# Patient Record
Sex: MALE | Race: WHITE | HISPANIC OR LATINO | URBAN - METROPOLITAN AREA
[De-identification: names, ages, dates, MRNs, and addresses within clinical notes are randomized per-mention and may not be internally consistent; named-entity substitution may affect disease eponyms.]

---

## 2018-11-13 VITALS
DIASTOLIC BLOOD PRESSURE: 74 MMHG | OXYGEN SATURATION: 99 % | RESPIRATION RATE: 16 BRPM | HEART RATE: 53 BPM | TEMPERATURE: 98 F | SYSTOLIC BLOOD PRESSURE: 164 MMHG

## 2018-11-13 RX ORDER — CHLORHEXIDINE GLUCONATE 213 G/1000ML
1 SOLUTION TOPICAL ONCE
Qty: 0 | Refills: 0 | Status: DISCONTINUED | OUTPATIENT
Start: 2018-11-14 | End: 2018-11-15

## 2018-11-13 NOTE — H&P ADULT - ASSESSMENT
73 y o Chinese speaking male POOR HISTORIAN with PMH of HTN, Dm, PVD, HLD, glaucoma CAD s/p PCI s/p most recent dx cath @ [facility] 11/6/18 revealing 2 V CAD who now  presents for recommended staged PCI of RCA 2/2 pt's risk factors, prior history of CAD, abnormal NST and known residual disease.  H/H . Pt denies bleeding, GI bleeding, hematemesis, hematuria, BRBPR or melena . ASA … and Plavix … pre-cath.  Cr. IV NS@ cc/hr pre-cath.  Risks & benefits of procedure and alternative therapy have been explained to the patient including but not limited to: allergic reaction, bleeding w/possible need for blood transfusion, infection, renal and vascular compromise, limb damage, arrhythmia, stroke, vessel dissection/perforation, Myocardial infarction, emergent CABG. Informed consent obtained and in chart 73 y o Sami speaking male BLIND POOR HISTORIAN with PMH of HTN, Dm, PVD, HLD, glaucoma CAD s/p PCI s/p most recent dx cath @ [facility] 11/6/18 revealing 2 V CAD who now  presents for recommended staged PCI of RCA 2/2 pt's risk factors, prior history of CAD, abnormal NST and known residual disease.  H/H 12.9/39.3. Pt denies bleeding, GI bleeding, hematemesis, hematuria, BRBPR or melena . Pt. loaded with  mg PO X 1 and Plavix 600 mg PO X 1 pre-cath.  Cr. 1.06 IV NS@ 75 cc/hr pre-cath.  Pt. precath consented with help of [employer]  ID # [id number]. Pt. blind, consent signed by wife Nini Robertson (HCP).   Risks & benefits of procedure and alternative therapy have been explained to the patient including but not limited to: allergic reaction, bleeding w/possible need for blood transfusion, infection, renal and vascular compromise, limb damage, arrhythmia, stroke, vessel dissection/perforation, Myocardial infarction, emergent CABG. Informed consent obtained and in chart

## 2018-11-13 NOTE — H&P ADULT - NSHPLABSRESULTS_GEN_ALL_CORE
12.9   5.6   )-----------( 195      ( 14 Nov 2018 10:57 )             39.3   11-14    140  |  103  |  22  ----------------------------<  250<H>  4.6   |  27  |  1.06    Ca    9.3      14 Nov 2018 10:57    TPro  7.2  /  Alb  4.4  /  TBili  0.4  /  DBili  x   /  AST  22  /  ALT  20  /  AlkPhos  78  11-14  PT/INR - ( 14 Nov 2018 10:57 )   PT: 13.3 sec;   INR: 1.17          PTT - ( 14 Nov 2018 10:57 )  PTT:31.6 sec

## 2018-11-13 NOTE — H&P ADULT - HISTORY OF PRESENT ILLNESS
73 y o M current smoker with PMH of HTN, Dm, PVD, HLD, glaucoma CAD s/p PCI in 2014 presented to the clinic for follow-up of ECHO and NST results. Pt underwent ECHO on 10/19/18 which indicated overall LV systolic function is nml with EF of 55-60%. NST was done 10/19/18 indicating a large sized  moderate intensity partially reversible defect in the inferolateral wall suggestive of ischemia with normal LV with EF of 60%. Pt underwent diagnostic cardiac cath on 11/6/18 in [place] which showed 2-vessel CAD. Pt denies currently CP, SOB, LE edema, PND/orthopnea, palpitations, n/v, fever/chills, dizziness, syncope, blood in the stool. In light of pt's risk factors, prior history of CAD, abnormal NST and known residual disease, pt is now referred to [facility] for recommended cardiac cath with possible intervention. pt was told to bring meds with him, please review    73 y o Luxembourgish speaking male POOR HISTORIAN with PMH of HTN, Dm, PVD, HLD, glaucoma CAD s/p PCI in 2014 presented to his doctor's office with 7/10 CP and AMBROCIO with ambulation on less than 3 city blocks and going up less than one flight of stairs. Pt endorses LE edema.  Pt underwent ECHO on 10/19/18 which indicated overall LV systolic function is nml with EF of 55-60%. NST was done 10/19/18 indicating a large sized  moderate intensity partially reversible defect in the inferolateral wall suggestive of ischemia with normal LV with EF of 60%. Pt underwent diagnostic cardiac cath on 11/6/18 in [place] which showed 2-vessel CAD. Pt denies currently PND/orthopnea, palpitations, n/v, fever/chills, dizziness, syncope, blood in the stool. In light of pt's risk factors, prior history of CAD, abnormal NST and known residual disease, pt is now referred to [facility] for recommended staged PCI of RCA. 73 y o Welsh speaking male POOR HISTORIAN with PMH of HTN, Dm, PVD, HLD, glaucoma CAD s/p PCI in 2014 presented to his doctor's office with 7/10 CP and AMBROCIO with ambulation on less than 3 city blocks and going up less than one flight of stairs. Pt endorses LE edema.  Pt underwent ECHO on 10/19/18 which indicated overall LV systolic function is nml with EF of 55-60%. NST was done 10/19/18 indicating a large sized  moderate intensity partially reversible defect in the inferolateral wall suggestive of ischemia with normal LV with EF of 60%. Pt underwent diagnostic cardiac cath on 11/6/18 in [place] which showed 2-vessel CAD. Pt denies currently PND/orthopnea, palpitations, n/v, fever/chills, dizziness, syncope, blood in the stool. In light of pt's risk factors, prior history of CAD, abnormal NST and known residual disease, pt is now referred to [facility] for recommended staged PCI of RCA. 73 y o Kyrgyz speaking male BLIND, POOR HISTORIAN with PMH of HTN, DM, PVD, HLD, glaucoma, CAD s/p PCI in 2014 presented to his doctor's office with 7/10 CP and AMBROCIO with ambulation on less than 3 city blocks and going up less than one flight of stairs. Pt endorses LE edema.  Pt underwent ECHO on 10/19/18 which indicated overall LV systolic function is nml with EF of 55-60%. NST was done 10/19/18 indicating a large sized  moderate intensity partially reversible defect in the inferolateral wall suggestive of ischemia with normal LV with EF of 60%. Pt underwent diagnostic cardiac cath on 11/6/18 in [place] which showed 2-vessel CAD. Pt denies currently PND/orthopnea, palpitations, n/v, fever/chills, dizziness, syncope, blood in the stool. In light of pt's risk factors, prior history of CAD, abnormal NST and known residual disease, pt is now referred to [facility] for recommended staged PCI of RCA.

## 2018-11-13 NOTE — H&P ADULT - RS GEN PE MLT RESP DETAILS PC
no wheezes/no subcutaneous emphysema/clear to auscultation bilaterally/breath sounds equal/respirations non-labored/normal/good air movement/airway patent/no rales/no chest wall tenderness/no intercostal retractions/no rhonchi

## 2018-11-13 NOTE — H&P ADULT - PMH
CAD (coronary artery disease)    DM (diabetes mellitus)    Glaucoma    HLD (hyperlipidemia)    HTN (hypertension)

## 2018-11-14 ENCOUNTER — INPATIENT (INPATIENT)
Facility: HOSPITAL | Age: 73
LOS: 0 days | Discharge: HOME | DRG: 247 | End: 2018-11-15
Attending: INTERNAL MEDICINE | Admitting: INTERNAL MEDICINE
Payer: MEDICARE

## 2018-11-14 DIAGNOSIS — E11.51 TYPE 2 DIABETES MELLITUS WITH DIABETIC PERIPHERAL ANGIOPATHY WITHOUT GANGRENE: ICD-10-CM

## 2018-11-14 DIAGNOSIS — Z95.5 PRESENCE OF CORONARY ANGIOPLASTY IMPLANT AND GRAFT: ICD-10-CM

## 2018-11-14 DIAGNOSIS — H54.7 UNSPECIFIED VISUAL LOSS: ICD-10-CM

## 2018-11-14 DIAGNOSIS — I25.110 ATHEROSCLEROTIC HEART DISEASE OF NATIVE CORONARY ARTERY WITH UNSTABLE ANGINA PECTORIS: ICD-10-CM

## 2018-11-14 DIAGNOSIS — Z79.4 LONG TERM (CURRENT) USE OF INSULIN: ICD-10-CM

## 2018-11-14 DIAGNOSIS — E78.5 HYPERLIPIDEMIA, UNSPECIFIED: ICD-10-CM

## 2018-11-14 DIAGNOSIS — H40.9 UNSPECIFIED GLAUCOMA: ICD-10-CM

## 2018-11-14 DIAGNOSIS — Z83.3 FAMILY HISTORY OF DIABETES MELLITUS: ICD-10-CM

## 2018-11-14 DIAGNOSIS — Z82.49 FAMILY HISTORY OF ISCHEMIC HEART DISEASE AND OTHER DISEASES OF THE CIRCULATORY SYSTEM: ICD-10-CM

## 2018-11-14 DIAGNOSIS — I10 ESSENTIAL (PRIMARY) HYPERTENSION: ICD-10-CM

## 2018-11-14 LAB
ALBUMIN SERPL ELPH-MCNC: 4.4 G/DL — SIGNIFICANT CHANGE UP (ref 3.3–5)
ALP SERPL-CCNC: 78 U/L — SIGNIFICANT CHANGE UP (ref 40–120)
ALT FLD-CCNC: 20 U/L — SIGNIFICANT CHANGE UP (ref 10–45)
ANION GAP SERPL CALC-SCNC: 10 MMOL/L — SIGNIFICANT CHANGE UP (ref 5–17)
APTT BLD: 31.6 SEC — SIGNIFICANT CHANGE UP (ref 27.5–36.3)
AST SERPL-CCNC: 22 U/L — SIGNIFICANT CHANGE UP (ref 10–40)
BASOPHILS NFR BLD AUTO: 0.2 % — SIGNIFICANT CHANGE UP (ref 0–2)
BILIRUB SERPL-MCNC: 0.4 MG/DL — SIGNIFICANT CHANGE UP (ref 0.2–1.2)
BUN SERPL-MCNC: 22 MG/DL — SIGNIFICANT CHANGE UP (ref 7–23)
CALCIUM SERPL-MCNC: 9.3 MG/DL — SIGNIFICANT CHANGE UP (ref 8.4–10.5)
CHLORIDE SERPL-SCNC: 103 MMOL/L — SIGNIFICANT CHANGE UP (ref 96–108)
CHOLEST SERPL-MCNC: 127 MG/DL — SIGNIFICANT CHANGE UP (ref 10–199)
CK MB CFR SERPL CALC: 4.5 NG/ML — SIGNIFICANT CHANGE UP (ref 0–6.7)
CK SERPL-CCNC: 170 U/L — SIGNIFICANT CHANGE UP (ref 30–200)
CO2 SERPL-SCNC: 27 MMOL/L — SIGNIFICANT CHANGE UP (ref 22–31)
CREAT SERPL-MCNC: 1.06 MG/DL — SIGNIFICANT CHANGE UP (ref 0.5–1.3)
EOSINOPHIL NFR BLD AUTO: 1.6 % — SIGNIFICANT CHANGE UP (ref 0–6)
GLUCOSE SERPL-MCNC: 250 MG/DL — HIGH (ref 70–99)
HCT VFR BLD CALC: 39.3 % — SIGNIFICANT CHANGE UP (ref 39–50)
HDLC SERPL-MCNC: 43 MG/DL — SIGNIFICANT CHANGE UP
HGB BLD-MCNC: 12.9 G/DL — LOW (ref 13–17)
INR BLD: 1.17 — HIGH (ref 0.88–1.16)
LIPID PNL WITH DIRECT LDL SERPL: 59 MG/DL — SIGNIFICANT CHANGE UP
LYMPHOCYTES # BLD AUTO: 30.1 % — SIGNIFICANT CHANGE UP (ref 13–44)
MCHC RBC-ENTMCNC: 27.9 PG — SIGNIFICANT CHANGE UP (ref 27–34)
MCHC RBC-ENTMCNC: 32.8 G/DL — SIGNIFICANT CHANGE UP (ref 32–36)
MCV RBC AUTO: 84.9 FL — SIGNIFICANT CHANGE UP (ref 80–100)
MONOCYTES NFR BLD AUTO: 6.6 % — SIGNIFICANT CHANGE UP (ref 2–14)
NEUTROPHILS NFR BLD AUTO: 61.5 % — SIGNIFICANT CHANGE UP (ref 43–77)
PLATELET # BLD AUTO: 195 K/UL — SIGNIFICANT CHANGE UP (ref 150–400)
POTASSIUM SERPL-MCNC: 4.6 MMOL/L — SIGNIFICANT CHANGE UP (ref 3.5–5.3)
POTASSIUM SERPL-SCNC: 4.6 MMOL/L — SIGNIFICANT CHANGE UP (ref 3.5–5.3)
PROT SERPL-MCNC: 7.2 G/DL — SIGNIFICANT CHANGE UP (ref 6–8.3)
PROTHROM AB SERPL-ACNC: 13.3 SEC — HIGH (ref 10–12.9)
RBC # BLD: 4.63 M/UL — SIGNIFICANT CHANGE UP (ref 4.2–5.8)
RBC # FLD: 13.2 % — SIGNIFICANT CHANGE UP (ref 10.3–16.9)
SODIUM SERPL-SCNC: 140 MMOL/L — SIGNIFICANT CHANGE UP (ref 135–145)
TOTAL CHOLESTEROL/HDL RATIO MEASUREMENT: 3 RATIO — LOW (ref 3.4–9.6)
TRIGL SERPL-MCNC: 125 MG/DL — SIGNIFICANT CHANGE UP (ref 10–149)
WBC # BLD: 5.6 K/UL — SIGNIFICANT CHANGE UP (ref 3.8–10.5)
WBC # FLD AUTO: 5.6 K/UL — SIGNIFICANT CHANGE UP (ref 3.8–10.5)

## 2018-11-14 PROCEDURE — 92928 PRQ TCAT PLMT NTRAC ST 1 LES: CPT | Mod: LD

## 2018-11-14 PROCEDURE — 93010 ELECTROCARDIOGRAM REPORT: CPT

## 2018-11-14 PROCEDURE — 92978 ENDOLUMINL IVUS OCT C 1ST: CPT | Mod: 26

## 2018-11-14 PROCEDURE — 93454 CORONARY ARTERY ANGIO S&I: CPT | Mod: 26,XU

## 2018-11-14 PROCEDURE — 93571 IV DOP VEL&/PRESS C FLO 1ST: CPT | Mod: 26

## 2018-11-14 RX ORDER — ASPIRIN/CALCIUM CARB/MAGNESIUM 324 MG
325 TABLET ORAL ONCE
Qty: 0 | Refills: 0 | Status: DISCONTINUED | OUTPATIENT
Start: 2018-11-14 | End: 2018-11-14

## 2018-11-14 RX ORDER — INSULIN LISPRO 100/ML
12 VIAL (ML) SUBCUTANEOUS
Qty: 0 | Refills: 0 | Status: DISCONTINUED | OUTPATIENT
Start: 2018-11-14 | End: 2018-11-15

## 2018-11-14 RX ORDER — INSULIN LISPRO 100/ML
VIAL (ML) SUBCUTANEOUS
Qty: 0 | Refills: 0 | Status: DISCONTINUED | OUTPATIENT
Start: 2018-11-14 | End: 2018-11-15

## 2018-11-14 RX ORDER — ATORVASTATIN CALCIUM 80 MG/1
20 TABLET, FILM COATED ORAL AT BEDTIME
Qty: 0 | Refills: 0 | Status: DISCONTINUED | OUTPATIENT
Start: 2018-11-14 | End: 2018-11-15

## 2018-11-14 RX ORDER — DEXTROSE 50 % IN WATER 50 %
25 SYRINGE (ML) INTRAVENOUS ONCE
Qty: 0 | Refills: 0 | Status: DISCONTINUED | OUTPATIENT
Start: 2018-11-14 | End: 2018-11-15

## 2018-11-14 RX ORDER — LISINOPRIL 2.5 MG/1
20 TABLET ORAL DAILY
Qty: 0 | Refills: 0 | Status: DISCONTINUED | OUTPATIENT
Start: 2018-11-15 | End: 2018-11-15

## 2018-11-14 RX ORDER — DEXTROSE 50 % IN WATER 50 %
12.5 SYRINGE (ML) INTRAVENOUS ONCE
Qty: 0 | Refills: 0 | Status: DISCONTINUED | OUTPATIENT
Start: 2018-11-14 | End: 2018-11-15

## 2018-11-14 RX ORDER — INSULIN LISPRO 100/ML
7 VIAL (ML) SUBCUTANEOUS
Qty: 0 | Refills: 0 | Status: DISCONTINUED | OUTPATIENT
Start: 2018-11-14 | End: 2018-11-15

## 2018-11-14 RX ORDER — CLOPIDOGREL BISULFATE 75 MG/1
600 TABLET, FILM COATED ORAL ONCE
Qty: 0 | Refills: 0 | Status: COMPLETED | OUTPATIENT
Start: 2018-11-14 | End: 2018-11-14

## 2018-11-14 RX ORDER — INSULIN GLARGINE 100 [IU]/ML
45 INJECTION, SOLUTION SUBCUTANEOUS EVERY MORNING
Qty: 0 | Refills: 0 | Status: DISCONTINUED | OUTPATIENT
Start: 2018-11-15 | End: 2018-11-15

## 2018-11-14 RX ORDER — INSULIN GLARGINE 100 [IU]/ML
52 INJECTION, SOLUTION SUBCUTANEOUS EVERY MORNING
Qty: 0 | Refills: 0 | Status: DISCONTINUED | OUTPATIENT
Start: 2018-11-14 | End: 2018-11-14

## 2018-11-14 RX ORDER — SODIUM CHLORIDE 9 MG/ML
1000 INJECTION INTRAMUSCULAR; INTRAVENOUS; SUBCUTANEOUS
Qty: 0 | Refills: 0 | Status: DISCONTINUED | OUTPATIENT
Start: 2018-11-14 | End: 2018-11-14

## 2018-11-14 RX ORDER — HYDROCHLOROTHIAZIDE 25 MG
12.5 TABLET ORAL DAILY
Qty: 0 | Refills: 0 | Status: DISCONTINUED | OUTPATIENT
Start: 2018-11-15 | End: 2018-11-15

## 2018-11-14 RX ORDER — SODIUM CHLORIDE 9 MG/ML
1000 INJECTION INTRAMUSCULAR; INTRAVENOUS; SUBCUTANEOUS
Qty: 0 | Refills: 0 | Status: DISCONTINUED | OUTPATIENT
Start: 2018-11-14 | End: 2018-11-15

## 2018-11-14 RX ORDER — ASPIRIN/CALCIUM CARB/MAGNESIUM 324 MG
81 TABLET ORAL DAILY
Qty: 0 | Refills: 0 | Status: DISCONTINUED | OUTPATIENT
Start: 2018-11-15 | End: 2018-11-15

## 2018-11-14 RX ORDER — TIMOLOL 0.5 %
1 DROPS OPHTHALMIC (EYE) DAILY
Qty: 0 | Refills: 0 | Status: DISCONTINUED | OUTPATIENT
Start: 2018-11-14 | End: 2018-11-15

## 2018-11-14 RX ORDER — INSULIN LISPRO 100/ML
5 VIAL (ML) SUBCUTANEOUS
Qty: 0 | Refills: 0 | Status: DISCONTINUED | OUTPATIENT
Start: 2018-11-14 | End: 2018-11-15

## 2018-11-14 RX ORDER — INSULIN LISPRO 100/ML
VIAL (ML) SUBCUTANEOUS ONCE
Qty: 0 | Refills: 0 | Status: DISCONTINUED | OUTPATIENT
Start: 2018-11-14 | End: 2018-11-15

## 2018-11-14 RX ORDER — LATANOPROST 0.05 MG/ML
1 SOLUTION/ DROPS OPHTHALMIC; TOPICAL AT BEDTIME
Qty: 0 | Refills: 0 | Status: DISCONTINUED | OUTPATIENT
Start: 2018-11-14 | End: 2018-11-15

## 2018-11-14 RX ORDER — METOPROLOL TARTRATE 50 MG
100 TABLET ORAL DAILY
Qty: 0 | Refills: 0 | Status: DISCONTINUED | OUTPATIENT
Start: 2018-11-14 | End: 2018-11-15

## 2018-11-14 RX ORDER — ASPIRIN/CALCIUM CARB/MAGNESIUM 324 MG
325 TABLET ORAL ONCE
Qty: 0 | Refills: 0 | Status: COMPLETED | OUTPATIENT
Start: 2018-11-14 | End: 2018-11-14

## 2018-11-14 RX ORDER — DEXTROSE 50 % IN WATER 50 %
15 SYRINGE (ML) INTRAVENOUS ONCE
Qty: 0 | Refills: 0 | Status: DISCONTINUED | OUTPATIENT
Start: 2018-11-14 | End: 2018-11-15

## 2018-11-14 RX ORDER — BENAZEPRIL HYDROCHLORIDE 40 MG/1
1 TABLET ORAL
Qty: 0 | Refills: 0 | COMMUNITY

## 2018-11-14 RX ORDER — INSULIN LISPRO 100/ML
0 VIAL (ML) SUBCUTANEOUS
Qty: 0 | Refills: 0 | COMMUNITY

## 2018-11-14 RX ORDER — INSULIN GLARGINE 100 [IU]/ML
0 INJECTION, SOLUTION SUBCUTANEOUS
Qty: 0 | Refills: 0 | COMMUNITY

## 2018-11-14 RX ORDER — GLUCAGON INJECTION, SOLUTION 0.5 MG/.1ML
1 INJECTION, SOLUTION SUBCUTANEOUS ONCE
Qty: 0 | Refills: 0 | Status: DISCONTINUED | OUTPATIENT
Start: 2018-11-14 | End: 2018-11-15

## 2018-11-14 RX ORDER — SODIUM CHLORIDE 9 MG/ML
1000 INJECTION, SOLUTION INTRAVENOUS
Qty: 0 | Refills: 0 | Status: DISCONTINUED | OUTPATIENT
Start: 2018-11-14 | End: 2018-11-15

## 2018-11-14 RX ORDER — CLOPIDOGREL BISULFATE 75 MG/1
75 TABLET, FILM COATED ORAL DAILY
Qty: 0 | Refills: 0 | Status: DISCONTINUED | OUTPATIENT
Start: 2018-11-15 | End: 2018-11-15

## 2018-11-14 RX ADMIN — CLOPIDOGREL BISULFATE 600 MILLIGRAM(S): 75 TABLET, FILM COATED ORAL at 12:05

## 2018-11-14 RX ADMIN — LATANOPROST 1 DROP(S): 0.05 SOLUTION/ DROPS OPHTHALMIC; TOPICAL at 22:52

## 2018-11-14 RX ADMIN — Medication 325 MILLIGRAM(S): at 12:09

## 2018-11-14 RX ADMIN — Medication 1 DROP(S): at 22:52

## 2018-11-14 RX ADMIN — SODIUM CHLORIDE 75 MILLILITER(S): 9 INJECTION INTRAMUSCULAR; INTRAVENOUS; SUBCUTANEOUS at 12:04

## 2018-11-14 NOTE — PROGRESS NOTE ADULT - SUBJECTIVE AND OBJECTIVE BOX
Interventional Cardiology Radial band Removal Note    s/p Heparin     Pt without complaints.  VSS.    RightRadial access site Radar Hemoband in place, No hematoma, no bleed  Radial pulse: 2+    Hemostasis achieved with manual release of hemoband.    No Vasovagal reaction.    Meds given: None    Right Radial access site  no hematoma, no bleed  Radial pulse: 2+    A/P:  s/p Dx PTCA/Stent ([place] one)  -	continue to monitor  -	-OOB as tolerated  -	Post Procedure Instructions given  -                   Call [phone] if any access site issues.

## 2018-11-14 NOTE — PROGRESS NOTE ADULT - SUBJECTIVE AND OBJECTIVE BOX
Interventional Cardiology PA Sheath Pull Note    Pt without complaints. VSS      Pre-Sheath Removal:    [ X] Right     [ ] Left          [X] Groin    [ ] Brachial    [ ] 5Fr      [X ] 6Fr    [ ] 7Fr     [ ] 8Fr    [X ] Arterial  [ ] Venous sheath in place    [ ] Hematoma        [ ] Bleed    Pulses:    [X ] Right     [ ] Left       DP:  [X ]  Doppler   [ ]  Faint    [ ]   1+    [ ] 2+       Hemostasis Achieved with:          20       minutes manual pressure    Vasovagal Reaction: No    Meds Given: None      Post-Sheath Removal:    [ X] Right     [ ] Left          [ ] Groin    [ ] Brachial    [ ] Hematoma        [ ] Bleed       [ ] Bruit    Pulses:    [X ] Right     [ ] Left       DP:  [X]  Doppler   [ ]  Faint    [ ]   1+    [ ] 2+     A/P:  s/p [ ] Dx Cath      [ ] IVUS/FFR     [ ] PTA/STENT       [X ] PTCA/STENT    -Continue bedrest (pt given instructions)  -Continue to monitor

## 2018-11-15 ENCOUNTER — TRANSCRIPTION ENCOUNTER (OUTPATIENT)
Age: 73
End: 2018-11-15

## 2018-11-15 VITALS — TEMPERATURE: 98 F

## 2018-11-15 LAB
ANION GAP SERPL CALC-SCNC: 10 MMOL/L — SIGNIFICANT CHANGE UP (ref 5–17)
BASOPHILS NFR BLD AUTO: 0.2 % — SIGNIFICANT CHANGE UP (ref 0–2)
BUN SERPL-MCNC: 16 MG/DL — SIGNIFICANT CHANGE UP (ref 7–23)
CALCIUM SERPL-MCNC: 9.1 MG/DL — SIGNIFICANT CHANGE UP (ref 8.4–10.5)
CHLORIDE SERPL-SCNC: 103 MMOL/L — SIGNIFICANT CHANGE UP (ref 96–108)
CO2 SERPL-SCNC: 24 MMOL/L — SIGNIFICANT CHANGE UP (ref 22–31)
CREAT SERPL-MCNC: 0.92 MG/DL — SIGNIFICANT CHANGE UP (ref 0.5–1.3)
EOSINOPHIL NFR BLD AUTO: 1.4 % — SIGNIFICANT CHANGE UP (ref 0–6)
GLUCOSE SERPL-MCNC: 171 MG/DL — HIGH (ref 70–99)
HBA1C BLD-MCNC: 8.1 % — HIGH (ref 4–5.6)
HCT VFR BLD CALC: 37.7 % — LOW (ref 39–50)
HGB BLD-MCNC: 12.2 G/DL — LOW (ref 13–17)
LYMPHOCYTES # BLD AUTO: 19 % — SIGNIFICANT CHANGE UP (ref 13–44)
MAGNESIUM SERPL-MCNC: 1.7 MG/DL — SIGNIFICANT CHANGE UP (ref 1.6–2.6)
MCHC RBC-ENTMCNC: 27.5 PG — SIGNIFICANT CHANGE UP (ref 27–34)
MCHC RBC-ENTMCNC: 32.4 G/DL — SIGNIFICANT CHANGE UP (ref 32–36)
MCV RBC AUTO: 85.1 FL — SIGNIFICANT CHANGE UP (ref 80–100)
MONOCYTES NFR BLD AUTO: 7.1 % — SIGNIFICANT CHANGE UP (ref 2–14)
NEUTROPHILS NFR BLD AUTO: 72.3 % — SIGNIFICANT CHANGE UP (ref 43–77)
PLATELET # BLD AUTO: 166 K/UL — SIGNIFICANT CHANGE UP (ref 150–400)
POTASSIUM SERPL-MCNC: 4.1 MMOL/L — SIGNIFICANT CHANGE UP (ref 3.5–5.3)
POTASSIUM SERPL-SCNC: 4.1 MMOL/L — SIGNIFICANT CHANGE UP (ref 3.5–5.3)
RBC # BLD: 4.43 M/UL — SIGNIFICANT CHANGE UP (ref 4.2–5.8)
RBC # FLD: 13.3 % — SIGNIFICANT CHANGE UP (ref 10.3–16.9)
SODIUM SERPL-SCNC: 137 MMOL/L — SIGNIFICANT CHANGE UP (ref 135–145)
WBC # BLD: 5.9 K/UL — SIGNIFICANT CHANGE UP (ref 3.8–10.5)
WBC # FLD AUTO: 5.9 K/UL — SIGNIFICANT CHANGE UP (ref 3.8–10.5)

## 2018-11-15 PROCEDURE — 99231 SBSQ HOSP IP/OBS SF/LOW 25: CPT | Mod: 25

## 2018-11-15 PROCEDURE — 99239 HOSP IP/OBS DSCHRG MGMT >30: CPT

## 2018-11-15 RX ORDER — CLOPIDOGREL BISULFATE 75 MG/1
1 TABLET, FILM COATED ORAL
Qty: 30 | Refills: 11 | OUTPATIENT
Start: 2018-11-15 | End: 2019-11-09

## 2018-11-15 RX ORDER — MAGNESIUM SULFATE 500 MG/ML
2 VIAL (ML) INJECTION ONCE
Qty: 0 | Refills: 0 | Status: COMPLETED | OUTPATIENT
Start: 2018-11-15 | End: 2018-11-15

## 2018-11-15 RX ORDER — ATORVASTATIN CALCIUM 80 MG/1
1 TABLET, FILM COATED ORAL
Qty: 0 | Refills: 0 | COMMUNITY

## 2018-11-15 RX ORDER — ATORVASTATIN CALCIUM 80 MG/1
1 TABLET, FILM COATED ORAL
Qty: 30 | Refills: 3 | OUTPATIENT
Start: 2018-11-15 | End: 2019-03-14

## 2018-11-15 RX ADMIN — Medication 5 UNIT(S): at 07:34

## 2018-11-15 RX ADMIN — CLOPIDOGREL BISULFATE 75 MILLIGRAM(S): 75 TABLET, FILM COATED ORAL at 11:58

## 2018-11-15 RX ADMIN — Medication 100 MILLIGRAM(S): at 10:56

## 2018-11-15 RX ADMIN — Medication 2: at 07:34

## 2018-11-15 RX ADMIN — Medication 1 DROP(S): at 12:00

## 2018-11-15 RX ADMIN — Medication 81 MILLIGRAM(S): at 11:59

## 2018-11-15 RX ADMIN — Medication 6: at 11:59

## 2018-11-15 RX ADMIN — Medication 12.5 MILLIGRAM(S): at 05:31

## 2018-11-15 RX ADMIN — Medication 7 UNIT(S): at 12:00

## 2018-11-15 RX ADMIN — INSULIN GLARGINE 45 UNIT(S): 100 INJECTION, SOLUTION SUBCUTANEOUS at 07:33

## 2018-11-15 RX ADMIN — Medication 50 GRAM(S): at 09:07

## 2018-11-15 RX ADMIN — LISINOPRIL 20 MILLIGRAM(S): 2.5 TABLET ORAL at 05:31

## 2018-11-15 NOTE — DISCHARGE NOTE ADULT - PATIENT PORTAL LINK FT
You can access the [facility] Patient Portal, offered by [facility], by registering with the following website: [email or web address] [place]

## 2018-11-15 NOTE — DISCHARGE NOTE ADULT - CARE PLAN
Principal Discharge DX:	CAD (coronary artery disease)  Goal:	You have blockages in the arteries that give blood and oxygen to your heart arteries. It is called CORONARY ARTERY DISEASE. You had a procedure called CARDIAC CATHETERIZATION and got a drug eluting stent to your mid RIGHT CORONARY ARTERY. You also had BALLOONING to your mid LEFT ANTERIOR DESCENDING coronary artery. You have ANOTHER BLOCKAGE in your LEFT CIRCUMFLEX that you need to COME BACK for another procedure in 5 WEEKS. It is VERY IMPORTANT that you take ASPIRIN 81mg once daily and PLAVIX (CLOPIDOGREL) 75mg once daily to keep your stents open. If you do not take them EVERY SINGLE DAY your stents can close and you can have a heart attack.  Assessment and plan of treatment:	Right wrist and groin care: do not lift objects heavier than 5 lbs for 5 days. Observe for signs of bleeding including bleeding/sudden swelling to your right wrist or groin site, new/worsening back pain, or numbness/tingling/pain/coolness to your right arm or leg. If you experience these symptoms call your doctor and go to the nearest ED immediately. Follow up with Dr Latrice Goldberg in 1-2 weeks.  Secondary Diagnosis:	DM (diabetes mellitus)  Goal:	Continue medications for diabetes  Secondary Diagnosis:	HTN (hypertension)  Goal:	Continue medications for blood pressure  Secondary Diagnosis:	HLD (hyperlipidemia)  Goal:	We are INCREASING your cholesterol medication. STOP taking ATORVASTATIN 20mg once daily at bedtime. START taking ATORVASTATIN 40mg once daily at bedtime.  Secondary Diagnosis:	Glaucoma  Goal:	Continue glaucoma medications.

## 2018-11-15 NOTE — DISCHARGE NOTE ADULT - MEDICATION SUMMARY - MEDICATIONS TO TAKE
I will START or STAY ON the medications listed below when I get home from the hospital:    Ecotrin Adult Low Strength 81 mg oral delayed release tablet  -- 1 tab(s) by mouth once a day  -- Indication: For Coronary artery disease/ KEEPING YOUR STENTS OPEN    Lantus 100 units/mL subcutaneous solution  -- 52 unit(s) subcutaneous once a day (in the morning)  -- Indication: For Diabetes    HumaLOG 100 units/mL injectable solution  -- 8 units in am; 10 units in lunch and 15 units at dinner  -- Indication: For Diabetes    atorvastatin 40 mg oral tablet  -- 1 tab(s) by mouth once a day   -- Avoid grapefruit and grapefruit juice while taking this medication.  Do not take this drug if you are pregnant.  It is very important that you take or use this exactly as directed.  Do not skip doses or discontinue unless directed by your doctor.  Obtain medical advice before taking any non-prescription drugs as some may affect the action of this medication.  Take with food or milk.    -- Indication: For Cholesterol control    benazepril-hydrochlorothiazide 20 mg-12.5 mg oral tablet  -- 1 tab(s) by mouth once a day  -- Indication: For Blood pressure control    clopidogrel 75 mg oral tablet  -- 1 tab(s) by mouth once a day  -- Indication: For Coronary artery disease/ KEEPING YOUR STENTS OPEN    Toprol- mg oral tablet, extended release  -- 1 tab(s) by mouth once a day  -- Indication: For Blood pressure control    latanoprost 0.005% ophthalmic solution  -- 1 drop(s) to each affected eye once a day (in the evening) left eye  -- Indication: For Glaucoma    Simbrinza 1%- 0.2% ophthalmic suspension  -- 1 drop(s) to each affected eye 3 times a day  -- Indication: For Glaucoma    timolol maleate 0.5% ophthalmic solution  -- 1 drop(s) to each affected eye once a day  -- Indication: For Glaucoma

## 2018-11-15 NOTE — PROGRESS NOTE ADULT - SUBJECTIVE AND OBJECTIVE BOX
INTERVENTIONAL CARDIOLOGY FOLLOW UP NOTE    -Patient seen and examined this am  -No events overnight  -No complaints this am    VASCULAR ACCESS EXAM:     FEMORAL:    2+ right femoral pulse  2+ DP/PT pulses.  -Access site clean, non-tender, without ecchymosis or hematoma.    RADIAL:   2+ right/ radial pulse   Normal capillary refill. No evidence of motor or sensory deficits of digits, hand, wrist or forearm.   -Access site clean, non-tender, without ecchymosis or hematoma.    A/P  72 yo M with PMH CAD/DLD now s/p PCI of RCA with WINTER and PTCA of LAD ISR  via radial and femoral approach with no evidence of vascular complications post procedure.     -continue with current medications including dual antiplatelet therapy   -discharge instructions as per protocol   -outpatient follow up with primary cardiologist

## 2018-11-15 NOTE — DISCHARGE NOTE ADULT - PLAN OF CARE
You have blockages in the arteries that give blood and oxygen to your heart arteries. It is called CORONARY ARTERY DISEASE. You had a procedure called CARDIAC CATHETERIZATION and got a drug eluting stent to your mid RIGHT CORONARY ARTERY. You also had BALLOONING to your mid LEFT ANTERIOR DESCENDING coronary artery. You have ANOTHER BLOCKAGE in your LEFT CIRCUMFLEX that you need to COME BACK for another procedure in 5 WEEKS. It is VERY IMPORTANT that you take ASPIRIN 81mg once daily and PLAVIX (CLOPIDOGREL) 75mg once daily to keep your stents open. If you do not take them EVERY SINGLE DAY your stents can close and you can have a heart attack. Right wrist and groin care: do not lift objects heavier than 5 lbs for 5 days. Observe for signs of bleeding including bleeding/sudden swelling to your right wrist or groin site, new/worsening back pain, or numbness/tingling/pain/coolness to your right arm or leg. If you experience these symptoms call your doctor and go to the nearest ED immediately. Follow up with Dr Latrice Goldberg in 1-2 weeks. Continue medications for diabetes Continue medications for blood pressure We are INCREASING your cholesterol medication. STOP taking ATORVASTATIN 20mg once daily at bedtime. START taking ATORVASTATIN 40mg once daily at bedtime. Continue glaucoma medications.

## 2018-11-15 NOTE — DISCHARGE NOTE ADULT - HOSPITAL COURSE
73 y o Hungarian speaking male BLIND, POOR HISTORIAN with PMH of HTN, DM, PVD, HLD, glaucoma, CAD s/p PCI in 2014 presented to his doctor's office with 7/10 CP and AMBROCIO with ambulation on less than 3 city blocks and going up less than one flight of stairs. Pt endorses LE edema.  Pt underwent ECHO on 10/19/18 which indicated overall LV systolic function is nml with EF of 55-60%. NST was done 10/19/18 indicating a large sized  moderate intensity partially reversible defect in the inferolateral wall suggestive of ischemia with normal LV with EF of 60%. Pt underwent diagnostic cardiac cath on 11/6/18 in [place] which showed 2-vessel CAD. Pt denies currently PND/orthopnea, palpitations, n/v, fever/chills, dizziness, syncope, blood in the stool. In light of pt's risk factors, prior history of CAD, abnormal NST and known residual disease, pt is now referred to [facility] for recommended staged PCI of RCA. 73 y o Azeri speaking male BLIND, POOR HISTORIAN with PMH of HTN, DM, PVD, HLD, glaucoma, CAD s/p PCI in 2014 presented to his doctor's office with 7/10 CP and AMBROCIO with ambulation on less than 3 city blocks and going up less than one flight of stairs. Pt endorses LE edema.  Pt underwent ECHO on 10/19/18 which indicated overall LV systolic function is nml with EF of 55-60%. NST was done 10/19/18 indicating a large sized  moderate intensity partially reversible defect in the inferolateral wall suggestive of ischemia with normal LV with EF of 60%. Pt underwent diagnostic cardiac cath on 11/6/18 in [place] which showed 2-vessel CAD. Pt denies currently PND/orthopnea, palpitations, n/v, fever/chills, dizziness, syncope, blood in the stool. In light of pt's risk factors, prior history of CAD, abnormal NST and known residual disease, pt is now referred to [facility] for recommended staged PCI of RCA. Pt now s/p cardiac cath 11/14/18: PTCA WINTER mRCA 90%, patent proximal RCA stent. PTCA Angiosculpt proximal to mid LAD severe ISR (IFR 0.84 (+)). LM 20-30%. Proximal LCx 70%, distal LCx 99%. Return for staged PCI LCx. EF 55-60% by echo 10/2018. Radial band 5 pm Radar Band 5 pm. Hepairn 5 PM 6 F sheath right groin.  5 PM. Atropine 0.6 mg IV x 1 given for  HR 30-40's baseline 50's. HR back to baseline. Insulin doses decreased while inpatient mealtime and basal. Patine ton Lantus 52 AM and Humalo 8,10,15 at home ordered for 5,7,and 12. Pt's labs and VSS. PT stable for discharge home as per Dr Bravo. Pt's Atorvastatin 20mg daily increased to Atorvastatin 40mg daily. Pt will follow up with Dr Goldberg in 1-2 weeks.

## 2018-11-15 NOTE — DISCHARGE NOTE ADULT - PROVIDER TOKENS
FREE:[LAST:[Yusuf],FIRST:[Ben],PHONE:[phone]],FAX:[(   )    -],ADDRESS:[[address] [place] [address]]]

## 2018-12-02 PROCEDURE — C1725: CPT

## 2018-12-02 PROCEDURE — C1753: CPT

## 2018-12-02 PROCEDURE — C1889: CPT

## 2018-12-02 PROCEDURE — C1769: CPT

## 2018-12-02 PROCEDURE — 82962 GLUCOSE BLOOD TEST: CPT

## 2018-12-02 PROCEDURE — 83036 HEMOGLOBIN GLYCOSYLATED A1C: CPT

## 2018-12-02 PROCEDURE — 85610 PROTHROMBIN TIME: CPT

## 2018-12-02 PROCEDURE — 85730 THROMBOPLASTIN TIME PARTIAL: CPT

## 2018-12-02 PROCEDURE — C1894: CPT

## 2018-12-02 PROCEDURE — 36415 COLL VENOUS BLD VENIPUNCTURE: CPT

## 2018-12-02 PROCEDURE — C1887: CPT

## 2018-12-02 PROCEDURE — 80053 COMPREHEN METABOLIC PANEL: CPT

## 2018-12-02 PROCEDURE — 80061 LIPID PANEL: CPT

## 2018-12-02 PROCEDURE — 80048 BASIC METABOLIC PNL TOTAL CA: CPT

## 2018-12-02 PROCEDURE — 93005 ELECTROCARDIOGRAM TRACING: CPT

## 2018-12-02 PROCEDURE — 83735 ASSAY OF MAGNESIUM: CPT

## 2018-12-02 PROCEDURE — C1874: CPT

## 2018-12-02 PROCEDURE — 82550 ASSAY OF CK (CPK): CPT

## 2018-12-02 PROCEDURE — 85025 COMPLETE CBC W/AUTO DIFF WBC: CPT

## 2018-12-02 PROCEDURE — 82553 CREATINE MB FRACTION: CPT

## 2019-02-25 VITALS
DIASTOLIC BLOOD PRESSURE: 67 MMHG | OXYGEN SATURATION: 99 % | HEIGHT: 66 IN | HEART RATE: 61 BPM | TEMPERATURE: 98 F | RESPIRATION RATE: 16 BRPM | WEIGHT: 170.42 LBS | SYSTOLIC BLOOD PRESSURE: 143 MMHG

## 2019-02-25 PROBLEM — I25.10 ATHEROSCLEROTIC HEART DISEASE OF NATIVE CORONARY ARTERY WITHOUT ANGINA PECTORIS: Chronic | Status: ACTIVE | Noted: 2018-11-13

## 2019-02-25 PROBLEM — H40.9 UNSPECIFIED GLAUCOMA: Chronic | Status: ACTIVE | Noted: 2018-11-13

## 2019-02-25 PROBLEM — E11.9 TYPE 2 DIABETES MELLITUS WITHOUT COMPLICATIONS: Chronic | Status: ACTIVE | Noted: 2018-11-13

## 2019-02-25 PROBLEM — E78.5 HYPERLIPIDEMIA, UNSPECIFIED: Chronic | Status: ACTIVE | Noted: 2018-11-13

## 2019-02-25 PROBLEM — I10 ESSENTIAL (PRIMARY) HYPERTENSION: Chronic | Status: ACTIVE | Noted: 2018-11-13

## 2019-02-25 NOTE — H&P ADULT - ATTENDING COMMENTS
See PA note written above, for details. I reviewed the PA documentation.  I reviewed vitals, labs, medications, cardiac studies and additional imaging 2/27/19.  I agree with the PA's findings and plans as written above with the following additions/amendments:  73 blind M, with Essential HTN, DM, PVD, HLD, Glaucoma, CAD s/p multiple PCI (last 11/14/18) who presented today to [facility] for recommended staged PCI. Patient is now s/p PCI to dLCx and PTCA of mLCX ISR. LVEF 55-60% per TTE.      Plan for:  CTSx evaluation of residual severe mLAD ISR   DAPT with ASA/Plavix  High intensity statin Atorva 80  Metoprolol 100XL daily  Order HbA1c, TFTs, FLP  Patient to be referred to cardiac rehabilitation upon discharge for cardiac conditioning    NOEMI Ronquillo.  Cardiology Attending

## 2019-02-25 NOTE — H&P ADULT - HISTORY OF PRESENT ILLNESS
74 yo Equ[place] Guinean speaking, blind M, POOR HISTORIAN with PMH of HTN, DM, PVD, HLD, glaucoma, CAD s/p multiple PCI (most recently @ [facility] on 11/14/18 WINTER mRCA with residual pLCX 70% ISR and dLCx 99%) who presented to cardiologist c/o persistent fatigue and palpitations. Pt denies CP, SOB, AMBROCIO, dizziness, syncope, diaphoresis, orthopnea, PND, LE edema, N/V, melena, hematochezia, fever or chills. Prior to previous procedure pt c/o 7/10 CP and AMBROCIO with ambulation on less than 3 city blocks and going up less than one flight of stairs. Pt endorsed LE edema. At that time pt underwent ECHO (10/19/18) revealing overall LV systolic function is nml with EF of 55-60%,  NST (10/19/18) indicating a large sized  moderate intensity partially reversible defect in the inferolateral wall suggestive of ischemia with normal LV with EF of 60%.    In light of pt's risk factors, abnormal NST and known residual disease, pt is now referred to [facility] for recommended staged PCI of LCx.    Cardiac CATH (11/14/18 @ [facility]): dLMCA 20-30%, prox to mid LAD severe ISR (IFR 0.84), pLCx 70% ISR, dLCx 99%, pRCA stent patent, mRCA 90% tubular s/p WINTER.

## 2019-02-25 NOTE — H&P ADULT - ASSESSMENT
74 yo Tristanian speaking, blind M, POOR HISTORIAN with PMH of HTN, DM, PVD, HLD, glaucoma, CAD s/p multiple PCI (most recently @ [facility] on 11/14/18 WINTER mRCA with residual pLCX 70% ISR and dLCx 99%) who presented today to [facility] for recommended staged PCI.    ASA III and Mallampati III    OF NOTE: pt was loaded with Plavix 75 mg PO x1 and  mg PO x1 prior to procedure.    Risks & benefits of procedure and alternative therapy have been explained to the patient including but not limited to: allergic reaction, bleeding w/possible need for blood transfusion, infection, renal and vascular compromise, limb damage, arrhythmia, stroke, vessel dissection/perforation, Myocardial infarction, emergent CABG. Informed consent obtained and in chart. 74 yo Palauan speaking, blind M, POOR HISTORIAN with PMH of HTN, DM, PVD, HLD, glaucoma, CAD s/p multiple PCI (most recently @ [facility] on 11/14/18 WINTER mRCA with residual pLCX 70% ISR and dLCx 99%) who presented today to [facility] for recommended staged PCI.    ASA III and Mallampati III    OF NOTE: pt was loaded with Plavix 75 mg PO x1 and  mg PO x1 prior to procedure. The history was taken and the consent was obtained with the help of [employer]  George # [id number].     Risks & benefits of procedure and alternative therapy have been explained to the patient including but not limited to: allergic reaction, bleeding w/possible need for blood transfusion, infection, renal and vascular compromise, limb damage, arrhythmia, stroke, vessel dissection/perforation, Myocardial infarction, emergent CABG. Informed consent obtained and in chart.

## 2019-02-27 ENCOUNTER — INPATIENT (INPATIENT)
Facility: HOSPITAL | Age: 74
LOS: 0 days | Discharge: HOME | DRG: 247 | End: 2019-02-28
Attending: INTERNAL MEDICINE | Admitting: INTERNAL MEDICINE
Payer: MEDICARE

## 2019-02-27 PROBLEM — Z00.00 ENCOUNTER FOR PREVENTIVE HEALTH EXAMINATION: Status: ACTIVE | Noted: 2019-02-27

## 2019-02-27 LAB
ALBUMIN SERPL ELPH-MCNC: 4.2 G/DL — SIGNIFICANT CHANGE UP (ref 3.3–5)
ALP SERPL-CCNC: 74 U/L — SIGNIFICANT CHANGE UP (ref 40–120)
ALT FLD-CCNC: 21 U/L — SIGNIFICANT CHANGE UP (ref 10–45)
ANION GAP SERPL CALC-SCNC: 10 MMOL/L — SIGNIFICANT CHANGE UP (ref 5–17)
APTT BLD: 32.4 SEC — SIGNIFICANT CHANGE UP (ref 27.5–36.3)
AST SERPL-CCNC: 19 U/L — SIGNIFICANT CHANGE UP (ref 10–40)
BASOPHILS # BLD AUTO: 0.02 K/UL — SIGNIFICANT CHANGE UP (ref 0–0.2)
BASOPHILS NFR BLD AUTO: 0.3 % — SIGNIFICANT CHANGE UP (ref 0–2)
BILIRUB SERPL-MCNC: 0.5 MG/DL — SIGNIFICANT CHANGE UP (ref 0.2–1.2)
BUN SERPL-MCNC: 16 MG/DL — SIGNIFICANT CHANGE UP (ref 7–23)
CALCIUM SERPL-MCNC: 9.1 MG/DL — SIGNIFICANT CHANGE UP (ref 8.4–10.5)
CHLORIDE SERPL-SCNC: 105 MMOL/L — SIGNIFICANT CHANGE UP (ref 96–108)
CHOLEST SERPL-MCNC: 93 MG/DL — SIGNIFICANT CHANGE UP (ref 10–199)
CK MB CFR SERPL CALC: 4 NG/ML — SIGNIFICANT CHANGE UP (ref 0–6.7)
CK SERPL-CCNC: 152 U/L — SIGNIFICANT CHANGE UP (ref 30–200)
CO2 SERPL-SCNC: 26 MMOL/L — SIGNIFICANT CHANGE UP (ref 22–31)
CREAT SERPL-MCNC: 0.84 MG/DL — SIGNIFICANT CHANGE UP (ref 0.5–1.3)
EOSINOPHIL # BLD AUTO: 0.07 K/UL — SIGNIFICANT CHANGE UP (ref 0–0.5)
EOSINOPHIL NFR BLD AUTO: 1.1 % — SIGNIFICANT CHANGE UP (ref 0–6)
GLUCOSE BLDC GLUCOMTR-MCNC: 133 MG/DL — HIGH (ref 70–99)
GLUCOSE SERPL-MCNC: 144 MG/DL — HIGH (ref 70–99)
HBA1C BLD-MCNC: 8.7 % — HIGH (ref 4–5.6)
HCT VFR BLD CALC: 38.1 % — LOW (ref 39–50)
HDLC SERPL-MCNC: 40 MG/DL — SIGNIFICANT CHANGE UP
HGB BLD-MCNC: 12.7 G/DL — LOW (ref 13–17)
IMM GRANULOCYTES NFR BLD AUTO: 0.3 % — SIGNIFICANT CHANGE UP (ref 0–1.5)
INR BLD: 1.19 — HIGH (ref 0.88–1.16)
LIPID PNL WITH DIRECT LDL SERPL: 35 MG/DL — SIGNIFICANT CHANGE UP
LYMPHOCYTES # BLD AUTO: 1.47 K/UL — SIGNIFICANT CHANGE UP (ref 1–3.3)
LYMPHOCYTES # BLD AUTO: 22.3 % — SIGNIFICANT CHANGE UP (ref 13–44)
MCHC RBC-ENTMCNC: 28.3 PG — SIGNIFICANT CHANGE UP (ref 27–34)
MCHC RBC-ENTMCNC: 33.3 GM/DL — SIGNIFICANT CHANGE UP (ref 32–36)
MCV RBC AUTO: 84.9 FL — SIGNIFICANT CHANGE UP (ref 80–100)
MONOCYTES # BLD AUTO: 0.58 K/UL — SIGNIFICANT CHANGE UP (ref 0–0.9)
MONOCYTES NFR BLD AUTO: 8.8 % — SIGNIFICANT CHANGE UP (ref 2–14)
NEUTROPHILS # BLD AUTO: 4.43 K/UL — SIGNIFICANT CHANGE UP (ref 1.8–7.4)
NEUTROPHILS NFR BLD AUTO: 67.2 % — SIGNIFICANT CHANGE UP (ref 43–77)
NRBC # BLD: 0 /100 WBCS — SIGNIFICANT CHANGE UP (ref 0–0)
PLATELET # BLD AUTO: 183 K/UL — SIGNIFICANT CHANGE UP (ref 150–400)
POTASSIUM SERPL-MCNC: 3.9 MMOL/L — SIGNIFICANT CHANGE UP (ref 3.5–5.3)
POTASSIUM SERPL-SCNC: 3.9 MMOL/L — SIGNIFICANT CHANGE UP (ref 3.5–5.3)
PROT SERPL-MCNC: 7 G/DL — SIGNIFICANT CHANGE UP (ref 6–8.3)
PROTHROM AB SERPL-ACNC: 13.5 SEC — HIGH (ref 10–12.9)
RBC # BLD: 4.49 M/UL — SIGNIFICANT CHANGE UP (ref 4.2–5.8)
RBC # FLD: 13 % — SIGNIFICANT CHANGE UP (ref 10.3–14.5)
SODIUM SERPL-SCNC: 141 MMOL/L — SIGNIFICANT CHANGE UP (ref 135–145)
TOTAL CHOLESTEROL/HDL RATIO MEASUREMENT: 2.3 RATIO — LOW (ref 3.4–9.6)
TRIGL SERPL-MCNC: 88 MG/DL — SIGNIFICANT CHANGE UP (ref 10–149)
WBC # BLD: 6.59 K/UL — SIGNIFICANT CHANGE UP (ref 3.8–10.5)
WBC # FLD AUTO: 6.59 K/UL — SIGNIFICANT CHANGE UP (ref 3.8–10.5)

## 2019-02-27 PROCEDURE — 71046 X-RAY EXAM CHEST 2 VIEWS: CPT | Mod: 26

## 2019-02-27 PROCEDURE — 93010 ELECTROCARDIOGRAM REPORT: CPT

## 2019-02-27 PROCEDURE — 99222 1ST HOSP IP/OBS MODERATE 55: CPT

## 2019-02-27 RX ORDER — GLUCAGON INJECTION, SOLUTION 0.5 MG/.1ML
1 INJECTION, SOLUTION SUBCUTANEOUS ONCE
Qty: 0 | Refills: 0 | Status: DISCONTINUED | OUTPATIENT
Start: 2019-02-27 | End: 2019-02-28

## 2019-02-27 RX ORDER — DEXTROSE 50 % IN WATER 50 %
25 SYRINGE (ML) INTRAVENOUS ONCE
Qty: 0 | Refills: 0 | Status: DISCONTINUED | OUTPATIENT
Start: 2019-02-27 | End: 2019-02-28

## 2019-02-27 RX ORDER — CLOPIDOGREL BISULFATE 75 MG/1
75 TABLET, FILM COATED ORAL ONCE
Qty: 0 | Refills: 0 | Status: COMPLETED | OUTPATIENT
Start: 2019-02-27 | End: 2019-02-27

## 2019-02-27 RX ORDER — ASPIRIN/CALCIUM CARB/MAGNESIUM 324 MG
81 TABLET ORAL DAILY
Qty: 0 | Refills: 0 | Status: DISCONTINUED | OUTPATIENT
Start: 2019-02-28 | End: 2019-02-28

## 2019-02-27 RX ORDER — DORZOLAMIDE HYDROCHLORIDE 20 MG/ML
1 SOLUTION/ DROPS OPHTHALMIC THREE TIMES A DAY
Qty: 0 | Refills: 0 | Status: DISCONTINUED | OUTPATIENT
Start: 2019-02-27 | End: 2019-02-28

## 2019-02-27 RX ORDER — METOPROLOL TARTRATE 50 MG
100 TABLET ORAL DAILY
Qty: 0 | Refills: 0 | Status: DISCONTINUED | OUTPATIENT
Start: 2019-02-27 | End: 2019-02-28

## 2019-02-27 RX ORDER — DEXTROSE 50 % IN WATER 50 %
15 SYRINGE (ML) INTRAVENOUS ONCE
Qty: 0 | Refills: 0 | Status: DISCONTINUED | OUTPATIENT
Start: 2019-02-27 | End: 2019-02-28

## 2019-02-27 RX ORDER — SODIUM CHLORIDE 9 MG/ML
1000 INJECTION, SOLUTION INTRAVENOUS
Qty: 0 | Refills: 0 | Status: DISCONTINUED | OUTPATIENT
Start: 2019-02-27 | End: 2019-02-28

## 2019-02-27 RX ORDER — LISINOPRIL 2.5 MG/1
20 TABLET ORAL AT BEDTIME
Qty: 0 | Refills: 0 | Status: DISCONTINUED | OUTPATIENT
Start: 2019-02-28 | End: 2019-02-28

## 2019-02-27 RX ORDER — INSULIN LISPRO 100/ML
8 VIAL (ML) SUBCUTANEOUS
Qty: 0 | Refills: 0 | Status: DISCONTINUED | OUTPATIENT
Start: 2019-02-27 | End: 2019-02-28

## 2019-02-27 RX ORDER — DEXTROSE 50 % IN WATER 50 %
12.5 SYRINGE (ML) INTRAVENOUS ONCE
Qty: 0 | Refills: 0 | Status: DISCONTINUED | OUTPATIENT
Start: 2019-02-27 | End: 2019-02-28

## 2019-02-27 RX ORDER — ASPIRIN/CALCIUM CARB/MAGNESIUM 324 MG
325 TABLET ORAL ONCE
Qty: 0 | Refills: 0 | Status: COMPLETED | OUTPATIENT
Start: 2019-02-27 | End: 2019-02-27

## 2019-02-27 RX ORDER — SODIUM CHLORIDE 9 MG/ML
500 INJECTION INTRAMUSCULAR; INTRAVENOUS; SUBCUTANEOUS
Qty: 0 | Refills: 0 | Status: DISCONTINUED | OUTPATIENT
Start: 2019-02-27 | End: 2019-02-28

## 2019-02-27 RX ORDER — INSULIN LISPRO 100/ML
12 VIAL (ML) SUBCUTANEOUS
Qty: 0 | Refills: 0 | Status: DISCONTINUED | OUTPATIENT
Start: 2019-02-27 | End: 2019-02-28

## 2019-02-27 RX ORDER — INSULIN LISPRO 100/ML
VIAL (ML) SUBCUTANEOUS
Qty: 0 | Refills: 0 | Status: DISCONTINUED | OUTPATIENT
Start: 2019-02-27 | End: 2019-02-28

## 2019-02-27 RX ORDER — SODIUM CHLORIDE 9 MG/ML
500 INJECTION, SOLUTION INTRAVENOUS
Qty: 0 | Refills: 0 | Status: DISCONTINUED | OUTPATIENT
Start: 2019-02-27 | End: 2019-02-27

## 2019-02-27 RX ORDER — HYDROCHLOROTHIAZIDE 25 MG
12.5 TABLET ORAL DAILY
Qty: 0 | Refills: 0 | Status: DISCONTINUED | OUTPATIENT
Start: 2019-02-28 | End: 2019-02-28

## 2019-02-27 RX ORDER — ATORVASTATIN CALCIUM 80 MG/1
40 TABLET, FILM COATED ORAL DAILY
Qty: 0 | Refills: 0 | Status: DISCONTINUED | OUTPATIENT
Start: 2019-02-27 | End: 2019-02-28

## 2019-02-27 RX ORDER — TIMOLOL 0.5 %
1 DROPS OPHTHALMIC (EYE) DAILY
Qty: 0 | Refills: 0 | Status: DISCONTINUED | OUTPATIENT
Start: 2019-02-27 | End: 2019-02-28

## 2019-02-27 RX ORDER — LATANOPROST 0.05 MG/ML
1 SOLUTION/ DROPS OPHTHALMIC; TOPICAL AT BEDTIME
Qty: 0 | Refills: 0 | Status: DISCONTINUED | OUTPATIENT
Start: 2019-02-27 | End: 2019-02-28

## 2019-02-27 RX ORDER — INSULIN LISPRO 100/ML
VIAL (ML) SUBCUTANEOUS ONCE
Qty: 0 | Refills: 0 | Status: DISCONTINUED | OUTPATIENT
Start: 2019-02-27 | End: 2019-02-28

## 2019-02-27 RX ORDER — CLOPIDOGREL BISULFATE 75 MG/1
75 TABLET, FILM COATED ORAL DAILY
Qty: 0 | Refills: 0 | Status: DISCONTINUED | OUTPATIENT
Start: 2019-02-28 | End: 2019-02-28

## 2019-02-27 RX ORDER — INSULIN GLARGINE 100 [IU]/ML
40 INJECTION, SOLUTION SUBCUTANEOUS AT BEDTIME
Qty: 0 | Refills: 0 | Status: DISCONTINUED | OUTPATIENT
Start: 2019-02-27 | End: 2019-02-28

## 2019-02-27 RX ORDER — INSULIN LISPRO 100/ML
6 VIAL (ML) SUBCUTANEOUS
Qty: 0 | Refills: 0 | Status: DISCONTINUED | OUTPATIENT
Start: 2019-02-28 | End: 2019-02-28

## 2019-02-27 RX ADMIN — ATORVASTATIN CALCIUM 40 MILLIGRAM(S): 80 TABLET, FILM COATED ORAL at 22:44

## 2019-02-27 RX ADMIN — Medication 1 DROP(S): at 18:21

## 2019-02-27 RX ADMIN — DORZOLAMIDE HYDROCHLORIDE 1 DROP(S): 20 SOLUTION/ DROPS OPHTHALMIC at 18:21

## 2019-02-27 RX ADMIN — SODIUM CHLORIDE 100 MILLILITER(S): 9 INJECTION INTRAMUSCULAR; INTRAVENOUS; SUBCUTANEOUS at 14:39

## 2019-02-27 RX ADMIN — SODIUM CHLORIDE 30 MILLILITER(S): 9 INJECTION, SOLUTION INTRAVENOUS at 08:15

## 2019-02-27 RX ADMIN — Medication 3: at 16:52

## 2019-02-27 RX ADMIN — Medication 325 MILLIGRAM(S): at 08:14

## 2019-02-27 RX ADMIN — INSULIN GLARGINE 40 UNIT(S): 100 INJECTION, SOLUTION SUBCUTANEOUS at 22:44

## 2019-02-27 RX ADMIN — CLOPIDOGREL BISULFATE 75 MILLIGRAM(S): 75 TABLET, FILM COATED ORAL at 08:13

## 2019-02-27 RX ADMIN — Medication 5: at 22:49

## 2019-02-27 RX ADMIN — Medication 12 UNIT(S): at 16:52

## 2019-02-27 NOTE — CONSULT NOTE ADULT - SUBJECTIVE AND OBJECTIVE BOX
Surgeon:    Requesting Physician:    HISTORY OF PRESENT ILLNESS (Need 4):  73y Male    PAST MEDICAL & SURGICAL HISTORY:  Glaucoma  CAD (coronary artery disease)  DM (diabetes mellitus)  HLD (hyperlipidemia)  HTN (hypertension)  No significant past surgical history      MEDICATIONS  (STANDING):  atorvastatin 40 milliGRAM(s) Oral daily  dextrose 5%. 1000 milliLiter(s) (50 mL/Hr) IV Continuous <Continuous>  dextrose 5%. 1000 milliLiter(s) (50 mL/Hr) IV Continuous <Continuous>  dextrose 50% Injectable 12.5 Gram(s) IV Push Once  dextrose 50% Injectable 25 Gram(s) IV Push Once  dextrose 50% Injectable 25 Gram(s) IV Push Once  dextrose 50% Injectable 12.5 Gram(s) IV Push Once  dextrose 50% Injectable 25 Gram(s) IV Push Once  dextrose 50% Injectable 25 Gram(s) IV Push Once  dorzolamide 2% Ophthalmic Solution 1 Drop(s) Both EYES three times a day  insulin glargine Injectable (LANTUS) 40 Unit(s) SubCutaneous at bedtime  insulin lispro (HumaLOG) corrective regimen sliding scale   SubCutaneous Before meals and at bedtime  insulin lispro (HumaLOG) corrective regimen sliding scale   SubCutaneous Once  insulin lispro Injectable (HumaLOG) 8 Unit(s) SubCutaneous with lunch  insulin lispro Injectable (HumaLOG) 12 Unit(s) SubCutaneous with dinner  latanoprost 0.005% Ophthalmic Solution 1 Drop(s) Left EYE at bedtime  metoprolol succinate  milliGRAM(s) Oral daily  sodium chloride 0.9%. 500 milliLiter(s) (100 mL/Hr) IV Continuous <Continuous>  timolol 0.5% Solution 1 Drop(s) Both EYES daily    MEDICATIONS  (PRN):  dextrose 40% Gel 15 Gram(s) Oral Once PRN Blood Glucose LESS THAN 70 milliGRAM(s)/deciliter  dextrose 40% Gel 15 Gram(s) Oral Once PRN Blood Glucose LESS THAN 70 milliGRAM(s)/deciliter  glucagon  Injectable 1 milliGRAM(s) IntraMuscular Once PRN Glucose LESS THAN 70 milligrams/deciliter  glucagon  Injectable 1 milliGRAM(s) IntraMuscular Once PRN Glucose LESS THAN 70 milligrams/deciliter      Allergies    No Known Allergies    Intolerances        SOCIAL HISTORY:  Smoker:  YES / NO        PACK YEARS:                         WHEN QUIT?  ETOH use:  YES / NO               FREQUENCY / QUANTITY:  Ilicit Drug use:  YES / NO  Occupation:  Assisted device use (Cane / Walker):  Live with:    FAMILY HISTORY:  No pertinent family history in first degree relatives      Review of Systems (Need 10):  CONSTITUTIONAL: Denies fevers / chills, sweats, fatigue, weight loss, weight gain                                       NEURO:  Denies parathesias, seizures, syncope, confusion                                                                                  EYES:  Denies blurry vision, discharge, pain, loss of vision                                                                                    ENMT:  Denies difficulty hearing, vertigo, dysphagia, epistaxis, recent dental work                                       CV:  Denies chest pain, palpitations, AMBROCIO, orthopnea                                                                                           RESPIRATORY:  Denies wWheezing, SOB, cough / sputum, hemoptysis                                                               GI:  Denies nausea, vomiting, diarrhea, constipation, melena                                                                          : Denies hematuria, dysuria, urgency, incontinence                                                                                          MUSKULOSKELETAL:  Denies arthritis, joint swelling, muscle weakness                                                             SKIN/BREAST:  Denies rash, itching, hair loss, masses                                                                                              PSYCH:  Denies depression, anxiety, suicidal ideation                                                                                                HEME/LYMPH:  Denies bruises easily, enlarged lymph nodes, tender lymph nodes                                          ENDOCRINE:  Denies cold intolerance, heat intolerance, polydipsia                                                                      Vital Signs Last 24 Hrs  T(C): --  T(F): --  HR: --  BP: --  BP(mean): --  RR: --  SpO2: --    Physical Exam (Need 8)  CONSTITUTIONAL:                                                                          WNL  NEURO:                                                                                             WNL                      EYES:                                                                                                  WNL  ENMT:                                                                                                WNL  CV:                                                                                                      WNL  RESPIRATORY:                                                                                  WNL  GI:                                                                                                       WNL  : AGUILAR + / -                                                                                 WNL  MUSKULOSKELETAL:                                                                       WNL  SKIN / BREAST:                                                                                 WNL                                                          LABS:                        12.7   6.59  )-----------( 183      ( 27 Feb 2019 07:51 )             38.1     02-27    141  |  105  |  16  ----------------------------<  144<H>  3.9   |  26  |  0.84    Ca    9.1      27 Feb 2019 07:51    TPro  7.0  /  Alb  4.2  /  TBili  0.5  /  DBili  x   /  AST  19  /  ALT  21  /  AlkPhos  74  02-27    PT/INR - ( 27 Feb 2019 07:51 )   PT: 13.5 sec;   INR: 1.19          PTT - ( 27 Feb 2019 07:51 )  PTT:32.4 sec    CARDIAC MARKERS ( 27 Feb 2019 07:51 )  x     / x     / 152 U/L / x     / 4.0 ng/mL          RADIOLOGY & ADDITIONAL STUDIES:  CAROTID U/S:    CXR:    CT Scan:    EKG:    TTE / BRITTANY:    Cardiac Cath: Surgeon:     Requesting Physician:    HISTORY OF PRESENT ILLNESS (Need 4):  72 yo Guyanese speaking, blind M, POOR HISTORIAN with PMH of HTN, DM, PVD, HLD, glaucoma, CAD s/p multiple PCI (most recently @ [facility] on 11/14/18 WINTER mRCA with residual pLCX 70% ISR and dLCx 99%) who presented to cardiologist c/o persistent fatigue and palpitations. Pt denies CP, SOB, AMBROCIO, dizziness, syncope, diaphoresis, orthopnea, PND, LE edema, N/V, melena, hematochezia, fever or chills. Prior to previous procedure pt c/o 7/10 CP and AMBROCIO with ambulation on less than 3 city blocks and going up less than one flight of stairs. Pt endorsed LE edema. At that time pt underwent ECHO (10/19/18) revealing overall LV systolic function is nml with EF of 55-60%,  NST (10/19/18) indicating a large sized  moderate intensity partially reversible defect in the inferolateral wall suggestive of ischemia with normal LV with EF of 60%.    In light of pt's risk factors, abnormal NST and known residual disease, pt is now referred to [facility] for recommended staged PCI of LCx.    Cardiac CATH (11/14/18 @ [facility]): dLMCA 20-30%, prox to mid LAD severe ISR (IFR 0.84), pLCx 70% ISR, dLCx 99%, pRCA stent patent, mRCA 90% tubular s/p WINTER.    PAST MEDICAL & SURGICAL HISTORY:  Glaucoma  CAD (coronary artery disease)  DM (diabetes mellitus)  HLD (hyperlipidemia)  HTN (hypertension)  No significant past surgical history      MEDICATIONS  (STANDING):  atorvastatin 40 milliGRAM(s) Oral daily  dextrose 5%. 1000 milliLiter(s) (50 mL/Hr) IV Continuous <Continuous>  dextrose 5%. 1000 milliLiter(s) (50 mL/Hr) IV Continuous <Continuous>  dextrose 50% Injectable 12.5 Gram(s) IV Push Once  dextrose 50% Injectable 25 Gram(s) IV Push Once  dextrose 50% Injectable 25 Gram(s) IV Push Once  dextrose 50% Injectable 12.5 Gram(s) IV Push Once  dextrose 50% Injectable 25 Gram(s) IV Push Once  dextrose 50% Injectable 25 Gram(s) IV Push Once  dorzolamide 2% Ophthalmic Solution 1 Drop(s) Both EYES three times a day  insulin glargine Injectable (LANTUS) 40 Unit(s) SubCutaneous at bedtime  insulin lispro (HumaLOG) corrective regimen sliding scale   SubCutaneous Before meals and at bedtime  insulin lispro (HumaLOG) corrective regimen sliding scale   SubCutaneous Once  insulin lispro Injectable (HumaLOG) 8 Unit(s) SubCutaneous with lunch  insulin lispro Injectable (HumaLOG) 12 Unit(s) SubCutaneous with dinner  latanoprost 0.005% Ophthalmic Solution 1 Drop(s) Left EYE at bedtime  metoprolol succinate  milliGRAM(s) Oral daily  sodium chloride 0.9%. 500 milliLiter(s) (100 mL/Hr) IV Continuous <Continuous>  timolol 0.5% Solution 1 Drop(s) Both EYES daily    MEDICATIONS  (PRN):  dextrose 40% Gel 15 Gram(s) Oral Once PRN Blood Glucose LESS THAN 70 milliGRAM(s)/deciliter  dextrose 40% Gel 15 Gram(s) Oral Once PRN Blood Glucose LESS THAN 70 milliGRAM(s)/deciliter  glucagon  Injectable 1 milliGRAM(s) IntraMuscular Once PRN Glucose LESS THAN 70 milligrams/deciliter  glucagon  Injectable 1 milliGRAM(s) IntraMuscular Once PRN Glucose LESS THAN 70 milligrams/deciliter      Allergies    No Known Allergies    Intolerances        SOCIAL HISTORY:  Smoker:  YES / NO        PACK YEARS:                         WHEN QUIT?  ETOH use:  YES / NO               FREQUENCY / QUANTITY:  Ilicit Drug use:  YES / NO  Occupation:  Assisted device use (Cane / Walker):  Live with:    FAMILY HISTORY:  No pertinent family history in first degree relatives      Review of Systems (Need 10):  CONSTITUTIONAL: Denies fevers / chills, sweats, fatigue, weight loss, weight gain                                       NEURO:  Denies parathesias, seizures, syncope, confusion                                                                                  EYES:  Denies blurry vision, discharge, pain, loss of vision                                                                                    ENMT:  Denies difficulty hearing, vertigo, dysphagia, epistaxis, recent dental work                                       CV:  Denies chest pain, palpitations, AMBROCIO, orthopnea                                                                                           RESPIRATORY:  Denies wWheezing, SOB, cough / sputum, hemoptysis                                                               GI:  Denies nausea, vomiting, diarrhea, constipation, melena                                                                          : Denies hematuria, dysuria, urgency, incontinence                                                                                          MUSKULOSKELETAL:  Denies arthritis, joint swelling, muscle weakness                                                             SKIN/BREAST:  Denies rash, itching, hair loss, masses                                                                                              PSYCH:  Denies depression, anxiety, suicidal ideation                                                                                                HEME/LYMPH:  Denies bruises easily, enlarged lymph nodes, tender lymph nodes                                          ENDOCRINE:  Denies cold intolerance, heat intolerance, polydipsia                                                                      Vital Signs Last 24 Hrs  T(C): --  T(F): --  HR: --  BP: --  BP(mean): --  RR: --  SpO2: --    Physical Exam (Need 8)  CONSTITUTIONAL:                                                                          WNL  NEURO:                                                                                             WNL                      EYES:                                                                                                  WNL  ENMT:                                                                                                WNL  CV:                                                                                                      WNL  RESPIRATORY:                                                                                  WNL  GI:                                                                                                       WNL  : AGUILAR + / -                                                                                 WNL  MUSKULOSKELETAL:                                                                       WNL  SKIN / BREAST:                                                                                 WNL                                                          LABS:                        12.7   6.59  )-----------( 183      ( 27 Feb 2019 07:51 )             38.1     02-27    141  |  105  |  16  ----------------------------<  144<H>  3.9   |  26  |  0.84    Ca    9.1      27 Feb 2019 07:51    TPro  7.0  /  Alb  4.2  /  TBili  0.5  /  DBili  x   /  AST  19  /  ALT  21  /  AlkPhos  74  02-27    PT/INR - ( 27 Feb 2019 07:51 )   PT: 13.5 sec;   INR: 1.19          PTT - ( 27 Feb 2019 07:51 )  PTT:32.4 sec    CARDIAC MARKERS ( 27 Feb 2019 07:51 )  x     / x     / 152 U/L / x     / 4.0 ng/mL          RADIOLOGY & ADDITIONAL STUDIES:  CAROTID U/S:    CXR:    CT Scan:    EKG:    TTE / BRITTANY:    Cardiac Cath: Surgeon:     Requesting Physician: Dr. Mosher    HISTORY OF PRESENT ILLNESS (Need 4):  This is a 74 y/o Welsh speaking, blind male, with PMHx of HTN, DM, PVD, HLD, glaucoma, CAD s/p multiple PCI (most recently @ [facility] on 11/14/18 WINTER mRCA with residual pLCX 70% ISR and dLCx 99%) who presented to cardiologist c/o persistent fatigue and palpitations.  Pt denies CP, SOB, AMBROCIO, dizziness, syncope, diaphoresis, orthopnea, PND, LE edema, N/V, melena, hematochezia, fever or chills.  Prior to his stent, he was experiencing 7/10 CP and AMBROCIO with ambulation on less than 3 city blocks and going up less than one flight of stairs along with LE edema.  At that time pt underwent ECHO (10/19/18) revealing overall LV systolic function is normal with EF of 55-60%, NST (10/19/18) indicating a large sized moderate intensity partially reversible defect in the inferolateral wall suggestive of ischemia with normal LV with EF of 60%.    Most recent cardiac CATH (11/14/18 @ [facility]): dLMCA 20-30%, prox to mid LAD severe ISR (IFR 0.84), pLCx 70% ISR, dLCx 99%, pRCA stent patent, mRCA 90% tubular s/p WINTER.    He was sent here for a repeat elective cath today.  He was found to have severe ISR of the LAD.  We are being consulted for a MIDCAB evaluation.     PAST MEDICAL & SURGICAL HISTORY:  Glaucoma  CAD (coronary artery disease)  DM (diabetes mellitus)  HLD (hyperlipidemia)  HTN (hypertension)  No significant past surgical history      MEDICATIONS  (STANDING):  atorvastatin 40 milliGRAM(s) Oral daily  dextrose 5%. 1000 milliLiter(s) (50 mL/Hr) IV Continuous <Continuous>  dextrose 5%. 1000 milliLiter(s) (50 mL/Hr) IV Continuous <Continuous>  dextrose 50% Injectable 12.5 Gram(s) IV Push Once  dextrose 50% Injectable 25 Gram(s) IV Push Once  dextrose 50% Injectable 25 Gram(s) IV Push Once  dextrose 50% Injectable 12.5 Gram(s) IV Push Once  dextrose 50% Injectable 25 Gram(s) IV Push Once  dextrose 50% Injectable 25 Gram(s) IV Push Once  dorzolamide 2% Ophthalmic Solution 1 Drop(s) Both EYES three times a day  insulin glargine Injectable (LANTUS) 40 Unit(s) SubCutaneous at bedtime  insulin lispro (HumaLOG) corrective regimen sliding scale   SubCutaneous Before meals and at bedtime  insulin lispro (HumaLOG) corrective regimen sliding scale   SubCutaneous Once  insulin lispro Injectable (HumaLOG) 8 Unit(s) SubCutaneous with lunch  insulin lispro Injectable (HumaLOG) 12 Unit(s) SubCutaneous with dinner  latanoprost 0.005% Ophthalmic Solution 1 Drop(s) Left EYE at bedtime  metoprolol succinate  milliGRAM(s) Oral daily  sodium chloride 0.9%. 500 milliLiter(s) (100 mL/Hr) IV Continuous <Continuous>  timolol 0.5% Solution 1 Drop(s) Both EYES daily    MEDICATIONS  (PRN):  dextrose 40% Gel 15 Gram(s) Oral Once PRN Blood Glucose LESS THAN 70 milliGRAM(s)/deciliter  dextrose 40% Gel 15 Gram(s) Oral Once PRN Blood Glucose LESS THAN 70 milliGRAM(s)/deciliter  glucagon  Injectable 1 milliGRAM(s) IntraMuscular Once PRN Glucose LESS THAN 70 milligrams/deciliter  glucagon  Injectable 1 milliGRAM(s) IntraMuscular Once PRN Glucose LESS THAN 70 milligrams/deciliter      Allergies    No Known Allergies    Intolerances        SOCIAL HISTORY:  Smoker: Denies  ETOH use:  Denies  Ilicit Drug use: Denies  Occupation:  Assisted device use (Cane / Walker):  Live with:    FAMILY HISTORY:  No pertinent family history in first degree relatives      Review of Systems (Need 10):  CONSTITUTIONAL: Denies fevers / chills, sweats, fatigue, weight loss, weight gain                                       NEURO:  Denies parathesias, seizures, syncope, confusion                                                                                  EYES:  Denies blurry vision, discharge, pain, loss of vision                                                                                    ENMT:  Denies difficulty hearing, vertigo, dysphagia, epistaxis, recent dental work                                       CV:  Denies chest pain, palpitations, AMBROCIO, orthopnea                                                                                           RESPIRATORY:  Denies wWheezing, SOB, cough / sputum, hemoptysis                                                               GI:  Denies nausea, vomiting, diarrhea, constipation, melena                                                                          : Denies hematuria, dysuria, urgency, incontinence                                                                                          MUSKULOSKELETAL:  Denies arthritis, joint swelling, muscle weakness                                                             SKIN/BREAST:  Denies rash, itching, hair loss, masses                                                                                              PSYCH:  Denies depression, anxiety, suicidal ideation                                                                                                HEME/LYMPH:  Denies bruises easily, enlarged lymph nodes, tender lymph nodes                                          ENDOCRINE:  Denies cold intolerance, heat intolerance, polydipsia                                                                            Physical Exam (Need 8)  GEN: NAD, looks comfortable  Psych: Mood appropriate  Neuro: A&Ox3.  No focal deficits.  Moving all extremities.   HEENT: No obvious abnormalities  CV: S1S2, regular, no murmurs appreciated.  No carotid bruits.  No JVD  Lungs: Clear B/L.  No wheezing, rales or rhonchi  ABD: Soft, non-tender, non-distended.  +Bowel sounds  EXT: Warm and well perfused.  No peripheral edema noted  Musculoskeletal: Moving all extremities with normal ROM, no joint swelling  PV: Pedal pulses palpable                                                            LABS:                        12.7   6.59  )-----------( 183      ( 27 Feb 2019 07:51 )             38.1     02-27    141  |  105  |  16  ----------------------------<  144<H>  3.9   |  26  |  0.84    Ca    9.1      27 Feb 2019 07:51    TPro  7.0  /  Alb  4.2  /  TBili  0.5  /  DBili  x   /  AST  19  /  ALT  21  /  AlkPhos  74  02-27    PT/INR - ( 27 Feb 2019 07:51 )   PT: 13.5 sec;   INR: 1.19          PTT - ( 27 Feb 2019 07:51 )  PTT:32.4 sec    CARDIAC MARKERS ( 27 Feb 2019 07:51 )  x     / x     / 152 U/L / x     / 4.0 ng/mL      RADIOLOGY & ADDITIONAL STUDIES:  CAROTID U/S:    CXR:    CT Scan:    EKG:< from: 12 Lead ECG (11.14.18 @ 11:00) >    Diagnosis Line Marked sinus bradycardia  ST abnormality, possible digitalis effect    < end of copied text >  < from: 12 Lead ECG (11.14.18 @ 11:00) >    Ventricular Rate 48 BPM    < end of copied text >      TTE     Cardiac Cath:

## 2019-02-27 NOTE — CONSULT NOTE ADULT - ASSESSMENT
CABG  Aspirin               [  ] Yes  [  ] Contraindicated, Reason_______________________________  Beta-Blocker     [  ] Yes  [  ]Contraindicated, Reason_______________________________  Statin                 [  ] Yes  [  ] Contraindicated, Reason_______________________________            Plan:  Problem 1:      Problem 2:      Problem 3:      Problem 4:    I have reviewed clinical labs tests and reports, radiology tests and reports, as well as old patient medical records, and discussed with the refering physician. This is a 72 y/o Uzbek speaking, blind male, with PMHx of HTN, DM, PVD, HLD, glaucoma, CAD s/p multiple PCI (most recently @ [facility] on 11/14/18 WINTER mRCA with residual pLCX 70% ISR and dLCx 99%) who presented to cardiologist c/o persistent fatigue and palpitations.  Pt denies CP, SOB, AMBROCIO, dizziness, syncope, diaphoresis, orthopnea, PND, LE edema, N/V, melena, hematochezia, fever or chills.  Prior to his stent, he was experiencing 7/10 CP and AMBROCIO with ambulation on less than 3 city blocks and going up less than one flight of stairs along with LE edema.  At that time pt underwent ECHO (10/19/18) revealing overall LV systolic function is normal with EF of 55-60%, NST (10/19/18) indicating a large sized moderate intensity partially reversible defect in the inferolateral wall suggestive of ischemia with normal LV with EF of 60%.    CABG  Aspirin               [x  ] Yes  [  ] Contraindicated, Reason_______________________________  Beta-Blocker     [  x] Yes  [  ]Contraindicated, Reason_______________________________  Statin                 [ x ] Yes  [  ] Contraindicated, Reason_______________________________            Plan:  Problem 1: CAD.  Plan for MIDCAB on Friday 2/27/19 pending further work-up.  Will need TTE, Carotid dopplers, CXR, EKG, PFTs and T&S x 2.  Added on TSH level, other labs reviewed.  Continue w/ BB, ASA, statin and plavix (recent stent).  Prefer to D/C ACEI as to avoid perioperative vasoplegia.      Problem 2: HLD.  Continue w/ Statin.       Problem 3: HTN.  C/w beta blocker.      Problem 4: Diabetes.  Insulin regimen and sliding scale.  FS currently under control.  ?Endocrine consult for A1C >8%.      I have reviewed clinical labs tests and reports, radiology tests and reports, as well as old patient medical records, and discussed with the refering physician.

## 2019-02-28 ENCOUNTER — TRANSCRIPTION ENCOUNTER (OUTPATIENT)
Age: 74
End: 2019-02-28

## 2019-02-28 VITALS — HEART RATE: 65 BPM | DIASTOLIC BLOOD PRESSURE: 58 MMHG | SYSTOLIC BLOOD PRESSURE: 133 MMHG | RESPIRATION RATE: 18 BRPM

## 2019-02-28 LAB
ANION GAP SERPL CALC-SCNC: 8 MMOL/L — SIGNIFICANT CHANGE UP (ref 5–17)
APPEARANCE UR: CLEAR — SIGNIFICANT CHANGE UP
BASOPHILS # BLD AUTO: 0.02 K/UL — SIGNIFICANT CHANGE UP (ref 0–0.2)
BASOPHILS NFR BLD AUTO: 0.3 % — SIGNIFICANT CHANGE UP (ref 0–2)
BILIRUB UR-MCNC: NEGATIVE — SIGNIFICANT CHANGE UP
BUN SERPL-MCNC: 12 MG/DL — SIGNIFICANT CHANGE UP (ref 7–23)
CALCIUM SERPL-MCNC: 9.2 MG/DL — SIGNIFICANT CHANGE UP (ref 8.4–10.5)
CHLORIDE SERPL-SCNC: 103 MMOL/L — SIGNIFICANT CHANGE UP (ref 96–108)
CO2 SERPL-SCNC: 30 MMOL/L — SIGNIFICANT CHANGE UP (ref 22–31)
COLOR SPEC: YELLOW — SIGNIFICANT CHANGE UP
CREAT SERPL-MCNC: 0.91 MG/DL — SIGNIFICANT CHANGE UP (ref 0.5–1.3)
DIFF PNL FLD: NEGATIVE — SIGNIFICANT CHANGE UP
EOSINOPHIL # BLD AUTO: 0.07 K/UL — SIGNIFICANT CHANGE UP (ref 0–0.5)
EOSINOPHIL NFR BLD AUTO: 0.9 % — SIGNIFICANT CHANGE UP (ref 0–6)
GLUCOSE SERPL-MCNC: 71 MG/DL — SIGNIFICANT CHANGE UP (ref 70–99)
GLUCOSE UR QL: NEGATIVE — SIGNIFICANT CHANGE UP
HCT VFR BLD CALC: 41.1 % — SIGNIFICANT CHANGE UP (ref 39–50)
HGB BLD-MCNC: 13.1 G/DL — SIGNIFICANT CHANGE UP (ref 13–17)
IMM GRANULOCYTES NFR BLD AUTO: 0.4 % — SIGNIFICANT CHANGE UP (ref 0–1.5)
KETONES UR-MCNC: NEGATIVE — SIGNIFICANT CHANGE UP
LEUKOCYTE ESTERASE UR-ACNC: NEGATIVE — SIGNIFICANT CHANGE UP
LYMPHOCYTES # BLD AUTO: 2.09 K/UL — SIGNIFICANT CHANGE UP (ref 1–3.3)
LYMPHOCYTES # BLD AUTO: 26.6 % — SIGNIFICANT CHANGE UP (ref 13–44)
MAGNESIUM SERPL-MCNC: 1.8 MG/DL — SIGNIFICANT CHANGE UP (ref 1.6–2.6)
MCHC RBC-ENTMCNC: 27.7 PG — SIGNIFICANT CHANGE UP (ref 27–34)
MCHC RBC-ENTMCNC: 31.9 GM/DL — LOW (ref 32–36)
MCV RBC AUTO: 86.9 FL — SIGNIFICANT CHANGE UP (ref 80–100)
MONOCYTES # BLD AUTO: 0.74 K/UL — SIGNIFICANT CHANGE UP (ref 0–0.9)
MONOCYTES NFR BLD AUTO: 9.4 % — SIGNIFICANT CHANGE UP (ref 2–14)
NEUTROPHILS # BLD AUTO: 4.92 K/UL — SIGNIFICANT CHANGE UP (ref 1.8–7.4)
NEUTROPHILS NFR BLD AUTO: 62.4 % — SIGNIFICANT CHANGE UP (ref 43–77)
NITRITE UR-MCNC: NEGATIVE — SIGNIFICANT CHANGE UP
NRBC # BLD: 0 /100 WBCS — SIGNIFICANT CHANGE UP (ref 0–0)
PH UR: 6 — SIGNIFICANT CHANGE UP (ref 5–8)
PLATELET # BLD AUTO: 187 K/UL — SIGNIFICANT CHANGE UP (ref 150–400)
POTASSIUM SERPL-MCNC: 3.5 MMOL/L — SIGNIFICANT CHANGE UP (ref 3.5–5.3)
POTASSIUM SERPL-SCNC: 3.5 MMOL/L — SIGNIFICANT CHANGE UP (ref 3.5–5.3)
PROT UR-MCNC: NEGATIVE MG/DL — SIGNIFICANT CHANGE UP
RBC # BLD: 4.73 M/UL — SIGNIFICANT CHANGE UP (ref 4.2–5.8)
RBC # FLD: 13.1 % — SIGNIFICANT CHANGE UP (ref 10.3–14.5)
SODIUM SERPL-SCNC: 141 MMOL/L — SIGNIFICANT CHANGE UP (ref 135–145)
SP GR SPEC: 1.01 — SIGNIFICANT CHANGE UP (ref 1–1.03)
UROBILINOGEN FLD QL: 0.2 E.U./DL — SIGNIFICANT CHANGE UP
WBC # BLD: 7.87 K/UL — SIGNIFICANT CHANGE UP (ref 3.8–10.5)
WBC # FLD AUTO: 7.87 K/UL — SIGNIFICANT CHANGE UP (ref 3.8–10.5)

## 2019-02-28 PROCEDURE — 93880 EXTRACRANIAL BILAT STUDY: CPT | Mod: 26

## 2019-02-28 PROCEDURE — 99239 HOSP IP/OBS DSCHRG MGMT >30: CPT

## 2019-02-28 PROCEDURE — 94010 BREATHING CAPACITY TEST: CPT | Mod: 26

## 2019-02-28 RX ORDER — POTASSIUM CHLORIDE 20 MEQ
40 PACKET (EA) ORAL ONCE
Qty: 0 | Refills: 0 | Status: COMPLETED | OUTPATIENT
Start: 2019-02-28 | End: 2019-02-28

## 2019-02-28 RX ORDER — MAGNESIUM OXIDE 400 MG ORAL TABLET 241.3 MG
800 TABLET ORAL ONCE
Qty: 0 | Refills: 0 | Status: COMPLETED | OUTPATIENT
Start: 2019-02-28 | End: 2019-02-28

## 2019-02-28 RX ORDER — CLOPIDOGREL BISULFATE 75 MG/1
1 TABLET, FILM COATED ORAL
Qty: 30 | Refills: 11 | OUTPATIENT
Start: 2019-02-28 | End: 2020-02-22

## 2019-02-28 RX ORDER — ASPIRIN/CALCIUM CARB/MAGNESIUM 324 MG
1 TABLET ORAL
Qty: 30 | Refills: 11 | OUTPATIENT
Start: 2019-02-28 | End: 2020-02-22

## 2019-02-28 RX ORDER — ASPIRIN/CALCIUM CARB/MAGNESIUM 324 MG
1 TABLET ORAL
Qty: 0 | Refills: 0 | COMMUNITY

## 2019-02-28 RX ORDER — ATORVASTATIN CALCIUM 80 MG/1
80 TABLET, FILM COATED ORAL AT BEDTIME
Qty: 0 | Refills: 0 | Status: DISCONTINUED | OUTPATIENT
Start: 2019-02-28 | End: 2019-02-28

## 2019-02-28 RX ADMIN — Medication 100 MILLIGRAM(S): at 12:22

## 2019-02-28 RX ADMIN — Medication 40 MILLIEQUIVALENT(S): at 12:22

## 2019-02-28 RX ADMIN — CLOPIDOGREL BISULFATE 75 MILLIGRAM(S): 75 TABLET, FILM COATED ORAL at 12:22

## 2019-02-28 RX ADMIN — LISINOPRIL 20 MILLIGRAM(S): 2.5 TABLET ORAL at 00:44

## 2019-02-28 RX ADMIN — DORZOLAMIDE HYDROCHLORIDE 1 DROP(S): 20 SOLUTION/ DROPS OPHTHALMIC at 14:40

## 2019-02-28 RX ADMIN — LATANOPROST 1 DROP(S): 0.05 SOLUTION/ DROPS OPHTHALMIC; TOPICAL at 00:45

## 2019-02-28 RX ADMIN — Medication 1: at 12:21

## 2019-02-28 RX ADMIN — DORZOLAMIDE HYDROCHLORIDE 1 DROP(S): 20 SOLUTION/ DROPS OPHTHALMIC at 06:31

## 2019-02-28 RX ADMIN — MAGNESIUM OXIDE 400 MG ORAL TABLET 800 MILLIGRAM(S): 241.3 TABLET ORAL at 12:22

## 2019-02-28 RX ADMIN — Medication 8 UNIT(S): at 12:21

## 2019-02-28 RX ADMIN — Medication 81 MILLIGRAM(S): at 12:22

## 2019-02-28 RX ADMIN — Medication 12.5 MILLIGRAM(S): at 06:30

## 2019-02-28 NOTE — DISCHARGE NOTE NURSING/CASE MANAGEMENT/SOCIAL WORK - [email or web address]_GEN_[email or web address]_CORE
You can access the [facility] Portal, offered by [facility], by registering with the following website: [email or web address]

## 2019-02-28 NOTE — DISCHARGE NOTE PROVIDER - HOSPITAL COURSE
74 yo Lithuanian speaking, blind M, POOR HISTORIAN with PMH of HTN, DM, PVD, HLD, glaucoma, CAD s/p multiple PCI (most recently @ [facility] on 11/14/18 WINTER mRCA with residual pLCX 70% ISR and dLCx 99%) who presented to cardiologist c/o persistent fatigue and palpitations. Pt denies CP, SOB, AMBROCIO, dizziness, syncope, diaphoresis, orthopnea, PND, LE edema, N/V, melena, hematochezia, fever or chills. Prior to previous procedure pt c/o 7/10 CP and AMBROCIO with ambulation on less than 3 city blocks and going up less than one flight of stairs. Pt endorsed LE edema. At that time pt underwent ECHO (10/19/18) revealing overall LV systolic function is nml with EF of 55-60%,  NST (10/19/18) indicating a large sized  moderate intensity partially reversible defect in the inferolateral wall suggestive of ischemia with normal LV with EF of 60%. In light of pt's risk factors, abnormal NST and known residual disease, pt was referred to [facility] for recommended staged PCI of LCx. Patient underwent cardiac cath on 2/27/19 and received PTCA/WINTER distalLCx, PTCA midLCx ISR, LM normal, midLAD severe ISR, distalLAD mild diffuse disease, midLCx 90% ISR, distalLCx 99%, patent stents in prox/midRCA, EDP 6, EF 55-60%, right groin perclosed. Dr. Roberson consulted to discuss options related to severe midLAD disease. He was recommended for CTS and has completed appropriate pre-op workup including PFTs, labs, UA and carotid duplex. Patient was seen and examined this AM and is asymptomatic without complaints. VSS, labs and tele reviewed. Patient is stable for discharge per Dr. Bravo. He has been given appropriate discharge instructions including medication regimen, access site management and follow up.        Temp 98.3F, HR 60bpm, /64, RR 16, O2 sat 97%    Gen: NAD, A&O x 3    Cards: RRR, clear S1 and S2 without murmur    Pulm: CTA b/l without w/r/r    Right groin: no hematoma or ooze, femoral pulse 2+ without bruit, DP 1+, PT faint    Ext: No LE edema b/l 72 yo Panamanian speaking, blind M, POOR HISTORIAN with PMH of HTN, DM, PVD, HLD, glaucoma, CAD s/p multiple PCI (most recently @ [facility] on 11/14/18 WINTER mRCA with residual pLCX 70% ISR and dLCx 99%) who presented to cardiologist c/o persistent fatigue and palpitations. Pt denies CP, SOB, AMBROCIO, dizziness, syncope, diaphoresis, orthopnea, PND, LE edema, N/V, melena, hematochezia, fever or chills. Prior to previous procedure pt c/o 7/10 CP and AMBROCIO with ambulation on less than 3 city blocks and going up less than one flight of stairs. Pt endorsed LE edema. At that time pt underwent ECHO (10/19/18) revealing overall LV systolic function is nml with EF of 55-60%,  NST (10/19/18) indicating a large sized  moderate intensity partially reversible defect in the inferolateral wall suggestive of ischemia with normal LV with EF of 60%. In light of pt's risk factors, abnormal NST and known residual disease, pt was referred to [facility] for recommended staged PCI of LCx. Patient underwent cardiac cath on 2/27/19 and received PTCA/WINTER distalLCx, PTCA midLCx ISR, LM normal, midLAD severe ISR, distalLAD mild diffuse disease, midLCx 90% ISR, distalLCx 99%, patent stents in prox/midRCA, EDP 6, EF 55-60%, right groin perclosed. Dr. Roberson consulted to discuss options related to severe midLAD disease. He was recommended for CTS and has completed appropriate pre-op workup including PFTs, labs, UA and carotid duplex. Carotid duplex revealed small plaque b/l without evidence of HD significant carotid artery stenosis. Patient was seen and examined this AM and is asymptomatic without complaints. VSS, labs and tele reviewed. Patient is stable for discharge per Dr. Bravo. He has been given appropriate discharge instructions including medication regimen, access site management and follow up.        Temp 98.3F, HR 60bpm, /64, RR 16, O2 sat 97%    Gen: NAD, A&O x 3    Cards: RRR, clear S1 and S2 without murmur    Pulm: CTA b/l without w/r/r    Right groin: no hematoma or ooze, femoral pulse 2+ without bruit, DP 1+, PT faint    Ext: No LE edema b/l

## 2019-02-28 NOTE — DISCHARGE NOTE PROVIDER - CARE PROVIDER_API CALL
Inocencio Smith  [address] [place] [address]  Phone: [phone]  Fax: (   )    -  Follow Up Time:     Boom Roberson)  Cardiovascular Surgery  [address] [place] [address]  Phone: [phone]  Fax: [phone]  Follow Up Time:

## 2019-02-28 NOTE — DISCHARGE NOTE PROVIDER - NSDCCPCAREPLAN_GEN_ALL_CORE_FT
PRINCIPAL DISCHARGE DIAGNOSIS  Problem: CAD (coronary artery disease)  Assessment and Plan of Treatment: You underwent a cardiac angiogram and received a stent to the ___ artery. PLEASE CONTINUE ASPIRIN 81MG DAILY AND PLAVIX 75MG DAILY. DO NOT STOP THESE MEDICATIONS FOR ANY REASON AS THEY ARE KEEPING YOUR STENT OPEN AND PREVENTING A HEART ATTACK. Avoid strenuous activity or heavy lifting for the next five days. Do not take a bath or swim for the next five days; you may shower. For any bleeding or hematoma formation (hardened blood collection under the skin) at the access site of ____ please hold pressure and go to the emergency room. Please follow up with  ___ in 1-2 weeks. For recurrent chest pain, please call your doctor or go to the emergency room.      SECONDARY DISCHARGE DIAGNOSES  Problem: HTN (hypertension)  Assessment and Plan of Treatment:     Problem: HLD (hyperlipidemia)  Assessment and Plan of Treatment:     Problem: DM (diabetes mellitus)  Assessment and Plan of Treatment: PRINCIPAL DISCHARGE DIAGNOSIS  Problem: CAD (coronary artery disease)  Assessment and Plan of Treatment: You underwent a cardiac angiogram and received a stent to the left circumflex artery. PLEASE CONTINUE ASPIRIN 81MG DAILY AND PLAVIX 75MG DAILY. DO NOT STOP THESE MEDICATIONS FOR ANY REASON AS THEY ARE KEEPING YOUR STENT OPEN AND PREVENTING A HEART ATTACK. Avoid strenuous activity or heavy lifting for the next five days. Do not take a bath or swim for the next five days; you may shower. For any bleeding or hematoma formation (hardened blood collection under the skin) at the access site of right groin please hold pressure and go to the emergency room. Please follow up with Dr. Inocencio Smith in 1-2 weeks. For recurrent chest pain, please call your doctor or go to the emergency room. Follow up with Dr. Roberson on March 19th at 12PM.      SECONDARY DISCHARGE DIAGNOSES  Problem: HTN (hypertension)  Assessment and Plan of Treatment: Please continue medications as listed to keep your blood pressure controlled. For blood pressure that is too high or too low please see your doctor or go to the emergency room as necessary.    Problem: HLD (hyperlipidemia)  Assessment and Plan of Treatment: Please continue lipitor (atorvastatin) to keep your cholesterol low. High cholesterol contributes to heart disease.    Problem: DM (diabetes mellitus)  Assessment and Plan of Treatment: Please continue medications as listed for diabetes. Maintain a low carbohydrate, low sugar diet. Check for your blood sugars regularly.

## 2019-02-28 NOTE — DISCHARGE NOTE PROVIDER - PROVIDER TOKENS
FREE:[LAST:[Luis],FIRST:[Inocencio],PHONE:[phone]],FAX:[(   )    -],ADDRESS:[[address] [place] [address]]],PROVIDER:[[id number]]]

## 2019-03-05 DIAGNOSIS — T82.855A STENOSIS OF CORONARY ARTERY STENT, INITIAL ENCOUNTER: ICD-10-CM

## 2019-03-05 DIAGNOSIS — I10 ESSENTIAL (PRIMARY) HYPERTENSION: ICD-10-CM

## 2019-03-05 DIAGNOSIS — E11.9 TYPE 2 DIABETES MELLITUS WITHOUT COMPLICATIONS: ICD-10-CM

## 2019-03-05 DIAGNOSIS — I25.118 ATHEROSCLEROTIC HEART DISEASE OF NATIVE CORONARY ARTERY WITH OTHER FORMS OF ANGINA PECTORIS: ICD-10-CM

## 2019-03-05 DIAGNOSIS — Z79.82 LONG TERM (CURRENT) USE OF ASPIRIN: ICD-10-CM

## 2019-03-05 DIAGNOSIS — E78.5 HYPERLIPIDEMIA, UNSPECIFIED: ICD-10-CM

## 2019-03-05 DIAGNOSIS — I73.9 PERIPHERAL VASCULAR DISEASE, UNSPECIFIED: ICD-10-CM
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
## 2019-03-05 DIAGNOSIS — H54.7 UNSPECIFIED VISUAL LOSS: ICD-10-CM
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
## 2019-03-05 DIAGNOSIS — Z79.4 LONG TERM (CURRENT) USE OF INSULIN: ICD-10-CM

## 2019-03-05 DIAGNOSIS — H40.9 UNSPECIFIED GLAUCOMA: ICD-10-CM

## 2019-03-13 VITALS — HEIGHT: 66 IN | BODY MASS INDEX: 27.32 KG/M2 | WEIGHT: 170 LBS

## 2019-03-13 PROBLEM — I25.10 CAD (CORONARY ARTERY DISEASE): Status: ACTIVE | Noted: 2019-03-13

## 2019-03-13 PROBLEM — Z86.39 HISTORY OF DIABETES MELLITUS: Status: RESOLVED | Noted: 2019-03-13 | Resolved: 2019-03-13

## 2019-03-13 PROBLEM — Z95.5 PRESENCE OF DRUG COATED STENT IN LEFT CIRCUMFLEX CORONARY ARTERY: Status: ACTIVE | Noted: 2019-03-13

## 2019-03-13 PROBLEM — Z95.5 PRESENCE OF BARE METAL STENT IN RIGHT CORONARY ARTERY: Status: ACTIVE | Noted: 2019-03-13

## 2019-03-13 PROBLEM — H54.8 LEGALLY BLIND: Status: ACTIVE | Noted: 2019-03-13

## 2019-03-13 PROBLEM — Z86.79 HISTORY OF PERIPHERAL VASCULAR DISEASE: Status: RESOLVED | Noted: 2019-03-13 | Resolved: 2019-03-13

## 2019-03-13 PROBLEM — Z86.79 HISTORY OF HYPERTENSION: Status: RESOLVED | Noted: 2019-03-13 | Resolved: 2019-03-13

## 2019-03-13 PROBLEM — Z86.69 HISTORY OF GLAUCOMA: Status: RESOLVED | Noted: 2019-03-13 | Resolved: 2019-03-13

## 2019-03-13 PROBLEM — Z86.39 HISTORY OF HYPERLIPIDEMIA: Status: RESOLVED | Noted: 2019-03-13 | Resolved: 2019-03-13

## 2019-03-13 RX ORDER — TIMOLOL MALEATE 5 MG/ML
0.5 SOLUTION/ DROPS OPHTHALMIC DAILY
Refills: 0 | Status: ACTIVE | COMMUNITY

## 2019-03-13 RX ORDER — BENAZEPRIL HYDROCHLORIDE AND HYDROCHLOROTHIAZIDE 20; 12.5 MG/1; MG/1
20-12.5 TABLET, FILM COATED ORAL DAILY
Refills: 0 | Status: ACTIVE | COMMUNITY

## 2019-03-13 RX ORDER — BRINZOLAMIDE/BRIMONIDINE TARTRATE 10; 2 MG/ML; MG/ML
1-0.2 SUSPENSION/ DROPS OPHTHALMIC 3 TIMES DAILY
Refills: 0 | Status: ACTIVE | COMMUNITY

## 2019-03-13 RX ORDER — LATANOPROST/PF 0.005 %
0.01 DROPS OPHTHALMIC (EYE) DAILY
Refills: 0 | Status: ACTIVE | COMMUNITY

## 2019-03-15 PROCEDURE — 82962 GLUCOSE BLOOD TEST: CPT

## 2019-03-15 PROCEDURE — 86900 BLOOD TYPING SEROLOGIC ABO: CPT

## 2019-03-15 PROCEDURE — 82550 ASSAY OF CK (CPK): CPT

## 2019-03-15 PROCEDURE — 83735 ASSAY OF MAGNESIUM: CPT

## 2019-03-15 PROCEDURE — 71046 X-RAY EXAM CHEST 2 VIEWS: CPT

## 2019-03-15 PROCEDURE — 82553 CREATINE MB FRACTION: CPT

## 2019-03-15 PROCEDURE — C1894: CPT

## 2019-03-15 PROCEDURE — 86850 RBC ANTIBODY SCREEN: CPT

## 2019-03-15 PROCEDURE — C1725: CPT

## 2019-03-15 PROCEDURE — 80061 LIPID PANEL: CPT

## 2019-03-15 PROCEDURE — 80048 BASIC METABOLIC PNL TOTAL CA: CPT

## 2019-03-15 PROCEDURE — C1760: CPT

## 2019-03-15 PROCEDURE — C1769: CPT

## 2019-03-15 PROCEDURE — 84443 ASSAY THYROID STIM HORMONE: CPT

## 2019-03-15 PROCEDURE — 83036 HEMOGLOBIN GLYCOSYLATED A1C: CPT

## 2019-03-15 PROCEDURE — 85730 THROMBOPLASTIN TIME PARTIAL: CPT

## 2019-03-15 PROCEDURE — 93005 ELECTROCARDIOGRAM TRACING: CPT

## 2019-03-15 PROCEDURE — C1887: CPT

## 2019-03-15 PROCEDURE — 93880 EXTRACRANIAL BILAT STUDY: CPT

## 2019-03-15 PROCEDURE — 81003 URINALYSIS AUTO W/O SCOPE: CPT

## 2019-03-15 PROCEDURE — 80053 COMPREHEN METABOLIC PANEL: CPT

## 2019-03-15 PROCEDURE — 85610 PROTHROMBIN TIME: CPT

## 2019-03-15 PROCEDURE — C1874: CPT

## 2019-03-15 PROCEDURE — C1889: CPT

## 2019-03-15 PROCEDURE — 36415 COLL VENOUS BLD VENIPUNCTURE: CPT

## 2019-03-15 PROCEDURE — C1753: CPT

## 2019-03-15 PROCEDURE — 86901 BLOOD TYPING SEROLOGIC RH(D): CPT

## 2019-03-15 PROCEDURE — 94150 VITAL CAPACITY TEST: CPT

## 2019-03-15 PROCEDURE — 85025 COMPLETE CBC W/AUTO DIFF WBC: CPT

## 2019-03-19 ENCOUNTER — APPOINTMENT (OUTPATIENT)
Dept: CARDIOTHORACIC SURGERY | Facility: CLINIC | Age: 74
End: 2019-03-19
Payer: MEDICARE

## 2019-03-19 ENCOUNTER — OUTPATIENT (OUTPATIENT)
Dept: OUTPATIENT SERVICES | Facility: HOSPITAL | Age: 74
LOS: 1 days | End: 2019-03-19
Payer: MEDICARE

## 2019-03-19 ENCOUNTER — NON-APPOINTMENT (OUTPATIENT)
Age: 74
End: 2019-03-19

## 2019-03-19 VITALS
BODY MASS INDEX: 27.32 KG/M2 | TEMPERATURE: 97.8 F | RESPIRATION RATE: 18 BRPM | HEART RATE: 98 BPM | SYSTOLIC BLOOD PRESSURE: 158 MMHG | WEIGHT: 170 LBS | HEIGHT: 66 IN | OXYGEN SATURATION: 98 % | DIASTOLIC BLOOD PRESSURE: 69 MMHG

## 2019-03-19 DIAGNOSIS — Z86.39 PERSONAL HISTORY OF OTHER ENDOCRINE, NUTRITIONAL AND METABOLIC DISEASE: ICD-10-CM

## 2019-03-19 DIAGNOSIS — Z95.5 PRESENCE OF CORONARY ANGIOPLASTY IMPLANT AND GRAFT: ICD-10-CM

## 2019-03-19 DIAGNOSIS — Z86.69 PERSONAL HISTORY OF OTHER DISEASES OF THE NERVOUS SYSTEM AND SENSE ORGANS: ICD-10-CM

## 2019-03-19 DIAGNOSIS — Z86.79 PERSONAL HISTORY OF OTHER DISEASES OF THE CIRCULATORY SYSTEM: ICD-10-CM

## 2019-03-19 DIAGNOSIS — H54.8 LEGAL BLINDNESS, AS DEFINED IN USA: ICD-10-CM

## 2019-03-19 DIAGNOSIS — I25.10 ATHEROSCLEROTIC HEART DISEASE OF NATIVE CORONARY ARTERY W/OUT ANGINA PECTORIS: ICD-10-CM

## 2019-03-19 LAB
ALBUMIN SERPL ELPH-MCNC: 4.2 G/DL — SIGNIFICANT CHANGE UP (ref 3.3–5)
ALP SERPL-CCNC: 75 U/L — SIGNIFICANT CHANGE UP (ref 40–120)
ALT FLD-CCNC: 20 U/L — SIGNIFICANT CHANGE UP (ref 10–45)
ANION GAP SERPL CALC-SCNC: 11 MMOL/L — SIGNIFICANT CHANGE UP (ref 5–17)
APPEARANCE UR: CLEAR — SIGNIFICANT CHANGE UP
AST SERPL-CCNC: 25 U/L — SIGNIFICANT CHANGE UP (ref 10–40)
BACTERIA # UR AUTO: PRESENT /HPF
BASOPHILS # BLD AUTO: 0.02 K/UL — SIGNIFICANT CHANGE UP (ref 0–0.2)
BASOPHILS NFR BLD AUTO: 0.3 % — SIGNIFICANT CHANGE UP (ref 0–2)
BILIRUB SERPL-MCNC: 0.3 MG/DL — SIGNIFICANT CHANGE UP (ref 0.2–1.2)
BILIRUB UR-MCNC: NEGATIVE — SIGNIFICANT CHANGE UP
BLD GP AB SCN SERPL QL: NEGATIVE — SIGNIFICANT CHANGE UP
BUN SERPL-MCNC: 14 MG/DL — SIGNIFICANT CHANGE UP (ref 7–23)
CALCIUM SERPL-MCNC: 9.5 MG/DL — SIGNIFICANT CHANGE UP (ref 8.4–10.5)
CHLORIDE SERPL-SCNC: 106 MMOL/L — SIGNIFICANT CHANGE UP (ref 96–108)
CHOLEST SERPL-MCNC: 113 MG/DL — SIGNIFICANT CHANGE UP (ref 10–199)
CO2 SERPL-SCNC: 28 MMOL/L — SIGNIFICANT CHANGE UP (ref 22–31)
COLOR SPEC: YELLOW — SIGNIFICANT CHANGE UP
CREAT SERPL-MCNC: 0.86 MG/DL — SIGNIFICANT CHANGE UP (ref 0.5–1.3)
DIFF PNL FLD: NEGATIVE — SIGNIFICANT CHANGE UP
EOSINOPHIL # BLD AUTO: 0.06 K/UL — SIGNIFICANT CHANGE UP (ref 0–0.5)
EOSINOPHIL NFR BLD AUTO: 1 % — SIGNIFICANT CHANGE UP (ref 0–6)
EPI CELLS # UR: SIGNIFICANT CHANGE UP /HPF (ref 0–5)
GLUCOSE SERPL-MCNC: 118 MG/DL — HIGH (ref 70–99)
GLUCOSE UR QL: NEGATIVE — SIGNIFICANT CHANGE UP
HBA1C BLD-MCNC: 8.2 % — HIGH (ref 4–5.6)
HBV SURFACE AG SER-ACNC: SIGNIFICANT CHANGE UP
HCT VFR BLD CALC: 39.5 % — SIGNIFICANT CHANGE UP (ref 39–50)
HDLC SERPL-MCNC: 40 MG/DL — SIGNIFICANT CHANGE UP
HGB BLD-MCNC: 12.6 G/DL — LOW (ref 13–17)
IMM GRANULOCYTES NFR BLD AUTO: 0.2 % — SIGNIFICANT CHANGE UP (ref 0–1.5)
KETONES UR-MCNC: NEGATIVE — SIGNIFICANT CHANGE UP
LEUKOCYTE ESTERASE UR-ACNC: NEGATIVE — SIGNIFICANT CHANGE UP
LIPID PNL WITH DIRECT LDL SERPL: 46 MG/DL — SIGNIFICANT CHANGE UP
LYMPHOCYTES # BLD AUTO: 1.42 K/UL — SIGNIFICANT CHANGE UP (ref 1–3.3)
LYMPHOCYTES # BLD AUTO: 22.6 % — SIGNIFICANT CHANGE UP (ref 13–44)
MCHC RBC-ENTMCNC: 27.8 PG — SIGNIFICANT CHANGE UP (ref 27–34)
MCHC RBC-ENTMCNC: 31.9 GM/DL — LOW (ref 32–36)
MCV RBC AUTO: 87 FL — SIGNIFICANT CHANGE UP (ref 80–100)
MONOCYTES # BLD AUTO: 0.52 K/UL — SIGNIFICANT CHANGE UP (ref 0–0.9)
MONOCYTES NFR BLD AUTO: 8.3 % — SIGNIFICANT CHANGE UP (ref 2–14)
NEUTROPHILS # BLD AUTO: 4.24 K/UL — SIGNIFICANT CHANGE UP (ref 1.8–7.4)
NEUTROPHILS NFR BLD AUTO: 67.6 % — SIGNIFICANT CHANGE UP (ref 43–77)
NITRITE UR-MCNC: NEGATIVE — SIGNIFICANT CHANGE UP
NRBC # BLD: 0 /100 WBCS — SIGNIFICANT CHANGE UP (ref 0–0)
PH UR: 7 — SIGNIFICANT CHANGE UP (ref 5–8)
PLATELET # BLD AUTO: 196 K/UL — SIGNIFICANT CHANGE UP (ref 150–400)
POTASSIUM SERPL-MCNC: 4.9 MMOL/L — SIGNIFICANT CHANGE UP (ref 3.5–5.3)
POTASSIUM SERPL-SCNC: 4.9 MMOL/L — SIGNIFICANT CHANGE UP (ref 3.5–5.3)
PROT SERPL-MCNC: 7.8 G/DL — SIGNIFICANT CHANGE UP (ref 6–8.3)
PROT UR-MCNC: ABNORMAL MG/DL
RBC # BLD: 4.54 M/UL — SIGNIFICANT CHANGE UP (ref 4.2–5.8)
RBC # FLD: 13.3 % — SIGNIFICANT CHANGE UP (ref 10.3–14.5)
RH IG SCN BLD-IMP: POSITIVE — SIGNIFICANT CHANGE UP
SODIUM SERPL-SCNC: 145 MMOL/L — SIGNIFICANT CHANGE UP (ref 135–145)
SP GR SPEC: 1.01 — SIGNIFICANT CHANGE UP (ref 1–1.03)
TOTAL CHOLESTEROL/HDL RATIO MEASUREMENT: 2.8 RATIO — LOW (ref 3.4–9.6)
TRIGL SERPL-MCNC: 134 MG/DL — SIGNIFICANT CHANGE UP (ref 10–149)
TSH SERPL-MCNC: 2.03 UIU/ML — SIGNIFICANT CHANGE UP (ref 0.35–4.94)
UROBILINOGEN FLD QL: 0.2 E.U./DL — SIGNIFICANT CHANGE UP
WBC # BLD: 6.27 K/UL — SIGNIFICANT CHANGE UP (ref 3.8–10.5)
WBC # FLD AUTO: 6.27 K/UL — SIGNIFICANT CHANGE UP (ref 3.8–10.5)
WBC UR QL: < 5 /HPF — SIGNIFICANT CHANGE UP

## 2019-03-19 PROCEDURE — 93010 ELECTROCARDIOGRAM REPORT: CPT

## 2019-03-19 PROCEDURE — 99205 OFFICE O/P NEW HI 60 MIN: CPT

## 2019-03-19 PROCEDURE — 36415 COLL VENOUS BLD VENIPUNCTURE: CPT

## 2019-03-20 NOTE — HISTORY OF PRESENT ILLNESS
[FreeTextEntry1] : 72 yo Salvadorean speaking male who is legally blind and POOR HISTORIAN with PMH of HTN, DM, PVD, HLD, \par glaucoma, CAD s/p multiple PCI's presents for surgical consult. 11/14/18  @ [facility] s/p WINTER ->mRCA with \par residual pLCX 70% ISR and dLCx 99%. He presented to his cardiologist with c/o persistent fatigue and palpitations. Pt denies CP, SOB, AMBROCIO, dizziness, syncope, diaphoresis, orthopnea, PND, LE edema, N/V, melena, hematochezia, fever or chills. 10/19/18  ECHO revealing overall LV systolic function is nml with EF of 55-60%. 10/19/18 NST indicating a large sized  moderate intensity partially reversible defect in the inferolateral wall suggestive of ischemia with normal LV with EF of 60%. 2/27/19 admitted to [facility] for staged PCI of  LCx. 2/27/19 Cardiac cath by Dr. Smith with PTCA/WINTER-> distalLCx, PTCA ->midLCx ISR, LM normal, midLAD severe ISR, distalLAD mild diffuse disease, midLCx 90% ISR, distalLCx 99%, patent stents in prox/midRCA, EDP 6, EF 55-60%. \par Patient presents today to discuss MIDCAB procedure. Today he appears generally well, NAD, denies c/o chest pain, sob/ambrocio, fever, lower extremity edema, brbpr or palpitations.

## 2019-03-20 NOTE — PHYSICAL EXAM
[General Appearance - Alert] : alert [General Appearance - In No Acute Distress] : in no acute distress [Outer Ear] : the ears and nose were normal in appearance [Oropharynx] : the oropharynx was normal [Neck Appearance] : the appearance of the neck was normal [Neck Cervical Mass (___cm)] : no neck mass was observed [Jugular Venous Distention Increased] : there was no jugular-venous distention [Thyroid Diffuse Enlargement] : the thyroid was not enlarged [Thyroid Nodule] : there were no palpable thyroid nodules [Auscultation Breath Sounds / Voice Sounds] : lungs were clear to auscultation bilaterally [Heart Sounds] : normal S1 and S2 [Heart Rate And Rhythm] : heart rate was normal and rhythm regular [Heart Sounds Pericardial Friction Rub] : no pericardial rub [Murmurs] : no murmurs [Heart Sounds Gallop] : no gallops [Chest Visual Inspection Thoracic Asymmetry] : no chest asymmetry [Examination Of The Chest] : the chest was normal in appearance [Diminished Respiratory Excursion] : normal chest expansion [2+] : right 2+ [No Abnormalities] : the abdominal aorta was not enlarged and no bruit was heard [Penis Abnormality] : the penis was normal [Scrotum] : the scrotum was normal [Testes Swelling] : the testicles were not swollen [Testes Mass (___cm)] : there were no testicular masses [No CVA Tenderness] : no ~M costovertebral angle tenderness [No Spinal Tenderness] : no spinal tenderness [Abnormal Walk] : normal gait [Nail Clubbing] : no clubbing  or cyanosis of the fingernails [Musculoskeletal - Swelling] : no joint swelling seen [Motor Tone] : muscle strength and tone were normal [Skin Turgor] : normal skin turgor [Skin Color & Pigmentation] : normal skin color and pigmentation [] : no rash [Sensation] : the sensory exam was normal to light touch and pinprick [Deep Tendon Reflexes (DTR)] : deep tendon reflexes were 2+ and symmetric [Oriented To Time, Place, And Person] : oriented to person, place, and time [No Focal Deficits] : no focal deficits [Impaired Insight] : insight and judgment were intact [Affect] : the affect was normal [Right Carotid Bruit] : no bruit heard over the right carotid [Left Carotid Bruit] : no bruit heard over the left carotid [Right Femoral Bruit] : no bruit heard over the right femoral artery [Left Femoral Bruit] : no bruit heard over the left femoral artery [FreeTextEntry1] : ambulates with a cane

## 2019-03-20 NOTE — CONSULT LETTER
[Dear  ___] : Dear  [unfilled], [Please see my note below.] : Please see my note below. [Sincerely,] : Sincerely, [FreeTextEntry2] : Feliberto Mosher M.D. [FreeTextEntry1] : Please find attached our consultation on your patient, LEONARDO HAY \par We take a multidisciplinary team approach to patient care and consider you, the referring physician, an extension of our team. We will maintain an open line of communication with you throughout your patient's treatment course.  \par It is our commitment to provide your patient with the highest quality of advanced therapeutic options. We thank you for allowing us to participate in the care of your patient.\par Please do not hesitate to contact our team with any questions or concerns at [phone].\par  [FreeTextEntry3] : Boom Roberson MD, FRCS\par \par [facility] \par Department of Cardiothoracic  Surgery \par Director of Robotic Cardiac Surgery\par Professor of Cardiovascular & Thoracic Surgery\par [employer] \par \par [facility]\par

## 2019-03-20 NOTE — ASSESSMENT
[FreeTextEntry1] : 73 year old male  with a history of PMH of HTN, DM, PVD, HLD, glaucoma, blindness,  CAD s/p WINTER->RCA in 11/2018 and recently WINTER->LCX presents with residual LAD ISR.  Dr. Roberson reviewed the cardiac cath imaging and reports with the patient and  his wife and discussed the case with .  Dr. Roberson discussed the risks, benefits and alternatives to surgery. Risks include but not limited to death, heart attack, bleeding, stroke, kidney problems and infection. He quoted a 1-2% operative mortality and complication risk.  He also discussed the various approaches, minimally invasive versus sternotomy. Dr. Roberson feels the patient will benefit and is a candidate for a robotic assisted MIDCAB (LIMA->LAD) as completion of hybrid procedures. All questions were addressed and patient agrees to proceed with surgery. \par \par Plan: \par PST today--labs, xray, ekg, u/a\par SDA 4/1/19\par pt instructed to take Toprol XL the morning of surgery\par instructions provided re antibacterial showers and pt given 3 sponges\par

## 2019-03-20 NOTE — DATA REVIEWED
[FreeTextEntry1] : 10/19/18  ECHO:g overall LV systolic function is nml with EF of 55-60%\par \par 10/19/18 NST: large sized  moderate intensity partially reversible defect in the inferolateral wall suggestive of ischemia with normal LV with EF of 60%. \par \par 2/27/19 Cardiac cath: PTCA/WINTER-> distalLCx, PTCA ->midLCx ISR, LM normal, midLAD severe ISR, distalLAD mild diffuse disease, midLCx 90% ISR, distalLCx 99%, patent stents in prox/midRCA, EDP 6, EF 55-60%.\par \par  2/28/19 Carotid US: Small plaque bilaterally without evidence of hemodynamically significant carotid artery stenosis.\par \par 2/27/19 Chest Xray : Coronary stents. Mild hypoinflation. No lung infiltrate or pleural effusion. No bone abnormality. No pneumothorax.\par \par

## 2019-03-21 VITALS
WEIGHT: 170.42 LBS | HEART RATE: 61 BPM | HEIGHT: 66 IN | OXYGEN SATURATION: 99 % | DIASTOLIC BLOOD PRESSURE: 67 MMHG | SYSTOLIC BLOOD PRESSURE: 143 MMHG | RESPIRATION RATE: 18 BRPM | TEMPERATURE: 98 F

## 2019-03-21 DIAGNOSIS — I25.10 ATHEROSCLEROTIC HEART DISEASE OF NATIVE CORONARY ARTERY WITHOUT ANGINA PECTORIS: ICD-10-CM

## 2019-03-21 NOTE — H&P ADULT - ASSESSMENT
73 year old male with a history of PMH of HTN, DM, PVD, HLD, glaucoma, blindness, CAD s/p WINTER->RCA in 11/2018 and recently WINTER->LCX presents with residual LAD ISR. Dr. Roberson reviewed the cardiac cath imaging and reports with the patient and his wife and discussed the case with . Dr. Roberson discussed the risks, benefits and alternatives to surgery. Risks include but not limited to death, heart attack, bleeding, stroke, kidney problems and infection. He quoted a 1-2% operative mortality and complication risk. He also discussed the various approaches, minimally invasive versus sternotomy. Dr. Roberson feels the patient will benefit and is a candidate for a robotic assisted MIDCAB (LIMA->LAD) as completion of hybrid procedures. All questions were addressed and patient agrees to proceed with surgery.     Plan:   PST   SDA 4/1/19  pt instructed to take Toprol XL the morning of surgery  instructions provided re antibacterial showers and pt given 3 sponges  ***Please send PT/INR STAT in SDA unit** 73 year old male with a history of PMH of HTN, DM, PVD, HLD, glaucoma, blindness, CAD s/p WINTER->RCA in 11/2018 and recently WINTER->LCX presents with residual LAD ISR. Dr. Roberson reviewed the cardiac cath imaging and reports with the patient and his wife and discussed the case with . Dr. Roberson discussed the risks, benefits and alternatives to surgery. Risks include but not limited to death, heart attack, bleeding, stroke, kidney problems and infection. He quoted a 1-2% operative mortality and complication risk. He also discussed the various approaches, minimally invasive versus sternotomy. Dr. Roberson feels the patient will benefit and is a candidate for a robotic assisted MIDCAB (LIMA->LAD) as completion of hybrid procedures. All questions were addressed and patient agrees to proceed with surgery.     Plan:   PST   SDA 4/1/19  pt instructed to take Toprol XL the morning of surgery  instructions provided re antibacterial showers and pt given 3 sponges  ***Please send PT/INR STAT in SDA unit**      Admit under Dr. Roberson via same day surgery. Consent signed, placed on chart.  Risks/benefits reviewed, patient understands and agrees. T&S ordered and blood products placed on hold for OR.  To 9E  post-op.     Consent obtained via pacific , verbal consent given per patient but unable to read/write 2/2 blindness. Spouse signed consent for patient at bedside.

## 2019-03-21 NOTE — H&P ADULT - NSHPLABSRESULTS_GEN_ALL_CORE
Hgb A1C = 8.2  creat = 0.86  hct= 39.5  hgb= 12.6  plt= 196  WBC= 6.27  INR=  tot alb=4.2  tot bili= 0.3    TSH= 2.028    2/27/19 Chest xray: clear lungs    3/19/19 EKG: SB, 54 bpm    10/19/18 ECHO: EF of 55-60%    10/19/18 NST: large sized moderate intensity partially reversible defect in the inferolateral wall suggestive of ischemia with normal LV with EF of 60%.     2/27/19 Cardiac cath: PTCA/WINTER-> distalLCx, PTCA ->midLCx ISR, LM normal, midLAD severe ISR, distalLAD mild diffuse disease, midLCx 90% ISR, distalLCx 99%, patent stents in prox/midRCA, EDP 6, EF 55-60%.     2/28/19 Carotid US: Small plaque bilaterally without evidence of hemodynamically significant carotid artery stenosis.    2/27/19 Chest Xray : Coronary stents. Mild hypoinflation. No lung infiltrate or pleural effusion. No bone abnormality. No pneumothorax.

## 2019-03-21 NOTE — H&P ADULT - HISTORY OF PRESENT ILLNESS
72 yo Filipino speaking male who is legally blind and POOR HISTORIAN with PMH of HTN, DM, PVD, HLD, glaucoma, CAD s/p multiple PCI's presents for surgical consult. 11/14/18 @ [facility] s/p WINTER ->mRCA with residual pLCX 70% ISR and dLCx 99%. He presented to his cardiologist with c/o persistent fatigue and palpitations. Pt denies CP, SOB, AMBROCIO, dizziness, syncope, diaphoresis, orthopnea, PND, LE edema, N/V, melena, hematochezia, fever or chills. 10/19/18 ECHO revealing overall LV systolic function is nml with EF of 55-60%. 10/19/18 NST indicating a large sized moderate intensity partially reversible defect in the inferolateral wall suggestive of ischemia with normal LV with EF of 60%. 2/27/19 admitted to [facility] for staged PCI of LCx. 2/27/19 Cardiac cath by Dr. Smith with PTCA/WINTER-> distalLCx, PTCA ->midLCx ISR, LM normal, midLAD severe ISR, distalLAD mild diffuse disease, midLCx 90% ISR, distalLCx 99%, patent stents in prox/midRCA, EDP 6, EF 55-60%. Patient was seen in the outpatient office and now presents for elective surgery 74 yo Tanzanian speaking male who is legally blind and POOR HISTORIAN with PMH of HTN, DM, PVD, HLD, glaucoma, CAD s/p multiple PCI's presents for surgical consult. 11/14/18 @ [facility] s/p WINTER ->mRCA with residual pLCX 70% ISR and dLCx 99%. He presented to his cardiologist with c/o persistent fatigue and palpitations. Pt denies CP, SOB, AMBROCIO, dizziness, syncope, diaphoresis, orthopnea, PND, LE edema, N/V, melena, hematochezia, fever or chills. 10/19/18 ECHO revealing overall LV systolic function is nml with EF of 55-60%. 10/19/18 NST indicating a large sized moderate intensity partially reversible defect in the inferolateral wall suggestive of ischemia with normal LV with EF of 60%. 2/27/19 admitted to [facility] for staged PCI of LCx. 2/27/19 Cardiac cath by Dr. Smith with PTCA/WINTER-> distalLCx, PTCA ->midLCx ISR, LM normal, midLAD severe ISR, distalLAD mild diffuse disease, midLCx 90% ISR, distalLCx 99%, patent stents in prox/midRCA, EDP 6, EF 55-60%. Patient was seen in the outpatient office and now presents for elective surgery    Patient seen in same day holding area; Reports no changes to PMHx or medications since last seen by our team. Denies acute or current SOB, chest pain, palpitation, N/V/D, fever/chills, recent illness, or any other concerning symptoms.

## 2019-03-21 NOTE — H&P ADULT - NSICDXPASTMEDICALHX_GEN_ALL_CORE_FT
PAST MEDICAL HISTORY:  CAD (coronary artery disease)     DM (diabetes mellitus)     Glaucoma     HLD (hyperlipidemia)     HTN (hypertension)

## 2019-04-01 ENCOUNTER — APPOINTMENT (OUTPATIENT)
Dept: CARDIOTHORACIC SURGERY | Facility: HOSPITAL | Age: 74
End: 2019-04-01

## 2019-04-01 ENCOUNTER — INPATIENT (INPATIENT)
Facility: HOSPITAL | Age: 74
LOS: 2 days | Discharge: HOME HEALTH | DRG: 236 | End: 2019-04-04
Attending: THORACIC SURGERY (CARDIOTHORACIC VASCULAR SURGERY) | Admitting: THORACIC SURGERY (CARDIOTHORACIC VASCULAR SURGERY)
Payer: MEDICARE

## 2019-04-01 LAB
ALBUMIN SERPL ELPH-MCNC: 3.5 G/DL — SIGNIFICANT CHANGE UP (ref 3.3–5)
ALP SERPL-CCNC: 60 U/L — SIGNIFICANT CHANGE UP (ref 40–120)
ALT FLD-CCNC: 15 U/L — SIGNIFICANT CHANGE UP (ref 10–45)
ANION GAP SERPL CALC-SCNC: 12 MMOL/L — SIGNIFICANT CHANGE UP (ref 5–17)
APTT BLD: 34.8 SEC — SIGNIFICANT CHANGE UP (ref 27.5–36.3)
APTT BLD: 45.4 SEC — HIGH (ref 27.5–36.3)
AST SERPL-CCNC: 19 U/L — SIGNIFICANT CHANGE UP (ref 10–40)
BASOPHILS # BLD AUTO: 0.02 K/UL — SIGNIFICANT CHANGE UP (ref 0–0.2)
BASOPHILS NFR BLD AUTO: 0.2 % — SIGNIFICANT CHANGE UP (ref 0–2)
BILIRUB SERPL-MCNC: 0.5 MG/DL — SIGNIFICANT CHANGE UP (ref 0.2–1.2)
BUN SERPL-MCNC: 13 MG/DL — SIGNIFICANT CHANGE UP (ref 7–23)
CALCIUM SERPL-MCNC: 8.7 MG/DL — SIGNIFICANT CHANGE UP (ref 8.4–10.5)
CHLORIDE SERPL-SCNC: 105 MMOL/L — SIGNIFICANT CHANGE UP (ref 96–108)
CO2 SERPL-SCNC: 23 MMOL/L — SIGNIFICANT CHANGE UP (ref 22–31)
CREAT SERPL-MCNC: 0.71 MG/DL — SIGNIFICANT CHANGE UP (ref 0.5–1.3)
EOSINOPHIL # BLD AUTO: 0.08 K/UL — SIGNIFICANT CHANGE UP (ref 0–0.5)
EOSINOPHIL NFR BLD AUTO: 0.8 % — SIGNIFICANT CHANGE UP (ref 0–6)
GAS PNL BLDA: SIGNIFICANT CHANGE UP
GAS PNL BLDA: SIGNIFICANT CHANGE UP
GLUCOSE BLDC GLUCOMTR-MCNC: 118 MG/DL — HIGH (ref 70–99)
GLUCOSE BLDC GLUCOMTR-MCNC: 151 MG/DL — HIGH (ref 70–99)
GLUCOSE SERPL-MCNC: 162 MG/DL — HIGH (ref 70–99)
HCT VFR BLD CALC: 33.4 % — LOW (ref 39–50)
HGB BLD-MCNC: 11.5 G/DL — LOW (ref 13–17)
IMM GRANULOCYTES NFR BLD AUTO: 1.3 % — SIGNIFICANT CHANGE UP (ref 0–1.5)
INR BLD: 1.15 — SIGNIFICANT CHANGE UP (ref 0.88–1.16)
INR BLD: 1.31 — HIGH (ref 0.88–1.16)
LACTATE SERPL-SCNC: 1.3 MMOL/L — SIGNIFICANT CHANGE UP (ref 0.5–2)
LYMPHOCYTES # BLD AUTO: 1.24 K/UL — SIGNIFICANT CHANGE UP (ref 1–3.3)
LYMPHOCYTES # BLD AUTO: 11.9 % — LOW (ref 13–44)
MCHC RBC-ENTMCNC: 29.1 PG — SIGNIFICANT CHANGE UP (ref 27–34)
MCHC RBC-ENTMCNC: 34.4 GM/DL — SIGNIFICANT CHANGE UP (ref 32–36)
MCV RBC AUTO: 84.6 FL — SIGNIFICANT CHANGE UP (ref 80–100)
MONOCYTES # BLD AUTO: 0.45 K/UL — SIGNIFICANT CHANGE UP (ref 0–0.9)
MONOCYTES NFR BLD AUTO: 4.3 % — SIGNIFICANT CHANGE UP (ref 2–14)
NEUTROPHILS # BLD AUTO: 8.46 K/UL — HIGH (ref 1.8–7.4)
NEUTROPHILS NFR BLD AUTO: 81.5 % — HIGH (ref 43–77)
NRBC # BLD: 0 /100 WBCS — SIGNIFICANT CHANGE UP (ref 0–0)
PLATELET # BLD AUTO: 158 K/UL — SIGNIFICANT CHANGE UP (ref 150–400)
POTASSIUM SERPL-MCNC: 4.1 MMOL/L — SIGNIFICANT CHANGE UP (ref 3.5–5.3)
POTASSIUM SERPL-SCNC: 4.1 MMOL/L — SIGNIFICANT CHANGE UP (ref 3.5–5.3)
PROT SERPL-MCNC: 6 G/DL — SIGNIFICANT CHANGE UP (ref 6–8.3)
PROTHROM AB SERPL-ACNC: 13.1 SEC — HIGH (ref 10–12.9)
PROTHROM AB SERPL-ACNC: 14.9 SEC — HIGH (ref 10–12.9)
RBC # BLD: 3.95 M/UL — LOW (ref 4.2–5.8)
RBC # FLD: 13.3 % — SIGNIFICANT CHANGE UP (ref 10.3–14.5)
SODIUM SERPL-SCNC: 140 MMOL/L — SIGNIFICANT CHANGE UP (ref 135–145)
WBC # BLD: 10.39 K/UL — SIGNIFICANT CHANGE UP (ref 3.8–10.5)
WBC # FLD AUTO: 10.39 K/UL — SIGNIFICANT CHANGE UP (ref 3.8–10.5)

## 2019-04-01 PROCEDURE — 76998 US GUIDE INTRAOP: CPT | Mod: 26,59

## 2019-04-01 PROCEDURE — 99292 CRITICAL CARE ADDL 30 MIN: CPT

## 2019-04-01 PROCEDURE — 93010 ELECTROCARDIOGRAM REPORT: CPT

## 2019-04-01 PROCEDURE — 76998 US GUIDE INTRAOP: CPT | Mod: 26,AS

## 2019-04-01 PROCEDURE — 71045 X-RAY EXAM CHEST 1 VIEW: CPT | Mod: 26

## 2019-04-01 PROCEDURE — 99291 CRITICAL CARE FIRST HOUR: CPT

## 2019-04-01 PROCEDURE — 33533 CABG ARTERIAL SINGLE: CPT

## 2019-04-01 PROCEDURE — 33533 CABG ARTERIAL SINGLE: CPT | Mod: AS

## 2019-04-01 RX ORDER — INSULIN HUMAN 100 [IU]/ML
1 INJECTION, SOLUTION SUBCUTANEOUS
Qty: 100 | Refills: 0 | Status: DISCONTINUED | OUTPATIENT
Start: 2019-04-01 | End: 2019-04-02

## 2019-04-01 RX ORDER — LATANOPROST 0.05 MG/ML
1 SOLUTION/ DROPS OPHTHALMIC; TOPICAL AT BEDTIME
Qty: 0 | Refills: 0 | Status: DISCONTINUED | OUTPATIENT
Start: 2019-04-01 | End: 2019-04-04

## 2019-04-01 RX ORDER — PANTOPRAZOLE SODIUM 20 MG/1
40 TABLET, DELAYED RELEASE ORAL DAILY
Qty: 0 | Refills: 0 | Status: DISCONTINUED | OUTPATIENT
Start: 2019-04-01 | End: 2019-04-02

## 2019-04-01 RX ORDER — BUPIVACAINE 13.3 MG/ML
20 INJECTION, SUSPENSION, LIPOSOMAL INFILTRATION ONCE
Qty: 0 | Refills: 0 | Status: DISCONTINUED | OUTPATIENT
Start: 2019-04-01 | End: 2019-04-01

## 2019-04-01 RX ORDER — CEFAZOLIN SODIUM 1 G
2000 VIAL (EA) INJECTION EVERY 8 HOURS
Qty: 0 | Refills: 0 | Status: COMPLETED | OUTPATIENT
Start: 2019-04-01 | End: 2019-04-02

## 2019-04-01 RX ORDER — DORZOLAMIDE HYDROCHLORIDE 20 MG/ML
1 SOLUTION/ DROPS OPHTHALMIC THREE TIMES A DAY
Qty: 0 | Refills: 0 | Status: DISCONTINUED | OUTPATIENT
Start: 2019-04-02 | End: 2019-04-04

## 2019-04-01 RX ORDER — ASPIRIN/CALCIUM CARB/MAGNESIUM 324 MG
81 TABLET ORAL DAILY
Qty: 0 | Refills: 0 | Status: DISCONTINUED | OUTPATIENT
Start: 2019-04-01 | End: 2019-04-04

## 2019-04-01 RX ORDER — CEFAZOLIN SODIUM 1 G
2000 VIAL (EA) INJECTION EVERY 8 HOURS
Qty: 0 | Refills: 0 | Status: DISCONTINUED | OUTPATIENT
Start: 2019-04-01 | End: 2019-04-01

## 2019-04-01 RX ORDER — CHLORHEXIDINE GLUCONATE 213 G/1000ML
5 SOLUTION TOPICAL EVERY 4 HOURS
Qty: 0 | Refills: 0 | Status: DISCONTINUED | OUTPATIENT
Start: 2019-04-01 | End: 2019-04-02

## 2019-04-01 RX ORDER — BRIMONIDINE TARTRATE 2 MG/MG
1 SOLUTION/ DROPS OPHTHALMIC THREE TIMES A DAY
Qty: 0 | Refills: 0 | Status: DISCONTINUED | OUTPATIENT
Start: 2019-04-02 | End: 2019-04-04

## 2019-04-01 RX ORDER — TIMOLOL 0.5 %
1 DROPS OPHTHALMIC (EYE) DAILY
Qty: 0 | Refills: 0 | Status: DISCONTINUED | OUTPATIENT
Start: 2019-04-01 | End: 2019-04-04

## 2019-04-01 RX ORDER — HEPARIN SODIUM 5000 [USP'U]/ML
5000 INJECTION INTRAVENOUS; SUBCUTANEOUS EVERY 8 HOURS
Qty: 0 | Refills: 0 | Status: DISCONTINUED | OUTPATIENT
Start: 2019-04-01 | End: 2019-04-02

## 2019-04-01 RX ORDER — CLOPIDOGREL BISULFATE 75 MG/1
75 TABLET, FILM COATED ORAL DAILY
Qty: 0 | Refills: 0 | Status: DISCONTINUED | OUTPATIENT
Start: 2019-04-01 | End: 2019-04-04

## 2019-04-01 RX ORDER — SODIUM CHLORIDE 9 MG/ML
1000 INJECTION INTRAMUSCULAR; INTRAVENOUS; SUBCUTANEOUS
Qty: 0 | Refills: 0 | Status: DISCONTINUED | OUTPATIENT
Start: 2019-04-01 | End: 2019-04-02

## 2019-04-01 RX ORDER — ATORVASTATIN CALCIUM 80 MG/1
40 TABLET, FILM COATED ORAL AT BEDTIME
Qty: 0 | Refills: 0 | Status: DISCONTINUED | OUTPATIENT
Start: 2019-04-01 | End: 2019-04-04

## 2019-04-01 RX ORDER — MEPERIDINE HYDROCHLORIDE 50 MG/ML
25 INJECTION INTRAMUSCULAR; INTRAVENOUS; SUBCUTANEOUS ONCE
Qty: 0 | Refills: 0 | Status: DISCONTINUED | OUTPATIENT
Start: 2019-04-01 | End: 2019-04-01

## 2019-04-01 RX ORDER — DORZOLAMIDE HYDROCHLORIDE 20 MG/ML
SOLUTION/ DROPS OPHTHALMIC
Qty: 0 | Refills: 0 | Status: DISCONTINUED | OUTPATIENT
Start: 2019-04-01 | End: 2019-04-04

## 2019-04-01 RX ORDER — INSULIN LISPRO 100/ML
0 VIAL (ML) SUBCUTANEOUS
Qty: 0 | Refills: 0 | COMMUNITY

## 2019-04-01 RX ORDER — DORZOLAMIDE HYDROCHLORIDE 20 MG/ML
1 SOLUTION/ DROPS OPHTHALMIC ONCE
Qty: 0 | Refills: 0 | Status: COMPLETED | OUTPATIENT
Start: 2019-04-01 | End: 2019-04-01

## 2019-04-01 RX ORDER — DEXTROSE 50 % IN WATER 50 %
50 SYRINGE (ML) INTRAVENOUS
Qty: 0 | Refills: 0 | Status: DISCONTINUED | OUTPATIENT
Start: 2019-04-01 | End: 2019-04-04

## 2019-04-01 RX ORDER — DEXTROSE 50 % IN WATER 50 %
25 SYRINGE (ML) INTRAVENOUS
Qty: 0 | Refills: 0 | Status: DISCONTINUED | OUTPATIENT
Start: 2019-04-01 | End: 2019-04-04

## 2019-04-01 RX ORDER — CHLORHEXIDINE GLUCONATE 213 G/1000ML
1 SOLUTION TOPICAL DAILY
Qty: 0 | Refills: 0 | Status: DISCONTINUED | OUTPATIENT
Start: 2019-04-02 | End: 2019-04-04

## 2019-04-01 RX ORDER — ALBUMIN HUMAN 25 %
500 VIAL (ML) INTRAVENOUS ONCE
Qty: 0 | Refills: 0 | Status: COMPLETED | OUTPATIENT
Start: 2019-04-01 | End: 2019-04-01

## 2019-04-01 RX ORDER — PROPOFOL 10 MG/ML
5 INJECTION, EMULSION INTRAVENOUS
Qty: 1000 | Refills: 0 | Status: DISCONTINUED | OUTPATIENT
Start: 2019-04-01 | End: 2019-04-02

## 2019-04-01 RX ADMIN — CHLORHEXIDINE GLUCONATE 5 MILLILITER(S): 213 SOLUTION TOPICAL at 23:51

## 2019-04-01 RX ADMIN — Medication 250 MILLILITER(S): at 21:30

## 2019-04-01 NOTE — PROGRESS NOTE ADULT - SUBJECTIVE AND OBJECTIVE BOX
CTICU  CRITICAL  CARE  attending     Hand off received 					   Pertinent clinical, laboratory, radiographic, hemodynamic, echocardiographic, respiratory data, microbiologic data and chart were reviewed and analyzed frequently throughout the course of the day and night  Patient seen and examined with CTS/ SH attending at bedside      74 yo Yi speaking male who is legally blind and POOR HISTORIAN with PMH of HTN, DM, PVD, HLD, glaucoma, CAD s/p multiple PCI's presents for surgical consult. 11/14/18 @ [facility] s/p WINTER ->mRCA with residual pLCX 70% ISR and dLCx 99%. He presented to his cardiologist with c/o persistent fatigue and palpitations. Pt denies CP, SOB, AMBROCIO, dizziness, syncope, diaphoresis, orthopnea, PND, LE edema, N/V, melena, hematochezia, fever or chills. 10/19/18 ECHO revealing overall LV systolic function is nml with EF of 55-60%. 10/19/18 NST indicating a large sized moderate intensity partially reversible defect in the inferolateral wall suggestive of ischemia with normal LV with EF of 60%. 2/27/19 admitted to [facility] for staged PCI of LCx. 2/27/19 Cardiac cath by Dr. Smith with PTCA/WINTER-> distalLCx, PTCA ->midLCx ISR, LM normal, midLAD severe ISR, distalLAD mild diffuse disease, midLCx 90% ISR, distalLCx 99%, patent stents in prox/midRCA, EDP 6, EF 55-60%. Patient was seen in the outpatient office and now presents for elective CABG.      FAMILY HISTORY:  No pertinent family history in first degree relatives  PAST MEDICAL & SURGICAL HISTORY:  Glaucoma  CAD (coronary artery disease)  DM (diabetes mellitus)  HLD (hyperlipidemia)  HTN (hypertension)  No significant past surgical history    Patient is a 73y old  Male who presents with a chief complaint of CAD (21 Mar 2019 14:50)      14 system review was unremarkable  acute changes include acute respiratory failure  Vital signs, hemodynamic and respiratory parameters were reviewed from the bedside nursing flow sheet.  ICU Vital Signs Last 24 Hrs  T(C): 36.7 (01 Apr 2019 13:00), Max: 36.7 (01 Apr 2019 13:00)  T(F): 98.1 (01 Apr 2019 13:00), Max: 98.1 (01 Apr 2019 13:00)  HR: 50 (01 Apr 2019 22:30) (50 - 57)  BP: 150/67 (01 Apr 2019 13:00) (150/67 - 150/67)  BP(mean): --  ABP: 150/60 (01 Apr 2019 22:30) (114/44 - 150/60)  ABP(mean): 94 (01 Apr 2019 22:30) (70 - 94)  RR: 12 (01 Apr 2019 22:30) (11 - 14)  SpO2: 100% (01 Apr 2019 22:30) (97% - 100%)    Adult Advanced Hemodynamics Last 24 Hrs  CVP(mm Hg): 18 (01 Apr 2019 22:30) (8 - 21)  CVP(cm H2O): --  CO: --  CI: --  PA: --  PA(mean): --  PCWP: --  SVR: --  SVRI: --  PVR: --  PVRI: --, ABG - ( 01 Apr 2019 20:50 )  pH, Arterial: 7.43  pH, Blood: x     /  pCO2: 37    /  pO2: 139   / HCO3: 24    / Base Excess: -0.2  /  SaO2: 99                Mode: AC/ CMV (Assist Control/ Continuous Mandatory Ventilation)  RR (machine): 12  TV (machine): 500  FiO2: 50  PEEP: 5  ITime: 1  MAP: 8  PIP: 20    Intake and output was reviewed and the fluid balance was calculated  Daily Height in cm: 167.64 (01 Apr 2019 13:00)    Daily   I&O's Summary    01 Apr 2019 07:01  -  01 Apr 2019 22:56  --------------------------------------------------------  IN: 598 mL / OUT: 200 mL / NET: 398 mL        All lines and drain sites were assessed  Glycemic trend was reviewed: CAPILLARY BLOOD GLUCOSE      POCT Blood Glucose.: 118 mg/dL       NEURO: No acute change in mental status from the baseline. Both cornea are opacified. No visual perception except light. Follows simple commands.  CVS: S1, S2, No S3.  RESPIRATORY: Auscultation of the chest reveals equal breath sounds.  GI: Abdomen is soft  Extremities are warm and well perfused.  VASCULAR: + DP/PT.  Wounds appear clean and unremarkable  Antibiotics are perioperative cefazolin.        labs  CBC Full  -  ( 01 Apr 2019 20:39 )  WBC Count : 10.39 K/uL  RBC Count : 3.95 M/uL  Hemoglobin : 11.5 g/dL  Hematocrit : 33.4 %  Platelet Count - Automated : 158 K/uL  Mean Cell Volume : 84.6 fl  Mean Cell Hemoglobin : 29.1 pg  Mean Cell Hemoglobin Concentration : 34.4 gm/dL  Auto Neutrophil # : 8.46 K/uL  Auto Lymphocyte # : 1.24 K/uL  Auto Monocyte # : 0.45 K/uL  Auto Eosinophil # : 0.08 K/uL  Auto Basophil # : 0.02 K/uL  Auto Neutrophil % : 81.5 %  Auto Lymphocyte % : 11.9 %  Auto Monocyte % : 4.3 %  Auto Eosinophil % : 0.8 %  Auto Basophil % : 0.2 %    04-01    140  |  105  |  13  ----------------------------<  162<H>  4.1   |  23  |  0.71    Ca    8.7      01 Apr 2019 20:39    TPro  6.0  /  Alb  3.5  /  TBili  0.5  /  DBili  x   /  AST  19  /  ALT  15  /  AlkPhos  60  04-01    PT/INR - ( 01 Apr 2019 20:39 )   PT: 14.9 sec;   INR: 1.31          PTT - ( 01 Apr 2019 20:39 )  PTT:45.4 sec  The current medications were reviewed   MEDICATIONS  (STANDING):  albumin human  5% IVPB 500 milliLiter(s) IV Intermittent once  aspirin enteric coated 81 milliGRAM(s) Oral daily  atorvastatin 40 milliGRAM(s) Oral at bedtime  ceFAZolin  Injectable. 2000 milliGRAM(s) IV Push every 8 hours  chlorhexidine 0.12% Liquid 5 milliLiter(s) Oral Mucosa every 4 hours  clopidogrel Tablet 75 milliGRAM(s) Oral daily  dextrose 50% Injectable 50 milliLiter(s) IV Push every 15 minutes  dextrose 50% Injectable 25 milliLiter(s) IV Push every 15 minutes  dorzolamide 2% Ophthalmic Solution      dorzolamide 2% Ophthalmic Solution 1 Drop(s) Both EYES once  heparin  Injectable 5000 Unit(s) SubCutaneous every 8 hours  insulin Infusion 1 Unit(s)/Hr (1 mL/Hr) IV Continuous <Continuous>  latanoprost 0.005% Ophthalmic Solution 1 Drop(s) Both EYES at bedtime  pantoprazole  Injectable 40 milliGRAM(s) IV Push daily  propofol Infusion 5 MICROgram(s)/kG/Min (2.319 mL/Hr) IV Continuous <Continuous>  sodium chloride 0.9%. 1000 milliLiter(s) (10 mL/Hr) IV Continuous <Continuous>  timolol 0.5% Solution 1 Drop(s) Both EYES daily    MEDICATIONS  (PRN):      PROBLEM LIST/ ASSESSMENT:  HEALTH ISSUES - PROBLEM Dx:        Patient is a 73y old  Male who presents with CAD   S/P CABG  Hemodynamically stable.  Good oxygenation.  Fair urine  output.  Overall doing well.      My plan includes :  WEAN to Extubate.  Dual antiplatelet RX.  Beta blocker and statin.  Resume antiglaucoma meds.  Close hemodynamic, ventilatory and drain monitoring and management.  Monitor for arrhythmias and monitor parameters for organ perfusion  Monitor neurologic status  Head of the bed should remain elevated to 45 deg .   Chest PT and IS will be encouraged  Monitor adequacy of oxygenation and ventilation and attempt to wean oxygen  Nutritional goals will be met using po eventually , ensure adequate caloric intake and monitor the same  Stress ulcer and VTE prophylaxis will be achieved    OPTIMIZE Glycemic control.  Electrolytes have been repleted as necessary and wound care has been carried out. Pain control has been achieved.   Aggressive physical therapy and early mobility and ambulation goals will be met   The family was updated about the course and plan  CRITICAL CARE TIME SPENT in evaluation and management, reassessments, review and interpretation of labs and x-rays, ventilator and hemodynamic management, formulating a plan and coordinating care: ___90____ MIN.  Time does not include procedural time.  CTICU ATTENDING     					    Hany Brock MD

## 2019-04-01 NOTE — BRIEF OPERATIVE NOTE - NSICDXBRIEFPROCEDURE_GEN_ALL_CORE_FT
PROCEDURES:  CABG, using internal mammary artery 01-Apr-2019 20:22:07 Minimally invasive approach Hitesh Westbrook

## 2019-04-02 LAB
ALBUMIN SERPL ELPH-MCNC: 3.9 G/DL — SIGNIFICANT CHANGE UP (ref 3.3–5)
ALBUMIN SERPL ELPH-MCNC: 4.1 G/DL — SIGNIFICANT CHANGE UP (ref 3.3–5)
ALP SERPL-CCNC: 60 U/L — SIGNIFICANT CHANGE UP (ref 40–120)
ALP SERPL-CCNC: 62 U/L — SIGNIFICANT CHANGE UP (ref 40–120)
ALT FLD-CCNC: 14 U/L — SIGNIFICANT CHANGE UP (ref 10–45)
ALT FLD-CCNC: 14 U/L — SIGNIFICANT CHANGE UP (ref 10–45)
ANION GAP SERPL CALC-SCNC: 12 MMOL/L — SIGNIFICANT CHANGE UP (ref 5–17)
ANION GAP SERPL CALC-SCNC: 13 MMOL/L — SIGNIFICANT CHANGE UP (ref 5–17)
APTT BLD: 31.2 SEC — SIGNIFICANT CHANGE UP (ref 27.5–36.3)
APTT BLD: 33.1 SEC — SIGNIFICANT CHANGE UP (ref 27.5–36.3)
AST SERPL-CCNC: 19 U/L — SIGNIFICANT CHANGE UP (ref 10–40)
AST SERPL-CCNC: 21 U/L — SIGNIFICANT CHANGE UP (ref 10–40)
BILIRUB SERPL-MCNC: 0.5 MG/DL — SIGNIFICANT CHANGE UP (ref 0.2–1.2)
BILIRUB SERPL-MCNC: 0.5 MG/DL — SIGNIFICANT CHANGE UP (ref 0.2–1.2)
BUN SERPL-MCNC: 15 MG/DL — SIGNIFICANT CHANGE UP (ref 7–23)
BUN SERPL-MCNC: 16 MG/DL — SIGNIFICANT CHANGE UP (ref 7–23)
CALCIUM SERPL-MCNC: 8.6 MG/DL — SIGNIFICANT CHANGE UP (ref 8.4–10.5)
CALCIUM SERPL-MCNC: 8.6 MG/DL — SIGNIFICANT CHANGE UP (ref 8.4–10.5)
CHLORIDE SERPL-SCNC: 102 MMOL/L — SIGNIFICANT CHANGE UP (ref 96–108)
CHLORIDE SERPL-SCNC: 104 MMOL/L — SIGNIFICANT CHANGE UP (ref 96–108)
CO2 SERPL-SCNC: 23 MMOL/L — SIGNIFICANT CHANGE UP (ref 22–31)
CO2 SERPL-SCNC: 23 MMOL/L — SIGNIFICANT CHANGE UP (ref 22–31)
CREAT SERPL-MCNC: 0.73 MG/DL — SIGNIFICANT CHANGE UP (ref 0.5–1.3)
CREAT SERPL-MCNC: 0.84 MG/DL — SIGNIFICANT CHANGE UP (ref 0.5–1.3)
GAS PNL BLDA: SIGNIFICANT CHANGE UP
GLUCOSE BLDC GLUCOMTR-MCNC: 190 MG/DL — HIGH (ref 70–99)
GLUCOSE BLDC GLUCOMTR-MCNC: 200 MG/DL — HIGH (ref 70–99)
GLUCOSE BLDC GLUCOMTR-MCNC: 269 MG/DL — HIGH (ref 70–99)
GLUCOSE BLDC GLUCOMTR-MCNC: 289 MG/DL — HIGH (ref 70–99)
GLUCOSE BLDC GLUCOMTR-MCNC: 317 MG/DL — HIGH (ref 70–99)
GLUCOSE BLDC GLUCOMTR-MCNC: 354 MG/DL — HIGH (ref 70–99)
GLUCOSE BLDC GLUCOMTR-MCNC: 360 MG/DL — HIGH (ref 70–99)
GLUCOSE BLDC GLUCOMTR-MCNC: 475 MG/DL — CRITICAL HIGH (ref 70–99)
GLUCOSE SERPL-MCNC: 185 MG/DL — HIGH (ref 70–99)
GLUCOSE SERPL-MCNC: 291 MG/DL — HIGH (ref 70–99)
HCT VFR BLD CALC: 33.6 % — LOW (ref 39–50)
HCT VFR BLD CALC: 35.2 % — LOW (ref 39–50)
HGB BLD-MCNC: 11.3 G/DL — LOW (ref 13–17)
HGB BLD-MCNC: 11.5 G/DL — LOW (ref 13–17)
INR BLD: 1.26 — HIGH (ref 0.88–1.16)
INR BLD: 1.27 — HIGH (ref 0.88–1.16)
LACTATE SERPL-SCNC: 0.9 MMOL/L — SIGNIFICANT CHANGE UP (ref 0.5–2)
LACTATE SERPL-SCNC: 1.4 MMOL/L — SIGNIFICANT CHANGE UP (ref 0.5–2)
MAGNESIUM SERPL-MCNC: 1.4 MG/DL — LOW (ref 1.6–2.6)
MAGNESIUM SERPL-MCNC: 2.5 MG/DL — SIGNIFICANT CHANGE UP (ref 1.6–2.6)
MCHC RBC-ENTMCNC: 27.9 PG — SIGNIFICANT CHANGE UP (ref 27–34)
MCHC RBC-ENTMCNC: 28.5 PG — SIGNIFICANT CHANGE UP (ref 27–34)
MCHC RBC-ENTMCNC: 32.7 GM/DL — SIGNIFICANT CHANGE UP (ref 32–36)
MCHC RBC-ENTMCNC: 33.6 GM/DL — SIGNIFICANT CHANGE UP (ref 32–36)
MCV RBC AUTO: 84.8 FL — SIGNIFICANT CHANGE UP (ref 80–100)
MCV RBC AUTO: 85.4 FL — SIGNIFICANT CHANGE UP (ref 80–100)
NRBC # BLD: 0 /100 WBCS — SIGNIFICANT CHANGE UP (ref 0–0)
NRBC # BLD: 0 /100 WBCS — SIGNIFICANT CHANGE UP (ref 0–0)
PHOSPHATE SERPL-MCNC: 4.1 MG/DL — SIGNIFICANT CHANGE UP (ref 2.5–4.5)
PLATELET # BLD AUTO: 142 K/UL — LOW (ref 150–400)
PLATELET # BLD AUTO: 174 K/UL — SIGNIFICANT CHANGE UP (ref 150–400)
POTASSIUM SERPL-MCNC: 4 MMOL/L — SIGNIFICANT CHANGE UP (ref 3.5–5.3)
POTASSIUM SERPL-MCNC: 4.1 MMOL/L — SIGNIFICANT CHANGE UP (ref 3.5–5.3)
POTASSIUM SERPL-SCNC: 4 MMOL/L — SIGNIFICANT CHANGE UP (ref 3.5–5.3)
POTASSIUM SERPL-SCNC: 4.1 MMOL/L — SIGNIFICANT CHANGE UP (ref 3.5–5.3)
PROT SERPL-MCNC: 6.2 G/DL — SIGNIFICANT CHANGE UP (ref 6–8.3)
PROT SERPL-MCNC: 6.6 G/DL — SIGNIFICANT CHANGE UP (ref 6–8.3)
PROTHROM AB SERPL-ACNC: 14.3 SEC — HIGH (ref 10–12.9)
PROTHROM AB SERPL-ACNC: 14.4 SEC — HIGH (ref 10–12.9)
RBC # BLD: 3.96 M/UL — LOW (ref 4.2–5.8)
RBC # BLD: 4.12 M/UL — LOW (ref 4.2–5.8)
RBC # FLD: 13.4 % — SIGNIFICANT CHANGE UP (ref 10.3–14.5)
RBC # FLD: 13.5 % — SIGNIFICANT CHANGE UP (ref 10.3–14.5)
SODIUM SERPL-SCNC: 138 MMOL/L — SIGNIFICANT CHANGE UP (ref 135–145)
SODIUM SERPL-SCNC: 139 MMOL/L — SIGNIFICANT CHANGE UP (ref 135–145)
WBC # BLD: 10.8 K/UL — HIGH (ref 3.8–10.5)
WBC # BLD: 12 K/UL — HIGH (ref 3.8–10.5)
WBC # FLD AUTO: 10.8 K/UL — HIGH (ref 3.8–10.5)
WBC # FLD AUTO: 12 K/UL — HIGH (ref 3.8–10.5)

## 2019-04-02 PROCEDURE — 83036 HEMOGLOBIN GLYCOSYLATED A1C: CPT

## 2019-04-02 PROCEDURE — 80053 COMPREHEN METABOLIC PANEL: CPT

## 2019-04-02 PROCEDURE — 86901 BLOOD TYPING SEROLOGIC RH(D): CPT

## 2019-04-02 PROCEDURE — 81001 URINALYSIS AUTO W/SCOPE: CPT

## 2019-04-02 PROCEDURE — 99291 CRITICAL CARE FIRST HOUR: CPT

## 2019-04-02 PROCEDURE — 86900 BLOOD TYPING SEROLOGIC ABO: CPT

## 2019-04-02 PROCEDURE — 85025 COMPLETE CBC W/AUTO DIFF WBC: CPT

## 2019-04-02 PROCEDURE — 36415 COLL VENOUS BLD VENIPUNCTURE: CPT

## 2019-04-02 PROCEDURE — 80061 LIPID PANEL: CPT

## 2019-04-02 PROCEDURE — 84443 ASSAY THYROID STIM HORMONE: CPT

## 2019-04-02 PROCEDURE — 86850 RBC ANTIBODY SCREEN: CPT

## 2019-04-02 PROCEDURE — 87340 HEPATITIS B SURFACE AG IA: CPT

## 2019-04-02 PROCEDURE — 71045 X-RAY EXAM CHEST 1 VIEW: CPT | Mod: 26,76

## 2019-04-02 RX ORDER — MAGNESIUM SULFATE 500 MG/ML
2 VIAL (ML) INJECTION ONCE
Qty: 0 | Refills: 0 | Status: COMPLETED | OUTPATIENT
Start: 2019-04-02 | End: 2019-04-02

## 2019-04-02 RX ORDER — LISINOPRIL 2.5 MG/1
2.5 TABLET ORAL DAILY
Qty: 0 | Refills: 0 | Status: DISCONTINUED | OUTPATIENT
Start: 2019-04-02 | End: 2019-04-03

## 2019-04-02 RX ORDER — INSULIN GLARGINE 100 [IU]/ML
40 INJECTION, SOLUTION SUBCUTANEOUS AT BEDTIME
Qty: 0 | Refills: 0 | Status: DISCONTINUED | OUTPATIENT
Start: 2019-04-02 | End: 2019-04-02

## 2019-04-02 RX ORDER — PANTOPRAZOLE SODIUM 20 MG/1
40 TABLET, DELAYED RELEASE ORAL
Qty: 0 | Refills: 0 | Status: DISCONTINUED | OUTPATIENT
Start: 2019-04-02 | End: 2019-04-04

## 2019-04-02 RX ORDER — SODIUM CHLORIDE 9 MG/ML
3 INJECTION INTRAMUSCULAR; INTRAVENOUS; SUBCUTANEOUS EVERY 8 HOURS
Qty: 0 | Refills: 0 | Status: DISCONTINUED | OUTPATIENT
Start: 2019-04-02 | End: 2019-04-04

## 2019-04-02 RX ORDER — INSULIN LISPRO 100/ML
4 VIAL (ML) SUBCUTANEOUS
Qty: 0 | Refills: 0 | Status: DISCONTINUED | OUTPATIENT
Start: 2019-04-02 | End: 2019-04-02

## 2019-04-02 RX ORDER — GLUCAGON INJECTION, SOLUTION 0.5 MG/.1ML
1 INJECTION, SOLUTION SUBCUTANEOUS ONCE
Qty: 0 | Refills: 0 | Status: DISCONTINUED | OUTPATIENT
Start: 2019-04-02 | End: 2019-04-04

## 2019-04-02 RX ORDER — SENNA PLUS 8.6 MG/1
2 TABLET ORAL AT BEDTIME
Qty: 0 | Refills: 0 | Status: DISCONTINUED | OUTPATIENT
Start: 2019-04-02 | End: 2019-04-04

## 2019-04-02 RX ORDER — OXYCODONE AND ACETAMINOPHEN 5; 325 MG/1; MG/1
2 TABLET ORAL EVERY 4 HOURS
Qty: 0 | Refills: 0 | Status: DISCONTINUED | OUTPATIENT
Start: 2019-04-02 | End: 2019-04-04

## 2019-04-02 RX ORDER — INSULIN GLARGINE 100 [IU]/ML
40 INJECTION, SOLUTION SUBCUTANEOUS EVERY MORNING
Qty: 0 | Refills: 0 | Status: DISCONTINUED | OUTPATIENT
Start: 2019-04-02 | End: 2019-04-04

## 2019-04-02 RX ORDER — INSULIN LISPRO 100/ML
6 VIAL (ML) SUBCUTANEOUS
Qty: 0 | Refills: 0 | Status: DISCONTINUED | OUTPATIENT
Start: 2019-04-02 | End: 2019-04-04

## 2019-04-02 RX ORDER — DOCUSATE SODIUM 100 MG
100 CAPSULE ORAL THREE TIMES A DAY
Qty: 0 | Refills: 0 | Status: DISCONTINUED | OUTPATIENT
Start: 2019-04-02 | End: 2019-04-04

## 2019-04-02 RX ORDER — INSULIN LISPRO 100/ML
10 VIAL (ML) SUBCUTANEOUS
Qty: 0 | Refills: 0 | Status: DISCONTINUED | OUTPATIENT
Start: 2019-04-03 | End: 2019-04-04

## 2019-04-02 RX ORDER — SODIUM CHLORIDE 9 MG/ML
1000 INJECTION, SOLUTION INTRAVENOUS
Qty: 0 | Refills: 0 | Status: DISCONTINUED | OUTPATIENT
Start: 2019-04-02 | End: 2019-04-04

## 2019-04-02 RX ORDER — LIDOCAINE HYDROCHLORIDE AND EPINEPHRINE 10; 10 MG/ML; UG/ML
5 INJECTION, SOLUTION INFILTRATION; PERINEURAL ONCE
Qty: 0 | Refills: 0 | Status: COMPLETED | OUTPATIENT
Start: 2019-04-02 | End: 2019-04-02

## 2019-04-02 RX ORDER — HUMAN INSULIN 100 [IU]/ML
14 INJECTION, SUSPENSION SUBCUTANEOUS ONCE
Qty: 0 | Refills: 0 | Status: COMPLETED | OUTPATIENT
Start: 2019-04-02 | End: 2019-04-02

## 2019-04-02 RX ORDER — POLYETHYLENE GLYCOL 3350 17 G/17G
17 POWDER, FOR SOLUTION ORAL DAILY
Qty: 0 | Refills: 0 | Status: DISCONTINUED | OUTPATIENT
Start: 2019-04-02 | End: 2019-04-04

## 2019-04-02 RX ORDER — HEPARIN SODIUM 5000 [USP'U]/ML
5000 INJECTION INTRAVENOUS; SUBCUTANEOUS EVERY 8 HOURS
Qty: 0 | Refills: 0 | Status: DISCONTINUED | OUTPATIENT
Start: 2019-04-02 | End: 2019-04-04

## 2019-04-02 RX ORDER — ACETAMINOPHEN 500 MG
1000 TABLET ORAL ONCE
Qty: 0 | Refills: 0 | Status: COMPLETED | OUTPATIENT
Start: 2019-04-02 | End: 2019-04-02

## 2019-04-02 RX ORDER — ACETAMINOPHEN 500 MG
650 TABLET ORAL EVERY 6 HOURS
Qty: 0 | Refills: 0 | Status: DISCONTINUED | OUTPATIENT
Start: 2019-04-02 | End: 2019-04-04

## 2019-04-02 RX ORDER — IPRATROPIUM/ALBUTEROL SULFATE 18-103MCG
3 AEROSOL WITH ADAPTER (GRAM) INHALATION ONCE
Qty: 0 | Refills: 0 | Status: COMPLETED | OUTPATIENT
Start: 2019-04-02 | End: 2019-04-02

## 2019-04-02 RX ORDER — FUROSEMIDE 40 MG
20 TABLET ORAL ONCE
Qty: 0 | Refills: 0 | Status: COMPLETED | OUTPATIENT
Start: 2019-04-02 | End: 2019-04-02

## 2019-04-02 RX ORDER — HUMAN INSULIN 100 [IU]/ML
8 INJECTION, SUSPENSION SUBCUTANEOUS ONCE
Qty: 0 | Refills: 0 | Status: COMPLETED | OUTPATIENT
Start: 2019-04-02 | End: 2019-04-02

## 2019-04-02 RX ORDER — DEXTROSE 50 % IN WATER 50 %
15 SYRINGE (ML) INTRAVENOUS ONCE
Qty: 0 | Refills: 0 | Status: DISCONTINUED | OUTPATIENT
Start: 2019-04-02 | End: 2019-04-04

## 2019-04-02 RX ORDER — FENTANYL CITRATE 50 UG/ML
50 INJECTION INTRAVENOUS
Qty: 0 | Refills: 0 | Status: DISCONTINUED | OUTPATIENT
Start: 2019-04-02 | End: 2019-04-02

## 2019-04-02 RX ORDER — METOPROLOL TARTRATE 50 MG
12.5 TABLET ORAL EVERY 12 HOURS
Qty: 0 | Refills: 0 | Status: DISCONTINUED | OUTPATIENT
Start: 2019-04-02 | End: 2019-04-04

## 2019-04-02 RX ORDER — INSULIN LISPRO 100/ML
VIAL (ML) SUBCUTANEOUS
Qty: 0 | Refills: 0 | Status: DISCONTINUED | OUTPATIENT
Start: 2019-04-02 | End: 2019-04-04

## 2019-04-02 RX ADMIN — HEPARIN SODIUM 5000 UNIT(S): 5000 INJECTION INTRAVENOUS; SUBCUTANEOUS at 05:22

## 2019-04-02 RX ADMIN — Medication 650 MILLIGRAM(S): at 22:57

## 2019-04-02 RX ADMIN — BRIMONIDINE TARTRATE 1 DROP(S): 2 SOLUTION/ DROPS OPHTHALMIC at 22:26

## 2019-04-02 RX ADMIN — FENTANYL CITRATE 50 MICROGRAM(S): 50 INJECTION INTRAVENOUS at 03:05

## 2019-04-02 RX ADMIN — Medication 2000 MILLIGRAM(S): at 09:34

## 2019-04-02 RX ADMIN — CHLORHEXIDINE GLUCONATE 5 MILLILITER(S): 213 SOLUTION TOPICAL at 02:36

## 2019-04-02 RX ADMIN — FENTANYL CITRATE 50 MICROGRAM(S): 50 INJECTION INTRAVENOUS at 03:45

## 2019-04-02 RX ADMIN — OXYCODONE AND ACETAMINOPHEN 2 TABLET(S): 5; 325 TABLET ORAL at 11:05

## 2019-04-02 RX ADMIN — Medication 81 MILLIGRAM(S): at 12:19

## 2019-04-02 RX ADMIN — Medication 20 MILLIGRAM(S): at 03:00

## 2019-04-02 RX ADMIN — LIDOCAINE HYDROCHLORIDE AND EPINEPHRINE 5 MILLILITER(S): 10; 10 INJECTION, SOLUTION INFILTRATION; PERINEURAL at 10:14

## 2019-04-02 RX ADMIN — HEPARIN SODIUM 5000 UNIT(S): 5000 INJECTION INTRAVENOUS; SUBCUTANEOUS at 22:25

## 2019-04-02 RX ADMIN — BRIMONIDINE TARTRATE 1 DROP(S): 2 SOLUTION/ DROPS OPHTHALMIC at 13:34

## 2019-04-02 RX ADMIN — CLOPIDOGREL BISULFATE 75 MILLIGRAM(S): 75 TABLET, FILM COATED ORAL at 12:06

## 2019-04-02 RX ADMIN — Medication 1000 MILLIGRAM(S): at 04:14

## 2019-04-02 RX ADMIN — SENNA PLUS 2 TABLET(S): 8.6 TABLET ORAL at 22:25

## 2019-04-02 RX ADMIN — Medication 400 MILLIGRAM(S): at 03:04

## 2019-04-02 RX ADMIN — HUMAN INSULIN 8 UNIT(S): 100 INJECTION, SUSPENSION SUBCUTANEOUS at 11:03

## 2019-04-02 RX ADMIN — Medication 2000 MILLIGRAM(S): at 00:47

## 2019-04-02 RX ADMIN — OXYCODONE AND ACETAMINOPHEN 2 TABLET(S): 5; 325 TABLET ORAL at 09:32

## 2019-04-02 RX ADMIN — PANTOPRAZOLE SODIUM 40 MILLIGRAM(S): 20 TABLET, DELAYED RELEASE ORAL at 12:16

## 2019-04-02 RX ADMIN — Medication 4 UNIT(S): at 12:07

## 2019-04-02 RX ADMIN — Medication 2: at 07:31

## 2019-04-02 RX ADMIN — Medication 50 GRAM(S): at 03:15

## 2019-04-02 RX ADMIN — Medication 6: at 12:06

## 2019-04-02 RX ADMIN — Medication 50 GRAM(S): at 03:50

## 2019-04-02 RX ADMIN — Medication 8: at 16:57

## 2019-04-02 RX ADMIN — LATANOPROST 1 DROP(S): 0.05 SOLUTION/ DROPS OPHTHALMIC; TOPICAL at 01:53

## 2019-04-02 RX ADMIN — LISINOPRIL 2.5 MILLIGRAM(S): 2.5 TABLET ORAL at 09:33

## 2019-04-02 RX ADMIN — Medication 100 MILLIGRAM(S): at 22:25

## 2019-04-02 RX ADMIN — CHLORHEXIDINE GLUCONATE 1 APPLICATION(S): 213 SOLUTION TOPICAL at 12:06

## 2019-04-02 RX ADMIN — ATORVASTATIN CALCIUM 40 MILLIGRAM(S): 80 TABLET, FILM COATED ORAL at 22:25

## 2019-04-02 RX ADMIN — Medication 100 MILLIGRAM(S): at 13:34

## 2019-04-02 RX ADMIN — Medication 12.5 MILLIGRAM(S): at 17:44

## 2019-04-02 RX ADMIN — DORZOLAMIDE HYDROCHLORIDE 1 DROP(S): 20 SOLUTION/ DROPS OPHTHALMIC at 05:19

## 2019-04-02 RX ADMIN — SODIUM CHLORIDE 3 MILLILITER(S): 9 INJECTION INTRAMUSCULAR; INTRAVENOUS; SUBCUTANEOUS at 22:26

## 2019-04-02 RX ADMIN — LATANOPROST 1 DROP(S): 0.05 SOLUTION/ DROPS OPHTHALMIC; TOPICAL at 22:26

## 2019-04-02 RX ADMIN — Medication 4 UNIT(S): at 16:58

## 2019-04-02 RX ADMIN — DORZOLAMIDE HYDROCHLORIDE 1 DROP(S): 20 SOLUTION/ DROPS OPHTHALMIC at 01:53

## 2019-04-02 RX ADMIN — BRIMONIDINE TARTRATE 1 DROP(S): 2 SOLUTION/ DROPS OPHTHALMIC at 05:18

## 2019-04-02 RX ADMIN — DORZOLAMIDE HYDROCHLORIDE 1 DROP(S): 20 SOLUTION/ DROPS OPHTHALMIC at 13:35

## 2019-04-02 RX ADMIN — SODIUM CHLORIDE 3 MILLILITER(S): 9 INJECTION INTRAMUSCULAR; INTRAVENOUS; SUBCUTANEOUS at 13:32

## 2019-04-02 RX ADMIN — Medication 1 DROP(S): at 12:12

## 2019-04-02 RX ADMIN — HUMAN INSULIN 14 UNIT(S): 100 INJECTION, SUSPENSION SUBCUTANEOUS at 17:58

## 2019-04-02 RX ADMIN — Medication 2000 MILLIGRAM(S): at 16:27

## 2019-04-02 RX ADMIN — Medication 3 MILLILITER(S): at 01:47

## 2019-04-02 RX ADMIN — DORZOLAMIDE HYDROCHLORIDE 1 DROP(S): 20 SOLUTION/ DROPS OPHTHALMIC at 22:26

## 2019-04-02 RX ADMIN — Medication 2: at 22:34

## 2019-04-02 RX ADMIN — Medication 650 MILLIGRAM(S): at 23:00

## 2019-04-02 NOTE — PROGRESS NOTE ADULT - SUBJECTIVE AND OBJECTIVE BOX
CTICU  CRITICAL  CARE  attending     Hand off received 					   Pertinent clinical, laboratory, radiographic, hemodynamic, echocardiographic, respiratory data, microbiologic data and chart were reviewed and analyzed frequently throughout the course of the day and night  Patient seen and examined with CTS/ SH attending at bedside  Pt is a 73y , Male, HEALTH ISSUES - PROBLEM Dx:      , FAMILY HISTORY:  No pertinent family history in first degree relatives  PAST MEDICAL & SURGICAL HISTORY:  Glaucoma  CAD (coronary artery disease)  DM (diabetes mellitus)  HLD (hyperlipidemia)  HTN (hypertension)  No significant past surgical history    Patient is a 73y old  Male who presents with a chief complaint of CAD (02 Apr 2019 17:23)      14 system review was unremarkable  acute changes include acute respiratory failure  Vital signs, hemodynamic and respiratory parameters were reviewed from the bedside nursing flowsheet.  ICU Vital Signs Last 24 Hrs  T(C): 36.7 (02 Apr 2019 21:51), Max: 36.7 (02 Apr 2019 21:51)  T(F): 98.1 (02 Apr 2019 21:51), Max: 98.1 (02 Apr 2019 21:51)  HR: 64 (02 Apr 2019 19:20) (50 - 70)  BP: 115/47 (02 Apr 2019 19:20) (106/39 - 148/65)  BP(mean): 66 (02 Apr 2019 19:20) (56 - 103)  ABP: 132/34 (02 Apr 2019 10:00) (132/34 - 178/58)  ABP(mean): 66 (02 Apr 2019 10:00) (66 - 100)  RR: 18 (02 Apr 2019 19:20) (10 - 32)  SpO2: 98% (02 Apr 2019 19:20) (94% - 100%)    Adult Advanced Hemodynamics Last 24 Hrs  CVP(mm Hg): 13 (02 Apr 2019 09:00) (8 - 65)  CVP(cm H2O): --  CO: --  CI: --  PA: --  PA(mean): --  PCWP: --  SVR: --  SVRI: --  PVR: --  PVRI: --, ABG - ( 02 Apr 2019 10:33 )  pH, Arterial: 7.42  pH, Blood: x     /  pCO2: 40    /  pO2: 70    / HCO3: 25    / Base Excess: 0.4   /  SaO2: 94                Mode: CPAP with PS  FiO2: 40  PEEP: 5  PS: 10  MAP: 9  PIP: 15    Intake and output was reviewed and the fluid balance was calculated  Daily     Daily   I&O's Summary    01 Apr 2019 07:01  -  02 Apr 2019 07:00  --------------------------------------------------------  IN: 1144 mL / OUT: 1745 mL / NET: -601 mL    02 Apr 2019 07:01  -  02 Apr 2019 21:57  --------------------------------------------------------  IN: 435 mL / OUT: 820 mL / NET: -385 mL        All lines and drain sites were assessed  Glycemic trend was reviewedCAPILLARY BLOOD GLUCOSE      POCT Blood Glucose.: 354 mg/dL (02 Apr 2019 21:23)    No acute change in mental status  Auscultation of the chest reveals equal bs  Abdomen is soft  Extremities are warm and well perfused  Wounds appear clean and unremarkable  Antibiotics are periop    labs  CBC Full  -  ( 02 Apr 2019 10:34 )  WBC Count : 12.00 K/uL  RBC Count : 4.12 M/uL  Hemoglobin : 11.5 g/dL  Hematocrit : 35.2 %  Platelet Count - Automated : 174 K/uL  Mean Cell Volume : 85.4 fl  Mean Cell Hemoglobin : 27.9 pg  Mean Cell Hemoglobin Concentration : 32.7 gm/dL  Auto Neutrophil # : x  Auto Lymphocyte # : x  Auto Monocyte # : x  Auto Eosinophil # : x  Auto Basophil # : x  Auto Neutrophil % : x  Auto Lymphocyte % : x  Auto Monocyte % : x  Auto Eosinophil % : x  Auto Basophil % : x    04-02    138  |  102  |  16  ----------------------------<  291<H>  4.0   |  23  |  0.84    Ca    8.6      02 Apr 2019 10:34  Phos  4.1     04-02  Mg     2.5     04-02    TPro  6.6  /  Alb  3.9  /  TBili  0.5  /  DBili  x   /  AST  21  /  ALT  14  /  AlkPhos  62  04-02    PT/INR - ( 02 Apr 2019 10:34 )   PT: 14.3 sec;   INR: 1.26          PTT - ( 02 Apr 2019 10:34 )  PTT:31.2 sec  The current medications were reviewed   MEDICATIONS  (STANDING):  aspirin enteric coated 81 milliGRAM(s) Oral daily  atorvastatin 40 milliGRAM(s) Oral at bedtime  brimonidine 0.2% Ophthalmic Solution 1 Drop(s) Both EYES three times a day  chlorhexidine 2% Cloths 1 Application(s) Topical daily  clopidogrel Tablet 75 milliGRAM(s) Oral daily  dextrose 5%. 1000 milliLiter(s) (50 mL/Hr) IV Continuous <Continuous>  dextrose 50% Injectable 50 milliLiter(s) IV Push every 15 minutes  dextrose 50% Injectable 25 milliLiter(s) IV Push every 15 minutes  docusate sodium 100 milliGRAM(s) Oral three times a day  dorzolamide 2% Ophthalmic Solution      dorzolamide 2% Ophthalmic Solution 1 Drop(s) Both EYES three times a day  heparin  Injectable 5000 Unit(s) SubCutaneous every 8 hours  insulin glargine Injectable (LANTUS) 40 Unit(s) SubCutaneous every morning  insulin lispro (HumaLOG) corrective regimen sliding scale   SubCutaneous Before meals and at bedtime  insulin lispro Injectable (HumaLOG) 6 Unit(s) SubCutaneous before breakfast  insulin lispro Injectable (HumaLOG) 6 Unit(s) SubCutaneous before lunch  latanoprost 0.005% Ophthalmic Solution 1 Drop(s) Both EYES at bedtime  lisinopril 2.5 milliGRAM(s) Oral daily  metoprolol tartrate 12.5 milliGRAM(s) Oral every 12 hours  pantoprazole    Tablet 40 milliGRAM(s) Oral before breakfast  senna 2 Tablet(s) Oral at bedtime  sodium chloride 0.9% lock flush 3 milliLiter(s) IV Push every 8 hours  timolol 0.5% Solution 1 Drop(s) Both EYES daily    MEDICATIONS  (PRN):  acetaminophen   Tablet .. 650 milliGRAM(s) Oral every 6 hours PRN Mild Pain (1 - 3)  dextrose 40% Gel 15 Gram(s) Oral once PRN Blood Glucose LESS THAN 70 milliGRAM(s)/deciliter  glucagon  Injectable 1 milliGRAM(s) IntraMuscular once PRN Glucose LESS THAN 70 milligrams/deciliter  oxyCODONE    5 mG/acetaminophen 325 mG 2 Tablet(s) Oral every 4 hours PRN Moderate Pain (4 - 6)  polyethylene glycol 3350 17 Gram(s) Oral daily PRN Constipation       PROBLEM LIST/ ASSESSMENT:  HEALTH ISSUES - PROBLEM Dx:      ,   Patient is a 73y old  Male who presents with a chief complaint of CAD (02 Apr 2019 17:23)     s/p cardiac surgery      My plan includes :  close hemodynamic, ventilatory and drain monitoring and management per post op routine    Monitor for arrhythmias and monitor parameters for organ perfusion  beta blockade not administered due to hemodynamic instability and bradycardia  monitor neurologic status  Head of the bed should remain elevated to 45 deg .   chest PT and IS will be encouraged  monitor adequacy of oxygenation and ventilation and attempt to wean oxygen  antibiotic regimen will be tailored to the clinical, laboratory and microbiologic data  Nutritional goals will be met using po eventually , ensure adequate caloric intake and montior the same  Stress ulcer and VTE prophylaxis will be achieved    Glycemic control is satisfactory  Electrolytes have been repleted as necessary and wound care has been carried out. Pain control has been achieved.   agressive physical therapy and early mobility and ambulation goals will be met   The family was updated about the course and plan  CRITICAL CARE TIME SPENT in evaluation and management, reassessments, review and interpretation of labs and x-rays, ventilator and hemodynamic management, formulating a plan and coordinating care: ___90____ MIN.  Time does not include procedural time.  CTICU ATTENDING     					    Bakari Vargas MD

## 2019-04-02 NOTE — PHYSICAL THERAPY INITIAL EVALUATION ADULT - PERTINENT HX OF CURRENT PROBLEM, REHAB EVAL
73 year old male admitted to [facility] for staged PCI of LCx. 2/27/19 Cardiac cath by Dr. Smith with PTCA/WINTER-> distalLCx, PTCA ->midLCx ISR, LM normal, midLAD severe ISR, distalLAD mild diffuse disease, midLCx 90% ISR, distalLCx 99%, patent stents in prox/midRCA, EDP 6, EF 55-60%. Patient was seen in the outpatient office and now presents for elective surgery.

## 2019-04-02 NOTE — PHYSICAL THERAPY INITIAL EVALUATION ADULT - IMPAIRMENTS FOUND, PT EVAL
ventilation and respiration/gas exchange/visual motor/aerobic capacity/endurance/gait, locomotion, and balance

## 2019-04-02 NOTE — PROGRESS NOTE ADULT - SUBJECTIVE AND OBJECTIVE BOX
Patient discussed on morning rounds with Dr. Roberson    Operation / Date: 4/1/19 - [facility]    SUBJECTIVE ASSESSMENT:  73y Male resting in chair in NAD.  Patient ambulated from 9E to 9L.  Patient feels well.  Minimal SOB with activity.  Minimal post operative pain.  Tolerating diet, no nausea, emesis.  No dizziness.         Vital Signs Last 24 Hrs  T(C): 36 (02 Apr 2019 17:06), Max: 36.6 (02 Apr 2019 09:00)  T(F): 96.8 (02 Apr 2019 17:06), Max: 97.8 (02 Apr 2019 09:00)  HR: 58 (02 Apr 2019 13:46) (50 - 70)  BP: 106/40 (02 Apr 2019 13:46) (106/40 - 148/65)  BP(mean): 80 (02 Apr 2019 13:46) (80 - 103)  RR: 16 (02 Apr 2019 13:46) (10 - 32)  SpO2: 100% (02 Apr 2019 13:46) (94% - 100%)  I&O's Detail    01 Apr 2019 07:01  -  02 Apr 2019 07:00  --------------------------------------------------------  IN:    Albumin 5%  - 250 mL: 750 mL    IV PiggyBack: 150 mL    propofol Infusion: 74 mL    sodium chloride 0.9%: 170 mL  Total IN: 1144 mL    OUT:    Chest Tube: 250 mL    Chest Tube: 170 mL    Indwelling Catheter - Urethral: 1325 mL  Total OUT: 1745 mL    Total NET: -601 mL      02 Apr 2019 07:01  -  02 Apr 2019 17:24  --------------------------------------------------------  IN:    IV PiggyBack: 100 mL    Oral Fluid: 320 mL    sodium chloride 0.9%: 15 mL  Total IN: 435 mL    OUT:    Chest Tube: 60 mL    Chest Tube: 40 mL    Indwelling Catheter - Urethral: 230 mL  Total OUT: 330 mL    Total NET: 105 mL          CHEST TUBE:  No.  CORINNE DRAIN:  Yes  EPICARDIAL WIRES: No.  TIE DOWNS: Yes  AGUILAR: No.    PHYSICAL EXAM:    General: resting in chair in NAD     Neurological: A&O x 3 no deficit     Cardiovascular: RRR no M/R/G    Respiratory: CTA bilaterally     Gastrointestinal:    Extremities:    Vascular:    Incision Sites:    LABS:                        11.5   12.00 )-----------( 174      ( 02 Apr 2019 10:34 )             35.2       COUMADIN:  Yes/No. REASON: .    PT/INR - ( 02 Apr 2019 10:34 )   PT: 14.3 sec;   INR: 1.26          PTT - ( 02 Apr 2019 10:34 )  PTT:31.2 sec    04-02    138  |  102  |  16  ----------------------------<  291<H>  4.0   |  23  |  0.84    Ca    8.6      02 Apr 2019 10:34  Phos  4.1     04-02  Mg     2.5     04-02    TPro  6.6  /  Alb  3.9  /  TBili  0.5  /  DBili  x   /  AST  21  /  ALT  14  /  AlkPhos  62  04-02          MEDICATIONS  (STANDING):  aspirin enteric coated 81 milliGRAM(s) Oral daily  atorvastatin 40 milliGRAM(s) Oral at bedtime  brimonidine 0.2% Ophthalmic Solution 1 Drop(s) Both EYES three times a day  chlorhexidine 2% Cloths 1 Application(s) Topical daily  clopidogrel Tablet 75 milliGRAM(s) Oral daily  dextrose 5%. 1000 milliLiter(s) (50 mL/Hr) IV Continuous <Continuous>  dextrose 50% Injectable 50 milliLiter(s) IV Push every 15 minutes  dextrose 50% Injectable 25 milliLiter(s) IV Push every 15 minutes  docusate sodium 100 milliGRAM(s) Oral three times a day  dorzolamide 2% Ophthalmic Solution      dorzolamide 2% Ophthalmic Solution 1 Drop(s) Both EYES three times a day  insulin glargine Injectable (LANTUS) 40 Unit(s) SubCutaneous at bedtime  insulin lispro (HumaLOG) corrective regimen sliding scale   SubCutaneous Before meals and at bedtime  insulin lispro Injectable (HumaLOG) 6 Unit(s) SubCutaneous before breakfast  insulin lispro Injectable (HumaLOG) 6 Unit(s) SubCutaneous before lunch  insulin NPH human recombinant 14 Unit(s) SubCutaneous once  latanoprost 0.005% Ophthalmic Solution 1 Drop(s) Both EYES at bedtime  lisinopril 2.5 milliGRAM(s) Oral daily  metoprolol tartrate 12.5 milliGRAM(s) Oral every 12 hours  pantoprazole    Tablet 40 milliGRAM(s) Oral before breakfast  senna 2 Tablet(s) Oral at bedtime  sodium chloride 0.9% lock flush 3 milliLiter(s) IV Push every 8 hours  timolol 0.5% Solution 1 Drop(s) Both EYES daily    MEDICATIONS  (PRN):  dextrose 40% Gel 15 Gram(s) Oral once PRN Blood Glucose LESS THAN 70 milliGRAM(s)/deciliter  glucagon  Injectable 1 milliGRAM(s) IntraMuscular once PRN Glucose LESS THAN 70 milligrams/deciliter  oxyCODONE    5 mG/acetaminophen 325 mG 2 Tablet(s) Oral every 4 hours PRN Moderate Pain (4 - 6)  polyethylene glycol 3350 17 Gram(s) Oral daily PRN Constipation        RADIOLOGY & ADDITIONAL TESTS: Patient discussed on morning rounds with Dr. Roberson    Operation / Date: 4/1/19 - [facility]    SUBJECTIVE ASSESSMENT:  73y Male resting in chair in NAD.  Patient ambulated from 9E to 9L.  Patient feels well.  Minimal SOB with activity.  Minimal post operative pain.  Tolerating diet, no nausea, emesis.  No dizziness.         Vital Signs Last 24 Hrs  T(C): 36 (02 Apr 2019 17:06), Max: 36.6 (02 Apr 2019 09:00)  T(F): 96.8 (02 Apr 2019 17:06), Max: 97.8 (02 Apr 2019 09:00)  HR: 58 (02 Apr 2019 13:46) (50 - 70)  BP: 106/40 (02 Apr 2019 13:46) (106/40 - 148/65)  BP(mean): 80 (02 Apr 2019 13:46) (80 - 103)  RR: 16 (02 Apr 2019 13:46) (10 - 32)  SpO2: 100% (02 Apr 2019 13:46) (94% - 100%)  I&O's Detail    01 Apr 2019 07:01  -  02 Apr 2019 07:00  --------------------------------------------------------  IN:    Albumin 5%  - 250 mL: 750 mL    IV PiggyBack: 150 mL    propofol Infusion: 74 mL    sodium chloride 0.9%: 170 mL  Total IN: 1144 mL    OUT:    Chest Tube: 250 mL    Chest Tube: 170 mL    Indwelling Catheter - Urethral: 1325 mL  Total OUT: 1745 mL    Total NET: -601 mL      02 Apr 2019 07:01  -  02 Apr 2019 17:24  --------------------------------------------------------  IN:    IV PiggyBack: 100 mL    Oral Fluid: 320 mL    sodium chloride 0.9%: 15 mL  Total IN: 435 mL    OUT:    Chest Tube: 60 mL    Chest Tube: 40 mL    Indwelling Catheter - Urethral: 230 mL  Total OUT: 330 mL    Total NET: 105 mL          CHEST TUBE:  No.  CORINNE DRAIN:  Yes  EPICARDIAL WIRES: No.  TIE DOWNS: Yes  AGUILAR: No.    PHYSICAL EXAM:    General: resting in chair in NAD     Neurological: A&O x 3 no deficit     Cardiovascular: RRR no M/R/G    Respiratory: CTA bilaterally     Gastrointestinal: soft, non-tender     Extremities: no lower extremity edema     Vascular: extremities warm, well perfused     Incision Sites: L thoracotomy site healing well, C/D/I    LABS:                         11.5   12.00 )-----------( 174      ( 02 Apr 2019 10:34 )             35.2         PT/INR - ( 02 Apr 2019 10:34 )   PT: 14.3 sec;   INR: 1.26          PTT - ( 02 Apr 2019 10:34 )  PTT:31.2 sec    04-02    138  |  102  |  16  ----------------------------<  291<H>  4.0   |  23  |  0.84    Ca    8.6      02 Apr 2019 10:34  Phos  4.1     04-02  Mg     2.5     04-02    TPro  6.6  /  Alb  3.9  /  TBili  0.5  /  DBili  x   /  AST  21  /  ALT  14  /  AlkPhos  62  04-02          MEDICATIONS  (STANDING):  aspirin enteric coated 81 milliGRAM(s) Oral daily  atorvastatin 40 milliGRAM(s) Oral at bedtime  brimonidine 0.2% Ophthalmic Solution 1 Drop(s) Both EYES three times a day  chlorhexidine 2% Cloths 1 Application(s) Topical daily  clopidogrel Tablet 75 milliGRAM(s) Oral daily  dextrose 5%. 1000 milliLiter(s) (50 mL/Hr) IV Continuous <Continuous>  dextrose 50% Injectable 50 milliLiter(s) IV Push every 15 minutes  dextrose 50% Injectable 25 milliLiter(s) IV Push every 15 minutes  docusate sodium 100 milliGRAM(s) Oral three times a day  dorzolamide 2% Ophthalmic Solution      dorzolamide 2% Ophthalmic Solution 1 Drop(s) Both EYES three times a day  insulin glargine Injectable (LANTUS) 40 Unit(s) SubCutaneous at bedtime  insulin lispro (HumaLOG) corrective regimen sliding scale   SubCutaneous Before meals and at bedtime  insulin lispro Injectable (HumaLOG) 6 Unit(s) SubCutaneous before breakfast  insulin lispro Injectable (HumaLOG) 6 Unit(s) SubCutaneous before lunch  insulin NPH human recombinant 14 Unit(s) SubCutaneous once  latanoprost 0.005% Ophthalmic Solution 1 Drop(s) Both EYES at bedtime  lisinopril 2.5 milliGRAM(s) Oral daily  metoprolol tartrate 12.5 milliGRAM(s) Oral every 12 hours  pantoprazole    Tablet 40 milliGRAM(s) Oral before breakfast  senna 2 Tablet(s) Oral at bedtime  sodium chloride 0.9% lock flush 3 milliLiter(s) IV Push every 8 hours  timolol 0.5% Solution 1 Drop(s) Both EYES daily    MEDICATIONS  (PRN):  dextrose 40% Gel 15 Gram(s) Oral once PRN Blood Glucose LESS THAN 70 milliGRAM(s)/deciliter  glucagon  Injectable 1 milliGRAM(s) IntraMuscular once PRN Glucose LESS THAN 70 milligrams/deciliter  oxyCODONE    5 mG/acetaminophen 325 mG 2 Tablet(s) Oral every 4 hours PRN Moderate Pain (4 - 6)  polyethylene glycol 3350 17 Gram(s) Oral daily PRN Constipation        RADIOLOGY & ADDITIONAL TESTS:

## 2019-04-02 NOTE — PHYSICAL THERAPY INITIAL EVALUATION ADULT - GENERAL OBSERVATIONS, REHAB EVAL
patient received seated out of bed with no acute distress. +EKG +mikel to wall suction x 1, +chest tube to wall suction +ho +jack +central line  +NC 4L/min +SCDs

## 2019-04-02 NOTE — CONSULT NOTE ADULT - ATTENDING COMMENTS
Patient seen,questioned and examined with Dr. Brandon.and NP Janessa Alcantara His glucoses today have been [phone].He was brdged with NPH Insulin earlier today and will be bridged now as he takes his basal Insulin christiane the AM. Will then treat his Diabetes kaity;litus with 40 units Lantus and 6-6-10 Humalog premeal regimen. Will follow.

## 2019-04-02 NOTE — CONSULT NOTE ADULT - SUBJECTIVE AND OBJECTIVE BOX
HPI: 73 year old male with a history of PMH of HTN, DM, PVD, HLD, glaucoma, blindness, CAD s/p WINTER->RCA in 11/2018 and recently WINTER->LCX presents with residual LAD ISR. now s/p mid CAB POD #1.     	HumaLOG 100 units/mL injectable solution: 6 units in am, 10 units in lunch and 15 units at dinner   	Lantus 100 units/mL subcutaneous solution: 52 unit(s) subcutaneous once a day (in the morning), Last Dose Taken:    . Lives in home with wife and granddaughter. Never a smoker.    Age at Dx:  How dx:  Hx and duration of insulin:  Current Therapy:  Hx of hypoglycemia  Hx of DKA/HHS?    Home FSG:  Fasting  Lunch  Dinner  Bed    Hx of other regimens  Complications:  Outpatient Endo:    PMH & Surgical Hx:CAD I25.10  No pertinent family history in first degree relatives  Handoff  Glaucoma  CAD (coronary artery disease)  DM (diabetes mellitus)  HLD (hyperlipidemia)  HTN (hypertension)  CAD (coronary artery disease)  CAD in native artery  CABG, using internal mammary artery  No significant past surgical history      FH:  DM:  Thyroid:  Autoimmune:  Other:    SH:  Smoking  Etoh:  Recreational Drugs:  Social Life:    Current Meds:  aspirin enteric coated 81 milliGRAM(s) Oral daily  atorvastatin 40 milliGRAM(s) Oral at bedtime  brimonidine 0.2% Ophthalmic Solution 1 Drop(s) Both EYES three times a day  ceFAZolin  Injectable. 2000 milliGRAM(s) IV Push every 8 hours  chlorhexidine 2% Cloths 1 Application(s) Topical daily  clopidogrel Tablet 75 milliGRAM(s) Oral daily  dextrose 40% Gel 15 Gram(s) Oral once PRN  dextrose 5%. 1000 milliLiter(s) IV Continuous <Continuous>  dextrose 50% Injectable 50 milliLiter(s) IV Push every 15 minutes  dextrose 50% Injectable 25 milliLiter(s) IV Push every 15 minutes  docusate sodium 100 milliGRAM(s) Oral three times a day  dorzolamide 2% Ophthalmic Solution      dorzolamide 2% Ophthalmic Solution 1 Drop(s) Both EYES three times a day  glucagon  Injectable 1 milliGRAM(s) IntraMuscular once PRN  insulin lispro (HumaLOG) corrective regimen sliding scale   SubCutaneous Before meals and at bedtime  insulin lispro Injectable (HumaLOG) 4 Unit(s) SubCutaneous three times a day before meals  insulin NPH human recombinant 8 Unit(s) SubCutaneous once  latanoprost 0.005% Ophthalmic Solution 1 Drop(s) Both EYES at bedtime  lisinopril 2.5 milliGRAM(s) Oral daily  metoprolol tartrate 12.5 milliGRAM(s) Oral every 12 hours  oxyCODONE    5 mG/acetaminophen 325 mG 2 Tablet(s) Oral every 4 hours PRN  pantoprazole    Tablet 40 milliGRAM(s) Oral before breakfast  polyethylene glycol 3350 17 Gram(s) Oral daily PRN  senna 2 Tablet(s) Oral at bedtime  sodium chloride 0.9% lock flush 3 milliLiter(s) IV Push every 8 hours  timolol 0.5% Solution 1 Drop(s) Both EYES daily      Allergies:  No Known Allergies      ROS:  Denies the following except as indicated.    General: weight loss/weight gain, decreased appetite, fatigue  Eyes: Blurry vision, double vision, visual changes  ENT: Throat pain, changes in voice,   CV: palpitations, SOB, CP, cough  GI: NVD, difficulty swallowing, abdominal pain  : polyuria, dysuria  Endo: abnormal menses, temperature intolerance, decreased libido  MSK: weakness, joint pain  Skin: rash, dryness, diaphoresis  Heme: Easy bruising,bleeding  Neuro: HA, dizziness, lightheadedness, numbness tingling  Psych: Anxiety, Depression    Vital Signs Last 24 Hrs  T(C): 36.6 (02 Apr 2019 09:00), Max: 36.7 (01 Apr 2019 13:00)  T(F): 97.8 (02 Apr 2019 09:00), Max: 98.1 (01 Apr 2019 13:00)  HR: 58 (02 Apr 2019 10:00) (50 - 70)  BP: 148/65 (02 Apr 2019 01:00) (148/65 - 150/67)  BP(mean): 103 (02 Apr 2019 01:00) (103 - 103)  RR: 20 (02 Apr 2019 10:00) (10 - 32)  SpO2: 100% (02 Apr 2019 10:00) (94% - 100%)  Height (cm): 167.64 (04-01 @ 13:06)  Weight (kg): 77.3 (04-01 @ 13:06)  BMI (kg/m2): 27.5 (04-01 @ 13:06)      Constitutional: wn/wd in NAD.   HEENT: NCAT, MMM, OP clear, EOMI, , no proptosis or lid retraction  Neck: no thyromegaly or palpable thyroid nodules   Respiratory: lungs CTAB.  Cardiovascular: regular rhythm, normal S1 and S2, no audible murmurs, no peripheral edema  GI: soft, NT/ND, no masses/HSM appreciated.  Neurology: no tremors, DTR 2+  Skin: no visible rashes/lesions  Psychiatric: AAO x 3, normal affect/mood.  Ext: radial pulses intact, DP pulses intact, extremities warm, no cyanosis, clubbing or edema.       LABS:                        11.3   10.80 )-----------( 142      ( 02 Apr 2019 02:12 )             33.6     04-02    139  |  104  |  15  ----------------------------<  185<H>  4.1   |  23  |  0.73    Ca    8.6      02 Apr 2019 02:12  Phos  4.1     04-02  Mg     1.4     04-02    TPro  6.2  /  Alb  4.1  /  TBili  0.5  /  DBili  x   /  AST  19  /  ALT  14  /  AlkPhos  60  04-02    PT/INR - ( 02 Apr 2019 02:12 )   PT: 14.4 sec;   INR: 1.27          PTT - ( 02 Apr 2019 02:12 )  PTT:33.1 sec    Hemoglobin A1C, Whole Blood: 8.2 (03-19 @ 15:49)  Hemoglobin A1C, Whole Blood: 8.7 (02-27 @ 07:51)    Thyroid Stimulating Hormone, Serum: 2.028 (03-19 @ 15:48)  Thyroid Stimulating Hormone, Serum: 3.248 (02-27 @ 07:51)      RADIOLOGY & ADDITIONAL STUDIES:  CAPILLARY BLOOD GLUCOSE      POCT Blood Glucose.: 190 mg/dL (02 Apr 2019 07:03)  POCT Blood Glucose.: 151 mg/dL (01 Apr 2019 23:46)  POCT Blood Glucose.: 118 mg/dL (01 Apr 2019 12:55)      Will Discuss in Rounds  A/P:73y Male    1.  DM  Please continue lantus       units at night / morning.  Please continue lispro      units before each meal.  Please continue lispro moderate / low dose sliding scale four times daily with meals and at bedtime    Pt's fingerstick glucose goal is     Will continue to monitor     For discharge, pt can continue    Pt can follow up at discharge with [facility] by calling  to make an appointment.   Will discuss case with     and update primary team HPI: 73 year old male with a history of PMH of HTN, DM, PVD, HLD, glaucoma, blindness, CAD s/p WINTER->RCA in 11/2018 and recently WINTER->LCX presents with residual LAD ISR. now s/p mid CAB POD #1.  Didn't require insulin drip post procedure. Complains of pain at procedure site. Reports poor appetite today. Only had coffee for breakfast. A1C 8.2%. TSH 2.028. Cr 0.73 with GFR 92. EF 55-60%.    FSG & insulin:  3/31:  4P BMP .  4/1:  1P   Bedtime .  4/2:  Breakfast . Lispro 2  NPH 8 units @ 11 AM.  Lunch . Lispro 4+6.    Age at Dx: 20+ years ago  How dx: hyperglycemic symtpoms  Hx and duration of insulin: insulin X 15 years  Current Therapy: Lantus 52 units in the AM, and humalog 7/7/15; dials doses with clicks and confirmation by wife.  Hx of hypoglycemia: 1-2 times a week < 70 in the evening  Hx of DKA/HHS: Denies    Home FSG: 3X/day; talking meter  Fasting: <114  Lunch 150  Dinner <200  Diet at home: Breakfast-oatmeal and coffee, Lunch-sandwich with 2 slices of bread, Dinner-mashed potato and rice (moderate portion) and vegetable. Occasional fruit and sweets. No sugary beverages.    Hx of other regimens  Complications: venous insufficiency to right LES, blind Glaucoma right eye surgery 30 years ago with loss of vision; gradual loss of vision to left eye)  Outpatient Endo: managed by PCP at [facility]    PMH & Surgical Hx:CAD I25.10  No pertinent family history in first degree relatives  Handoff  Glaucoma  CAD (coronary artery disease)  DM (diabetes mellitus)  HLD (hyperlipidemia)  HTN (hypertension)  CAD (coronary artery disease)  CAD in native artery  CABG, using internal mammary artery  No significant past surgical history      FH:  DM: brother  Thyroid: denies  Autoimmune: denies      SH:  Smoking: denies  Etoh: denies  Recreational Drugs: denies  Social Life: lives with wife and granddaughter; worked as     Current Meds:  aspirin enteric coated 81 milliGRAM(s) Oral daily  atorvastatin 40 milliGRAM(s) Oral at bedtime  brimonidine 0.2% Ophthalmic Solution 1 Drop(s) Both EYES three times a day  ceFAZolin  Injectable. 2000 milliGRAM(s) IV Push every 8 hours  chlorhexidine 2% Cloths 1 Application(s) Topical daily  clopidogrel Tablet 75 milliGRAM(s) Oral daily  dextrose 40% Gel 15 Gram(s) Oral once PRN  dextrose 5%. 1000 milliLiter(s) IV Continuous <Continuous>  dextrose 50% Injectable 50 milliLiter(s) IV Push every 15 minutes  dextrose 50% Injectable 25 milliLiter(s) IV Push every 15 minutes  docusate sodium 100 milliGRAM(s) Oral three times a day  dorzolamide 2% Ophthalmic Solution      dorzolamide 2% Ophthalmic Solution 1 Drop(s) Both EYES three times a day  glucagon  Injectable 1 milliGRAM(s) IntraMuscular once PRN  insulin lispro (HumaLOG) corrective regimen sliding scale   SubCutaneous Before meals and at bedtime  insulin lispro Injectable (HumaLOG) 4 Unit(s) SubCutaneous three times a day before meals  insulin NPH human recombinant 8 Unit(s) SubCutaneous once  latanoprost 0.005% Ophthalmic Solution 1 Drop(s) Both EYES at bedtime  lisinopril 2.5 milliGRAM(s) Oral daily  metoprolol tartrate 12.5 milliGRAM(s) Oral every 12 hours  oxyCODONE    5 mG/acetaminophen 325 mG 2 Tablet(s) Oral every 4 hours PRN  pantoprazole    Tablet 40 milliGRAM(s) Oral before breakfast  polyethylene glycol 3350 17 Gram(s) Oral daily PRN  senna 2 Tablet(s) Oral at bedtime  sodium chloride 0.9% lock flush 3 milliLiter(s) IV Push every 8 hours  timolol 0.5% Solution 1 Drop(s) Both EYES daily      Allergies:  No Known Allergies      ROS:  Denies the following except as indicated.    General: weight loss/weight gain, decreased appetite, fatigue  Eyes: Blurry vision, double vision, visual changes  ENT: Throat pain, changes in voice,   CV: palpitations, SOB, CP, cough  GI: NVD, difficulty swallowing, abdominal pain  : polyuria, dysuria  Endo: temperature intolerance, decreased libido  MSK: weakness, joint pain  Skin: + discoloration to right LES; denies rash, dryness, diaphoresis  Heme: Easy bruising, bleeding  Neuro: HA, dizziness, lightheadedness, numbness, tingling  Psych: Anxiety, Depression    Vital Signs Last 24 Hrs  T(C): 36.6 (02 Apr 2019 09:00), Max: 36.7 (01 Apr 2019 13:00)  T(F): 97.8 (02 Apr 2019 09:00), Max: 98.1 (01 Apr 2019 13:00)  HR: 58 (02 Apr 2019 10:00) (50 - 70)  BP: 148/65 (02 Apr 2019 01:00) (148/65 - 150/67)  BP(mean): 103 (02 Apr 2019 01:00) (103 - 103)  RR: 20 (02 Apr 2019 10:00) (10 - 32)  SpO2: 100% (02 Apr 2019 10:00) (94% - 100%)  Height (cm): 167.64 (04-01 @ 13:06)  Weight (kg): 77.3 (04-01 @ 13:06)  BMI (kg/m2): 27.5 (04-01 @ 13:06)      Constitutional: wn/wd in NAD.   HEENT: NCAT, MMM, OP clear, EOMI, , no proptosis or lid retraction  Neck: no thyromegaly or palpable thyroid nodules   Respiratory: lungs CTAB.  Cardiovascular: regular rhythm, normal S1 and S2, no audible murmurs, right LES trace edema  GI: soft, NT/ND, no masses/HSM appreciated.  Neurology: no tremors, DTR 2+  Skin: no visible rashes/lesions  Psychiatric: AAO x 3, normal affect/mood.  Ext: radial pulses intact, DP pulses intact 2+ L, 1+ R, extremities warm, no cyanosis, clubbing.      LABS:                        11.3   10.80 )-----------( 142      ( 02 Apr 2019 02:12 )             33.6     04-02    139  |  104  |  15  ----------------------------<  185<H>  4.1   |  23  |  0.73    Ca    8.6      02 Apr 2019 02:12  Phos  4.1     04-02  Mg     1.4     04-02    TPro  6.2  /  Alb  4.1  /  TBili  0.5  /  DBili  x   /  AST  19  /  ALT  14  /  AlkPhos  60  04-02    PT/INR - ( 02 Apr 2019 02:12 )   PT: 14.4 sec;   INR: 1.27          PTT - ( 02 Apr 2019 02:12 )  PTT:33.1 sec    Hemoglobin A1C, Whole Blood: 8.2 (03-19 @ 15:49)  Hemoglobin A1C, Whole Blood: 8.7 (02-27 @ 07:51)    Thyroid Stimulating Hormone, Serum: 2.028 (03-19 @ 15:48)  Thyroid Stimulating Hormone, Serum: 3.248 (02-27 @ 07:51)    CAPILLARY BLOOD GLUCOSE  POCT Blood Glucose.: 190 mg/dL (02 Apr 2019 07:03)  POCT Blood Glucose.: 151 mg/dL (01 Apr 2019 23:46)  POCT Blood Glucose.: 118 mg/dL (01 Apr 2019 12:55)      Will Discuss in Rounds  A/P:  73 year old male with a history of PMH of HTN, DM, PVD, HLD, glaucoma, blindness, CAD now s/p mid CAB POD #1 with uncontrolled diabetes.    1.  DM - type 2, uncontrolled, complicated  Please continue lantus       units at night / morning.  Please continue lispro      units before each meal.  Please continue lispro moderate / low dose sliding scale four times daily with meals and at bedtime    Pt's fingerstick glucose goal is 100-180.    Will continue to monitor     For discharge, pt can continue basal/bolus, dosage TBD by clinical course    Pt can follow up at discharge with [facility] by calling  to make an appointment.   Will discuss case with Dr. Robbins   and update primary team HPI: 73 year old male with a history of PMH of HTN, DM, PVD, HLD, glaucoma, blindness, CAD s/p WINTER->RCA in 11/2018 and recently WINTER->LCX presents with residual LAD ISR. now s/p mid CAB POD #1.  Didn't require insulin drip post procedure. Complains of pain at procedure site. Reports poor appetite today. Only had coffee for breakfast. A1C 8.2%. TSH 2.028. Cr 0.73 with GFR 92. EF 55-60%.    FSG & insulin:  3/31:  4P BMP .  4/1:  1P   Bedtime .  4/2:  Breakfast . Lispro 2  NPH 8 units @ 11 AM.  Lunch . Lispro 4+6.    Age at Dx: 20+ years ago  How dx: hyperglycemic symtpoms  Hx and duration of insulin: insulin X 15 years  Current Therapy: Lantus 52 units in the AM, and humalog 7/7/15; dials doses with clicks and confirmation by wife.  Hx of hypoglycemia: 1-2 times a week < 70 in the evening  Hx of DKA/HHS: Denies    Home FSG: 3X/day; talking meter  Fasting: <114  Lunch 150  Dinner <200  Diet at home: Breakfast-oatmeal and coffee, Lunch-sandwich with 2 slices of bread, Dinner-mashed potato and rice (moderate portion) and vegetable. Occasional fruit and sweets. No sugary beverages.    Hx of other regimens  Complications: venous insufficiency to right LES, blind Glaucoma right eye surgery 30 years ago with loss of vision; gradual loss of vision to left eye)  Outpatient Endo: managed by PCP at [facility]    PMH & Surgical Hx:CAD I25.10  No pertinent family history in first degree relatives  Handoff  Glaucoma  CAD (coronary artery disease)  DM (diabetes mellitus)  HLD (hyperlipidemia)  HTN (hypertension)  CAD (coronary artery disease)  CAD in native artery  CABG, using internal mammary artery  No significant past surgical history      FH:  DM: brother  Thyroid: denies  Autoimmune: denies      SH:  Smoking: denies  Etoh: denies  Recreational Drugs: denies  Social Life: lives with wife and granddaughter; worked as     Current Meds:  aspirin enteric coated 81 milliGRAM(s) Oral daily  atorvastatin 40 milliGRAM(s) Oral at bedtime  brimonidine 0.2% Ophthalmic Solution 1 Drop(s) Both EYES three times a day  ceFAZolin  Injectable. 2000 milliGRAM(s) IV Push every 8 hours  chlorhexidine 2% Cloths 1 Application(s) Topical daily  clopidogrel Tablet 75 milliGRAM(s) Oral daily  dextrose 40% Gel 15 Gram(s) Oral once PRN  dextrose 5%. 1000 milliLiter(s) IV Continuous <Continuous>  dextrose 50% Injectable 50 milliLiter(s) IV Push every 15 minutes  dextrose 50% Injectable 25 milliLiter(s) IV Push every 15 minutes  docusate sodium 100 milliGRAM(s) Oral three times a day  dorzolamide 2% Ophthalmic Solution      dorzolamide 2% Ophthalmic Solution 1 Drop(s) Both EYES three times a day  glucagon  Injectable 1 milliGRAM(s) IntraMuscular once PRN  insulin lispro (HumaLOG) corrective regimen sliding scale   SubCutaneous Before meals and at bedtime  insulin lispro Injectable (HumaLOG) 4 Unit(s) SubCutaneous three times a day before meals  insulin NPH human recombinant 8 Unit(s) SubCutaneous once  latanoprost 0.005% Ophthalmic Solution 1 Drop(s) Both EYES at bedtime  lisinopril 2.5 milliGRAM(s) Oral daily  metoprolol tartrate 12.5 milliGRAM(s) Oral every 12 hours  oxyCODONE    5 mG/acetaminophen 325 mG 2 Tablet(s) Oral every 4 hours PRN  pantoprazole    Tablet 40 milliGRAM(s) Oral before breakfast  polyethylene glycol 3350 17 Gram(s) Oral daily PRN  senna 2 Tablet(s) Oral at bedtime  sodium chloride 0.9% lock flush 3 milliLiter(s) IV Push every 8 hours  timolol 0.5% Solution 1 Drop(s) Both EYES daily      Allergies:  No Known Allergies      ROS:  Denies the following except as indicated.    General: weight loss/weight gain, decreased appetite, fatigue  Eyes: Blurry vision, double vision, visual changes  ENT: Throat pain, changes in voice,   CV: palpitations, SOB, CP, cough  GI: NVD, difficulty swallowing, abdominal pain  : polyuria, dysuria  Endo: temperature intolerance, decreased libido  MSK: weakness, joint pain  Skin: + discoloration to right LES; denies rash, dryness, diaphoresis  Heme: Easy bruising, bleeding  Neuro: HA, dizziness, lightheadedness, numbness, tingling  Psych: Anxiety, Depression    Vital Signs Last 24 Hrs  T(C): 36.6 (02 Apr 2019 09:00), Max: 36.7 (01 Apr 2019 13:00)  T(F): 97.8 (02 Apr 2019 09:00), Max: 98.1 (01 Apr 2019 13:00)  HR: 58 (02 Apr 2019 10:00) (50 - 70)  BP: 148/65 (02 Apr 2019 01:00) (148/65 - 150/67)  BP(mean): 103 (02 Apr 2019 01:00) (103 - 103)  RR: 20 (02 Apr 2019 10:00) (10 - 32)  SpO2: 100% (02 Apr 2019 10:00) (94% - 100%)  Height (cm): 167.64 (04-01 @ 13:06)  Weight (kg): 77.3 (04-01 @ 13:06)  BMI (kg/m2): 27.5 (04-01 @ 13:06)      Constitutional: wn/wd in NAD.   HEENT: NCAT, MMM, OP clear, EOMI, , no proptosis or lid retraction  Neck: no thyromegaly or palpable thyroid nodules   Respiratory: lungs CTAB.  Cardiovascular: regular rhythm, normal S1 and S2, no audible murmurs, right LES trace edema  GI: soft, NT/ND, no masses/HSM appreciated.  Neurology: no tremors, DTR 2+  Skin: no visible rashes/lesions  Psychiatric: AAO x 3, normal affect/mood.  Ext: radial pulses intact, DP pulses intact 2+ L, 1+ R, extremities warm, no cyanosis, clubbing.      LABS:                        11.3   10.80 )-----------( 142      ( 02 Apr 2019 02:12 )             33.6     04-02    139  |  104  |  15  ----------------------------<  185<H>  4.1   |  23  |  0.73    Ca    8.6      02 Apr 2019 02:12  Phos  4.1     04-02  Mg     1.4     04-02    TPro  6.2  /  Alb  4.1  /  TBili  0.5  /  DBili  x   /  AST  19  /  ALT  14  /  AlkPhos  60  04-02    PT/INR - ( 02 Apr 2019 02:12 )   PT: 14.4 sec;   INR: 1.27          PTT - ( 02 Apr 2019 02:12 )  PTT:33.1 sec    Hemoglobin A1C, Whole Blood: 8.2 (03-19 @ 15:49)  Hemoglobin A1C, Whole Blood: 8.7 (02-27 @ 07:51)    Thyroid Stimulating Hormone, Serum: 2.028 (03-19 @ 15:48)  Thyroid Stimulating Hormone, Serum: 3.248 (02-27 @ 07:51)    CAPILLARY BLOOD GLUCOSE  POCT Blood Glucose.: 190 mg/dL (02 Apr 2019 07:03)  POCT Blood Glucose.: 151 mg/dL (01 Apr 2019 23:46)  POCT Blood Glucose.: 118 mg/dL (01 Apr 2019 12:55)      A/P:  73 year old male with a history of PMH of HTN, DM, PVD, HLD, glaucoma, blindness, CAD now s/p mid CAB POD #1 with uncontrolled diabetes.    1.  DM - type 2, uncontrolled, complicated  Please give NPH 14 units tonight. Please give lantus  40   units in the morning.  Please continue lispro 6  units before breakfast and lunch and lispro 10 units before dinner.  Please continue lispro moderate dose sliding scale four times daily with meals and at bedtime    Pt's fingerstick glucose goal is 100-180.    Will continue to monitor     For discharge, pt can continue basal/bolus, dosage TBD by clinical course    Pt can follow up at discharge with [facility] by calling  to make an appointment.   Will discuss case with Dr. Robbins   and update primary team

## 2019-04-02 NOTE — CHART NOTE - NSCHARTNOTEFT_GEN_A_CORE
CT Removal:    Pt seen and examined at bedside.  Case discussed with Dr. Roberson.  Minimal output from left pleural CT.  No air leak appreciated.  CT removed without incident per Dr. Roberson request. Tie down secured and Occlusive DSD placed.  CXR no obvious PTX noted.  Pt tolerated procedure well.

## 2019-04-02 NOTE — PROGRESS NOTE ADULT - ASSESSMENT
72 yo Burmese speaking male who is legally blind and POOR HISTORIAN with PMH of HTN, DM, PVD, HLD, glaucoma, CAD s/p multiple PCI's presents for surgical consult. 11/14/18 @ [facility] s/p WINTER ->mRCA with residual pLCX 70% ISR and dLCx 99%. He presented to his cardiologist with c/o persistent fatigue and palpitations.10/19/18 NST indicating a large sized moderate intensity partially reversible defect in the inferolateral wall suggestive of ischemia with normal LV with EF of 60%. 2/27/19 admitted to [facility] for staged PCI of LCx. 2/27/19 Cardiac cath by Dr. Smith with PTCA/WINTER-> distalLCx, PTCA ->midLCx ISR, LM normal, midLAD severe ISR, distalLAD mild diffuse disease, midLCx 90% ISR, distalLCx 99%, patent stents in prox/midRCA, EDP 6, EF 55-60%. Patient was seen in the outpatient office and now presents for elective surgery    Plan    NEURO  - Pain control: Tylenol, Oxycodone     PULM  - 4L NC, wean  - Surgical CT removed today, tiedown in place   - Surgical mikel remains, consider removal tomorrow   - Encourage IS  - PA/LAT CXR in AM    CV  - s/p MIDCAB     - ASA/Plavix     - Continue Lopressor, titrate as BP tolerates      - Lisinopril added for HTN  - PCI prior to admission: LCx     - Continue ASA/Plavix     GI   - Tolerating DASH diet   - Bowel reg       - No active issues  - Start diuresis tomorrow     HEME  - No active issues    ID  - No active issues     ENDO  - Hx DM     - A1c 8.2     - Endocrine recommendations: 72 yo Algerian speaking male who is legally blind and POOR HISTORIAN with PMH of HTN, DM, PVD, HLD, glaucoma, CAD s/p multiple PCI's presents for surgical consult. 11/14/18 @ [facility] s/p WINTER ->mRCA with residual pLCX 70% ISR and dLCx 99%. He presented to his cardiologist with c/o persistent fatigue and palpitations.10/19/18 NST indicating a large sized moderate intensity partially reversible defect in the inferolateral wall suggestive of ischemia with normal LV with EF of 60%. 2/27/19 admitted to [facility] for staged PCI of LCx. 2/27/19 Cardiac cath by Dr. Smith with PTCA/WINTER-> distalLCx, PTCA ->midLCx ISR, LM normal, midLAD severe ISR, distalLAD mild diffuse disease, midLCx 90% ISR, distalLCx 99%, patent stents in prox/midRCA, EDP 6, EF 55-60%. Patient was seen in the outpatient office and now presents for elective surgery    Plan    NEURO  - Pain control: Tylenol, Oxycodone     PULM  - 4L NC, wean  - Surgical CT removed today, tiedown in place   - Surgical mikel remains, consider removal tomorrow   - Encourage IS  - PA/LAT CXR in AM    CV  - s/p MIDCAB     - ASA/Plavix     - Continue Lopressor, titrate as BP tolerates      - Lisinopril added for HTN  - PCI prior to admission: LCx     - Continue ASA/Plavix     GI   - Tolerating DASH diet   - Bowel reg       - No active issues  - Start diuresis tomorrow     HEME  - No active issues    ID  - No active issues     ENDO  - Hx DM     - A1c 8.2     - Endocrine recommendations: Lantus 40U AM, Lispro 6U breakfast, 6U lunch, 10U dinner    DISPO  - 9L

## 2019-04-03 DIAGNOSIS — I25.10 ATHEROSCLEROTIC HEART DISEASE OF NATIVE CORONARY ARTERY WITHOUT ANGINA PECTORIS: ICD-10-CM

## 2019-04-03 LAB
ALBUMIN SERPL ELPH-MCNC: 3.4 G/DL — SIGNIFICANT CHANGE UP (ref 3.3–5)
ALP SERPL-CCNC: 51 U/L — SIGNIFICANT CHANGE UP (ref 40–120)
ALT FLD-CCNC: 9 U/L — LOW (ref 10–45)
ANION GAP SERPL CALC-SCNC: 8 MMOL/L — SIGNIFICANT CHANGE UP (ref 5–17)
AST SERPL-CCNC: 18 U/L — SIGNIFICANT CHANGE UP (ref 10–40)
BASOPHILS # BLD AUTO: 0.01 K/UL — SIGNIFICANT CHANGE UP (ref 0–0.2)
BASOPHILS NFR BLD AUTO: 0.1 % — SIGNIFICANT CHANGE UP (ref 0–2)
BILIRUB SERPL-MCNC: 0.4 MG/DL — SIGNIFICANT CHANGE UP (ref 0.2–1.2)
BUN SERPL-MCNC: 21 MG/DL — SIGNIFICANT CHANGE UP (ref 7–23)
CALCIUM SERPL-MCNC: 8.6 MG/DL — SIGNIFICANT CHANGE UP (ref 8.4–10.5)
CHLORIDE SERPL-SCNC: 104 MMOL/L — SIGNIFICANT CHANGE UP (ref 96–108)
CO2 SERPL-SCNC: 28 MMOL/L — SIGNIFICANT CHANGE UP (ref 22–31)
CREAT SERPL-MCNC: 0.93 MG/DL — SIGNIFICANT CHANGE UP (ref 0.5–1.3)
EOSINOPHIL # BLD AUTO: 0.01 K/UL — SIGNIFICANT CHANGE UP (ref 0–0.5)
EOSINOPHIL NFR BLD AUTO: 0.1 % — SIGNIFICANT CHANGE UP (ref 0–6)
GLUCOSE BLDC GLUCOMTR-MCNC: 164 MG/DL — HIGH (ref 70–99)
GLUCOSE BLDC GLUCOMTR-MCNC: 197 MG/DL — HIGH (ref 70–99)
GLUCOSE BLDC GLUCOMTR-MCNC: 230 MG/DL — HIGH (ref 70–99)
GLUCOSE BLDC GLUCOMTR-MCNC: 233 MG/DL — HIGH (ref 70–99)
GLUCOSE SERPL-MCNC: 181 MG/DL — HIGH (ref 70–99)
HCT VFR BLD CALC: 30.8 % — LOW (ref 39–50)
HGB BLD-MCNC: 10.1 G/DL — LOW (ref 13–17)
IMM GRANULOCYTES NFR BLD AUTO: 0.4 % — SIGNIFICANT CHANGE UP (ref 0–1.5)
LYMPHOCYTES # BLD AUTO: 1.13 K/UL — SIGNIFICANT CHANGE UP (ref 1–3.3)
LYMPHOCYTES # BLD AUTO: 10.8 % — LOW (ref 13–44)
MAGNESIUM SERPL-MCNC: 2.2 MG/DL — SIGNIFICANT CHANGE UP (ref 1.6–2.6)
MCHC RBC-ENTMCNC: 28.4 PG — SIGNIFICANT CHANGE UP (ref 27–34)
MCHC RBC-ENTMCNC: 32.8 GM/DL — SIGNIFICANT CHANGE UP (ref 32–36)
MCV RBC AUTO: 86.5 FL — SIGNIFICANT CHANGE UP (ref 80–100)
MONOCYTES # BLD AUTO: 0.9 K/UL — SIGNIFICANT CHANGE UP (ref 0–0.9)
MONOCYTES NFR BLD AUTO: 8.6 % — SIGNIFICANT CHANGE UP (ref 2–14)
NEUTROPHILS # BLD AUTO: 8.35 K/UL — HIGH (ref 1.8–7.4)
NEUTROPHILS NFR BLD AUTO: 80 % — HIGH (ref 43–77)
NRBC # BLD: 0 /100 WBCS — SIGNIFICANT CHANGE UP (ref 0–0)
PLATELET # BLD AUTO: 143 K/UL — LOW (ref 150–400)
POTASSIUM SERPL-MCNC: 4.1 MMOL/L — SIGNIFICANT CHANGE UP (ref 3.5–5.3)
POTASSIUM SERPL-SCNC: 4.1 MMOL/L — SIGNIFICANT CHANGE UP (ref 3.5–5.3)
PROT SERPL-MCNC: 6 G/DL — SIGNIFICANT CHANGE UP (ref 6–8.3)
RBC # BLD: 3.56 M/UL — LOW (ref 4.2–5.8)
RBC # FLD: 13.6 % — SIGNIFICANT CHANGE UP (ref 10.3–14.5)
SODIUM SERPL-SCNC: 140 MMOL/L — SIGNIFICANT CHANGE UP (ref 135–145)
WBC # BLD: 10.44 K/UL — SIGNIFICANT CHANGE UP (ref 3.8–10.5)
WBC # FLD AUTO: 10.44 K/UL — SIGNIFICANT CHANGE UP (ref 3.8–10.5)

## 2019-04-03 PROCEDURE — 71046 X-RAY EXAM CHEST 2 VIEWS: CPT | Mod: 26

## 2019-04-03 PROCEDURE — 71045 X-RAY EXAM CHEST 1 VIEW: CPT | Mod: 26,59

## 2019-04-03 RX ORDER — BENZOCAINE AND MENTHOL 5; 1 G/100ML; G/100ML
1 LIQUID ORAL THREE TIMES A DAY
Qty: 0 | Refills: 0 | Status: DISCONTINUED | OUTPATIENT
Start: 2019-04-03 | End: 2019-04-04

## 2019-04-03 RX ADMIN — Medication 10 UNIT(S): at 17:22

## 2019-04-03 RX ADMIN — INSULIN GLARGINE 40 UNIT(S): 100 INJECTION, SOLUTION SUBCUTANEOUS at 07:28

## 2019-04-03 RX ADMIN — LISINOPRIL 2.5 MILLIGRAM(S): 2.5 TABLET ORAL at 06:42

## 2019-04-03 RX ADMIN — Medication 100 MILLIGRAM(S): at 06:43

## 2019-04-03 RX ADMIN — Medication 4: at 17:22

## 2019-04-03 RX ADMIN — Medication 2: at 07:25

## 2019-04-03 RX ADMIN — HEPARIN SODIUM 5000 UNIT(S): 5000 INJECTION INTRAVENOUS; SUBCUTANEOUS at 06:43

## 2019-04-03 RX ADMIN — BRIMONIDINE TARTRATE 1 DROP(S): 2 SOLUTION/ DROPS OPHTHALMIC at 21:58

## 2019-04-03 RX ADMIN — Medication 100 MILLIGRAM(S): at 14:34

## 2019-04-03 RX ADMIN — Medication 6 UNIT(S): at 12:29

## 2019-04-03 RX ADMIN — LATANOPROST 1 DROP(S): 0.05 SOLUTION/ DROPS OPHTHALMIC; TOPICAL at 21:58

## 2019-04-03 RX ADMIN — DORZOLAMIDE HYDROCHLORIDE 1 DROP(S): 20 SOLUTION/ DROPS OPHTHALMIC at 06:45

## 2019-04-03 RX ADMIN — DORZOLAMIDE HYDROCHLORIDE 1 DROP(S): 20 SOLUTION/ DROPS OPHTHALMIC at 21:58

## 2019-04-03 RX ADMIN — HEPARIN SODIUM 5000 UNIT(S): 5000 INJECTION INTRAVENOUS; SUBCUTANEOUS at 14:33

## 2019-04-03 RX ADMIN — BRIMONIDINE TARTRATE 1 DROP(S): 2 SOLUTION/ DROPS OPHTHALMIC at 06:41

## 2019-04-03 RX ADMIN — SODIUM CHLORIDE 3 MILLILITER(S): 9 INJECTION INTRAMUSCULAR; INTRAVENOUS; SUBCUTANEOUS at 14:09

## 2019-04-03 RX ADMIN — Medication 6 UNIT(S): at 07:27

## 2019-04-03 RX ADMIN — HEPARIN SODIUM 5000 UNIT(S): 5000 INJECTION INTRAVENOUS; SUBCUTANEOUS at 21:57

## 2019-04-03 RX ADMIN — CLOPIDOGREL BISULFATE 75 MILLIGRAM(S): 75 TABLET, FILM COATED ORAL at 12:30

## 2019-04-03 RX ADMIN — SODIUM CHLORIDE 3 MILLILITER(S): 9 INJECTION INTRAMUSCULAR; INTRAVENOUS; SUBCUTANEOUS at 07:28

## 2019-04-03 RX ADMIN — SODIUM CHLORIDE 3 MILLILITER(S): 9 INJECTION INTRAMUSCULAR; INTRAVENOUS; SUBCUTANEOUS at 21:58

## 2019-04-03 RX ADMIN — Medication 100 MILLIGRAM(S): at 21:57

## 2019-04-03 RX ADMIN — Medication 81 MILLIGRAM(S): at 12:30

## 2019-04-03 RX ADMIN — BRIMONIDINE TARTRATE 1 DROP(S): 2 SOLUTION/ DROPS OPHTHALMIC at 14:34

## 2019-04-03 RX ADMIN — PANTOPRAZOLE SODIUM 40 MILLIGRAM(S): 20 TABLET, DELAYED RELEASE ORAL at 06:42

## 2019-04-03 RX ADMIN — Medication 2: at 12:29

## 2019-04-03 RX ADMIN — Medication 12.5 MILLIGRAM(S): at 06:42

## 2019-04-03 RX ADMIN — Medication 12.5 MILLIGRAM(S): at 20:38

## 2019-04-03 RX ADMIN — Medication 1 DROP(S): at 12:30

## 2019-04-03 RX ADMIN — DORZOLAMIDE HYDROCHLORIDE 1 DROP(S): 20 SOLUTION/ DROPS OPHTHALMIC at 14:33

## 2019-04-03 RX ADMIN — Medication 4: at 21:58

## 2019-04-03 RX ADMIN — CHLORHEXIDINE GLUCONATE 1 APPLICATION(S): 213 SOLUTION TOPICAL at 12:31

## 2019-04-03 RX ADMIN — ATORVASTATIN CALCIUM 40 MILLIGRAM(S): 80 TABLET, FILM COATED ORAL at 21:57

## 2019-04-03 RX ADMIN — SENNA PLUS 2 TABLET(S): 8.6 TABLET ORAL at 21:57

## 2019-04-03 NOTE — PROGRESS NOTE ADULT - SUBJECTIVE AND OBJECTIVE BOX
INTERVAL HPI/OVERNIGHT EVENTS:    HPI: 73 year old male with a history of PMH of HTN, DM, PVD, HLD, glaucoma, blindness, CAD s/p WINTER->RCA in 11/2018 and recently WINTER->LCX presents with residual LAD ISR. now s/p mid CAB POD #2.  Didn't require insulin drip post procedure. Complains of pain at procedure site. Reports poor appetite today. Only had coffee for breakfast. A1C 8.2%. TSH 2.028. Cr 0.73 with GFR 92. EF 55-60%.    FSG & insulin:  4/2:  Dinner . Lispro 4+8. Ate chicken, potato and applesauce. NPH 14 units at 5PM  Bedtime . Lispro 2  4/3:  Breakfast . Lispro 6+2. Ate Lantus 40 units. Ate cornflakes and coffee.  Lunch . Lispro 6+2.     Pt reports the following symptoms:  ROS:  Denies the following except as indicated.    General: weight loss/weight gain, decreased appetite, fatigue  Eyes: Blurry vision, double vision, visual changes  ENT: Throat pain, changes in voice,   CV: palpitations, SOB, CP, cough  GI: NVD, difficulty swallowing, abdominal pain  : polyuria, dysuria  Endo: temperature intolerance, decreased libido  MSK: weakness, joint pain  Skin: + discoloration to right LES; denies rash, dryness, diaphoresis  Heme: Easy bruising, bleeding  Neuro: HA, dizziness, lightheadedness, numbness, tingling  Psych: Anxiety, Depression    MEDICATIONS  (STANDING):  aspirin enteric coated 81 milliGRAM(s) Oral daily  atorvastatin 40 milliGRAM(s) Oral at bedtime  brimonidine 0.2% Ophthalmic Solution 1 Drop(s) Both EYES three times a day  chlorhexidine 2% Cloths 1 Application(s) Topical daily  clopidogrel Tablet 75 milliGRAM(s) Oral daily  dextrose 5%. 1000 milliLiter(s) (50 mL/Hr) IV Continuous <Continuous>  dextrose 50% Injectable 50 milliLiter(s) IV Push every 15 minutes  dextrose 50% Injectable 25 milliLiter(s) IV Push every 15 minutes  docusate sodium 100 milliGRAM(s) Oral three times a day  dorzolamide 2% Ophthalmic Solution      dorzolamide 2% Ophthalmic Solution 1 Drop(s) Both EYES three times a day  heparin  Injectable 5000 Unit(s) SubCutaneous every 8 hours  insulin glargine Injectable (LANTUS) 40 Unit(s) SubCutaneous every morning  insulin lispro (HumaLOG) corrective regimen sliding scale   SubCutaneous Before meals and at bedtime  insulin lispro Injectable (HumaLOG) 6 Unit(s) SubCutaneous before breakfast  insulin lispro Injectable (HumaLOG) 6 Unit(s) SubCutaneous before lunch  insulin lispro Injectable (HumaLOG) 10 Unit(s) SubCutaneous before dinner  latanoprost 0.005% Ophthalmic Solution 1 Drop(s) Both EYES at bedtime  lisinopril 2.5 milliGRAM(s) Oral daily  metoprolol tartrate 12.5 milliGRAM(s) Oral every 12 hours  pantoprazole    Tablet 40 milliGRAM(s) Oral before breakfast  senna 2 Tablet(s) Oral at bedtime  sodium chloride 0.9% lock flush 3 milliLiter(s) IV Push every 8 hours  timolol 0.5% Solution 1 Drop(s) Both EYES daily    MEDICATIONS  (PRN):  acetaminophen   Tablet .. 650 milliGRAM(s) Oral every 6 hours PRN Mild Pain (1 - 3)  benzocaine 15 mG/menthol 3.6 mG Lozenge 1 Lozenge Oral three times a day PRN Sore Throat  dextrose 40% Gel 15 Gram(s) Oral once PRN Blood Glucose LESS THAN 70 milliGRAM(s)/deciliter  glucagon  Injectable 1 milliGRAM(s) IntraMuscular once PRN Glucose LESS THAN 70 milligrams/deciliter  oxyCODONE    5 mG/acetaminophen 325 mG 2 Tablet(s) Oral every 4 hours PRN Moderate Pain (4 - 6)  polyethylene glycol 3350 17 Gram(s) Oral daily PRN Constipation      PHYSICAL EXAM  Vital Signs Last 24 Hrs  T(C): 37.3 (03 Apr 2019 13:30), Max: 37.3 (03 Apr 2019 13:30)  T(F): 99.2 (03 Apr 2019 13:30), Max: 99.2 (03 Apr 2019 13:30)  HR: 78 (03 Apr 2019 09:56) (58 - 78)  BP: 127/53 (03 Apr 2019 09:56) (97/36 - 131/36)  BP(mean): 93 (03 Apr 2019 09:56) (56 - 93)  RR: 18 (03 Apr 2019 09:56) (16 - 18)  SpO2: 97% (03 Apr 2019 09:56) (96% - 100%)    Constitutional: wn/wd in NAD.   HEENT: NCAT, MMM, OP clear, EOMI, , no proptosis or lid retraction  Neck: no thyromegaly or palpable thyroid nodules   Respiratory: lungs CTAB.  Cardiovascular: regular rhythm, normal S1 and S2, no audible murmurs, right LES trace edema  GI: soft, NT/ND, no masses/HSM appreciated.  Neurology: no tremors, DTR 2+  Skin: no visible rashes/lesions  Psychiatric: AAO x 3, normal affect/mood.  Ext: radial pulses intact, DP pulses intact 2+ L, 1+ R, extremities warm, no cyanosis, clubbing.        LABS:                        10.1   10.44 )-----------( 143      ( 03 Apr 2019 06:33 )             30.8     04-03    140  |  104  |  21  ----------------------------<  181<H>  4.1   |  28  |  0.93    Ca    8.6      03 Apr 2019 06:33  Phos  4.1     04-02  Mg     2.2     04-03    TPro  6.0  /  Alb  3.4  /  TBili  0.4  /  DBili  x   /  AST  18  /  ALT  9<L>  /  AlkPhos  51  04-03    PT/INR - ( 02 Apr 2019 10:34 )   PT: 14.3 sec;   INR: 1.26          PTT - ( 02 Apr 2019 10:34 )  PTT:31.2 sec    Thyroid Stimulating Hormone, Serum: 2.028 uIU/mL (03-19 @ 15:48)  Thyroid Stimulating Hormone, Serum: 3.248 uIU/mL (02-27 @ 07:51)      HbA1C: 8.2 % (03-19 @ 15:49)  8.7 % (02-27 @ 07:51)  8.1 % (11-15 @ 05:54)    CAPILLARY BLOOD GLUCOSE      POCT Blood Glucose.: 197 mg/dL (03 Apr 2019 11:46)  POCT Blood Glucose.: 164 mg/dL (03 Apr 2019 06:48)  POCT Blood Glucose.: 200 mg/dL (02 Apr 2019 22:32)  POCT Blood Glucose.: 354 mg/dL (02 Apr 2019 21:23)  POCT Blood Glucose.: 289 mg/dL (02 Apr 2019 19:22)  POCT Blood Glucose.: 360 mg/dL (02 Apr 2019 18:00)  POCT Blood Glucose.: 475 mg/dL (02 Apr 2019 17:56)  POCT Blood Glucose.: 317 mg/dL (02 Apr 2019 16:15)    WILL DISCUSS IN ROUNDS  A/P:  73 year old male with a history of PMH of HTN, DM, PVD, HLD, glaucoma, blindness, CAD now s/p mid CAB POD #2 with uncontrolled diabetes.    1.  DM - type 2, uncontrolled, complicated  Please give NPH 14 units tonight. Please give lantus  40   units in the morning.  Please continue lispro 6  units before breakfast and lunch and lispro 10 units before dinner.  Please continue lispro moderate dose sliding scale four times daily with meals and at bedtime    Pt's fingerstick glucose goal is 100-180.    Will continue to monitor     For discharge, pt can continue basal/bolus, dosage TBD by clinical course    Pt can follow up at discharge with [facility] by calling  to make an appointment.   Will discuss case with Dr. Robbins   and update primary team INTERVAL HPI/OVERNIGHT EVENTS:    HPI: 73 year old male with a history of PMH of HTN, DM, PVD, HLD, glaucoma, blindness, CAD s/p WINTER->RCA in 11/2018 and recently WINTER->LCX presents with residual LAD ISR. now s/p mid CAB POD #2.  Didn't require insulin drip post procedure. Complains of pain at procedure site. Reports poor appetite today. Only had coffee for breakfast. A1C 8.2%. TSH 2.028. Cr 0.73 with GFR 92. EF 55-60%.    FSG & insulin:  4/2:  Dinner . Lispro 4+8. Ate chicken, potato and applesauce. NPH 14 units at 5PM  Bedtime . Lispro 2  4/3:  Breakfast . Lispro 6+2. Ate Lantus 40 units. Ate cornflakes and coffee.  Lunch . Lispro 6+2.     Pt reports the following symptoms:  ROS:  Denies the following except as indicated.    General: weight loss/weight gain, decreased appetite, fatigue  Eyes: Blurry vision, double vision, visual changes  ENT: Throat pain, changes in voice,   CV: palpitations, SOB, CP, cough  GI: NVD, difficulty swallowing, abdominal pain  : polyuria, dysuria  Endo: temperature intolerance, decreased libido  MSK: weakness, joint pain  Skin: + discoloration to right LES; denies rash, dryness, diaphoresis  Heme: Easy bruising, bleeding  Neuro: HA, dizziness, lightheadedness, numbness, tingling  Psych: Anxiety, Depression    MEDICATIONS  (STANDING):  aspirin enteric coated 81 milliGRAM(s) Oral daily  atorvastatin 40 milliGRAM(s) Oral at bedtime  brimonidine 0.2% Ophthalmic Solution 1 Drop(s) Both EYES three times a day  chlorhexidine 2% Cloths 1 Application(s) Topical daily  clopidogrel Tablet 75 milliGRAM(s) Oral daily  dextrose 5%. 1000 milliLiter(s) (50 mL/Hr) IV Continuous <Continuous>  dextrose 50% Injectable 50 milliLiter(s) IV Push every 15 minutes  dextrose 50% Injectable 25 milliLiter(s) IV Push every 15 minutes  docusate sodium 100 milliGRAM(s) Oral three times a day  dorzolamide 2% Ophthalmic Solution      dorzolamide 2% Ophthalmic Solution 1 Drop(s) Both EYES three times a day  heparin  Injectable 5000 Unit(s) SubCutaneous every 8 hours  insulin glargine Injectable (LANTUS) 40 Unit(s) SubCutaneous every morning  insulin lispro (HumaLOG) corrective regimen sliding scale   SubCutaneous Before meals and at bedtime  insulin lispro Injectable (HumaLOG) 6 Unit(s) SubCutaneous before breakfast  insulin lispro Injectable (HumaLOG) 6 Unit(s) SubCutaneous before lunch  insulin lispro Injectable (HumaLOG) 10 Unit(s) SubCutaneous before dinner  latanoprost 0.005% Ophthalmic Solution 1 Drop(s) Both EYES at bedtime  lisinopril 2.5 milliGRAM(s) Oral daily  metoprolol tartrate 12.5 milliGRAM(s) Oral every 12 hours  pantoprazole    Tablet 40 milliGRAM(s) Oral before breakfast  senna 2 Tablet(s) Oral at bedtime  sodium chloride 0.9% lock flush 3 milliLiter(s) IV Push every 8 hours  timolol 0.5% Solution 1 Drop(s) Both EYES daily    MEDICATIONS  (PRN):  acetaminophen   Tablet .. 650 milliGRAM(s) Oral every 6 hours PRN Mild Pain (1 - 3)  benzocaine 15 mG/menthol 3.6 mG Lozenge 1 Lozenge Oral three times a day PRN Sore Throat  dextrose 40% Gel 15 Gram(s) Oral once PRN Blood Glucose LESS THAN 70 milliGRAM(s)/deciliter  glucagon  Injectable 1 milliGRAM(s) IntraMuscular once PRN Glucose LESS THAN 70 milligrams/deciliter  oxyCODONE    5 mG/acetaminophen 325 mG 2 Tablet(s) Oral every 4 hours PRN Moderate Pain (4 - 6)  polyethylene glycol 3350 17 Gram(s) Oral daily PRN Constipation      PHYSICAL EXAM  Vital Signs Last 24 Hrs  T(C): 37.3 (03 Apr 2019 13:30), Max: 37.3 (03 Apr 2019 13:30)  T(F): 99.2 (03 Apr 2019 13:30), Max: 99.2 (03 Apr 2019 13:30)  HR: 78 (03 Apr 2019 09:56) (58 - 78)  BP: 127/53 (03 Apr 2019 09:56) (97/36 - 131/36)  BP(mean): 93 (03 Apr 2019 09:56) (56 - 93)  RR: 18 (03 Apr 2019 09:56) (16 - 18)  SpO2: 97% (03 Apr 2019 09:56) (96% - 100%)    Constitutional: wn/wd in NAD.   HEENT: NCAT, MMM, OP clear, EOMI, , no proptosis or lid retraction  Neck: no thyromegaly or palpable thyroid nodules   Respiratory: lungs CTAB.  Cardiovascular: regular rhythm, normal S1 and S2, no audible murmurs, right LES trace edema  GI: soft, NT/ND, no masses/HSM appreciated.  Neurology: no tremors, DTR 2+  Skin: no visible rashes/lesions  Psychiatric: AAO x 3, normal affect/mood.  Ext: radial pulses intact, DP pulses intact 2+ L, 1+ R, extremities warm, no cyanosis, clubbing.        LABS:                        10.1   10.44 )-----------( 143      ( 03 Apr 2019 06:33 )             30.8     04-03    140  |  104  |  21  ----------------------------<  181<H>  4.1   |  28  |  0.93    Ca    8.6      03 Apr 2019 06:33  Phos  4.1     04-02  Mg     2.2     04-03    TPro  6.0  /  Alb  3.4  /  TBili  0.4  /  DBili  x   /  AST  18  /  ALT  9<L>  /  AlkPhos  51  04-03    PT/INR - ( 02 Apr 2019 10:34 )   PT: 14.3 sec;   INR: 1.26          PTT - ( 02 Apr 2019 10:34 )  PTT:31.2 sec    Thyroid Stimulating Hormone, Serum: 2.028 uIU/mL (03-19 @ 15:48)  Thyroid Stimulating Hormone, Serum: 3.248 uIU/mL (02-27 @ 07:51)      HbA1C: 8.2 % (03-19 @ 15:49)  8.7 % (02-27 @ 07:51)  8.1 % (11-15 @ 05:54)    CAPILLARY BLOOD GLUCOSE      POCT Blood Glucose.: 197 mg/dL (03 Apr 2019 11:46)  POCT Blood Glucose.: 164 mg/dL (03 Apr 2019 06:48)  POCT Blood Glucose.: 200 mg/dL (02 Apr 2019 22:32)  POCT Blood Glucose.: 354 mg/dL (02 Apr 2019 21:23)  POCT Blood Glucose.: 289 mg/dL (02 Apr 2019 19:22)  POCT Blood Glucose.: 360 mg/dL (02 Apr 2019 18:00)  POCT Blood Glucose.: 475 mg/dL (02 Apr 2019 17:56)  POCT Blood Glucose.: 317 mg/dL (02 Apr 2019 16:15)      A/P:  73 year old male with a history of PMH of HTN, DM, PVD, HLD, glaucoma, blindness, CAD now s/p mid CAB POD #2 with uncontrolled diabetes.    1.  DM - type 2, uncontrolled, complicated  Please contine lantus  40   units in the morning.  Please continue lispro 6  units before breakfast and lunch and lispro 10 units before dinner.  Please continue lispro moderate dose sliding scale four times daily with meals and at bedtime    Pt's fingerstick glucose goal is 100-180.    Will continue to monitor     For discharge, pt can continue basal/bolus, dosage TBD by clinical course    Pt can follow up at discharge with [facility] by calling  to make an appointment.   Will discuss case with Dr. Robbins   and update primary team

## 2019-04-03 NOTE — PROGRESS NOTE ADULT - PROBLEM SELECTOR PLAN 1
Neuro: Pain controlled with Tylenol/Percocet PRN.  No focal deficits.    CV: HR/BP controlled.  BP stable, however will D/C ACEI to try to titrate up BB.  Remaining mikel removed.  +tie down.      Lungs:  On 2L NC, Oe 99%.  Weaned to RA, will see how he does.  PA/Lat CXR from today stable.  F/U post tube pull bedside CXR.      GI:  On Protonix, PO diet.     : Creatinine stable.  Voiding on his own today.      ID: No issues.    Heme: H/H stable. SC heparin for DVT prophylaxis.      Dispo: Home tomorrow with wife.

## 2019-04-03 NOTE — PROGRESS NOTE ADULT - ATTENDING COMMENTS
Patient was seen by me at the bedside and evaluated with JOYA Alcantara.  Based on the patient's glucoses over the last day will continue Lantus Insulin 40 units and Humalog premeal Insulin 6-6-10. Will follow.

## 2019-04-03 NOTE — PROGRESS NOTE ADULT - ASSESSMENT
This is a 74 y/o Turkish speaking male (speaks some English) who is legally blind with PMH of HTN, DM, PVD, HLD, glaucoma, CAD s/p multiple PCI's presented out-patient for surgical consult.  10/19/18 NST indicating a large sized moderate intensity partially reversible defect in the inferolateral wall suggestive of ischemia with normal LV with EF of 60%.  On 11/14/18 @ [facility] s/p WINTER ->mRCA with residual pLCX 70% ISR and dLCx 99%. He presented to his cardiologist with c/o persistent fatigue and palpitations.  2/27/19 admitted to [facility] for staged PCI of LCx.  2/27/19 Cardiac cath by Dr. Smith with PTCA/WINTER-> distalLCx, PTCA ->midLCx ISR, LM normal, midLAD severe ISR, distalLAD mild diffuse disease, midLCx 90% ISR, distalLCx 99%, patent stents in prox/midRCA, EDP 6, EF 55-60%. Patient was seen in the outpatient office and came in for elective MIDCAB.  On 4/1/19 s/p MIDCABx1 (LIMA-LAD), EF 60%.  Uneventful post-op course thus far.  PA/Lat CXR stable wtih some small amt of atelectasis with possible small pleural effusion.  Otherwise walking and tolerating PO diet.  Pain controlled.  Likely home tomorrow.

## 2019-04-03 NOTE — PROGRESS NOTE ADULT - SUBJECTIVE AND OBJECTIVE BOX
Patient discussed on morning rounds with Dr. Roberson    Operation / Date: 4/1/19  MIDCABx1 (LIMA-LAD) EF 60%    SUBJECTIVE ASSESSMENT:  *Wife present helping with translation*  Patient feels well today.  Pulling 500-750mL on IS (after a lot of education).  He walked "4x yesterday" and "2x today" so far.  He is eating regular PO diet and has not had a BM yet.  He is passing gas though, and denies abdominal pain, N/V/D.  He denies SOB, surgical pain, chest pain, dizziness, fever or chills.  He was told he will be most likely ready for D/C tomorrow.       Vital Signs Last 24 Hrs  T(C): 37.3 (03 Apr 2019 13:30), Max: 37.3 (03 Apr 2019 13:30)  T(F): 99.2 (03 Apr 2019 13:30), Max: 99.2 (03 Apr 2019 13:30)  HR: 62 (03 Apr 2019 13:25) (62 - 78)  BP: 118/54 (03 Apr 2019 13:25) (97/36 - 131/36)  BP(mean): 91 (03 Apr 2019 13:25) (56 - 93)  RR: 16 (03 Apr 2019 13:25) (16 - 18)  SpO2: 97% (03 Apr 2019 13:25) (96% - 100%)  I&O's Detail    02 Apr 2019 07:01  -  03 Apr 2019 07:00  --------------------------------------------------------  IN:    IV PiggyBack: 100 mL    Oral Fluid: 320 mL    sodium chloride 0.9%: 15 mL  Total IN: 435 mL    OUT:    Chest Tube: 60 mL    Chest Tube: 120 mL    Indwelling Catheter - Urethral: 230 mL    Voided: 1100 mL  Total OUT: 1510 mL    Total NET: -1075 mL      03 Apr 2019 07:01  -  03 Apr 2019 15:57  --------------------------------------------------------  IN:    Oral Fluid: 300 mL  Total IN: 300 mL    OUT:    Voided: 250 mL  Total OUT: 250 mL    Total NET: 50 mL        CHEST TUBE:  No  CORINNE DRAIN:  No (just removed this afternoon, CXR pending)  EPICARDIAL WIRES: No  TIE DOWNS: Yes.    AGUILAR: No  *RIJ TLC    PHYSICAL EXAM:    GEN: NAD, looks comfortable. Sitting in chair, cooperative and calm.  Uses a cane to walk.    Psych: Mood appropriate  Neuro: A&Ox3.  No focal deficits.  Moving all extremities.   HEENT: No obvious abnormalities  CV: S1S2, regular, no murmurs appreciated.  No carotid bruits.  No JVD  Lungs: Decreased breath sounds LLL.  No wheezing, rales or rhonchi  ABD: Soft, non-tender, non-distended.  +Bowel sounds  EXT: Warm and well perfused.  1+ pitting edema LE below knees.    Musculoskeletal: Moving all extremities with normal ROM, no joint swelling  PV: Pedal pulses palpable  Incision Sites: Left thoracotomy incision clean and dry.  Open to air.      LABS:                        10.1   10.44 )-----------( 143      ( 03 Apr 2019 06:33 )             30.8       COUMADIN:  Yes/No. REASON: .    PT/INR - ( 02 Apr 2019 10:34 )   PT: 14.3 sec;   INR: 1.26          PTT - ( 02 Apr 2019 10:34 )  PTT:31.2 sec    04-03    140  |  104  |  21  ----------------------------<  181<H>  4.1   |  28  |  0.93    Ca    8.6      03 Apr 2019 06:33  Phos  4.1     04-02  Mg     2.2     04-03    TPro  6.0  /  Alb  3.4  /  TBili  0.4  /  DBili  x   /  AST  18  /  ALT  9<L>  /  AlkPhos  51  04-03        MEDICATIONS  (STANDING):  aspirin enteric coated 81 milliGRAM(s) Oral daily  atorvastatin 40 milliGRAM(s) Oral at bedtime  brimonidine 0.2% Ophthalmic Solution 1 Drop(s) Both EYES three times a day  chlorhexidine 2% Cloths 1 Application(s) Topical daily  clopidogrel Tablet 75 milliGRAM(s) Oral daily  dextrose 5%. 1000 milliLiter(s) (50 mL/Hr) IV Continuous <Continuous>  dextrose 50% Injectable 50 milliLiter(s) IV Push every 15 minutes  dextrose 50% Injectable 25 milliLiter(s) IV Push every 15 minutes  docusate sodium 100 milliGRAM(s) Oral three times a day  dorzolamide 2% Ophthalmic Solution      dorzolamide 2% Ophthalmic Solution 1 Drop(s) Both EYES three times a day  heparin  Injectable 5000 Unit(s) SubCutaneous every 8 hours  insulin glargine Injectable (LANTUS) 40 Unit(s) SubCutaneous every morning  insulin lispro (HumaLOG) corrective regimen sliding scale   SubCutaneous Before meals and at bedtime  insulin lispro Injectable (HumaLOG) 6 Unit(s) SubCutaneous before breakfast  insulin lispro Injectable (HumaLOG) 6 Unit(s) SubCutaneous before lunch  insulin lispro Injectable (HumaLOG) 10 Unit(s) SubCutaneous before dinner  latanoprost 0.005% Ophthalmic Solution 1 Drop(s) Both EYES at bedtime  lisinopril 2.5 milliGRAM(s) Oral daily  metoprolol tartrate 12.5 milliGRAM(s) Oral every 12 hours  pantoprazole    Tablet 40 milliGRAM(s) Oral before breakfast  senna 2 Tablet(s) Oral at bedtime  sodium chloride 0.9% lock flush 3 milliLiter(s) IV Push every 8 hours  timolol 0.5% Solution 1 Drop(s) Both EYES daily    MEDICATIONS  (PRN):  acetaminophen   Tablet .. 650 milliGRAM(s) Oral every 6 hours PRN Mild Pain (1 - 3)  benzocaine 15 mG/menthol 3.6 mG Lozenge 1 Lozenge Oral three times a day PRN Sore Throat  dextrose 40% Gel 15 Gram(s) Oral once PRN Blood Glucose LESS THAN 70 milliGRAM(s)/deciliter  glucagon  Injectable 1 milliGRAM(s) IntraMuscular once PRN Glucose LESS THAN 70 milligrams/deciliter  oxyCODONE    5 mG/acetaminophen 325 mG 2 Tablet(s) Oral every 4 hours PRN Moderate Pain (4 - 6)  polyethylene glycol 3350 17 Gram(s) Oral daily PRN Constipation        RADIOLOGY & ADDITIONAL TESTS:  < from: Xray Chest 2 Views PA/Lat (04.03.19 @ 09:33) >    Support Devices:  Unchanged  Heart: Cardiomegaly  Mediastinum: Normal in contour  Lungs/Airways: Improved right middle/lower lobe atelectasis. Small left   pleural effusion stable.  Musculoskeletal: Normal    < end of copied text >

## 2019-04-04 ENCOUNTER — TRANSCRIPTION ENCOUNTER (OUTPATIENT)
Age: 74
End: 2019-04-04

## 2019-04-04 VITALS — TEMPERATURE: 99 F

## 2019-04-04 LAB
ANION GAP SERPL CALC-SCNC: 7 MMOL/L — SIGNIFICANT CHANGE UP (ref 5–17)
BUN SERPL-MCNC: 18 MG/DL — SIGNIFICANT CHANGE UP (ref 7–23)
CALCIUM SERPL-MCNC: 8.7 MG/DL — SIGNIFICANT CHANGE UP (ref 8.4–10.5)
CHLORIDE SERPL-SCNC: 102 MMOL/L — SIGNIFICANT CHANGE UP (ref 96–108)
CO2 SERPL-SCNC: 28 MMOL/L — SIGNIFICANT CHANGE UP (ref 22–31)
CREAT SERPL-MCNC: 0.9 MG/DL — SIGNIFICANT CHANGE UP (ref 0.5–1.3)
GLUCOSE BLDC GLUCOMTR-MCNC: 160 MG/DL — HIGH (ref 70–99)
GLUCOSE BLDC GLUCOMTR-MCNC: 206 MG/DL — HIGH (ref 70–99)
GLUCOSE SERPL-MCNC: 190 MG/DL — HIGH (ref 70–99)
HCT VFR BLD CALC: 31.6 % — LOW (ref 39–50)
HGB BLD-MCNC: 10.3 G/DL — LOW (ref 13–17)
MAGNESIUM SERPL-MCNC: 2 MG/DL — SIGNIFICANT CHANGE UP (ref 1.6–2.6)
MCHC RBC-ENTMCNC: 28.7 PG — SIGNIFICANT CHANGE UP (ref 27–34)
MCHC RBC-ENTMCNC: 32.6 GM/DL — SIGNIFICANT CHANGE UP (ref 32–36)
MCV RBC AUTO: 88 FL — SIGNIFICANT CHANGE UP (ref 80–100)
NRBC # BLD: 0 /100 WBCS — SIGNIFICANT CHANGE UP (ref 0–0)
PLATELET # BLD AUTO: 141 K/UL — LOW (ref 150–400)
POTASSIUM SERPL-MCNC: 4.2 MMOL/L — SIGNIFICANT CHANGE UP (ref 3.5–5.3)
POTASSIUM SERPL-SCNC: 4.2 MMOL/L — SIGNIFICANT CHANGE UP (ref 3.5–5.3)
RBC # BLD: 3.59 M/UL — LOW (ref 4.2–5.8)
RBC # FLD: 13.9 % — SIGNIFICANT CHANGE UP (ref 10.3–14.5)
SODIUM SERPL-SCNC: 137 MMOL/L — SIGNIFICANT CHANGE UP (ref 135–145)
WBC # BLD: 10.9 K/UL — HIGH (ref 3.8–10.5)
WBC # FLD AUTO: 10.9 K/UL — HIGH (ref 3.8–10.5)

## 2019-04-04 PROCEDURE — 97530 THERAPEUTIC ACTIVITIES: CPT

## 2019-04-04 PROCEDURE — 84100 ASSAY OF PHOSPHORUS: CPT

## 2019-04-04 PROCEDURE — P9045: CPT

## 2019-04-04 PROCEDURE — 85730 THROMBOPLASTIN TIME PARTIAL: CPT

## 2019-04-04 PROCEDURE — 86923 COMPATIBILITY TEST ELECTRIC: CPT

## 2019-04-04 PROCEDURE — 86901 BLOOD TYPING SEROLOGIC RH(D): CPT

## 2019-04-04 PROCEDURE — 94002 VENT MGMT INPAT INIT DAY: CPT

## 2019-04-04 PROCEDURE — 99239 HOSP IP/OBS DSCHRG MGMT >30: CPT

## 2019-04-04 PROCEDURE — 86900 BLOOD TYPING SEROLOGIC ABO: CPT

## 2019-04-04 PROCEDURE — 82962 GLUCOSE BLOOD TEST: CPT

## 2019-04-04 PROCEDURE — S2900: CPT

## 2019-04-04 PROCEDURE — 80048 BASIC METABOLIC PNL TOTAL CA: CPT

## 2019-04-04 PROCEDURE — 85610 PROTHROMBIN TIME: CPT

## 2019-04-04 PROCEDURE — 82330 ASSAY OF CALCIUM: CPT

## 2019-04-04 PROCEDURE — 97161 PT EVAL LOW COMPLEX 20 MIN: CPT

## 2019-04-04 PROCEDURE — 71045 X-RAY EXAM CHEST 1 VIEW: CPT | Mod: 26

## 2019-04-04 PROCEDURE — C1889: CPT

## 2019-04-04 PROCEDURE — 85027 COMPLETE CBC AUTOMATED: CPT

## 2019-04-04 PROCEDURE — 83605 ASSAY OF LACTIC ACID: CPT

## 2019-04-04 PROCEDURE — 71045 X-RAY EXAM CHEST 1 VIEW: CPT

## 2019-04-04 PROCEDURE — 71046 X-RAY EXAM CHEST 2 VIEWS: CPT

## 2019-04-04 PROCEDURE — 82803 BLOOD GASES ANY COMBINATION: CPT

## 2019-04-04 PROCEDURE — 83735 ASSAY OF MAGNESIUM: CPT

## 2019-04-04 PROCEDURE — 84295 ASSAY OF SERUM SODIUM: CPT

## 2019-04-04 PROCEDURE — 86850 RBC ANTIBODY SCREEN: CPT

## 2019-04-04 PROCEDURE — 80053 COMPREHEN METABOLIC PANEL: CPT

## 2019-04-04 PROCEDURE — 97116 GAIT TRAINING THERAPY: CPT

## 2019-04-04 PROCEDURE — 36415 COLL VENOUS BLD VENIPUNCTURE: CPT

## 2019-04-04 PROCEDURE — 84132 ASSAY OF SERUM POTASSIUM: CPT

## 2019-04-04 PROCEDURE — 94640 AIRWAY INHALATION TREATMENT: CPT

## 2019-04-04 PROCEDURE — 93005 ELECTROCARDIOGRAM TRACING: CPT

## 2019-04-04 PROCEDURE — 85025 COMPLETE CBC W/AUTO DIFF WBC: CPT

## 2019-04-04 RX ORDER — METOPROLOL TARTRATE 50 MG
1 TABLET ORAL
Qty: 0 | Refills: 0 | COMMUNITY

## 2019-04-04 RX ORDER — FUROSEMIDE 40 MG
1 TABLET ORAL
Qty: 5 | Refills: 0
Start: 2019-04-04 | End: 2019-04-08

## 2019-04-04 RX ORDER — DOCUSATE SODIUM 100 MG
1 CAPSULE ORAL
Qty: 30 | Refills: 0
Start: 2019-04-04 | End: 2019-04-13

## 2019-04-04 RX ORDER — CLOPIDOGREL BISULFATE 75 MG/1
1 TABLET, FILM COATED ORAL
Qty: 30 | Refills: 0
Start: 2019-04-04 | End: 2019-05-03

## 2019-04-04 RX ORDER — ASPIRIN/CALCIUM CARB/MAGNESIUM 324 MG
1 TABLET ORAL
Qty: 30 | Refills: 0
Start: 2019-04-04 | End: 2019-05-03

## 2019-04-04 RX ORDER — INSULIN GLARGINE 100 [IU]/ML
45 INJECTION, SOLUTION SUBCUTANEOUS
Qty: 0 | Refills: 0 | DISCHARGE
Start: 2019-04-04

## 2019-04-04 RX ORDER — INSULIN GLARGINE 100 [IU]/ML
46 INJECTION, SOLUTION SUBCUTANEOUS
Qty: 0 | Refills: 0 | DISCHARGE
Start: 2019-04-04

## 2019-04-04 RX ORDER — ATORVASTATIN CALCIUM 80 MG/1
1 TABLET, FILM COATED ORAL
Qty: 30 | Refills: 0
Start: 2019-04-04 | End: 2019-05-03

## 2019-04-04 RX ORDER — SENNA PLUS 8.6 MG/1
2 TABLET ORAL
Qty: 20 | Refills: 0
Start: 2019-04-04 | End: 2019-04-13

## 2019-04-04 RX ORDER — BENAZEPRIL HYDROCHLORIDE AND HYDROCHLOROTHIAZIDE 10; 12.5 MG/1; MG/1
1 TABLET, FILM COATED ORAL
Qty: 0 | Refills: 0 | COMMUNITY

## 2019-04-04 RX ORDER — METOPROLOL TARTRATE 50 MG
0.5 TABLET ORAL
Qty: 30 | Refills: 0
Start: 2019-04-04 | End: 2019-05-03

## 2019-04-04 RX ORDER — ACETAMINOPHEN 500 MG
2 TABLET ORAL
Qty: 60 | Refills: 0
Start: 2019-04-04

## 2019-04-04 RX ORDER — POTASSIUM CHLORIDE 20 MEQ
1 PACKET (EA) ORAL
Qty: 5 | Refills: 0
Start: 2019-04-04 | End: 2019-04-08

## 2019-04-04 RX ORDER — INSULIN GLARGINE 100 [IU]/ML
52 INJECTION, SOLUTION SUBCUTANEOUS
Qty: 0 | Refills: 0 | COMMUNITY

## 2019-04-04 RX ADMIN — Medication 81 MILLIGRAM(S): at 12:32

## 2019-04-04 RX ADMIN — SODIUM CHLORIDE 3 MILLILITER(S): 9 INJECTION INTRAMUSCULAR; INTRAVENOUS; SUBCUTANEOUS at 12:33

## 2019-04-04 RX ADMIN — HEPARIN SODIUM 5000 UNIT(S): 5000 INJECTION INTRAVENOUS; SUBCUTANEOUS at 05:30

## 2019-04-04 RX ADMIN — DORZOLAMIDE HYDROCHLORIDE 1 DROP(S): 20 SOLUTION/ DROPS OPHTHALMIC at 05:31

## 2019-04-04 RX ADMIN — Medication 4: at 12:31

## 2019-04-04 RX ADMIN — Medication 6 UNIT(S): at 07:09

## 2019-04-04 RX ADMIN — Medication 6 UNIT(S): at 12:31

## 2019-04-04 RX ADMIN — INSULIN GLARGINE 40 UNIT(S): 100 INJECTION, SOLUTION SUBCUTANEOUS at 07:09

## 2019-04-04 RX ADMIN — Medication 2: at 06:59

## 2019-04-04 RX ADMIN — SODIUM CHLORIDE 3 MILLILITER(S): 9 INJECTION INTRAMUSCULAR; INTRAVENOUS; SUBCUTANEOUS at 05:31

## 2019-04-04 RX ADMIN — CLOPIDOGREL BISULFATE 75 MILLIGRAM(S): 75 TABLET, FILM COATED ORAL at 12:32

## 2019-04-04 RX ADMIN — BRIMONIDINE TARTRATE 1 DROP(S): 2 SOLUTION/ DROPS OPHTHALMIC at 05:31

## 2019-04-04 RX ADMIN — BRIMONIDINE TARTRATE 1 DROP(S): 2 SOLUTION/ DROPS OPHTHALMIC at 14:16

## 2019-04-04 RX ADMIN — Medication 12.5 MILLIGRAM(S): at 05:31

## 2019-04-04 RX ADMIN — DORZOLAMIDE HYDROCHLORIDE 1 DROP(S): 20 SOLUTION/ DROPS OPHTHALMIC at 14:16

## 2019-04-04 RX ADMIN — Medication 1 DROP(S): at 12:32

## 2019-04-04 RX ADMIN — PANTOPRAZOLE SODIUM 40 MILLIGRAM(S): 20 TABLET, DELAYED RELEASE ORAL at 06:59

## 2019-04-04 RX ADMIN — Medication 100 MILLIGRAM(S): at 14:16

## 2019-04-04 RX ADMIN — Medication 100 MILLIGRAM(S): at 05:31

## 2019-04-04 NOTE — DISCHARGE NOTE PROVIDER - HOSPITAL COURSE
72 yo Greenlandic speaking male who is legally blind and POOR HISTORIAN with PMH of HTN, DM, PVD, HLD, glaucoma, CAD s/p multiple PCI's presents for surgical consult. 11/14/18 @ [facility] s/p WINTER ->mRCA with residual pLCX 70% ISR and dLCx 99%. He presented to his cardiologist with c/o persistent fatigue and palpitations. Pt denies CP, SOB, AMBROCIO, dizziness, syncope, diaphoresis, orthopnea, PND, LE edema, N/V, melena, hematochezia, fever or chills. 10/19/18 ECHO revealing overall LV systolic function is nml with EF of 55-60%. 10/19/18 NST indicating a large sized moderate intensity partially reversible defect in the inferolateral wall suggestive of ischemia with normal LV with EF of 60%. 2/27/19 admitted to [facility] for staged PCI of LCx. 2/27/19 Cardiac cath by Dr. Smith with PTCA/WINETR-> distalLCx, PTCA ->midLCx ISR, LM normal, midLAD severe ISR, distalLAD mild diffuse disease, midLCx 90% ISR, distalLCx 99%, patent stents in prox/midRCA, EDP 6, EF 55-60%. He presented on 4/1/19 for elective surgey and underwent and uncomplicated MIDCAB.  He was brought to CTICU post-op intuabted and HD stable without inotropic support.  He was extubated that evening and transferred to step down unit on pod#1.  His chest tube was removed pod#1 and he was started on low dose beta blocker and ACE-I.  He was also evaluated by endocrinology for poorly controlled diabetes (HgbA1c of 8.2).  He was started on a basal/bolus regimen with good glycemic control.  His mikel drain was removed on pod#2 and he has been breathing comfortably, ambulating in hallway, and is ready to return home on pod#3.        35 minutes was spent with the patient reviewing the discharge material including medications, follow up appointments, recovery, concerning symptoms, and how to contact their health care providers if they have questions

## 2019-04-04 NOTE — DISCHARGE NOTE NURSING/CASE MANAGEMENT/SOCIAL WORK - NSDCDPATPORTLINK_GEN_ALL_CORE
You can access the [facility] Patient Portal, offered by [facility], by registering with the following website: [email or web address]

## 2019-04-04 NOTE — DISCHARGE NOTE PROVIDER - CARE PROVIDER_API CALL
Boom Roberson (MD)  Cardiovascular Surgery  [address]  [place], [place] [address]  Phone: [phone]  Fax: [phone]  Follow Up Time:

## 2019-04-04 NOTE — PROGRESS NOTE ADULT - ATTENDING COMMENTS
Patient seen and discussed with Dr. Brandon. Glucose level was 230 last night and 160 this AM.Plan is discharge on 42 units Lantus Insulin plus 6-6-100 units premeal Insulin.

## 2019-04-04 NOTE — PROGRESS NOTE ADULT - SUBJECTIVE AND OBJECTIVE BOX
INTERVAL HPI/OVERNIGHT EVENTS:    Patient seen at bedside with family member, sitting in chair and resting comfortably. States for breakfast he had pancakes and coffee. Patient possible is for discharge today.     FSG & Insulin received:  Yesterday: 40 lantus in AM   pre-dinner fs  nutritional lispro 10  units + 4  units lispro SS  bedtime fs  lantus 0  units +  4  units lispro SS    Today: 40 lantus in AM   pre-breakfast fsg: 160  nutritional lispro  6 units+  2  units lispro SS  pre-lunch fs   nutritional lispro  6 units + 4  units lispro SS    Pt reports the following symptoms:  CONSTITUTIONAL:  Negative fever or chills, feels well, good appetite  EYES:  +poor vision  CARDIOVASCULAR:  Negative for chest pain or palpitations  RESPIRATORY:  Negative for cough, wheezing, or SOB   GASTROINTESTINAL:  Negative for nausea, vomiting, diarrhea, constipation, or abdominal pain  GENITOURINARY:  Negative frequency, urgency or dysuria  NEUROLOGIC:  No headache, confusion, dizziness, lightheadedness    MEDICATIONS  (STANDING):  aspirin enteric coated 81 milliGRAM(s) Oral daily  atorvastatin 40 milliGRAM(s) Oral at bedtime  brimonidine 0.2% Ophthalmic Solution 1 Drop(s) Both EYES three times a day  chlorhexidine 2% Cloths 1 Application(s) Topical daily  clopidogrel Tablet 75 milliGRAM(s) Oral daily  docusate sodium 100 milliGRAM(s) Oral three times a day  dorzolamide 2% Ophthalmic Solution      dorzolamide 2% Ophthalmic Solution 1 Drop(s) Both EYES three times a day  heparin  Injectable 5000 Unit(s) SubCutaneous every 8 hours  insulin glargine Injectable (LANTUS) 40 Unit(s) SubCutaneous every morning  insulin lispro (HumaLOG) corrective regimen sliding scale   SubCutaneous Before meals and at bedtime  insulin lispro Injectable (HumaLOG) 6 Unit(s) SubCutaneous before breakfast  insulin lispro Injectable (HumaLOG) 6 Unit(s) SubCutaneous before lunch  insulin lispro Injectable (HumaLOG) 10 Unit(s) SubCutaneous before dinner  latanoprost 0.005% Ophthalmic Solution 1 Drop(s) Both EYES at bedtime  metoprolol tartrate 12.5 milliGRAM(s) Oral every 12 hours  pantoprazole    Tablet 40 milliGRAM(s) Oral before breakfast  senna 2 Tablet(s) Oral at bedtime  sodium chloride 0.9% lock flush 3 milliLiter(s) IV Push every 8 hours  timolol 0.5% Solution 1 Drop(s) Both EYES daily    MEDICATIONS  (PRN):  acetaminophen   Tablet .. 650 milliGRAM(s) Oral every 6 hours PRN Mild Pain (1 - 3)  benzocaine 15 mG/menthol 3.6 mG Lozenge 1 Lozenge Oral three times a day PRN Sore Throat  dextrose 40% Gel 15 Gram(s) Oral once PRN Blood Glucose LESS THAN 70 milliGRAM(s)/deciliter  glucagon  Injectable 1 milliGRAM(s) IntraMuscular once PRN Glucose LESS THAN 70 milligrams/deciliter  oxyCODONE    5 mG/acetaminophen 325 mG 2 Tablet(s) Oral every 4 hours PRN Moderate Pain (4 - 6)  polyethylene glycol 3350 17 Gram(s) Oral daily PRN Constipation    PHYSICAL EXAM  Vital Signs Last 24 Hrs  T(C): 36.8 (2019 09:56), Max: 37.3 (2019 13:30)  T(F): 98.2 (2019 09:56), Max: 99.2 (2019 13:30)  HR: 84 (2019 09:23) (62 - 84)  BP: 108/46 (2019 09:23) (108/46 - 128/50)  BP(mean): 61 (2019 09:23) (57 - 91)  RR: 18 (2019 09:23) (16 - 18)  SpO2: 94% (2019 09:23) (89% - 97%)    Constitutional: wn/wd in NAD.   HEENT: NCAT, +opacification in b/l eyes  Neck: no thyromegaly or palpable thyroid nodules   Respiratory: lungs CTAB.  Cardiovascular: regular rhythm, normal S1 and S2, no audible murmurs, no peripheral edema  GI: soft, NT/ND, no masses/HSM appreciated.  Neurology: no tremors, DTR 2+  Skin: no visible rashes/lesions  Psychiatric: AAO x 3, normal affect/mood.    LABS:                        10.3   10.90 )-----------( 141      ( 2019 07:00 )             31.6     04-04    137  |  102  |  18  ----------------------------<  190<H>  4.2   |  28  |  0.90    Ca    8.7      2019 06:59  Mg     2.0     -    TPro  6.0  /  Alb  3.4  /  TBili  0.4  /  DBili  x   /  AST  18  /  ALT  9<L>  /  AlkPhos  51  -    Thyroid Stimulating Hormone, Serum: 2.028 uIU/mL ( @ 15:48)  Thyroid Stimulating Hormone, Serum: 3.248 uIU/mL ( @ 07:51)      HbA1C: 8.2 % ( @ 15:49)  8.7 % ( @ 07:51)  8.1 % (11-15 @ 05:54)    CAPILLARY BLOOD GLUCOSE  POCT Blood Glucose.: 206 mg/dL (2019 11:37)  POCT Blood Glucose.: 160 mg/dL (2019 06:34)  POCT Blood Glucose.: 230 mg/dL (2019 21:01)  POCT Blood Glucose.: 233 mg/dL (2019 16:49)    A/P:  73 year old male with a history of PMH of HTN, DM, PVD, HLD, glaucoma, blindness, CAD now s/p mid CAB POD #3 with uncontrolled diabetes.    1.  DM - type 2, uncontrolled, complicated  Please increase lantus to 45 units in the morning.    Please increase lispro tp 7 units before breakfast and lunch and lispro 15 units before dinner.    Please continue lispro moderate dose sliding scale four times daily with meals and at bedtime  Pt's fingerstick glucose goal is 100-180.  Will continue to monitor     For discharge, pt can continue his home regimen of basal/bolus, would change his AM lantus dose to 46 units.     Pt can follow up at discharge with [facility] by calling  to make an appointment. INTERVAL HPI/OVERNIGHT EVENTS:    Patient seen at bedside with family member, sitting in chair and resting comfortably. States for breakfast he had pancakes and coffee. Patient possible is for discharge today.     FSG & Insulin received:  Yesterday: 40 lantus in AM   pre-dinner fs  nutritional lispro 10  units + 4  units lispro SS  bedtime fs  lantus 0  units +  4  units lispro SS    Today: 40 lantus in AM   pre-breakfast fsg: 160  nutritional lispro  6 units+  2  units lispro SS  pre-lunch fs   nutritional lispro  6 units + 4  units lispro SS    Pt reports the following symptoms:  CONSTITUTIONAL:  Negative fever or chills, feels well, good appetite  EYES:  +poor vision  CARDIOVASCULAR:  Negative for chest pain or palpitations  RESPIRATORY:  Negative for cough, wheezing, or SOB   GASTROINTESTINAL:  Negative for nausea, vomiting, diarrhea, constipation, or abdominal pain  GENITOURINARY:  Negative frequency, urgency or dysuria  NEUROLOGIC:  No headache, confusion, dizziness, lightheadedness    MEDICATIONS  (STANDING):  aspirin enteric coated 81 milliGRAM(s) Oral daily  atorvastatin 40 milliGRAM(s) Oral at bedtime  brimonidine 0.2% Ophthalmic Solution 1 Drop(s) Both EYES three times a day  chlorhexidine 2% Cloths 1 Application(s) Topical daily  clopidogrel Tablet 75 milliGRAM(s) Oral daily  docusate sodium 100 milliGRAM(s) Oral three times a day  dorzolamide 2% Ophthalmic Solution      dorzolamide 2% Ophthalmic Solution 1 Drop(s) Both EYES three times a day  heparin  Injectable 5000 Unit(s) SubCutaneous every 8 hours  insulin glargine Injectable (LANTUS) 40 Unit(s) SubCutaneous every morning  insulin lispro (HumaLOG) corrective regimen sliding scale   SubCutaneous Before meals and at bedtime  insulin lispro Injectable (HumaLOG) 6 Unit(s) SubCutaneous before breakfast  insulin lispro Injectable (HumaLOG) 6 Unit(s) SubCutaneous before lunch  insulin lispro Injectable (HumaLOG) 10 Unit(s) SubCutaneous before dinner  latanoprost 0.005% Ophthalmic Solution 1 Drop(s) Both EYES at bedtime  metoprolol tartrate 12.5 milliGRAM(s) Oral every 12 hours  pantoprazole    Tablet 40 milliGRAM(s) Oral before breakfast  senna 2 Tablet(s) Oral at bedtime  sodium chloride 0.9% lock flush 3 milliLiter(s) IV Push every 8 hours  timolol 0.5% Solution 1 Drop(s) Both EYES daily    MEDICATIONS  (PRN):  acetaminophen   Tablet .. 650 milliGRAM(s) Oral every 6 hours PRN Mild Pain (1 - 3)  benzocaine 15 mG/menthol 3.6 mG Lozenge 1 Lozenge Oral three times a day PRN Sore Throat  dextrose 40% Gel 15 Gram(s) Oral once PRN Blood Glucose LESS THAN 70 milliGRAM(s)/deciliter  glucagon  Injectable 1 milliGRAM(s) IntraMuscular once PRN Glucose LESS THAN 70 milligrams/deciliter  oxyCODONE    5 mG/acetaminophen 325 mG 2 Tablet(s) Oral every 4 hours PRN Moderate Pain (4 - 6)  polyethylene glycol 3350 17 Gram(s) Oral daily PRN Constipation    PHYSICAL EXAM  Vital Signs Last 24 Hrs  T(C): 36.8 (2019 09:56), Max: 37.3 (2019 13:30)  T(F): 98.2 (2019 09:56), Max: 99.2 (2019 13:30)  HR: 84 (2019 09:23) (62 - 84)  BP: 108/46 (2019 09:23) (108/46 - 128/50)  BP(mean): 61 (2019 09:23) (57 - 91)  RR: 18 (2019 09:23) (16 - 18)  SpO2: 94% (2019 09:23) (89% - 97%)    Constitutional: wn/wd in NAD.   HEENT: NCAT, +opacification in b/l eyes  Neck: no thyromegaly or palpable thyroid nodules   Respiratory: lungs CTAB.  Cardiovascular: regular rhythm, normal S1 and S2, no audible murmurs, no peripheral edema  GI: soft, NT/ND, no masses/HSM appreciated.  Neurology: no tremors, DTR 2+  Skin: no visible rashes/lesions  Psychiatric: AAO x 3, normal affect/mood.    LABS:                        10.3   10.90 )-----------( 141      ( 2019 07:00 )             31.6     04-04    137  |  102  |  18  ----------------------------<  190<H>  4.2   |  28  |  0.90    Ca    8.7      2019 06:59  Mg     2.0     -    TPro  6.0  /  Alb  3.4  /  TBili  0.4  /  DBili  x   /  AST  18  /  ALT  9<L>  /  AlkPhos  51  -    Thyroid Stimulating Hormone, Serum: 2.028 uIU/mL ( @ 15:48)  Thyroid Stimulating Hormone, Serum: 3.248 uIU/mL ( @ 07:51)      HbA1C: 8.2 % ( @ 15:49)  8.7 % ( @ 07:51)  8.1 % (11-15 @ 05:54)    CAPILLARY BLOOD GLUCOSE  POCT Blood Glucose.: 206 mg/dL (2019 11:37)  POCT Blood Glucose.: 160 mg/dL (2019 06:34)  POCT Blood Glucose.: 230 mg/dL (2019 21:01)  POCT Blood Glucose.: 233 mg/dL (2019 16:49)    A/P:  73 year old male with a history of PMH of HTN, DM, PVD, HLD, glaucoma, blindness, CAD now s/p mid CAB POD #3 with uncontrolled diabetes.    1.  DM - type 2, uncontrolled, complicated  Please increase lantus to 46 units in the morning.    Please increase lispro t0 7 units before breakfast and lunch and lispro 15 units before dinner.    Please continue lispro moderate dose sliding scale four times daily with meals and at bedtime  Pt's fingerstick glucose goal is 100-180.  Will continue to monitor     For discharge, pt can continue his home regimen of basal/bolus, would change his AM lantus dose to 46 units.     Pt can follow up at discharge with [facility] by calling  to make an appointment.

## 2019-04-04 NOTE — DISCHARGE NOTE PROVIDER - NSDCCPGOAL_GEN_ALL_CORE_FT
To get better and follow your care plan as instructed.    -Please follow up with Dr. Roberson on 4/9/19 at 1:15PM.  The office is located at [facility], [facility] 4th Floor.  Call us with any questions #[phone].  Follow up with Dr. Goldberg on 4/17/19 at 2:15 PM.  His office is located at [address] [place] [address].  The phone number is [phone]  -No driving or strenuous activity for 6 weeks, or until cleared by your surgeon.  Avoid lifting >10lbs.   -Continue to walk daily as tolerated and use your incentive spirometer every hour.  -Gently clean your incision(s) with anti-bacterial soap and water, pat dry and leave open to air.  We recommend liquid Dial.    -Call your doctor if you have shortness of breath, chest pain not relieved by pain medication, dizziness, fever >101.5, or increased redness/drainage from your incision. To get better and follow your care plan as instructed.    -Please follow up with Dr. Roberson on 4/9/19 at 1:15PM.  The office is located at [facility], [facility] 4th Floor.  Call us with any questions #[phone].  Follow up with Dr. Goldberg on 4/17/19 at 2:15 PM.  His office is located at [address] [place] [address].  The phone number is [phone]  Follow up with [facility] by calling  to make an appointment  -No driving or strenuous activity for 6 weeks, or until cleared by your surgeon.  Avoid lifting >10lbs.   -Continue to walk daily as tolerated and use your incentive spirometer every hour.  -Gently clean your incision(s) with anti-bacterial soap and water, pat dry and leave open to air.  We recommend liquid Dial.    -Call your doctor if you have shortness of breath, chest pain not relieved by pain medication, dizziness, fever >101.5, or increased redness/drainage from your incision.

## 2019-04-04 NOTE — PROGRESS NOTE ADULT - SUBJECTIVE AND OBJECTIVE BOX
Patient discussed on morning rounds with Dr. Roberson     Operation / Date: 4/1/19 MICHELLE    Surgeon: Dr. Roberson    Referring Physician: Dr. Goldberg    SUBJECTIVE ASSESSMENT:    Pt reports pain well controlled, breathing comfortably on room air and ambulating in hallway.    Hospital Course:    72 yo Maltese speaking male who is legally blind and POOR HISTORIAN with PMH of HTN, DM, PVD, HLD, glaucoma, CAD s/p multiple PCI's presents for surgical consult. 11/14/18 @ [facility] s/p WINTER ->mRCA with residual pLCX 70% ISR and dLCx 99%. He presented to his cardiologist with c/o persistent fatigue and palpitations. Pt denies CP, SOB, AMBROCIO, dizziness, syncope, diaphoresis, orthopnea, PND, LE edema, N/V, melena, hematochezia, fever or chills. 10/19/18 ECHO revealing overall LV systolic function is nml with EF of 55-60%. 10/19/18 NST indicating a large sized moderate intensity partially reversible defect in the inferolateral wall suggestive of ischemia with normal LV with EF of 60%. 2/27/19 admitted to [facility] for staged PCI of LCx. 2/27/19 Cardiac cath by Dr. Smith with PTCA/WINTER-> distalLCx, PTCA ->midLCx ISR, LM normal, midLAD severe ISR, distalLAD mild diffuse disease, midLCx 90% ISR, distalLCx 99%, patent stents in prox/midRCA, EDP 6, EF 55-60%. He presented on 4/1/19 for elective surgey and underwent and uncomplicated MIDCAB.  He was brought to CTICU post-op intuabted and HD stable without inotropic support.  He was extubated that evening and transferred to step down unit on pod#1.  His chest tube was removed pod#1 and he was started on low dose beta blocker and ACE-I.  He was also evaluated by endocrinology for poorly controlled diabetes (HgbA1c of 8.2).  He was started on a basal/bolus regimen with good glycemic control.  His mikel drain was removed on pod#2 and he has been breathing comfortably, ambulating in hallway, and is ready to return home on pod#3.    Vital Signs Last 24 Hrs  T(C): 37.1 (04 Apr 2019 12:56), Max: 37.3 (03 Apr 2019 17:23)  T(F): 98.7 (04 Apr 2019 12:56), Max: 99.2 (03 Apr 2019 17:23)  HR: 74 (04 Apr 2019 12:40) (64 - 84)  BP: 130/35 (04 Apr 2019 12:40) (108/46 - 130/35)  BP(mean): 55 (04 Apr 2019 12:40) (55 - 75)  RR: 18 (04 Apr 2019 12:40) (16 - 18)  SpO2: 91% (04 Apr 2019 12:40) (89% - 97%)  I&O's Detail    03 Apr 2019 07:01  -  04 Apr 2019 07:00  --------------------------------------------------------  IN:    Oral Fluid: 300 mL  Total IN: 300 mL    OUT:    Voided: 950 mL  Total OUT: 950 mL    Total NET: -650 mL      04 Apr 2019 07:01  -  04 Apr 2019 13:31  --------------------------------------------------------  IN:    Oral Fluid: 350 mL  Total IN: 350 mL    OUT:    Voided: 200 mL  Total OUT: 200 mL    Total NET: 150 mL          EPICARDIAL WIRES REMOVED: Yes.  TIE DOWNS REMOVED: Yes.    PHYSICAL EXAM:    General: NAD    Neurological: A&Ox3    Cardiovascular: s1S2 RRR    Respiratory: CTA b/l no W/R/R    Gastrointestinal: soft NT/ND +BS    Extremities: no edema    Vascular: warm and well perfused bilaterally    Incision Sites: L anterior thoracotomy C/D/I      LABS:                        10.3   10.90 )-----------( 141      ( 04 Apr 2019 07:00 )             31.6       COUMADIN:  No.            04-04    137  |  102  |  18  ----------------------------<  190<H>  4.2   |  28  |  0.90    Ca    8.7      04 Apr 2019 06:59  Mg     2.0     04-04    TPro  6.0  /  Alb  3.4  /  TBili  0.4  /  DBili  x   /  AST  18  /  ALT  9<L>  /  AlkPhos  51  04-03          MEDICATIONS  (STANDING):  aspirin enteric coated 81 milliGRAM(s) Oral daily  atorvastatin 40 milliGRAM(s) Oral at bedtime  brimonidine 0.2% Ophthalmic Solution 1 Drop(s) Both EYES three times a day  chlorhexidine 2% Cloths 1 Application(s) Topical daily  clopidogrel Tablet 75 milliGRAM(s) Oral daily  dextrose 5%. 1000 milliLiter(s) (50 mL/Hr) IV Continuous <Continuous>  dextrose 50% Injectable 50 milliLiter(s) IV Push every 15 minutes  dextrose 50% Injectable 25 milliLiter(s) IV Push every 15 minutes  docusate sodium 100 milliGRAM(s) Oral three times a day  dorzolamide 2% Ophthalmic Solution      dorzolamide 2% Ophthalmic Solution 1 Drop(s) Both EYES three times a day  heparin  Injectable 5000 Unit(s) SubCutaneous every 8 hours  insulin glargine Injectable (LANTUS) 40 Unit(s) SubCutaneous every morning  insulin lispro (HumaLOG) corrective regimen sliding scale   SubCutaneous Before meals and at bedtime  insulin lispro Injectable (HumaLOG) 6 Unit(s) SubCutaneous before breakfast  insulin lispro Injectable (HumaLOG) 6 Unit(s) SubCutaneous before lunch  insulin lispro Injectable (HumaLOG) 10 Unit(s) SubCutaneous before dinner  latanoprost 0.005% Ophthalmic Solution 1 Drop(s) Both EYES at bedtime  metoprolol tartrate 12.5 milliGRAM(s) Oral every 12 hours  pantoprazole    Tablet 40 milliGRAM(s) Oral before breakfast  senna 2 Tablet(s) Oral at bedtime  sodium chloride 0.9% lock flush 3 milliLiter(s) IV Push every 8 hours  timolol 0.5% Solution 1 Drop(s) Both EYES daily      Discharge CXR: L basilar atelectasis, evaluated with US, no signif pleural effusion

## 2019-04-05 PROBLEM — Z09 POSTOP CHECK: Status: ACTIVE | Noted: 2019-04-05

## 2019-04-08 ENCOUNTER — FORM ENCOUNTER (OUTPATIENT)
Age: 74
End: 2019-04-08

## 2019-04-09 ENCOUNTER — APPOINTMENT (OUTPATIENT)
Dept: CARDIOTHORACIC SURGERY | Facility: CLINIC | Age: 74
End: 2019-04-09
Payer: MEDICARE

## 2019-04-09 ENCOUNTER — OUTPATIENT (OUTPATIENT)
Dept: OUTPATIENT SERVICES | Facility: HOSPITAL | Age: 74
LOS: 1 days | End: 2019-04-09
Payer: MEDICARE

## 2019-04-09 VITALS
OXYGEN SATURATION: 95 % | HEART RATE: 67 BPM | WEIGHT: 168 LBS | BODY MASS INDEX: 27.12 KG/M2 | TEMPERATURE: 97.5 F | DIASTOLIC BLOOD PRESSURE: 66 MMHG | RESPIRATION RATE: 18 BRPM | SYSTOLIC BLOOD PRESSURE: 141 MMHG

## 2019-04-09 DIAGNOSIS — Z09 ENCOUNTER FOR FOLLOW-UP EXAMINATION AFTER COMPLETED TREATMENT FOR CONDITIONS OTHER THAN MALIGNANT NEOPLASM: ICD-10-CM

## 2019-04-09 PROCEDURE — 71046 X-RAY EXAM CHEST 2 VIEWS: CPT | Mod: 26

## 2019-04-09 PROCEDURE — 71046 X-RAY EXAM CHEST 2 VIEWS: CPT

## 2019-04-09 PROCEDURE — 99024 POSTOP FOLLOW-UP VISIT: CPT

## 2019-04-10 DIAGNOSIS — J98.11 ATELECTASIS: ICD-10-CM
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
## 2019-04-10 DIAGNOSIS — I25.10 ATHEROSCLEROTIC HEART DISEASE OF NATIVE CORONARY ARTERY WITHOUT ANGINA PECTORIS: ICD-10-CM

## 2019-04-10 DIAGNOSIS — E11.51 TYPE 2 DIABETES MELLITUS WITH DIABETIC PERIPHERAL ANGIOPATHY WITHOUT GANGRENE: ICD-10-CM

## 2019-04-10 DIAGNOSIS — Z79.4 LONG TERM (CURRENT) USE OF INSULIN: ICD-10-CM

## 2019-04-10 DIAGNOSIS — T82.855A STENOSIS OF CORONARY ARTERY STENT, INITIAL ENCOUNTER: ICD-10-CM

## 2019-04-10 DIAGNOSIS — H40.9 UNSPECIFIED GLAUCOMA: ICD-10-CM
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
## 2019-04-10 DIAGNOSIS — H54.8 LEGAL BLINDNESS, AS DEFINED IN USA: ICD-10-CM

## 2019-04-10 DIAGNOSIS — I10 ESSENTIAL (PRIMARY) HYPERTENSION: ICD-10-CM

## 2019-04-10 DIAGNOSIS — E78.5 HYPERLIPIDEMIA, UNSPECIFIED: ICD-10-CM

## 2019-04-10 DIAGNOSIS — E11.65 TYPE 2 DIABETES MELLITUS WITH HYPERGLYCEMIA: ICD-10-CM

## 2019-04-14 ENCOUNTER — EMERGENCY (EMERGENCY)
Facility: HOSPITAL | Age: 74
LOS: 1 days | Discharge: HOME | End: 2019-04-14
Attending: EMERGENCY MEDICINE | Admitting: EMERGENCY MEDICINE
Payer: MEDICARE

## 2019-04-14 VITALS
OXYGEN SATURATION: 93 % | HEART RATE: 57 BPM | RESPIRATION RATE: 20 BRPM | TEMPERATURE: 99 F | SYSTOLIC BLOOD PRESSURE: 135 MMHG | WEIGHT: 173.94 LBS | DIASTOLIC BLOOD PRESSURE: 77 MMHG

## 2019-04-14 VITALS
TEMPERATURE: 99 F | HEART RATE: 55 BPM | RESPIRATION RATE: 18 BRPM | DIASTOLIC BLOOD PRESSURE: 64 MMHG | OXYGEN SATURATION: 95 % | SYSTOLIC BLOOD PRESSURE: 134 MMHG

## 2019-04-14 LAB
ALBUMIN SERPL ELPH-MCNC: 3.7 G/DL — SIGNIFICANT CHANGE UP (ref 3.3–5)
ALP SERPL-CCNC: 80 U/L — SIGNIFICANT CHANGE UP (ref 40–120)
ALT FLD-CCNC: 17 U/L — SIGNIFICANT CHANGE UP (ref 10–45)
ANION GAP SERPL CALC-SCNC: 9 MMOL/L — SIGNIFICANT CHANGE UP (ref 5–17)
APTT BLD: 31.2 SEC — SIGNIFICANT CHANGE UP (ref 27.5–36.3)
AST SERPL-CCNC: 19 U/L — SIGNIFICANT CHANGE UP (ref 10–40)
BASOPHILS # BLD AUTO: 0.02 K/UL — SIGNIFICANT CHANGE UP (ref 0–0.2)
BASOPHILS NFR BLD AUTO: 0.2 % — SIGNIFICANT CHANGE UP (ref 0–2)
BILIRUB SERPL-MCNC: 0.3 MG/DL — SIGNIFICANT CHANGE UP (ref 0.2–1.2)
BUN SERPL-MCNC: 20 MG/DL — SIGNIFICANT CHANGE UP (ref 7–23)
CALCIUM SERPL-MCNC: 9 MG/DL — SIGNIFICANT CHANGE UP (ref 8.4–10.5)
CHLORIDE SERPL-SCNC: 99 MMOL/L — SIGNIFICANT CHANGE UP (ref 96–108)
CK MB CFR SERPL CALC: 2.2 NG/ML — SIGNIFICANT CHANGE UP (ref 0–6.7)
CO2 SERPL-SCNC: 27 MMOL/L — SIGNIFICANT CHANGE UP (ref 22–31)
CREAT SERPL-MCNC: 1.05 MG/DL — SIGNIFICANT CHANGE UP (ref 0.5–1.3)
EOSINOPHIL # BLD AUTO: 0.16 K/UL — SIGNIFICANT CHANGE UP (ref 0–0.5)
EOSINOPHIL NFR BLD AUTO: 1.6 % — SIGNIFICANT CHANGE UP (ref 0–6)
GLUCOSE SERPL-MCNC: 225 MG/DL — HIGH (ref 70–99)
HCT VFR BLD CALC: 32.1 % — LOW (ref 39–50)
HGB BLD-MCNC: 10.1 G/DL — LOW (ref 13–17)
IMM GRANULOCYTES NFR BLD AUTO: 0.4 % — SIGNIFICANT CHANGE UP (ref 0–1.5)
INR BLD: 1.22 — HIGH (ref 0.88–1.16)
LYMPHOCYTES # BLD AUTO: 1.47 K/UL — SIGNIFICANT CHANGE UP (ref 1–3.3)
LYMPHOCYTES # BLD AUTO: 14.3 % — SIGNIFICANT CHANGE UP (ref 13–44)
MAGNESIUM SERPL-MCNC: 1.9 MG/DL — SIGNIFICANT CHANGE UP (ref 1.6–2.6)
MCHC RBC-ENTMCNC: 27.6 PG — SIGNIFICANT CHANGE UP (ref 27–34)
MCHC RBC-ENTMCNC: 31.5 GM/DL — LOW (ref 32–36)
MCV RBC AUTO: 87.7 FL — SIGNIFICANT CHANGE UP (ref 80–100)
MONOCYTES # BLD AUTO: 0.81 K/UL — SIGNIFICANT CHANGE UP (ref 0–0.9)
MONOCYTES NFR BLD AUTO: 7.9 % — SIGNIFICANT CHANGE UP (ref 2–14)
NEUTROPHILS # BLD AUTO: 7.78 K/UL — HIGH (ref 1.8–7.4)
NEUTROPHILS NFR BLD AUTO: 75.6 % — SIGNIFICANT CHANGE UP (ref 43–77)
NRBC # BLD: 0 /100 WBCS — SIGNIFICANT CHANGE UP (ref 0–0)
PLATELET # BLD AUTO: 382 K/UL — SIGNIFICANT CHANGE UP (ref 150–400)
POTASSIUM SERPL-MCNC: 4.6 MMOL/L — SIGNIFICANT CHANGE UP (ref 3.5–5.3)
POTASSIUM SERPL-SCNC: 4.6 MMOL/L — SIGNIFICANT CHANGE UP (ref 3.5–5.3)
PROT SERPL-MCNC: 7.2 G/DL — SIGNIFICANT CHANGE UP (ref 6–8.3)
PROTHROM AB SERPL-ACNC: 13.9 SEC — HIGH (ref 10–12.9)
RBC # BLD: 3.66 M/UL — LOW (ref 4.2–5.8)
RBC # FLD: 13.3 % — SIGNIFICANT CHANGE UP (ref 10.3–14.5)
SODIUM SERPL-SCNC: 135 MMOL/L — SIGNIFICANT CHANGE UP (ref 135–145)
TROPONIN T SERPL-MCNC: 0.08 NG/ML — CRITICAL HIGH (ref 0–0.01)
WBC # BLD: 10.28 K/UL — SIGNIFICANT CHANGE UP (ref 3.8–10.5)
WBC # FLD AUTO: 10.28 K/UL — SIGNIFICANT CHANGE UP (ref 3.8–10.5)

## 2019-04-14 PROCEDURE — 71046 X-RAY EXAM CHEST 2 VIEWS: CPT | Mod: 26

## 2019-04-14 PROCEDURE — 80053 COMPREHEN METABOLIC PANEL: CPT

## 2019-04-14 PROCEDURE — 84484 ASSAY OF TROPONIN QUANT: CPT

## 2019-04-14 PROCEDURE — 85730 THROMBOPLASTIN TIME PARTIAL: CPT

## 2019-04-14 PROCEDURE — 85025 COMPLETE CBC W/AUTO DIFF WBC: CPT

## 2019-04-14 PROCEDURE — 71046 X-RAY EXAM CHEST 2 VIEWS: CPT

## 2019-04-14 PROCEDURE — 82553 CREATINE MB FRACTION: CPT

## 2019-04-14 PROCEDURE — 83880 ASSAY OF NATRIURETIC PEPTIDE: CPT

## 2019-04-14 PROCEDURE — 36415 COLL VENOUS BLD VENIPUNCTURE: CPT

## 2019-04-14 PROCEDURE — 99285 EMERGENCY DEPT VISIT HI MDM: CPT | Mod: 25

## 2019-04-14 PROCEDURE — 99284 EMERGENCY DEPT VISIT MOD MDM: CPT | Mod: 25

## 2019-04-14 PROCEDURE — 82550 ASSAY OF CK (CPK): CPT

## 2019-04-14 PROCEDURE — 83735 ASSAY OF MAGNESIUM: CPT

## 2019-04-14 PROCEDURE — 85610 PROTHROMBIN TIME: CPT

## 2019-04-14 NOTE — ED PROVIDER NOTE - DIAGNOSTIC INTERPRETATION
ER PA: Fabiola Bravo, PAC  CHEST XRAY INTERPRETATION: L sided pleural effusion, improved from prior, heart shadow normal, bony structures intact

## 2019-04-14 NOTE — ED ADULT TRIAGE NOTE - CHIEF COMPLAINT QUOTE
pt. presents with c/o severe cough for a week, pt. was discharged april 4th s/p CABG procedure. pt. denies any chest pain, fever,

## 2019-04-14 NOTE — ED ADULT NURSE NOTE - OBJECTIVE STATEMENT
Patient presents to the ED with c/o chest pain with cough x 10 days s/p discharge from hospital after CABG. Denies pain radiation, severe SOB, Dizziness, n/v. Speaking in complete sentences. NAD Noted

## 2019-04-14 NOTE — ED PROVIDER NOTE - CARE PROVIDER_API CALL
Boom Roberson (MD)  Cardiovascular Surgery  [address]  [place], [place] [address]  Phone: [phone]  Fax: [phone]  Follow Up Time:

## 2019-04-14 NOTE — ED PROVIDER NOTE - CARE PROVIDERS DIRECT ADDRESSES
[email or web address]

## 2019-04-14 NOTE — ED PROVIDER NOTE - PHYSICAL EXAMINATION
CONSTITUTIONAL: Well-appearing; well-nourished; in no apparent distress.   HEAD: Normocephalic; atraumatic.   EYES: PERRL; EOM intact; conjunctiva and sclera clear  ENT: normal nose; no rhinorrhea; normal pharynx with no erythema or lesions.   NECK: Supple; non-tender; no LAD  CARDIOVASCULAR: Normal S1, S2; no murmurs, rubs, or gallops. Regular rate and rhythm. +ecchymosis to L breast with well healing scar (pt states bruising has been there since the surgery)  RESPIRATORY: Breathing easily; breath sounds clear and equal bilaterally; no wheezes, rhonchi, or rales.  GI: Soft; non-distended; non-tender; no palpable organomegaly.   MSK: FROM at all extremities, normal tone   EXT: No cyanosis or edema; N/V intact  SKIN: Normal for age and race; warm; dry; good turgor; no apparent lesions or rash.   NEURO: A & O x 3; face symmetric; grossly unremarkable.   PSYCHOLOGICAL: The patient’s mood and manner are appropriate.

## 2019-04-14 NOTE — ED PROVIDER NOTE - ATTENDING CONTRIBUTION TO CARE
73M hx htn, dm, high chol, cad, s/p recent cabg, c/o cough for past week.  states dry. no fevers, no chest pain, no SOB, no palpitations, chronic LE swelling.  gen- nad  heent- ncat, clear conj  cv -rrr  lungs -ctab  abd - soft, nt, nd  ext -wwp  neuro -aox3, mata  no infiltrate on CXR, no change on xray, no acute st/t wave changes on EKG< labs checked.  trop elevation likely from recent CABG. pt seen by CT surgery - can be discharged home

## 2019-04-14 NOTE — ED PROVIDER NOTE - OBJECTIVE STATEMENT
74 yo M with pmh of HTN, DM, PVD, HLD, glaucoma, legally blind, CAD s/p multiple PCIs, s/p CABG 4/4/19 c/o dry cough x 1 week. Denies fever, chills, cp, sob, palpitations, sweats, dizziness. Report chronic LE swelling. Denies sick contacts, recent travel. Has not taken anything for cough. Pt had a f/u with his surgeon and everything is healing well.

## 2019-04-14 NOTE — ED PROVIDER NOTE - CLINICAL SUMMARY MEDICAL DECISION MAKING FREE TEXT BOX
74 yo M with pmh of HTN, DM, PVD, HLD, glaucoma, legally blind, CAD s/p multiple PCIs, s/p CABG 4/4/19 c/o dry cough x 1 week. No f/c, cp, sob. Afebrile. Sat 93% on room air. Well appearing. Lungs cta b/l. +bruising to L chest wall, well healing scar. EKG sinus viral @ 56 no ischemic changes. 74 yo M with pmh of HTN, DM, PVD, HLD, glaucoma, legally blind, CAD s/p multiple PCIs, s/p CABG 4/4/19 c/o dry cough x 1 week. No f/c, cp, sob. Afebrile. Sat 93% on room air. Well appearing. Lungs cta b/l. +bruising to L chest wall, well healing scar. EKG sinus viral @ 56 no ischemic changes. CXR- L sided pleural effusion, improved from prior. Seen by CT surgery PA who spoke with Dr. Rboerson, trop likely from recent surgery, pt  stable for dc

## 2019-04-14 NOTE — ED ADULT NURSE NOTE - CAS EDN DISCHARGE ASSESSMENT
testing unremarkable, denies further complaints NAD noted/Alert and oriented to person, place and time

## 2019-04-14 NOTE — ED ADULT NURSE NOTE - NSIMPLEMENTINTERV_GEN_ALL_ED
Implemented All Universal Safety Interventions:  [place] to call system. Call bell, personal items and telephone within reach. Instruct patient to call for assistance. Room bathroom lighting operational. Non-slip footwear when patient is off stretcher. Physically safe environment: no spills, clutter or unnecessary equipment. Stretcher in lowest position, wheels locked, appropriate side rails in place.

## 2019-04-14 NOTE — ED ADULT NURSE NOTE - CHPI ED NUR SYMPTOMS NEG
no diaphoresis/no fever/no nausea/no congestion/no dizziness/no vomiting/no chills/no syncope/no back pain

## 2019-04-18 DIAGNOSIS — I10 ESSENTIAL (PRIMARY) HYPERTENSION: ICD-10-CM

## 2019-04-18 DIAGNOSIS — Z79.82 LONG TERM (CURRENT) USE OF ASPIRIN: ICD-10-CM

## 2019-04-18 DIAGNOSIS — R05 COUGH: ICD-10-CM

## 2019-04-18 DIAGNOSIS — E78.5 HYPERLIPIDEMIA, UNSPECIFIED: ICD-10-CM

## 2019-04-18 DIAGNOSIS — E11.9 TYPE 2 DIABETES MELLITUS WITHOUT COMPLICATIONS: ICD-10-CM

## 2019-04-18 DIAGNOSIS — Z79.1 LONG TERM (CURRENT) USE OF NON-STEROIDAL ANTI-INFLAMMATORIES (NSAID): ICD-10-CM

## 2019-04-18 DIAGNOSIS — I25.10 ATHEROSCLEROTIC HEART DISEASE OF NATIVE CORONARY ARTERY WITHOUT ANGINA PECTORIS: ICD-10-CM

## 2019-04-18 DIAGNOSIS — Z79.4 LONG TERM (CURRENT) USE OF INSULIN: ICD-10-CM

## 2019-04-18 DIAGNOSIS — Z79.891 LONG TERM (CURRENT) USE OF OPIATE ANALGESIC: ICD-10-CM

## 2019-04-18 DIAGNOSIS — M79.81 NONTRAUMATIC HEMATOMA OF SOFT TISSUE: ICD-10-CM

## 2019-05-13 ENCOUNTER — FORM ENCOUNTER (OUTPATIENT)
Age: 74
End: 2019-05-13

## 2019-05-14 ENCOUNTER — OUTPATIENT (OUTPATIENT)
Dept: OUTPATIENT SERVICES | Facility: HOSPITAL | Age: 74
LOS: 1 days | End: 2019-05-14
Payer: MEDICARE

## 2019-05-14 ENCOUNTER — APPOINTMENT (OUTPATIENT)
Dept: CARDIOTHORACIC SURGERY | Facility: CLINIC | Age: 74
End: 2019-05-14
Payer: MEDICARE

## 2019-05-14 VITALS
BODY MASS INDEX: 28.08 KG/M2 | WEIGHT: 174 LBS | TEMPERATURE: 95.1 F | OXYGEN SATURATION: 96 % | DIASTOLIC BLOOD PRESSURE: 66 MMHG | HEART RATE: 70 BPM | RESPIRATION RATE: 18 BRPM | SYSTOLIC BLOOD PRESSURE: 151 MMHG

## 2019-05-14 PROCEDURE — 71046 X-RAY EXAM CHEST 2 VIEWS: CPT

## 2019-05-14 PROCEDURE — 99024 POSTOP FOLLOW-UP VISIT: CPT

## 2019-05-14 PROCEDURE — 71046 X-RAY EXAM CHEST 2 VIEWS: CPT | Mod: 26

## 2019-10-02 NOTE — PHYSICAL THERAPY INITIAL EVALUATION ADULT - PRECAUTIONS/LIMITATIONS, REHAB EVAL
CHIEF COMPLAINT: bites (left leg)      HISTORY OF PRESENT ILLNESS: Jake Grande is a 53 year old male who presents for the following issues:    1. Patient reports that two weeks ago he noticed what he thought was a bug bite. It doesn't seem to be going away.     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 Tortuous colon determined to be likely cause of occasional left side discomfort.     • AINSLEY on CPAP 04/25/2018     Priority: Low   • Low testosterone 03/07/2018     Priority: Low       Past Surgical History:   Procedure Laterality Date   • Umbilical hernia repair          Family History   Problem Relation Age of Onset   • Cancer Mother         pancreatic cancer   • Hypertension Father    • Other Father         kidney stones, gall stones, heart bypass surgery   • Cancer Sister         Breast cancer, returned twice   • Cancer Maternal Grandfather         bone cancer       Social History     Socioeconomic History   • Marital status: /Civil Union     Spouse name: Not on file   • Number of children: Not on file   • Years of education: Not on file   • Highest education level: Not on file   Occupational History   • Not on file   Social Needs   • Financial resource strain: Not on file   • Food insecurity:     Worry: Not on file     Inability: Not on file   • Transportation needs:     Medical: Not on file     Non-medical: Not on file   Tobacco Use   • Smoking status: Never Smoker   • Smokeless tobacco: Never Used   Substance and Sexual Activity   • Alcohol use: Yes     Comment: socially   • Drug use: No   • Sexual activity: Yes     Partners: Female   Lifestyle   • Physical activity:     Days per week: Not on file     Minutes per session: Not on file   • Stress: Not on file   Relationships    • Social connections:     Talks on phone: Not on file     Gets together: Not on file     Attends Confucianism service: Not on file     Active member of club or organization: Not on file     Attends meetings of clubs or organizations: Not on file     Relationship status: Not on file   • Intimate partner violence:     Fear of current or ex partner: Not on file     Emotionally abused: Not on file     Physically abused: Not on file     Forced sexual activity: Not on file   Other Topics Concern   • Not on file   Social History Narrative   • Not on file        Current Outpatient Medications   Medication Sig Dispense Refill   • Multiple Vitamins-Minerals (VITAMIN - THERAPEUTIC MULTIVITAMINS W/MINERALS) Tab Take 1 tablet by mouth daily.     • Elastic Bandages & Supports (MEDICAL COMPRESSION STOCKINGS) Misc 2 each daily. 20-30mmHg, knee high, daily use, for 99 months 2 each 0   • carvedilol (COREG) 6.25 MG tablet Take 1 tablet by mouth 2 times daily (with meals). 180 tablet 1   • phenylephrine (SUDAFED PE) 10 MG Tab Take 10 mg by mouth.     • cyclobenzaprine (FLEXERIL) 5 MG tablet Take 1-2 tablets by mouth 3 times daily as needed for Muscle spasms. 30 tablet 0   • Ascorbic Acid (VITAMIN C) 100 MG tablet Take 100 mg by mouth.     • Cholecalciferol (VITAMIN D3) 1000 units capsule Take 1,000 Units by mouth.     • IRON PO Take 1 tablet by mouth.     • CVS ZINC 50 MG Tab        No current facility-administered medications for this visit.         ALLERGIES:   Allergen Reactions   • Aspirin Other (See Comments)     LOW PLATELET COUNT       Most Recent Immunizations   Administered Date(s) Administered   • Influenza Whole 11/08/2008   • Influenza, Split 09/01/2017   • Influenza, Unspecified Formulation 09/10/2018   • Influenza, seasonal, injectable, preservative free 09/01/2017   • Influenza, seasonal, injectable, trivalent 11/10/2007   • Tdap 08/12/2017     REVIEW OF SYSTEMS:  GENERAL:  Denies fever, chills.  SKIN:  Reports bug bite  as above.    Objective:  VITALS:    Visit Vitals  /80   Pulse 86   Temp 97.9 °F (36.6 °C) (Oral)   Wt 127 kg   BMI 35.00 kg/m²     PHYSICAL EXAMINATION:    General:  conversant and in no acute distress  HEENT:   normocephalic and atraumatic  Skin: left lower extremity, lateral aspect with 5mm erythematous papule with tiny eschar.  Psychiatric: Mood: Appropriate; Alert and oriented x3    ASSESSMENT AND PLAN:    Bug bite without infection, initial encounter  Patient brought in a picture from two weeks ago which showed a much larger lesion on his left lower leg. It appears to have healed and shrunken in size since then. We discussed that sometimes a bug bite can take longer to heal, and can sometimes even fibrose into a dermatofibroma. Patient was advised to notify me if there is no improvement in the next two weeks, or if worsening at any point. He expressed understanding.      Bilateral lower extremity edema  Patient requested refill of compression stocking.  - Elastic Bandages & Supports (MEDICAL COMPRESSION STOCKINGS) Misc; 2 each daily. 20-30mmHg, knee high, daily use, for 99 months  Dispense: 2 each; Refill: 0    Patient requested the following screenings prior to his upcoming annual health checkup:    History of thrombocytopenia  - CBC WITH DIFFERENTIAL; Future    Lipid screening  - LIPID PANEL WITH REFLEX; Future    Screening for diabetes mellitus (DM)  - COMPREHENSIVE METABOLIC PANEL; Future      FOLLOW UP:  Return in about 1 month (around 11/2/2019) for annual health checkup.   thoracotomy

## 2020-05-12 ENCOUNTER — APPOINTMENT (OUTPATIENT)
Dept: CARDIOTHORACIC SURGERY | Facility: CLINIC | Age: 75
End: 2020-05-12
Payer: MEDICARE

## 2020-05-12 ENCOUNTER — APPOINTMENT (OUTPATIENT)
Dept: CARDIOTHORACIC SURGERY | Facility: CLINIC | Age: 75
End: 2020-05-12

## 2020-05-12 PROCEDURE — 99441: CPT

## 2020-05-12 RX ORDER — ASPIRIN 81 MG
81 TABLET, DELAYED RELEASE (ENTERIC COATED) ORAL
Qty: 30 | Refills: 6 | Status: ACTIVE | COMMUNITY
Start: 1900-01-01 | End: 1900-01-01

## 2020-05-12 RX ORDER — INSULIN LISPRO 100 [IU]/ML
100 INJECTION, SOLUTION INTRAVENOUS; SUBCUTANEOUS 3 TIMES DAILY
Qty: 1 | Refills: 3 | Status: ACTIVE | COMMUNITY
Start: 1900-01-01 | End: 1900-01-01

## 2020-05-12 RX ORDER — CLOPIDOGREL BISULFATE 75 MG/1
75 TABLET, FILM COATED ORAL
Qty: 30 | Refills: 5 | Status: ACTIVE | COMMUNITY
Start: 1900-01-01 | End: 1900-01-01

## 2020-05-12 RX ORDER — METOPROLOL SUCCINATE 100 MG/1
100 TABLET, EXTENDED RELEASE ORAL
Qty: 30 | Refills: 3 | Status: ACTIVE | COMMUNITY
Start: 1900-01-01 | End: 1900-01-01

## 2020-05-12 RX ORDER — INSULIN GLARGINE 100 [IU]/ML
100 INJECTION, SOLUTION SUBCUTANEOUS DAILY
Qty: 1 | Refills: 3 | Status: ACTIVE | COMMUNITY
Start: 1900-01-01 | End: 1900-01-01

## 2020-05-13 NOTE — REASON FOR VISIT
[Follow-Up: _____] : a [unfilled] follow-up visit [Verbal consent obtained from patient] : the patient, [unfilled] [Other: _____] : [unfilled]

## 2020-05-14 NOTE — END OF VISIT
[FreeTextEntry3] : I, MARKELL CAMARILLO , am scribing for and in the presence of FRITZ BETHEA the following sections: History of present illness, past Medical/family/surgical/family/social history, review of systems, vital signs, physical exam and disposition.\par I personally performed the services described in the documentation, reviewed the documentation recorded by the scribe in my presence and it accurately and completely records my words and actions.\par

## 2020-05-14 NOTE — ASSESSMENT
[FreeTextEntry1] : 75 yo male s/p PTCA/WINTER-> distalLCx, PTCA ->midLCx ISR on 2/27/19 and status post Robotic assisted MIDCAB x 1 (LIMA->LAD) on 4/1/19 for completion of hybrid procedure presents for 1 year follow up. \par \par Plan: \par continue current medications\par follow up with Dr. Mosher--appt later this month\par counseled re need to good DM management\par we have renewed all medications\par DC from CTS service

## 2020-05-14 NOTE — HISTORY OF PRESENT ILLNESS
[FreeTextEntry1] : 73 yo male s/p PTCA/WINTER-> distalLCx, PTCA ->midLCx ISR on 2/27/19 and status post Robotic assisted MIDCAB x 1 (LIMA->LAD) on 4/1/19 for completion of hybrid procedure presents for 1 year follow up. Patient denies c/o chest pain, sob/melton, fever, lower extremity edema, syncope or palpitations. He denies hospitalizations or additional cardiac procedures. He is followed by Dr. Mosher. NYHA 1.

## 2022-08-11 ENCOUNTER — INPATIENT (INPATIENT)
Facility: HOSPITAL | Age: 77
LOS: 2 days | Discharge: HOME | DRG: 246 | End: 2022-08-14
Attending: INTERNAL MEDICINE | Admitting: INTERNAL MEDICINE
Payer: MEDICARE

## 2022-08-11 VITALS
RESPIRATION RATE: 18 BRPM | DIASTOLIC BLOOD PRESSURE: 73 MMHG | HEIGHT: 65 IN | SYSTOLIC BLOOD PRESSURE: 174 MMHG | HEART RATE: 51 BPM | WEIGHT: 176.37 LBS | TEMPERATURE: 98 F | OXYGEN SATURATION: 99 %

## 2022-08-11 DIAGNOSIS — Z29.9 ENCOUNTER FOR PROPHYLACTIC MEASURES, UNSPECIFIED: ICD-10-CM

## 2022-08-11 DIAGNOSIS — E11.9 TYPE 2 DIABETES MELLITUS WITHOUT COMPLICATIONS: ICD-10-CM

## 2022-08-11 DIAGNOSIS — E78.5 HYPERLIPIDEMIA, UNSPECIFIED: ICD-10-CM

## 2022-08-11 DIAGNOSIS — I10 ESSENTIAL (PRIMARY) HYPERTENSION: ICD-10-CM

## 2022-08-11 LAB
ALBUMIN SERPL ELPH-MCNC: 3.8 G/DL — SIGNIFICANT CHANGE UP (ref 3.3–5)
ALP SERPL-CCNC: 91 U/L — SIGNIFICANT CHANGE UP (ref 40–120)
ALT FLD-CCNC: 22 U/L — SIGNIFICANT CHANGE UP (ref 10–45)
ANION GAP SERPL CALC-SCNC: 8 MMOL/L — SIGNIFICANT CHANGE UP (ref 5–17)
APTT BLD: >200 SEC — CRITICAL HIGH (ref 27.5–35.5)
AST SERPL-CCNC: 25 U/L — SIGNIFICANT CHANGE UP (ref 10–40)
BILIRUB SERPL-MCNC: 0.4 MG/DL — SIGNIFICANT CHANGE UP (ref 0.2–1.2)
BUN SERPL-MCNC: 24 MG/DL — HIGH (ref 7–23)
CALCIUM SERPL-MCNC: 8.8 MG/DL — SIGNIFICANT CHANGE UP (ref 8.4–10.5)
CHLORIDE SERPL-SCNC: 102 MMOL/L — SIGNIFICANT CHANGE UP (ref 96–108)
CK MB CFR SERPL CALC: 4.7 NG/ML — SIGNIFICANT CHANGE UP (ref 0–6.7)
CK SERPL-CCNC: 132 U/L — SIGNIFICANT CHANGE UP (ref 30–200)
CO2 SERPL-SCNC: 26 MMOL/L — SIGNIFICANT CHANGE UP (ref 22–31)
CREAT SERPL-MCNC: 1.18 MG/DL — SIGNIFICANT CHANGE UP (ref 0.5–1.3)
EGFR: 64 ML/MIN/1.73M2 — SIGNIFICANT CHANGE UP
GLUCOSE BLDC GLUCOMTR-MCNC: 204 MG/DL — HIGH (ref 70–99)
GLUCOSE SERPL-MCNC: 228 MG/DL — HIGH (ref 70–99)
HCT VFR BLD CALC: 36.2 % — LOW (ref 39–50)
HGB BLD-MCNC: 11.8 G/DL — LOW (ref 13–17)
INR BLD: 1.15 — SIGNIFICANT CHANGE UP (ref 0.88–1.16)
MAGNESIUM SERPL-MCNC: 2 MG/DL — SIGNIFICANT CHANGE UP (ref 1.6–2.6)
MCHC RBC-ENTMCNC: 28 PG — SIGNIFICANT CHANGE UP (ref 27–34)
MCHC RBC-ENTMCNC: 32.6 GM/DL — SIGNIFICANT CHANGE UP (ref 32–36)
MCV RBC AUTO: 86 FL — SIGNIFICANT CHANGE UP (ref 80–100)
NRBC # BLD: 0 /100 WBCS — SIGNIFICANT CHANGE UP (ref 0–0)
PLATELET # BLD AUTO: 185 K/UL — SIGNIFICANT CHANGE UP (ref 150–400)
POTASSIUM SERPL-MCNC: 4.8 MMOL/L — SIGNIFICANT CHANGE UP (ref 3.5–5.3)
POTASSIUM SERPL-SCNC: 4.8 MMOL/L — SIGNIFICANT CHANGE UP (ref 3.5–5.3)
PROT SERPL-MCNC: 6.5 G/DL — SIGNIFICANT CHANGE UP (ref 6–8.3)
PROTHROM AB SERPL-ACNC: 13.7 SEC — HIGH (ref 10.5–13.4)
RBC # BLD: 4.21 M/UL — SIGNIFICANT CHANGE UP (ref 4.2–5.8)
RBC # FLD: 13 % — SIGNIFICANT CHANGE UP (ref 10.3–14.5)
SODIUM SERPL-SCNC: 136 MMOL/L — SIGNIFICANT CHANGE UP (ref 135–145)
TROPONIN T SERPL-MCNC: 0.05 NG/ML — CRITICAL HIGH (ref 0–0.01)
WBC # BLD: 6.73 K/UL — SIGNIFICANT CHANGE UP (ref 3.8–10.5)
WBC # FLD AUTO: 6.73 K/UL — SIGNIFICANT CHANGE UP (ref 3.8–10.5)

## 2022-08-11 RX ORDER — HEPARIN SODIUM 5000 [USP'U]/ML
INJECTION INTRAVENOUS; SUBCUTANEOUS
Qty: 25000 | Refills: 0 | Status: DISCONTINUED | OUTPATIENT
Start: 2022-08-11 | End: 2022-08-12

## 2022-08-11 RX ORDER — MUPIROCIN 20 MG/G
1 OINTMENT TOPICAL EVERY 8 HOURS
Refills: 0 | Status: DISCONTINUED | OUTPATIENT
Start: 2022-08-11 | End: 2022-08-14

## 2022-08-11 RX ADMIN — HEPARIN SODIUM 1400 UNIT(S)/HR: 5000 INJECTION INTRAVENOUS; SUBCUTANEOUS at 22:21

## 2022-08-11 RX ADMIN — HEPARIN SODIUM 0 UNIT(S)/HR: 5000 INJECTION INTRAVENOUS; SUBCUTANEOUS at 23:30

## 2022-08-11 NOTE — H&P ADULT - ASSESSMENT
EKG:	  ASA:  III  Mallampati class: II  Anginal Class: III    -Allergy Status: NKDA/NKFA  -H/H = Pt denies BRBPR, hematuria, hematochezia, melena. Pt given ASA:  and Plavix:     -BUN/Cr = **. EF****. Euvolemic on exam. IV NS @ *****started pre procedure    Sedation Plan: Moderate      Patient Is Suitable Candidate For Sedation: Yes      Risks & benefits of procedure and sedation and risks and benefits for the alternative therapy have been explained to the patient in layman’s terms including but not limited to: allergic reaction, bleeding, infection, arrhythmia, respiratory compromise, renal and vascular compromise, limb damage, MI, CVA, emergent CABG/Vascular Surgery and death. Informed consent obtained and in chart.   76 y/o male, legally blind, PMHX HTN, HLD, DM, PVD,  known CAD, s/p CABG (LIMA to LAD  with Dr Roberson,  4/1/19), s/p multiple PCIs, found with positive NST,  and unstable angina, R/I NSTEMI with elevated Troponins, now transferred to [facility] for management of NSTEMI and  LHC with Dr. Richard.  Precath/consented      EKG: SB@ 45 bpm, peaked T waves in V1-V4, no acute ischemic changes	  ASA:  III  Mallampati class: II  Anginal Class: III  Sedation Plan: Moderate      Patient Is Suitable Candidate For Sedation: Yes      Risks & benefits of procedure and sedation and risks and benefits for the alternative therapy have been explained to the patient in layman’s terms including but not limited to: allergic reaction, bleeding, infection, arrhythmia, respiratory compromise, renal and vascular compromise, limb damage, MI, CVA, emergent CABG/Vascular Surgery and death. Informed consent obtained and in chart.

## 2022-08-11 NOTE — H&P ADULT - PROBLEM SELECTOR PLAN 5
BP ranging   > DASH/TLC  > BP goal of < 140/90 (age < 60, DM, CKD) or < 150/90 (age > 60)  > c/w home meds w/ holding parameters  > monitor BP & titrate BP meds PRN BP ranging 160-170's. Asx  -on home Benazepril 20mg interchanged to Lisinopril 20mg daily, c/w Imdur 30mg QD  -holding Metoprolol Succ 100mg QD due to bradycardia  -monitor BP & titrate BP meds PRN

## 2022-08-11 NOTE — H&P ADULT - PROBLEM SELECTOR PLAN 4
DVT PPX: heparin gtt. Will hold on call to cath lab     F: NS/None  E: K<4, Mg<2  N: DASH/TLC/NPO for LHC      C: FULL CODE  Dispo: Pending clinical progression B/L LE redness, with mild tenderness, L>R  -no leuks, afebrile, no chills  -Started on Bactroban q8h  -LE Duplex ordered B/L LE redness, with mild tenderness, L>R  -Started on Bactroban q8h  -LE Duplex ordered

## 2022-08-11 NOTE — H&P ADULT - HISTORY OF PRESENT ILLNESS
76 y/o male, PMHX HTN, HLD, DM, known CAD, s/p CABG and PCI, transferred for [facility] with c/o exertional SOB and unstable angina, positive NST.  VSS  Pt denies CP, SOB, Palpitations, Diaphoresis, Dizziness, Syncope. Abdominal Pain, LE Swelling, history CVA.    Upon admit, VS:  BP:  HR, RR  Temp  O2 Sat  RA,  EKG with NSR@.    Labs significant for stable H/H  Trops, BNP,  Covid.     Pt admitted to [facility] for cardiac cath   76 y/o male, Legally blind, PMHX HTN, HLD, DM, PVD,  known CAD, s/p CABG (LIMA to LAD  4/1/19) and multiple PCIs (WINTER RCA and PTCA LAD, transferred for [facility] with c/o exertional SOB and unstable angina, positive NST.  VSS    Pt denies CP, SOB, Palpitations, Diaphoresis, Dizziness, Syncope. Abdominal Pain, LE Swelling, history CVA.    Upon admit, VS:  BP:  HR, RR  Temp  O2 Sat  RA,  EKG with NSR@.    Labs significant for stable H/H  Trops, BNP,  Covid.     Pt admitted to [facility] for cardiac cath   78 y/o male, Legally blind, PMHX HTN, HLD, DM, PVD,  known CAD, s/p CABG (LIMA to LAD  with Dr Roberson,  4/1/19). s/p multiple PCIs, transferred for [facility] with c/o exertional SOB and unstable angina, positive NST.  VSS    Pt denies CP, SOB, Palpitations, Diaphoresis, Dizziness, Syncope. Abdominal Pain, LE Swelling, history CVA.    Upon admit, VS:  BP:  HR, RR  Temp  O2 Sat  RA,  EKG with NSR@.    Labs significant for stable H/H  Trops, BNP,  Covid.     Pt admitted to [facility] for cardiac cath   HISTORY OBTAINED FROM PT'S GRAND DAUGHTER, BIANCA [phone], RELIABLE HISTORIAN    78 y/o male, legally blind, PMHX HTN, HLD, DM, PVD,  known CAD, s/p CABG (LIMA to LAD  with Dr Roberson,  4/1/19), s/p multiple PCIs, initially presented to [facility] for scheduled  NST on 8/11/22, during testing, found with low oxygen and c/o  intermittent, CP and  SOB and was admitted to OSH for unstable angina   Pt found with elevated Troponin I (560.6), R/I NSTEMI, and transferred to [facility] for management of NSTEMI and  [facility] with Dr. Richard.   Pt also found with c/o LE swelling and redness and mild tenderness, L>R.  States that his CP and SOB has somewhat resolved, currently denies palpitations, diaphoresis, dizziness, syncope, abdominal pain or any other complaints at this time.    Upon arrival to [facility].  VS:  BP: 168/68, HR 51, RR 18, Temp 98.7F, O2 Sat 98% RA.  EKG SB @ 46bpm, peaked T waves in V1-V3, no acute ischemic changes.  Labs significant for Trops 0.05, , CKMB 4.7, WBC 6.79. H/H (11.8/36.3), Plts 185, BUN/Cr (24/1.18).  Of note: Pt was started on Heparin gtt and given his home ASA 81mg and Plavix 75mg prior to transfer to [facility].  He is now admitted to cardiac telemetry for [facility] on Friday with Dr. Richard.  Precath/Consented.       HISTORY OBTAINED FROM PT'S GRAND DAUGHTER, BIANCA [phone], RELIABLE HISTORIAN    76 y/o male, legally blind, PMHX HTN, HLD, DM, PVD,  known CAD, s/p CABG (LIMA to LAD  with Dr Roberson,  4/1/19), s/p multiple PCIs, initially presented to [facility] for scheduled  NST on 8/11/22, and per family, during testing, found with low oxygen and c/o  intermittent, CP and  SOB and was admitted to OSH for unstable angina   Pt found with elevated Troponin I (560.6), R/I NSTEMI, and transferred to [facility] for management of NSTEMI and  [facility] with Dr. Richard.   Pt also found with c/o LE swelling, redness and mild tenderness, L>R.  Currently states that his CP and SOB has somewhat resolved, denies palpitations, diaphoresis, dizziness, syncope, abdominal pain or any other complaints at this time.    Upon arrival to [facility].  VS:  BP: 168/68, HR 51, RR 18, Temp 98.7F, O2 Sat 98% RA.  EKG SB @ 45bpm, peaked T waves in V1-V3, no acute ischemic changes.  Labs significant for Trops 0.05, , CKMB 4.7,  , WBC 6.79. H/H (11.8/36.3), Plts 185, BUN/Cr (24/1.18).  Of note: Pt was started on Heparin gtt and given his home ASA 81mg and Plavix 75mg prior to transfer to [facility].  He is now admitted to cardiac telemetry for [facility] on Friday with Dr. Richard.  Precath/Consented.

## 2022-08-11 NOTE — H&P ADULT - PROBLEM SELECTOR PLAN 6
Recommendations:   - LDL goal < 70 (diabetic) or <100 -Increased from Atorvastatin 40mg to 80mg QHS  -F/U lipid panel

## 2022-08-11 NOTE — H&P ADULT - PROBLEM SELECTOR PLAN 3
Problem: T2DM (type 2 diabetes mellitus).    Plan: T2DM on metformin and insulin.  Holding oral hypoglycemics  - FS q4h, ISS   - monitor PO intake.  - Pending A1c EKG showing SB @ 45bpm. HR ranging 42-51bpm on tele, remains asymptomatic. Pacer Pads placed  -Holding Metoprolol Succinate 100mg QD (transfer meds) i/s/o bradycardia  -ECHO ordered  -EP consult in AM  -c/w tele

## 2022-08-11 NOTE — PATIENT PROFILE ADULT - FALL HARM RISK - HARM RISK INTERVENTIONS

## 2022-08-11 NOTE — H&P ADULT - PROBLEM SELECTOR PLAN 2
HLD  Recommendations:   - LDL goal < 70 (diabetic) or <100 Known CAD, s/p CABG (LIMA to LAD  with Dr Roberson,  4/1/19), s/p multiple PCIs  -c/w home ASA 81mg QD, Plavix 75mg QD and Atorvastatin 40mg QD

## 2022-08-11 NOTE — H&P ADULT - PROBLEM SELECTOR PLAN 1
> Stable  > DASH/TLC  > BP goal of < 140/90 (age < 60, DM, CKD) or < 150/90 (age > 60)  > c/w home meds w/ holding parameters  > monitor BP & titrate BP meds PRN HPI, PE, Cardiologist  -Current status  -EKG, CE trend  -Echo  -Home meds  -Here meds  -Plan for NST/CCTA/LHC?  -VS per routine, cont tele/pulse ox Pt presenting from [facility] with elevated trops I (560) and R/I NSTEMI, repeat trops 0.05, , CKMB  4.7, . EKG SB @ 45bpm, no acute ischemic changes.  F/U CE/EKG @ 5:30AM  -Currently CP free and HDS  -Plans for [facility] on 8/12/22 with Dr. Richard. Precath/Consented. NPO @ MN.   NS @ 50cc/h x 6 hours  -c/w Heparin gtt (hold on call to cath lab).    -c/w ASA 81mg QD, Plavix, 75mg QD, Ranexa 500mg BID, Imdur 30mg QD and Atorvastatin 80mg QHS (increased from home dose 40mg QHS)   -Obtain ECHO for EF and structural  -VS per routine, cont tele/pulse ox

## 2022-08-11 NOTE — H&P ADULT - NSICDXPASTMEDICALHX_GEN_ALL_CORE_FT
PAST MEDICAL HISTORY:  CAD (coronary artery disease)     DM (diabetes mellitus)     Glaucoma     HLD (hyperlipidemia)     HTN (hypertension)      PAST MEDICAL HISTORY:  CAD (coronary artery disease)     DM (diabetes mellitus)     Glaucoma     HLD (hyperlipidemia)     HTN (hypertension)     PVD (peripheral vascular disease)

## 2022-08-11 NOTE — H&P ADULT - NSHPLABSRESULTS_GEN_ALL_CORE
11.8   6.73  )-----------( 185      ( 11 Aug 2022 22:38 )             36.2       08-11    136  |  102  |  24<H>  ----------------------------<  228<H>  4.8   |  26  |  1.18    Ca    8.8      11 Aug 2022 22:38  Mg     2.0     08-11    TPro  6.5  /  Alb  3.8  /  TBili  0.4  /  DBili  x   /  AST  25  /  ALT  22  /  AlkPhos  91  08-11      PT/INR - ( 11 Aug 2022 22:38 )   PT: 13.7 sec;   INR: 1.15          PTT - ( 11 Aug 2022 22:38 )  PTT:>200.0 sec    CARDIAC MARKERS ( 11 Aug 2022 22:38 )  x     / 0.05 ng/mL / 132 U/L / x     / 4.7 ng/mL
Other belongings/Cell Phone/PDA (specify)

## 2022-08-11 NOTE — H&P ADULT - PROBLEM SELECTOR PLAN 8
DVT PPX: heparin gtt. Will hold on call to cath lab     F: NS/None  E: K<4, Mg<2  N: DASH/TLC/NPO for LHC      C: FULL CODE  Dispo: Pending clinical progression DVT PPX: heparin gtt. Will hold on call to cath lab     F: NS/None  E: K<4, Mg<2  N: DASH/TLC/NPO for LHC      C: FULL CODE  Dispo: C in AM with Dr Richard

## 2022-08-11 NOTE — H&P ADULT - PROBLEM SELECTOR PLAN 7
Problem: T2DM (type 2 diabetes mellitus).    Plan: T2DM on metformin and insulin.  Holding oral hypoglycemics  - FS q4h, ISS   - monitor PO intake.  - Pending A1c Per pt, prescribed Lantus 45units AM; however, dose according to ISS, Humalog 6units TID  -c/w Lantus 40units QHS and Humalog 7units TID (transfer meds)  - FS qid, ISS   - monitor PO intake  - Pending A1c

## 2022-08-12 DIAGNOSIS — I21.4 NON-ST ELEVATION (NSTEMI) MYOCARDIAL INFARCTION: ICD-10-CM

## 2022-08-12 DIAGNOSIS — Z29.9 ENCOUNTER FOR PROPHYLACTIC MEASURES, UNSPECIFIED: ICD-10-CM

## 2022-08-12 DIAGNOSIS — I73.9 PERIPHERAL VASCULAR DISEASE, UNSPECIFIED: ICD-10-CM

## 2022-08-12 DIAGNOSIS — I25.10 ATHEROSCLEROTIC HEART DISEASE OF NATIVE CORONARY ARTERY WITHOUT ANGINA PECTORIS: ICD-10-CM

## 2022-08-12 DIAGNOSIS — R00.1 BRADYCARDIA, UNSPECIFIED: ICD-10-CM

## 2022-08-12 LAB
ALBUMIN SERPL ELPH-MCNC: 3.9 G/DL — SIGNIFICANT CHANGE UP (ref 3.3–5)
ALP SERPL-CCNC: 91 U/L — SIGNIFICANT CHANGE UP (ref 40–120)
ALT FLD-CCNC: 23 U/L — SIGNIFICANT CHANGE UP (ref 10–45)
ANION GAP SERPL CALC-SCNC: 7 MMOL/L — SIGNIFICANT CHANGE UP (ref 5–17)
APTT BLD: 91.6 SEC — HIGH (ref 27.5–35.5)
APTT BLD: >200 SEC — CRITICAL HIGH (ref 27.5–35.5)
AST SERPL-CCNC: 22 U/L — SIGNIFICANT CHANGE UP (ref 10–40)
BILIRUB SERPL-MCNC: 0.5 MG/DL — SIGNIFICANT CHANGE UP (ref 0.2–1.2)
BLD GP AB SCN SERPL QL: NEGATIVE — SIGNIFICANT CHANGE UP
BLD GP AB SCN SERPL QL: NEGATIVE — SIGNIFICANT CHANGE UP
BUN SERPL-MCNC: 24 MG/DL — HIGH (ref 7–23)
CALCIUM SERPL-MCNC: 8.8 MG/DL — SIGNIFICANT CHANGE UP (ref 8.4–10.5)
CHLORIDE SERPL-SCNC: 104 MMOL/L — SIGNIFICANT CHANGE UP (ref 96–108)
CHOLEST SERPL-MCNC: 103 MG/DL — SIGNIFICANT CHANGE UP
CK MB CFR SERPL CALC: 4.3 NG/ML — SIGNIFICANT CHANGE UP (ref 0–6.7)
CK SERPL-CCNC: 130 U/L — SIGNIFICANT CHANGE UP (ref 30–200)
CO2 SERPL-SCNC: 24 MMOL/L — SIGNIFICANT CHANGE UP (ref 22–31)
CREAT SERPL-MCNC: 1.09 MG/DL — SIGNIFICANT CHANGE UP (ref 0.5–1.3)
EGFR: 70 ML/MIN/1.73M2 — SIGNIFICANT CHANGE UP
GLUCOSE BLDC GLUCOMTR-MCNC: 166 MG/DL — HIGH (ref 70–99)
GLUCOSE BLDC GLUCOMTR-MCNC: 176 MG/DL — HIGH (ref 70–99)
GLUCOSE BLDC GLUCOMTR-MCNC: 229 MG/DL — HIGH (ref 70–99)
GLUCOSE BLDC GLUCOMTR-MCNC: 272 MG/DL — HIGH (ref 70–99)
GLUCOSE SERPL-MCNC: 208 MG/DL — HIGH (ref 70–99)
HCT VFR BLD CALC: 36.9 % — LOW (ref 39–50)
HCV AB S/CO SERPL IA: 0.04 S/CO — SIGNIFICANT CHANGE UP
HCV AB SERPL-IMP: SIGNIFICANT CHANGE UP
HDLC SERPL-MCNC: 42 MG/DL — SIGNIFICANT CHANGE UP
HGB BLD-MCNC: 12.2 G/DL — LOW (ref 13–17)
ISTAT ACTK (ACTIVATED CLOTTING TIME KAOLIN): 237 SEC — HIGH (ref 74–137)
ISTAT ACTK (ACTIVATED CLOTTING TIME KAOLIN): 295 SEC — HIGH (ref 74–137)
ISTAT ACTK (ACTIVATED CLOTTING TIME KAOLIN): 590 SEC — HIGH (ref 74–137)
LIPID PNL WITH DIRECT LDL SERPL: 48 MG/DL — SIGNIFICANT CHANGE UP
MCHC RBC-ENTMCNC: 27.9 PG — SIGNIFICANT CHANGE UP (ref 27–34)
MCHC RBC-ENTMCNC: 33.1 GM/DL — SIGNIFICANT CHANGE UP (ref 32–36)
MCV RBC AUTO: 84.2 FL — SIGNIFICANT CHANGE UP (ref 80–100)
NON HDL CHOLESTEROL: 61 MG/DL — SIGNIFICANT CHANGE UP
NRBC # BLD: 0 /100 WBCS — SIGNIFICANT CHANGE UP (ref 0–0)
NT-PROBNP SERPL-SCNC: 419 PG/ML — HIGH (ref 0–300)
PLATELET # BLD AUTO: 180 K/UL — SIGNIFICANT CHANGE UP (ref 150–400)
POTASSIUM SERPL-MCNC: 5 MMOL/L — SIGNIFICANT CHANGE UP (ref 3.5–5.3)
POTASSIUM SERPL-SCNC: 5 MMOL/L — SIGNIFICANT CHANGE UP (ref 3.5–5.3)
PROT SERPL-MCNC: 6.9 G/DL — SIGNIFICANT CHANGE UP (ref 6–8.3)
RBC # BLD: 4.38 M/UL — SIGNIFICANT CHANGE UP (ref 4.2–5.8)
RBC # FLD: 13 % — SIGNIFICANT CHANGE UP (ref 10.3–14.5)
RH IG SCN BLD-IMP: POSITIVE — SIGNIFICANT CHANGE UP
RH IG SCN BLD-IMP: POSITIVE — SIGNIFICANT CHANGE UP
SARS-COV-2 RNA SPEC QL NAA+PROBE: SIGNIFICANT CHANGE UP
SODIUM SERPL-SCNC: 135 MMOL/L — SIGNIFICANT CHANGE UP (ref 135–145)
TRIGL SERPL-MCNC: 64 MG/DL — SIGNIFICANT CHANGE UP
TROPONIN T SERPL-MCNC: 0.02 NG/ML — HIGH (ref 0–0.01)
WBC # BLD: 5.81 K/UL — SIGNIFICANT CHANGE UP (ref 3.8–10.5)
WBC # FLD AUTO: 5.81 K/UL — SIGNIFICANT CHANGE UP (ref 3.8–10.5)

## 2022-08-12 PROCEDURE — 93459 L HRT ART/GRFT ANGIO: CPT | Mod: 26,59

## 2022-08-12 PROCEDURE — 92978 ENDOLUMINL IVUS OCT C 1ST: CPT | Mod: 26,LC

## 2022-08-12 PROCEDURE — 0715T: CPT

## 2022-08-12 PROCEDURE — 93306 TTE W/DOPPLER COMPLETE: CPT | Mod: 26

## 2022-08-12 PROCEDURE — 92928 PRQ TCAT PLMT NTRAC ST 1 LES: CPT | Mod: LC

## 2022-08-12 PROCEDURE — 99223 1ST HOSP IP/OBS HIGH 75: CPT

## 2022-08-12 RX ORDER — HEPARIN SODIUM 5000 [USP'U]/ML
800 INJECTION INTRAVENOUS; SUBCUTANEOUS
Qty: 25000 | Refills: 0 | Status: DISCONTINUED | OUTPATIENT
Start: 2022-08-12 | End: 2022-08-12

## 2022-08-12 RX ORDER — INSULIN LISPRO 100/ML
VIAL (ML) SUBCUTANEOUS
Refills: 0 | Status: DISCONTINUED | OUTPATIENT
Start: 2022-08-12 | End: 2022-08-14

## 2022-08-12 RX ORDER — LISINOPRIL 2.5 MG/1
20 TABLET ORAL DAILY
Refills: 0 | Status: DISCONTINUED | OUTPATIENT
Start: 2022-08-12 | End: 2022-08-14

## 2022-08-12 RX ORDER — HEPARIN SODIUM 5000 [USP'U]/ML
1100 INJECTION INTRAVENOUS; SUBCUTANEOUS
Qty: 25000 | Refills: 0 | Status: DISCONTINUED | OUTPATIENT
Start: 2022-08-12 | End: 2022-08-12

## 2022-08-12 RX ORDER — INSULIN GLARGINE 100 [IU]/ML
45 INJECTION, SOLUTION SUBCUTANEOUS AT BEDTIME
Refills: 0 | Status: DISCONTINUED | OUTPATIENT
Start: 2022-08-12 | End: 2022-08-14

## 2022-08-12 RX ORDER — SODIUM CHLORIDE 9 MG/ML
500 INJECTION INTRAMUSCULAR; INTRAVENOUS; SUBCUTANEOUS
Refills: 0 | Status: DISCONTINUED | OUTPATIENT
Start: 2022-08-12 | End: 2022-08-14

## 2022-08-12 RX ORDER — SODIUM CHLORIDE 9 MG/ML
1000 INJECTION, SOLUTION INTRAVENOUS
Refills: 0 | Status: DISCONTINUED | OUTPATIENT
Start: 2022-08-12 | End: 2022-08-14

## 2022-08-12 RX ORDER — SODIUM CHLORIDE 9 MG/ML
1000 INJECTION INTRAMUSCULAR; INTRAVENOUS; SUBCUTANEOUS
Refills: 0 | Status: DISCONTINUED | OUTPATIENT
Start: 2022-08-12 | End: 2022-08-14

## 2022-08-12 RX ORDER — RANOLAZINE 500 MG/1
500 TABLET, FILM COATED, EXTENDED RELEASE ORAL
Refills: 0 | Status: DISCONTINUED | OUTPATIENT
Start: 2022-08-12 | End: 2022-08-14

## 2022-08-12 RX ORDER — DEXTROSE 50 % IN WATER 50 %
15 SYRINGE (ML) INTRAVENOUS ONCE
Refills: 0 | Status: DISCONTINUED | OUTPATIENT
Start: 2022-08-12 | End: 2022-08-14

## 2022-08-12 RX ORDER — INSULIN LISPRO 100/ML
7 VIAL (ML) SUBCUTANEOUS
Refills: 0 | Status: DISCONTINUED | OUTPATIENT
Start: 2022-08-12 | End: 2022-08-14

## 2022-08-12 RX ORDER — ASPIRIN/CALCIUM CARB/MAGNESIUM 324 MG
81 TABLET ORAL DAILY
Refills: 0 | Status: DISCONTINUED | OUTPATIENT
Start: 2022-08-12 | End: 2022-08-14

## 2022-08-12 RX ORDER — ISOSORBIDE MONONITRATE 60 MG/1
30 TABLET, EXTENDED RELEASE ORAL DAILY
Refills: 0 | Status: DISCONTINUED | OUTPATIENT
Start: 2022-08-12 | End: 2022-08-14

## 2022-08-12 RX ORDER — LATANOPROST 0.05 MG/ML
1 SOLUTION/ DROPS OPHTHALMIC; TOPICAL AT BEDTIME
Refills: 0 | Status: DISCONTINUED | OUTPATIENT
Start: 2022-08-12 | End: 2022-08-14

## 2022-08-12 RX ORDER — CLOPIDOGREL BISULFATE 75 MG/1
75 TABLET, FILM COATED ORAL DAILY
Refills: 0 | Status: DISCONTINUED | OUTPATIENT
Start: 2022-08-12 | End: 2022-08-14

## 2022-08-12 RX ORDER — ATORVASTATIN CALCIUM 80 MG/1
80 TABLET, FILM COATED ORAL AT BEDTIME
Refills: 0 | Status: DISCONTINUED | OUTPATIENT
Start: 2022-08-12 | End: 2022-08-14

## 2022-08-12 RX ORDER — DEXTROSE 50 % IN WATER 50 %
12.5 SYRINGE (ML) INTRAVENOUS ONCE
Refills: 0 | Status: DISCONTINUED | OUTPATIENT
Start: 2022-08-12 | End: 2022-08-14

## 2022-08-12 RX ORDER — GLUCAGON INJECTION, SOLUTION 0.5 MG/.1ML
1 INJECTION, SOLUTION SUBCUTANEOUS ONCE
Refills: 0 | Status: DISCONTINUED | OUTPATIENT
Start: 2022-08-12 | End: 2022-08-14

## 2022-08-12 RX ORDER — TIMOLOL 0.5 %
1 DROPS OPHTHALMIC (EYE) DAILY
Refills: 0 | Status: DISCONTINUED | OUTPATIENT
Start: 2022-08-12 | End: 2022-08-14

## 2022-08-12 RX ORDER — DEXTROSE 50 % IN WATER 50 %
25 SYRINGE (ML) INTRAVENOUS ONCE
Refills: 0 | Status: DISCONTINUED | OUTPATIENT
Start: 2022-08-12 | End: 2022-08-14

## 2022-08-12 RX ADMIN — RANOLAZINE 500 MILLIGRAM(S): 500 TABLET, FILM COATED, EXTENDED RELEASE ORAL at 05:12

## 2022-08-12 RX ADMIN — Medication 1 DROP(S): at 11:30

## 2022-08-12 RX ADMIN — Medication 7 UNIT(S): at 07:00

## 2022-08-12 RX ADMIN — Medication 1: at 11:33

## 2022-08-12 RX ADMIN — CLOPIDOGREL BISULFATE 75 MILLIGRAM(S): 75 TABLET, FILM COATED ORAL at 11:29

## 2022-08-12 RX ADMIN — SODIUM CHLORIDE 50 MILLILITER(S): 9 INJECTION INTRAMUSCULAR; INTRAVENOUS; SUBCUTANEOUS at 06:32

## 2022-08-12 RX ADMIN — MUPIROCIN 1 APPLICATION(S): 20 OINTMENT TOPICAL at 21:41

## 2022-08-12 RX ADMIN — HEPARIN SODIUM 0 UNIT(S)/HR: 5000 INJECTION INTRAVENOUS; SUBCUTANEOUS at 07:38

## 2022-08-12 RX ADMIN — RANOLAZINE 500 MILLIGRAM(S): 500 TABLET, FILM COATED, EXTENDED RELEASE ORAL at 18:50

## 2022-08-12 RX ADMIN — ATORVASTATIN CALCIUM 80 MILLIGRAM(S): 80 TABLET, FILM COATED ORAL at 21:41

## 2022-08-12 RX ADMIN — Medication 2: at 07:00

## 2022-08-12 RX ADMIN — INSULIN GLARGINE 45 UNIT(S): 100 INJECTION, SOLUTION SUBCUTANEOUS at 21:57

## 2022-08-12 RX ADMIN — SODIUM CHLORIDE 210 MILLILITER(S): 9 INJECTION INTRAMUSCULAR; INTRAVENOUS; SUBCUTANEOUS at 18:26

## 2022-08-12 RX ADMIN — Medication 81 MILLIGRAM(S): at 11:29

## 2022-08-12 RX ADMIN — LATANOPROST 1 DROP(S): 0.05 SOLUTION/ DROPS OPHTHALMIC; TOPICAL at 23:00

## 2022-08-12 RX ADMIN — MUPIROCIN 1 APPLICATION(S): 20 OINTMENT TOPICAL at 05:12

## 2022-08-12 RX ADMIN — LISINOPRIL 20 MILLIGRAM(S): 2.5 TABLET ORAL at 05:12

## 2022-08-12 RX ADMIN — ISOSORBIDE MONONITRATE 30 MILLIGRAM(S): 60 TABLET, EXTENDED RELEASE ORAL at 18:50

## 2022-08-12 RX ADMIN — Medication 3: at 21:57

## 2022-08-12 RX ADMIN — HEPARIN SODIUM 0 UNIT(S)/HR: 5000 INJECTION INTRAVENOUS; SUBCUTANEOUS at 10:02

## 2022-08-12 RX ADMIN — HEPARIN SODIUM 1100 UNIT(S)/HR: 5000 INJECTION INTRAVENOUS; SUBCUTANEOUS at 01:03

## 2022-08-12 NOTE — PROGRESS NOTE ADULT - PROBLEM SELECTOR PLAN 6
-Increased from Atorvastatin 40mg to 80mg QHS  -F/U lipid panel Lipid panel o/a NAD.  -Increased from Atorvastatin 40mg to 80mg QHS iso NSTEMI

## 2022-08-12 NOTE — PROGRESS NOTE ADULT - SUBJECTIVE AND OBJECTIVE BOX
***INCOMPLETE***    INTERVAL HPI/OVERNIGHT EVENTS:  Patient was seen and examined at bedside. As per nurse and patient, no o/n events, patient resting comfortably. No complaints at this time. Patient denies: fever, chills, dizziness, weakness, HA, Changes in vision, CP, palpitations, SOB, cough, N/V/D/C, dysuria, changes in bowel movements, LE edema. ROS otherwise negative.    VITAL SIGNS:  T(F): 97.9 (08-12-22 @ 06:19)  HR: 44 (08-12-22 @ 04:51)  BP: 158/70 (08-12-22 @ 04:51)  RR: 19 (08-12-22 @ 04:51)  SpO2: 97% (08-12-22 @ 04:51)  Wt(kg): --    PHYSICAL EXAM:    Constitutional: NAD  HEENT: PERRL, EOMI, sclera non-icteric, neck supple, trachea midline, no masses, no JVD, MMM  Respiratory: CTA b/l, good air entry b/l, no wheezing, no rhonchi, no rales, without accessory muscle use and no intercostal retractions  Cardiovascular: RRR, normal S1S2, no M/R/G  Gastrointestinal: soft, NTND, no masses palpable, BS normal  Extremities: Warm, well perfused, pulses equal bilateral upper and lower extremities, no edema, no clubbing  Neurological: AAOx3, CN Grossly intact  Skin: Normal temperature, warm, dry    MEDICATIONS  (STANDING):  aspirin enteric coated 81 milliGRAM(s) Oral daily  atorvastatin 80 milliGRAM(s) Oral at bedtime  clopidogrel Tablet 75 milliGRAM(s) Oral daily  dextrose 5%. 1000 milliLiter(s) (50 mL/Hr) IV Continuous <Continuous>  dextrose 5%. 1000 milliLiter(s) (100 mL/Hr) IV Continuous <Continuous>  dextrose 50% Injectable 25 Gram(s) IV Push once  dextrose 50% Injectable 12.5 Gram(s) IV Push once  dextrose 50% Injectable 25 Gram(s) IV Push once  glucagon  Injectable 1 milliGRAM(s) IntraMuscular once  heparin  Infusion. 1100 Unit(s)/Hr (11 mL/Hr) IV Continuous <Continuous>  insulin glargine Injectable (LANTUS) 45 Unit(s) SubCutaneous at bedtime  insulin lispro (ADMELOG) corrective regimen sliding scale   SubCutaneous Before meals and at bedtime  insulin lispro Injectable (ADMELOG) 7 Unit(s) SubCutaneous three times a day before meals  isosorbide   mononitrate ER Tablet (IMDUR) 30 milliGRAM(s) Oral daily  latanoprost 0.005% Ophthalmic Solution 1 Drop(s) Both EYES at bedtime  lisinopril 20 milliGRAM(s) Oral daily  mupirocin 2% Ointment 1 Application(s) Topical every 8 hours  ranolazine 500 milliGRAM(s) Oral two times a day  sodium chloride 0.9%. 1000 milliLiter(s) (50 mL/Hr) IV Continuous <Continuous>  timolol 0.5% Solution 1 Drop(s) Both EYES daily    MEDICATIONS  (PRN):  dextrose Oral Gel 15 Gram(s) Oral once PRN Blood Glucose LESS THAN 70 milliGRAM(s)/deciliter      Allergies    No Known Allergies    Intolerances        LABS:                        12.2   5.81  )-----------( 180      ( 12 Aug 2022 06:56 )             36.9     08-11    136  |  102  |  24<H>  ----------------------------<  228<H>  4.8   |  26  |  1.18    Ca    8.8      11 Aug 2022 22:38  Mg     2.0     08-11    TPro  6.5  /  Alb  3.8  /  TBili  0.4  /  DBili  x   /  AST  25  /  ALT  22  /  AlkPhos  91  08-11    PT/INR - ( 11 Aug 2022 22:38 )   PT: 13.7 sec;   INR: 1.15          PTT - ( 12 Aug 2022 06:56 )  PTT:>200.0 sec        RADIOLOGY & ADDITIONAL TESTS:  Reviewed INTERVAL HPI/OVERNIGHT EVENTS:  Patient was seen and examined at bedside. As per patient, no o/n events, patient resting comfortably in bed. Complaining of mild chest discomfort but much improved from pain yesterday. Patient denies: fever, chills, dizziness, weakness, HA, Changes in vision, palpitations, SOB, cough, N/V/D/C, dysuria, changes in bowel movements, LE edema. ROS otherwise negative.    VITAL SIGNS:  T(F): 97.9 (08-12-22 @ 06:19)  HR: 44 (08-12-22 @ 04:51)  BP: 158/70 (08-12-22 @ 04:51)  RR: 19 (08-12-22 @ 04:51)  SpO2: 97% (08-12-22 @ 04:51)  Wt(kg): --    PHYSICAL EXAM:    Constitutional: NAD  HEENT: EOMI, sclera non-icteric, neck supple, trachea midline, no masses, no JVD, MMM  Respiratory: CTA b/l, good air entry b/l, no wheezing, no rhonchi, no rales, without accessory muscle use and no intercostal retractions  Cardiovascular: RRR, normal S1S2, no M/R/G  Gastrointestinal: soft, NT, distended obese abdomen, no masses palpable, BS normal  Extremities: Warm, well perfused with purple venous insufficiency changes around ankles b/l, pulses equal bilateral upper and lower extremities, no edema, no clubbing  Neurological: AAOx3, CN Grossly intact  Skin: Normal temperature, warm, dry    MEDICATIONS  (STANDING):  aspirin enteric coated 81 milliGRAM(s) Oral daily  atorvastatin 80 milliGRAM(s) Oral at bedtime  clopidogrel Tablet 75 milliGRAM(s) Oral daily  dextrose 5%. 1000 milliLiter(s) (50 mL/Hr) IV Continuous <Continuous>  dextrose 5%. 1000 milliLiter(s) (100 mL/Hr) IV Continuous <Continuous>  dextrose 50% Injectable 25 Gram(s) IV Push once  dextrose 50% Injectable 12.5 Gram(s) IV Push once  dextrose 50% Injectable 25 Gram(s) IV Push once  glucagon  Injectable 1 milliGRAM(s) IntraMuscular once  heparin  Infusion. 1100 Unit(s)/Hr (11 mL/Hr) IV Continuous <Continuous>  insulin glargine Injectable (LANTUS) 45 Unit(s) SubCutaneous at bedtime  insulin lispro (ADMELOG) corrective regimen sliding scale   SubCutaneous Before meals and at bedtime  insulin lispro Injectable (ADMELOG) 7 Unit(s) SubCutaneous three times a day before meals  isosorbide   mononitrate ER Tablet (IMDUR) 30 milliGRAM(s) Oral daily  latanoprost 0.005% Ophthalmic Solution 1 Drop(s) Both EYES at bedtime  lisinopril 20 milliGRAM(s) Oral daily  mupirocin 2% Ointment 1 Application(s) Topical every 8 hours  ranolazine 500 milliGRAM(s) Oral two times a day  sodium chloride 0.9%. 1000 milliLiter(s) (50 mL/Hr) IV Continuous <Continuous>  timolol 0.5% Solution 1 Drop(s) Both EYES daily    MEDICATIONS  (PRN):  dextrose Oral Gel 15 Gram(s) Oral once PRN Blood Glucose LESS THAN 70 milliGRAM(s)/deciliter    Allergies    No Known Allergies    LABS:                        12.2   5.81  )-----------( 180      ( 12 Aug 2022 06:56 )             36.9     08-11    136  |  102  |  24<H>  ----------------------------<  228<H>  4.8   |  26  |  1.18    Ca    8.8      11 Aug 2022 22:38  Mg     2.0     08-11    TPro  6.5  /  Alb  3.8  /  TBili  0.4  /  DBili  x   /  AST  25  /  ALT  22  /  AlkPhos  91  08-11    PT/INR - ( 11 Aug 2022 22:38 )   PT: 13.7 sec;   INR: 1.15        PTT - ( 12 Aug 2022 06:56 )  PTT:>200.0 sec    RADIOLOGY & ADDITIONAL TESTS:  Reviewed

## 2022-08-12 NOTE — PROGRESS NOTE ADULT - PROBLEM SELECTOR PLAN 1
Pt presenting from [facility] with elevated trops I (560) and R/I NSTEMI, repeat trops 0.05, , CKMB  4.7, . EKG SB @ 45bpm, no acute ischemic changes.  F/U CE/EKG @ 5:30AM  -Currently CP free and HDS  -Plans for [facility] on 8/12/22 with Dr. Richard. Precath/Consented. NPO @ MN.   NS @ 50cc/h x 6 hours  -c/w Heparin gtt (hold on call to cath lab).    -c/w ASA 81mg QD, Plavix, 75mg QD, Ranexa 500mg BID, Imdur 30mg QD and Atorvastatin 80mg QHS (increased from home dose 40mg QHS)   -Obtain ECHO for EF and structural  -VS per routine, cont tele/pulse ox Pt presenting from [facility] w/ chest pain and elevated trops I (560) and R/I NSTEMI. o/a trops 0.05, , CKMB  4.7, . EKG SB @ 45bpm, no acute ischemic changes. Received loading dose clopidogrel/aspirin at [facility].  - For [facility] on 8/12/22 with Dr. Richard. Precath/Consented. NPO @ MN.  - c/w Heparin gtt (hold on call to cath lab)    - c/w ASA 81mg QD, Plavix, 75mg QD, Ranexa 500mg BID, Imdur 30mg QD and Atorvastatin 80mg QHS (increased from home dose 40mg QHS)   - Obtain ECHO for EF and structural  - VS per routine, cont tele/pulse ox

## 2022-08-12 NOTE — PROGRESS NOTE ADULT - PROBLEM SELECTOR PLAN 7
Per pt, prescribed Lantus 45units AM; however, dose according to ISS, Humalog 6units TID  -c/w Lantus 40units QHS and Humalog 7units TID (transfer meds)  - FS qid, ISS   - monitor PO intake  - Pending A1c Per pt, prescribed Lantus 45units AM; however, dose according to ISS, Humalog 6units TID  -c/w Lantus 45 units QHS and Humalog 7units TID (transfer meds)  - FS qid, ISS   - monitor PO intake  - Pending A1c  - consistent carb diet no snacks/DASH

## 2022-08-12 NOTE — PROGRESS NOTE ADULT - PROBLEM SELECTOR PLAN 3
EKG showing SB @ 45bpm. HR ranging 42-51bpm on tele, remains asymptomatic. Pacer Pads placed  -Holding Metoprolol Succinate 100mg QD (transfer meds) i/s/o bradycardia  -ECHO ordered  -EP consult in AM  -c/w tele EKG showing SB @ 45bpm. HR ranging 42-51bpm on tele, remains asymptomatic. Pacer Pads placed  -Holding Metoprolol Succinate 100mg QD (transfer meds) i/s/o bradycardia  -ECHO ordered  -EP consulted - a/w recs  -c/w tele

## 2022-08-12 NOTE — PROVIDER CONTACT NOTE (CRITICAL VALUE NOTIFICATION) - SITUATION
Heparin at 14ml/hr. Instructed to stop for one hour and restart at 11ml/hr. No further lab draw required. Confirmed results w/ PA.
PTT result +200. As per [facility] team and nomogram, stop nomogram for one hour. Recheck to be done 10am labs.

## 2022-08-12 NOTE — PROGRESS NOTE ADULT - ASSESSMENT
78 y/o male, legally blind, PMHX HTN, HLD, DM, PVD,  known CAD, s/p CABG (LIMA to LAD  with Dr Roberson,  4/1/19), s/p multiple PCIs, found with positive NST,  and unstable angina, R/I NSTEMI with elevated Troponins, now transferred to [facility] for management of NSTEMI and  LHC with Dr. Richard.  Precath/consented      EKG: SB@ 45 bpm, peaked T waves in V1-V4, no acute ischemic changes	  ASA:  III  Mallampati class: II  Anginal Class: III  Sedation Plan: Moderate      Patient Is Suitable Candidate For Sedation: Yes      Risks & benefits of procedure and sedation and risks and benefits for the alternative therapy have been explained to the patient in layman’s terms including but not limited to: allergic reaction, bleeding, infection, arrhythmia, respiratory compromise, renal and vascular compromise, limb damage, MI, CVA, emergent CABG/Vascular Surgery and death. Informed consent obtained and in chart.   78 y/o male, legally blind, PMHX HTN, HLD, DM, PVD,  known CAD, s/p CABG (LIMA to LAD with Dr Roberson,  4/1/19), s/p multiple PCIs, found with positive NST, and unstable angina, R/I NSTEMI with elevated Troponins, now transferred to [facility] for management of NSTEMI and  LHC with Dr. Richard.

## 2022-08-12 NOTE — PROGRESS NOTE ADULT - PROBLEM SELECTOR PLAN 8
DVT PPX: heparin gtt. Will hold on call to cath lab     F: NS/None  E: K<4, Mg<2  N: DASH/TLC/NPO for LHC      C: FULL CODE  Dispo: C in AM with Dr Richard DVT PPX: heparin gtt. Will hold on call to cath lab   F: NS  E: replet if K<4, Mg<2  N: DASH/TLC/NPO for LHC  C: FULL CODE  Dispo: [facility] with Dr Richard

## 2022-08-13 ENCOUNTER — TRANSCRIPTION ENCOUNTER (OUTPATIENT)
Age: 77
End: 2022-08-13

## 2022-08-13 LAB
ALBUMIN SERPL ELPH-MCNC: 3.6 G/DL — SIGNIFICANT CHANGE UP (ref 3.3–5)
ALP SERPL-CCNC: 72 U/L — SIGNIFICANT CHANGE UP (ref 40–120)
ALT FLD-CCNC: 16 U/L — SIGNIFICANT CHANGE UP (ref 10–45)
ANION GAP SERPL CALC-SCNC: 7 MMOL/L — SIGNIFICANT CHANGE UP (ref 5–17)
AST SERPL-CCNC: 20 U/L — SIGNIFICANT CHANGE UP (ref 10–40)
BASOPHILS # BLD AUTO: 0.01 K/UL — SIGNIFICANT CHANGE UP (ref 0–0.2)
BASOPHILS NFR BLD AUTO: 0.1 % — SIGNIFICANT CHANGE UP (ref 0–2)
BILIRUB SERPL-MCNC: 0.4 MG/DL — SIGNIFICANT CHANGE UP (ref 0.2–1.2)
BUN SERPL-MCNC: 17 MG/DL — SIGNIFICANT CHANGE UP (ref 7–23)
CALCIUM SERPL-MCNC: 8.5 MG/DL — SIGNIFICANT CHANGE UP (ref 8.4–10.5)
CHLORIDE SERPL-SCNC: 105 MMOL/L — SIGNIFICANT CHANGE UP (ref 96–108)
CO2 SERPL-SCNC: 26 MMOL/L — SIGNIFICANT CHANGE UP (ref 22–31)
CREAT SERPL-MCNC: 1.17 MG/DL — SIGNIFICANT CHANGE UP (ref 0.5–1.3)
EGFR: 64 ML/MIN/1.73M2 — SIGNIFICANT CHANGE UP
EOSINOPHIL # BLD AUTO: 0.22 K/UL — SIGNIFICANT CHANGE UP (ref 0–0.5)
EOSINOPHIL NFR BLD AUTO: 2.4 % — SIGNIFICANT CHANGE UP (ref 0–6)
GLUCOSE BLDC GLUCOMTR-MCNC: 130 MG/DL — HIGH (ref 70–99)
GLUCOSE BLDC GLUCOMTR-MCNC: 144 MG/DL — HIGH (ref 70–99)
GLUCOSE BLDC GLUCOMTR-MCNC: 151 MG/DL — HIGH (ref 70–99)
GLUCOSE BLDC GLUCOMTR-MCNC: 171 MG/DL — HIGH (ref 70–99)
GLUCOSE SERPL-MCNC: 199 MG/DL — HIGH (ref 70–99)
HCT VFR BLD CALC: 31.8 % — LOW (ref 39–50)
HCT VFR BLD CALC: 32.8 % — LOW (ref 39–50)
HGB BLD-MCNC: 10.5 G/DL — LOW (ref 13–17)
HGB BLD-MCNC: 10.7 G/DL — LOW (ref 13–17)
IMM GRANULOCYTES NFR BLD AUTO: 0.3 % — SIGNIFICANT CHANGE UP (ref 0–1.5)
LYMPHOCYTES # BLD AUTO: 1.41 K/UL — SIGNIFICANT CHANGE UP (ref 1–3.3)
LYMPHOCYTES # BLD AUTO: 15.2 % — SIGNIFICANT CHANGE UP (ref 13–44)
MAGNESIUM SERPL-MCNC: 1.8 MG/DL — SIGNIFICANT CHANGE UP (ref 1.6–2.6)
MCHC RBC-ENTMCNC: 28.2 PG — SIGNIFICANT CHANGE UP (ref 27–34)
MCHC RBC-ENTMCNC: 28.4 PG — SIGNIFICANT CHANGE UP (ref 27–34)
MCHC RBC-ENTMCNC: 32.6 GM/DL — SIGNIFICANT CHANGE UP (ref 32–36)
MCHC RBC-ENTMCNC: 33 GM/DL — SIGNIFICANT CHANGE UP (ref 32–36)
MCV RBC AUTO: 85.9 FL — SIGNIFICANT CHANGE UP (ref 80–100)
MCV RBC AUTO: 86.3 FL — SIGNIFICANT CHANGE UP (ref 80–100)
MONOCYTES # BLD AUTO: 0.78 K/UL — SIGNIFICANT CHANGE UP (ref 0–0.9)
MONOCYTES NFR BLD AUTO: 8.4 % — SIGNIFICANT CHANGE UP (ref 2–14)
NEUTROPHILS # BLD AUTO: 6.8 K/UL — SIGNIFICANT CHANGE UP (ref 1.8–7.4)
NEUTROPHILS NFR BLD AUTO: 73.6 % — SIGNIFICANT CHANGE UP (ref 43–77)
NRBC # BLD: 0 /100 WBCS — SIGNIFICANT CHANGE UP (ref 0–0)
NRBC # BLD: 0 /100 WBCS — SIGNIFICANT CHANGE UP (ref 0–0)
PHOSPHATE SERPL-MCNC: 2.8 MG/DL — SIGNIFICANT CHANGE UP (ref 2.5–4.5)
PLATELET # BLD AUTO: 174 K/UL — SIGNIFICANT CHANGE UP (ref 150–400)
PLATELET # BLD AUTO: 189 K/UL — SIGNIFICANT CHANGE UP (ref 150–400)
POTASSIUM SERPL-MCNC: 4.2 MMOL/L — SIGNIFICANT CHANGE UP (ref 3.5–5.3)
POTASSIUM SERPL-SCNC: 4.2 MMOL/L — SIGNIFICANT CHANGE UP (ref 3.5–5.3)
PROT SERPL-MCNC: 6.1 G/DL — SIGNIFICANT CHANGE UP (ref 6–8.3)
RBC # BLD: 3.7 M/UL — LOW (ref 4.2–5.8)
RBC # BLD: 3.8 M/UL — LOW (ref 4.2–5.8)
RBC # FLD: 13.2 % — SIGNIFICANT CHANGE UP (ref 10.3–14.5)
RBC # FLD: 13.3 % — SIGNIFICANT CHANGE UP (ref 10.3–14.5)
SODIUM SERPL-SCNC: 138 MMOL/L — SIGNIFICANT CHANGE UP (ref 135–145)
WBC # BLD: 7.16 K/UL — SIGNIFICANT CHANGE UP (ref 3.8–10.5)
WBC # BLD: 9.25 K/UL — SIGNIFICANT CHANGE UP (ref 3.8–10.5)
WBC # FLD AUTO: 7.16 K/UL — SIGNIFICANT CHANGE UP (ref 3.8–10.5)
WBC # FLD AUTO: 9.25 K/UL — SIGNIFICANT CHANGE UP (ref 3.8–10.5)

## 2022-08-13 PROCEDURE — 99233 SBSQ HOSP IP/OBS HIGH 50: CPT

## 2022-08-13 PROCEDURE — 93970 EXTREMITY STUDY: CPT | Mod: 26

## 2022-08-13 RX ORDER — ISOSORBIDE MONONITRATE 60 MG/1
1 TABLET, EXTENDED RELEASE ORAL
Qty: 30 | Refills: 3
Start: 2022-08-13 | End: 2022-12-10

## 2022-08-13 RX ORDER — ATORVASTATIN CALCIUM 80 MG/1
1 TABLET, FILM COATED ORAL
Qty: 30 | Refills: 3
Start: 2022-08-13 | End: 2022-12-10

## 2022-08-13 RX ORDER — CLOPIDOGREL BISULFATE 75 MG/1
1 TABLET, FILM COATED ORAL
Qty: 30 | Refills: 11
Start: 2022-08-13 | End: 2023-01-01

## 2022-08-13 RX ORDER — ASPIRIN/CALCIUM CARB/MAGNESIUM 324 MG
1 TABLET ORAL
Qty: 30 | Refills: 11
Start: 2022-08-13 | End: 2023-01-01

## 2022-08-13 RX ORDER — RANOLAZINE 500 MG/1
1 TABLET, FILM COATED, EXTENDED RELEASE ORAL
Qty: 60 | Refills: 0
Start: 2022-08-13 | End: 2022-09-11

## 2022-08-13 RX ORDER — MAGNESIUM SULFATE 500 MG/ML
2 VIAL (ML) INJECTION ONCE
Refills: 0 | Status: COMPLETED | OUTPATIENT
Start: 2022-08-13 | End: 2022-08-13

## 2022-08-13 RX ORDER — ISOSORBIDE MONONITRATE 60 MG/1
1 TABLET, EXTENDED RELEASE ORAL
Qty: 0 | Refills: 0 | DISCHARGE
Start: 2022-08-13

## 2022-08-13 RX ORDER — ENOXAPARIN SODIUM 100 MG/ML
40 INJECTION SUBCUTANEOUS EVERY 24 HOURS
Refills: 0 | Status: DISCONTINUED | OUTPATIENT
Start: 2022-08-13 | End: 2022-08-14

## 2022-08-13 RX ORDER — INSULIN LISPRO 100/ML
0 VIAL (ML) SUBCUTANEOUS
Qty: 0 | Refills: 0 | DISCHARGE

## 2022-08-13 RX ORDER — LISINOPRIL 2.5 MG/1
1 TABLET ORAL
Qty: 0 | Refills: 0 | DISCHARGE
Start: 2022-08-13

## 2022-08-13 RX ORDER — RANOLAZINE 500 MG/1
1 TABLET, FILM COATED, EXTENDED RELEASE ORAL
Qty: 0 | Refills: 0 | DISCHARGE
Start: 2022-08-13

## 2022-08-13 RX ORDER — ATORVASTATIN CALCIUM 80 MG/1
1 TABLET, FILM COATED ORAL
Qty: 0 | Refills: 0 | DISCHARGE
Start: 2022-08-13

## 2022-08-13 RX ADMIN — MUPIROCIN 1 APPLICATION(S): 20 OINTMENT TOPICAL at 07:17

## 2022-08-13 RX ADMIN — RANOLAZINE 500 MILLIGRAM(S): 500 TABLET, FILM COATED, EXTENDED RELEASE ORAL at 17:28

## 2022-08-13 RX ADMIN — ATORVASTATIN CALCIUM 80 MILLIGRAM(S): 80 TABLET, FILM COATED ORAL at 22:03

## 2022-08-13 RX ADMIN — LISINOPRIL 20 MILLIGRAM(S): 2.5 TABLET ORAL at 11:34

## 2022-08-13 RX ADMIN — Medication 1 DROP(S): at 11:35

## 2022-08-13 RX ADMIN — ENOXAPARIN SODIUM 40 MILLIGRAM(S): 100 INJECTION SUBCUTANEOUS at 11:30

## 2022-08-13 RX ADMIN — Medication 1: at 12:18

## 2022-08-13 RX ADMIN — Medication 1: at 07:10

## 2022-08-13 RX ADMIN — Medication 81 MILLIGRAM(S): at 11:35

## 2022-08-13 RX ADMIN — Medication 62.5 MILLIMOLE(S): at 14:32

## 2022-08-13 RX ADMIN — INSULIN GLARGINE 45 UNIT(S): 100 INJECTION, SOLUTION SUBCUTANEOUS at 22:03

## 2022-08-13 RX ADMIN — MUPIROCIN 1 APPLICATION(S): 20 OINTMENT TOPICAL at 14:34

## 2022-08-13 RX ADMIN — MUPIROCIN 1 APPLICATION(S): 20 OINTMENT TOPICAL at 22:27

## 2022-08-13 RX ADMIN — ISOSORBIDE MONONITRATE 30 MILLIGRAM(S): 60 TABLET, EXTENDED RELEASE ORAL at 11:34

## 2022-08-13 RX ADMIN — Medication 25 GRAM(S): at 14:32

## 2022-08-13 RX ADMIN — Medication 7 UNIT(S): at 17:29

## 2022-08-13 RX ADMIN — RANOLAZINE 500 MILLIGRAM(S): 500 TABLET, FILM COATED, EXTENDED RELEASE ORAL at 07:17

## 2022-08-13 RX ADMIN — LATANOPROST 1 DROP(S): 0.05 SOLUTION/ DROPS OPHTHALMIC; TOPICAL at 22:03

## 2022-08-13 RX ADMIN — CLOPIDOGREL BISULFATE 75 MILLIGRAM(S): 75 TABLET, FILM COATED ORAL at 11:35

## 2022-08-13 RX ADMIN — Medication 7 UNIT(S): at 07:10

## 2022-08-13 RX ADMIN — Medication 7 UNIT(S): at 12:18

## 2022-08-13 NOTE — PROGRESS NOTE ADULT - PROBLEM SELECTOR PLAN 5
BP ranging 160-170's. Asx  -on home Benazepril 20mg interchanged to Lisinopril 20mg daily, c/w Imdur 30mg QD  -holding Metoprolol Succ 100mg QD due to bradycardia  -monitor BP & titrate BP meds PRN
History of HTN.   - on home Benazepril 20mg interchanged to Lisinopril 20mg daily, c/w Imdur 30mg QD  -holding Metoprolol Succ 100mg QD due to bradycardia  -monitor BP & titrate BP meds PRN

## 2022-08-13 NOTE — PHYSICAL THERAPY INITIAL EVALUATION ADULT - ADDITIONAL COMMENTS
Pt states he lives with wife and kids on 2nd floor walk up apartment. Pt uses SC for ambulation and was mostly independent with ADLs PTA. requires some assistance with stairs/gait 2/2 legally blind.

## 2022-08-13 NOTE — PHYSICAL THERAPY INITIAL EVALUATION ADULT - PERTINENT HX OF CURRENT PROBLEM, REHAB EVAL
78 y/o male, legally blind, PMHX HTN, HLD, DM, PVD,  known CAD, s/p CABG (LIMA to LAD with Dr Roberson,  4/1/19), s/p multiple PCIs, found with positive NST, and unstable angina, R/I NSTEMI with elevated Troponins, now transferred to [facility] for management of NSTEMI and  LHC with Dr. Richard.

## 2022-08-13 NOTE — PROGRESS NOTE ADULT - ATTENDING COMMENTS
Patient seen and examined on 8/13/22. Case discussed with Dr. Dave.     77M legally blind CAD s/p CABG, PCIS, HTN HL DM PVD who was transferred from OSH for NSTEMI.     8/12 LHC with WINTER oLCx, balloon angioplasty of severe OM1 ISR  8/12 TTE with EF 50-55%, WMA, mild LVH, normal RV size/fxn, no sig valvular disease, PASP 37mmHg, no pericardial effusion    On eval reports feeling well no CP. C/o pain over LLE.     -CAD, NSTEMI: s/p LHC/PCI, continue DAPT, statin (LDL at goal), also on Ranexa, Imdur  -BB held for bradycardia, on ACEI  -LE Doppler to r/o DVT  -trend cbc, repeat in PM  -PT eval    Alecia Nava MD

## 2022-08-13 NOTE — PROGRESS NOTE ADULT - SUBJECTIVE AND OBJECTIVE BOX
INTERVAL HPI/OVERNIGHT EVENTS:  Patient was seen and examined at bedside. As per notes and patient, TR bandage removed last night. No complications. Now complaining of LLE pain. Patient denies: fever, chills, dizziness, weakness, HA, Changes in vision, CP, palpitations, SOB, cough, N/V/D/C, dysuria, changes in bowel movements, LE edema. ROS otherwise negative.    VITAL SIGNS:  T(F): 98.8 (08-13-22 @ 09:06)  HR: 55 (08-13-22 @ 08:05)  BP: 120/58 (08-13-22 @ 08:05)  RR: 16 (08-13-22 @ 08:05)  SpO2: 98% (08-13-22 @ 08:05)  Wt(kg): --    PHYSICAL EXAM:  Constitutional: NAD  HEENT: sclera non-icteric, neck supple, trachea midline, no masses, no JVD, MMM  Respiratory: CTA b/l, good air entry b/l, no wheezing, no rhonchi, no rales, without accessory muscle use and no intercostal retractions  Cardiovascular: RRR, normal S1S2, no M/R/G  Gastrointestinal: soft, NT, distended obese abdomen, no masses palpable, BS normal  Extremities: Warm, well perfused with purple venous insufficiency changes around ankles b/l, pulses equal bilateral upper and lower extremities, no edema, no clubbing. L calf bigger in diameter compared to R. More erythematous and painful to touch.  Neurological: AAOx3, CN Grossly intact  Skin: Normal temperature, warm, dry    MEDICATIONS  (STANDING):  aspirin enteric coated 81 milliGRAM(s) Oral daily  atorvastatin 80 milliGRAM(s) Oral at bedtime  clopidogrel Tablet 75 milliGRAM(s) Oral daily  dextrose 5%. 1000 milliLiter(s) (50 mL/Hr) IV Continuous <Continuous>  dextrose 5%. 1000 milliLiter(s) (100 mL/Hr) IV Continuous <Continuous>  dextrose 50% Injectable 25 Gram(s) IV Push once  dextrose 50% Injectable 12.5 Gram(s) IV Push once  dextrose 50% Injectable 25 Gram(s) IV Push once  glucagon  Injectable 1 milliGRAM(s) IntraMuscular once  insulin glargine Injectable (LANTUS) 45 Unit(s) SubCutaneous at bedtime  insulin lispro (ADMELOG) corrective regimen sliding scale   SubCutaneous Before meals and at bedtime  insulin lispro Injectable (ADMELOG) 7 Unit(s) SubCutaneous three times a day before meals  isosorbide   mononitrate ER Tablet (IMDUR) 30 milliGRAM(s) Oral daily  latanoprost 0.005% Ophthalmic Solution 1 Drop(s) Both EYES at bedtime  lisinopril 20 milliGRAM(s) Oral daily  mupirocin 2% Ointment 1 Application(s) Topical every 8 hours  ranolazine 500 milliGRAM(s) Oral two times a day  sodium chloride 0.9%. 1000 milliLiter(s) (50 mL/Hr) IV Continuous <Continuous>  sodium chloride 0.9%. 500 milliLiter(s) (210 mL/Hr) IV Continuous <Continuous>  timolol 0.5% Solution 1 Drop(s) Both EYES daily    MEDICATIONS  (PRN):  dextrose Oral Gel 15 Gram(s) Oral once PRN Blood Glucose LESS THAN 70 milliGRAM(s)/deciliter    Allergies    No Known Allergies    LABS:                        10.7   7.16  )-----------( 174      ( 13 Aug 2022 05:30 )             32.8     08-13    138  |  105  |  17  ----------------------------<  199<H>  4.2   |  26  |  1.17    Ca    8.5      13 Aug 2022 05:30  Phos  2.8     08-13  Mg     1.8     08-13    TPro  6.1  /  Alb  3.6  /  TBili  0.4  /  DBili  x   /  AST  20  /  ALT  16  /  AlkPhos  72  08-13    PT/INR - ( 11 Aug 2022 22:38 )   PT: 13.7 sec;   INR: 1.15        PTT - ( 12 Aug 2022 08:37 )  PTT:91.6 sec    RADIOLOGY & ADDITIONAL TESTS:  Reviewed

## 2022-08-13 NOTE — DISCHARGE NOTE PROVIDER - PROVIDER TOKENS
FREE:[LAST:[Jose Manuel],FIRST:[Stu],PHONE:[phone]],FAX:[phone]],ADDRESS:[[address] [place] [address]],FOLLOWUP:[1 week]]

## 2022-08-13 NOTE — PROGRESS NOTE ADULT - ASSESSMENT
78 y/o male, legally blind, PMHX HTN, HLD, DM, PVD,  known CAD, s/p CABG (LIMA to LAD with Dr Roberson,  4/1/19), s/p multiple PCIs, found with positive NST, and unstable angina, R/I NSTEMI with elevated Troponins, now transferred to [facility] for management of NSTEMI and  LHC with Dr. Richard.

## 2022-08-13 NOTE — DISCHARGE NOTE PROVIDER - NSDCCPCAREPLAN_GEN_ALL_CORE_FT
PRINCIPAL DISCHARGE DIAGNOSIS  Diagnosis: NSTEMI (non-ST elevation myocardial infarction)  Assessment and Plan of Treatment: A Non-ST-Elevation Myocardial Infarction is a type of heart attack, often referred to as NSTEMI or a non-STEMI. In medical terminology, a heart attack is a myocardial infarction. An NSTEMI is a less severe form of heart attack than the STEMI because it inflicts less damage to the heart. Your proximal left circumflex artery of your heart had a blockage requiring a stent to be placed. Your distal left circumflex artery was also blocked so we used a balloon (angioplasty) to open the artery. We will be adding a medication called Plavix and Aspirin to your daily meds. ***Please take as prescribed and follow up with your Cardiologist within one week of discharge.***        SECONDARY DISCHARGE DIAGNOSES  Diagnosis: Bradycardia  Assessment and Plan of Treatment: Bradycardia is the term doctors use for when a person's heartbeat is slower than normal. We believe that your bradycardia might have been caused by your heart attack and further compounded by a medication you take called metoprolol which slows down your heart rate. We have stopped giving you this medication while you were with us in the hospital.    Diagnosis: PVD (peripheral vascular disease)  Assessment and Plan of Treatment: Peripheral artery disease (PAD) is a condition that affects the blood vessels (called arteries) that bring blood to the legs. PAD can cause muscle pain that gets worse with activity and better with rest, called "claudication." PAD can also cause wounds to heal more slowly than usual. Normally, blood flows easily through arteries to all parts of the body. But sometimes, fatty clumps called "plaques" build up inside the walls of arteries. Plaques can cause arteries to become narrow or blocked. This prevents blood from flowing normally. When muscles do not get enough blood, symptoms can occur. Your risk for PAD is increased due to your diabetes, cholesterol and high blood pressure. We treat this condition with aspirin and Plavix. Please continue these medications as prescribed.     PRINCIPAL DISCHARGE DIAGNOSIS  Diagnosis: NSTEMI (non-ST elevation myocardial infarction)  Assessment and Plan of Treatment: A Non-ST-Elevation Myocardial Infarction is a type of heart attack, often referred to as NSTEMI or a non-STEMI. In medical terminology, a heart attack is a myocardial infarction. An NSTEMI is a less severe form of heart attack than the STEMI because it inflicts less damage to the heart. Your proximal left circumflex artery of your heart had a blockage requiring a stent to be placed. Your distal left circumflex artery was also blocked so we used a balloon (angioplasty) to open the artery. We will be adding a medication called Plavix and Aspirin to your daily meds. ***Please take as prescribed and follow up with your Cardiologist within one week of discharge.***  Please take one tabet of Aspirin by mouth once a day.   Please take on tablet of Plavix by mouth once a day.   Do not skip a a day of Aspirn or Plavix.    If you devlop chest pain please report to the emergency department.         SECONDARY DISCHARGE DIAGNOSES  Diagnosis: PVD (peripheral vascular disease)  Assessment and Plan of Treatment: Peripheral artery disease (PAD) is a condition that affects the blood vessels (called arteries) that bring blood to the legs. PAD can cause muscle pain that gets worse with activity and better with rest, called "claudication." PAD can also cause wounds to heal more slowly than usual. Normally, blood flows easily through arteries to all parts of the body. But sometimes, fatty clumps called "plaques" build up inside the walls of arteries. Plaques can cause arteries to become narrow or blocked. This prevents blood from flowing normally. When muscles do not get enough blood, symptoms can occur. Your risk for PAD is increased due to your diabetes, cholesterol and high blood pressure. We treat this condition with aspirin and Plavix. Please continue these medications as prescribed.    Diagnosis: Bradycardia  Assessment and Plan of Treatment: Bradycardia is the term doctors use for when a person's heartbeat is slower than normal. We believe that your bradycardia might have been caused by your heart attack and further compounded by a medication you take called metoprolol which slows down your heart rate. We have stopped giving you this medication while you were with us in the hospital.

## 2022-08-13 NOTE — DISCHARGE NOTE PROVIDER - CARE PROVIDER_API CALL
Stu Richard  [address] [address]  Phone: [phone]  Fax: [phone]  Follow Up Time: 1 week

## 2022-08-13 NOTE — PROGRESS NOTE ADULT - SUBJECTIVE AND OBJECTIVE BOX
Interventional Cardiology Radial band Removal Note    Pt without complaints.  VSS.    Left Radial access site, TR band placed, successfully removed, no hematoma, no bleed  Radial pulse: +2, back to baseline    Hemostasis achieved with manual release of hemoband.  No Vasovagal reaction.  No Meds given    A/P:  s/p LHC with WINTER pLCx and PTCA dLCx  -	continue to monitor  -	-OOB as tolerated  -	Post Procedure Instructions given  -                   Call [phone] if any access site issues.

## 2022-08-13 NOTE — PHYSICAL THERAPY INITIAL EVALUATION ADULT - GENERAL OBSERVATIONS, REHAB EVAL
Pt received semi supine in bed +hep lock +tele. PT received consent from [facility] Pool. pt was left as received with call bell in reach, VSS, and in NAD.

## 2022-08-13 NOTE — DISCHARGE NOTE PROVIDER - NSDCMRMEDTOKEN_GEN_ALL_CORE_FT
aspirin 81 mg oral delayed release tablet: 1 tab(s) orally once a day  atorvastatin 80 mg oral tablet: 1 tab(s) orally once a day (at bedtime)  clopidogrel 75 mg oral tablet: 1 tab(s) orally once a day  docusate sodium 100 mg oral capsule: 1 cap(s) orally 3 times a day  HumaLOG 100 units/mL injectable solution: 7 unit(s) injectable 3 times a day with meals  isosorbide mononitrate 30 mg oral tablet, extended release: 1 tab(s) orally once a day  Lantus 100 units/mL subcutaneous solution: 45 unit(s) subcutaneous once a day (in the morning)  latanoprost 0.005% ophthalmic solution: 1 drop(s) to each affected eye once a day (in the evening) left eye  ranolazine 500 mg oral tablet, extended release: 1 tab(s) orally 2 times a day  Simbrinza 1%- 0.2% ophthalmic suspension: 1 drop(s) to each affected eye 3 times a day  timolol maleate 0.5% ophthalmic solution: 1 drop(s) to each affected eye once a day   aspirin 81 mg oral delayed release tablet: 1 tab(s) orally once a day  atorvastatin 80 mg oral tablet: 1 tab(s) orally once a day (at bedtime)  clopidogrel 75 mg oral tablet: 1 tab(s) orally once a day  docusate sodium 100 mg oral capsule: 1 cap(s) orally 3 times a day  HumaLOG 100 units/mL injectable solution: 7 unit(s) injectable 3 times a day with meals  isosorbide mononitrate 30 mg oral tablet, extended release: 1 tab(s) orally once a day  Lantus 100 units/mL subcutaneous solution: 45 unit(s) subcutaneous once a day (in the morning)  latanoprost 0.005% ophthalmic solution: 1 drop(s) to each affected eye once a day (in the evening) left eye  Metoprolol Succinate ER 25 mg oral tablet, extended release: 0.5 tab(s) orally once a day   ranolazine 500 mg oral tablet, extended release: 1 tab(s) orally 2 times a day  Simbrinza 1%- 0.2% ophthalmic suspension: 1 drop(s) to each affected eye 3 times a day  timolol maleate 0.5% ophthalmic solution: 1 drop(s) to each affected eye once a day   aspirin 81 mg oral delayed release tablet: 1 tab(s) orally once a day  atorvastatin 80 mg oral tablet: 1 tab(s) orally once a day (at bedtime)  benazepril 20 mg oral tablet: 1 tab(s) orally once a day   clopidogrel 75 mg oral tablet: 1 tab(s) orally once a day  docusate sodium 100 mg oral capsule: 1 cap(s) orally 3 times a day  HumaLOG 100 units/mL injectable solution: 7 unit(s) injectable 3 times a day with meals  isosorbide mononitrate 30 mg oral tablet, extended release: 1 tab(s) orally once a day  Lantus 100 units/mL subcutaneous solution: 45 unit(s) subcutaneous once a day (in the morning)  latanoprost 0.005% ophthalmic solution: 1 drop(s) to each affected eye once a day (in the evening) left eye  Metoprolol Succinate ER 25 mg oral tablet, extended release: 0.5 tab(s) orally once a day   ranolazine 500 mg oral tablet, extended release: 1 tab(s) orally 2 times a day  Simbrinza 1%- 0.2% ophthalmic suspension: 1 drop(s) to each affected eye 3 times a day  timolol maleate 0.5% ophthalmic solution: 1 drop(s) to each affected eye once a day

## 2022-08-13 NOTE — PROGRESS NOTE ADULT - PROBLEM SELECTOR PLAN 7
Per pt, prescribed Lantus 45units AM; however, dose according to ISS, Humalog 6units TID  -c/w Lantus 45 units QHS and Humalog 7units TID (transfer meds)  - FS qid, ISS   - consistent carb diet no snacks/DASH

## 2022-08-13 NOTE — PROGRESS NOTE ADULT - PROBLEM SELECTOR PLAN 1
Pt presenting from [facility] w/ chest pain and elevated trops I (560) and R/I NSTEMI. o/a trops 0.05, , CKMB  4.7, . EKG SB @ 45bpm, no acute ischemic changes. s/p LHC with WINTER pLCx and PTCA dLCx. ECHO 08/12 - EF 50% w/ LV regional wall motion abnormalities. Hb drop to 10.7 post-LHC, asymptomatic.  - c/w ASA 81mg QD, Plavix, 75mg QD, Ranexa 500mg BID, Imdur 30mg QD and Atorvastatin 80mg QHS (increased from home dose 40mg QHS)   - repeat CBC PM to monitor for Hb drop  - VS per routine, cont tele/pulse ox

## 2022-08-13 NOTE — PROGRESS NOTE ADULT - PROBLEM SELECTOR PLAN 3
EKG showing SB @ 45bpm o/a. Asymptomatic - now resolving. ECHO as above.  -Holding Metoprolol Succinate 100mg QD (transfer meds) i/s/o bradycardia  -c/w tele

## 2022-08-13 NOTE — PROGRESS NOTE ADULT - PROBLEM SELECTOR PLAN 2
Known CAD, s/p CABG ([facility] to [facility]  with Dr Roberson,  4/1/19), s/p multiple PCIs  -c/w home ASA 81mg QD, Plavix 75mg QD and Atorvastatin 40mg QD
Known CAD, s/p CABG ([facility] to [facility]  with Dr Roberson,  4/1/19), s/p multiple PCIs  -c/w home ASA 81mg QD, Plavix 75mg QD and Atorvastatin 40mg QD

## 2022-08-13 NOTE — PROGRESS NOTE ADULT - PROBLEM SELECTOR PLAN 4
B/L LE redness, with mild tenderness, L>R  - Started on Bactroban q8h  - LE Duplex ordered  - start lovenox 40mg QD
B/L LE redness, with mild tenderness, L>R  -Started on Bactroban q8h  -LE Duplex ordered

## 2022-08-13 NOTE — DISCHARGE NOTE PROVIDER - NSDCFUADDAPPT_GEN_ALL_CORE_FT
We tried calling Dr. Richard's office however it was closed.  Please reach out to him to confirm an appointment within one week of discharge.

## 2022-08-13 NOTE — PHYSICAL THERAPY INITIAL EVALUATION ADULT - LIVES WITH, PROFILE
Speech Therapy      Visit Type: Evaluation  -  Clinical swallow    Precautions     - Medical precautions:  COVID-19 status: Negative test on 1/14/2022  PPE donned: gloves, face shield/goggles and N95 respirator  Aerosolizing events during session?: None  Because this patient has a negative COVID-19 test within the past 72 hours, door to room was not closed        Current Diet: nothing by mouth      - Dentition: edentulous  SUBJECTIVE  Not alert, drooling copiously with what appears to be remnant food material in mouth.    SPEECH PATHOLOGY HOSPITAL COURSE:    1/14/2022: to ED via EMS from group home after witnessed full-body seizures x3, AMS with no return to baseline afterwards. CT of head- \"No acute intracranial abnormality. Ill-defined higher attenuation about the anteroinferior temporal lobes is thought to reflect beam hardening/streak artifact related to the adjacent dense skull base (as opposed to ill-defined intracranial hemorrhage).\" CXR- no acute findings. Clinical- recommend NPO.    Baseline: puree diet/ thin liquids, straw OK. Nonverbal at baseline but can answer yes/no questions, per chart. Lives in group home. Guardianship.     Relevant PMH to Speech Pathology: mental retardation, cerebral palsy, epilepsy s/p implanted VNS, GERD, hypothyroidism, chronic gastritis with presence of H. Pylori infection, recurrent rhinosinusitis     Relevant Past Speech Pathology Intervention:  12/4/21- 12/9/21 Admission [facility]: Clinical laurence woo 12/4- NPO. Video 12/6- rec puree/ thin, straw OK. This was discharge diet.    11/1/18-11/5/18: Clinical Swallow evaluation at [facility]: Puree/[place].    1998, 2000, 2000:Video Fluoroscopic Swallow Studies: all note inconsistent aspiration (silent and audible) with thin and thick liquids with poor oral phase.         Patient/family goals: unable to state     OBJECTIVE     Oral Mechanism   Saliva Control: impaired (copious drooling)     Swallowing   Patient was not intubated on this  admission   Patient positioning: upright in bed  Pretrial vocal quality: nonverbal    Consistencies:  Ice Chips:    - Amount given:1   - Oral phase: impaired   , anterior loss and impaired bolus control   - Pharyngeal phase: impaired, no pharyngeal response  Thin Liquid (straw):     - Oral: impaired, anterior loss and impaired bolus control   - Pharyngeal: impaired, no pharyngeal response            ASSESSMENT                                                                        • Impairments: swallowing  • Functional Limitations: eating/drinking  • Skilled therapy is required to address these limitations in attempt to maximize the patient's independence.  • The patient was seen in ED. ISMAEL Lane approved session.    Upon arrival, patient drooling copiously from mouth, brownish tinged secretions. No oral suction or toothette swabs available but writer able to gently clear anterior oral cavity with washcloth- patient appears to have remnant food particles in mouth, presumably from last meal at group home (suspect yesterday?). Writer presented patient with single ice chip. Patient continues with incomplete labial closure and drooling. No swallow response demonstrated. Writer put straw to patient's lip, patient was able to draw liquid up straw but appeared to just pour anteriorly from mouth. Again, no swallow response. Despite documented severe cognitive deficits, patient appears obtunded/ postictal and does not appear to be at her normal baseline. Will need reassessment when alertness/ mental status improves.    Requires SLP Follow Up: Yes    Discharge Recommendations           SLP Identified Barriers to Discharge: medical status, NPO without alternate means   Recommendations for Discharge: SLP: jennifer pending progress         Education Provided:   Education provided during session:  - Receiving education: patient  - dysphagia  - Results of above outlined education: No evidence of learning  Patient at end of session:    -  location: on cart    PLAN  Swallow reassess needed when alertness/ mental status improve. Nonverbal at baseline but reportedly can respond to yes/no questions and does eat (puree/ thin).     Frequency: SAT sw reassess- NPO without TFs (1/14)    Interventions:  Reassess swallow function as appropriate    Plan/Goal Agreement:  Patient unable to agree with goals and treatment plan and will attempt to contact family/signficant other/caregiver    RECOMMENDATIONS     -Diet:          *nothing by mouth    -Medication Administration:         *IV medications only    -Additional Swallow Recommendations:         *frequent oral care (may want to obtain and set up oral suction at bedside!)    -Speech Reviewed Swallow:         *with clinical caregivers    GOALS    Short Term Goals:   Pending reassess 1/15/22      Documented in the chart in the following areas: Pain. Assessment.      Therapy procedure time and total treatment time can be found documented on the Time Entry flowsheet   children/spouse

## 2022-08-14 ENCOUNTER — TRANSCRIPTION ENCOUNTER (OUTPATIENT)
Age: 77
End: 2022-08-14

## 2022-08-14 VITALS
HEART RATE: 88 BPM | SYSTOLIC BLOOD PRESSURE: 131 MMHG | DIASTOLIC BLOOD PRESSURE: 60 MMHG | RESPIRATION RATE: 20 BRPM | OXYGEN SATURATION: 98 %

## 2022-08-14 LAB
ANION GAP SERPL CALC-SCNC: 11 MMOL/L — SIGNIFICANT CHANGE UP (ref 5–17)
BASOPHILS # BLD AUTO: 0.02 K/UL — SIGNIFICANT CHANGE UP (ref 0–0.2)
BASOPHILS NFR BLD AUTO: 0.2 % — SIGNIFICANT CHANGE UP (ref 0–2)
BUN SERPL-MCNC: 18 MG/DL — SIGNIFICANT CHANGE UP (ref 7–23)
CALCIUM SERPL-MCNC: 8.6 MG/DL — SIGNIFICANT CHANGE UP (ref 8.4–10.5)
CHLORIDE SERPL-SCNC: 106 MMOL/L — SIGNIFICANT CHANGE UP (ref 96–108)
CO2 SERPL-SCNC: 25 MMOL/L — SIGNIFICANT CHANGE UP (ref 22–31)
CREAT SERPL-MCNC: 1.19 MG/DL — SIGNIFICANT CHANGE UP (ref 0.5–1.3)
EGFR: 63 ML/MIN/1.73M2 — SIGNIFICANT CHANGE UP
EOSINOPHIL # BLD AUTO: 0.1 K/UL — SIGNIFICANT CHANGE UP (ref 0–0.5)
EOSINOPHIL NFR BLD AUTO: 1.2 % — SIGNIFICANT CHANGE UP (ref 0–6)
GLUCOSE BLDC GLUCOMTR-MCNC: 128 MG/DL — HIGH (ref 70–99)
GLUCOSE BLDC GLUCOMTR-MCNC: 159 MG/DL — HIGH (ref 70–99)
GLUCOSE SERPL-MCNC: 136 MG/DL — HIGH (ref 70–99)
HCT VFR BLD CALC: 33.5 % — LOW (ref 39–50)
HGB BLD-MCNC: 11 G/DL — LOW (ref 13–17)
IMM GRANULOCYTES NFR BLD AUTO: 0.2 % — SIGNIFICANT CHANGE UP (ref 0–1.5)
LYMPHOCYTES # BLD AUTO: 1.2 K/UL — SIGNIFICANT CHANGE UP (ref 1–3.3)
LYMPHOCYTES # BLD AUTO: 14.7 % — SIGNIFICANT CHANGE UP (ref 13–44)
MAGNESIUM SERPL-MCNC: 1.9 MG/DL — SIGNIFICANT CHANGE UP (ref 1.6–2.6)
MCHC RBC-ENTMCNC: 28 PG — SIGNIFICANT CHANGE UP (ref 27–34)
MCHC RBC-ENTMCNC: 32.8 GM/DL — SIGNIFICANT CHANGE UP (ref 32–36)
MCV RBC AUTO: 85.2 FL — SIGNIFICANT CHANGE UP (ref 80–100)
MONOCYTES # BLD AUTO: 0.83 K/UL — SIGNIFICANT CHANGE UP (ref 0–0.9)
MONOCYTES NFR BLD AUTO: 10.1 % — SIGNIFICANT CHANGE UP (ref 2–14)
NEUTROPHILS # BLD AUTO: 6.02 K/UL — SIGNIFICANT CHANGE UP (ref 1.8–7.4)
NEUTROPHILS NFR BLD AUTO: 73.6 % — SIGNIFICANT CHANGE UP (ref 43–77)
NRBC # BLD: 0 /100 WBCS — SIGNIFICANT CHANGE UP (ref 0–0)
PHOSPHATE SERPL-MCNC: 3.2 MG/DL — SIGNIFICANT CHANGE UP (ref 2.5–4.5)
PLATELET # BLD AUTO: 166 K/UL — SIGNIFICANT CHANGE UP (ref 150–400)
POTASSIUM SERPL-MCNC: 4.3 MMOL/L — SIGNIFICANT CHANGE UP (ref 3.5–5.3)
POTASSIUM SERPL-SCNC: 4.3 MMOL/L — SIGNIFICANT CHANGE UP (ref 3.5–5.3)
RBC # BLD: 3.93 M/UL — LOW (ref 4.2–5.8)
RBC # FLD: 13.2 % — SIGNIFICANT CHANGE UP (ref 10.3–14.5)
SODIUM SERPL-SCNC: 142 MMOL/L — SIGNIFICANT CHANGE UP (ref 135–145)
WBC # BLD: 8.19 K/UL — SIGNIFICANT CHANGE UP (ref 3.8–10.5)
WBC # FLD AUTO: 8.19 K/UL — SIGNIFICANT CHANGE UP (ref 3.8–10.5)

## 2022-08-14 PROCEDURE — 82962 GLUCOSE BLOOD TEST: CPT

## 2022-08-14 PROCEDURE — 85610 PROTHROMBIN TIME: CPT

## 2022-08-14 PROCEDURE — 97116 GAIT TRAINING THERAPY: CPT

## 2022-08-14 PROCEDURE — 86900 BLOOD TYPING SEROLOGIC ABO: CPT

## 2022-08-14 PROCEDURE — 83880 ASSAY OF NATRIURETIC PEPTIDE: CPT

## 2022-08-14 PROCEDURE — 99239 HOSP IP/OBS DSCHRG MGMT >30: CPT

## 2022-08-14 PROCEDURE — U0003: CPT

## 2022-08-14 PROCEDURE — C8929: CPT

## 2022-08-14 PROCEDURE — 86901 BLOOD TYPING SEROLOGIC RH(D): CPT

## 2022-08-14 PROCEDURE — 86850 RBC ANTIBODY SCREEN: CPT

## 2022-08-14 PROCEDURE — C1753: CPT

## 2022-08-14 PROCEDURE — 80048 BASIC METABOLIC PNL TOTAL CA: CPT

## 2022-08-14 PROCEDURE — 80061 LIPID PANEL: CPT

## 2022-08-14 PROCEDURE — C1887: CPT

## 2022-08-14 PROCEDURE — C1769: CPT

## 2022-08-14 PROCEDURE — 82553 CREATINE MB FRACTION: CPT

## 2022-08-14 PROCEDURE — 83735 ASSAY OF MAGNESIUM: CPT

## 2022-08-14 PROCEDURE — C1725: CPT

## 2022-08-14 PROCEDURE — 85347 COAGULATION TIME ACTIVATED: CPT

## 2022-08-14 PROCEDURE — C1760: CPT

## 2022-08-14 PROCEDURE — 84100 ASSAY OF PHOSPHORUS: CPT

## 2022-08-14 PROCEDURE — 97162 PT EVAL MOD COMPLEX 30 MIN: CPT

## 2022-08-14 PROCEDURE — 85730 THROMBOPLASTIN TIME PARTIAL: CPT

## 2022-08-14 PROCEDURE — 85025 COMPLETE CBC W/AUTO DIFF WBC: CPT

## 2022-08-14 PROCEDURE — 80053 COMPREHEN METABOLIC PANEL: CPT

## 2022-08-14 PROCEDURE — 86803 HEPATITIS C AB TEST: CPT

## 2022-08-14 PROCEDURE — C1894: CPT

## 2022-08-14 PROCEDURE — U0005: CPT

## 2022-08-14 PROCEDURE — 82550 ASSAY OF CK (CPK): CPT

## 2022-08-14 PROCEDURE — 85027 COMPLETE CBC AUTOMATED: CPT

## 2022-08-14 PROCEDURE — C1761: CPT

## 2022-08-14 PROCEDURE — C1874: CPT

## 2022-08-14 PROCEDURE — 36415 COLL VENOUS BLD VENIPUNCTURE: CPT

## 2022-08-14 PROCEDURE — 93970 EXTREMITY STUDY: CPT

## 2022-08-14 PROCEDURE — 84484 ASSAY OF TROPONIN QUANT: CPT

## 2022-08-14 RX ORDER — ASPIRIN/CALCIUM CARB/MAGNESIUM 324 MG
1 TABLET ORAL
Qty: 30 | Refills: 11
Start: 2022-08-14 | End: 2023-01-01

## 2022-08-14 RX ORDER — ISOSORBIDE MONONITRATE 60 MG/1
1 TABLET, EXTENDED RELEASE ORAL
Qty: 30 | Refills: 3
Start: 2022-08-14 | End: 2022-12-11

## 2022-08-14 RX ORDER — CLOPIDOGREL BISULFATE 75 MG/1
1 TABLET, FILM COATED ORAL
Qty: 30 | Refills: 11
Start: 2022-08-14 | End: 2023-01-01

## 2022-08-14 RX ORDER — MAGNESIUM SULFATE 500 MG/ML
1 VIAL (ML) INJECTION ONCE
Refills: 0 | Status: COMPLETED | OUTPATIENT
Start: 2022-08-14 | End: 2022-08-14

## 2022-08-14 RX ORDER — BENAZEPRIL HYDROCHLORIDE 40 MG/1
1 TABLET ORAL
Qty: 30 | Refills: 0
Start: 2022-08-14 | End: 2022-09-12

## 2022-08-14 RX ORDER — RANOLAZINE 500 MG/1
1 TABLET, FILM COATED, EXTENDED RELEASE ORAL
Qty: 60 | Refills: 3
Start: 2022-08-14 | End: 2022-12-11

## 2022-08-14 RX ORDER — METOPROLOL TARTRATE 50 MG
12.5 TABLET ORAL EVERY 24 HOURS
Refills: 0 | Status: DISCONTINUED | OUTPATIENT
Start: 2022-08-14 | End: 2022-08-14

## 2022-08-14 RX ORDER — METOPROLOL TARTRATE 50 MG
0.5 TABLET ORAL
Qty: 15 | Refills: 3
Start: 2022-08-14 | End: 2022-12-11

## 2022-08-14 RX ORDER — ATORVASTATIN CALCIUM 80 MG/1
1 TABLET, FILM COATED ORAL
Qty: 30 | Refills: 3
Start: 2022-08-14 | End: 2022-12-11

## 2022-08-14 RX ADMIN — RANOLAZINE 500 MILLIGRAM(S): 500 TABLET, FILM COATED, EXTENDED RELEASE ORAL at 05:28

## 2022-08-14 RX ADMIN — LISINOPRIL 20 MILLIGRAM(S): 2.5 TABLET ORAL at 05:27

## 2022-08-14 RX ADMIN — Medication 100 GRAM(S): at 07:51

## 2022-08-14 RX ADMIN — Medication 81 MILLIGRAM(S): at 12:30

## 2022-08-14 RX ADMIN — ENOXAPARIN SODIUM 40 MILLIGRAM(S): 100 INJECTION SUBCUTANEOUS at 10:59

## 2022-08-14 RX ADMIN — Medication 7 UNIT(S): at 12:32

## 2022-08-14 RX ADMIN — Medication 12.5 MILLIGRAM(S): at 10:59

## 2022-08-14 RX ADMIN — ISOSORBIDE MONONITRATE 30 MILLIGRAM(S): 60 TABLET, EXTENDED RELEASE ORAL at 12:30

## 2022-08-14 RX ADMIN — Medication 1: at 12:31

## 2022-08-14 RX ADMIN — MUPIROCIN 1 APPLICATION(S): 20 OINTMENT TOPICAL at 05:28

## 2022-08-14 RX ADMIN — Medication 7 UNIT(S): at 07:51

## 2022-08-14 RX ADMIN — CLOPIDOGREL BISULFATE 75 MILLIGRAM(S): 75 TABLET, FILM COATED ORAL at 12:30

## 2022-08-14 NOTE — DISCHARGE NOTE NURSING/CASE MANAGEMENT/SOCIAL WORK - NSDCPEFALRISK_GEN_ALL_CORE
For information on Fall & Injury Prevention, visit: [email or web address] OR  [email or web address] OR  https://www.cdc.gov/steadi/patient.html

## 2022-08-14 NOTE — DISCHARGE NOTE NURSING/CASE MANAGEMENT/SOCIAL WORK - BRAND OF FIRST COVID-19 BOOSTER
[employer] Erivedge Counseling- I discussed with the patient the risks of Erivedge including but not limited to nausea, vomiting, diarrhea, constipation, weight loss, changes in the sense of taste, decreased appetite, muscle spasms, and hair loss.  The patient verbalized understanding of the proper use and possible adverse effects of Erivedge.  All of the patient's questions and concerns were addressed.

## 2022-08-14 NOTE — DISCHARGE NOTE NURSING/CASE MANAGEMENT/SOCIAL WORK - PATIENT PORTAL LINK FT
You can access the FollowMyHealth Patient Portal offered by [facility] by registering at the following website: [email or web address]. By joining [facility]’s FollowMyHealth portal, you will also be able to view your health information using other applications (apps) compatible with our system.

## 2022-08-14 NOTE — DISCHARGE NOTE NURSING/CASE MANAGEMENT/SOCIAL WORK - NSPROMEDSBROUGHTTOHOSP_GEN_A_NUR
Corrected, Thank you   
This patient is on the epic schedule for 6-6 but her instructions and orders are for 6-20.  Please remove from epic for 6-6. Thanks!  
no

## 2022-08-24 DIAGNOSIS — E78.5 HYPERLIPIDEMIA, UNSPECIFIED: ICD-10-CM

## 2022-08-24 DIAGNOSIS — T82.855A STENOSIS OF CORONARY ARTERY STENT, INITIAL ENCOUNTER: ICD-10-CM

## 2022-08-24 DIAGNOSIS — Z95.5 PRESENCE OF CORONARY ANGIOPLASTY IMPLANT AND GRAFT: ICD-10-CM

## 2022-08-24 DIAGNOSIS — E11.51 TYPE 2 DIABETES MELLITUS WITH DIABETIC PERIPHERAL ANGIOPATHY WITHOUT GANGRENE: ICD-10-CM

## 2022-08-24 DIAGNOSIS — I25.110 ATHEROSCLEROTIC HEART DISEASE OF NATIVE CORONARY ARTERY WITH UNSTABLE ANGINA PECTORIS: ICD-10-CM

## 2022-08-24 DIAGNOSIS — I21.4 NON-ST ELEVATION (NSTEMI) MYOCARDIAL INFARCTION: ICD-10-CM

## 2022-08-24 DIAGNOSIS — Z79.4 LONG TERM (CURRENT) USE OF INSULIN: ICD-10-CM
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
## 2022-08-24 DIAGNOSIS — E11.39 TYPE 2 DIABETES MELLITUS WITH OTHER DIABETIC OPHTHALMIC COMPLICATION: ICD-10-CM
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
## 2022-08-24 DIAGNOSIS — Z79.82 LONG TERM (CURRENT) USE OF ASPIRIN: ICD-10-CM

## 2022-08-24 DIAGNOSIS — R00.1 BRADYCARDIA, UNSPECIFIED: ICD-10-CM

## 2022-08-24 DIAGNOSIS — I10 ESSENTIAL (PRIMARY) HYPERTENSION: ICD-10-CM

## 2022-08-24 DIAGNOSIS — H42 GLAUCOMA IN DISEASES CLASSIFIED ELSEWHERE: ICD-10-CM

## 2022-08-24 DIAGNOSIS — H54.8 LEGAL BLINDNESS, AS DEFINED IN USA: ICD-10-CM

## 2022-08-24 DIAGNOSIS — Z79.02 LONG TERM (CURRENT) USE OF ANTITHROMBOTICS/ANTIPLATELETS: ICD-10-CM

## 2022-08-24 DIAGNOSIS — I25.84 CORONARY ATHEROSCLEROSIS DUE TO CALCIFIED CORONARY LESION: ICD-10-CM

## 2022-12-09 NOTE — H&P ADULT - HEIGHT IN CM
Bertha Thomas was seen and treated in our emergency department on 12/9/2022. She may return to work on 12/12/2022. If you have any questions or concerns, please don't hesitate to call.       MICHEAL Maldonado
[phone]

## 2023-01-01 ENCOUNTER — APPOINTMENT (OUTPATIENT)
Dept: HEMATOLOGY ONCOLOGY | Facility: CLINIC | Age: 78
End: 2023-01-01
Payer: MEDICARE

## 2023-01-01 ENCOUNTER — NON-APPOINTMENT (OUTPATIENT)
Age: 78
End: 2023-01-01

## 2023-01-01 ENCOUNTER — APPOINTMENT (OUTPATIENT)
Age: 78
End: 2023-01-01
Payer: SELF-PAY

## 2023-01-01 ENCOUNTER — OUTPATIENT (OUTPATIENT)
Dept: OUTPATIENT SERVICES | Facility: HOSPITAL | Age: 78
LOS: 1 days | End: 2023-01-01
Payer: MEDICARE

## 2023-01-01 ENCOUNTER — APPOINTMENT (OUTPATIENT)
Dept: INFUSION THERAPY | Facility: CLINIC | Age: 78
End: 2023-01-01

## 2023-01-01 ENCOUNTER — APPOINTMENT (OUTPATIENT)
Dept: OTOLARYNGOLOGY | Facility: CLINIC | Age: 78
End: 2023-01-01

## 2023-01-01 ENCOUNTER — TRANSCRIPTION ENCOUNTER (OUTPATIENT)
Age: 78
End: 2023-01-01

## 2023-01-01 ENCOUNTER — APPOINTMENT (OUTPATIENT)
Dept: INTERNAL MEDICINE | Facility: CLINIC | Age: 78
End: 2023-01-01

## 2023-01-01 ENCOUNTER — LABORATORY RESULT (OUTPATIENT)
Age: 78
End: 2023-01-01

## 2023-01-01 ENCOUNTER — APPOINTMENT (OUTPATIENT)
Dept: INTERVENTIONAL RADIOLOGY/VASCULAR | Facility: HOSPITAL | Age: 78
End: 2023-01-01

## 2023-01-01 ENCOUNTER — APPOINTMENT (OUTPATIENT)
Dept: ENDOCRINOLOGY | Facility: CLINIC | Age: 78
End: 2023-01-01

## 2023-01-01 ENCOUNTER — APPOINTMENT (OUTPATIENT)
Dept: NUCLEAR MEDICINE | Facility: HOSPITAL | Age: 78
End: 2023-01-01

## 2023-01-01 ENCOUNTER — APPOINTMENT (OUTPATIENT)
Dept: RADIATION ONCOLOGY | Facility: CLINIC | Age: 78
End: 2023-01-01
Payer: MEDICARE

## 2023-01-01 ENCOUNTER — INPATIENT (INPATIENT)
Facility: HOSPITAL | Age: 78
LOS: 8 days | Discharge: NURSING, REHAB OR LONG-TERM CARE | DRG: 951 | End: 2023-12-01
Attending: STUDENT IN AN ORGANIZED HEALTH CARE EDUCATION/TRAINING PROGRAM | Admitting: STUDENT IN AN ORGANIZED HEALTH CARE EDUCATION/TRAINING PROGRAM
Payer: MEDICARE

## 2023-01-01 ENCOUNTER — APPOINTMENT (OUTPATIENT)
Dept: HEMATOLOGY ONCOLOGY | Facility: CLINIC | Age: 78
End: 2023-01-01

## 2023-01-01 ENCOUNTER — APPOINTMENT (OUTPATIENT)
Dept: OTOLARYNGOLOGY | Facility: CLINIC | Age: 78
End: 2023-01-01
Payer: MEDICARE

## 2023-01-01 ENCOUNTER — APPOINTMENT (OUTPATIENT)
Age: 78
End: 2023-01-01

## 2023-01-01 ENCOUNTER — APPOINTMENT (OUTPATIENT)
Dept: ENDOCRINOLOGY | Facility: CLINIC | Age: 78
End: 2023-01-01
Payer: MEDICARE

## 2023-01-01 ENCOUNTER — INPATIENT (INPATIENT)
Facility: HOSPITAL | Age: 78
LOS: 7 days | DRG: 951 | End: 2023-12-22
Attending: INTERNAL MEDICINE | Admitting: INTERNAL MEDICINE
Payer: OTHER MISCELLANEOUS

## 2023-01-01 ENCOUNTER — APPOINTMENT (OUTPATIENT)
Dept: CT IMAGING | Facility: HOSPITAL | Age: 78
End: 2023-01-01

## 2023-01-01 ENCOUNTER — INPATIENT (INPATIENT)
Facility: HOSPITAL | Age: 78
LOS: 23 days | Discharge: HOME HEALTH | DRG: 146 | End: 2023-11-22
Attending: INTERNAL MEDICINE | Admitting: INTERNAL MEDICINE
Payer: MEDICARE

## 2023-01-01 ENCOUNTER — APPOINTMENT (OUTPATIENT)
Dept: OTOLARYNGOLOGY | Facility: AMBULATORY SURGERY CENTER | Age: 78
End: 2023-01-01

## 2023-01-01 ENCOUNTER — INPATIENT (INPATIENT)
Facility: HOSPITAL | Age: 78
LOS: 11 days | Discharge: HOSPICE | DRG: 146 | End: 2023-12-14
Attending: INTERNAL MEDICINE | Admitting: STUDENT IN AN ORGANIZED HEALTH CARE EDUCATION/TRAINING PROGRAM
Payer: MEDICARE

## 2023-01-01 ENCOUNTER — INPATIENT (INPATIENT)
Facility: HOSPITAL | Age: 78
LOS: 8 days | Discharge: HOME | DRG: 144 | End: 2023-07-23
Attending: OTOLARYNGOLOGY | Admitting: OTOLARYNGOLOGY
Payer: MEDICARE

## 2023-01-01 ENCOUNTER — RESULT REVIEW (OUTPATIENT)
Age: 78
End: 2023-01-01

## 2023-01-01 VITALS
DIASTOLIC BLOOD PRESSURE: 70 MMHG | TEMPERATURE: 98.2 F | DIASTOLIC BLOOD PRESSURE: 58 MMHG | SYSTOLIC BLOOD PRESSURE: 140 MMHG | RESPIRATION RATE: 17 BRPM | WEIGHT: 164 LBS | RESPIRATION RATE: 18 BRPM | BODY MASS INDEX: 26.36 KG/M2 | TEMPERATURE: 98 F | OXYGEN SATURATION: 97 % | OXYGEN SATURATION: 96 % | HEIGHT: 66 IN | HEART RATE: 68 BPM | SYSTOLIC BLOOD PRESSURE: 132 MMHG | HEART RATE: 87 BPM

## 2023-01-01 VITALS
SYSTOLIC BLOOD PRESSURE: 122 MMHG | TEMPERATURE: 98 F | RESPIRATION RATE: 17 BRPM | DIASTOLIC BLOOD PRESSURE: 74 MMHG | OXYGEN SATURATION: 99 % | HEART RATE: 68 BPM

## 2023-01-01 VITALS
TEMPERATURE: 98.6 F | DIASTOLIC BLOOD PRESSURE: 76 MMHG | BODY MASS INDEX: 26.36 KG/M2 | RESPIRATION RATE: 18 BRPM | HEIGHT: 66 IN | WEIGHT: 164 LBS | HEART RATE: 66 BPM | OXYGEN SATURATION: 95 % | SYSTOLIC BLOOD PRESSURE: 138 MMHG

## 2023-01-01 VITALS
HEIGHT: 66 IN | OXYGEN SATURATION: 98 % | SYSTOLIC BLOOD PRESSURE: 131 MMHG | HEART RATE: 95 BPM | RESPIRATION RATE: 18 BRPM | TEMPERATURE: 98 F | DIASTOLIC BLOOD PRESSURE: 77 MMHG

## 2023-01-01 VITALS
HEIGHT: 66 IN | TEMPERATURE: 98 F | HEART RATE: 70 BPM | SYSTOLIC BLOOD PRESSURE: 117 MMHG | WEIGHT: 149.91 LBS | DIASTOLIC BLOOD PRESSURE: 54 MMHG | OXYGEN SATURATION: 97 % | RESPIRATION RATE: 18 BRPM

## 2023-01-01 VITALS
HEIGHT: 66 IN | TEMPERATURE: 98 F | HEART RATE: 88 BPM | DIASTOLIC BLOOD PRESSURE: 64 MMHG | RESPIRATION RATE: 16 BRPM | OXYGEN SATURATION: 100 % | WEIGHT: 132.28 LBS | SYSTOLIC BLOOD PRESSURE: 107 MMHG

## 2023-01-01 VITALS
HEIGHT: 66 IN | OXYGEN SATURATION: 100 % | SYSTOLIC BLOOD PRESSURE: 127 MMHG | RESPIRATION RATE: 18 BRPM | TEMPERATURE: 98 F | HEART RATE: 64 BPM | WEIGHT: 164 LBS | BODY MASS INDEX: 26.36 KG/M2 | DIASTOLIC BLOOD PRESSURE: 80 MMHG

## 2023-01-01 VITALS
TEMPERATURE: 97.7 F | SYSTOLIC BLOOD PRESSURE: 118 MMHG | HEART RATE: 52 BPM | TEMPERATURE: 97.8 F | SYSTOLIC BLOOD PRESSURE: 140 MMHG | DIASTOLIC BLOOD PRESSURE: 50 MMHG | HEART RATE: 55 BPM | HEIGHT: 66 IN | DIASTOLIC BLOOD PRESSURE: 45 MMHG | OXYGEN SATURATION: 98 % | RESPIRATION RATE: 18 BRPM | WEIGHT: 166 LBS | BODY MASS INDEX: 26.68 KG/M2 | HEIGHT: 66 IN | BODY MASS INDEX: 26.84 KG/M2 | OXYGEN SATURATION: 95 % | RESPIRATION RATE: 16 BRPM | WEIGHT: 167 LBS

## 2023-01-01 VITALS
RESPIRATION RATE: 18 BRPM | TEMPERATURE: 97 F | HEART RATE: 70 BPM | DIASTOLIC BLOOD PRESSURE: 80 MMHG | OXYGEN SATURATION: 99 % | SYSTOLIC BLOOD PRESSURE: 147 MMHG

## 2023-01-01 VITALS
DIASTOLIC BLOOD PRESSURE: 68 MMHG | HEIGHT: 65.75 IN | WEIGHT: 165.35 LBS | TEMPERATURE: 98 F | HEART RATE: 57 BPM | SYSTOLIC BLOOD PRESSURE: 161 MMHG | RESPIRATION RATE: 18 BRPM | OXYGEN SATURATION: 97 %

## 2023-01-01 VITALS
OXYGEN SATURATION: 96 % | TEMPERATURE: 98.2 F | SYSTOLIC BLOOD PRESSURE: 149 MMHG | WEIGHT: 160 LBS | HEART RATE: 56 BPM | BODY MASS INDEX: 25.71 KG/M2 | HEIGHT: 66 IN | DIASTOLIC BLOOD PRESSURE: 75 MMHG | RESPIRATION RATE: 18 BRPM

## 2023-01-01 VITALS
DIASTOLIC BLOOD PRESSURE: 73 MMHG | SYSTOLIC BLOOD PRESSURE: 150 MMHG | TEMPERATURE: 98 F | OXYGEN SATURATION: 98 % | RESPIRATION RATE: 18 BRPM | HEART RATE: 53 BPM

## 2023-01-01 VITALS
SYSTOLIC BLOOD PRESSURE: 135 MMHG | RESPIRATION RATE: 18 BRPM | RESPIRATION RATE: 18 BRPM | DIASTOLIC BLOOD PRESSURE: 71 MMHG | OXYGEN SATURATION: 99 % | TEMPERATURE: 98.4 F | DIASTOLIC BLOOD PRESSURE: 52 MMHG | HEART RATE: 57 BPM | OXYGEN SATURATION: 97 % | HEART RATE: 60 BPM | SYSTOLIC BLOOD PRESSURE: 150 MMHG | TEMPERATURE: 98 F | WEIGHT: 160 LBS | WEIGHT: 154.98 LBS | HEIGHT: 66 IN | BODY MASS INDEX: 25.71 KG/M2 | HEIGHT: 66 IN

## 2023-01-01 VITALS
HEART RATE: 86 BPM | RESPIRATION RATE: 18 BRPM | DIASTOLIC BLOOD PRESSURE: 64 MMHG | OXYGEN SATURATION: 96 % | TEMPERATURE: 97 F | SYSTOLIC BLOOD PRESSURE: 110 MMHG

## 2023-01-01 VITALS
RESPIRATION RATE: 18 BRPM | WEIGHT: 152 LBS | DIASTOLIC BLOOD PRESSURE: 70 MMHG | HEART RATE: 60 BPM | TEMPERATURE: 98.1 F | HEIGHT: 66 IN | SYSTOLIC BLOOD PRESSURE: 150 MMHG | OXYGEN SATURATION: 100 % | BODY MASS INDEX: 24.43 KG/M2

## 2023-01-01 VITALS
DIASTOLIC BLOOD PRESSURE: 62 MMHG | SYSTOLIC BLOOD PRESSURE: 147 MMHG | HEART RATE: 52 BPM | WEIGHT: 164.91 LBS | OXYGEN SATURATION: 99 % | HEIGHT: 66 IN | TEMPERATURE: 97 F | RESPIRATION RATE: 18 BRPM

## 2023-01-01 VITALS
HEART RATE: 62 BPM | RESPIRATION RATE: 18 BRPM | WEIGHT: 160.06 LBS | DIASTOLIC BLOOD PRESSURE: 75 MMHG | TEMPERATURE: 98 F | OXYGEN SATURATION: 96 % | SYSTOLIC BLOOD PRESSURE: 185 MMHG

## 2023-01-01 VITALS
TEMPERATURE: 98.6 F | WEIGHT: 153.5 LBS | BODY MASS INDEX: 24.78 KG/M2 | OXYGEN SATURATION: 99 % | SYSTOLIC BLOOD PRESSURE: 169 MMHG | DIASTOLIC BLOOD PRESSURE: 67 MMHG | HEART RATE: 60 BPM

## 2023-01-01 VITALS
HEART RATE: 54 BPM | BODY MASS INDEX: 27.32 KG/M2 | TEMPERATURE: 97.9 F | DIASTOLIC BLOOD PRESSURE: 72 MMHG | HEIGHT: 66 IN | SYSTOLIC BLOOD PRESSURE: 154 MMHG | WEIGHT: 170 LBS | OXYGEN SATURATION: 95 %

## 2023-01-01 VITALS
TEMPERATURE: 98 F | HEIGHT: 65.98 IN | WEIGHT: 132.28 LBS | SYSTOLIC BLOOD PRESSURE: 132 MMHG | DIASTOLIC BLOOD PRESSURE: 60 MMHG | OXYGEN SATURATION: 95 % | HEART RATE: 89 BPM | RESPIRATION RATE: 18 BRPM

## 2023-01-01 VITALS
DIASTOLIC BLOOD PRESSURE: 60 MMHG | HEIGHT: 66 IN | SYSTOLIC BLOOD PRESSURE: 109 MMHG | WEIGHT: 160.06 LBS | RESPIRATION RATE: 18 BRPM | HEART RATE: 80 BPM | TEMPERATURE: 98 F | OXYGEN SATURATION: 95 %

## 2023-01-01 VITALS
WEIGHT: 166 LBS | HEIGHT: 66 IN | SYSTOLIC BLOOD PRESSURE: 145 MMHG | OXYGEN SATURATION: 98 % | TEMPERATURE: 97.3 F | DIASTOLIC BLOOD PRESSURE: 60 MMHG | BODY MASS INDEX: 26.68 KG/M2 | HEART RATE: 48 BPM

## 2023-01-01 VITALS
DIASTOLIC BLOOD PRESSURE: 71 MMHG | OXYGEN SATURATION: 100 % | HEIGHT: 66 IN | HEART RATE: 62 BPM | RESPIRATION RATE: 18 BRPM | TEMPERATURE: 97.7 F | BODY MASS INDEX: 24.43 KG/M2 | SYSTOLIC BLOOD PRESSURE: 150 MMHG | WEIGHT: 152 LBS

## 2023-01-01 VITALS
HEIGHT: 66 IN | SYSTOLIC BLOOD PRESSURE: 155 MMHG | DIASTOLIC BLOOD PRESSURE: 70 MMHG | RESPIRATION RATE: 18 BRPM | OXYGEN SATURATION: 99 % | TEMPERATURE: 98 F | HEART RATE: 50 BPM | WEIGHT: 162.04 LBS

## 2023-01-01 VITALS
SYSTOLIC BLOOD PRESSURE: 110 MMHG | HEART RATE: 98 BPM | DIASTOLIC BLOOD PRESSURE: 66 MMHG | RESPIRATION RATE: 19 BRPM | OXYGEN SATURATION: 92 % | TEMPERATURE: 100 F

## 2023-01-01 VITALS
WEIGHT: 170 LBS | OXYGEN SATURATION: 98 % | DIASTOLIC BLOOD PRESSURE: 71 MMHG | HEART RATE: 68 BPM | SYSTOLIC BLOOD PRESSURE: 130 MMHG | BODY MASS INDEX: 27.32 KG/M2 | RESPIRATION RATE: 18 BRPM | HEIGHT: 66 IN | TEMPERATURE: 98.3 F

## 2023-01-01 VITALS
SYSTOLIC BLOOD PRESSURE: 153 MMHG | TEMPERATURE: 98 F | OXYGEN SATURATION: 96 % | HEART RATE: 54 BPM | DIASTOLIC BLOOD PRESSURE: 74 MMHG | RESPIRATION RATE: 18 BRPM

## 2023-01-01 VITALS
DIASTOLIC BLOOD PRESSURE: 66 MMHG | HEIGHT: 66 IN | HEART RATE: 54 BPM | OXYGEN SATURATION: 99 % | SYSTOLIC BLOOD PRESSURE: 125 MMHG | WEIGHT: 166 LBS | BODY MASS INDEX: 26.68 KG/M2 | TEMPERATURE: 97.7 F

## 2023-01-01 VITALS — TEMPERATURE: 97 F | HEART RATE: 125 BPM | OXYGEN SATURATION: 92 % | RESPIRATION RATE: 20 BRPM

## 2023-01-01 VITALS
SYSTOLIC BLOOD PRESSURE: 126 MMHG | RESPIRATION RATE: 17 BRPM | HEART RATE: 50 BPM | DIASTOLIC BLOOD PRESSURE: 53 MMHG | OXYGEN SATURATION: 96 % | TEMPERATURE: 96 F

## 2023-01-01 VITALS
OXYGEN SATURATION: 99 % | RESPIRATION RATE: 18 BRPM | DIASTOLIC BLOOD PRESSURE: 62 MMHG | SYSTOLIC BLOOD PRESSURE: 125 MMHG | TEMPERATURE: 97 F | HEART RATE: 53 BPM

## 2023-01-01 VITALS
DIASTOLIC BLOOD PRESSURE: 67 MMHG | OXYGEN SATURATION: 98 % | RESPIRATION RATE: 16 BRPM | WEIGHT: 165.35 LBS | HEIGHT: 65.75 IN | TEMPERATURE: 96 F | HEART RATE: 58 BPM | SYSTOLIC BLOOD PRESSURE: 129 MMHG

## 2023-01-01 VITALS
HEART RATE: 52 BPM | RESPIRATION RATE: 18 BRPM | OXYGEN SATURATION: 100 % | WEIGHT: 165 LBS | TEMPERATURE: 97.5 F | DIASTOLIC BLOOD PRESSURE: 62 MMHG | SYSTOLIC BLOOD PRESSURE: 147 MMHG | HEIGHT: 66 IN | BODY MASS INDEX: 26.52 KG/M2

## 2023-01-01 VITALS
SYSTOLIC BLOOD PRESSURE: 148 MMHG | DIASTOLIC BLOOD PRESSURE: 76 MMHG | RESPIRATION RATE: 16 BRPM | TEMPERATURE: 98 F | HEART RATE: 58 BPM | WEIGHT: 151.9 LBS | OXYGEN SATURATION: 98 % | HEIGHT: 66 IN

## 2023-01-01 VITALS — TEMPERATURE: 99 F

## 2023-01-01 DIAGNOSIS — R47.02 DYSPHASIA: ICD-10-CM

## 2023-01-01 DIAGNOSIS — Z71.89 OTHER SPECIFIED COUNSELING: ICD-10-CM

## 2023-01-01 DIAGNOSIS — R50.9 FEVER, UNSPECIFIED: ICD-10-CM

## 2023-01-01 DIAGNOSIS — H54.8 LEGAL BLINDNESS, AS DEFINED IN USA: ICD-10-CM

## 2023-01-01 DIAGNOSIS — R63.30 FEEDING DIFFICULTIES, UNSPECIFIED: ICD-10-CM

## 2023-01-01 DIAGNOSIS — Z29.9 ENCOUNTER FOR PROPHYLACTIC MEASURES, UNSPECIFIED: ICD-10-CM

## 2023-01-01 DIAGNOSIS — Z66 DO NOT RESUSCITATE: ICD-10-CM

## 2023-01-01 DIAGNOSIS — G93.41 METABOLIC ENCEPHALOPATHY: ICD-10-CM

## 2023-01-01 DIAGNOSIS — H61.23 IMPACTED CERUMEN, BILATERAL: ICD-10-CM

## 2023-01-01 DIAGNOSIS — C32.9 MALIGNANT NEOPLASM OF LARYNX, UNSPECIFIED: ICD-10-CM

## 2023-01-01 DIAGNOSIS — K56.7 ILEUS, UNSPECIFIED: ICD-10-CM

## 2023-01-01 DIAGNOSIS — R13.10 DYSPHAGIA, UNSPECIFIED: ICD-10-CM

## 2023-01-01 DIAGNOSIS — Z75.1 PERSON AWAITING ADMISSION TO ADEQUATE FACILITY ELSEWHERE: ICD-10-CM

## 2023-01-01 DIAGNOSIS — E43 UNSPECIFIED SEVERE PROTEIN-CALORIE MALNUTRITION: ICD-10-CM

## 2023-01-01 DIAGNOSIS — Z93.1 GASTROSTOMY STATUS: Chronic | ICD-10-CM

## 2023-01-01 DIAGNOSIS — L89.626 PRESSURE-INDUCED DEEP TISSUE DAMAGE OF LEFT HEEL: ICD-10-CM

## 2023-01-01 DIAGNOSIS — J96.01 ACUTE RESPIRATORY FAILURE WITH HYPOXIA: ICD-10-CM

## 2023-01-01 DIAGNOSIS — I10 ESSENTIAL (PRIMARY) HYPERTENSION: ICD-10-CM

## 2023-01-01 DIAGNOSIS — E87.5 HYPERKALEMIA: ICD-10-CM

## 2023-01-01 DIAGNOSIS — Z65.9 PROBLEM RELATED TO UNSPECIFIED PSYCHOSOCIAL CIRCUMSTANCES: ICD-10-CM

## 2023-01-01 DIAGNOSIS — R65.21 SEVERE SEPSIS WITH SEPTIC SHOCK: ICD-10-CM

## 2023-01-01 DIAGNOSIS — E11.51 TYPE 2 DIABETES MELLITUS WITH DIABETIC PERIPHERAL ANGIOPATHY WITHOUT GANGRENE: ICD-10-CM

## 2023-01-01 DIAGNOSIS — R19.7 DIARRHEA, UNSPECIFIED: ICD-10-CM

## 2023-01-01 DIAGNOSIS — C32.0 MALIGNANT NEOPLASM OF GLOTTIS: ICD-10-CM

## 2023-01-01 DIAGNOSIS — N39.0 URINARY TRACT INFECTION, SITE NOT SPECIFIED: ICD-10-CM

## 2023-01-01 DIAGNOSIS — K20.80 OTHER ESOPHAGITIS WITHOUT BLEEDING: ICD-10-CM

## 2023-01-01 DIAGNOSIS — Z79.891 LONG TERM (CURRENT) USE OF OPIATE ANALGESIC: ICD-10-CM

## 2023-01-01 DIAGNOSIS — Z79.82 LONG TERM (CURRENT) USE OF ASPIRIN: ICD-10-CM

## 2023-01-01 DIAGNOSIS — I11.0 HYPERTENSIVE HEART DISEASE WITH HEART FAILURE: ICD-10-CM

## 2023-01-01 DIAGNOSIS — E11.39 TYPE 2 DIABETES MELLITUS WITH OTHER DIABETIC OPHTHALMIC COMPLICATION: ICD-10-CM

## 2023-01-01 DIAGNOSIS — L58.9 RADIODERMATITIS, UNSPECIFIED: ICD-10-CM

## 2023-01-01 DIAGNOSIS — I25.10 ATHEROSCLEROTIC HEART DISEASE OF NATIVE CORONARY ARTERY WITHOUT ANGINA PECTORIS: ICD-10-CM

## 2023-01-01 DIAGNOSIS — R53.81 OTHER MALAISE: ICD-10-CM

## 2023-01-01 DIAGNOSIS — I50.20 UNSPECIFIED SYSTOLIC (CONGESTIVE) HEART FAILURE: ICD-10-CM

## 2023-01-01 DIAGNOSIS — K12.33 ORAL MUCOSITIS (ULCERATIVE) DUE TO RADIATION: ICD-10-CM

## 2023-01-01 DIAGNOSIS — Z78.1 PHYSICAL RESTRAINT STATUS: ICD-10-CM

## 2023-01-01 DIAGNOSIS — Z51.5 ENCOUNTER FOR PALLIATIVE CARE: ICD-10-CM

## 2023-01-01 DIAGNOSIS — R06.00 DYSPNEA, UNSPECIFIED: ICD-10-CM

## 2023-01-01 DIAGNOSIS — T45.1X5A ADVERSE EFFECT OF ANTINEOPLASTIC AND IMMUNOSUPPRESSIVE DRUGS, INITIAL ENCOUNTER: ICD-10-CM

## 2023-01-01 DIAGNOSIS — R13.12 DYSPHAGIA, OROPHARYNGEAL PHASE: ICD-10-CM

## 2023-01-01 DIAGNOSIS — K11.7 DISTURBANCES OF SALIVARY SECRETION: ICD-10-CM

## 2023-01-01 DIAGNOSIS — J38.3 OTHER DISEASES OF VOCAL CORDS: ICD-10-CM

## 2023-01-01 DIAGNOSIS — Z79.4 LONG TERM (CURRENT) USE OF INSULIN: ICD-10-CM

## 2023-01-01 DIAGNOSIS — L30.8 OTHER SPECIFIED DERMATITIS: ICD-10-CM

## 2023-01-01 DIAGNOSIS — E44.0 MODERATE PROTEIN-CALORIE MALNUTRITION: ICD-10-CM

## 2023-01-01 DIAGNOSIS — R45.1 RESTLESSNESS AND AGITATION: ICD-10-CM

## 2023-01-01 DIAGNOSIS — E11.9 TYPE 2 DIABETES MELLITUS WITHOUT COMPLICATIONS: ICD-10-CM

## 2023-01-01 DIAGNOSIS — Z95.820 PERIPHERAL VASCULAR ANGIOPLASTY STATUS WITH IMPLANTS AND GRAFTS: ICD-10-CM

## 2023-01-01 DIAGNOSIS — K94.23 GASTROSTOMY MALFUNCTION: ICD-10-CM

## 2023-01-01 DIAGNOSIS — L89.616 PRESSURE-INDUCED DEEP TISSUE DAMAGE OF RIGHT HEEL: ICD-10-CM

## 2023-01-01 DIAGNOSIS — N17.9 ACUTE KIDNEY FAILURE, UNSPECIFIED: ICD-10-CM

## 2023-01-01 DIAGNOSIS — E78.5 HYPERLIPIDEMIA, UNSPECIFIED: ICD-10-CM

## 2023-01-01 DIAGNOSIS — I50.22 CHRONIC SYSTOLIC (CONGESTIVE) HEART FAILURE: ICD-10-CM

## 2023-01-01 DIAGNOSIS — K12.31 ORAL MUCOSITIS (ULCERATIVE) DUE TO ANTINEOPLASTIC THERAPY: ICD-10-CM

## 2023-01-01 DIAGNOSIS — Z79.01 LONG TERM (CURRENT) USE OF ANTICOAGULANTS: ICD-10-CM

## 2023-01-01 DIAGNOSIS — R49.0 DYSPHONIA: ICD-10-CM

## 2023-01-01 DIAGNOSIS — F05 DELIRIUM DUE TO KNOWN PHYSIOLOGICAL CONDITION: ICD-10-CM

## 2023-01-01 DIAGNOSIS — D64.9 ANEMIA, UNSPECIFIED: ICD-10-CM
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
## 2023-01-01 DIAGNOSIS — Z95.1 PRESENCE OF AORTOCORONARY BYPASS GRAFT: ICD-10-CM

## 2023-01-01 DIAGNOSIS — K59.00 CONSTIPATION, UNSPECIFIED: ICD-10-CM

## 2023-01-01 DIAGNOSIS — Z95.5 PRESENCE OF CORONARY ANGIOPLASTY IMPLANT AND GRAFT: ICD-10-CM

## 2023-01-01 DIAGNOSIS — R53.2 FUNCTIONAL QUADRIPLEGIA: ICD-10-CM

## 2023-01-01 DIAGNOSIS — Z93.0 TRACHEOSTOMY STATUS: ICD-10-CM

## 2023-01-01 DIAGNOSIS — Z63.8 OTHER SPECIFIED PROBLEMS RELATED TO PRIMARY SUPPORT GROUP: ICD-10-CM

## 2023-01-01 DIAGNOSIS — D72.819 DECREASED WHITE BLOOD CELL COUNT, UNSPECIFIED: ICD-10-CM

## 2023-01-01 DIAGNOSIS — J15.69 PNEUMONIA DUE TO OTHER GRAM-NEGATIVE BACTERIA: ICD-10-CM

## 2023-01-01 DIAGNOSIS — E11.649 TYPE 2 DIABETES MELLITUS WITH HYPOGLYCEMIA WITHOUT COMA: ICD-10-CM

## 2023-01-01 DIAGNOSIS — E11.65 TYPE 2 DIABETES MELLITUS WITH HYPERGLYCEMIA: ICD-10-CM

## 2023-01-01 DIAGNOSIS — C76.0 MALIGNANT NEOPLASM OF HEAD, FACE AND NECK: ICD-10-CM

## 2023-01-01 DIAGNOSIS — H42 GLAUCOMA IN DISEASES CLASSIFIED ELSEWHERE: ICD-10-CM

## 2023-01-01 DIAGNOSIS — E83.42 HYPOMAGNESEMIA: ICD-10-CM

## 2023-01-01 DIAGNOSIS — Z79.02 LONG TERM (CURRENT) USE OF ANTITHROMBOTICS/ANTIPLATELETS: ICD-10-CM

## 2023-01-01 DIAGNOSIS — L98.8 OTHER SPECIFIED DISORDERS OF THE SKIN AND SUBCUTANEOUS TISSUE: ICD-10-CM

## 2023-01-01 DIAGNOSIS — E87.0 HYPEROSMOLALITY AND HYPERNATREMIA: ICD-10-CM

## 2023-01-01 DIAGNOSIS — E11.319 TYPE 2 DIABETES MELLITUS WITH UNSPECIFIED DIABETIC RETINOPATHY WITHOUT MACULAR EDEMA: ICD-10-CM

## 2023-01-01 DIAGNOSIS — R00.1 BRADYCARDIA, UNSPECIFIED: ICD-10-CM

## 2023-01-01 DIAGNOSIS — G89.3 NEOPLASM RELATED PAIN (ACUTE) (CHRONIC): ICD-10-CM

## 2023-01-01 DIAGNOSIS — B37.81 CANDIDAL ESOPHAGITIS: ICD-10-CM

## 2023-01-01 DIAGNOSIS — A41.9 SEPSIS, UNSPECIFIED ORGANISM: ICD-10-CM

## 2023-01-01 DIAGNOSIS — Z74.2 NEED FOR ASSISTANCE AT HOME AND NO OTHER HOUSEHOLD MEMBER ABLE TO RENDER CARE: ICD-10-CM

## 2023-01-01 DIAGNOSIS — I25.2 OLD MYOCARDIAL INFARCTION: ICD-10-CM

## 2023-01-01 DIAGNOSIS — N40.1 BENIGN PROSTATIC HYPERPLASIA WITH LOWER URINARY TRACT SYMPTOMS: ICD-10-CM

## 2023-01-01 DIAGNOSIS — D63.0 ANEMIA IN NEOPLASTIC DISEASE: ICD-10-CM

## 2023-01-01 DIAGNOSIS — B96.89 OTHER SPECIFIED BACTERIAL AGENTS AS THE CAUSE OF DISEASES CLASSIFIED ELSEWHERE: ICD-10-CM

## 2023-01-01 DIAGNOSIS — E87.20 ACIDOSIS, UNSPECIFIED: ICD-10-CM

## 2023-01-01 DIAGNOSIS — R53.1 WEAKNESS: ICD-10-CM

## 2023-01-01 DIAGNOSIS — R33.8 OTHER RETENTION OF URINE: ICD-10-CM

## 2023-01-01 DIAGNOSIS — J69.0 PNEUMONITIS DUE TO INHALATION OF FOOD AND VOMIT: ICD-10-CM

## 2023-01-01 DIAGNOSIS — J04.0 ACUTE LARYNGITIS: ICD-10-CM

## 2023-01-01 DIAGNOSIS — Z93.1 GASTROSTOMY STATUS: ICD-10-CM

## 2023-01-01 DIAGNOSIS — H40.9 UNSPECIFIED GLAUCOMA: ICD-10-CM

## 2023-01-01 DIAGNOSIS — E11.8 TYPE 2 DIABETES MELLITUS WITH UNSPECIFIED COMPLICATIONS: ICD-10-CM

## 2023-01-01 LAB
-  AMPICILLIN/SULBACTAM: SIGNIFICANT CHANGE UP
-  AMPICILLIN: SIGNIFICANT CHANGE UP
-  CEFAZOLIN: SIGNIFICANT CHANGE UP
-  CEFEPIME: SIGNIFICANT CHANGE UP
-  CEFTRIAXONE: SIGNIFICANT CHANGE UP
-  CIPROFLOXACIN: SIGNIFICANT CHANGE UP
-  CLINDAMYCIN: SIGNIFICANT CHANGE UP
-  CLINDAMYCIN: SIGNIFICANT CHANGE UP
-  ERTAPENEM: SIGNIFICANT CHANGE UP
-  ERYTHROMYCIN: SIGNIFICANT CHANGE UP
-  ERYTHROMYCIN: SIGNIFICANT CHANGE UP
-  GENTAMICIN: SIGNIFICANT CHANGE UP
-  LINEZOLID: SIGNIFICANT CHANGE UP
-  LINEZOLID: SIGNIFICANT CHANGE UP
-  MEROPENEM: SIGNIFICANT CHANGE UP
-  MEROPENEM: SIGNIFICANT CHANGE UP
-  NITROFURANTOIN: SIGNIFICANT CHANGE UP
-  OXACILLIN: SIGNIFICANT CHANGE UP
-  OXACILLIN: SIGNIFICANT CHANGE UP
-  PIPERACILLIN/TAZOBACTAM: SIGNIFICANT CHANGE UP
-  RIFAMPIN: SIGNIFICANT CHANGE UP
-  RIFAMPIN: SIGNIFICANT CHANGE UP
-  TOBRAMYCIN: SIGNIFICANT CHANGE UP
-  TRIMETHOPRIM/SULFAMETHOXAZOLE: SIGNIFICANT CHANGE UP
-  VANCOMYCIN: SIGNIFICANT CHANGE UP
-  VANCOMYCIN: SIGNIFICANT CHANGE UP
A1C WITH ESTIMATED AVERAGE GLUCOSE RESULT: 7.9 % — HIGH (ref 4–5.6)
A1C WITH ESTIMATED AVERAGE GLUCOSE RESULT: 9 % — HIGH (ref 4–5.6)
A1C WITH ESTIMATED AVERAGE GLUCOSE RESULT: 9 % — HIGH (ref 4–5.6)
ALBUMIN SERPL ELPH-MCNC: 1.7 G/DL — LOW (ref 3.3–5)
ALBUMIN SERPL ELPH-MCNC: 1.7 G/DL — LOW (ref 3.3–5)
ALBUMIN SERPL ELPH-MCNC: 1.8 G/DL — LOW (ref 3.3–5)
ALBUMIN SERPL ELPH-MCNC: 1.9 G/DL — LOW (ref 3.3–5)
ALBUMIN SERPL ELPH-MCNC: 2 G/DL — LOW (ref 3.3–5)
ALBUMIN SERPL ELPH-MCNC: 2 G/DL — LOW (ref 3.3–5)
ALBUMIN SERPL ELPH-MCNC: 2.1 G/DL — LOW (ref 3.3–5)
ALBUMIN SERPL ELPH-MCNC: 2.2 G/DL — LOW (ref 3.3–5)
ALBUMIN SERPL ELPH-MCNC: 2.3 G/DL — LOW (ref 3.3–5)
ALBUMIN SERPL ELPH-MCNC: 2.4 G/DL — LOW (ref 3.3–5)
ALBUMIN SERPL ELPH-MCNC: 2.5 G/DL — LOW (ref 3.3–5)
ALBUMIN SERPL ELPH-MCNC: 2.6 G/DL — LOW (ref 3.3–5)
ALBUMIN SERPL ELPH-MCNC: 2.7 G/DL — LOW (ref 3.3–5)
ALBUMIN SERPL ELPH-MCNC: 2.7 G/DL — LOW (ref 3.3–5)
ALBUMIN SERPL ELPH-MCNC: 2.8 G/DL — LOW (ref 3.3–5)
ALBUMIN SERPL ELPH-MCNC: 2.9 G/DL — LOW (ref 3.3–5)
ALBUMIN SERPL ELPH-MCNC: 3 G/DL — LOW (ref 3.3–5)
ALBUMIN SERPL ELPH-MCNC: 3 G/DL — LOW (ref 3.3–5)
ALBUMIN SERPL ELPH-MCNC: 3.2 G/DL — LOW (ref 3.3–5)
ALBUMIN SERPL ELPH-MCNC: 3.3 G/DL — SIGNIFICANT CHANGE UP (ref 3.3–5)
ALBUMIN SERPL ELPH-MCNC: 3.4 G/DL
ALBUMIN SERPL ELPH-MCNC: 3.4 G/DL — SIGNIFICANT CHANGE UP (ref 3.3–5)
ALBUMIN SERPL ELPH-MCNC: 3.6 G/DL — SIGNIFICANT CHANGE UP (ref 3.3–5)
ALBUMIN SERPL ELPH-MCNC: 3.7 G/DL
ALP BLD-CCNC: 73 U/L
ALP BLD-CCNC: 97 U/L
ALP SERPL-CCNC: 114 U/L — SIGNIFICANT CHANGE UP (ref 40–120)
ALP SERPL-CCNC: 114 U/L — SIGNIFICANT CHANGE UP (ref 40–120)
ALP SERPL-CCNC: 132 U/L — HIGH (ref 40–120)
ALP SERPL-CCNC: 132 U/L — HIGH (ref 40–120)
ALP SERPL-CCNC: 136 U/L — HIGH (ref 40–120)
ALP SERPL-CCNC: 136 U/L — HIGH (ref 40–120)
ALP SERPL-CCNC: 141 U/L — HIGH (ref 40–120)
ALP SERPL-CCNC: 141 U/L — HIGH (ref 40–120)
ALP SERPL-CCNC: 180 U/L — HIGH (ref 40–120)
ALP SERPL-CCNC: 180 U/L — HIGH (ref 40–120)
ALP SERPL-CCNC: 56 U/L — SIGNIFICANT CHANGE UP (ref 40–120)
ALP SERPL-CCNC: 56 U/L — SIGNIFICANT CHANGE UP (ref 40–120)
ALP SERPL-CCNC: 59 U/L — SIGNIFICANT CHANGE UP (ref 40–120)
ALP SERPL-CCNC: 59 U/L — SIGNIFICANT CHANGE UP (ref 40–120)
ALP SERPL-CCNC: 61 U/L — SIGNIFICANT CHANGE UP (ref 40–120)
ALP SERPL-CCNC: 61 U/L — SIGNIFICANT CHANGE UP (ref 40–120)
ALP SERPL-CCNC: 62 U/L — SIGNIFICANT CHANGE UP (ref 40–120)
ALP SERPL-CCNC: 62 U/L — SIGNIFICANT CHANGE UP (ref 40–120)
ALP SERPL-CCNC: 63 U/L — SIGNIFICANT CHANGE UP (ref 40–120)
ALP SERPL-CCNC: 63 U/L — SIGNIFICANT CHANGE UP (ref 40–120)
ALP SERPL-CCNC: 64 U/L — SIGNIFICANT CHANGE UP (ref 40–120)
ALP SERPL-CCNC: 65 U/L — SIGNIFICANT CHANGE UP (ref 40–120)
ALP SERPL-CCNC: 65 U/L — SIGNIFICANT CHANGE UP (ref 40–120)
ALP SERPL-CCNC: 66 U/L — SIGNIFICANT CHANGE UP (ref 40–120)
ALP SERPL-CCNC: 66 U/L — SIGNIFICANT CHANGE UP (ref 40–120)
ALP SERPL-CCNC: 67 U/L — SIGNIFICANT CHANGE UP (ref 40–120)
ALP SERPL-CCNC: 68 U/L — SIGNIFICANT CHANGE UP (ref 40–120)
ALP SERPL-CCNC: 68 U/L — SIGNIFICANT CHANGE UP (ref 40–120)
ALP SERPL-CCNC: 69 U/L — SIGNIFICANT CHANGE UP (ref 40–120)
ALP SERPL-CCNC: 70 U/L — SIGNIFICANT CHANGE UP (ref 40–120)
ALP SERPL-CCNC: 71 U/L — SIGNIFICANT CHANGE UP (ref 40–120)
ALP SERPL-CCNC: 71 U/L — SIGNIFICANT CHANGE UP (ref 40–120)
ALP SERPL-CCNC: 72 U/L — SIGNIFICANT CHANGE UP (ref 40–120)
ALP SERPL-CCNC: 73 U/L — SIGNIFICANT CHANGE UP (ref 40–120)
ALP SERPL-CCNC: 73 U/L — SIGNIFICANT CHANGE UP (ref 40–120)
ALP SERPL-CCNC: 74 U/L — SIGNIFICANT CHANGE UP (ref 40–120)
ALP SERPL-CCNC: 75 U/L — SIGNIFICANT CHANGE UP (ref 40–120)
ALP SERPL-CCNC: 78 U/L — SIGNIFICANT CHANGE UP (ref 40–120)
ALP SERPL-CCNC: 78 U/L — SIGNIFICANT CHANGE UP (ref 40–120)
ALP SERPL-CCNC: 80 U/L — SIGNIFICANT CHANGE UP (ref 40–120)
ALP SERPL-CCNC: 80 U/L — SIGNIFICANT CHANGE UP (ref 40–120)
ALP SERPL-CCNC: 81 U/L — SIGNIFICANT CHANGE UP (ref 40–120)
ALP SERPL-CCNC: 81 U/L — SIGNIFICANT CHANGE UP (ref 40–120)
ALP SERPL-CCNC: 83 U/L — SIGNIFICANT CHANGE UP (ref 40–120)
ALP SERPL-CCNC: 83 U/L — SIGNIFICANT CHANGE UP (ref 40–120)
ALP SERPL-CCNC: 84 U/L — SIGNIFICANT CHANGE UP (ref 40–120)
ALP SERPL-CCNC: 84 U/L — SIGNIFICANT CHANGE UP (ref 40–120)
ALP SERPL-CCNC: 85 U/L — SIGNIFICANT CHANGE UP (ref 40–120)
ALP SERPL-CCNC: 85 U/L — SIGNIFICANT CHANGE UP (ref 40–120)
ALP SERPL-CCNC: 86 U/L — SIGNIFICANT CHANGE UP (ref 40–120)
ALP SERPL-CCNC: 88 U/L — SIGNIFICANT CHANGE UP (ref 40–120)
ALP SERPL-CCNC: 88 U/L — SIGNIFICANT CHANGE UP (ref 40–120)
ALP SERPL-CCNC: 94 U/L — SIGNIFICANT CHANGE UP (ref 40–120)
ALP SERPL-CCNC: 99 U/L — SIGNIFICANT CHANGE UP (ref 40–120)
ALT FLD-CCNC: 10 U/L — SIGNIFICANT CHANGE UP (ref 10–45)
ALT FLD-CCNC: 10 U/L — SIGNIFICANT CHANGE UP (ref 10–45)
ALT FLD-CCNC: 11 U/L — SIGNIFICANT CHANGE UP (ref 10–45)
ALT FLD-CCNC: 12 U/L — SIGNIFICANT CHANGE UP (ref 10–45)
ALT FLD-CCNC: 13 U/L — SIGNIFICANT CHANGE UP (ref 10–45)
ALT FLD-CCNC: 14 U/L — SIGNIFICANT CHANGE UP (ref 10–45)
ALT FLD-CCNC: 14 U/L — SIGNIFICANT CHANGE UP (ref 10–45)
ALT FLD-CCNC: 15 U/L — SIGNIFICANT CHANGE UP (ref 10–45)
ALT FLD-CCNC: 16 U/L — SIGNIFICANT CHANGE UP (ref 10–45)
ALT FLD-CCNC: 18 U/L — SIGNIFICANT CHANGE UP (ref 10–45)
ALT FLD-CCNC: 20 U/L — SIGNIFICANT CHANGE UP (ref 10–45)
ALT FLD-CCNC: 20 U/L — SIGNIFICANT CHANGE UP (ref 10–45)
ALT FLD-CCNC: 21 U/L — SIGNIFICANT CHANGE UP (ref 10–45)
ALT FLD-CCNC: 22 U/L — SIGNIFICANT CHANGE UP (ref 10–45)
ALT FLD-CCNC: 26 U/L — SIGNIFICANT CHANGE UP (ref 10–45)
ALT FLD-CCNC: 26 U/L — SIGNIFICANT CHANGE UP (ref 10–45)
ALT FLD-CCNC: 29 U/L — SIGNIFICANT CHANGE UP (ref 10–45)
ALT FLD-CCNC: 29 U/L — SIGNIFICANT CHANGE UP (ref 10–45)
ALT FLD-CCNC: 38 U/L — SIGNIFICANT CHANGE UP (ref 10–45)
ALT FLD-CCNC: 38 U/L — SIGNIFICANT CHANGE UP (ref 10–45)
ALT FLD-CCNC: 5 U/L — LOW (ref 10–45)
ALT FLD-CCNC: 5 U/L — LOW (ref 10–45)
ALT FLD-CCNC: 6 U/L — LOW (ref 10–45)
ALT FLD-CCNC: 7 U/L — LOW (ref 10–45)
ALT FLD-CCNC: 8 U/L — LOW (ref 10–45)
ALT FLD-CCNC: 9 U/L — LOW (ref 10–45)
ALT FLD-CCNC: <5 U/L — LOW (ref 10–45)
ALT SERPL-CCNC: 12 U/L
ALT SERPL-CCNC: 20 U/L
ANION GAP SERPL CALC-SCNC: 10 MMOL/L — SIGNIFICANT CHANGE UP (ref 5–17)
ANION GAP SERPL CALC-SCNC: 11 MMOL/L — SIGNIFICANT CHANGE UP (ref 5–17)
ANION GAP SERPL CALC-SCNC: 12 MMOL/L — SIGNIFICANT CHANGE UP (ref 5–17)
ANION GAP SERPL CALC-SCNC: 15 MMOL/L — SIGNIFICANT CHANGE UP (ref 5–17)
ANION GAP SERPL CALC-SCNC: 15 MMOL/L — SIGNIFICANT CHANGE UP (ref 5–17)
ANION GAP SERPL CALC-SCNC: 3 MMOL/L — LOW (ref 5–17)
ANION GAP SERPL CALC-SCNC: 3 MMOL/L — LOW (ref 5–17)
ANION GAP SERPL CALC-SCNC: 4 MMOL/L — LOW (ref 5–17)
ANION GAP SERPL CALC-SCNC: 5 MMOL/L
ANION GAP SERPL CALC-SCNC: 5 MMOL/L — SIGNIFICANT CHANGE UP (ref 5–17)
ANION GAP SERPL CALC-SCNC: 6 MMOL/L — SIGNIFICANT CHANGE UP (ref 5–17)
ANION GAP SERPL CALC-SCNC: 7 MMOL/L — SIGNIFICANT CHANGE UP (ref 5–17)
ANION GAP SERPL CALC-SCNC: 8 MMOL/L
ANION GAP SERPL CALC-SCNC: 8 MMOL/L — SIGNIFICANT CHANGE UP (ref 5–17)
ANION GAP SERPL CALC-SCNC: 9 MMOL/L — SIGNIFICANT CHANGE UP (ref 5–17)
ANISOCYTOSIS BLD QL: SIGNIFICANT CHANGE UP
ANISOCYTOSIS BLD QL: SLIGHT — SIGNIFICANT CHANGE UP
APPEARANCE UR: ABNORMAL
APPEARANCE UR: CLEAR — SIGNIFICANT CHANGE UP
APTT BLD: 27.6 SEC — SIGNIFICANT CHANGE UP (ref 24.5–35.6)
APTT BLD: 27.6 SEC — SIGNIFICANT CHANGE UP (ref 24.5–35.6)
APTT BLD: 29.4 SEC — SIGNIFICANT CHANGE UP (ref 24.5–35.6)
APTT BLD: 29.5 SEC — SIGNIFICANT CHANGE UP (ref 24.5–35.6)
APTT BLD: 30 SEC — SIGNIFICANT CHANGE UP (ref 24.5–35.6)
APTT BLD: 30 SEC — SIGNIFICANT CHANGE UP (ref 24.5–35.6)
APTT BLD: 30.5 SEC — SIGNIFICANT CHANGE UP (ref 27.5–35.5)
APTT BLD: 30.6 SEC — SIGNIFICANT CHANGE UP (ref 27.5–35.5)
APTT BLD: 30.8 SEC — SIGNIFICANT CHANGE UP (ref 24.5–35.6)
APTT BLD: 30.8 SEC — SIGNIFICANT CHANGE UP (ref 24.5–35.6)
APTT BLD: 30.9 SEC — SIGNIFICANT CHANGE UP (ref 24.5–35.6)
APTT BLD: 30.9 SEC — SIGNIFICANT CHANGE UP (ref 24.5–35.6)
APTT BLD: 31.2 SEC — SIGNIFICANT CHANGE UP (ref 24.5–35.6)
APTT BLD: 31.2 SEC — SIGNIFICANT CHANGE UP (ref 24.5–35.6)
APTT BLD: 31.4 SEC — SIGNIFICANT CHANGE UP (ref 24.5–35.6)
APTT BLD: 31.4 SEC — SIGNIFICANT CHANGE UP (ref 24.5–35.6)
APTT BLD: 31.6 SEC — SIGNIFICANT CHANGE UP (ref 24.5–35.6)
APTT BLD: 31.6 SEC — SIGNIFICANT CHANGE UP (ref 24.5–35.6)
APTT BLD: 32.8 SEC — SIGNIFICANT CHANGE UP (ref 24.5–35.6)
APTT BLD: 32.8 SEC — SIGNIFICANT CHANGE UP (ref 24.5–35.6)
APTT BLD: 36.1 SEC — HIGH (ref 24.5–35.6)
APTT BLD: 36.1 SEC — HIGH (ref 24.5–35.6)
AST SERPL-CCNC: 12 U/L — SIGNIFICANT CHANGE UP (ref 10–40)
AST SERPL-CCNC: 12 U/L — SIGNIFICANT CHANGE UP (ref 10–40)
AST SERPL-CCNC: 13 U/L — SIGNIFICANT CHANGE UP (ref 10–40)
AST SERPL-CCNC: 13 U/L — SIGNIFICANT CHANGE UP (ref 10–40)
AST SERPL-CCNC: 14 U/L — SIGNIFICANT CHANGE UP (ref 10–40)
AST SERPL-CCNC: 15 U/L
AST SERPL-CCNC: 15 U/L — SIGNIFICANT CHANGE UP (ref 10–40)
AST SERPL-CCNC: 15 U/L — SIGNIFICANT CHANGE UP (ref 10–40)
AST SERPL-CCNC: 16 U/L — SIGNIFICANT CHANGE UP (ref 10–40)
AST SERPL-CCNC: 17 U/L — SIGNIFICANT CHANGE UP (ref 10–40)
AST SERPL-CCNC: 17 U/L — SIGNIFICANT CHANGE UP (ref 10–40)
AST SERPL-CCNC: 18 U/L — SIGNIFICANT CHANGE UP (ref 10–40)
AST SERPL-CCNC: 19 U/L — SIGNIFICANT CHANGE UP (ref 10–40)
AST SERPL-CCNC: 20 U/L — SIGNIFICANT CHANGE UP (ref 10–40)
AST SERPL-CCNC: 21 U/L — SIGNIFICANT CHANGE UP (ref 10–40)
AST SERPL-CCNC: 22 U/L — SIGNIFICANT CHANGE UP (ref 10–40)
AST SERPL-CCNC: 23 U/L — SIGNIFICANT CHANGE UP (ref 10–40)
AST SERPL-CCNC: 24 U/L — SIGNIFICANT CHANGE UP (ref 10–40)
AST SERPL-CCNC: 24 U/L — SIGNIFICANT CHANGE UP (ref 10–40)
AST SERPL-CCNC: 25 U/L
AST SERPL-CCNC: 25 U/L — SIGNIFICANT CHANGE UP (ref 10–40)
AST SERPL-CCNC: 27 U/L — SIGNIFICANT CHANGE UP (ref 10–40)
AST SERPL-CCNC: 28 U/L — SIGNIFICANT CHANGE UP (ref 10–40)
AST SERPL-CCNC: 29 U/L — SIGNIFICANT CHANGE UP (ref 10–40)
AST SERPL-CCNC: 29 U/L — SIGNIFICANT CHANGE UP (ref 10–40)
AST SERPL-CCNC: 34 U/L — SIGNIFICANT CHANGE UP (ref 10–40)
AST SERPL-CCNC: 34 U/L — SIGNIFICANT CHANGE UP (ref 10–40)
AST SERPL-CCNC: 36 U/L — SIGNIFICANT CHANGE UP (ref 10–40)
AST SERPL-CCNC: 36 U/L — SIGNIFICANT CHANGE UP (ref 10–40)
AST SERPL-CCNC: 41 U/L — HIGH (ref 10–40)
AST SERPL-CCNC: 41 U/L — HIGH (ref 10–40)
AST SERPL-CCNC: 61 U/L — HIGH (ref 10–40)
AST SERPL-CCNC: 61 U/L — HIGH (ref 10–40)
AST SERPL-CCNC: 75 U/L — HIGH (ref 10–40)
AST SERPL-CCNC: 75 U/L — HIGH (ref 10–40)
AST SERPL-CCNC: 77 U/L — HIGH (ref 10–40)
AST SERPL-CCNC: 77 U/L — HIGH (ref 10–40)
AST SERPL-CCNC: 85 U/L — HIGH (ref 10–40)
AST SERPL-CCNC: 85 U/L — HIGH (ref 10–40)
AST SERPL-CCNC: 92 U/L — HIGH (ref 10–40)
AST SERPL-CCNC: 92 U/L — HIGH (ref 10–40)
BACTERIA # UR AUTO: ABNORMAL /HPF
BACTERIA # UR AUTO: NEGATIVE /HPF — SIGNIFICANT CHANGE UP
BACTERIA # UR AUTO: SIGNIFICANT CHANGE UP /HPF
BASE EXCESS BLDA CALC-SCNC: 4.5 MMOL/L — HIGH (ref -2–3)
BASE EXCESS BLDA CALC-SCNC: 4.5 MMOL/L — HIGH (ref -2–3)
BASOPHILS # BLD AUTO: 0 K/UL — SIGNIFICANT CHANGE UP (ref 0–0.2)
BASOPHILS # BLD AUTO: 0.01 K/UL — SIGNIFICANT CHANGE UP (ref 0–0.2)
BASOPHILS # BLD AUTO: 0.01 K/UL — SIGNIFICANT CHANGE UP (ref 0–0.2)
BASOPHILS # BLD AUTO: 0.02 K/UL — SIGNIFICANT CHANGE UP (ref 0–0.2)
BASOPHILS # BLD AUTO: 0.03 K/UL
BASOPHILS # BLD AUTO: 0.03 K/UL — SIGNIFICANT CHANGE UP (ref 0–0.2)
BASOPHILS # BLD AUTO: 0.04 K/UL — SIGNIFICANT CHANGE UP (ref 0–0.2)
BASOPHILS # BLD AUTO: 0.04 K/UL — SIGNIFICANT CHANGE UP (ref 0–0.2)
BASOPHILS # BLD AUTO: 0.05 K/UL — SIGNIFICANT CHANGE UP (ref 0–0.2)
BASOPHILS # BLD AUTO: 0.07 K/UL — SIGNIFICANT CHANGE UP (ref 0–0.2)
BASOPHILS # BLD AUTO: 0.07 K/UL — SIGNIFICANT CHANGE UP (ref 0–0.2)
BASOPHILS # BLD AUTO: 0.09 K/UL — SIGNIFICANT CHANGE UP (ref 0–0.2)
BASOPHILS # BLD AUTO: 0.09 K/UL — SIGNIFICANT CHANGE UP (ref 0–0.2)
BASOPHILS NFR BLD AUTO: 0 % — SIGNIFICANT CHANGE UP (ref 0–2)
BASOPHILS NFR BLD AUTO: 0.2 % — SIGNIFICANT CHANGE UP (ref 0–2)
BASOPHILS NFR BLD AUTO: 0.3 % — SIGNIFICANT CHANGE UP (ref 0–2)
BASOPHILS NFR BLD AUTO: 0.4 %
BASOPHILS NFR BLD AUTO: 0.4 % — SIGNIFICANT CHANGE UP (ref 0–2)
BASOPHILS NFR BLD AUTO: 0.4 % — SIGNIFICANT CHANGE UP (ref 0–2)
BASOPHILS NFR BLD AUTO: 0.5 % — SIGNIFICANT CHANGE UP (ref 0–2)
BASOPHILS NFR BLD AUTO: 0.6 % — SIGNIFICANT CHANGE UP (ref 0–2)
BASOPHILS NFR BLD AUTO: 0.9 % — SIGNIFICANT CHANGE UP (ref 0–2)
BASOPHILS NFR BLD AUTO: 1.8 % — SIGNIFICANT CHANGE UP (ref 0–2)
BASOPHILS NFR BLD AUTO: 2.6 % — HIGH (ref 0–2)
BILIRUB SERPL-MCNC: 0.2 MG/DL — SIGNIFICANT CHANGE UP (ref 0.2–1.2)
BILIRUB SERPL-MCNC: 0.3 MG/DL
BILIRUB SERPL-MCNC: 0.3 MG/DL — SIGNIFICANT CHANGE UP (ref 0.2–1.2)
BILIRUB SERPL-MCNC: 0.4 MG/DL — SIGNIFICANT CHANGE UP (ref 0.2–1.2)
BILIRUB SERPL-MCNC: 0.5 MG/DL — SIGNIFICANT CHANGE UP (ref 0.2–1.2)
BILIRUB SERPL-MCNC: 0.6 MG/DL
BILIRUB SERPL-MCNC: 0.6 MG/DL — SIGNIFICANT CHANGE UP (ref 0.2–1.2)
BILIRUB UR-MCNC: NEGATIVE — SIGNIFICANT CHANGE UP
BLD GP AB SCN SERPL QL: NEGATIVE — SIGNIFICANT CHANGE UP
BUN SERPL-MCNC: 10 MG/DL — SIGNIFICANT CHANGE UP (ref 7–23)
BUN SERPL-MCNC: 10 MG/DL — SIGNIFICANT CHANGE UP (ref 7–23)
BUN SERPL-MCNC: 11 MG/DL — SIGNIFICANT CHANGE UP (ref 7–23)
BUN SERPL-MCNC: 12 MG/DL — SIGNIFICANT CHANGE UP (ref 7–23)
BUN SERPL-MCNC: 13 MG/DL — SIGNIFICANT CHANGE UP (ref 7–23)
BUN SERPL-MCNC: 14 MG/DL — SIGNIFICANT CHANGE UP (ref 7–23)
BUN SERPL-MCNC: 15 MG/DL — SIGNIFICANT CHANGE UP (ref 7–23)
BUN SERPL-MCNC: 16 MG/DL — SIGNIFICANT CHANGE UP (ref 7–23)
BUN SERPL-MCNC: 16 MG/DL — SIGNIFICANT CHANGE UP (ref 7–23)
BUN SERPL-MCNC: 17 MG/DL — SIGNIFICANT CHANGE UP (ref 7–23)
BUN SERPL-MCNC: 17 MG/DL — SIGNIFICANT CHANGE UP (ref 7–23)
BUN SERPL-MCNC: 18 MG/DL — SIGNIFICANT CHANGE UP (ref 7–23)
BUN SERPL-MCNC: 18 MG/DL — SIGNIFICANT CHANGE UP (ref 7–23)
BUN SERPL-MCNC: 19 MG/DL — SIGNIFICANT CHANGE UP (ref 7–23)
BUN SERPL-MCNC: 19 MG/DL — SIGNIFICANT CHANGE UP (ref 7–23)
BUN SERPL-MCNC: 20 MG/DL — SIGNIFICANT CHANGE UP (ref 7–23)
BUN SERPL-MCNC: 20 MG/DL — SIGNIFICANT CHANGE UP (ref 7–23)
BUN SERPL-MCNC: 22 MG/DL — SIGNIFICANT CHANGE UP (ref 7–23)
BUN SERPL-MCNC: 23 MG/DL — SIGNIFICANT CHANGE UP (ref 7–23)
BUN SERPL-MCNC: 24 MG/DL
BUN SERPL-MCNC: 24 MG/DL — HIGH (ref 7–23)
BUN SERPL-MCNC: 25 MG/DL — HIGH (ref 7–23)
BUN SERPL-MCNC: 26 MG/DL — HIGH (ref 7–23)
BUN SERPL-MCNC: 28 MG/DL — HIGH (ref 7–23)
BUN SERPL-MCNC: 28 MG/DL — HIGH (ref 7–23)
BUN SERPL-MCNC: 29 MG/DL — HIGH (ref 7–23)
BUN SERPL-MCNC: 29 MG/DL — HIGH (ref 7–23)
BUN SERPL-MCNC: 30 MG/DL — HIGH (ref 7–23)
BUN SERPL-MCNC: 30 MG/DL — HIGH (ref 7–23)
BUN SERPL-MCNC: 31 MG/DL — HIGH (ref 7–23)
BUN SERPL-MCNC: 32 MG/DL — HIGH (ref 7–23)
BUN SERPL-MCNC: 32 MG/DL — HIGH (ref 7–23)
BUN SERPL-MCNC: 34 MG/DL — HIGH (ref 7–23)
BUN SERPL-MCNC: 34 MG/DL — HIGH (ref 7–23)
BUN SERPL-MCNC: 36 MG/DL — HIGH (ref 7–23)
BUN SERPL-MCNC: 37 MG/DL — HIGH (ref 7–23)
BUN SERPL-MCNC: 38 MG/DL — HIGH (ref 7–23)
BUN SERPL-MCNC: 38 MG/DL — HIGH (ref 7–23)
BUN SERPL-MCNC: 39 MG/DL — HIGH (ref 7–23)
BUN SERPL-MCNC: 4 MG/DL — LOW (ref 7–23)
BUN SERPL-MCNC: 4 MG/DL — LOW (ref 7–23)
BUN SERPL-MCNC: 40 MG/DL — HIGH (ref 7–23)
BUN SERPL-MCNC: 42 MG/DL — HIGH (ref 7–23)
BUN SERPL-MCNC: 42 MG/DL — HIGH (ref 7–23)
BUN SERPL-MCNC: 46 MG/DL — HIGH (ref 7–23)
BUN SERPL-MCNC: 47 MG/DL
BUN SERPL-MCNC: 49 MG/DL — HIGH (ref 7–23)
BUN SERPL-MCNC: 49 MG/DL — HIGH (ref 7–23)
BUN SERPL-MCNC: 5 MG/DL — LOW (ref 7–23)
BUN SERPL-MCNC: 5 MG/DL — LOW (ref 7–23)
BUN SERPL-MCNC: 6 MG/DL — LOW (ref 7–23)
BUN SERPL-MCNC: 7 MG/DL — SIGNIFICANT CHANGE UP (ref 7–23)
BUN SERPL-MCNC: 8 MG/DL — SIGNIFICANT CHANGE UP (ref 7–23)
BUN SERPL-MCNC: 9 MG/DL — SIGNIFICANT CHANGE UP (ref 7–23)
BURR CELLS BLD QL SMEAR: PRESENT — SIGNIFICANT CHANGE UP
CALCIUM SERPL-MCNC: 10 MG/DL — SIGNIFICANT CHANGE UP (ref 8.4–10.5)
CALCIUM SERPL-MCNC: 7 MG/DL — LOW (ref 8.4–10.5)
CALCIUM SERPL-MCNC: 7 MG/DL — LOW (ref 8.4–10.5)
CALCIUM SERPL-MCNC: 7.5 MG/DL — LOW (ref 8.4–10.5)
CALCIUM SERPL-MCNC: 7.6 MG/DL — LOW (ref 8.4–10.5)
CALCIUM SERPL-MCNC: 7.7 MG/DL — LOW (ref 8.4–10.5)
CALCIUM SERPL-MCNC: 7.7 MG/DL — LOW (ref 8.4–10.5)
CALCIUM SERPL-MCNC: 7.8 MG/DL — LOW (ref 8.4–10.5)
CALCIUM SERPL-MCNC: 7.9 MG/DL — LOW (ref 8.4–10.5)
CALCIUM SERPL-MCNC: 8 MG/DL — LOW (ref 8.4–10.5)
CALCIUM SERPL-MCNC: 8.1 MG/DL — LOW (ref 8.4–10.5)
CALCIUM SERPL-MCNC: 8.2 MG/DL — LOW (ref 8.4–10.5)
CALCIUM SERPL-MCNC: 8.3 MG/DL — LOW (ref 8.4–10.5)
CALCIUM SERPL-MCNC: 8.4 MG/DL — SIGNIFICANT CHANGE UP (ref 8.4–10.5)
CALCIUM SERPL-MCNC: 8.5 MG/DL — SIGNIFICANT CHANGE UP (ref 8.4–10.5)
CALCIUM SERPL-MCNC: 8.6 MG/DL — SIGNIFICANT CHANGE UP (ref 8.4–10.5)
CALCIUM SERPL-MCNC: 8.7 MG/DL — SIGNIFICANT CHANGE UP (ref 8.4–10.5)
CALCIUM SERPL-MCNC: 8.8 MG/DL — SIGNIFICANT CHANGE UP (ref 8.4–10.5)
CALCIUM SERPL-MCNC: 8.9 MG/DL — SIGNIFICANT CHANGE UP (ref 8.4–10.5)
CALCIUM SERPL-MCNC: 9.1 MG/DL
CALCIUM SERPL-MCNC: 9.2 MG/DL
CALCIUM SERPL-MCNC: 9.2 MG/DL — SIGNIFICANT CHANGE UP (ref 8.4–10.5)
CALCIUM SERPL-MCNC: 9.2 MG/DL — SIGNIFICANT CHANGE UP (ref 8.4–10.5)
CALCIUM SERPL-MCNC: 9.3 MG/DL — SIGNIFICANT CHANGE UP (ref 8.4–10.5)
CALCIUM SERPL-MCNC: 9.4 MG/DL — SIGNIFICANT CHANGE UP (ref 8.4–10.5)
CAST: 0 /LPF — SIGNIFICANT CHANGE UP (ref 0–4)
CAST: 0 /LPF — SIGNIFICANT CHANGE UP (ref 0–4)
CAST: 2 /LPF — SIGNIFICANT CHANGE UP (ref 0–4)
CAST: 2 /LPF — SIGNIFICANT CHANGE UP (ref 0–4)
CAST: 4 /LPF — SIGNIFICANT CHANGE UP (ref 0–4)
CAST: 4 /LPF — SIGNIFICANT CHANGE UP (ref 0–4)
CHLORIDE SERPL-SCNC: 101 MMOL/L — SIGNIFICANT CHANGE UP (ref 96–108)
CHLORIDE SERPL-SCNC: 102 MMOL/L — SIGNIFICANT CHANGE UP (ref 96–108)
CHLORIDE SERPL-SCNC: 103 MMOL/L — SIGNIFICANT CHANGE UP (ref 96–108)
CHLORIDE SERPL-SCNC: 104 MMOL/L
CHLORIDE SERPL-SCNC: 104 MMOL/L — SIGNIFICANT CHANGE UP (ref 96–108)
CHLORIDE SERPL-SCNC: 105 MMOL/L — SIGNIFICANT CHANGE UP (ref 96–108)
CHLORIDE SERPL-SCNC: 106 MMOL/L — SIGNIFICANT CHANGE UP (ref 96–108)
CHLORIDE SERPL-SCNC: 107 MMOL/L — SIGNIFICANT CHANGE UP (ref 96–108)
CHLORIDE SERPL-SCNC: 108 MMOL/L — SIGNIFICANT CHANGE UP (ref 96–108)
CHLORIDE SERPL-SCNC: 109 MMOL/L — HIGH (ref 96–108)
CHLORIDE SERPL-SCNC: 110 MMOL/L — HIGH (ref 96–108)
CHLORIDE SERPL-SCNC: 111 MMOL/L — HIGH (ref 96–108)
CHLORIDE SERPL-SCNC: 114 MMOL/L — HIGH (ref 96–108)
CHLORIDE SERPL-SCNC: 117 MMOL/L — HIGH (ref 96–108)
CHLORIDE SERPL-SCNC: 117 MMOL/L — HIGH (ref 96–108)
CHLORIDE SERPL-SCNC: 96 MMOL/L — SIGNIFICANT CHANGE UP (ref 96–108)
CHLORIDE SERPL-SCNC: 96 MMOL/L — SIGNIFICANT CHANGE UP (ref 96–108)
CHLORIDE SERPL-SCNC: 97 MMOL/L — SIGNIFICANT CHANGE UP (ref 96–108)
CHLORIDE SERPL-SCNC: 97 MMOL/L — SIGNIFICANT CHANGE UP (ref 96–108)
CHLORIDE SERPL-SCNC: 98 MMOL/L
CHLORIDE SERPL-SCNC: 98 MMOL/L — SIGNIFICANT CHANGE UP (ref 96–108)
CHLORIDE SERPL-SCNC: 98 MMOL/L — SIGNIFICANT CHANGE UP (ref 96–108)
CHLORIDE SERPL-SCNC: 99 MMOL/L — SIGNIFICANT CHANGE UP (ref 96–108)
CHOLEST SERPL-MCNC: 105 MG/DL
CO2 BLDA-SCNC: 30 MMOL/L — HIGH (ref 19–24)
CO2 BLDA-SCNC: 30 MMOL/L — HIGH (ref 19–24)
CO2 SERPL-SCNC: 21 MMOL/L — LOW (ref 22–31)
CO2 SERPL-SCNC: 21 MMOL/L — LOW (ref 22–31)
CO2 SERPL-SCNC: 22 MMOL/L — SIGNIFICANT CHANGE UP (ref 22–31)
CO2 SERPL-SCNC: 23 MMOL/L — SIGNIFICANT CHANGE UP (ref 22–31)
CO2 SERPL-SCNC: 24 MMOL/L — SIGNIFICANT CHANGE UP (ref 22–31)
CO2 SERPL-SCNC: 25 MMOL/L — SIGNIFICANT CHANGE UP (ref 22–31)
CO2 SERPL-SCNC: 26 MMOL/L — SIGNIFICANT CHANGE UP (ref 22–31)
CO2 SERPL-SCNC: 27 MMOL/L
CO2 SERPL-SCNC: 27 MMOL/L — SIGNIFICANT CHANGE UP (ref 22–31)
CO2 SERPL-SCNC: 28 MMOL/L
CO2 SERPL-SCNC: 28 MMOL/L — SIGNIFICANT CHANGE UP (ref 22–31)
CO2 SERPL-SCNC: 29 MMOL/L — SIGNIFICANT CHANGE UP (ref 22–31)
CO2 SERPL-SCNC: 30 MMOL/L — SIGNIFICANT CHANGE UP (ref 22–31)
CO2 SERPL-SCNC: 31 MMOL/L — SIGNIFICANT CHANGE UP (ref 22–31)
CO2 SERPL-SCNC: 32 MMOL/L — HIGH (ref 22–31)
CO2 SERPL-SCNC: 33 MMOL/L — HIGH (ref 22–31)
CO2 SERPL-SCNC: 33 MMOL/L — HIGH (ref 22–31)
CO2 SERPL-SCNC: 34 MMOL/L — HIGH (ref 22–31)
CO2 SERPL-SCNC: 34 MMOL/L — HIGH (ref 22–31)
CO2 SERPL-SCNC: 35 MMOL/L — HIGH (ref 22–31)
CO2 SERPL-SCNC: 35 MMOL/L — HIGH (ref 22–31)
CO2 SERPL-SCNC: 36 MMOL/L — HIGH (ref 22–31)
CO2 SERPL-SCNC: 36 MMOL/L — HIGH (ref 22–31)
COLOR SPEC: YELLOW — SIGNIFICANT CHANGE UP
CREAT ?TM UR-MCNC: 185 MG/DL — SIGNIFICANT CHANGE UP
CREAT ?TM UR-MCNC: 185 MG/DL — SIGNIFICANT CHANGE UP
CREAT SERPL-MCNC: 0.68 MG/DL — SIGNIFICANT CHANGE UP (ref 0.5–1.3)
CREAT SERPL-MCNC: 0.68 MG/DL — SIGNIFICANT CHANGE UP (ref 0.5–1.3)
CREAT SERPL-MCNC: 0.72 MG/DL — SIGNIFICANT CHANGE UP (ref 0.5–1.3)
CREAT SERPL-MCNC: 0.72 MG/DL — SIGNIFICANT CHANGE UP (ref 0.5–1.3)
CREAT SERPL-MCNC: 0.77 MG/DL — SIGNIFICANT CHANGE UP (ref 0.5–1.3)
CREAT SERPL-MCNC: 0.78 MG/DL — SIGNIFICANT CHANGE UP (ref 0.5–1.3)
CREAT SERPL-MCNC: 0.78 MG/DL — SIGNIFICANT CHANGE UP (ref 0.5–1.3)
CREAT SERPL-MCNC: 0.82 MG/DL — SIGNIFICANT CHANGE UP (ref 0.5–1.3)
CREAT SERPL-MCNC: 0.82 MG/DL — SIGNIFICANT CHANGE UP (ref 0.5–1.3)
CREAT SERPL-MCNC: 0.85 MG/DL — SIGNIFICANT CHANGE UP (ref 0.5–1.3)
CREAT SERPL-MCNC: 0.85 MG/DL — SIGNIFICANT CHANGE UP (ref 0.5–1.3)
CREAT SERPL-MCNC: 0.86 MG/DL — SIGNIFICANT CHANGE UP (ref 0.5–1.3)
CREAT SERPL-MCNC: 0.9 MG/DL — SIGNIFICANT CHANGE UP (ref 0.5–1.3)
CREAT SERPL-MCNC: 0.9 MG/DL — SIGNIFICANT CHANGE UP (ref 0.5–1.3)
CREAT SERPL-MCNC: 0.91 MG/DL — SIGNIFICANT CHANGE UP (ref 0.5–1.3)
CREAT SERPL-MCNC: 0.95 MG/DL — SIGNIFICANT CHANGE UP (ref 0.5–1.3)
CREAT SERPL-MCNC: 0.95 MG/DL — SIGNIFICANT CHANGE UP (ref 0.5–1.3)
CREAT SERPL-MCNC: 0.96 MG/DL — SIGNIFICANT CHANGE UP (ref 0.5–1.3)
CREAT SERPL-MCNC: 0.97 MG/DL — SIGNIFICANT CHANGE UP (ref 0.5–1.3)
CREAT SERPL-MCNC: 0.97 MG/DL — SIGNIFICANT CHANGE UP (ref 0.5–1.3)
CREAT SERPL-MCNC: 1 MG/DL — SIGNIFICANT CHANGE UP (ref 0.5–1.3)
CREAT SERPL-MCNC: 1.03 MG/DL — SIGNIFICANT CHANGE UP (ref 0.5–1.3)
CREAT SERPL-MCNC: 1.04 MG/DL — SIGNIFICANT CHANGE UP (ref 0.5–1.3)
CREAT SERPL-MCNC: 1.05 MG/DL — SIGNIFICANT CHANGE UP (ref 0.5–1.3)
CREAT SERPL-MCNC: 1.05 MG/DL — SIGNIFICANT CHANGE UP (ref 0.5–1.3)
CREAT SERPL-MCNC: 1.06 MG/DL — SIGNIFICANT CHANGE UP (ref 0.5–1.3)
CREAT SERPL-MCNC: 1.07 MG/DL — SIGNIFICANT CHANGE UP (ref 0.5–1.3)
CREAT SERPL-MCNC: 1.07 MG/DL — SIGNIFICANT CHANGE UP (ref 0.5–1.3)
CREAT SERPL-MCNC: 1.08 MG/DL — SIGNIFICANT CHANGE UP (ref 0.5–1.3)
CREAT SERPL-MCNC: 1.09 MG/DL — SIGNIFICANT CHANGE UP (ref 0.5–1.3)
CREAT SERPL-MCNC: 1.09 MG/DL — SIGNIFICANT CHANGE UP (ref 0.5–1.3)
CREAT SERPL-MCNC: 1.1 MG/DL — SIGNIFICANT CHANGE UP (ref 0.5–1.3)
CREAT SERPL-MCNC: 1.11 MG/DL — SIGNIFICANT CHANGE UP (ref 0.5–1.3)
CREAT SERPL-MCNC: 1.11 MG/DL — SIGNIFICANT CHANGE UP (ref 0.5–1.3)
CREAT SERPL-MCNC: 1.12 MG/DL — SIGNIFICANT CHANGE UP (ref 0.5–1.3)
CREAT SERPL-MCNC: 1.12 MG/DL — SIGNIFICANT CHANGE UP (ref 0.5–1.3)
CREAT SERPL-MCNC: 1.13 MG/DL — SIGNIFICANT CHANGE UP (ref 0.5–1.3)
CREAT SERPL-MCNC: 1.13 MG/DL — SIGNIFICANT CHANGE UP (ref 0.5–1.3)
CREAT SERPL-MCNC: 1.14 MG/DL — SIGNIFICANT CHANGE UP (ref 0.5–1.3)
CREAT SERPL-MCNC: 1.15 MG/DL — SIGNIFICANT CHANGE UP (ref 0.5–1.3)
CREAT SERPL-MCNC: 1.15 MG/DL — SIGNIFICANT CHANGE UP (ref 0.5–1.3)
CREAT SERPL-MCNC: 1.16 MG/DL
CREAT SERPL-MCNC: 1.18 MG/DL — SIGNIFICANT CHANGE UP (ref 0.5–1.3)
CREAT SERPL-MCNC: 1.18 MG/DL — SIGNIFICANT CHANGE UP (ref 0.5–1.3)
CREAT SERPL-MCNC: 1.19 MG/DL — SIGNIFICANT CHANGE UP (ref 0.5–1.3)
CREAT SERPL-MCNC: 1.19 MG/DL — SIGNIFICANT CHANGE UP (ref 0.5–1.3)
CREAT SERPL-MCNC: 1.2 MG/DL — SIGNIFICANT CHANGE UP (ref 0.5–1.3)
CREAT SERPL-MCNC: 1.2 MG/DL — SIGNIFICANT CHANGE UP (ref 0.5–1.3)
CREAT SERPL-MCNC: 1.21 MG/DL — SIGNIFICANT CHANGE UP (ref 0.5–1.3)
CREAT SERPL-MCNC: 1.23 MG/DL — SIGNIFICANT CHANGE UP (ref 0.5–1.3)
CREAT SERPL-MCNC: 1.23 MG/DL — SIGNIFICANT CHANGE UP (ref 0.5–1.3)
CREAT SERPL-MCNC: 1.25 MG/DL — SIGNIFICANT CHANGE UP (ref 0.5–1.3)
CREAT SERPL-MCNC: 1.25 MG/DL — SIGNIFICANT CHANGE UP (ref 0.5–1.3)
CREAT SERPL-MCNC: 1.28 MG/DL — SIGNIFICANT CHANGE UP (ref 0.5–1.3)
CREAT SERPL-MCNC: 1.3 MG/DL
CREAT SERPL-MCNC: 1.3 MG/DL — SIGNIFICANT CHANGE UP (ref 0.5–1.3)
CREAT SERPL-MCNC: 1.4 MG/DL — HIGH (ref 0.5–1.3)
CREAT SERPL-MCNC: 1.42 MG/DL — HIGH (ref 0.5–1.3)
CREAT SERPL-MCNC: 1.42 MG/DL — HIGH (ref 0.5–1.3)
CREAT SERPL-MCNC: 1.5 MG/DL — HIGH (ref 0.5–1.3)
CREAT SERPL-MCNC: 1.53 MG/DL — HIGH (ref 0.5–1.3)
CREAT SERPL-MCNC: 1.53 MG/DL — HIGH (ref 0.5–1.3)
CREAT SERPL-MCNC: 1.55 MG/DL — HIGH (ref 0.5–1.3)
CREAT SERPL-MCNC: 1.55 MG/DL — HIGH (ref 0.5–1.3)
CREAT SERPL-MCNC: 1.6 MG/DL — HIGH (ref 0.5–1.3)
CREAT SERPL-MCNC: 1.6 MG/DL — HIGH (ref 0.5–1.3)
CREAT SERPL-MCNC: 1.62 MG/DL — HIGH (ref 0.5–1.3)
CREAT SERPL-MCNC: 1.84 MG/DL — HIGH (ref 0.5–1.3)
CREAT SERPL-MCNC: 1.84 MG/DL — HIGH (ref 0.5–1.3)
CREAT SERPL-MCNC: 1.87 MG/DL — HIGH (ref 0.5–1.3)
CREAT SERPL-MCNC: 1.87 MG/DL — HIGH (ref 0.5–1.3)
CREAT SERPL-MCNC: 2.2 MG/DL — HIGH (ref 0.5–1.3)
CREAT SERPL-MCNC: 2.2 MG/DL — HIGH (ref 0.5–1.3)
CREAT SERPL-MCNC: 2.27 MG/DL — HIGH (ref 0.5–1.3)
CREAT SERPL-MCNC: 2.27 MG/DL — HIGH (ref 0.5–1.3)
CREAT SERPL-MCNC: 2.46 MG/DL — HIGH (ref 0.5–1.3)
CREAT SERPL-MCNC: 2.46 MG/DL — HIGH (ref 0.5–1.3)
CREAT SERPL-MCNC: 2.68 MG/DL — HIGH (ref 0.5–1.3)
CREAT SERPL-MCNC: 2.68 MG/DL — HIGH (ref 0.5–1.3)
CULTURE RESULTS: ABNORMAL
CULTURE RESULTS: SIGNIFICANT CHANGE UP
CYSTATIN C SERPL-MCNC: 2.17 MG/L — HIGH (ref 0.82–1.52)
CYSTATIN C SERPL-MCNC: 2.17 MG/L — HIGH (ref 0.82–1.52)
DIFF PNL FLD: ABNORMAL
DIFF PNL FLD: NEGATIVE — SIGNIFICANT CHANGE UP
EGFR: 24 ML/MIN/1.73M2 — LOW
EGFR: 24 ML/MIN/1.73M2 — LOW
EGFR: 26 ML/MIN/1.73M2 — LOW
EGFR: 26 ML/MIN/1.73M2 — LOW
EGFR: 29 ML/MIN/1.73M2 — LOW
EGFR: 29 ML/MIN/1.73M2 — LOW
EGFR: 30 ML/MIN/1.73M2 — LOW
EGFR: 30 ML/MIN/1.73M2 — LOW
EGFR: 36 ML/MIN/1.73M2 — LOW
EGFR: 36 ML/MIN/1.73M2 — LOW
EGFR: 37 ML/MIN/1.73M2 — LOW
EGFR: 37 ML/MIN/1.73M2 — LOW
EGFR: 43 ML/MIN/1.73M2 — LOW
EGFR: 44 ML/MIN/1.73M2 — LOW
EGFR: 44 ML/MIN/1.73M2 — LOW
EGFR: 46 ML/MIN/1.73M2 — LOW
EGFR: 47 ML/MIN/1.73M2 — LOW
EGFR: 51 ML/MIN/1.73M2 — LOW
EGFR: 56 ML/MIN/1.73M2
EGFR: 56 ML/MIN/1.73M2 — LOW
EGFR: 57 ML/MIN/1.73M2 — LOW
EGFR: 59 ML/MIN/1.73M2 — LOW
EGFR: 59 ML/MIN/1.73M2 — LOW
EGFR: 60 ML/MIN/1.73M2 — SIGNIFICANT CHANGE UP
EGFR: 60 ML/MIN/1.73M2 — SIGNIFICANT CHANGE UP
EGFR: 61 ML/MIN/1.73M2 — SIGNIFICANT CHANGE UP
EGFR: 62 ML/MIN/1.73M2 — SIGNIFICANT CHANGE UP
EGFR: 62 ML/MIN/1.73M2 — SIGNIFICANT CHANGE UP
EGFR: 63 ML/MIN/1.73M2 — SIGNIFICANT CHANGE UP
EGFR: 64 ML/MIN/1.73M2
EGFR: 65 ML/MIN/1.73M2 — SIGNIFICANT CHANGE UP
EGFR: 65 ML/MIN/1.73M2 — SIGNIFICANT CHANGE UP
EGFR: 66 ML/MIN/1.73M2 — SIGNIFICANT CHANGE UP
EGFR: 67 ML/MIN/1.73M2 — SIGNIFICANT CHANGE UP
EGFR: 68 ML/MIN/1.73M2 — SIGNIFICANT CHANGE UP
EGFR: 68 ML/MIN/1.73M2 — SIGNIFICANT CHANGE UP
EGFR: 69 ML/MIN/1.73M2 — SIGNIFICANT CHANGE UP
EGFR: 70 ML/MIN/1.73M2 — SIGNIFICANT CHANGE UP
EGFR: 71 ML/MIN/1.73M2 — SIGNIFICANT CHANGE UP
EGFR: 71 ML/MIN/1.73M2 — SIGNIFICANT CHANGE UP
EGFR: 72 ML/MIN/1.73M2 — SIGNIFICANT CHANGE UP
EGFR: 73 ML/MIN/1.73M2 — SIGNIFICANT CHANGE UP
EGFR: 73 ML/MIN/1.73M2 — SIGNIFICANT CHANGE UP
EGFR: 74 ML/MIN/1.73M2 — SIGNIFICANT CHANGE UP
EGFR: 77 ML/MIN/1.73M2 — SIGNIFICANT CHANGE UP
EGFR: 80 ML/MIN/1.73M2 — SIGNIFICANT CHANGE UP
EGFR: 80 ML/MIN/1.73M2 — SIGNIFICANT CHANGE UP
EGFR: 81 ML/MIN/1.73M2 — SIGNIFICANT CHANGE UP
EGFR: 82 ML/MIN/1.73M2 — SIGNIFICANT CHANGE UP
EGFR: 82 ML/MIN/1.73M2 — SIGNIFICANT CHANGE UP
EGFR: 86 ML/MIN/1.73M2 — SIGNIFICANT CHANGE UP
EGFR: 87 ML/MIN/1.73M2 — SIGNIFICANT CHANGE UP
EGFR: 87 ML/MIN/1.73M2 — SIGNIFICANT CHANGE UP
EGFR: 89 ML/MIN/1.73M2 — SIGNIFICANT CHANGE UP
EGFR: 90 ML/MIN/1.73M2 — SIGNIFICANT CHANGE UP
EGFR: 90 ML/MIN/1.73M2 — SIGNIFICANT CHANGE UP
EGFR: 91 ML/MIN/1.73M2 — SIGNIFICANT CHANGE UP
EGFR: 91 ML/MIN/1.73M2 — SIGNIFICANT CHANGE UP
EGFR: 92 ML/MIN/1.73M2 — SIGNIFICANT CHANGE UP
EGFR: 94 ML/MIN/1.73M2 — SIGNIFICANT CHANGE UP
EGFR: 94 ML/MIN/1.73M2 — SIGNIFICANT CHANGE UP
EGFR: 95 ML/MIN/1.73M2 — SIGNIFICANT CHANGE UP
EGFR: 95 ML/MIN/1.73M2 — SIGNIFICANT CHANGE UP
ELLIPTOCYTES BLD QL SMEAR: SLIGHT — SIGNIFICANT CHANGE UP
ELLIPTOCYTES BLD QL SMEAR: SLIGHT — SIGNIFICANT CHANGE UP
EOSINOPHIL # BLD AUTO: 0 K/UL — SIGNIFICANT CHANGE UP (ref 0–0.5)
EOSINOPHIL # BLD AUTO: 0.01 K/UL — SIGNIFICANT CHANGE UP (ref 0–0.5)
EOSINOPHIL # BLD AUTO: 0.01 K/UL — SIGNIFICANT CHANGE UP (ref 0–0.5)
EOSINOPHIL # BLD AUTO: 0.02 K/UL — SIGNIFICANT CHANGE UP (ref 0–0.5)
EOSINOPHIL # BLD AUTO: 0.03 K/UL — SIGNIFICANT CHANGE UP (ref 0–0.5)
EOSINOPHIL # BLD AUTO: 0.04 K/UL — SIGNIFICANT CHANGE UP (ref 0–0.5)
EOSINOPHIL # BLD AUTO: 0.05 K/UL — SIGNIFICANT CHANGE UP (ref 0–0.5)
EOSINOPHIL # BLD AUTO: 0.05 K/UL — SIGNIFICANT CHANGE UP (ref 0–0.5)
EOSINOPHIL # BLD AUTO: 0.06 K/UL — SIGNIFICANT CHANGE UP (ref 0–0.5)
EOSINOPHIL # BLD AUTO: 0.07 K/UL — SIGNIFICANT CHANGE UP (ref 0–0.5)
EOSINOPHIL # BLD AUTO: 0.07 K/UL — SIGNIFICANT CHANGE UP (ref 0–0.5)
EOSINOPHIL # BLD AUTO: 0.08 K/UL — SIGNIFICANT CHANGE UP (ref 0–0.5)
EOSINOPHIL # BLD AUTO: 0.08 K/UL — SIGNIFICANT CHANGE UP (ref 0–0.5)
EOSINOPHIL # BLD AUTO: 0.11 K/UL — SIGNIFICANT CHANGE UP (ref 0–0.5)
EOSINOPHIL # BLD AUTO: 0.12 K/UL — SIGNIFICANT CHANGE UP (ref 0–0.5)
EOSINOPHIL # BLD AUTO: 0.12 K/UL — SIGNIFICANT CHANGE UP (ref 0–0.5)
EOSINOPHIL # BLD AUTO: 0.16 K/UL — SIGNIFICANT CHANGE UP (ref 0–0.5)
EOSINOPHIL # BLD AUTO: 0.16 K/UL — SIGNIFICANT CHANGE UP (ref 0–0.5)
EOSINOPHIL # BLD AUTO: 0.2 K/UL — SIGNIFICANT CHANGE UP (ref 0–0.5)
EOSINOPHIL # BLD AUTO: 0.2 K/UL — SIGNIFICANT CHANGE UP (ref 0–0.5)
EOSINOPHIL # BLD AUTO: 0.25 K/UL
EOSINOPHIL # BLD AUTO: 0.36 K/UL — SIGNIFICANT CHANGE UP (ref 0–0.5)
EOSINOPHIL NFR BLD AUTO: 0 % — SIGNIFICANT CHANGE UP (ref 0–6)
EOSINOPHIL NFR BLD AUTO: 0.1 % — SIGNIFICANT CHANGE UP (ref 0–6)
EOSINOPHIL NFR BLD AUTO: 0.1 % — SIGNIFICANT CHANGE UP (ref 0–6)
EOSINOPHIL NFR BLD AUTO: 0.4 % — SIGNIFICANT CHANGE UP (ref 0–6)
EOSINOPHIL NFR BLD AUTO: 0.5 % — SIGNIFICANT CHANGE UP (ref 0–6)
EOSINOPHIL NFR BLD AUTO: 0.5 % — SIGNIFICANT CHANGE UP (ref 0–6)
EOSINOPHIL NFR BLD AUTO: 0.6 % — SIGNIFICANT CHANGE UP (ref 0–6)
EOSINOPHIL NFR BLD AUTO: 0.6 % — SIGNIFICANT CHANGE UP (ref 0–6)
EOSINOPHIL NFR BLD AUTO: 0.7 % — SIGNIFICANT CHANGE UP (ref 0–6)
EOSINOPHIL NFR BLD AUTO: 0.7 % — SIGNIFICANT CHANGE UP (ref 0–6)
EOSINOPHIL NFR BLD AUTO: 0.9 % — SIGNIFICANT CHANGE UP (ref 0–6)
EOSINOPHIL NFR BLD AUTO: 1.1 % — SIGNIFICANT CHANGE UP (ref 0–6)
EOSINOPHIL NFR BLD AUTO: 1.7 % — SIGNIFICANT CHANGE UP (ref 0–6)
EOSINOPHIL NFR BLD AUTO: 1.7 % — SIGNIFICANT CHANGE UP (ref 0–6)
EOSINOPHIL NFR BLD AUTO: 1.8 % — SIGNIFICANT CHANGE UP (ref 0–6)
EOSINOPHIL NFR BLD AUTO: 1.9 % — SIGNIFICANT CHANGE UP (ref 0–6)
EOSINOPHIL NFR BLD AUTO: 1.9 % — SIGNIFICANT CHANGE UP (ref 0–6)
EOSINOPHIL NFR BLD AUTO: 2.7 % — SIGNIFICANT CHANGE UP (ref 0–6)
EOSINOPHIL NFR BLD AUTO: 2.7 % — SIGNIFICANT CHANGE UP (ref 0–6)
EOSINOPHIL NFR BLD AUTO: 3 %
EOSINOPHIL NFR BLD AUTO: 3 % — SIGNIFICANT CHANGE UP (ref 0–6)
EOSINOPHIL NFR BLD AUTO: 3 % — SIGNIFICANT CHANGE UP (ref 0–6)
EOSINOPHIL NFR BLD AUTO: 3.7 % — SIGNIFICANT CHANGE UP (ref 0–6)
EOSINOPHIL NFR BLD AUTO: 5.2 % — SIGNIFICANT CHANGE UP (ref 0–6)
EPI CELLS # UR: SIGNIFICANT CHANGE UP /HPF (ref 0–5)
ESTIMATED AVERAGE GLUCOSE: 180 MG/DL — HIGH (ref 68–114)
ESTIMATED AVERAGE GLUCOSE: 212 MG/DL — HIGH (ref 68–114)
ESTIMATED AVERAGE GLUCOSE: 212 MG/DL — HIGH (ref 68–114)
ESTIMATED AVERAGE GLUCOSE: 226 MG/DL
FERRITIN SERPL-MCNC: 833 NG/ML — HIGH (ref 30–400)
FERRITIN SERPL-MCNC: 833 NG/ML — HIGH (ref 30–400)
FOLATE SERPL-MCNC: 4.4 NG/ML — LOW
FOLATE SERPL-MCNC: 4.4 NG/ML — LOW
GFR/BSA.PRED SERPLBLD CYS-BASED-ARV: 26 ML/MIN/1.73M2 — LOW
GFR/BSA.PRED SERPLBLD CYS-BASED-ARV: 26 ML/MIN/1.73M2 — LOW
GIANT PLATELETS BLD QL SMEAR: PRESENT — SIGNIFICANT CHANGE UP
GLUCOSE BLDC GLUCOMTR-MCNC: 101 MG/DL — HIGH (ref 70–99)
GLUCOSE BLDC GLUCOMTR-MCNC: 102 MG/DL — HIGH (ref 70–99)
GLUCOSE BLDC GLUCOMTR-MCNC: 102 MG/DL — HIGH (ref 70–99)
GLUCOSE BLDC GLUCOMTR-MCNC: 103 MG/DL — HIGH (ref 70–99)
GLUCOSE BLDC GLUCOMTR-MCNC: 104 MG/DL — HIGH (ref 70–99)
GLUCOSE BLDC GLUCOMTR-MCNC: 104 MG/DL — HIGH (ref 70–99)
GLUCOSE BLDC GLUCOMTR-MCNC: 106 MG/DL — HIGH (ref 70–99)
GLUCOSE BLDC GLUCOMTR-MCNC: 106 MG/DL — HIGH (ref 70–99)
GLUCOSE BLDC GLUCOMTR-MCNC: 107 MG/DL — HIGH (ref 70–99)
GLUCOSE BLDC GLUCOMTR-MCNC: 109 MG/DL — HIGH (ref 70–99)
GLUCOSE BLDC GLUCOMTR-MCNC: 109 MG/DL — HIGH (ref 70–99)
GLUCOSE BLDC GLUCOMTR-MCNC: 110 MG/DL — HIGH (ref 70–99)
GLUCOSE BLDC GLUCOMTR-MCNC: 110 MG/DL — HIGH (ref 70–99)
GLUCOSE BLDC GLUCOMTR-MCNC: 111 MG/DL — HIGH (ref 70–99)
GLUCOSE BLDC GLUCOMTR-MCNC: 111 MG/DL — HIGH (ref 70–99)
GLUCOSE BLDC GLUCOMTR-MCNC: 113 MG/DL — HIGH (ref 70–99)
GLUCOSE BLDC GLUCOMTR-MCNC: 113 MG/DL — HIGH (ref 70–99)
GLUCOSE BLDC GLUCOMTR-MCNC: 114 MG/DL — HIGH (ref 70–99)
GLUCOSE BLDC GLUCOMTR-MCNC: 116 MG/DL — HIGH (ref 70–99)
GLUCOSE BLDC GLUCOMTR-MCNC: 118 MG/DL — HIGH (ref 70–99)
GLUCOSE BLDC GLUCOMTR-MCNC: 119 MG/DL — HIGH (ref 70–99)
GLUCOSE BLDC GLUCOMTR-MCNC: 119 MG/DL — HIGH (ref 70–99)
GLUCOSE BLDC GLUCOMTR-MCNC: 120 MG/DL — HIGH (ref 70–99)
GLUCOSE BLDC GLUCOMTR-MCNC: 120 MG/DL — HIGH (ref 70–99)
GLUCOSE BLDC GLUCOMTR-MCNC: 121 MG/DL — HIGH (ref 70–99)
GLUCOSE BLDC GLUCOMTR-MCNC: 121 MG/DL — HIGH (ref 70–99)
GLUCOSE BLDC GLUCOMTR-MCNC: 122 MG/DL — HIGH (ref 70–99)
GLUCOSE BLDC GLUCOMTR-MCNC: 123 MG/DL — HIGH (ref 70–99)
GLUCOSE BLDC GLUCOMTR-MCNC: 123 MG/DL — HIGH (ref 70–99)
GLUCOSE BLDC GLUCOMTR-MCNC: 124 MG/DL — HIGH (ref 70–99)
GLUCOSE BLDC GLUCOMTR-MCNC: 127 MG/DL — HIGH (ref 70–99)
GLUCOSE BLDC GLUCOMTR-MCNC: 128 MG/DL — HIGH (ref 70–99)
GLUCOSE BLDC GLUCOMTR-MCNC: 129 MG/DL — HIGH (ref 70–99)
GLUCOSE BLDC GLUCOMTR-MCNC: 129 MG/DL — HIGH (ref 70–99)
GLUCOSE BLDC GLUCOMTR-MCNC: 130 MG/DL — HIGH (ref 70–99)
GLUCOSE BLDC GLUCOMTR-MCNC: 131 MG/DL — HIGH (ref 70–99)
GLUCOSE BLDC GLUCOMTR-MCNC: 133 MG/DL — HIGH (ref 70–99)
GLUCOSE BLDC GLUCOMTR-MCNC: 134 MG/DL — HIGH (ref 70–99)
GLUCOSE BLDC GLUCOMTR-MCNC: 135 MG/DL — HIGH (ref 70–99)
GLUCOSE BLDC GLUCOMTR-MCNC: 136 MG/DL — HIGH (ref 70–99)
GLUCOSE BLDC GLUCOMTR-MCNC: 137 MG/DL — HIGH (ref 70–99)
GLUCOSE BLDC GLUCOMTR-MCNC: 138 MG/DL — HIGH (ref 70–99)
GLUCOSE BLDC GLUCOMTR-MCNC: 139 MG/DL — HIGH (ref 70–99)
GLUCOSE BLDC GLUCOMTR-MCNC: 140 MG/DL — HIGH (ref 70–99)
GLUCOSE BLDC GLUCOMTR-MCNC: 140 MG/DL — HIGH (ref 70–99)
GLUCOSE BLDC GLUCOMTR-MCNC: 141 MG/DL — HIGH (ref 70–99)
GLUCOSE BLDC GLUCOMTR-MCNC: 142 MG/DL — HIGH (ref 70–99)
GLUCOSE BLDC GLUCOMTR-MCNC: 143 MG/DL — HIGH (ref 70–99)
GLUCOSE BLDC GLUCOMTR-MCNC: 144 MG/DL — HIGH (ref 70–99)
GLUCOSE BLDC GLUCOMTR-MCNC: 145 MG/DL — HIGH (ref 70–99)
GLUCOSE BLDC GLUCOMTR-MCNC: 146 MG/DL — HIGH (ref 70–99)
GLUCOSE BLDC GLUCOMTR-MCNC: 147 MG/DL — HIGH (ref 70–99)
GLUCOSE BLDC GLUCOMTR-MCNC: 147 MG/DL — HIGH (ref 70–99)
GLUCOSE BLDC GLUCOMTR-MCNC: 148 MG/DL — HIGH (ref 70–99)
GLUCOSE BLDC GLUCOMTR-MCNC: 148 MG/DL — HIGH (ref 70–99)
GLUCOSE BLDC GLUCOMTR-MCNC: 149 MG/DL — HIGH (ref 70–99)
GLUCOSE BLDC GLUCOMTR-MCNC: 150 MG/DL — HIGH (ref 70–99)
GLUCOSE BLDC GLUCOMTR-MCNC: 151 MG/DL — HIGH (ref 70–99)
GLUCOSE BLDC GLUCOMTR-MCNC: 152 MG/DL — HIGH (ref 70–99)
GLUCOSE BLDC GLUCOMTR-MCNC: 152 MG/DL — HIGH (ref 70–99)
GLUCOSE BLDC GLUCOMTR-MCNC: 153 MG/DL — HIGH (ref 70–99)
GLUCOSE BLDC GLUCOMTR-MCNC: 153 MG/DL — HIGH (ref 70–99)
GLUCOSE BLDC GLUCOMTR-MCNC: 154 MG/DL — HIGH (ref 70–99)
GLUCOSE BLDC GLUCOMTR-MCNC: 155 MG/DL — HIGH (ref 70–99)
GLUCOSE BLDC GLUCOMTR-MCNC: 155 MG/DL — HIGH (ref 70–99)
GLUCOSE BLDC GLUCOMTR-MCNC: 156 MG/DL — HIGH (ref 70–99)
GLUCOSE BLDC GLUCOMTR-MCNC: 156 MG/DL — HIGH (ref 70–99)
GLUCOSE BLDC GLUCOMTR-MCNC: 157 MG/DL — HIGH (ref 70–99)
GLUCOSE BLDC GLUCOMTR-MCNC: 158 MG/DL — HIGH (ref 70–99)
GLUCOSE BLDC GLUCOMTR-MCNC: 158 MG/DL — HIGH (ref 70–99)
GLUCOSE BLDC GLUCOMTR-MCNC: 159 MG/DL — HIGH (ref 70–99)
GLUCOSE BLDC GLUCOMTR-MCNC: 161 MG/DL — HIGH (ref 70–99)
GLUCOSE BLDC GLUCOMTR-MCNC: 161 MG/DL — HIGH (ref 70–99)
GLUCOSE BLDC GLUCOMTR-MCNC: 162 MG/DL — HIGH (ref 70–99)
GLUCOSE BLDC GLUCOMTR-MCNC: 163 MG/DL — HIGH (ref 70–99)
GLUCOSE BLDC GLUCOMTR-MCNC: 164 MG/DL — HIGH (ref 70–99)
GLUCOSE BLDC GLUCOMTR-MCNC: 165 MG/DL — HIGH (ref 70–99)
GLUCOSE BLDC GLUCOMTR-MCNC: 166 MG/DL — HIGH (ref 70–99)
GLUCOSE BLDC GLUCOMTR-MCNC: 167 MG/DL — HIGH (ref 70–99)
GLUCOSE BLDC GLUCOMTR-MCNC: 168 MG/DL — HIGH (ref 70–99)
GLUCOSE BLDC GLUCOMTR-MCNC: 169 MG/DL — HIGH (ref 70–99)
GLUCOSE BLDC GLUCOMTR-MCNC: 170 MG/DL — HIGH (ref 70–99)
GLUCOSE BLDC GLUCOMTR-MCNC: 170 MG/DL — HIGH (ref 70–99)
GLUCOSE BLDC GLUCOMTR-MCNC: 173 MG/DL — HIGH (ref 70–99)
GLUCOSE BLDC GLUCOMTR-MCNC: 173 MG/DL — HIGH (ref 70–99)
GLUCOSE BLDC GLUCOMTR-MCNC: 174 MG/DL — HIGH (ref 70–99)
GLUCOSE BLDC GLUCOMTR-MCNC: 175 MG/DL — HIGH (ref 70–99)
GLUCOSE BLDC GLUCOMTR-MCNC: 175 MG/DL — HIGH (ref 70–99)
GLUCOSE BLDC GLUCOMTR-MCNC: 176 MG/DL — HIGH (ref 70–99)
GLUCOSE BLDC GLUCOMTR-MCNC: 177 MG/DL — HIGH (ref 70–99)
GLUCOSE BLDC GLUCOMTR-MCNC: 178 MG/DL — HIGH (ref 70–99)
GLUCOSE BLDC GLUCOMTR-MCNC: 178 MG/DL — HIGH (ref 70–99)
GLUCOSE BLDC GLUCOMTR-MCNC: 179 MG/DL — HIGH (ref 70–99)
GLUCOSE BLDC GLUCOMTR-MCNC: 180 MG/DL — HIGH (ref 70–99)
GLUCOSE BLDC GLUCOMTR-MCNC: 181 MG/DL — HIGH (ref 70–99)
GLUCOSE BLDC GLUCOMTR-MCNC: 182 MG/DL — HIGH (ref 70–99)
GLUCOSE BLDC GLUCOMTR-MCNC: 183 MG/DL — HIGH (ref 70–99)
GLUCOSE BLDC GLUCOMTR-MCNC: 183 MG/DL — HIGH (ref 70–99)
GLUCOSE BLDC GLUCOMTR-MCNC: 184 MG/DL — HIGH (ref 70–99)
GLUCOSE BLDC GLUCOMTR-MCNC: 185 MG/DL — HIGH (ref 70–99)
GLUCOSE BLDC GLUCOMTR-MCNC: 185 MG/DL — HIGH (ref 70–99)
GLUCOSE BLDC GLUCOMTR-MCNC: 186 MG/DL — HIGH (ref 70–99)
GLUCOSE BLDC GLUCOMTR-MCNC: 187 MG/DL — HIGH (ref 70–99)
GLUCOSE BLDC GLUCOMTR-MCNC: 188 MG/DL — HIGH (ref 70–99)
GLUCOSE BLDC GLUCOMTR-MCNC: 191 MG/DL — HIGH (ref 70–99)
GLUCOSE BLDC GLUCOMTR-MCNC: 193 MG/DL — HIGH (ref 70–99)
GLUCOSE BLDC GLUCOMTR-MCNC: 193 MG/DL — HIGH (ref 70–99)
GLUCOSE BLDC GLUCOMTR-MCNC: 194 MG/DL — HIGH (ref 70–99)
GLUCOSE BLDC GLUCOMTR-MCNC: 194 MG/DL — HIGH (ref 70–99)
GLUCOSE BLDC GLUCOMTR-MCNC: 196 MG/DL — HIGH (ref 70–99)
GLUCOSE BLDC GLUCOMTR-MCNC: 196 MG/DL — HIGH (ref 70–99)
GLUCOSE BLDC GLUCOMTR-MCNC: 197
GLUCOSE BLDC GLUCOMTR-MCNC: 199 MG/DL — HIGH (ref 70–99)
GLUCOSE BLDC GLUCOMTR-MCNC: 202 MG/DL — HIGH (ref 70–99)
GLUCOSE BLDC GLUCOMTR-MCNC: 203 MG/DL — HIGH (ref 70–99)
GLUCOSE BLDC GLUCOMTR-MCNC: 204 MG/DL — HIGH (ref 70–99)
GLUCOSE BLDC GLUCOMTR-MCNC: 207 MG/DL — HIGH (ref 70–99)
GLUCOSE BLDC GLUCOMTR-MCNC: 207 MG/DL — HIGH (ref 70–99)
GLUCOSE BLDC GLUCOMTR-MCNC: 209 MG/DL — HIGH (ref 70–99)
GLUCOSE BLDC GLUCOMTR-MCNC: 210 MG/DL — HIGH (ref 70–99)
GLUCOSE BLDC GLUCOMTR-MCNC: 211 MG/DL — HIGH (ref 70–99)
GLUCOSE BLDC GLUCOMTR-MCNC: 211 MG/DL — HIGH (ref 70–99)
GLUCOSE BLDC GLUCOMTR-MCNC: 213 MG/DL — HIGH (ref 70–99)
GLUCOSE BLDC GLUCOMTR-MCNC: 213 MG/DL — HIGH (ref 70–99)
GLUCOSE BLDC GLUCOMTR-MCNC: 214 MG/DL — HIGH (ref 70–99)
GLUCOSE BLDC GLUCOMTR-MCNC: 214 MG/DL — HIGH (ref 70–99)
GLUCOSE BLDC GLUCOMTR-MCNC: 216 MG/DL — HIGH (ref 70–99)
GLUCOSE BLDC GLUCOMTR-MCNC: 216 MG/DL — HIGH (ref 70–99)
GLUCOSE BLDC GLUCOMTR-MCNC: 218 MG/DL — HIGH (ref 70–99)
GLUCOSE BLDC GLUCOMTR-MCNC: 218 MG/DL — HIGH (ref 70–99)
GLUCOSE BLDC GLUCOMTR-MCNC: 220 MG/DL — HIGH (ref 70–99)
GLUCOSE BLDC GLUCOMTR-MCNC: 220 MG/DL — HIGH (ref 70–99)
GLUCOSE BLDC GLUCOMTR-MCNC: 224 MG/DL — HIGH (ref 70–99)
GLUCOSE BLDC GLUCOMTR-MCNC: 225 MG/DL — HIGH (ref 70–99)
GLUCOSE BLDC GLUCOMTR-MCNC: 225 MG/DL — HIGH (ref 70–99)
GLUCOSE BLDC GLUCOMTR-MCNC: 226 MG/DL — HIGH (ref 70–99)
GLUCOSE BLDC GLUCOMTR-MCNC: 226 MG/DL — HIGH (ref 70–99)
GLUCOSE BLDC GLUCOMTR-MCNC: 228 MG/DL — HIGH (ref 70–99)
GLUCOSE BLDC GLUCOMTR-MCNC: 228 MG/DL — HIGH (ref 70–99)
GLUCOSE BLDC GLUCOMTR-MCNC: 231 MG/DL — HIGH (ref 70–99)
GLUCOSE BLDC GLUCOMTR-MCNC: 231 MG/DL — HIGH (ref 70–99)
GLUCOSE BLDC GLUCOMTR-MCNC: 232 MG/DL — HIGH (ref 70–99)
GLUCOSE BLDC GLUCOMTR-MCNC: 234 MG/DL — HIGH (ref 70–99)
GLUCOSE BLDC GLUCOMTR-MCNC: 235 MG/DL — HIGH (ref 70–99)
GLUCOSE BLDC GLUCOMTR-MCNC: 236 MG/DL — HIGH (ref 70–99)
GLUCOSE BLDC GLUCOMTR-MCNC: 237 MG/DL — HIGH (ref 70–99)
GLUCOSE BLDC GLUCOMTR-MCNC: 238 MG/DL — HIGH (ref 70–99)
GLUCOSE BLDC GLUCOMTR-MCNC: 238 MG/DL — HIGH (ref 70–99)
GLUCOSE BLDC GLUCOMTR-MCNC: 242 MG/DL — HIGH (ref 70–99)
GLUCOSE BLDC GLUCOMTR-MCNC: 245 MG/DL — HIGH (ref 70–99)
GLUCOSE BLDC GLUCOMTR-MCNC: 245 MG/DL — HIGH (ref 70–99)
GLUCOSE BLDC GLUCOMTR-MCNC: 246 MG/DL — HIGH (ref 70–99)
GLUCOSE BLDC GLUCOMTR-MCNC: 249 MG/DL — HIGH (ref 70–99)
GLUCOSE BLDC GLUCOMTR-MCNC: 249 MG/DL — HIGH (ref 70–99)
GLUCOSE BLDC GLUCOMTR-MCNC: 250 MG/DL — HIGH (ref 70–99)
GLUCOSE BLDC GLUCOMTR-MCNC: 250 MG/DL — HIGH (ref 70–99)
GLUCOSE BLDC GLUCOMTR-MCNC: 251 MG/DL — HIGH (ref 70–99)
GLUCOSE BLDC GLUCOMTR-MCNC: 251 MG/DL — HIGH (ref 70–99)
GLUCOSE BLDC GLUCOMTR-MCNC: 256 MG/DL — HIGH (ref 70–99)
GLUCOSE BLDC GLUCOMTR-MCNC: 259 MG/DL — HIGH (ref 70–99)
GLUCOSE BLDC GLUCOMTR-MCNC: 261 MG/DL — HIGH (ref 70–99)
GLUCOSE BLDC GLUCOMTR-MCNC: 279 MG/DL — HIGH (ref 70–99)
GLUCOSE BLDC GLUCOMTR-MCNC: 279 MG/DL — HIGH (ref 70–99)
GLUCOSE BLDC GLUCOMTR-MCNC: 284 MG/DL — HIGH (ref 70–99)
GLUCOSE BLDC GLUCOMTR-MCNC: 284 MG/DL — HIGH (ref 70–99)
GLUCOSE BLDC GLUCOMTR-MCNC: 292 MG/DL — HIGH (ref 70–99)
GLUCOSE BLDC GLUCOMTR-MCNC: 292 MG/DL — HIGH (ref 70–99)
GLUCOSE BLDC GLUCOMTR-MCNC: 293 MG/DL — HIGH (ref 70–99)
GLUCOSE BLDC GLUCOMTR-MCNC: 294 MG/DL — HIGH (ref 70–99)
GLUCOSE BLDC GLUCOMTR-MCNC: 299 MG/DL — HIGH (ref 70–99)
GLUCOSE BLDC GLUCOMTR-MCNC: 313 MG/DL — HIGH (ref 70–99)
GLUCOSE BLDC GLUCOMTR-MCNC: 313 MG/DL — HIGH (ref 70–99)
GLUCOSE BLDC GLUCOMTR-MCNC: 317 MG/DL — HIGH (ref 70–99)
GLUCOSE BLDC GLUCOMTR-MCNC: 317 MG/DL — HIGH (ref 70–99)
GLUCOSE BLDC GLUCOMTR-MCNC: 326 MG/DL — HIGH (ref 70–99)
GLUCOSE BLDC GLUCOMTR-MCNC: 330 MG/DL — HIGH (ref 70–99)
GLUCOSE BLDC GLUCOMTR-MCNC: 330 MG/DL — HIGH (ref 70–99)
GLUCOSE BLDC GLUCOMTR-MCNC: 331 MG/DL — HIGH (ref 70–99)
GLUCOSE BLDC GLUCOMTR-MCNC: 331 MG/DL — HIGH (ref 70–99)
GLUCOSE BLDC GLUCOMTR-MCNC: 337 MG/DL — HIGH (ref 70–99)
GLUCOSE BLDC GLUCOMTR-MCNC: 337 MG/DL — HIGH (ref 70–99)
GLUCOSE BLDC GLUCOMTR-MCNC: 342 MG/DL — HIGH (ref 70–99)
GLUCOSE BLDC GLUCOMTR-MCNC: 361 MG/DL — HIGH (ref 70–99)
GLUCOSE BLDC GLUCOMTR-MCNC: 376 MG/DL — HIGH (ref 70–99)
GLUCOSE BLDC GLUCOMTR-MCNC: 417 MG/DL — HIGH (ref 70–99)
GLUCOSE BLDC GLUCOMTR-MCNC: 417 MG/DL — HIGH (ref 70–99)
GLUCOSE BLDC GLUCOMTR-MCNC: 419 MG/DL — HIGH (ref 70–99)
GLUCOSE BLDC GLUCOMTR-MCNC: 419 MG/DL — HIGH (ref 70–99)
GLUCOSE BLDC GLUCOMTR-MCNC: 461 MG/DL — CRITICAL HIGH (ref 70–99)
GLUCOSE BLDC GLUCOMTR-MCNC: 461 MG/DL — CRITICAL HIGH (ref 70–99)
GLUCOSE BLDC GLUCOMTR-MCNC: 52 MG/DL — CRITICAL LOW (ref 70–99)
GLUCOSE BLDC GLUCOMTR-MCNC: 52 MG/DL — CRITICAL LOW (ref 70–99)
GLUCOSE BLDC GLUCOMTR-MCNC: 53 MG/DL — CRITICAL LOW (ref 70–99)
GLUCOSE BLDC GLUCOMTR-MCNC: 53 MG/DL — CRITICAL LOW (ref 70–99)
GLUCOSE BLDC GLUCOMTR-MCNC: 54 MG/DL — CRITICAL LOW (ref 70–99)
GLUCOSE BLDC GLUCOMTR-MCNC: 54 MG/DL — CRITICAL LOW (ref 70–99)
GLUCOSE BLDC GLUCOMTR-MCNC: 55 MG/DL — LOW (ref 70–99)
GLUCOSE BLDC GLUCOMTR-MCNC: 55 MG/DL — LOW (ref 70–99)
GLUCOSE BLDC GLUCOMTR-MCNC: 62 MG/DL — LOW (ref 70–99)
GLUCOSE BLDC GLUCOMTR-MCNC: 62 MG/DL — LOW (ref 70–99)
GLUCOSE BLDC GLUCOMTR-MCNC: 63 MG/DL — LOW (ref 70–99)
GLUCOSE BLDC GLUCOMTR-MCNC: 63 MG/DL — LOW (ref 70–99)
GLUCOSE BLDC GLUCOMTR-MCNC: 70 MG/DL — SIGNIFICANT CHANGE UP (ref 70–99)
GLUCOSE BLDC GLUCOMTR-MCNC: 70 MG/DL — SIGNIFICANT CHANGE UP (ref 70–99)
GLUCOSE BLDC GLUCOMTR-MCNC: 72 MG/DL — SIGNIFICANT CHANGE UP (ref 70–99)
GLUCOSE BLDC GLUCOMTR-MCNC: 72 MG/DL — SIGNIFICANT CHANGE UP (ref 70–99)
GLUCOSE BLDC GLUCOMTR-MCNC: 78 MG/DL — SIGNIFICANT CHANGE UP (ref 70–99)
GLUCOSE BLDC GLUCOMTR-MCNC: 79 MG/DL — SIGNIFICANT CHANGE UP (ref 70–99)
GLUCOSE BLDC GLUCOMTR-MCNC: 79 MG/DL — SIGNIFICANT CHANGE UP (ref 70–99)
GLUCOSE BLDC GLUCOMTR-MCNC: 80 MG/DL — SIGNIFICANT CHANGE UP (ref 70–99)
GLUCOSE BLDC GLUCOMTR-MCNC: 81 MG/DL — SIGNIFICANT CHANGE UP (ref 70–99)
GLUCOSE BLDC GLUCOMTR-MCNC: 81 MG/DL — SIGNIFICANT CHANGE UP (ref 70–99)
GLUCOSE BLDC GLUCOMTR-MCNC: 82 MG/DL — SIGNIFICANT CHANGE UP (ref 70–99)
GLUCOSE BLDC GLUCOMTR-MCNC: 82 MG/DL — SIGNIFICANT CHANGE UP (ref 70–99)
GLUCOSE BLDC GLUCOMTR-MCNC: 83 MG/DL — SIGNIFICANT CHANGE UP (ref 70–99)
GLUCOSE BLDC GLUCOMTR-MCNC: 83 MG/DL — SIGNIFICANT CHANGE UP (ref 70–99)
GLUCOSE BLDC GLUCOMTR-MCNC: 84 MG/DL — SIGNIFICANT CHANGE UP (ref 70–99)
GLUCOSE BLDC GLUCOMTR-MCNC: 84 MG/DL — SIGNIFICANT CHANGE UP (ref 70–99)
GLUCOSE BLDC GLUCOMTR-MCNC: 89 MG/DL — SIGNIFICANT CHANGE UP (ref 70–99)
GLUCOSE BLDC GLUCOMTR-MCNC: 89 MG/DL — SIGNIFICANT CHANGE UP (ref 70–99)
GLUCOSE BLDC GLUCOMTR-MCNC: 91 MG/DL — SIGNIFICANT CHANGE UP (ref 70–99)
GLUCOSE BLDC GLUCOMTR-MCNC: 91 MG/DL — SIGNIFICANT CHANGE UP (ref 70–99)
GLUCOSE BLDC GLUCOMTR-MCNC: 92 MG/DL — SIGNIFICANT CHANGE UP (ref 70–99)
GLUCOSE BLDC GLUCOMTR-MCNC: 93 MG/DL — SIGNIFICANT CHANGE UP (ref 70–99)
GLUCOSE BLDC GLUCOMTR-MCNC: 93 MG/DL — SIGNIFICANT CHANGE UP (ref 70–99)
GLUCOSE BLDC GLUCOMTR-MCNC: 95 MG/DL — SIGNIFICANT CHANGE UP (ref 70–99)
GLUCOSE BLDC GLUCOMTR-MCNC: 96 MG/DL — SIGNIFICANT CHANGE UP (ref 70–99)
GLUCOSE BLDC GLUCOMTR-MCNC: 96 MG/DL — SIGNIFICANT CHANGE UP (ref 70–99)
GLUCOSE BLDC GLUCOMTR-MCNC: 98 MG/DL — SIGNIFICANT CHANGE UP (ref 70–99)
GLUCOSE SERPL-MCNC: 111 MG/DL — HIGH (ref 70–99)
GLUCOSE SERPL-MCNC: 112 MG/DL — HIGH (ref 70–99)
GLUCOSE SERPL-MCNC: 121 MG/DL — HIGH (ref 70–99)
GLUCOSE SERPL-MCNC: 121 MG/DL — HIGH (ref 70–99)
GLUCOSE SERPL-MCNC: 124 MG/DL — HIGH (ref 70–99)
GLUCOSE SERPL-MCNC: 131 MG/DL — HIGH (ref 70–99)
GLUCOSE SERPL-MCNC: 131 MG/DL — HIGH (ref 70–99)
GLUCOSE SERPL-MCNC: 133 MG/DL — HIGH (ref 70–99)
GLUCOSE SERPL-MCNC: 133 MG/DL — HIGH (ref 70–99)
GLUCOSE SERPL-MCNC: 136 MG/DL — HIGH (ref 70–99)
GLUCOSE SERPL-MCNC: 136 MG/DL — HIGH (ref 70–99)
GLUCOSE SERPL-MCNC: 139 MG/DL — HIGH (ref 70–99)
GLUCOSE SERPL-MCNC: 140 MG/DL — HIGH (ref 70–99)
GLUCOSE SERPL-MCNC: 140 MG/DL — HIGH (ref 70–99)
GLUCOSE SERPL-MCNC: 143 MG/DL — HIGH (ref 70–99)
GLUCOSE SERPL-MCNC: 147 MG/DL — HIGH (ref 70–99)
GLUCOSE SERPL-MCNC: 147 MG/DL — HIGH (ref 70–99)
GLUCOSE SERPL-MCNC: 151 MG/DL — HIGH (ref 70–99)
GLUCOSE SERPL-MCNC: 151 MG/DL — HIGH (ref 70–99)
GLUCOSE SERPL-MCNC: 157 MG/DL — HIGH (ref 70–99)
GLUCOSE SERPL-MCNC: 157 MG/DL — HIGH (ref 70–99)
GLUCOSE SERPL-MCNC: 159 MG/DL — HIGH (ref 70–99)
GLUCOSE SERPL-MCNC: 159 MG/DL — HIGH (ref 70–99)
GLUCOSE SERPL-MCNC: 162 MG/DL — HIGH (ref 70–99)
GLUCOSE SERPL-MCNC: 162 MG/DL — HIGH (ref 70–99)
GLUCOSE SERPL-MCNC: 164 MG/DL — HIGH (ref 70–99)
GLUCOSE SERPL-MCNC: 164 MG/DL — HIGH (ref 70–99)
GLUCOSE SERPL-MCNC: 167 MG/DL — HIGH (ref 70–99)
GLUCOSE SERPL-MCNC: 169 MG/DL — HIGH (ref 70–99)
GLUCOSE SERPL-MCNC: 170 MG/DL — HIGH (ref 70–99)
GLUCOSE SERPL-MCNC: 170 MG/DL — HIGH (ref 70–99)
GLUCOSE SERPL-MCNC: 171 MG/DL — HIGH (ref 70–99)
GLUCOSE SERPL-MCNC: 171 MG/DL — HIGH (ref 70–99)
GLUCOSE SERPL-MCNC: 175 MG/DL — HIGH (ref 70–99)
GLUCOSE SERPL-MCNC: 175 MG/DL — HIGH (ref 70–99)
GLUCOSE SERPL-MCNC: 177 MG/DL — HIGH (ref 70–99)
GLUCOSE SERPL-MCNC: 177 MG/DL — HIGH (ref 70–99)
GLUCOSE SERPL-MCNC: 178 MG/DL — HIGH (ref 70–99)
GLUCOSE SERPL-MCNC: 178 MG/DL — HIGH (ref 70–99)
GLUCOSE SERPL-MCNC: 179 MG/DL — HIGH (ref 70–99)
GLUCOSE SERPL-MCNC: 179 MG/DL — HIGH (ref 70–99)
GLUCOSE SERPL-MCNC: 180 MG/DL — HIGH (ref 70–99)
GLUCOSE SERPL-MCNC: 185 MG/DL — HIGH (ref 70–99)
GLUCOSE SERPL-MCNC: 185 MG/DL — HIGH (ref 70–99)
GLUCOSE SERPL-MCNC: 186 MG/DL — HIGH (ref 70–99)
GLUCOSE SERPL-MCNC: 186 MG/DL — HIGH (ref 70–99)
GLUCOSE SERPL-MCNC: 187 MG/DL — HIGH (ref 70–99)
GLUCOSE SERPL-MCNC: 187 MG/DL — HIGH (ref 70–99)
GLUCOSE SERPL-MCNC: 191 MG/DL — HIGH (ref 70–99)
GLUCOSE SERPL-MCNC: 191 MG/DL — HIGH (ref 70–99)
GLUCOSE SERPL-MCNC: 199 MG/DL — HIGH (ref 70–99)
GLUCOSE SERPL-MCNC: 199 MG/DL — HIGH (ref 70–99)
GLUCOSE SERPL-MCNC: 200 MG/DL — HIGH (ref 70–99)
GLUCOSE SERPL-MCNC: 200 MG/DL — HIGH (ref 70–99)
GLUCOSE SERPL-MCNC: 201 MG/DL — HIGH (ref 70–99)
GLUCOSE SERPL-MCNC: 201 MG/DL — HIGH (ref 70–99)
GLUCOSE SERPL-MCNC: 204 MG/DL — HIGH (ref 70–99)
GLUCOSE SERPL-MCNC: 206 MG/DL — HIGH (ref 70–99)
GLUCOSE SERPL-MCNC: 206 MG/DL — HIGH (ref 70–99)
GLUCOSE SERPL-MCNC: 209 MG/DL — HIGH (ref 70–99)
GLUCOSE SERPL-MCNC: 209 MG/DL — HIGH (ref 70–99)
GLUCOSE SERPL-MCNC: 216 MG/DL — HIGH (ref 70–99)
GLUCOSE SERPL-MCNC: 216 MG/DL — HIGH (ref 70–99)
GLUCOSE SERPL-MCNC: 225 MG/DL
GLUCOSE SERPL-MCNC: 232 MG/DL — HIGH (ref 70–99)
GLUCOSE SERPL-MCNC: 232 MG/DL — HIGH (ref 70–99)
GLUCOSE SERPL-MCNC: 234 MG/DL — HIGH (ref 70–99)
GLUCOSE SERPL-MCNC: 234 MG/DL — HIGH (ref 70–99)
GLUCOSE SERPL-MCNC: 238 MG/DL — HIGH (ref 70–99)
GLUCOSE SERPL-MCNC: 242 MG/DL — HIGH (ref 70–99)
GLUCOSE SERPL-MCNC: 242 MG/DL — HIGH (ref 70–99)
GLUCOSE SERPL-MCNC: 248 MG/DL
GLUCOSE SERPL-MCNC: 250 MG/DL — HIGH (ref 70–99)
GLUCOSE SERPL-MCNC: 250 MG/DL — HIGH (ref 70–99)
GLUCOSE SERPL-MCNC: 251 MG/DL — HIGH (ref 70–99)
GLUCOSE SERPL-MCNC: 254 MG/DL — HIGH (ref 70–99)
GLUCOSE SERPL-MCNC: 254 MG/DL — HIGH (ref 70–99)
GLUCOSE SERPL-MCNC: 282 MG/DL — HIGH (ref 70–99)
GLUCOSE SERPL-MCNC: 304 MG/DL — HIGH (ref 70–99)
GLUCOSE SERPL-MCNC: 304 MG/DL — HIGH (ref 70–99)
GLUCOSE SERPL-MCNC: 313 MG/DL — HIGH (ref 70–99)
GLUCOSE SERPL-MCNC: 318 MG/DL — HIGH (ref 70–99)
GLUCOSE SERPL-MCNC: 347 MG/DL — HIGH (ref 70–99)
GLUCOSE SERPL-MCNC: 347 MG/DL — HIGH (ref 70–99)
GLUCOSE SERPL-MCNC: 65 MG/DL — LOW (ref 70–99)
GLUCOSE SERPL-MCNC: 65 MG/DL — LOW (ref 70–99)
GLUCOSE SERPL-MCNC: 68 MG/DL — LOW (ref 70–99)
GLUCOSE SERPL-MCNC: 68 MG/DL — LOW (ref 70–99)
GLUCOSE SERPL-MCNC: 70 MG/DL — SIGNIFICANT CHANGE UP (ref 70–99)
GLUCOSE SERPL-MCNC: 70 MG/DL — SIGNIFICANT CHANGE UP (ref 70–99)
GLUCOSE SERPL-MCNC: 81 MG/DL — SIGNIFICANT CHANGE UP (ref 70–99)
GLUCOSE SERPL-MCNC: 83 MG/DL — SIGNIFICANT CHANGE UP (ref 70–99)
GLUCOSE SERPL-MCNC: 83 MG/DL — SIGNIFICANT CHANGE UP (ref 70–99)
GLUCOSE SERPL-MCNC: 95 MG/DL — SIGNIFICANT CHANGE UP (ref 70–99)
GLUCOSE SERPL-MCNC: 95 MG/DL — SIGNIFICANT CHANGE UP (ref 70–99)
GLUCOSE SERPL-MCNC: 96 MG/DL — SIGNIFICANT CHANGE UP (ref 70–99)
GLUCOSE SERPL-MCNC: 96 MG/DL — SIGNIFICANT CHANGE UP (ref 70–99)
GLUCOSE UR QL: 250 MG/DL
GLUCOSE UR QL: 250 MG/DL
GLUCOSE UR QL: NEGATIVE MG/DL — SIGNIFICANT CHANGE UP
GLUCOSE UR QL: NEGATIVE — SIGNIFICANT CHANGE UP
GRAM STN FLD: ABNORMAL
GRAM STN FLD: ABNORMAL
HBA1C MFR BLD HPLC: 10.3
HBA1C MFR BLD HPLC: 9.5 %
HBV CORE IGG+IGM SER QL: REACTIVE
HBV SURFACE AB SER QL: REACTIVE
HBV SURFACE AG SER QL: NONREACTIVE
HCO3 BLDA-SCNC: 28 MMOL/L — SIGNIFICANT CHANGE UP (ref 21–28)
HCO3 BLDA-SCNC: 28 MMOL/L — SIGNIFICANT CHANGE UP (ref 21–28)
HCT VFR BLD CALC: 21.7 % — LOW (ref 39–50)
HCT VFR BLD CALC: 21.7 % — LOW (ref 39–50)
HCT VFR BLD CALC: 22.2 % — LOW (ref 39–50)
HCT VFR BLD CALC: 22.2 % — LOW (ref 39–50)
HCT VFR BLD CALC: 23.6 % — LOW (ref 39–50)
HCT VFR BLD CALC: 23.6 % — LOW (ref 39–50)
HCT VFR BLD CALC: 23.9 % — LOW (ref 39–50)
HCT VFR BLD CALC: 24 % — LOW (ref 39–50)
HCT VFR BLD CALC: 24 % — LOW (ref 39–50)
HCT VFR BLD CALC: 24.2 % — LOW (ref 39–50)
HCT VFR BLD CALC: 24.3 % — LOW (ref 39–50)
HCT VFR BLD CALC: 24.3 % — LOW (ref 39–50)
HCT VFR BLD CALC: 24.4 % — LOW (ref 39–50)
HCT VFR BLD CALC: 24.6 % — LOW (ref 39–50)
HCT VFR BLD CALC: 24.6 % — LOW (ref 39–50)
HCT VFR BLD CALC: 24.7 % — LOW (ref 39–50)
HCT VFR BLD CALC: 24.8 % — LOW (ref 39–50)
HCT VFR BLD CALC: 24.8 % — LOW (ref 39–50)
HCT VFR BLD CALC: 25.7 % — LOW (ref 39–50)
HCT VFR BLD CALC: 26.1 % — LOW (ref 39–50)
HCT VFR BLD CALC: 26.1 % — LOW (ref 39–50)
HCT VFR BLD CALC: 26.2 % — LOW (ref 39–50)
HCT VFR BLD CALC: 26.2 % — LOW (ref 39–50)
HCT VFR BLD CALC: 26.6 % — LOW (ref 39–50)
HCT VFR BLD CALC: 26.6 % — LOW (ref 39–50)
HCT VFR BLD CALC: 26.9 % — LOW (ref 39–50)
HCT VFR BLD CALC: 26.9 % — LOW (ref 39–50)
HCT VFR BLD CALC: 27.1 % — LOW (ref 39–50)
HCT VFR BLD CALC: 27.1 % — LOW (ref 39–50)
HCT VFR BLD CALC: 27.5 % — LOW (ref 39–50)
HCT VFR BLD CALC: 27.6 % — LOW (ref 39–50)
HCT VFR BLD CALC: 27.7 % — LOW (ref 39–50)
HCT VFR BLD CALC: 27.7 % — LOW (ref 39–50)
HCT VFR BLD CALC: 27.9 % — LOW (ref 39–50)
HCT VFR BLD CALC: 28.1 % — LOW (ref 39–50)
HCT VFR BLD CALC: 28.1 % — LOW (ref 39–50)
HCT VFR BLD CALC: 28.2 % — LOW (ref 39–50)
HCT VFR BLD CALC: 28.2 % — LOW (ref 39–50)
HCT VFR BLD CALC: 28.3 % — LOW (ref 39–50)
HCT VFR BLD CALC: 28.3 % — LOW (ref 39–50)
HCT VFR BLD CALC: 28.6 % — LOW (ref 39–50)
HCT VFR BLD CALC: 28.7 % — LOW (ref 39–50)
HCT VFR BLD CALC: 28.7 % — LOW (ref 39–50)
HCT VFR BLD CALC: 28.8 % — LOW (ref 39–50)
HCT VFR BLD CALC: 28.9 % — LOW (ref 39–50)
HCT VFR BLD CALC: 28.9 % — LOW (ref 39–50)
HCT VFR BLD CALC: 29.2 % — LOW (ref 39–50)
HCT VFR BLD CALC: 29.2 % — LOW (ref 39–50)
HCT VFR BLD CALC: 29.3 % — LOW (ref 39–50)
HCT VFR BLD CALC: 29.3 % — LOW (ref 39–50)
HCT VFR BLD CALC: 29.5 % — LOW (ref 39–50)
HCT VFR BLD CALC: 29.5 % — LOW (ref 39–50)
HCT VFR BLD CALC: 29.6 % — LOW (ref 39–50)
HCT VFR BLD CALC: 29.9 % — LOW (ref 39–50)
HCT VFR BLD CALC: 29.9 % — LOW (ref 39–50)
HCT VFR BLD CALC: 30 % — LOW (ref 39–50)
HCT VFR BLD CALC: 30 % — LOW (ref 39–50)
HCT VFR BLD CALC: 30.1 % — LOW (ref 39–50)
HCT VFR BLD CALC: 30.1 % — LOW (ref 39–50)
HCT VFR BLD CALC: 30.4 % — LOW (ref 39–50)
HCT VFR BLD CALC: 30.4 % — LOW (ref 39–50)
HCT VFR BLD CALC: 30.6 % — LOW (ref 39–50)
HCT VFR BLD CALC: 30.6 % — LOW (ref 39–50)
HCT VFR BLD CALC: 30.9 % — LOW (ref 39–50)
HCT VFR BLD CALC: 31 % — LOW (ref 39–50)
HCT VFR BLD CALC: 31 % — LOW (ref 39–50)
HCT VFR BLD CALC: 31.5 % — LOW (ref 39–50)
HCT VFR BLD CALC: 31.5 % — LOW (ref 39–50)
HCT VFR BLD CALC: 31.7 % — LOW (ref 39–50)
HCT VFR BLD CALC: 31.7 % — LOW (ref 39–50)
HCT VFR BLD CALC: 31.9 % — LOW (ref 39–50)
HCT VFR BLD CALC: 33 % — LOW (ref 39–50)
HCT VFR BLD CALC: 34.5 % — LOW (ref 39–50)
HCT VFR BLD CALC: 35.4 %
HCT VFR BLD CALC: 37.2 % — LOW (ref 39–50)
HCT VFR BLD CALC: 37.6 % — LOW (ref 39–50)
HCT VFR BLD CALC: 37.8 % — LOW (ref 39–50)
HDLC SERPL-MCNC: 41 MG/DL
HGB BLD-MCNC: 10.3 G/DL — LOW (ref 13–17)
HGB BLD-MCNC: 10.8 G/DL — LOW (ref 13–17)
HGB BLD-MCNC: 11.2 G/DL
HGB BLD-MCNC: 11.5 G/DL — LOW (ref 13–17)
HGB BLD-MCNC: 11.9 G/DL — LOW (ref 13–17)
HGB BLD-MCNC: 12.1 G/DL — LOW (ref 13–17)
HGB BLD-MCNC: 12.4 G/DL — LOW (ref 13–17)
HGB BLD-MCNC: 6.7 G/DL — CRITICAL LOW (ref 13–17)
HGB BLD-MCNC: 6.7 G/DL — CRITICAL LOW (ref 13–17)
HGB BLD-MCNC: 6.9 G/DL — CRITICAL LOW (ref 13–17)
HGB BLD-MCNC: 6.9 G/DL — CRITICAL LOW (ref 13–17)
HGB BLD-MCNC: 7.1 G/DL — LOW (ref 13–17)
HGB BLD-MCNC: 7.1 G/DL — LOW (ref 13–17)
HGB BLD-MCNC: 7.3 G/DL — LOW (ref 13–17)
HGB BLD-MCNC: 7.3 G/DL — LOW (ref 13–17)
HGB BLD-MCNC: 7.4 G/DL — LOW (ref 13–17)
HGB BLD-MCNC: 7.4 G/DL — LOW (ref 13–17)
HGB BLD-MCNC: 7.5 G/DL — LOW (ref 13–17)
HGB BLD-MCNC: 7.7 G/DL — LOW (ref 13–17)
HGB BLD-MCNC: 7.8 G/DL — LOW (ref 13–17)
HGB BLD-MCNC: 7.9 G/DL — LOW (ref 13–17)
HGB BLD-MCNC: 8.1 G/DL — LOW (ref 13–17)
HGB BLD-MCNC: 8.1 G/DL — LOW (ref 13–17)
HGB BLD-MCNC: 8.2 G/DL — LOW (ref 13–17)
HGB BLD-MCNC: 8.2 G/DL — LOW (ref 13–17)
HGB BLD-MCNC: 8.3 G/DL — LOW (ref 13–17)
HGB BLD-MCNC: 8.5 G/DL — LOW (ref 13–17)
HGB BLD-MCNC: 8.6 G/DL — LOW (ref 13–17)
HGB BLD-MCNC: 8.7 G/DL — LOW (ref 13–17)
HGB BLD-MCNC: 8.8 G/DL — LOW (ref 13–17)
HGB BLD-MCNC: 8.8 G/DL — LOW (ref 13–17)
HGB BLD-MCNC: 8.9 G/DL — LOW (ref 13–17)
HGB BLD-MCNC: 8.9 G/DL — LOW (ref 13–17)
HGB BLD-MCNC: 9 G/DL — LOW (ref 13–17)
HGB BLD-MCNC: 9.1 G/DL — LOW (ref 13–17)
HGB BLD-MCNC: 9.2 G/DL — LOW (ref 13–17)
HGB BLD-MCNC: 9.3 G/DL — LOW (ref 13–17)
HGB BLD-MCNC: 9.4 G/DL — LOW (ref 13–17)
HGB BLD-MCNC: 9.5 G/DL — LOW (ref 13–17)
HGB BLD-MCNC: 9.7 G/DL — LOW (ref 13–17)
HGB BLD-MCNC: 9.9 G/DL — LOW (ref 13–17)
HGB BLD-MCNC: 9.9 G/DL — LOW (ref 13–17)
HIV 1+2 AB+HIV1 P24 AG SERPL QL IA: SIGNIFICANT CHANGE UP
HIV 1+2 AB+HIV1 P24 AG SERPL QL IA: SIGNIFICANT CHANGE UP
HYPOCHROMIA BLD QL: SLIGHT — SIGNIFICANT CHANGE UP
IMM GRANULOCYTES NFR BLD AUTO: 0.3 % — SIGNIFICANT CHANGE UP (ref 0–0.9)
IMM GRANULOCYTES NFR BLD AUTO: 0.4 % — SIGNIFICANT CHANGE UP (ref 0–0.9)
IMM GRANULOCYTES NFR BLD AUTO: 0.5 %
IMM GRANULOCYTES NFR BLD AUTO: 0.5 % — SIGNIFICANT CHANGE UP (ref 0–0.9)
IMM GRANULOCYTES NFR BLD AUTO: 0.6 % — SIGNIFICANT CHANGE UP (ref 0–0.9)
IMM GRANULOCYTES NFR BLD AUTO: 0.8 % — SIGNIFICANT CHANGE UP (ref 0–0.9)
IMM GRANULOCYTES NFR BLD AUTO: 0.8 % — SIGNIFICANT CHANGE UP (ref 0–0.9)
IMM GRANULOCYTES NFR BLD AUTO: 1.1 % — HIGH (ref 0–0.9)
IMM GRANULOCYTES NFR BLD AUTO: 1.1 % — HIGH (ref 0–0.9)
IMM GRANULOCYTES NFR BLD AUTO: 1.9 % — HIGH (ref 0–0.9)
IMM GRANULOCYTES NFR BLD AUTO: 1.9 % — HIGH (ref 0–0.9)
IMM GRANULOCYTES NFR BLD AUTO: 2 % — HIGH (ref 0–0.9)
IMM GRANULOCYTES NFR BLD AUTO: 2 % — HIGH (ref 0–0.9)
IMM GRANULOCYTES NFR BLD AUTO: 2.7 % — HIGH (ref 0–0.9)
IMM GRANULOCYTES NFR BLD AUTO: 2.7 % — HIGH (ref 0–0.9)
INR BLD: 1.02 — SIGNIFICANT CHANGE UP (ref 0.85–1.18)
INR BLD: 1.11 — SIGNIFICANT CHANGE UP (ref 0.85–1.18)
INR BLD: 1.11 — SIGNIFICANT CHANGE UP (ref 0.85–1.18)
INR BLD: 1.12 — SIGNIFICANT CHANGE UP (ref 0.88–1.16)
INR BLD: 1.14 — SIGNIFICANT CHANGE UP (ref 0.85–1.18)
INR BLD: 1.14 — SIGNIFICANT CHANGE UP (ref 0.85–1.18)
INR BLD: 1.16 — SIGNIFICANT CHANGE UP (ref 0.85–1.18)
INR BLD: 1.16 — SIGNIFICANT CHANGE UP (ref 0.85–1.18)
INR BLD: 1.17 — HIGH (ref 0.88–1.16)
INR BLD: 1.17 — SIGNIFICANT CHANGE UP (ref 0.85–1.18)
INR BLD: 1.17 — SIGNIFICANT CHANGE UP (ref 0.85–1.18)
INR BLD: 1.18 — SIGNIFICANT CHANGE UP (ref 0.85–1.18)
INR BLD: 1.18 — SIGNIFICANT CHANGE UP (ref 0.85–1.18)
INR BLD: 1.19 — HIGH (ref 0.85–1.18)
INR BLD: 1.19 — HIGH (ref 0.85–1.18)
INR BLD: 1.22 — HIGH (ref 0.85–1.18)
INR BLD: 1.22 — HIGH (ref 0.85–1.18)
INR BLD: 1.23 — HIGH (ref 0.85–1.18)
INR BLD: 1.23 — HIGH (ref 0.85–1.18)
INR BLD: 1.24 — HIGH (ref 0.85–1.18)
INR BLD: 1.24 — HIGH (ref 0.85–1.18)
INR BLD: 1.46 — HIGH (ref 0.85–1.18)
INR BLD: 1.46 — HIGH (ref 0.85–1.18)
INR BLD: 1.71 — HIGH (ref 0.85–1.18)
INR BLD: 1.71 — HIGH (ref 0.85–1.18)
IRON SATN MFR SERPL: 15 % — LOW (ref 16–55)
IRON SATN MFR SERPL: 15 % — LOW (ref 16–55)
IRON SATN MFR SERPL: 25 UG/DL — LOW (ref 45–165)
IRON SATN MFR SERPL: 25 UG/DL — LOW (ref 45–165)
KETONES UR-MCNC: 15 MG/DL
KETONES UR-MCNC: 15 MG/DL
KETONES UR-MCNC: ABNORMAL MG/DL
KETONES UR-MCNC: NEGATIVE MG/DL — SIGNIFICANT CHANGE UP
KETONES UR-MCNC: NEGATIVE MG/DL — SIGNIFICANT CHANGE UP
KETONES UR-MCNC: NEGATIVE — SIGNIFICANT CHANGE UP
LACTATE SERPL-SCNC: 1 MMOL/L — SIGNIFICANT CHANGE UP (ref 0.5–2)
LACTATE SERPL-SCNC: 1 MMOL/L — SIGNIFICANT CHANGE UP (ref 0.5–2)
LACTATE SERPL-SCNC: 3.6 MMOL/L — HIGH (ref 0.5–2)
LACTATE SERPL-SCNC: 3.6 MMOL/L — HIGH (ref 0.5–2)
LDLC SERPL CALC-MCNC: 36 MG/DL
LEUKOCYTE ESTERASE UR-ACNC: ABNORMAL
LEUKOCYTE ESTERASE UR-ACNC: NEGATIVE — SIGNIFICANT CHANGE UP
LYMPHOCYTES # BLD AUTO: 0.09 K/UL — LOW (ref 1–3.3)
LYMPHOCYTES # BLD AUTO: 0.09 K/UL — LOW (ref 1–3.3)
LYMPHOCYTES # BLD AUTO: 0.11 K/UL — LOW (ref 1–3.3)
LYMPHOCYTES # BLD AUTO: 0.11 K/UL — LOW (ref 1–3.3)
LYMPHOCYTES # BLD AUTO: 0.16 K/UL — LOW (ref 1–3.3)
LYMPHOCYTES # BLD AUTO: 0.2 K/UL — LOW (ref 1–3.3)
LYMPHOCYTES # BLD AUTO: 0.2 K/UL — LOW (ref 1–3.3)
LYMPHOCYTES # BLD AUTO: 0.25 K/UL — LOW (ref 1–3.3)
LYMPHOCYTES # BLD AUTO: 0.26 K/UL — LOW (ref 1–3.3)
LYMPHOCYTES # BLD AUTO: 0.26 K/UL — LOW (ref 1–3.3)
LYMPHOCYTES # BLD AUTO: 0.28 K/UL — LOW (ref 1–3.3)
LYMPHOCYTES # BLD AUTO: 0.28 K/UL — LOW (ref 1–3.3)
LYMPHOCYTES # BLD AUTO: 0.29 K/UL — LOW (ref 1–3.3)
LYMPHOCYTES # BLD AUTO: 0.29 K/UL — LOW (ref 1–3.3)
LYMPHOCYTES # BLD AUTO: 0.3 K/UL — LOW (ref 1–3.3)
LYMPHOCYTES # BLD AUTO: 0.3 K/UL — LOW (ref 1–3.3)
LYMPHOCYTES # BLD AUTO: 0.34 K/UL — LOW (ref 1–3.3)
LYMPHOCYTES # BLD AUTO: 0.34 K/UL — LOW (ref 1–3.3)
LYMPHOCYTES # BLD AUTO: 0.38 K/UL — LOW (ref 1–3.3)
LYMPHOCYTES # BLD AUTO: 0.38 K/UL — LOW (ref 1–3.3)
LYMPHOCYTES # BLD AUTO: 0.4 K/UL — LOW (ref 1–3.3)
LYMPHOCYTES # BLD AUTO: 0.4 K/UL — LOW (ref 1–3.3)
LYMPHOCYTES # BLD AUTO: 0.44 K/UL — LOW (ref 1–3.3)
LYMPHOCYTES # BLD AUTO: 0.44 K/UL — LOW (ref 1–3.3)
LYMPHOCYTES # BLD AUTO: 0.53 K/UL — LOW (ref 1–3.3)
LYMPHOCYTES # BLD AUTO: 0.55 K/UL — LOW (ref 1–3.3)
LYMPHOCYTES # BLD AUTO: 0.55 K/UL — LOW (ref 1–3.3)
LYMPHOCYTES # BLD AUTO: 0.7 K/UL — LOW (ref 1–3.3)
LYMPHOCYTES # BLD AUTO: 0.77 K/UL — LOW (ref 1–3.3)
LYMPHOCYTES # BLD AUTO: 0.77 K/UL — LOW (ref 1–3.3)
LYMPHOCYTES # BLD AUTO: 0.81 K/UL — LOW (ref 1–3.3)
LYMPHOCYTES # BLD AUTO: 0.97 K/UL — LOW (ref 1–3.3)
LYMPHOCYTES # BLD AUTO: 0.97 K/UL — LOW (ref 1–3.3)
LYMPHOCYTES # BLD AUTO: 1.06 K/UL — SIGNIFICANT CHANGE UP (ref 1–3.3)
LYMPHOCYTES # BLD AUTO: 1.06 K/UL — SIGNIFICANT CHANGE UP (ref 1–3.3)
LYMPHOCYTES # BLD AUTO: 1.1 K/UL — SIGNIFICANT CHANGE UP (ref 1–3.3)
LYMPHOCYTES # BLD AUTO: 1.21 K/UL — SIGNIFICANT CHANGE UP (ref 1–3.3)
LYMPHOCYTES # BLD AUTO: 1.31 K/UL — SIGNIFICANT CHANGE UP (ref 1–3.3)
LYMPHOCYTES # BLD AUTO: 1.31 K/UL — SIGNIFICANT CHANGE UP (ref 1–3.3)
LYMPHOCYTES # BLD AUTO: 1.32 K/UL — SIGNIFICANT CHANGE UP (ref 1–3.3)
LYMPHOCYTES # BLD AUTO: 1.32 K/UL — SIGNIFICANT CHANGE UP (ref 1–3.3)
LYMPHOCYTES # BLD AUTO: 1.35 K/UL — SIGNIFICANT CHANGE UP (ref 1–3.3)
LYMPHOCYTES # BLD AUTO: 1.35 K/UL — SIGNIFICANT CHANGE UP (ref 1–3.3)
LYMPHOCYTES # BLD AUTO: 1.49 K/UL — SIGNIFICANT CHANGE UP (ref 1–3.3)
LYMPHOCYTES # BLD AUTO: 1.49 K/UL — SIGNIFICANT CHANGE UP (ref 1–3.3)
LYMPHOCYTES # BLD AUTO: 1.53 K/UL — SIGNIFICANT CHANGE UP (ref 1–3.3)
LYMPHOCYTES # BLD AUTO: 1.68 K/UL
LYMPHOCYTES # BLD AUTO: 1.7 % — LOW (ref 13–44)
LYMPHOCYTES # BLD AUTO: 1.7 % — LOW (ref 13–44)
LYMPHOCYTES # BLD AUTO: 1.8 K/UL — SIGNIFICANT CHANGE UP (ref 1–3.3)
LYMPHOCYTES # BLD AUTO: 10.4 % — LOW (ref 13–44)
LYMPHOCYTES # BLD AUTO: 10.4 % — LOW (ref 13–44)
LYMPHOCYTES # BLD AUTO: 11.3 % — LOW (ref 13–44)
LYMPHOCYTES # BLD AUTO: 11.3 % — LOW (ref 13–44)
LYMPHOCYTES # BLD AUTO: 11.9 % — LOW (ref 13–44)
LYMPHOCYTES # BLD AUTO: 11.9 % — LOW (ref 13–44)
LYMPHOCYTES # BLD AUTO: 12.7 % — LOW (ref 13–44)
LYMPHOCYTES # BLD AUTO: 13.4 % — SIGNIFICANT CHANGE UP (ref 13–44)
LYMPHOCYTES # BLD AUTO: 14 % — SIGNIFICANT CHANGE UP (ref 13–44)
LYMPHOCYTES # BLD AUTO: 15 % — SIGNIFICANT CHANGE UP (ref 13–44)
LYMPHOCYTES # BLD AUTO: 15 % — SIGNIFICANT CHANGE UP (ref 13–44)
LYMPHOCYTES # BLD AUTO: 15.7 % — SIGNIFICANT CHANGE UP (ref 13–44)
LYMPHOCYTES # BLD AUTO: 15.7 % — SIGNIFICANT CHANGE UP (ref 13–44)
LYMPHOCYTES # BLD AUTO: 15.9 % — SIGNIFICANT CHANGE UP (ref 13–44)
LYMPHOCYTES # BLD AUTO: 15.9 % — SIGNIFICANT CHANGE UP (ref 13–44)
LYMPHOCYTES # BLD AUTO: 16.8 % — SIGNIFICANT CHANGE UP (ref 13–44)
LYMPHOCYTES # BLD AUTO: 16.8 % — SIGNIFICANT CHANGE UP (ref 13–44)
LYMPHOCYTES # BLD AUTO: 17.9 % — SIGNIFICANT CHANGE UP (ref 13–44)
LYMPHOCYTES # BLD AUTO: 18 % — SIGNIFICANT CHANGE UP (ref 13–44)
LYMPHOCYTES # BLD AUTO: 19 % — SIGNIFICANT CHANGE UP (ref 13–44)
LYMPHOCYTES # BLD AUTO: 19 % — SIGNIFICANT CHANGE UP (ref 13–44)
LYMPHOCYTES # BLD AUTO: 19.5 % — SIGNIFICANT CHANGE UP (ref 13–44)
LYMPHOCYTES # BLD AUTO: 19.5 % — SIGNIFICANT CHANGE UP (ref 13–44)
LYMPHOCYTES # BLD AUTO: 2.6 % — LOW (ref 13–44)
LYMPHOCYTES # BLD AUTO: 2.6 % — LOW (ref 13–44)
LYMPHOCYTES # BLD AUTO: 20.1 % — SIGNIFICANT CHANGE UP (ref 13–44)
LYMPHOCYTES # BLD AUTO: 20.1 % — SIGNIFICANT CHANGE UP (ref 13–44)
LYMPHOCYTES # BLD AUTO: 22 % — SIGNIFICANT CHANGE UP (ref 13–44)
LYMPHOCYTES # BLD AUTO: 22.3 % — SIGNIFICANT CHANGE UP (ref 13–44)
LYMPHOCYTES # BLD AUTO: 22.3 % — SIGNIFICANT CHANGE UP (ref 13–44)
LYMPHOCYTES # BLD AUTO: 23.2 % — SIGNIFICANT CHANGE UP (ref 13–44)
LYMPHOCYTES # BLD AUTO: 23.2 % — SIGNIFICANT CHANGE UP (ref 13–44)
LYMPHOCYTES # BLD AUTO: 24.1 % — SIGNIFICANT CHANGE UP (ref 13–44)
LYMPHOCYTES # BLD AUTO: 24.1 % — SIGNIFICANT CHANGE UP (ref 13–44)
LYMPHOCYTES # BLD AUTO: 3.4 % — LOW (ref 13–44)
LYMPHOCYTES # BLD AUTO: 3.4 % — LOW (ref 13–44)
LYMPHOCYTES # BLD AUTO: 3.5 % — LOW (ref 13–44)
LYMPHOCYTES # BLD AUTO: 3.5 % — LOW (ref 13–44)
LYMPHOCYTES # BLD AUTO: 5.2 % — LOW (ref 13–44)
LYMPHOCYTES # BLD AUTO: 5.2 % — LOW (ref 13–44)
LYMPHOCYTES # BLD AUTO: 5.5 % — LOW (ref 13–44)
LYMPHOCYTES # BLD AUTO: 5.5 % — LOW (ref 13–44)
LYMPHOCYTES # BLD AUTO: 6.1 % — LOW (ref 13–44)
LYMPHOCYTES # BLD AUTO: 6.1 % — LOW (ref 13–44)
LYMPHOCYTES # BLD AUTO: 6.2 % — LOW (ref 13–44)
LYMPHOCYTES # BLD AUTO: 6.2 % — LOW (ref 13–44)
LYMPHOCYTES # BLD AUTO: 6.5 % — LOW (ref 13–44)
LYMPHOCYTES # BLD AUTO: 6.5 % — LOW (ref 13–44)
LYMPHOCYTES # BLD AUTO: 7.2 % — LOW (ref 13–44)
LYMPHOCYTES # BLD AUTO: 7.2 % — LOW (ref 13–44)
LYMPHOCYTES # BLD AUTO: 8.3 % — LOW (ref 13–44)
LYMPHOCYTES # BLD AUTO: 8.3 % — LOW (ref 13–44)
LYMPHOCYTES # BLD AUTO: 8.8 % — LOW (ref 13–44)
LYMPHOCYTES # BLD AUTO: 8.8 % — LOW (ref 13–44)
LYMPHOCYTES NFR BLD AUTO: 20.4 %
MACROCYTES BLD QL: SLIGHT — SIGNIFICANT CHANGE UP
MACROCYTES BLD QL: SLIGHT — SIGNIFICANT CHANGE UP
MAGNESIUM SERPL-MCNC: 1.1 MG/DL — LOW (ref 1.6–2.6)
MAGNESIUM SERPL-MCNC: 1.1 MG/DL — LOW (ref 1.6–2.6)
MAGNESIUM SERPL-MCNC: 1.3 MG/DL — LOW (ref 1.6–2.6)
MAGNESIUM SERPL-MCNC: 1.4 MG/DL — LOW (ref 1.6–2.6)
MAGNESIUM SERPL-MCNC: 1.5 MG/DL — LOW (ref 1.6–2.6)
MAGNESIUM SERPL-MCNC: 1.6 MG/DL — SIGNIFICANT CHANGE UP (ref 1.6–2.6)
MAGNESIUM SERPL-MCNC: 1.7 MG/DL — SIGNIFICANT CHANGE UP (ref 1.6–2.6)
MAGNESIUM SERPL-MCNC: 1.8 MG/DL — SIGNIFICANT CHANGE UP (ref 1.6–2.6)
MAGNESIUM SERPL-MCNC: 1.9 MG/DL — SIGNIFICANT CHANGE UP (ref 1.6–2.6)
MAGNESIUM SERPL-MCNC: 2 MG/DL — SIGNIFICANT CHANGE UP (ref 1.6–2.6)
MAGNESIUM SERPL-MCNC: 2.1 MG/DL — SIGNIFICANT CHANGE UP (ref 1.6–2.6)
MAGNESIUM SERPL-MCNC: 2.2 MG/DL — SIGNIFICANT CHANGE UP (ref 1.6–2.6)
MAGNESIUM SERPL-MCNC: 2.3 MG/DL — SIGNIFICANT CHANGE UP (ref 1.6–2.6)
MAGNESIUM SERPL-MCNC: 2.3 MG/DL — SIGNIFICANT CHANGE UP (ref 1.6–2.6)
MAGNESIUM SERPL-MCNC: 2.4 MG/DL — SIGNIFICANT CHANGE UP (ref 1.6–2.6)
MAGNESIUM SERPL-MCNC: 2.4 MG/DL — SIGNIFICANT CHANGE UP (ref 1.6–2.6)
MAN DIFF?: NORMAL
MANUAL SMEAR VERIFICATION: SIGNIFICANT CHANGE UP
MCHC RBC-ENTMCNC: 26.1 PG — LOW (ref 27–34)
MCHC RBC-ENTMCNC: 26.5 PG — LOW (ref 27–34)
MCHC RBC-ENTMCNC: 26.5 PG — LOW (ref 27–34)
MCHC RBC-ENTMCNC: 26.7 PG — LOW (ref 27–34)
MCHC RBC-ENTMCNC: 26.8 PG — LOW (ref 27–34)
MCHC RBC-ENTMCNC: 26.9 PG
MCHC RBC-ENTMCNC: 26.9 PG — LOW (ref 27–34)
MCHC RBC-ENTMCNC: 27 PG — SIGNIFICANT CHANGE UP (ref 27–34)
MCHC RBC-ENTMCNC: 27.1 PG — SIGNIFICANT CHANGE UP (ref 27–34)
MCHC RBC-ENTMCNC: 27.2 PG — SIGNIFICANT CHANGE UP (ref 27–34)
MCHC RBC-ENTMCNC: 27.3 PG — SIGNIFICANT CHANGE UP (ref 27–34)
MCHC RBC-ENTMCNC: 27.4 PG — SIGNIFICANT CHANGE UP (ref 27–34)
MCHC RBC-ENTMCNC: 27.5 PG — SIGNIFICANT CHANGE UP (ref 27–34)
MCHC RBC-ENTMCNC: 27.6 PG — SIGNIFICANT CHANGE UP (ref 27–34)
MCHC RBC-ENTMCNC: 27.7 PG — SIGNIFICANT CHANGE UP (ref 27–34)
MCHC RBC-ENTMCNC: 27.7 PG — SIGNIFICANT CHANGE UP (ref 27–34)
MCHC RBC-ENTMCNC: 27.8 PG — SIGNIFICANT CHANGE UP (ref 27–34)
MCHC RBC-ENTMCNC: 27.9 PG — SIGNIFICANT CHANGE UP (ref 27–34)
MCHC RBC-ENTMCNC: 28 PG — SIGNIFICANT CHANGE UP (ref 27–34)
MCHC RBC-ENTMCNC: 28.1 PG — SIGNIFICANT CHANGE UP (ref 27–34)
MCHC RBC-ENTMCNC: 29.7 GM/DL — LOW (ref 32–36)
MCHC RBC-ENTMCNC: 29.7 GM/DL — LOW (ref 32–36)
MCHC RBC-ENTMCNC: 30.1 GM/DL — LOW (ref 32–36)
MCHC RBC-ENTMCNC: 30.1 GM/DL — LOW (ref 32–36)
MCHC RBC-ENTMCNC: 30.2 GM/DL — LOW (ref 32–36)
MCHC RBC-ENTMCNC: 30.3 GM/DL — LOW (ref 32–36)
MCHC RBC-ENTMCNC: 30.4 GM/DL — LOW (ref 32–36)
MCHC RBC-ENTMCNC: 30.5 GM/DL — LOW (ref 32–36)
MCHC RBC-ENTMCNC: 30.6 GM/DL — LOW (ref 32–36)
MCHC RBC-ENTMCNC: 30.7 GM/DL — LOW (ref 32–36)
MCHC RBC-ENTMCNC: 30.8 GM/DL — LOW (ref 32–36)
MCHC RBC-ENTMCNC: 30.8 GM/DL — LOW (ref 32–36)
MCHC RBC-ENTMCNC: 30.9 GM/DL — LOW (ref 32–36)
MCHC RBC-ENTMCNC: 31 GM/DL — LOW (ref 32–36)
MCHC RBC-ENTMCNC: 31 GM/DL — LOW (ref 32–36)
MCHC RBC-ENTMCNC: 31.1 GM/DL — LOW (ref 32–36)
MCHC RBC-ENTMCNC: 31.1 GM/DL — LOW (ref 32–36)
MCHC RBC-ENTMCNC: 31.2 GM/DL — LOW (ref 32–36)
MCHC RBC-ENTMCNC: 31.2 GM/DL — LOW (ref 32–36)
MCHC RBC-ENTMCNC: 31.3 GM/DL — LOW (ref 32–36)
MCHC RBC-ENTMCNC: 31.4 GM/DL — LOW (ref 32–36)
MCHC RBC-ENTMCNC: 31.4 GM/DL — LOW (ref 32–36)
MCHC RBC-ENTMCNC: 31.5 GM/DL — LOW (ref 32–36)
MCHC RBC-ENTMCNC: 31.6 GM/DL
MCHC RBC-ENTMCNC: 31.6 GM/DL — LOW (ref 32–36)
MCHC RBC-ENTMCNC: 31.6 GM/DL — LOW (ref 32–36)
MCHC RBC-ENTMCNC: 31.7 GM/DL — LOW (ref 32–36)
MCHC RBC-ENTMCNC: 31.7 GM/DL — LOW (ref 32–36)
MCHC RBC-ENTMCNC: 31.8 GM/DL — LOW (ref 32–36)
MCHC RBC-ENTMCNC: 32 GM/DL — SIGNIFICANT CHANGE UP (ref 32–36)
MCHC RBC-ENTMCNC: 32.2 GM/DL — SIGNIFICANT CHANGE UP (ref 32–36)
MCHC RBC-ENTMCNC: 32.3 GM/DL — SIGNIFICANT CHANGE UP (ref 32–36)
MCHC RBC-ENTMCNC: 32.4 GM/DL — SIGNIFICANT CHANGE UP (ref 32–36)
MCHC RBC-ENTMCNC: 32.4 GM/DL — SIGNIFICANT CHANGE UP (ref 32–36)
MCHC RBC-ENTMCNC: 32.7 GM/DL — SIGNIFICANT CHANGE UP (ref 32–36)
MCHC RBC-ENTMCNC: 32.8 GM/DL — SIGNIFICANT CHANGE UP (ref 32–36)
MCHC RBC-ENTMCNC: 32.8 GM/DL — SIGNIFICANT CHANGE UP (ref 32–36)
MCHC RBC-ENTMCNC: 33 GM/DL — SIGNIFICANT CHANGE UP (ref 32–36)
MCHC RBC-ENTMCNC: 33 GM/DL — SIGNIFICANT CHANGE UP (ref 32–36)
MCHC RBC-ENTMCNC: 33.3 GM/DL — SIGNIFICANT CHANGE UP (ref 32–36)
MCHC RBC-ENTMCNC: 33.6 GM/DL — SIGNIFICANT CHANGE UP (ref 32–36)
MCHC RBC-ENTMCNC: 33.6 GM/DL — SIGNIFICANT CHANGE UP (ref 32–36)
MCHC RBC-ENTMCNC: 33.8 GM/DL — SIGNIFICANT CHANGE UP (ref 32–36)
MCHC RBC-ENTMCNC: 33.8 GM/DL — SIGNIFICANT CHANGE UP (ref 32–36)
MCHC RBC-ENTMCNC: 34.1 GM/DL — SIGNIFICANT CHANGE UP (ref 32–36)
MCV RBC AUTO: 80.2 FL — SIGNIFICANT CHANGE UP (ref 80–100)
MCV RBC AUTO: 81 FL — SIGNIFICANT CHANGE UP (ref 80–100)
MCV RBC AUTO: 81.4 FL — SIGNIFICANT CHANGE UP (ref 80–100)
MCV RBC AUTO: 81.6 FL — SIGNIFICANT CHANGE UP (ref 80–100)
MCV RBC AUTO: 81.6 FL — SIGNIFICANT CHANGE UP (ref 80–100)
MCV RBC AUTO: 81.7 FL — SIGNIFICANT CHANGE UP (ref 80–100)
MCV RBC AUTO: 81.7 FL — SIGNIFICANT CHANGE UP (ref 80–100)
MCV RBC AUTO: 81.8 FL — SIGNIFICANT CHANGE UP (ref 80–100)
MCV RBC AUTO: 82.5 FL — SIGNIFICANT CHANGE UP (ref 80–100)
MCV RBC AUTO: 82.5 FL — SIGNIFICANT CHANGE UP (ref 80–100)
MCV RBC AUTO: 82.8 FL — SIGNIFICANT CHANGE UP (ref 80–100)
MCV RBC AUTO: 82.9 FL — SIGNIFICANT CHANGE UP (ref 80–100)
MCV RBC AUTO: 82.9 FL — SIGNIFICANT CHANGE UP (ref 80–100)
MCV RBC AUTO: 83.2 FL — SIGNIFICANT CHANGE UP (ref 80–100)
MCV RBC AUTO: 83.2 FL — SIGNIFICANT CHANGE UP (ref 80–100)
MCV RBC AUTO: 83.3 FL — SIGNIFICANT CHANGE UP (ref 80–100)
MCV RBC AUTO: 83.3 FL — SIGNIFICANT CHANGE UP (ref 80–100)
MCV RBC AUTO: 83.7 FL — SIGNIFICANT CHANGE UP (ref 80–100)
MCV RBC AUTO: 83.7 FL — SIGNIFICANT CHANGE UP (ref 80–100)
MCV RBC AUTO: 84.6 FL — SIGNIFICANT CHANGE UP (ref 80–100)
MCV RBC AUTO: 84.6 FL — SIGNIFICANT CHANGE UP (ref 80–100)
MCV RBC AUTO: 84.8 FL — SIGNIFICANT CHANGE UP (ref 80–100)
MCV RBC AUTO: 84.8 FL — SIGNIFICANT CHANGE UP (ref 80–100)
MCV RBC AUTO: 84.9 FL — SIGNIFICANT CHANGE UP (ref 80–100)
MCV RBC AUTO: 85.1 FL
MCV RBC AUTO: 85.3 FL — SIGNIFICANT CHANGE UP (ref 80–100)
MCV RBC AUTO: 85.3 FL — SIGNIFICANT CHANGE UP (ref 80–100)
MCV RBC AUTO: 85.5 FL — SIGNIFICANT CHANGE UP (ref 80–100)
MCV RBC AUTO: 85.5 FL — SIGNIFICANT CHANGE UP (ref 80–100)
MCV RBC AUTO: 85.7 FL — SIGNIFICANT CHANGE UP (ref 80–100)
MCV RBC AUTO: 85.8 FL — SIGNIFICANT CHANGE UP (ref 80–100)
MCV RBC AUTO: 85.8 FL — SIGNIFICANT CHANGE UP (ref 80–100)
MCV RBC AUTO: 86.3 FL — SIGNIFICANT CHANGE UP (ref 80–100)
MCV RBC AUTO: 86.3 FL — SIGNIFICANT CHANGE UP (ref 80–100)
MCV RBC AUTO: 86.4 FL — SIGNIFICANT CHANGE UP (ref 80–100)
MCV RBC AUTO: 86.4 FL — SIGNIFICANT CHANGE UP (ref 80–100)
MCV RBC AUTO: 86.5 FL — SIGNIFICANT CHANGE UP (ref 80–100)
MCV RBC AUTO: 86.5 FL — SIGNIFICANT CHANGE UP (ref 80–100)
MCV RBC AUTO: 87 FL — SIGNIFICANT CHANGE UP (ref 80–100)
MCV RBC AUTO: 87 FL — SIGNIFICANT CHANGE UP (ref 80–100)
MCV RBC AUTO: 87.1 FL — SIGNIFICANT CHANGE UP (ref 80–100)
MCV RBC AUTO: 87.2 FL — SIGNIFICANT CHANGE UP (ref 80–100)
MCV RBC AUTO: 87.2 FL — SIGNIFICANT CHANGE UP (ref 80–100)
MCV RBC AUTO: 87.6 FL — SIGNIFICANT CHANGE UP (ref 80–100)
MCV RBC AUTO: 87.6 FL — SIGNIFICANT CHANGE UP (ref 80–100)
MCV RBC AUTO: 87.7 FL — SIGNIFICANT CHANGE UP (ref 80–100)
MCV RBC AUTO: 87.7 FL — SIGNIFICANT CHANGE UP (ref 80–100)
MCV RBC AUTO: 87.9 FL — SIGNIFICANT CHANGE UP (ref 80–100)
MCV RBC AUTO: 87.9 FL — SIGNIFICANT CHANGE UP (ref 80–100)
MCV RBC AUTO: 88 FL — SIGNIFICANT CHANGE UP (ref 80–100)
MCV RBC AUTO: 88 FL — SIGNIFICANT CHANGE UP (ref 80–100)
MCV RBC AUTO: 88.1 FL — SIGNIFICANT CHANGE UP (ref 80–100)
MCV RBC AUTO: 88.1 FL — SIGNIFICANT CHANGE UP (ref 80–100)
MCV RBC AUTO: 88.2 FL — SIGNIFICANT CHANGE UP (ref 80–100)
MCV RBC AUTO: 88.5 FL — SIGNIFICANT CHANGE UP (ref 80–100)
MCV RBC AUTO: 88.5 FL — SIGNIFICANT CHANGE UP (ref 80–100)
MCV RBC AUTO: 88.9 FL — SIGNIFICANT CHANGE UP (ref 80–100)
MCV RBC AUTO: 88.9 FL — SIGNIFICANT CHANGE UP (ref 80–100)
MCV RBC AUTO: 89 FL — SIGNIFICANT CHANGE UP (ref 80–100)
MCV RBC AUTO: 89.1 FL — SIGNIFICANT CHANGE UP (ref 80–100)
MCV RBC AUTO: 89.2 FL — SIGNIFICANT CHANGE UP (ref 80–100)
MCV RBC AUTO: 89.2 FL — SIGNIFICANT CHANGE UP (ref 80–100)
MCV RBC AUTO: 89.3 FL — SIGNIFICANT CHANGE UP (ref 80–100)
MCV RBC AUTO: 89.5 FL — SIGNIFICANT CHANGE UP (ref 80–100)
MCV RBC AUTO: 89.5 FL — SIGNIFICANT CHANGE UP (ref 80–100)
MCV RBC AUTO: 89.8 FL — SIGNIFICANT CHANGE UP (ref 80–100)
MCV RBC AUTO: 89.8 FL — SIGNIFICANT CHANGE UP (ref 80–100)
MCV RBC AUTO: 90.1 FL — SIGNIFICANT CHANGE UP (ref 80–100)
MCV RBC AUTO: 90.1 FL — SIGNIFICANT CHANGE UP (ref 80–100)
MCV RBC AUTO: 90.3 FL — SIGNIFICANT CHANGE UP (ref 80–100)
MCV RBC AUTO: 90.3 FL — SIGNIFICANT CHANGE UP (ref 80–100)
MCV RBC AUTO: 90.4 FL — SIGNIFICANT CHANGE UP (ref 80–100)
MCV RBC AUTO: 90.4 FL — SIGNIFICANT CHANGE UP (ref 80–100)
MCV RBC AUTO: 90.8 FL — SIGNIFICANT CHANGE UP (ref 80–100)
MCV RBC AUTO: 90.8 FL — SIGNIFICANT CHANGE UP (ref 80–100)
MCV RBC AUTO: 90.9 FL — SIGNIFICANT CHANGE UP (ref 80–100)
MCV RBC AUTO: 90.9 FL — SIGNIFICANT CHANGE UP (ref 80–100)
MCV RBC AUTO: 91 FL — SIGNIFICANT CHANGE UP (ref 80–100)
MCV RBC AUTO: 91 FL — SIGNIFICANT CHANGE UP (ref 80–100)
METAMYELOCYTES # FLD: 2.6 % — HIGH (ref 0–0)
METAMYELOCYTES # FLD: 2.6 % — HIGH (ref 0–0)
METAMYELOCYTES # FLD: 3.5 % — HIGH (ref 0–0)
METAMYELOCYTES # FLD: 3.5 % — HIGH (ref 0–0)
METAMYELOCYTES # FLD: 4.4 % — HIGH (ref 0–0)
METAMYELOCYTES # FLD: 4.4 % — HIGH (ref 0–0)
METAMYELOCYTES # FLD: 5.3 % — HIGH (ref 0–0)
METHOD TYPE: SIGNIFICANT CHANGE UP
MICROCYTES BLD QL: SIGNIFICANT CHANGE UP
MICROCYTES BLD QL: SLIGHT — SIGNIFICANT CHANGE UP
MONOCYTES # BLD AUTO: 0.02 K/UL — SIGNIFICANT CHANGE UP (ref 0–0.9)
MONOCYTES # BLD AUTO: 0.02 K/UL — SIGNIFICANT CHANGE UP (ref 0–0.9)
MONOCYTES # BLD AUTO: 0.14 K/UL — SIGNIFICANT CHANGE UP (ref 0–0.9)
MONOCYTES # BLD AUTO: 0.27 K/UL — SIGNIFICANT CHANGE UP (ref 0–0.9)
MONOCYTES # BLD AUTO: 0.3 K/UL — SIGNIFICANT CHANGE UP (ref 0–0.9)
MONOCYTES # BLD AUTO: 0.31 K/UL — SIGNIFICANT CHANGE UP (ref 0–0.9)
MONOCYTES # BLD AUTO: 0.31 K/UL — SIGNIFICANT CHANGE UP (ref 0–0.9)
MONOCYTES # BLD AUTO: 0.36 K/UL — SIGNIFICANT CHANGE UP (ref 0–0.9)
MONOCYTES # BLD AUTO: 0.36 K/UL — SIGNIFICANT CHANGE UP (ref 0–0.9)
MONOCYTES # BLD AUTO: 0.47 K/UL — SIGNIFICANT CHANGE UP (ref 0–0.9)
MONOCYTES # BLD AUTO: 0.47 K/UL — SIGNIFICANT CHANGE UP (ref 0–0.9)
MONOCYTES # BLD AUTO: 0.51 K/UL — SIGNIFICANT CHANGE UP (ref 0–0.9)
MONOCYTES # BLD AUTO: 0.51 K/UL — SIGNIFICANT CHANGE UP (ref 0–0.9)
MONOCYTES # BLD AUTO: 0.52 K/UL — SIGNIFICANT CHANGE UP (ref 0–0.9)
MONOCYTES # BLD AUTO: 0.52 K/UL — SIGNIFICANT CHANGE UP (ref 0–0.9)
MONOCYTES # BLD AUTO: 0.55 K/UL — SIGNIFICANT CHANGE UP (ref 0–0.9)
MONOCYTES # BLD AUTO: 0.55 K/UL — SIGNIFICANT CHANGE UP (ref 0–0.9)
MONOCYTES # BLD AUTO: 0.57 K/UL — SIGNIFICANT CHANGE UP (ref 0–0.9)
MONOCYTES # BLD AUTO: 0.58 K/UL — SIGNIFICANT CHANGE UP (ref 0–0.9)
MONOCYTES # BLD AUTO: 0.59 K/UL — SIGNIFICANT CHANGE UP (ref 0–0.9)
MONOCYTES # BLD AUTO: 0.59 K/UL — SIGNIFICANT CHANGE UP (ref 0–0.9)
MONOCYTES # BLD AUTO: 0.6 K/UL — SIGNIFICANT CHANGE UP (ref 0–0.9)
MONOCYTES # BLD AUTO: 0.6 K/UL — SIGNIFICANT CHANGE UP (ref 0–0.9)
MONOCYTES # BLD AUTO: 0.63 K/UL — SIGNIFICANT CHANGE UP (ref 0–0.9)
MONOCYTES # BLD AUTO: 0.63 K/UL — SIGNIFICANT CHANGE UP (ref 0–0.9)
MONOCYTES # BLD AUTO: 0.64 K/UL — SIGNIFICANT CHANGE UP (ref 0–0.9)
MONOCYTES # BLD AUTO: 0.64 K/UL — SIGNIFICANT CHANGE UP (ref 0–0.9)
MONOCYTES # BLD AUTO: 0.68 K/UL — SIGNIFICANT CHANGE UP (ref 0–0.9)
MONOCYTES # BLD AUTO: 0.68 K/UL — SIGNIFICANT CHANGE UP (ref 0–0.9)
MONOCYTES # BLD AUTO: 0.71 K/UL — SIGNIFICANT CHANGE UP (ref 0–0.9)
MONOCYTES # BLD AUTO: 0.73 K/UL — SIGNIFICANT CHANGE UP (ref 0–0.9)
MONOCYTES # BLD AUTO: 0.73 K/UL — SIGNIFICANT CHANGE UP (ref 0–0.9)
MONOCYTES # BLD AUTO: 0.74 K/UL — SIGNIFICANT CHANGE UP (ref 0–0.9)
MONOCYTES # BLD AUTO: 0.74 K/UL — SIGNIFICANT CHANGE UP (ref 0–0.9)
MONOCYTES # BLD AUTO: 0.76 K/UL
MONOCYTES # BLD AUTO: 0.78 K/UL — SIGNIFICANT CHANGE UP (ref 0–0.9)
MONOCYTES # BLD AUTO: 0.78 K/UL — SIGNIFICANT CHANGE UP (ref 0–0.9)
MONOCYTES # BLD AUTO: 0.8 K/UL — SIGNIFICANT CHANGE UP (ref 0–0.9)
MONOCYTES # BLD AUTO: 0.8 K/UL — SIGNIFICANT CHANGE UP (ref 0–0.9)
MONOCYTES # BLD AUTO: 0.81 K/UL — SIGNIFICANT CHANGE UP (ref 0–0.9)
MONOCYTES # BLD AUTO: 0.81 K/UL — SIGNIFICANT CHANGE UP (ref 0–0.9)
MONOCYTES # BLD AUTO: 0.83 K/UL — SIGNIFICANT CHANGE UP (ref 0–0.9)
MONOCYTES # BLD AUTO: 0.83 K/UL — SIGNIFICANT CHANGE UP (ref 0–0.9)
MONOCYTES NFR BLD AUTO: 0.9 % — LOW (ref 2–14)
MONOCYTES NFR BLD AUTO: 0.9 % — LOW (ref 2–14)
MONOCYTES NFR BLD AUTO: 10.2 % — SIGNIFICANT CHANGE UP (ref 2–14)
MONOCYTES NFR BLD AUTO: 10.2 % — SIGNIFICANT CHANGE UP (ref 2–14)
MONOCYTES NFR BLD AUTO: 10.7 % — SIGNIFICANT CHANGE UP (ref 2–14)
MONOCYTES NFR BLD AUTO: 10.7 % — SIGNIFICANT CHANGE UP (ref 2–14)
MONOCYTES NFR BLD AUTO: 11.5 % — SIGNIFICANT CHANGE UP (ref 2–14)
MONOCYTES NFR BLD AUTO: 11.9 % — SIGNIFICANT CHANGE UP (ref 2–14)
MONOCYTES NFR BLD AUTO: 11.9 % — SIGNIFICANT CHANGE UP (ref 2–14)
MONOCYTES NFR BLD AUTO: 12.4 % — SIGNIFICANT CHANGE UP (ref 2–14)
MONOCYTES NFR BLD AUTO: 12.4 % — SIGNIFICANT CHANGE UP (ref 2–14)
MONOCYTES NFR BLD AUTO: 12.9 % — SIGNIFICANT CHANGE UP (ref 2–14)
MONOCYTES NFR BLD AUTO: 12.9 % — SIGNIFICANT CHANGE UP (ref 2–14)
MONOCYTES NFR BLD AUTO: 13 % — SIGNIFICANT CHANGE UP (ref 2–14)
MONOCYTES NFR BLD AUTO: 13 % — SIGNIFICANT CHANGE UP (ref 2–14)
MONOCYTES NFR BLD AUTO: 13.3 % — SIGNIFICANT CHANGE UP (ref 2–14)
MONOCYTES NFR BLD AUTO: 13.3 % — SIGNIFICANT CHANGE UP (ref 2–14)
MONOCYTES NFR BLD AUTO: 13.9 % — SIGNIFICANT CHANGE UP (ref 2–14)
MONOCYTES NFR BLD AUTO: 13.9 % — SIGNIFICANT CHANGE UP (ref 2–14)
MONOCYTES NFR BLD AUTO: 14.1 % — HIGH (ref 2–14)
MONOCYTES NFR BLD AUTO: 14.1 % — HIGH (ref 2–14)
MONOCYTES NFR BLD AUTO: 19.1 % — HIGH (ref 2–14)
MONOCYTES NFR BLD AUTO: 19.1 % — HIGH (ref 2–14)
MONOCYTES NFR BLD AUTO: 23.9 % — HIGH (ref 2–14)
MONOCYTES NFR BLD AUTO: 23.9 % — HIGH (ref 2–14)
MONOCYTES NFR BLD AUTO: 3.5 % — SIGNIFICANT CHANGE UP (ref 2–14)
MONOCYTES NFR BLD AUTO: 3.5 % — SIGNIFICANT CHANGE UP (ref 2–14)
MONOCYTES NFR BLD AUTO: 7.2 % — SIGNIFICANT CHANGE UP (ref 2–14)
MONOCYTES NFR BLD AUTO: 7.2 % — SIGNIFICANT CHANGE UP (ref 2–14)
MONOCYTES NFR BLD AUTO: 7.8 % — SIGNIFICANT CHANGE UP (ref 2–14)
MONOCYTES NFR BLD AUTO: 7.8 % — SIGNIFICANT CHANGE UP (ref 2–14)
MONOCYTES NFR BLD AUTO: 7.9 % — SIGNIFICANT CHANGE UP (ref 2–14)
MONOCYTES NFR BLD AUTO: 8 % — SIGNIFICANT CHANGE UP (ref 2–14)
MONOCYTES NFR BLD AUTO: 8.2 % — SIGNIFICANT CHANGE UP (ref 2–14)
MONOCYTES NFR BLD AUTO: 8.2 % — SIGNIFICANT CHANGE UP (ref 2–14)
MONOCYTES NFR BLD AUTO: 8.3 % — SIGNIFICANT CHANGE UP (ref 2–14)
MONOCYTES NFR BLD AUTO: 8.5 % — SIGNIFICANT CHANGE UP (ref 2–14)
MONOCYTES NFR BLD AUTO: 8.5 % — SIGNIFICANT CHANGE UP (ref 2–14)
MONOCYTES NFR BLD AUTO: 8.8 % — SIGNIFICANT CHANGE UP (ref 2–14)
MONOCYTES NFR BLD AUTO: 8.8 % — SIGNIFICANT CHANGE UP (ref 2–14)
MONOCYTES NFR BLD AUTO: 9 % — SIGNIFICANT CHANGE UP (ref 2–14)
MONOCYTES NFR BLD AUTO: 9 % — SIGNIFICANT CHANGE UP (ref 2–14)
MONOCYTES NFR BLD AUTO: 9.1 % — SIGNIFICANT CHANGE UP (ref 2–14)
MONOCYTES NFR BLD AUTO: 9.2 %
MONOCYTES NFR BLD AUTO: 9.5 % — SIGNIFICANT CHANGE UP (ref 2–14)
MONOCYTES NFR BLD AUTO: 9.5 % — SIGNIFICANT CHANGE UP (ref 2–14)
MONOCYTES NFR BLD AUTO: 9.6 % — SIGNIFICANT CHANGE UP (ref 2–14)
MONOCYTES NFR BLD AUTO: 9.6 % — SIGNIFICANT CHANGE UP (ref 2–14)
MRSA PCR RESULT.: NEGATIVE — SIGNIFICANT CHANGE UP
MRSA PCR RESULT.: NEGATIVE — SIGNIFICANT CHANGE UP
MYELOCYTES NFR BLD: 0.9 % — HIGH (ref 0–0)
MYELOCYTES NFR BLD: 1.7 % — HIGH (ref 0–0)
MYELOCYTES NFR BLD: 1.7 % — HIGH (ref 0–0)
MYELOCYTES NFR BLD: 1.8 % — HIGH (ref 0–0)
MYELOCYTES NFR BLD: 1.8 % — HIGH (ref 0–0)
MYELOCYTES NFR BLD: 2.6 % — HIGH (ref 0–0)
MYELOCYTES NFR BLD: 2.6 % — HIGH (ref 0–0)
NEUTROPHILS # BLD AUTO: 1.18 K/UL — LOW (ref 1.8–7.4)
NEUTROPHILS # BLD AUTO: 1.32 K/UL — LOW (ref 1.8–7.4)
NEUTROPHILS # BLD AUTO: 1.58 K/UL — LOW (ref 1.8–7.4)
NEUTROPHILS # BLD AUTO: 1.58 K/UL — LOW (ref 1.8–7.4)
NEUTROPHILS # BLD AUTO: 1.72 K/UL — LOW (ref 1.8–7.4)
NEUTROPHILS # BLD AUTO: 1.72 K/UL — LOW (ref 1.8–7.4)
NEUTROPHILS # BLD AUTO: 1.73 K/UL — LOW (ref 1.8–7.4)
NEUTROPHILS # BLD AUTO: 1.73 K/UL — LOW (ref 1.8–7.4)
NEUTROPHILS # BLD AUTO: 2.04 K/UL — SIGNIFICANT CHANGE UP (ref 1.8–7.4)
NEUTROPHILS # BLD AUTO: 2.3 K/UL — SIGNIFICANT CHANGE UP (ref 1.8–7.4)
NEUTROPHILS # BLD AUTO: 2.3 K/UL — SIGNIFICANT CHANGE UP (ref 1.8–7.4)
NEUTROPHILS # BLD AUTO: 20.65 K/UL — HIGH (ref 1.8–7.4)
NEUTROPHILS # BLD AUTO: 20.65 K/UL — HIGH (ref 1.8–7.4)
NEUTROPHILS # BLD AUTO: 3.35 K/UL — SIGNIFICANT CHANGE UP (ref 1.8–7.4)
NEUTROPHILS # BLD AUTO: 3.35 K/UL — SIGNIFICANT CHANGE UP (ref 1.8–7.4)
NEUTROPHILS # BLD AUTO: 3.57 K/UL — SIGNIFICANT CHANGE UP (ref 1.8–7.4)
NEUTROPHILS # BLD AUTO: 3.57 K/UL — SIGNIFICANT CHANGE UP (ref 1.8–7.4)
NEUTROPHILS # BLD AUTO: 3.75 K/UL — SIGNIFICANT CHANGE UP (ref 1.8–7.4)
NEUTROPHILS # BLD AUTO: 3.75 K/UL — SIGNIFICANT CHANGE UP (ref 1.8–7.4)
NEUTROPHILS # BLD AUTO: 3.78 K/UL — SIGNIFICANT CHANGE UP (ref 1.8–7.4)
NEUTROPHILS # BLD AUTO: 3.78 K/UL — SIGNIFICANT CHANGE UP (ref 1.8–7.4)
NEUTROPHILS # BLD AUTO: 3.82 K/UL — SIGNIFICANT CHANGE UP (ref 1.8–7.4)
NEUTROPHILS # BLD AUTO: 3.82 K/UL — SIGNIFICANT CHANGE UP (ref 1.8–7.4)
NEUTROPHILS # BLD AUTO: 3.88 K/UL — SIGNIFICANT CHANGE UP (ref 1.8–7.4)
NEUTROPHILS # BLD AUTO: 3.88 K/UL — SIGNIFICANT CHANGE UP (ref 1.8–7.4)
NEUTROPHILS # BLD AUTO: 4.09 K/UL — SIGNIFICANT CHANGE UP (ref 1.8–7.4)
NEUTROPHILS # BLD AUTO: 4.09 K/UL — SIGNIFICANT CHANGE UP (ref 1.8–7.4)
NEUTROPHILS # BLD AUTO: 4.13 K/UL — SIGNIFICANT CHANGE UP (ref 1.8–7.4)
NEUTROPHILS # BLD AUTO: 4.13 K/UL — SIGNIFICANT CHANGE UP (ref 1.8–7.4)
NEUTROPHILS # BLD AUTO: 4.2 K/UL — SIGNIFICANT CHANGE UP (ref 1.8–7.4)
NEUTROPHILS # BLD AUTO: 4.23 K/UL — SIGNIFICANT CHANGE UP (ref 1.8–7.4)
NEUTROPHILS # BLD AUTO: 4.23 K/UL — SIGNIFICANT CHANGE UP (ref 1.8–7.4)
NEUTROPHILS # BLD AUTO: 4.28 K/UL — SIGNIFICANT CHANGE UP (ref 1.8–7.4)
NEUTROPHILS # BLD AUTO: 4.28 K/UL — SIGNIFICANT CHANGE UP (ref 1.8–7.4)
NEUTROPHILS # BLD AUTO: 4.44 K/UL — SIGNIFICANT CHANGE UP (ref 1.8–7.4)
NEUTROPHILS # BLD AUTO: 4.54 K/UL — SIGNIFICANT CHANGE UP (ref 1.8–7.4)
NEUTROPHILS # BLD AUTO: 4.54 K/UL — SIGNIFICANT CHANGE UP (ref 1.8–7.4)
NEUTROPHILS # BLD AUTO: 4.86 K/UL — SIGNIFICANT CHANGE UP (ref 1.8–7.4)
NEUTROPHILS # BLD AUTO: 4.86 K/UL — SIGNIFICANT CHANGE UP (ref 1.8–7.4)
NEUTROPHILS # BLD AUTO: 5.18 K/UL — SIGNIFICANT CHANGE UP (ref 1.8–7.4)
NEUTROPHILS # BLD AUTO: 5.18 K/UL — SIGNIFICANT CHANGE UP (ref 1.8–7.4)
NEUTROPHILS # BLD AUTO: 5.34 K/UL — SIGNIFICANT CHANGE UP (ref 1.8–7.4)
NEUTROPHILS # BLD AUTO: 5.34 K/UL — SIGNIFICANT CHANGE UP (ref 1.8–7.4)
NEUTROPHILS # BLD AUTO: 5.49 K/UL
NEUTROPHILS # BLD AUTO: 6 K/UL — SIGNIFICANT CHANGE UP (ref 1.8–7.4)
NEUTROPHILS # BLD AUTO: 6 K/UL — SIGNIFICANT CHANGE UP (ref 1.8–7.4)
NEUTROPHILS # BLD AUTO: 6.78 K/UL — SIGNIFICANT CHANGE UP (ref 1.8–7.4)
NEUTROPHILS # BLD AUTO: 6.78 K/UL — SIGNIFICANT CHANGE UP (ref 1.8–7.4)
NEUTROPHILS # BLD AUTO: 7.1 K/UL — SIGNIFICANT CHANGE UP (ref 1.8–7.4)
NEUTROPHILS # BLD AUTO: 7.1 K/UL — SIGNIFICANT CHANGE UP (ref 1.8–7.4)
NEUTROPHILS # BLD AUTO: 7.2 K/UL — SIGNIFICANT CHANGE UP (ref 1.8–7.4)
NEUTROPHILS # BLD AUTO: 7.45 K/UL — HIGH (ref 1.8–7.4)
NEUTROPHILS # BLD AUTO: 7.45 K/UL — HIGH (ref 1.8–7.4)
NEUTROPHILS # BLD AUTO: 7.6 K/UL — HIGH (ref 1.8–7.4)
NEUTROPHILS NFR BLD AUTO: 57.5 % — SIGNIFICANT CHANGE UP (ref 43–77)
NEUTROPHILS NFR BLD AUTO: 57.5 % — SIGNIFICANT CHANGE UP (ref 43–77)
NEUTROPHILS NFR BLD AUTO: 61.6 % — SIGNIFICANT CHANGE UP (ref 43–77)
NEUTROPHILS NFR BLD AUTO: 61.6 % — SIGNIFICANT CHANGE UP (ref 43–77)
NEUTROPHILS NFR BLD AUTO: 62.8 % — SIGNIFICANT CHANGE UP (ref 43–77)
NEUTROPHILS NFR BLD AUTO: 62.8 % — SIGNIFICANT CHANGE UP (ref 43–77)
NEUTROPHILS NFR BLD AUTO: 63.8 % — SIGNIFICANT CHANGE UP (ref 43–77)
NEUTROPHILS NFR BLD AUTO: 64.2 % — SIGNIFICANT CHANGE UP (ref 43–77)
NEUTROPHILS NFR BLD AUTO: 64.2 % — SIGNIFICANT CHANGE UP (ref 43–77)
NEUTROPHILS NFR BLD AUTO: 65.8 % — SIGNIFICANT CHANGE UP (ref 43–77)
NEUTROPHILS NFR BLD AUTO: 65.8 % — SIGNIFICANT CHANGE UP (ref 43–77)
NEUTROPHILS NFR BLD AUTO: 66.4 % — SIGNIFICANT CHANGE UP (ref 43–77)
NEUTROPHILS NFR BLD AUTO: 66.5 %
NEUTROPHILS NFR BLD AUTO: 66.7 % — SIGNIFICANT CHANGE UP (ref 43–77)
NEUTROPHILS NFR BLD AUTO: 68.7 % — SIGNIFICANT CHANGE UP (ref 43–77)
NEUTROPHILS NFR BLD AUTO: 68.7 % — SIGNIFICANT CHANGE UP (ref 43–77)
NEUTROPHILS NFR BLD AUTO: 68.8 % — SIGNIFICANT CHANGE UP (ref 43–77)
NEUTROPHILS NFR BLD AUTO: 68.8 % — SIGNIFICANT CHANGE UP (ref 43–77)
NEUTROPHILS NFR BLD AUTO: 69 % — SIGNIFICANT CHANGE UP (ref 43–77)
NEUTROPHILS NFR BLD AUTO: 69.2 % — SIGNIFICANT CHANGE UP (ref 43–77)
NEUTROPHILS NFR BLD AUTO: 69.2 % — SIGNIFICANT CHANGE UP (ref 43–77)
NEUTROPHILS NFR BLD AUTO: 71.3 % — SIGNIFICANT CHANGE UP (ref 43–77)
NEUTROPHILS NFR BLD AUTO: 73.9 % — SIGNIFICANT CHANGE UP (ref 43–77)
NEUTROPHILS NFR BLD AUTO: 75.1 % — SIGNIFICANT CHANGE UP (ref 43–77)
NEUTROPHILS NFR BLD AUTO: 75.1 % — SIGNIFICANT CHANGE UP (ref 43–77)
NEUTROPHILS NFR BLD AUTO: 76.6 % — SIGNIFICANT CHANGE UP (ref 43–77)
NEUTROPHILS NFR BLD AUTO: 77.2 % — HIGH (ref 43–77)
NEUTROPHILS NFR BLD AUTO: 77.2 % — HIGH (ref 43–77)
NEUTROPHILS NFR BLD AUTO: 77.8 % — HIGH (ref 43–77)
NEUTROPHILS NFR BLD AUTO: 77.8 % — HIGH (ref 43–77)
NEUTROPHILS NFR BLD AUTO: 77.9 % — HIGH (ref 43–77)
NEUTROPHILS NFR BLD AUTO: 77.9 % — HIGH (ref 43–77)
NEUTROPHILS NFR BLD AUTO: 78.3 % — HIGH (ref 43–77)
NEUTROPHILS NFR BLD AUTO: 78.3 % — HIGH (ref 43–77)
NEUTROPHILS NFR BLD AUTO: 79.9 % — HIGH (ref 43–77)
NEUTROPHILS NFR BLD AUTO: 80.5 % — HIGH (ref 43–77)
NEUTROPHILS NFR BLD AUTO: 80.5 % — HIGH (ref 43–77)
NEUTROPHILS NFR BLD AUTO: 80.7 % — HIGH (ref 43–77)
NEUTROPHILS NFR BLD AUTO: 80.7 % — HIGH (ref 43–77)
NEUTROPHILS NFR BLD AUTO: 80.9 % — HIGH (ref 43–77)
NEUTROPHILS NFR BLD AUTO: 80.9 % — HIGH (ref 43–77)
NEUTROPHILS NFR BLD AUTO: 81.7 % — HIGH (ref 43–77)
NEUTROPHILS NFR BLD AUTO: 81.7 % — HIGH (ref 43–77)
NEUTROPHILS NFR BLD AUTO: 84.6 % — HIGH (ref 43–77)
NEUTROPHILS NFR BLD AUTO: 84.6 % — HIGH (ref 43–77)
NEUTROPHILS NFR BLD AUTO: 84.7 % — HIGH (ref 43–77)
NEUTROPHILS NFR BLD AUTO: 84.7 % — HIGH (ref 43–77)
NEUTROPHILS NFR BLD AUTO: 85.7 % — HIGH (ref 43–77)
NEUTROPHILS NFR BLD AUTO: 85.7 % — HIGH (ref 43–77)
NEUTS BAND # BLD: 0.9 % — SIGNIFICANT CHANGE UP (ref 0–8)
NEUTS BAND # BLD: 1.8 % — SIGNIFICANT CHANGE UP (ref 0–8)
NEUTS BAND # BLD: 12.3 % — HIGH (ref 0–8)
NEUTS BAND # BLD: 12.3 % — HIGH (ref 0–8)
NEUTS BAND # BLD: 2.6 % — SIGNIFICANT CHANGE UP (ref 0–8)
NEUTS BAND # BLD: 2.6 % — SIGNIFICANT CHANGE UP (ref 0–8)
NEUTS BAND # BLD: 2.7 % — SIGNIFICANT CHANGE UP (ref 0–8)
NEUTS BAND # BLD: 2.7 % — SIGNIFICANT CHANGE UP (ref 0–8)
NEUTS BAND # BLD: 3.5 % — SIGNIFICANT CHANGE UP (ref 0–8)
NEUTS BAND # BLD: 3.5 % — SIGNIFICANT CHANGE UP (ref 0–8)
NEUTS BAND # BLD: 4.4 % — SIGNIFICANT CHANGE UP (ref 0–8)
NEUTS BAND # BLD: 4.4 % — SIGNIFICANT CHANGE UP (ref 0–8)
NITRITE UR-MCNC: NEGATIVE — SIGNIFICANT CHANGE UP
NITRITE UR-MCNC: POSITIVE
NITRITE UR-MCNC: POSITIVE
NONHDLC SERPL-MCNC: 64 MG/DL
NRBC # BLD: 0 /100 WBCS — SIGNIFICANT CHANGE UP (ref 0–0)
NRBC # BLD: 1 /100 — HIGH (ref 0–0)
NRBC # BLD: SIGNIFICANT CHANGE UP /100 WBCS (ref 0–0)
ORGANISM # SPEC MICROSCOPIC CNT: ABNORMAL
ORGANISM # SPEC MICROSCOPIC CNT: SIGNIFICANT CHANGE UP
OVALOCYTES BLD QL SMEAR: SLIGHT — SIGNIFICANT CHANGE UP
PCO2 BLDA: 39 MMHG — SIGNIFICANT CHANGE UP (ref 35–48)
PCO2 BLDA: 39 MMHG — SIGNIFICANT CHANGE UP (ref 35–48)
PH BLDA: 7.47 — HIGH (ref 7.35–7.45)
PH BLDA: 7.47 — HIGH (ref 7.35–7.45)
PH UR: 5.5 — SIGNIFICANT CHANGE UP (ref 5–8)
PH UR: 6 — SIGNIFICANT CHANGE UP (ref 5–8)
PH UR: 7.5 — SIGNIFICANT CHANGE UP (ref 5–8)
PH UR: 7.5 — SIGNIFICANT CHANGE UP (ref 5–8)
PH UR: >=9 (ref 5–8)
PH UR: >=9 (ref 5–8)
PHOSPHATE SERPL-MCNC: 1.9 MG/DL — LOW (ref 2.5–4.5)
PHOSPHATE SERPL-MCNC: 1.9 MG/DL — LOW (ref 2.5–4.5)
PHOSPHATE SERPL-MCNC: 2 MG/DL — LOW (ref 2.5–4.5)
PHOSPHATE SERPL-MCNC: 2 MG/DL — LOW (ref 2.5–4.5)
PHOSPHATE SERPL-MCNC: 2.1 MG/DL — LOW (ref 2.5–4.5)
PHOSPHATE SERPL-MCNC: 2.2 MG/DL — LOW (ref 2.5–4.5)
PHOSPHATE SERPL-MCNC: 2.2 MG/DL — LOW (ref 2.5–4.5)
PHOSPHATE SERPL-MCNC: 2.3 MG/DL — LOW (ref 2.5–4.5)
PHOSPHATE SERPL-MCNC: 2.3 MG/DL — LOW (ref 2.5–4.5)
PHOSPHATE SERPL-MCNC: 2.4 MG/DL — LOW (ref 2.5–4.5)
PHOSPHATE SERPL-MCNC: 2.4 MG/DL — LOW (ref 2.5–4.5)
PHOSPHATE SERPL-MCNC: 2.5 MG/DL — SIGNIFICANT CHANGE UP (ref 2.5–4.5)
PHOSPHATE SERPL-MCNC: 2.6 MG/DL — SIGNIFICANT CHANGE UP (ref 2.5–4.5)
PHOSPHATE SERPL-MCNC: 2.7 MG/DL — SIGNIFICANT CHANGE UP (ref 2.5–4.5)
PHOSPHATE SERPL-MCNC: 2.8 MG/DL — SIGNIFICANT CHANGE UP (ref 2.5–4.5)
PHOSPHATE SERPL-MCNC: 2.8 MG/DL — SIGNIFICANT CHANGE UP (ref 2.5–4.5)
PHOSPHATE SERPL-MCNC: 2.9 MG/DL — SIGNIFICANT CHANGE UP (ref 2.5–4.5)
PHOSPHATE SERPL-MCNC: 3 MG/DL — SIGNIFICANT CHANGE UP (ref 2.5–4.5)
PHOSPHATE SERPL-MCNC: 3.1 MG/DL — SIGNIFICANT CHANGE UP (ref 2.5–4.5)
PHOSPHATE SERPL-MCNC: 3.2 MG/DL — SIGNIFICANT CHANGE UP (ref 2.5–4.5)
PHOSPHATE SERPL-MCNC: 3.3 MG/DL — SIGNIFICANT CHANGE UP (ref 2.5–4.5)
PHOSPHATE SERPL-MCNC: 3.4 MG/DL — SIGNIFICANT CHANGE UP (ref 2.5–4.5)
PHOSPHATE SERPL-MCNC: 3.5 MG/DL — SIGNIFICANT CHANGE UP (ref 2.5–4.5)
PHOSPHATE SERPL-MCNC: 3.6 MG/DL — SIGNIFICANT CHANGE UP (ref 2.5–4.5)
PHOSPHATE SERPL-MCNC: 3.8 MG/DL — SIGNIFICANT CHANGE UP (ref 2.5–4.5)
PHOSPHATE SERPL-MCNC: 4 MG/DL — SIGNIFICANT CHANGE UP (ref 2.5–4.5)
PHOSPHATE SERPL-MCNC: 4 MG/DL — SIGNIFICANT CHANGE UP (ref 2.5–4.5)
PHOSPHATE SERPL-MCNC: 4.1 MG/DL — SIGNIFICANT CHANGE UP (ref 2.5–4.5)
PHOSPHATE SERPL-MCNC: 4.3 MG/DL — SIGNIFICANT CHANGE UP (ref 2.5–4.5)
PHOSPHATE SERPL-MCNC: 4.3 MG/DL — SIGNIFICANT CHANGE UP (ref 2.5–4.5)
PHOSPHATE SERPL-MCNC: 4.7 MG/DL — HIGH (ref 2.5–4.5)
PHOSPHATE SERPL-MCNC: 4.7 MG/DL — HIGH (ref 2.5–4.5)
PHOSPHATE SERPL-MCNC: 4.8 MG/DL — HIGH (ref 2.5–4.5)
PHOSPHATE SERPL-MCNC: 4.8 MG/DL — HIGH (ref 2.5–4.5)
PLAT MORPH BLD: ABNORMAL
PLAT MORPH BLD: NORMAL — SIGNIFICANT CHANGE UP
PLATELET # BLD AUTO: 130 K/UL — LOW (ref 150–400)
PLATELET # BLD AUTO: 130 K/UL — LOW (ref 150–400)
PLATELET # BLD AUTO: 138 K/UL — LOW (ref 150–400)
PLATELET # BLD AUTO: 138 K/UL — LOW (ref 150–400)
PLATELET # BLD AUTO: 141 K/UL — LOW (ref 150–400)
PLATELET # BLD AUTO: 143 K/UL — LOW (ref 150–400)
PLATELET # BLD AUTO: 143 K/UL — LOW (ref 150–400)
PLATELET # BLD AUTO: 144 K/UL — LOW (ref 150–400)
PLATELET # BLD AUTO: 144 K/UL — LOW (ref 150–400)
PLATELET # BLD AUTO: 146 K/UL — LOW (ref 150–400)
PLATELET # BLD AUTO: 146 K/UL — LOW (ref 150–400)
PLATELET # BLD AUTO: 150 K/UL — SIGNIFICANT CHANGE UP (ref 150–400)
PLATELET # BLD AUTO: 151 K/UL — SIGNIFICANT CHANGE UP (ref 150–400)
PLATELET # BLD AUTO: 151 K/UL — SIGNIFICANT CHANGE UP (ref 150–400)
PLATELET # BLD AUTO: 153 K/UL — SIGNIFICANT CHANGE UP (ref 150–400)
PLATELET # BLD AUTO: 153 K/UL — SIGNIFICANT CHANGE UP (ref 150–400)
PLATELET # BLD AUTO: 155 K/UL — SIGNIFICANT CHANGE UP (ref 150–400)
PLATELET # BLD AUTO: 155 K/UL — SIGNIFICANT CHANGE UP (ref 150–400)
PLATELET # BLD AUTO: 157 K/UL — SIGNIFICANT CHANGE UP (ref 150–400)
PLATELET # BLD AUTO: 157 K/UL — SIGNIFICANT CHANGE UP (ref 150–400)
PLATELET # BLD AUTO: 160 K/UL — SIGNIFICANT CHANGE UP (ref 150–400)
PLATELET # BLD AUTO: 160 K/UL — SIGNIFICANT CHANGE UP (ref 150–400)
PLATELET # BLD AUTO: 162 K/UL — SIGNIFICANT CHANGE UP (ref 150–400)
PLATELET # BLD AUTO: 162 K/UL — SIGNIFICANT CHANGE UP (ref 150–400)
PLATELET # BLD AUTO: 166 K/UL — SIGNIFICANT CHANGE UP (ref 150–400)
PLATELET # BLD AUTO: 166 K/UL — SIGNIFICANT CHANGE UP (ref 150–400)
PLATELET # BLD AUTO: 168 K/UL — SIGNIFICANT CHANGE UP (ref 150–400)
PLATELET # BLD AUTO: 168 K/UL — SIGNIFICANT CHANGE UP (ref 150–400)
PLATELET # BLD AUTO: 170 K/UL — SIGNIFICANT CHANGE UP (ref 150–400)
PLATELET # BLD AUTO: 170 K/UL — SIGNIFICANT CHANGE UP (ref 150–400)
PLATELET # BLD AUTO: 171 K/UL — SIGNIFICANT CHANGE UP (ref 150–400)
PLATELET # BLD AUTO: 171 K/UL — SIGNIFICANT CHANGE UP (ref 150–400)
PLATELET # BLD AUTO: 173 K/UL — SIGNIFICANT CHANGE UP (ref 150–400)
PLATELET # BLD AUTO: 181 K/UL — SIGNIFICANT CHANGE UP (ref 150–400)
PLATELET # BLD AUTO: 181 K/UL — SIGNIFICANT CHANGE UP (ref 150–400)
PLATELET # BLD AUTO: 182 K/UL — SIGNIFICANT CHANGE UP (ref 150–400)
PLATELET # BLD AUTO: 182 K/UL — SIGNIFICANT CHANGE UP (ref 150–400)
PLATELET # BLD AUTO: 183 K/UL — SIGNIFICANT CHANGE UP (ref 150–400)
PLATELET # BLD AUTO: 183 K/UL — SIGNIFICANT CHANGE UP (ref 150–400)
PLATELET # BLD AUTO: 191 K/UL — SIGNIFICANT CHANGE UP (ref 150–400)
PLATELET # BLD AUTO: 194 K/UL — SIGNIFICANT CHANGE UP (ref 150–400)
PLATELET # BLD AUTO: 194 K/UL — SIGNIFICANT CHANGE UP (ref 150–400)
PLATELET # BLD AUTO: 195 K/UL — SIGNIFICANT CHANGE UP (ref 150–400)
PLATELET # BLD AUTO: 195 K/UL — SIGNIFICANT CHANGE UP (ref 150–400)
PLATELET # BLD AUTO: 196 K/UL — SIGNIFICANT CHANGE UP (ref 150–400)
PLATELET # BLD AUTO: 196 K/UL — SIGNIFICANT CHANGE UP (ref 150–400)
PLATELET # BLD AUTO: 199 K/UL — SIGNIFICANT CHANGE UP (ref 150–400)
PLATELET # BLD AUTO: 199 K/UL — SIGNIFICANT CHANGE UP (ref 150–400)
PLATELET # BLD AUTO: 201 K/UL — SIGNIFICANT CHANGE UP (ref 150–400)
PLATELET # BLD AUTO: 206 K/UL — SIGNIFICANT CHANGE UP (ref 150–400)
PLATELET # BLD AUTO: 206 K/UL — SIGNIFICANT CHANGE UP (ref 150–400)
PLATELET # BLD AUTO: 210 K/UL — SIGNIFICANT CHANGE UP (ref 150–400)
PLATELET # BLD AUTO: 210 K/UL — SIGNIFICANT CHANGE UP (ref 150–400)
PLATELET # BLD AUTO: 211 K/UL — SIGNIFICANT CHANGE UP (ref 150–400)
PLATELET # BLD AUTO: 211 K/UL — SIGNIFICANT CHANGE UP (ref 150–400)
PLATELET # BLD AUTO: 215 K/UL — SIGNIFICANT CHANGE UP (ref 150–400)
PLATELET # BLD AUTO: 215 K/UL — SIGNIFICANT CHANGE UP (ref 150–400)
PLATELET # BLD AUTO: 216 K/UL — SIGNIFICANT CHANGE UP (ref 150–400)
PLATELET # BLD AUTO: 216 K/UL — SIGNIFICANT CHANGE UP (ref 150–400)
PLATELET # BLD AUTO: 219 K/UL — SIGNIFICANT CHANGE UP (ref 150–400)
PLATELET # BLD AUTO: 223 K/UL — SIGNIFICANT CHANGE UP (ref 150–400)
PLATELET # BLD AUTO: 225 K/UL — SIGNIFICANT CHANGE UP (ref 150–400)
PLATELET # BLD AUTO: 225 K/UL — SIGNIFICANT CHANGE UP (ref 150–400)
PLATELET # BLD AUTO: 226 K/UL — SIGNIFICANT CHANGE UP (ref 150–400)
PLATELET # BLD AUTO: 226 K/UL — SIGNIFICANT CHANGE UP (ref 150–400)
PLATELET # BLD AUTO: 227 K/UL — SIGNIFICANT CHANGE UP (ref 150–400)
PLATELET # BLD AUTO: 227 K/UL — SIGNIFICANT CHANGE UP (ref 150–400)
PLATELET # BLD AUTO: 231 K/UL
PLATELET # BLD AUTO: 231 K/UL — SIGNIFICANT CHANGE UP (ref 150–400)
PLATELET # BLD AUTO: 235 K/UL — SIGNIFICANT CHANGE UP (ref 150–400)
PLATELET # BLD AUTO: 235 K/UL — SIGNIFICANT CHANGE UP (ref 150–400)
PLATELET # BLD AUTO: 237 K/UL — SIGNIFICANT CHANGE UP (ref 150–400)
PLATELET # BLD AUTO: 237 K/UL — SIGNIFICANT CHANGE UP (ref 150–400)
PLATELET # BLD AUTO: 242 K/UL — SIGNIFICANT CHANGE UP (ref 150–400)
PLATELET # BLD AUTO: 242 K/UL — SIGNIFICANT CHANGE UP (ref 150–400)
PLATELET # BLD AUTO: 243 K/UL — SIGNIFICANT CHANGE UP (ref 150–400)
PLATELET # BLD AUTO: 249 K/UL — SIGNIFICANT CHANGE UP (ref 150–400)
PLATELET # BLD AUTO: 249 K/UL — SIGNIFICANT CHANGE UP (ref 150–400)
PLATELET # BLD AUTO: 254 K/UL — SIGNIFICANT CHANGE UP (ref 150–400)
PLATELET # BLD AUTO: 254 K/UL — SIGNIFICANT CHANGE UP (ref 150–400)
PLATELET # BLD AUTO: 268 K/UL — SIGNIFICANT CHANGE UP (ref 150–400)
PLATELET # BLD AUTO: 268 K/UL — SIGNIFICANT CHANGE UP (ref 150–400)
PLATELET # BLD AUTO: 271 K/UL — SIGNIFICANT CHANGE UP (ref 150–400)
PLATELET # BLD AUTO: 272 K/UL — SIGNIFICANT CHANGE UP (ref 150–400)
PLATELET # BLD AUTO: 272 K/UL — SIGNIFICANT CHANGE UP (ref 150–400)
PLATELET # BLD AUTO: 288 K/UL — SIGNIFICANT CHANGE UP (ref 150–400)
PLATELET # BLD AUTO: 292 K/UL — SIGNIFICANT CHANGE UP (ref 150–400)
PO2 BLDA: 290 MMHG — HIGH (ref 83–108)
PO2 BLDA: 290 MMHG — HIGH (ref 83–108)
POCT ISTAT CREATININE: 1.2 MG/DL — SIGNIFICANT CHANGE UP (ref 0.5–1.3)
POIKILOCYTOSIS BLD QL AUTO: SIGNIFICANT CHANGE UP
POIKILOCYTOSIS BLD QL AUTO: SLIGHT — SIGNIFICANT CHANGE UP
POLYCHROMASIA BLD QL SMEAR: SLIGHT — SIGNIFICANT CHANGE UP
POTASSIUM SERPL-MCNC: 3 MMOL/L — LOW (ref 3.5–5.3)
POTASSIUM SERPL-MCNC: 3 MMOL/L — LOW (ref 3.5–5.3)
POTASSIUM SERPL-MCNC: 3.3 MMOL/L — LOW (ref 3.5–5.3)
POTASSIUM SERPL-MCNC: 3.3 MMOL/L — LOW (ref 3.5–5.3)
POTASSIUM SERPL-MCNC: 3.5 MMOL/L — SIGNIFICANT CHANGE UP (ref 3.5–5.3)
POTASSIUM SERPL-MCNC: 3.6 MMOL/L — SIGNIFICANT CHANGE UP (ref 3.5–5.3)
POTASSIUM SERPL-MCNC: 3.7 MMOL/L — SIGNIFICANT CHANGE UP (ref 3.5–5.3)
POTASSIUM SERPL-MCNC: 3.8 MMOL/L — SIGNIFICANT CHANGE UP (ref 3.5–5.3)
POTASSIUM SERPL-MCNC: 3.9 MMOL/L — SIGNIFICANT CHANGE UP (ref 3.5–5.3)
POTASSIUM SERPL-MCNC: 4 MMOL/L — SIGNIFICANT CHANGE UP (ref 3.5–5.3)
POTASSIUM SERPL-MCNC: 4.1 MMOL/L — SIGNIFICANT CHANGE UP (ref 3.5–5.3)
POTASSIUM SERPL-MCNC: 4.2 MMOL/L — SIGNIFICANT CHANGE UP (ref 3.5–5.3)
POTASSIUM SERPL-MCNC: 4.3 MMOL/L — SIGNIFICANT CHANGE UP (ref 3.5–5.3)
POTASSIUM SERPL-MCNC: 4.4 MMOL/L — SIGNIFICANT CHANGE UP (ref 3.5–5.3)
POTASSIUM SERPL-MCNC: 4.5 MMOL/L — SIGNIFICANT CHANGE UP (ref 3.5–5.3)
POTASSIUM SERPL-MCNC: 4.6 MMOL/L — SIGNIFICANT CHANGE UP (ref 3.5–5.3)
POTASSIUM SERPL-MCNC: 4.7 MMOL/L — SIGNIFICANT CHANGE UP (ref 3.5–5.3)
POTASSIUM SERPL-MCNC: 4.8 MMOL/L — SIGNIFICANT CHANGE UP (ref 3.5–5.3)
POTASSIUM SERPL-MCNC: 4.9 MMOL/L — SIGNIFICANT CHANGE UP (ref 3.5–5.3)
POTASSIUM SERPL-MCNC: 5 MMOL/L — SIGNIFICANT CHANGE UP (ref 3.5–5.3)
POTASSIUM SERPL-MCNC: 5 MMOL/L — SIGNIFICANT CHANGE UP (ref 3.5–5.3)
POTASSIUM SERPL-MCNC: 5.1 MMOL/L — SIGNIFICANT CHANGE UP (ref 3.5–5.3)
POTASSIUM SERPL-MCNC: 5.1 MMOL/L — SIGNIFICANT CHANGE UP (ref 3.5–5.3)
POTASSIUM SERPL-MCNC: 5.4 MMOL/L — HIGH (ref 3.5–5.3)
POTASSIUM SERPL-MCNC: 5.6 MMOL/L — HIGH (ref 3.5–5.3)
POTASSIUM SERPL-MCNC: 6.1 MMOL/L — HIGH (ref 3.5–5.3)
POTASSIUM SERPL-SCNC: 3 MMOL/L — LOW (ref 3.5–5.3)
POTASSIUM SERPL-SCNC: 3 MMOL/L — LOW (ref 3.5–5.3)
POTASSIUM SERPL-SCNC: 3.3 MMOL/L — LOW (ref 3.5–5.3)
POTASSIUM SERPL-SCNC: 3.3 MMOL/L — LOW (ref 3.5–5.3)
POTASSIUM SERPL-SCNC: 3.5 MMOL/L — SIGNIFICANT CHANGE UP (ref 3.5–5.3)
POTASSIUM SERPL-SCNC: 3.6 MMOL/L — SIGNIFICANT CHANGE UP (ref 3.5–5.3)
POTASSIUM SERPL-SCNC: 3.7 MMOL/L — SIGNIFICANT CHANGE UP (ref 3.5–5.3)
POTASSIUM SERPL-SCNC: 3.8 MMOL/L — SIGNIFICANT CHANGE UP (ref 3.5–5.3)
POTASSIUM SERPL-SCNC: 3.9 MMOL/L — SIGNIFICANT CHANGE UP (ref 3.5–5.3)
POTASSIUM SERPL-SCNC: 4 MMOL/L — SIGNIFICANT CHANGE UP (ref 3.5–5.3)
POTASSIUM SERPL-SCNC: 4.1 MMOL/L — SIGNIFICANT CHANGE UP (ref 3.5–5.3)
POTASSIUM SERPL-SCNC: 4.2 MMOL/L — SIGNIFICANT CHANGE UP (ref 3.5–5.3)
POTASSIUM SERPL-SCNC: 4.3 MMOL/L — SIGNIFICANT CHANGE UP (ref 3.5–5.3)
POTASSIUM SERPL-SCNC: 4.4 MMOL/L — SIGNIFICANT CHANGE UP (ref 3.5–5.3)
POTASSIUM SERPL-SCNC: 4.5 MMOL/L — SIGNIFICANT CHANGE UP (ref 3.5–5.3)
POTASSIUM SERPL-SCNC: 4.6 MMOL/L — SIGNIFICANT CHANGE UP (ref 3.5–5.3)
POTASSIUM SERPL-SCNC: 4.7 MMOL/L
POTASSIUM SERPL-SCNC: 4.7 MMOL/L — SIGNIFICANT CHANGE UP (ref 3.5–5.3)
POTASSIUM SERPL-SCNC: 4.8 MMOL/L — SIGNIFICANT CHANGE UP (ref 3.5–5.3)
POTASSIUM SERPL-SCNC: 4.9 MMOL/L — SIGNIFICANT CHANGE UP (ref 3.5–5.3)
POTASSIUM SERPL-SCNC: 5 MMOL/L — SIGNIFICANT CHANGE UP (ref 3.5–5.3)
POTASSIUM SERPL-SCNC: 5 MMOL/L — SIGNIFICANT CHANGE UP (ref 3.5–5.3)
POTASSIUM SERPL-SCNC: 5.1 MMOL/L
POTASSIUM SERPL-SCNC: 5.1 MMOL/L — SIGNIFICANT CHANGE UP (ref 3.5–5.3)
POTASSIUM SERPL-SCNC: 5.1 MMOL/L — SIGNIFICANT CHANGE UP (ref 3.5–5.3)
POTASSIUM SERPL-SCNC: 5.4 MMOL/L — HIGH (ref 3.5–5.3)
POTASSIUM SERPL-SCNC: 5.6 MMOL/L — HIGH (ref 3.5–5.3)
POTASSIUM SERPL-SCNC: 6.1 MMOL/L — HIGH (ref 3.5–5.3)
PROCALCITONIN SERPL-MCNC: 0.28 NG/ML — HIGH (ref 0.02–0.1)
PROCALCITONIN SERPL-MCNC: 0.28 NG/ML — HIGH (ref 0.02–0.1)
PROMYELOCYTES # FLD: 0.9 % — HIGH (ref 0–0)
PROMYELOCYTES # FLD: 0.9 % — HIGH (ref 0–0)
PROT SERPL-MCNC: 4.4 G/DL — LOW (ref 6–8.3)
PROT SERPL-MCNC: 4.4 G/DL — LOW (ref 6–8.3)
PROT SERPL-MCNC: 4.5 G/DL — LOW (ref 6–8.3)
PROT SERPL-MCNC: 4.6 G/DL — LOW (ref 6–8.3)
PROT SERPL-MCNC: 4.7 G/DL — LOW (ref 6–8.3)
PROT SERPL-MCNC: 4.7 G/DL — LOW (ref 6–8.3)
PROT SERPL-MCNC: 4.8 G/DL — LOW (ref 6–8.3)
PROT SERPL-MCNC: 4.8 G/DL — LOW (ref 6–8.3)
PROT SERPL-MCNC: 4.9 G/DL — LOW (ref 6–8.3)
PROT SERPL-MCNC: 5 G/DL — LOW (ref 6–8.3)
PROT SERPL-MCNC: 5.1 G/DL — LOW (ref 6–8.3)
PROT SERPL-MCNC: 5.2 G/DL — LOW (ref 6–8.3)
PROT SERPL-MCNC: 5.3 G/DL — LOW (ref 6–8.3)
PROT SERPL-MCNC: 5.4 G/DL — LOW (ref 6–8.3)
PROT SERPL-MCNC: 5.5 G/DL — LOW (ref 6–8.3)
PROT SERPL-MCNC: 5.6 G/DL — LOW (ref 6–8.3)
PROT SERPL-MCNC: 5.6 G/DL — LOW (ref 6–8.3)
PROT SERPL-MCNC: 5.7 G/DL — LOW (ref 6–8.3)
PROT SERPL-MCNC: 5.7 G/DL — LOW (ref 6–8.3)
PROT SERPL-MCNC: 5.8 G/DL — LOW (ref 6–8.3)
PROT SERPL-MCNC: 6 G/DL — SIGNIFICANT CHANGE UP (ref 6–8.3)
PROT SERPL-MCNC: 6.1 G/DL — SIGNIFICANT CHANGE UP (ref 6–8.3)
PROT SERPL-MCNC: 6.1 G/DL — SIGNIFICANT CHANGE UP (ref 6–8.3)
PROT SERPL-MCNC: 6.2 G/DL — SIGNIFICANT CHANGE UP (ref 6–8.3)
PROT SERPL-MCNC: 6.3 G/DL — SIGNIFICANT CHANGE UP (ref 6–8.3)
PROT SERPL-MCNC: 6.4 G/DL — SIGNIFICANT CHANGE UP (ref 6–8.3)
PROT SERPL-MCNC: 6.6 G/DL — SIGNIFICANT CHANGE UP (ref 6–8.3)
PROT SERPL-MCNC: 6.7 G/DL — SIGNIFICANT CHANGE UP (ref 6–8.3)
PROT SERPL-MCNC: 6.8 G/DL — SIGNIFICANT CHANGE UP (ref 6–8.3)
PROT SERPL-MCNC: 6.9 G/DL
PROT SERPL-MCNC: 6.9 G/DL — SIGNIFICANT CHANGE UP (ref 6–8.3)
PROT SERPL-MCNC: 7 G/DL
PROT SERPL-MCNC: 7.4 G/DL — SIGNIFICANT CHANGE UP (ref 6–8.3)
PROT SERPL-MCNC: 8 G/DL — SIGNIFICANT CHANGE UP (ref 6–8.3)
PROT UR-MCNC: 100 MG/DL
PROT UR-MCNC: 30 MG/DL
PROT UR-MCNC: 30 MG/DL
PROT UR-MCNC: 300 MG/DL
PROT UR-MCNC: 300 MG/DL
PROT UR-MCNC: ABNORMAL MG/DL
PROTHROM AB SERPL-ACNC: 11.6 SEC — SIGNIFICANT CHANGE UP (ref 9.5–13)
PROTHROM AB SERPL-ACNC: 12.6 SEC — SIGNIFICANT CHANGE UP (ref 9.5–13)
PROTHROM AB SERPL-ACNC: 12.6 SEC — SIGNIFICANT CHANGE UP (ref 9.5–13)
PROTHROM AB SERPL-ACNC: 12.9 SEC — SIGNIFICANT CHANGE UP (ref 9.5–13)
PROTHROM AB SERPL-ACNC: 12.9 SEC — SIGNIFICANT CHANGE UP (ref 9.5–13)
PROTHROM AB SERPL-ACNC: 13.2 SEC — HIGH (ref 9.5–13)
PROTHROM AB SERPL-ACNC: 13.2 SEC — HIGH (ref 9.5–13)
PROTHROM AB SERPL-ACNC: 13.3 SEC — HIGH (ref 9.5–13)
PROTHROM AB SERPL-ACNC: 13.3 SEC — HIGH (ref 9.5–13)
PROTHROM AB SERPL-ACNC: 13.3 SEC — SIGNIFICANT CHANGE UP (ref 10.5–13.4)
PROTHROM AB SERPL-ACNC: 13.4 SEC — HIGH (ref 9.5–13)
PROTHROM AB SERPL-ACNC: 13.4 SEC — HIGH (ref 9.5–13)
PROTHROM AB SERPL-ACNC: 13.5 SEC — HIGH (ref 9.5–13)
PROTHROM AB SERPL-ACNC: 13.5 SEC — HIGH (ref 9.5–13)
PROTHROM AB SERPL-ACNC: 13.8 SEC — HIGH (ref 9.5–13)
PROTHROM AB SERPL-ACNC: 13.8 SEC — HIGH (ref 9.5–13)
PROTHROM AB SERPL-ACNC: 13.9 SEC — HIGH (ref 10.5–13.4)
PROTHROM AB SERPL-ACNC: 13.9 SEC — HIGH (ref 9.5–13)
PROTHROM AB SERPL-ACNC: 13.9 SEC — HIGH (ref 9.5–13)
PROTHROM AB SERPL-ACNC: 14 SEC — HIGH (ref 9.5–13)
PROTHROM AB SERPL-ACNC: 14 SEC — HIGH (ref 9.5–13)
PROTHROM AB SERPL-ACNC: 16.5 SEC — HIGH (ref 9.5–13)
PROTHROM AB SERPL-ACNC: 16.5 SEC — HIGH (ref 9.5–13)
PROTHROM AB SERPL-ACNC: 19.2 SEC — HIGH (ref 9.5–13)
PROTHROM AB SERPL-ACNC: 19.2 SEC — HIGH (ref 9.5–13)
RBC # BLD: 2.49 M/UL — LOW (ref 4.2–5.8)
RBC # BLD: 2.49 M/UL — LOW (ref 4.2–5.8)
RBC # BLD: 2.53 M/UL — LOW (ref 4.2–5.8)
RBC # BLD: 2.53 M/UL — LOW (ref 4.2–5.8)
RBC # BLD: 2.61 M/UL — LOW (ref 4.2–5.8)
RBC # BLD: 2.61 M/UL — LOW (ref 4.2–5.8)
RBC # BLD: 2.66 M/UL — LOW (ref 4.2–5.8)
RBC # BLD: 2.66 M/UL — LOW (ref 4.2–5.8)
RBC # BLD: 2.7 M/UL — LOW (ref 4.2–5.8)
RBC # BLD: 2.7 M/UL — LOW (ref 4.2–5.8)
RBC # BLD: 2.73 M/UL — LOW (ref 4.2–5.8)
RBC # BLD: 2.73 M/UL — LOW (ref 4.2–5.8)
RBC # BLD: 2.74 M/UL — LOW (ref 4.2–5.8)
RBC # BLD: 2.74 M/UL — LOW (ref 4.2–5.8)
RBC # BLD: 2.77 M/UL — LOW (ref 4.2–5.8)
RBC # BLD: 2.77 M/UL — LOW (ref 4.2–5.8)
RBC # BLD: 2.82 M/UL — LOW (ref 4.2–5.8)
RBC # BLD: 2.83 M/UL — LOW (ref 4.2–5.8)
RBC # BLD: 2.84 M/UL — LOW (ref 4.2–5.8)
RBC # BLD: 2.84 M/UL — LOW (ref 4.2–5.8)
RBC # BLD: 2.87 M/UL — LOW (ref 4.2–5.8)
RBC # BLD: 2.87 M/UL — LOW (ref 4.2–5.8)
RBC # BLD: 2.89 M/UL — LOW (ref 4.2–5.8)
RBC # BLD: 2.89 M/UL — LOW (ref 4.2–5.8)
RBC # BLD: 2.95 M/UL — LOW (ref 4.2–5.8)
RBC # BLD: 2.95 M/UL — LOW (ref 4.2–5.8)
RBC # BLD: 2.97 M/UL — LOW (ref 4.2–5.8)
RBC # BLD: 2.98 M/UL — LOW (ref 4.2–5.8)
RBC # BLD: 2.98 M/UL — LOW (ref 4.2–5.8)
RBC # BLD: 3.07 M/UL — LOW (ref 4.2–5.8)
RBC # BLD: 3.07 M/UL — LOW (ref 4.2–5.8)
RBC # BLD: 3.08 M/UL — LOW (ref 4.2–5.8)
RBC # BLD: 3.14 M/UL — LOW (ref 4.2–5.8)
RBC # BLD: 3.14 M/UL — LOW (ref 4.2–5.8)
RBC # BLD: 3.15 M/UL — LOW (ref 4.2–5.8)
RBC # BLD: 3.21 M/UL — LOW (ref 4.2–5.8)
RBC # BLD: 3.23 M/UL — LOW (ref 4.2–5.8)
RBC # BLD: 3.28 M/UL — LOW (ref 4.2–5.8)
RBC # BLD: 3.28 M/UL — LOW (ref 4.2–5.8)
RBC # BLD: 3.3 M/UL — LOW (ref 4.2–5.8)
RBC # BLD: 3.3 M/UL — LOW (ref 4.2–5.8)
RBC # BLD: 3.31 M/UL — LOW (ref 4.2–5.8)
RBC # BLD: 3.31 M/UL — LOW (ref 4.2–5.8)
RBC # BLD: 3.37 M/UL — LOW (ref 4.2–5.8)
RBC # BLD: 3.37 M/UL — LOW (ref 4.2–5.8)
RBC # BLD: 3.38 M/UL — LOW (ref 4.2–5.8)
RBC # BLD: 3.39 M/UL — LOW (ref 4.2–5.8)
RBC # BLD: 3.39 M/UL — LOW (ref 4.2–5.8)
RBC # BLD: 3.42 M/UL — LOW (ref 4.2–5.8)
RBC # BLD: 3.42 M/UL — LOW (ref 4.2–5.8)
RBC # BLD: 3.43 M/UL — LOW (ref 4.2–5.8)
RBC # BLD: 3.43 M/UL — LOW (ref 4.2–5.8)
RBC # BLD: 3.44 M/UL — LOW (ref 4.2–5.8)
RBC # BLD: 3.47 M/UL — LOW (ref 4.2–5.8)
RBC # BLD: 3.48 M/UL — LOW (ref 4.2–5.8)
RBC # BLD: 3.52 M/UL — LOW (ref 4.2–5.8)
RBC # BLD: 3.53 M/UL — LOW (ref 4.2–5.8)
RBC # BLD: 3.53 M/UL — LOW (ref 4.2–5.8)
RBC # BLD: 3.62 M/UL — LOW (ref 4.2–5.8)
RBC # BLD: 3.85 M/UL — LOW (ref 4.2–5.8)
RBC # BLD: 3.94 M/UL — LOW (ref 4.2–5.8)
RBC # BLD: 4.16 M/UL
RBC # BLD: 4.24 M/UL — SIGNIFICANT CHANGE UP (ref 4.2–5.8)
RBC # BLD: 4.38 M/UL — SIGNIFICANT CHANGE UP (ref 4.2–5.8)
RBC # BLD: 4.41 M/UL — SIGNIFICANT CHANGE UP (ref 4.2–5.8)
RBC # BLD: 4.43 M/UL — SIGNIFICANT CHANGE UP (ref 4.2–5.8)
RBC # FLD: 13 % — SIGNIFICANT CHANGE UP (ref 10.3–14.5)
RBC # FLD: 13 % — SIGNIFICANT CHANGE UP (ref 10.3–14.5)
RBC # FLD: 13.1 % — SIGNIFICANT CHANGE UP (ref 10.3–14.5)
RBC # FLD: 13.2 %
RBC # FLD: 13.2 % — SIGNIFICANT CHANGE UP (ref 10.3–14.5)
RBC # FLD: 13.4 % — SIGNIFICANT CHANGE UP (ref 10.3–14.5)
RBC # FLD: 13.6 % — SIGNIFICANT CHANGE UP (ref 10.3–14.5)
RBC # FLD: 14.4 % — SIGNIFICANT CHANGE UP (ref 10.3–14.5)
RBC # FLD: 15 % — HIGH (ref 10.3–14.5)
RBC # FLD: 15.4 % — HIGH (ref 10.3–14.5)
RBC # FLD: 16.4 % — HIGH (ref 10.3–14.5)
RBC # FLD: 16.4 % — HIGH (ref 10.3–14.5)
RBC # FLD: 16.6 % — HIGH (ref 10.3–14.5)
RBC # FLD: 16.6 % — HIGH (ref 10.3–14.5)
RBC # FLD: 16.7 % — HIGH (ref 10.3–14.5)
RBC # FLD: 16.7 % — HIGH (ref 10.3–14.5)
RBC # FLD: 16.8 % — HIGH (ref 10.3–14.5)
RBC # FLD: 16.8 % — HIGH (ref 10.3–14.5)
RBC # FLD: 16.9 % — HIGH (ref 10.3–14.5)
RBC # FLD: 17 % — HIGH (ref 10.3–14.5)
RBC # FLD: 17.1 % — HIGH (ref 10.3–14.5)
RBC # FLD: 17.2 % — HIGH (ref 10.3–14.5)
RBC # FLD: 17.3 % — HIGH (ref 10.3–14.5)
RBC # FLD: 17.4 % — HIGH (ref 10.3–14.5)
RBC # FLD: 17.4 % — HIGH (ref 10.3–14.5)
RBC # FLD: 17.5 % — HIGH (ref 10.3–14.5)
RBC # FLD: 17.6 % — HIGH (ref 10.3–14.5)
RBC # FLD: 17.6 % — HIGH (ref 10.3–14.5)
RBC # FLD: 17.7 % — HIGH (ref 10.3–14.5)
RBC # FLD: 17.7 % — HIGH (ref 10.3–14.5)
RBC # FLD: 17.8 % — HIGH (ref 10.3–14.5)
RBC # FLD: 17.8 % — HIGH (ref 10.3–14.5)
RBC # FLD: 17.9 % — HIGH (ref 10.3–14.5)
RBC # FLD: 18 % — HIGH (ref 10.3–14.5)
RBC # FLD: 18 % — HIGH (ref 10.3–14.5)
RBC # FLD: 18.2 % — HIGH (ref 10.3–14.5)
RBC # FLD: 18.2 % — HIGH (ref 10.3–14.5)
RBC # FLD: 18.6 % — HIGH (ref 10.3–14.5)
RBC # FLD: 18.6 % — HIGH (ref 10.3–14.5)
RBC # FLD: 18.8 % — HIGH (ref 10.3–14.5)
RBC # FLD: 19 % — HIGH (ref 10.3–14.5)
RBC # FLD: 19.1 % — HIGH (ref 10.3–14.5)
RBC # FLD: 19.1 % — HIGH (ref 10.3–14.5)
RBC # FLD: 19.2 % — HIGH (ref 10.3–14.5)
RBC # FLD: 19.2 % — HIGH (ref 10.3–14.5)
RBC # FLD: 19.3 % — HIGH (ref 10.3–14.5)
RBC # FLD: 19.4 % — HIGH (ref 10.3–14.5)
RBC # FLD: 19.5 % — HIGH (ref 10.3–14.5)
RBC # FLD: 19.5 % — HIGH (ref 10.3–14.5)
RBC # FLD: 19.6 % — HIGH (ref 10.3–14.5)
RBC # FLD: 19.7 % — HIGH (ref 10.3–14.5)
RBC # FLD: 19.7 % — HIGH (ref 10.3–14.5)
RBC BLD AUTO: ABNORMAL
RBC CASTS # UR COMP ASSIST: 1 /HPF — SIGNIFICANT CHANGE UP (ref 0–4)
RBC CASTS # UR COMP ASSIST: 1 /HPF — SIGNIFICANT CHANGE UP (ref 0–4)
RBC CASTS # UR COMP ASSIST: 2 /HPF — SIGNIFICANT CHANGE UP (ref 0–4)
RBC CASTS # UR COMP ASSIST: 3 /HPF — SIGNIFICANT CHANGE UP (ref 0–4)
RBC CASTS # UR COMP ASSIST: 3 /HPF — SIGNIFICANT CHANGE UP (ref 0–4)
RBC CASTS # UR COMP ASSIST: < 5 /HPF — SIGNIFICANT CHANGE UP
RH IG SCN BLD-IMP: POSITIVE — SIGNIFICANT CHANGE UP
S AUREUS DNA NOSE QL NAA+PROBE: POSITIVE
S AUREUS DNA NOSE QL NAA+PROBE: POSITIVE
SAO2 % BLDA: 99.4 % — HIGH (ref 94–98)
SAO2 % BLDA: 99.4 % — HIGH (ref 94–98)
SMUDGE CELLS # BLD: PRESENT — SIGNIFICANT CHANGE UP
SODIUM SERPL-SCNC: 132 MMOL/L — LOW (ref 135–145)
SODIUM SERPL-SCNC: 133 MMOL/L
SODIUM SERPL-SCNC: 133 MMOL/L — LOW (ref 135–145)
SODIUM SERPL-SCNC: 134 MMOL/L — LOW (ref 135–145)
SODIUM SERPL-SCNC: 134 MMOL/L — LOW (ref 135–145)
SODIUM SERPL-SCNC: 135 MMOL/L — SIGNIFICANT CHANGE UP (ref 135–145)
SODIUM SERPL-SCNC: 136 MMOL/L — SIGNIFICANT CHANGE UP (ref 135–145)
SODIUM SERPL-SCNC: 137 MMOL/L
SODIUM SERPL-SCNC: 137 MMOL/L — SIGNIFICANT CHANGE UP (ref 135–145)
SODIUM SERPL-SCNC: 137 MMOL/L — SIGNIFICANT CHANGE UP (ref 135–145)
SODIUM SERPL-SCNC: 138 MMOL/L — SIGNIFICANT CHANGE UP (ref 135–145)
SODIUM SERPL-SCNC: 138 MMOL/L — SIGNIFICANT CHANGE UP (ref 135–145)
SODIUM SERPL-SCNC: 139 MMOL/L — SIGNIFICANT CHANGE UP (ref 135–145)
SODIUM SERPL-SCNC: 140 MMOL/L — SIGNIFICANT CHANGE UP (ref 135–145)
SODIUM SERPL-SCNC: 141 MMOL/L — SIGNIFICANT CHANGE UP (ref 135–145)
SODIUM SERPL-SCNC: 142 MMOL/L — SIGNIFICANT CHANGE UP (ref 135–145)
SODIUM SERPL-SCNC: 143 MMOL/L — SIGNIFICANT CHANGE UP (ref 135–145)
SODIUM SERPL-SCNC: 144 MMOL/L — SIGNIFICANT CHANGE UP (ref 135–145)
SODIUM SERPL-SCNC: 145 MMOL/L — SIGNIFICANT CHANGE UP (ref 135–145)
SODIUM SERPL-SCNC: 146 MMOL/L — HIGH (ref 135–145)
SODIUM SERPL-SCNC: 147 MMOL/L — HIGH (ref 135–145)
SODIUM SERPL-SCNC: 147 MMOL/L — HIGH (ref 135–145)
SODIUM SERPL-SCNC: 151 MMOL/L — HIGH (ref 135–145)
SODIUM SERPL-SCNC: 151 MMOL/L — HIGH (ref 135–145)
SODIUM UR-SCNC: 29 MMOL/L — SIGNIFICANT CHANGE UP
SODIUM UR-SCNC: 29 MMOL/L — SIGNIFICANT CHANGE UP
SP GR SPEC: 1.01 — SIGNIFICANT CHANGE UP (ref 1–1.03)
SP GR SPEC: 1.02 — SIGNIFICANT CHANGE UP (ref 1–1.03)
SPECIMEN SOURCE: SIGNIFICANT CHANGE UP
SPHEROCYTES BLD QL SMEAR: SLIGHT — SIGNIFICANT CHANGE UP
SQUAMOUS # UR AUTO: 1 /HPF — SIGNIFICANT CHANGE UP (ref 0–5)
SQUAMOUS # UR AUTO: 1 /HPF — SIGNIFICANT CHANGE UP (ref 0–5)
SQUAMOUS # UR AUTO: 2 /HPF — SIGNIFICANT CHANGE UP (ref 0–5)
SQUAMOUS # UR AUTO: 6 /HPF — HIGH (ref 0–5)
SQUAMOUS # UR AUTO: 6 /HPF — HIGH (ref 0–5)
SURGICAL PATHOLOGY STUDY: SIGNIFICANT CHANGE UP
TIBC SERPL-MCNC: 165 UG/DL — LOW (ref 220–430)
TIBC SERPL-MCNC: 165 UG/DL — LOW (ref 220–430)
TRANSFERRIN SERPL-MCNC: 141 MG/DL — LOW (ref 200–360)
TRANSFERRIN SERPL-MCNC: 141 MG/DL — LOW (ref 200–360)
TRI-PHOS CRY UR QL COMP ASSIST: PRESENT
TRI-PHOS CRY UR QL COMP ASSIST: PRESENT
TRIGL SERPL-MCNC: 171 MG/DL
TSH SERPL-ACNC: 2.44 UIU/ML
TSH SERPL-MCNC: 1.59 UIU/ML — SIGNIFICANT CHANGE UP (ref 0.27–4.2)
TSH SERPL-MCNC: 1.93 UIU/ML — SIGNIFICANT CHANGE UP (ref 0.27–4.2)
TSH SERPL-MCNC: 2.09 UIU/ML — SIGNIFICANT CHANGE UP (ref 0.27–4.2)
UIBC SERPL-MCNC: 140 UG/DL — SIGNIFICANT CHANGE UP (ref 110–370)
UIBC SERPL-MCNC: 140 UG/DL — SIGNIFICANT CHANGE UP (ref 110–370)
UROBILINOGEN FLD QL: 0.2 E.U./DL — SIGNIFICANT CHANGE UP
UROBILINOGEN FLD QL: 1 MG/DL — SIGNIFICANT CHANGE UP (ref 0.2–1)
UUN UR-MCNC: 369 MG/DL — SIGNIFICANT CHANGE UP
UUN UR-MCNC: 369 MG/DL — SIGNIFICANT CHANGE UP
VANCOMYCIN FLD-MCNC: 11.5 UG/ML — SIGNIFICANT CHANGE UP
VANCOMYCIN FLD-MCNC: 11.5 UG/ML — SIGNIFICANT CHANGE UP
VARIANT LYMPHS # BLD: 0.9 % — SIGNIFICANT CHANGE UP (ref 0–6)
VARIANT LYMPHS # BLD: 0.9 % — SIGNIFICANT CHANGE UP (ref 0–6)
VARIANT LYMPHS # BLD: 1.8 % — SIGNIFICANT CHANGE UP (ref 0–6)
VARIANT LYMPHS # BLD: 1.8 % — SIGNIFICANT CHANGE UP (ref 0–6)
VARIANT LYMPHS # BLD: 6.1 % — HIGH (ref 0–6)
VIT B12 SERPL-MCNC: >2000 PG/ML — HIGH (ref 232–1245)
VIT B12 SERPL-MCNC: >2000 PG/ML — HIGH (ref 232–1245)
WBC # BLD: 1.77 K/UL — LOW (ref 3.8–10.5)
WBC # BLD: 1.89 K/UL — LOW (ref 3.8–10.5)
WBC # BLD: 1.89 K/UL — LOW (ref 3.8–10.5)
WBC # BLD: 1.94 K/UL — LOW (ref 3.8–10.5)
WBC # BLD: 1.94 K/UL — LOW (ref 3.8–10.5)
WBC # BLD: 10.2 K/UL — SIGNIFICANT CHANGE UP (ref 3.8–10.5)
WBC # BLD: 13.49 K/UL — HIGH (ref 3.8–10.5)
WBC # BLD: 13.49 K/UL — HIGH (ref 3.8–10.5)
WBC # BLD: 2.31 K/UL — LOW (ref 3.8–10.5)
WBC # BLD: 2.31 K/UL — LOW (ref 3.8–10.5)
WBC # BLD: 2.49 K/UL — LOW (ref 3.8–10.5)
WBC # BLD: 2.49 K/UL — LOW (ref 3.8–10.5)
WBC # BLD: 2.66 K/UL — LOW (ref 3.8–10.5)
WBC # BLD: 2.66 K/UL — LOW (ref 3.8–10.5)
WBC # BLD: 2.7 K/UL — LOW (ref 3.8–10.5)
WBC # BLD: 2.7 K/UL — LOW (ref 3.8–10.5)
WBC # BLD: 2.96 K/UL — LOW (ref 3.8–10.5)
WBC # BLD: 20.87 K/UL — HIGH (ref 3.8–10.5)
WBC # BLD: 20.87 K/UL — HIGH (ref 3.8–10.5)
WBC # BLD: 23.07 K/UL — HIGH (ref 3.8–10.5)
WBC # BLD: 23.07 K/UL — HIGH (ref 3.8–10.5)
WBC # BLD: 4.08 K/UL — SIGNIFICANT CHANGE UP (ref 3.8–10.5)
WBC # BLD: 4.08 K/UL — SIGNIFICANT CHANGE UP (ref 3.8–10.5)
WBC # BLD: 4.21 K/UL — SIGNIFICANT CHANGE UP (ref 3.8–10.5)
WBC # BLD: 4.21 K/UL — SIGNIFICANT CHANGE UP (ref 3.8–10.5)
WBC # BLD: 4.25 K/UL — SIGNIFICANT CHANGE UP (ref 3.8–10.5)
WBC # BLD: 4.25 K/UL — SIGNIFICANT CHANGE UP (ref 3.8–10.5)
WBC # BLD: 4.27 K/UL — SIGNIFICANT CHANGE UP (ref 3.8–10.5)
WBC # BLD: 4.27 K/UL — SIGNIFICANT CHANGE UP (ref 3.8–10.5)
WBC # BLD: 4.41 K/UL — SIGNIFICANT CHANGE UP (ref 3.8–10.5)
WBC # BLD: 4.41 K/UL — SIGNIFICANT CHANGE UP (ref 3.8–10.5)
WBC # BLD: 4.56 K/UL — SIGNIFICANT CHANGE UP (ref 3.8–10.5)
WBC # BLD: 4.56 K/UL — SIGNIFICANT CHANGE UP (ref 3.8–10.5)
WBC # BLD: 5.11 K/UL — SIGNIFICANT CHANGE UP (ref 3.8–10.5)
WBC # BLD: 5.11 K/UL — SIGNIFICANT CHANGE UP (ref 3.8–10.5)
WBC # BLD: 5.19 K/UL — SIGNIFICANT CHANGE UP (ref 3.8–10.5)
WBC # BLD: 5.19 K/UL — SIGNIFICANT CHANGE UP (ref 3.8–10.5)
WBC # BLD: 5.2 K/UL — SIGNIFICANT CHANGE UP (ref 3.8–10.5)
WBC # BLD: 5.22 K/UL — SIGNIFICANT CHANGE UP (ref 3.8–10.5)
WBC # BLD: 5.23 K/UL — SIGNIFICANT CHANGE UP (ref 3.8–10.5)
WBC # BLD: 5.23 K/UL — SIGNIFICANT CHANGE UP (ref 3.8–10.5)
WBC # BLD: 5.44 K/UL — SIGNIFICANT CHANGE UP (ref 3.8–10.5)
WBC # BLD: 5.45 K/UL — SIGNIFICANT CHANGE UP (ref 3.8–10.5)
WBC # BLD: 5.45 K/UL — SIGNIFICANT CHANGE UP (ref 3.8–10.5)
WBC # BLD: 5.46 K/UL — SIGNIFICANT CHANGE UP (ref 3.8–10.5)
WBC # BLD: 5.46 K/UL — SIGNIFICANT CHANGE UP (ref 3.8–10.5)
WBC # BLD: 5.5 K/UL — SIGNIFICANT CHANGE UP (ref 3.8–10.5)
WBC # BLD: 5.51 K/UL — SIGNIFICANT CHANGE UP (ref 3.8–10.5)
WBC # BLD: 5.51 K/UL — SIGNIFICANT CHANGE UP (ref 3.8–10.5)
WBC # BLD: 5.6 K/UL — SIGNIFICANT CHANGE UP (ref 3.8–10.5)
WBC # BLD: 5.6 K/UL — SIGNIFICANT CHANGE UP (ref 3.8–10.5)
WBC # BLD: 5.75 K/UL — SIGNIFICANT CHANGE UP (ref 3.8–10.5)
WBC # BLD: 5.75 K/UL — SIGNIFICANT CHANGE UP (ref 3.8–10.5)
WBC # BLD: 5.81 K/UL — SIGNIFICANT CHANGE UP (ref 3.8–10.5)
WBC # BLD: 5.81 K/UL — SIGNIFICANT CHANGE UP (ref 3.8–10.5)
WBC # BLD: 5.88 K/UL — SIGNIFICANT CHANGE UP (ref 3.8–10.5)
WBC # BLD: 5.88 K/UL — SIGNIFICANT CHANGE UP (ref 3.8–10.5)
WBC # BLD: 6.18 K/UL — SIGNIFICANT CHANGE UP (ref 3.8–10.5)
WBC # BLD: 6.18 K/UL — SIGNIFICANT CHANGE UP (ref 3.8–10.5)
WBC # BLD: 6.19 K/UL — SIGNIFICANT CHANGE UP (ref 3.8–10.5)
WBC # BLD: 6.19 K/UL — SIGNIFICANT CHANGE UP (ref 3.8–10.5)
WBC # BLD: 6.35 K/UL — SIGNIFICANT CHANGE UP (ref 3.8–10.5)
WBC # BLD: 6.35 K/UL — SIGNIFICANT CHANGE UP (ref 3.8–10.5)
WBC # BLD: 6.37 K/UL — SIGNIFICANT CHANGE UP (ref 3.8–10.5)
WBC # BLD: 6.37 K/UL — SIGNIFICANT CHANGE UP (ref 3.8–10.5)
WBC # BLD: 6.38 K/UL — SIGNIFICANT CHANGE UP (ref 3.8–10.5)
WBC # BLD: 6.38 K/UL — SIGNIFICANT CHANGE UP (ref 3.8–10.5)
WBC # BLD: 6.46 K/UL — SIGNIFICANT CHANGE UP (ref 3.8–10.5)
WBC # BLD: 6.46 K/UL — SIGNIFICANT CHANGE UP (ref 3.8–10.5)
WBC # BLD: 6.49 K/UL — SIGNIFICANT CHANGE UP (ref 3.8–10.5)
WBC # BLD: 6.49 K/UL — SIGNIFICANT CHANGE UP (ref 3.8–10.5)
WBC # BLD: 6.51 K/UL — SIGNIFICANT CHANGE UP (ref 3.8–10.5)
WBC # BLD: 6.51 K/UL — SIGNIFICANT CHANGE UP (ref 3.8–10.5)
WBC # BLD: 6.58 K/UL — SIGNIFICANT CHANGE UP (ref 3.8–10.5)
WBC # BLD: 6.58 K/UL — SIGNIFICANT CHANGE UP (ref 3.8–10.5)
WBC # BLD: 6.65 K/UL — SIGNIFICANT CHANGE UP (ref 3.8–10.5)
WBC # BLD: 6.84 K/UL — SIGNIFICANT CHANGE UP (ref 3.8–10.5)
WBC # BLD: 6.96 K/UL — SIGNIFICANT CHANGE UP (ref 3.8–10.5)
WBC # BLD: 7.13 K/UL — SIGNIFICANT CHANGE UP (ref 3.8–10.5)
WBC # BLD: 7.22 K/UL — SIGNIFICANT CHANGE UP (ref 3.8–10.5)
WBC # BLD: 7.22 K/UL — SIGNIFICANT CHANGE UP (ref 3.8–10.5)
WBC # BLD: 7.34 K/UL — SIGNIFICANT CHANGE UP (ref 3.8–10.5)
WBC # BLD: 7.34 K/UL — SIGNIFICANT CHANGE UP (ref 3.8–10.5)
WBC # BLD: 7.37 K/UL — SIGNIFICANT CHANGE UP (ref 3.8–10.5)
WBC # BLD: 7.37 K/UL — SIGNIFICANT CHANGE UP (ref 3.8–10.5)
WBC # BLD: 8 K/UL — SIGNIFICANT CHANGE UP (ref 3.8–10.5)
WBC # BLD: 8 K/UL — SIGNIFICANT CHANGE UP (ref 3.8–10.5)
WBC # BLD: 8.02 K/UL — SIGNIFICANT CHANGE UP (ref 3.8–10.5)
WBC # BLD: 8.02 K/UL — SIGNIFICANT CHANGE UP (ref 3.8–10.5)
WBC # BLD: 8.29 K/UL — SIGNIFICANT CHANGE UP (ref 3.8–10.5)
WBC # BLD: 8.29 K/UL — SIGNIFICANT CHANGE UP (ref 3.8–10.5)
WBC # BLD: 9 K/UL — SIGNIFICANT CHANGE UP (ref 3.8–10.5)
WBC # BLD: 9.25 K/UL — SIGNIFICANT CHANGE UP (ref 3.8–10.5)
WBC # BLD: 9.25 K/UL — SIGNIFICANT CHANGE UP (ref 3.8–10.5)
WBC # FLD AUTO: 1.77 K/UL — LOW (ref 3.8–10.5)
WBC # FLD AUTO: 1.89 K/UL — LOW (ref 3.8–10.5)
WBC # FLD AUTO: 1.89 K/UL — LOW (ref 3.8–10.5)
WBC # FLD AUTO: 1.94 K/UL — LOW (ref 3.8–10.5)
WBC # FLD AUTO: 1.94 K/UL — LOW (ref 3.8–10.5)
WBC # FLD AUTO: 10.2 K/UL — SIGNIFICANT CHANGE UP (ref 3.8–10.5)
WBC # FLD AUTO: 13.49 K/UL — HIGH (ref 3.8–10.5)
WBC # FLD AUTO: 13.49 K/UL — HIGH (ref 3.8–10.5)
WBC # FLD AUTO: 2.31 K/UL — LOW (ref 3.8–10.5)
WBC # FLD AUTO: 2.31 K/UL — LOW (ref 3.8–10.5)
WBC # FLD AUTO: 2.49 K/UL — LOW (ref 3.8–10.5)
WBC # FLD AUTO: 2.49 K/UL — LOW (ref 3.8–10.5)
WBC # FLD AUTO: 2.66 K/UL — LOW (ref 3.8–10.5)
WBC # FLD AUTO: 2.66 K/UL — LOW (ref 3.8–10.5)
WBC # FLD AUTO: 2.7 K/UL — LOW (ref 3.8–10.5)
WBC # FLD AUTO: 2.7 K/UL — LOW (ref 3.8–10.5)
WBC # FLD AUTO: 2.96 K/UL — LOW (ref 3.8–10.5)
WBC # FLD AUTO: 20.87 K/UL — HIGH (ref 3.8–10.5)
WBC # FLD AUTO: 20.87 K/UL — HIGH (ref 3.8–10.5)
WBC # FLD AUTO: 23.07 K/UL — HIGH (ref 3.8–10.5)
WBC # FLD AUTO: 23.07 K/UL — HIGH (ref 3.8–10.5)
WBC # FLD AUTO: 4.08 K/UL — SIGNIFICANT CHANGE UP (ref 3.8–10.5)
WBC # FLD AUTO: 4.08 K/UL — SIGNIFICANT CHANGE UP (ref 3.8–10.5)
WBC # FLD AUTO: 4.21 K/UL — SIGNIFICANT CHANGE UP (ref 3.8–10.5)
WBC # FLD AUTO: 4.21 K/UL — SIGNIFICANT CHANGE UP (ref 3.8–10.5)
WBC # FLD AUTO: 4.25 K/UL — SIGNIFICANT CHANGE UP (ref 3.8–10.5)
WBC # FLD AUTO: 4.25 K/UL — SIGNIFICANT CHANGE UP (ref 3.8–10.5)
WBC # FLD AUTO: 4.27 K/UL — SIGNIFICANT CHANGE UP (ref 3.8–10.5)
WBC # FLD AUTO: 4.27 K/UL — SIGNIFICANT CHANGE UP (ref 3.8–10.5)
WBC # FLD AUTO: 4.41 K/UL — SIGNIFICANT CHANGE UP (ref 3.8–10.5)
WBC # FLD AUTO: 4.41 K/UL — SIGNIFICANT CHANGE UP (ref 3.8–10.5)
WBC # FLD AUTO: 4.56 K/UL — SIGNIFICANT CHANGE UP (ref 3.8–10.5)
WBC # FLD AUTO: 4.56 K/UL — SIGNIFICANT CHANGE UP (ref 3.8–10.5)
WBC # FLD AUTO: 5.11 K/UL — SIGNIFICANT CHANGE UP (ref 3.8–10.5)
WBC # FLD AUTO: 5.11 K/UL — SIGNIFICANT CHANGE UP (ref 3.8–10.5)
WBC # FLD AUTO: 5.19 K/UL — SIGNIFICANT CHANGE UP (ref 3.8–10.5)
WBC # FLD AUTO: 5.19 K/UL — SIGNIFICANT CHANGE UP (ref 3.8–10.5)
WBC # FLD AUTO: 5.2 K/UL — SIGNIFICANT CHANGE UP (ref 3.8–10.5)
WBC # FLD AUTO: 5.22 K/UL — SIGNIFICANT CHANGE UP (ref 3.8–10.5)
WBC # FLD AUTO: 5.23 K/UL — SIGNIFICANT CHANGE UP (ref 3.8–10.5)
WBC # FLD AUTO: 5.23 K/UL — SIGNIFICANT CHANGE UP (ref 3.8–10.5)
WBC # FLD AUTO: 5.44 K/UL — SIGNIFICANT CHANGE UP (ref 3.8–10.5)
WBC # FLD AUTO: 5.45 K/UL — SIGNIFICANT CHANGE UP (ref 3.8–10.5)
WBC # FLD AUTO: 5.45 K/UL — SIGNIFICANT CHANGE UP (ref 3.8–10.5)
WBC # FLD AUTO: 5.46 K/UL — SIGNIFICANT CHANGE UP (ref 3.8–10.5)
WBC # FLD AUTO: 5.46 K/UL — SIGNIFICANT CHANGE UP (ref 3.8–10.5)
WBC # FLD AUTO: 5.5 K/UL — SIGNIFICANT CHANGE UP (ref 3.8–10.5)
WBC # FLD AUTO: 5.51 K/UL — SIGNIFICANT CHANGE UP (ref 3.8–10.5)
WBC # FLD AUTO: 5.51 K/UL — SIGNIFICANT CHANGE UP (ref 3.8–10.5)
WBC # FLD AUTO: 5.6 K/UL — SIGNIFICANT CHANGE UP (ref 3.8–10.5)
WBC # FLD AUTO: 5.6 K/UL — SIGNIFICANT CHANGE UP (ref 3.8–10.5)
WBC # FLD AUTO: 5.75 K/UL — SIGNIFICANT CHANGE UP (ref 3.8–10.5)
WBC # FLD AUTO: 5.75 K/UL — SIGNIFICANT CHANGE UP (ref 3.8–10.5)
WBC # FLD AUTO: 5.81 K/UL — SIGNIFICANT CHANGE UP (ref 3.8–10.5)
WBC # FLD AUTO: 5.81 K/UL — SIGNIFICANT CHANGE UP (ref 3.8–10.5)
WBC # FLD AUTO: 5.88 K/UL — SIGNIFICANT CHANGE UP (ref 3.8–10.5)
WBC # FLD AUTO: 5.88 K/UL — SIGNIFICANT CHANGE UP (ref 3.8–10.5)
WBC # FLD AUTO: 6.18 K/UL — SIGNIFICANT CHANGE UP (ref 3.8–10.5)
WBC # FLD AUTO: 6.18 K/UL — SIGNIFICANT CHANGE UP (ref 3.8–10.5)
WBC # FLD AUTO: 6.19 K/UL — SIGNIFICANT CHANGE UP (ref 3.8–10.5)
WBC # FLD AUTO: 6.19 K/UL — SIGNIFICANT CHANGE UP (ref 3.8–10.5)
WBC # FLD AUTO: 6.35 K/UL — SIGNIFICANT CHANGE UP (ref 3.8–10.5)
WBC # FLD AUTO: 6.35 K/UL — SIGNIFICANT CHANGE UP (ref 3.8–10.5)
WBC # FLD AUTO: 6.37 K/UL — SIGNIFICANT CHANGE UP (ref 3.8–10.5)
WBC # FLD AUTO: 6.37 K/UL — SIGNIFICANT CHANGE UP (ref 3.8–10.5)
WBC # FLD AUTO: 6.38 K/UL — SIGNIFICANT CHANGE UP (ref 3.8–10.5)
WBC # FLD AUTO: 6.38 K/UL — SIGNIFICANT CHANGE UP (ref 3.8–10.5)
WBC # FLD AUTO: 6.46 K/UL — SIGNIFICANT CHANGE UP (ref 3.8–10.5)
WBC # FLD AUTO: 6.46 K/UL — SIGNIFICANT CHANGE UP (ref 3.8–10.5)
WBC # FLD AUTO: 6.49 K/UL — SIGNIFICANT CHANGE UP (ref 3.8–10.5)
WBC # FLD AUTO: 6.49 K/UL — SIGNIFICANT CHANGE UP (ref 3.8–10.5)
WBC # FLD AUTO: 6.51 K/UL — SIGNIFICANT CHANGE UP (ref 3.8–10.5)
WBC # FLD AUTO: 6.51 K/UL — SIGNIFICANT CHANGE UP (ref 3.8–10.5)
WBC # FLD AUTO: 6.58 K/UL — SIGNIFICANT CHANGE UP (ref 3.8–10.5)
WBC # FLD AUTO: 6.58 K/UL — SIGNIFICANT CHANGE UP (ref 3.8–10.5)
WBC # FLD AUTO: 6.65 K/UL — SIGNIFICANT CHANGE UP (ref 3.8–10.5)
WBC # FLD AUTO: 6.84 K/UL — SIGNIFICANT CHANGE UP (ref 3.8–10.5)
WBC # FLD AUTO: 6.96 K/UL — SIGNIFICANT CHANGE UP (ref 3.8–10.5)
WBC # FLD AUTO: 7.13 K/UL — SIGNIFICANT CHANGE UP (ref 3.8–10.5)
WBC # FLD AUTO: 7.22 K/UL — SIGNIFICANT CHANGE UP (ref 3.8–10.5)
WBC # FLD AUTO: 7.22 K/UL — SIGNIFICANT CHANGE UP (ref 3.8–10.5)
WBC # FLD AUTO: 7.34 K/UL — SIGNIFICANT CHANGE UP (ref 3.8–10.5)
WBC # FLD AUTO: 7.34 K/UL — SIGNIFICANT CHANGE UP (ref 3.8–10.5)
WBC # FLD AUTO: 7.37 K/UL — SIGNIFICANT CHANGE UP (ref 3.8–10.5)
WBC # FLD AUTO: 7.37 K/UL — SIGNIFICANT CHANGE UP (ref 3.8–10.5)
WBC # FLD AUTO: 8 K/UL — SIGNIFICANT CHANGE UP (ref 3.8–10.5)
WBC # FLD AUTO: 8 K/UL — SIGNIFICANT CHANGE UP (ref 3.8–10.5)
WBC # FLD AUTO: 8.02 K/UL — SIGNIFICANT CHANGE UP (ref 3.8–10.5)
WBC # FLD AUTO: 8.02 K/UL — SIGNIFICANT CHANGE UP (ref 3.8–10.5)
WBC # FLD AUTO: 8.25 K/UL
WBC # FLD AUTO: 8.29 K/UL — SIGNIFICANT CHANGE UP (ref 3.8–10.5)
WBC # FLD AUTO: 8.29 K/UL — SIGNIFICANT CHANGE UP (ref 3.8–10.5)
WBC # FLD AUTO: 9 K/UL — SIGNIFICANT CHANGE UP (ref 3.8–10.5)
WBC # FLD AUTO: 9.25 K/UL — SIGNIFICANT CHANGE UP (ref 3.8–10.5)
WBC # FLD AUTO: 9.25 K/UL — SIGNIFICANT CHANGE UP (ref 3.8–10.5)
WBC UR QL: 1 /HPF — SIGNIFICANT CHANGE UP (ref 0–5)
WBC UR QL: 1 /HPF — SIGNIFICANT CHANGE UP (ref 0–5)
WBC UR QL: 2 /HPF — SIGNIFICANT CHANGE UP (ref 0–5)
WBC UR QL: 2 /HPF — SIGNIFICANT CHANGE UP (ref 0–5)
WBC UR QL: 44 /HPF — HIGH (ref 0–5)
WBC UR QL: 44 /HPF — HIGH (ref 0–5)
WBC UR QL: < 5 /HPF — SIGNIFICANT CHANGE UP
WBC UR QL: >998 /HPF — HIGH (ref 0–5)
WBC UR QL: >998 /HPF — HIGH (ref 0–5)

## 2023-01-01 PROCEDURE — 85027 COMPLETE CBC AUTOMATED: CPT

## 2023-01-01 PROCEDURE — 85610 PROTHROMBIN TIME: CPT

## 2023-01-01 PROCEDURE — C1889: CPT

## 2023-01-01 PROCEDURE — 86850 RBC ANTIBODY SCREEN: CPT

## 2023-01-01 PROCEDURE — 70491 CT SOFT TISSUE NECK W/DYE: CPT

## 2023-01-01 PROCEDURE — L8699: CPT

## 2023-01-01 PROCEDURE — 99232 SBSQ HOSP IP/OBS MODERATE 35: CPT | Mod: GC

## 2023-01-01 PROCEDURE — 96413 CHEMO IV INFUSION 1 HR: CPT

## 2023-01-01 PROCEDURE — 74019 RADEX ABDOMEN 2 VIEWS: CPT | Mod: 26

## 2023-01-01 PROCEDURE — 96374 THER/PROPH/DIAG INJ IV PUSH: CPT

## 2023-01-01 PROCEDURE — 99232 SBSQ HOSP IP/OBS MODERATE 35: CPT

## 2023-01-01 PROCEDURE — 99285 EMERGENCY DEPT VISIT HI MDM: CPT

## 2023-01-01 PROCEDURE — 99233 SBSQ HOSP IP/OBS HIGH 50: CPT

## 2023-01-01 PROCEDURE — 99223 1ST HOSP IP/OBS HIGH 75: CPT

## 2023-01-01 PROCEDURE — 82607 VITAMIN B-12: CPT

## 2023-01-01 PROCEDURE — 81001 URINALYSIS AUTO W/SCOPE: CPT

## 2023-01-01 PROCEDURE — 99233 SBSQ HOSP IP/OBS HIGH 50: CPT | Mod: GC

## 2023-01-01 PROCEDURE — 86901 BLOOD TYPING SEROLOGIC RH(D): CPT

## 2023-01-01 PROCEDURE — 99231 SBSQ HOSP IP/OBS SF/LOW 25: CPT

## 2023-01-01 PROCEDURE — 86900 BLOOD TYPING SEROLOGIC ABO: CPT

## 2023-01-01 PROCEDURE — 71045 X-RAY EXAM CHEST 1 VIEW: CPT | Mod: 26

## 2023-01-01 PROCEDURE — T1013: CPT

## 2023-01-01 PROCEDURE — 71045 X-RAY EXAM CHEST 1 VIEW: CPT

## 2023-01-01 PROCEDURE — 88331 PATH CONSLTJ SURG 1 BLK 1SPC: CPT | Mod: 26

## 2023-01-01 PROCEDURE — 97165 OT EVAL LOW COMPLEX 30 MIN: CPT

## 2023-01-01 PROCEDURE — C1887: CPT

## 2023-01-01 PROCEDURE — C1769: CPT

## 2023-01-01 PROCEDURE — 82746 ASSAY OF FOLIC ACID SERUM: CPT

## 2023-01-01 PROCEDURE — 36415 COLL VENOUS BLD VENIPUNCTURE: CPT

## 2023-01-01 PROCEDURE — 94640 AIRWAY INHALATION TREATMENT: CPT

## 2023-01-01 PROCEDURE — 96361 HYDRATE IV INFUSION ADD-ON: CPT

## 2023-01-01 PROCEDURE — 96375 TX/PRO/DX INJ NEW DRUG ADDON: CPT

## 2023-01-01 PROCEDURE — 93306 TTE W/DOPPLER COMPLETE: CPT | Mod: 26

## 2023-01-01 PROCEDURE — 83735 ASSAY OF MAGNESIUM: CPT

## 2023-01-01 PROCEDURE — 85025 COMPLETE CBC W/AUTO DIFF WBC: CPT

## 2023-01-01 PROCEDURE — 82803 BLOOD GASES ANY COMBINATION: CPT

## 2023-01-01 PROCEDURE — 31575 DIAGNOSTIC LARYNGOSCOPY: CPT

## 2023-01-01 PROCEDURE — 85730 THROMBOPLASTIN TIME PARTIAL: CPT

## 2023-01-01 PROCEDURE — C1892: CPT

## 2023-01-01 PROCEDURE — 80053 COMPREHEN METABOLIC PANEL: CPT

## 2023-01-01 PROCEDURE — 70450 CT HEAD/BRAIN W/O DYE: CPT | Mod: 26

## 2023-01-01 PROCEDURE — 99205 OFFICE O/P NEW HI 60 MIN: CPT | Mod: 25

## 2023-01-01 PROCEDURE — 83550 IRON BINDING TEST: CPT

## 2023-01-01 PROCEDURE — C1788: CPT

## 2023-01-01 PROCEDURE — 96367 TX/PROPH/DG ADDL SEQ IV INF: CPT

## 2023-01-01 PROCEDURE — 99215 OFFICE O/P EST HI 40 MIN: CPT

## 2023-01-01 PROCEDURE — 74177 CT ABD & PELVIS W/CONTRAST: CPT

## 2023-01-01 PROCEDURE — 97535 SELF CARE MNGMENT TRAINING: CPT

## 2023-01-01 PROCEDURE — C8929: CPT

## 2023-01-01 PROCEDURE — 36430 TRANSFUSION BLD/BLD COMPNT: CPT

## 2023-01-01 PROCEDURE — 36561 INSERT TUNNELED CV CATH: CPT

## 2023-01-01 PROCEDURE — 83036 HEMOGLOBIN GLYCOSYLATED A1C: CPT

## 2023-01-01 PROCEDURE — 99498 ADVNCD CARE PLAN ADDL 30 MIN: CPT | Mod: 25

## 2023-01-01 PROCEDURE — 84100 ASSAY OF PHOSPHORUS: CPT

## 2023-01-01 PROCEDURE — 99213 OFFICE O/P EST LOW 20 MIN: CPT

## 2023-01-01 PROCEDURE — 49450 REPLACE G/C TUBE PERC: CPT

## 2023-01-01 PROCEDURE — 99358 PROLONG SERVICE W/O CONTACT: CPT

## 2023-01-01 PROCEDURE — 77336 RADIATION PHYSICS CONSULT: CPT

## 2023-01-01 PROCEDURE — 87186 SC STD MICRODIL/AGAR DIL: CPT

## 2023-01-01 PROCEDURE — 93010 ELECTROCARDIOGRAM REPORT: CPT

## 2023-01-01 PROCEDURE — 92526 ORAL FUNCTION THERAPY: CPT

## 2023-01-01 PROCEDURE — 69210 REMOVE IMPACTED EAR WAX UNI: CPT

## 2023-01-01 PROCEDURE — 77386: CPT

## 2023-01-01 PROCEDURE — 99152 MOD SED SAME PHYS/QHP 5/>YRS: CPT

## 2023-01-01 PROCEDURE — 99214 OFFICE O/P EST MOD 30 MIN: CPT

## 2023-01-01 PROCEDURE — 82565 ASSAY OF CREATININE: CPT

## 2023-01-01 PROCEDURE — 87040 BLOOD CULTURE FOR BACTERIA: CPT

## 2023-01-01 PROCEDURE — 84145 PROCALCITONIN (PCT): CPT

## 2023-01-01 PROCEDURE — 83540 ASSAY OF IRON: CPT

## 2023-01-01 PROCEDURE — 78815 PET IMAGE W/CT SKULL-THIGH: CPT

## 2023-01-01 PROCEDURE — 84466 ASSAY OF TRANSFERRIN: CPT

## 2023-01-01 PROCEDURE — C1894: CPT

## 2023-01-01 PROCEDURE — 86923 COMPATIBILITY TEST ELECTRIC: CPT

## 2023-01-01 PROCEDURE — 84540 ASSAY OF URINE/UREA-N: CPT

## 2023-01-01 PROCEDURE — 80202 ASSAY OF VANCOMYCIN: CPT

## 2023-01-01 PROCEDURE — 97116 GAIT TRAINING THERAPY: CPT

## 2023-01-01 PROCEDURE — 31541 LARYNSCOP W/TUMR EXC + SCOPE: CPT

## 2023-01-01 PROCEDURE — 49440 PLACE GASTROSTOMY TUBE PERC: CPT

## 2023-01-01 PROCEDURE — 99221 1ST HOSP IP/OBS SF/LOW 40: CPT

## 2023-01-01 PROCEDURE — C9399: CPT

## 2023-01-01 PROCEDURE — D7140: CPT

## 2023-01-01 PROCEDURE — 84300 ASSAY OF URINE SODIUM: CPT

## 2023-01-01 PROCEDURE — 99223 1ST HOSP IP/OBS HIGH 75: CPT | Mod: GV

## 2023-01-01 PROCEDURE — 31505 DIAGNOSTIC LARYNGOSCOPY: CPT

## 2023-01-01 PROCEDURE — 84443 ASSAY THYROID STIM HORMONE: CPT

## 2023-01-01 PROCEDURE — 96417 CHEMO IV INFUS EACH ADDL SEQ: CPT

## 2023-01-01 PROCEDURE — 76775 US EXAM ABDO BACK WALL LIM: CPT

## 2023-01-01 PROCEDURE — 99236 HOSP IP/OBS SAME DATE HI 85: CPT | Mod: 25

## 2023-01-01 PROCEDURE — 87389 HIV-1 AG W/HIV-1&-2 AB AG IA: CPT

## 2023-01-01 PROCEDURE — 97161 PT EVAL LOW COMPLEX 20 MIN: CPT

## 2023-01-01 PROCEDURE — 99153 MOD SED SAME PHYS/QHP EA: CPT

## 2023-01-01 PROCEDURE — 88331 PATH CONSLTJ SURG 1 BLK 1SPC: CPT

## 2023-01-01 PROCEDURE — 97162 PT EVAL MOD COMPLEX 30 MIN: CPT

## 2023-01-01 PROCEDURE — 70371 SPEECH EVALUATION COMPLEX: CPT | Mod: 59

## 2023-01-01 PROCEDURE — 99222 1ST HOSP IP/OBS MODERATE 55: CPT

## 2023-01-01 PROCEDURE — 82728 ASSAY OF FERRITIN: CPT

## 2023-01-01 PROCEDURE — 96365 THER/PROPH/DIAG IV INF INIT: CPT

## 2023-01-01 PROCEDURE — 88305 TISSUE EXAM BY PATHOLOGIST: CPT | Mod: 26

## 2023-01-01 PROCEDURE — 80048 BASIC METABOLIC PNL TOTAL CA: CPT

## 2023-01-01 PROCEDURE — 74177 CT ABD & PELVIS W/CONTRAST: CPT | Mod: 26

## 2023-01-01 PROCEDURE — 31579 LARYNGOSCOPY TELESCOPIC: CPT

## 2023-01-01 PROCEDURE — 92610 EVALUATE SWALLOWING FUNCTION: CPT

## 2023-01-01 PROCEDURE — 74019 RADEX ABDOMEN 2 VIEWS: CPT

## 2023-01-01 PROCEDURE — D0220: CPT

## 2023-01-01 PROCEDURE — 83605 ASSAY OF LACTIC ACID: CPT

## 2023-01-01 PROCEDURE — 99233 SBSQ HOSP IP/OBS HIGH 50: CPT | Mod: GV

## 2023-01-01 PROCEDURE — 99223 1ST HOSP IP/OBS HIGH 75: CPT | Mod: GC

## 2023-01-01 PROCEDURE — 99239 HOSP IP/OBS DSCHRG MGMT >30: CPT | Mod: GC

## 2023-01-01 PROCEDURE — 82962 GLUCOSE BLOOD TEST: CPT

## 2023-01-01 PROCEDURE — 70491 CT SOFT TISSUE NECK W/DYE: CPT | Mod: 26

## 2023-01-01 PROCEDURE — P9016: CPT

## 2023-01-01 PROCEDURE — 93005 ELECTROCARDIOGRAM TRACING: CPT

## 2023-01-01 PROCEDURE — 74230 X-RAY XM SWLNG FUNCJ C+: CPT

## 2023-01-01 PROCEDURE — 87070 CULTURE OTHR SPECIMN AEROBIC: CPT

## 2023-01-01 PROCEDURE — 97530 THERAPEUTIC ACTIVITIES: CPT

## 2023-01-01 PROCEDURE — 87086 URINE CULTURE/COLONY COUNT: CPT

## 2023-01-01 PROCEDURE — 97110 THERAPEUTIC EXERCISES: CPT

## 2023-01-01 PROCEDURE — 74230 X-RAY XM SWLNG FUNCJ C+: CPT | Mod: 26

## 2023-01-01 PROCEDURE — 76775 US EXAM ABDO BACK WALL LIM: CPT | Mod: 26

## 2023-01-01 PROCEDURE — 99497 ADVNCD CARE PLAN 30 MIN: CPT | Mod: 25

## 2023-01-01 PROCEDURE — 99221 1ST HOSP IP/OBS SF/LOW 40: CPT | Mod: 25

## 2023-01-01 PROCEDURE — 0225U NFCT DS DNA&RNA 21 SARSCOV2: CPT

## 2023-01-01 PROCEDURE — 99285 EMERGENCY DEPT VISIT HI MDM: CPT | Mod: 25

## 2023-01-01 PROCEDURE — 99214 OFFICE O/P EST MOD 30 MIN: CPT | Mod: 95

## 2023-01-01 PROCEDURE — 82570 ASSAY OF URINE CREATININE: CPT

## 2023-01-01 PROCEDURE — 88305 TISSUE EXAM BY PATHOLOGIST: CPT

## 2023-01-01 PROCEDURE — A9552: CPT

## 2023-01-01 PROCEDURE — 99205 OFFICE O/P NEW HI 60 MIN: CPT

## 2023-01-01 PROCEDURE — 78815 PET IMAGE W/CT SKULL-THIGH: CPT | Mod: 26,PI

## 2023-01-01 PROCEDURE — 82610 CYSTATIN C: CPT

## 2023-01-01 PROCEDURE — 83036 HEMOGLOBIN GLYCOSYLATED A1C: CPT | Mod: QW

## 2023-01-01 PROCEDURE — 99214 OFFICE O/P EST MOD 30 MIN: CPT | Mod: 25

## 2023-01-01 PROCEDURE — 70450 CT HEAD/BRAIN W/O DYE: CPT

## 2023-01-01 PROCEDURE — 95250 CONT GLUC MNTR PHYS/QHP EQP: CPT

## 2023-01-01 PROCEDURE — 87640 STAPH A DNA AMP PROBE: CPT

## 2023-01-01 PROCEDURE — 96360 HYDRATION IV INFUSION INIT: CPT

## 2023-01-01 PROCEDURE — 77014: CPT | Mod: 26

## 2023-01-01 PROCEDURE — 87641 MR-STAPH DNA AMP PROBE: CPT

## 2023-01-01 PROCEDURE — 92611 MOTION FLUOROSCOPY/SWALLOW: CPT

## 2023-01-01 DEVICE — FIBER FIBERLASE CO2: Type: IMPLANTABLE DEVICE | Status: FUNCTIONAL

## 2023-01-01 RX ORDER — QUETIAPINE FUMARATE 200 MG/1
25 TABLET, FILM COATED ORAL EVERY 24 HOURS
Refills: 0 | Status: DISCONTINUED | OUTPATIENT
Start: 2023-01-01 | End: 2023-01-01

## 2023-01-01 RX ORDER — RANOLAZINE 500 MG/1
500 TABLET, FILM COATED, EXTENDED RELEASE ORAL
Refills: 0 | Status: DISCONTINUED | OUTPATIENT
Start: 2023-01-01 | End: 2023-01-01

## 2023-01-01 RX ORDER — HUMAN INSULIN 100 [IU]/ML
15 INJECTION, SUSPENSION SUBCUTANEOUS ONCE
Refills: 0 | Status: COMPLETED | OUTPATIENT
Start: 2023-01-01 | End: 2023-01-01

## 2023-01-01 RX ORDER — VANCOMYCIN HCL 1 G
1000 VIAL (EA) INTRAVENOUS EVERY 24 HOURS
Refills: 0 | Status: DISCONTINUED | OUTPATIENT
Start: 2023-01-01 | End: 2023-01-01

## 2023-01-01 RX ORDER — ASPIRIN/CALCIUM CARB/MAGNESIUM 324 MG
81 TABLET ORAL EVERY 24 HOURS
Refills: 0 | Status: DISCONTINUED | OUTPATIENT
Start: 2023-01-01 | End: 2023-01-01

## 2023-01-01 RX ORDER — HEPARIN SODIUM 5000 [USP'U]/ML
5000 INJECTION INTRAVENOUS; SUBCUTANEOUS EVERY 8 HOURS
Refills: 0 | Status: DISCONTINUED | OUTPATIENT
Start: 2023-01-01 | End: 2023-01-01

## 2023-01-01 RX ORDER — MAGNESIUM SULFATE 500 MG/ML
2 VIAL (ML) INJECTION ONCE
Refills: 0 | Status: COMPLETED | OUTPATIENT
Start: 2023-01-01 | End: 2023-01-01

## 2023-01-01 RX ORDER — TIMOLOL 0.5 %
1 DROPS OPHTHALMIC (EYE) DAILY
Refills: 0 | Status: DISCONTINUED | OUTPATIENT
Start: 2023-01-01 | End: 2023-01-01

## 2023-01-01 RX ORDER — PANTOPRAZOLE SODIUM 20 MG/1
40 TABLET, DELAYED RELEASE ORAL EVERY 12 HOURS
Refills: 0 | Status: DISCONTINUED | OUTPATIENT
Start: 2023-01-01 | End: 2023-01-01

## 2023-01-01 RX ORDER — HYDROMORPHONE HYDROCHLORIDE 2 MG/ML
0.5 INJECTION INTRAMUSCULAR; INTRAVENOUS; SUBCUTANEOUS ONCE
Refills: 0 | Status: DISCONTINUED | OUTPATIENT
Start: 2023-01-01 | End: 2023-01-01

## 2023-01-01 RX ORDER — SODIUM CHLORIDE 9 MG/ML
1000 INJECTION, SOLUTION INTRAVENOUS
Refills: 0 | Status: DISCONTINUED | OUTPATIENT
Start: 2023-01-01 | End: 2023-01-01

## 2023-01-01 RX ORDER — SODIUM CHLORIDE 9 MG/ML
1000 INJECTION INTRAMUSCULAR; INTRAVENOUS; SUBCUTANEOUS ONCE
Refills: 0 | Status: COMPLETED | OUTPATIENT
Start: 2023-01-01 | End: 2023-01-01

## 2023-01-01 RX ORDER — HALOPERIDOL DECANOATE 100 MG/ML
1 INJECTION INTRAMUSCULAR ONCE
Refills: 0 | Status: COMPLETED | OUTPATIENT
Start: 2023-01-01 | End: 2023-01-01

## 2023-01-01 RX ORDER — DIPHENHYDRAMINE HCL 50 MG
25 CAPSULE ORAL ONCE
Refills: 0 | Status: COMPLETED | OUTPATIENT
Start: 2023-01-01 | End: 2023-01-01

## 2023-01-01 RX ORDER — DEXTROSE 50 % IN WATER 50 %
12.5 SYRINGE (ML) INTRAVENOUS ONCE
Refills: 0 | Status: COMPLETED | OUTPATIENT
Start: 2023-01-01 | End: 2023-01-01

## 2023-01-01 RX ORDER — QUETIAPINE FUMARATE 200 MG/1
1 TABLET, FILM COATED ORAL
Qty: 0 | Refills: 0 | DISCHARGE
Start: 2023-01-01

## 2023-01-01 RX ORDER — METOPROLOL TARTRATE 50 MG
1 TABLET ORAL
Qty: 30 | Refills: 3
Start: 2023-01-01 | End: 2024-03-29

## 2023-01-01 RX ORDER — INSULIN GLARGINE 100 [IU]/ML
10 INJECTION, SOLUTION SUBCUTANEOUS AT BEDTIME
Refills: 0 | Status: DISCONTINUED | OUTPATIENT
Start: 2023-01-01 | End: 2023-01-01

## 2023-01-01 RX ORDER — SODIUM,POTASSIUM PHOSPHATES 278-250MG
2 POWDER IN PACKET (EA) ORAL ONCE
Refills: 0 | Status: COMPLETED | OUTPATIENT
Start: 2023-01-01 | End: 2023-01-01

## 2023-01-01 RX ORDER — ASPIRIN/CALCIUM CARB/MAGNESIUM 324 MG
81 TABLET ORAL DAILY
Refills: 0 | Status: DISCONTINUED | OUTPATIENT
Start: 2023-01-01 | End: 2023-01-01

## 2023-01-01 RX ORDER — DEXTROSE 50 % IN WATER 50 %
15 SYRINGE (ML) INTRAVENOUS ONCE
Refills: 0 | Status: DISCONTINUED | OUTPATIENT
Start: 2023-01-01 | End: 2023-01-01

## 2023-01-01 RX ORDER — ATORVASTATIN CALCIUM 80 MG/1
80 TABLET, FILM COATED ORAL AT BEDTIME
Refills: 0 | Status: DISCONTINUED | OUTPATIENT
Start: 2023-01-01 | End: 2023-01-01

## 2023-01-01 RX ORDER — IPRATROPIUM/ALBUTEROL SULFATE 18-103MCG
3 AEROSOL WITH ADAPTER (GRAM) INHALATION
Qty: 0 | Refills: 0 | DISCHARGE
Start: 2023-01-01

## 2023-01-01 RX ORDER — IBUPROFEN 200 MG
400 TABLET ORAL EVERY 6 HOURS
Refills: 0 | Status: DISCONTINUED | OUTPATIENT
Start: 2023-01-01 | End: 2023-01-01

## 2023-01-01 RX ORDER — HYDROMORPHONE HYDROCHLORIDE 2 MG/ML
0.5 INJECTION INTRAMUSCULAR; INTRAVENOUS; SUBCUTANEOUS
Refills: 0 | Status: DISCONTINUED | OUTPATIENT
Start: 2023-01-01 | End: 2023-01-01

## 2023-01-01 RX ORDER — INSULIN GLARGINE 100 [IU]/ML
48 INJECTION, SOLUTION SUBCUTANEOUS EVERY MORNING
Refills: 0 | Status: DISCONTINUED | OUTPATIENT
Start: 2023-01-01 | End: 2023-01-01

## 2023-01-01 RX ORDER — INFLUENZA VIRUS VACCINE 15; 15; 15; 15 UG/.5ML; UG/.5ML; UG/.5ML; UG/.5ML
0.7 SUSPENSION INTRAMUSCULAR ONCE
Refills: 0 | Status: DISCONTINUED | OUTPATIENT
Start: 2023-01-01 | End: 2023-01-01

## 2023-01-01 RX ORDER — METOPROLOL TARTRATE 50 MG
5 TABLET ORAL ONCE
Refills: 0 | Status: COMPLETED | OUTPATIENT
Start: 2023-01-01 | End: 2023-01-01

## 2023-01-01 RX ORDER — HYALURONATE/ALLANTOIN/ALOE EXT
GEL (GRAM) TOPICAL
Qty: 1 | Refills: 1 | Status: ACTIVE | COMMUNITY
Start: 2023-01-01 | End: 1900-01-01

## 2023-01-01 RX ORDER — METOPROLOL TARTRATE 50 MG
1.25 TABLET ORAL EVERY 6 HOURS
Refills: 0 | Status: DISCONTINUED | OUTPATIENT
Start: 2023-01-01 | End: 2023-01-01

## 2023-01-01 RX ORDER — ONDANSETRON 8 MG/1
4 TABLET, FILM COATED ORAL EVERY 8 HOURS
Refills: 0 | Status: DISCONTINUED | OUTPATIENT
Start: 2023-01-01 | End: 2023-01-01

## 2023-01-01 RX ORDER — SENNA PLUS 8.6 MG/1
2 TABLET ORAL AT BEDTIME
Refills: 0 | Status: DISCONTINUED | OUTPATIENT
Start: 2023-01-01 | End: 2023-01-01

## 2023-01-01 RX ORDER — INSULIN LISPRO 100/ML
VIAL (ML) SUBCUTANEOUS EVERY 6 HOURS
Refills: 0 | Status: DISCONTINUED | OUTPATIENT
Start: 2023-01-01 | End: 2023-01-01

## 2023-01-01 RX ORDER — ATROPINE SULFATE 1 %
0 DROPS OPHTHALMIC (EYE)
Qty: 0 | Refills: 0 | DISCHARGE
Start: 2023-01-01

## 2023-01-01 RX ORDER — ATORVASTATIN CALCIUM 40 MG/1
40 TABLET, FILM COATED ORAL
Qty: 30 | Refills: 0 | Status: ACTIVE | COMMUNITY

## 2023-01-01 RX ORDER — DEXTROSE 50 % IN WATER 50 %
25 SYRINGE (ML) INTRAVENOUS ONCE
Refills: 0 | Status: DISCONTINUED | OUTPATIENT
Start: 2023-01-01 | End: 2023-01-01

## 2023-01-01 RX ORDER — OLANZAPINE 15 MG/1
2.5 TABLET, FILM COATED ORAL ONCE
Refills: 0 | Status: COMPLETED | OUTPATIENT
Start: 2023-01-01 | End: 2023-01-01

## 2023-01-01 RX ORDER — ACETAMINOPHEN 500 MG
650 TABLET ORAL EVERY 6 HOURS
Refills: 0 | Status: DISCONTINUED | OUTPATIENT
Start: 2023-01-01 | End: 2023-01-01

## 2023-01-01 RX ORDER — FAMOTIDINE 10 MG/ML
20 INJECTION INTRAVENOUS DAILY
Refills: 0 | Status: DISCONTINUED | OUTPATIENT
Start: 2023-01-01 | End: 2023-01-01

## 2023-01-01 RX ORDER — DEXAMETHASONE 0.5 MG/5ML
10 ELIXIR ORAL ONCE
Refills: 0 | Status: COMPLETED | OUTPATIENT
Start: 2023-01-01 | End: 2023-01-01

## 2023-01-01 RX ORDER — HYDROMORPHONE HYDROCHLORIDE 2 MG/ML
1 INJECTION INTRAMUSCULAR; INTRAVENOUS; SUBCUTANEOUS EVERY 6 HOURS
Refills: 0 | Status: DISCONTINUED | OUTPATIENT
Start: 2023-01-01 | End: 2023-01-01

## 2023-01-01 RX ORDER — PIPERACILLIN AND TAZOBACTAM 4; .5 G/20ML; G/20ML
3.38 INJECTION, POWDER, LYOPHILIZED, FOR SOLUTION INTRAVENOUS ONCE
Refills: 0 | Status: COMPLETED | OUTPATIENT
Start: 2023-01-01 | End: 2023-01-01

## 2023-01-01 RX ORDER — OLANZAPINE 15 MG/1
5 TABLET, FILM COATED ORAL AT BEDTIME
Refills: 0 | Status: DISCONTINUED | OUTPATIENT
Start: 2023-01-01 | End: 2023-01-01

## 2023-01-01 RX ORDER — PIPERACILLIN AND TAZOBACTAM 4; .5 G/20ML; G/20ML
3.38 INJECTION, POWDER, LYOPHILIZED, FOR SOLUTION INTRAVENOUS EVERY 8 HOURS
Refills: 0 | Status: COMPLETED | OUTPATIENT
Start: 2023-01-01 | End: 2023-01-01

## 2023-01-01 RX ORDER — FENTANYL CITRATE 50 UG/ML
1 INJECTION INTRAVENOUS
Refills: 0 | Status: CANCELLED | OUTPATIENT
Start: 2023-01-01 | End: 2023-01-01

## 2023-01-01 RX ORDER — INSULIN GLARGINE 100 [IU]/ML
20 INJECTION, SOLUTION SUBCUTANEOUS AT BEDTIME
Refills: 0 | Status: DISCONTINUED | OUTPATIENT
Start: 2023-01-01 | End: 2023-01-01

## 2023-01-01 RX ORDER — QUETIAPINE FUMARATE 200 MG/1
25 TABLET, FILM COATED ORAL AT BEDTIME
Refills: 0 | Status: DISCONTINUED | OUTPATIENT
Start: 2023-01-01 | End: 2023-01-01

## 2023-01-01 RX ORDER — MORPHINE SULFATE 50 MG/1
0.25 CAPSULE, EXTENDED RELEASE ORAL
Qty: 10 | Refills: 0
Start: 2023-01-01 | End: 2023-01-01

## 2023-01-01 RX ORDER — DIPHENHYDRAMINE HYDROCHLORIDE AND LIDOCAINE HYDROCHLORIDE AND ALUMINUM HYDROXIDE AND MAGNESIUM HYDRO
10 KIT DAILY
Refills: 0 | Status: DISCONTINUED | OUTPATIENT
Start: 2023-01-01 | End: 2023-01-01

## 2023-01-01 RX ORDER — IPRATROPIUM/ALBUTEROL SULFATE 18-103MCG
3 AEROSOL WITH ADAPTER (GRAM) INHALATION EVERY 6 HOURS
Refills: 0 | Status: DISCONTINUED | OUTPATIENT
Start: 2023-01-01 | End: 2023-01-01

## 2023-01-01 RX ORDER — HYDROMORPHONE HYDROCHLORIDE 2 MG/ML
0.5 INJECTION INTRAMUSCULAR; INTRAVENOUS; SUBCUTANEOUS EVERY 8 HOURS
Refills: 0 | Status: DISCONTINUED | OUTPATIENT
Start: 2023-01-01 | End: 2023-01-01

## 2023-01-01 RX ORDER — INSULIN GLARGINE 100 [IU]/ML
38 INJECTION, SOLUTION SUBCUTANEOUS AT BEDTIME
Refills: 0 | Status: DISCONTINUED | OUTPATIENT
Start: 2023-01-01 | End: 2023-01-01

## 2023-01-01 RX ORDER — ACETAMINOPHEN 500 MG
1000 TABLET ORAL ONCE
Refills: 0 | Status: COMPLETED | OUTPATIENT
Start: 2023-01-01 | End: 2023-01-01

## 2023-01-01 RX ORDER — MAGNESIUM SULFATE 500 MG/ML
4 VIAL (ML) INJECTION ONCE
Refills: 0 | Status: COMPLETED | OUTPATIENT
Start: 2023-01-01 | End: 2023-01-01

## 2023-01-01 RX ORDER — QUETIAPINE FUMARATE 200 MG/1
50 TABLET, FILM COATED ORAL AT BEDTIME
Refills: 0 | Status: DISCONTINUED | OUTPATIENT
Start: 2023-01-01 | End: 2023-01-01

## 2023-01-01 RX ORDER — POTASSIUM PHOSPHATE, MONOBASIC POTASSIUM PHOSPHATE, DIBASIC 236; 224 MG/ML; MG/ML
15 INJECTION, SOLUTION INTRAVENOUS ONCE
Refills: 0 | Status: COMPLETED | OUTPATIENT
Start: 2023-01-01 | End: 2023-01-01

## 2023-01-01 RX ORDER — METOPROLOL TARTRATE 50 MG
25 TABLET ORAL DAILY
Refills: 0 | Status: DISCONTINUED | OUTPATIENT
Start: 2023-01-01 | End: 2023-01-01

## 2023-01-01 RX ORDER — IPRATROPIUM/ALBUTEROL SULFATE 18-103MCG
3 AEROSOL WITH ADAPTER (GRAM) INHALATION EVERY 4 HOURS
Refills: 0 | Status: DISCONTINUED | OUTPATIENT
Start: 2023-01-01 | End: 2023-01-01

## 2023-01-01 RX ORDER — ACETAMINOPHEN 500 MG
2 TABLET ORAL
Qty: 0 | Refills: 0 | DISCHARGE
Start: 2023-01-01

## 2023-01-01 RX ORDER — PANTOPRAZOLE SODIUM 20 MG/1
1 TABLET, DELAYED RELEASE ORAL
Refills: 0 | DISCHARGE

## 2023-01-01 RX ORDER — CARBOPLATIN 50 MG
100 VIAL (EA) INTRAVENOUS ONCE
Refills: 0 | Status: COMPLETED | OUTPATIENT
Start: 2023-01-01 | End: 2023-01-01

## 2023-01-01 RX ORDER — METOPROLOL TARTRATE 50 MG
12.5 TABLET ORAL EVERY 12 HOURS
Refills: 0 | Status: DISCONTINUED | OUTPATIENT
Start: 2023-01-01 | End: 2023-01-01

## 2023-01-01 RX ORDER — PANTOPRAZOLE SODIUM 20 MG/1
40 TABLET, DELAYED RELEASE ORAL EVERY 24 HOURS
Refills: 0 | Status: DISCONTINUED | OUTPATIENT
Start: 2023-01-01 | End: 2023-01-01

## 2023-01-01 RX ORDER — SODIUM CHLORIDE 9 MG/ML
500 INJECTION, SOLUTION INTRAVENOUS
Refills: 0 | Status: DISCONTINUED | OUTPATIENT
Start: 2023-01-01 | End: 2023-01-01

## 2023-01-01 RX ORDER — IBUPROFEN 200 MG
1 TABLET ORAL
Qty: 0 | Refills: 0 | DISCHARGE
Start: 2023-01-01

## 2023-01-01 RX ORDER — ONDANSETRON 8 MG/1
16 TABLET, FILM COATED ORAL ONCE
Refills: 0 | Status: DISCONTINUED | OUTPATIENT
Start: 2023-01-01 | End: 2023-01-01

## 2023-01-01 RX ORDER — DEXTROSE 50 % IN WATER 50 %
12.5 SYRINGE (ML) INTRAVENOUS ONCE
Refills: 0 | Status: DISCONTINUED | OUTPATIENT
Start: 2023-01-01 | End: 2023-01-01

## 2023-01-01 RX ORDER — GLUCAGON INJECTION, SOLUTION 0.5 MG/.1ML
1 INJECTION, SOLUTION SUBCUTANEOUS ONCE
Refills: 0 | Status: DISCONTINUED | OUTPATIENT
Start: 2023-01-01 | End: 2023-01-01

## 2023-01-01 RX ORDER — ALBUTEROL 90 UG/1
2 AEROSOL, METERED ORAL EVERY 6 HOURS
Refills: 0 | Status: DISCONTINUED | OUTPATIENT
Start: 2023-01-01 | End: 2023-01-01

## 2023-01-01 RX ORDER — DEXTROSE 4 G
4-6 TABLET,CHEWABLE ORAL
Qty: 1 | Refills: 2 | Status: ACTIVE | COMMUNITY
Start: 2023-01-01 | End: 1900-01-01

## 2023-01-01 RX ORDER — OLANZAPINE 15 MG/1
5 TABLET, FILM COATED ORAL ONCE
Refills: 0 | Status: COMPLETED | OUTPATIENT
Start: 2023-01-01 | End: 2023-01-01

## 2023-01-01 RX ORDER — PIPERACILLIN AND TAZOBACTAM 4; .5 G/20ML; G/20ML
3.38 INJECTION, POWDER, LYOPHILIZED, FOR SOLUTION INTRAVENOUS EVERY 8 HOURS
Refills: 0 | Status: DISCONTINUED | OUTPATIENT
Start: 2023-01-01 | End: 2023-01-01

## 2023-01-01 RX ORDER — INSULIN LISPRO 100/ML
VIAL (ML) SUBCUTANEOUS AT BEDTIME
Refills: 0 | Status: DISCONTINUED | OUTPATIENT
Start: 2023-01-01 | End: 2023-01-01

## 2023-01-01 RX ORDER — FUROSEMIDE 40 MG
20 TABLET ORAL ONCE
Refills: 0 | Status: COMPLETED | OUTPATIENT
Start: 2023-01-01 | End: 2023-01-01

## 2023-01-01 RX ORDER — PALONOSETRON HYDROCHLORIDE 0.25 MG/5ML
0.25 INJECTION, SOLUTION INTRAVENOUS ONCE
Refills: 0 | Status: COMPLETED | OUTPATIENT
Start: 2023-01-01 | End: 2023-01-01

## 2023-01-01 RX ORDER — INFLUENZA VIRUS VACCINE 15; 15; 15; 15 UG/.5ML; UG/.5ML; UG/.5ML; UG/.5ML
0.7 SUSPENSION INTRAMUSCULAR ONCE
Refills: 0 | Status: COMPLETED | OUTPATIENT
Start: 2023-01-01 | End: 2023-01-01

## 2023-01-01 RX ORDER — VANCOMYCIN HCL 1 G
VIAL (EA) INTRAVENOUS
Refills: 0 | Status: DISCONTINUED | OUTPATIENT
Start: 2023-01-01 | End: 2023-01-01

## 2023-01-01 RX ORDER — FOSAPREPITANT DIMEGLUMINE 150 MG/5ML
150 INJECTION, POWDER, LYOPHILIZED, FOR SOLUTION INTRAVENOUS ONCE
Refills: 0 | Status: COMPLETED | OUTPATIENT
Start: 2023-01-01 | End: 2023-01-01

## 2023-01-01 RX ORDER — POLYETHYLENE GLYCOL 3350 17 G/17G
17 POWDER, FOR SOLUTION ORAL EVERY 12 HOURS
Refills: 0 | Status: DISCONTINUED | OUTPATIENT
Start: 2023-01-01 | End: 2023-01-01

## 2023-01-01 RX ORDER — LATANOPROST 0.05 MG/ML
1 SOLUTION/ DROPS OPHTHALMIC; TOPICAL AT BEDTIME
Refills: 0 | Status: DISCONTINUED | OUTPATIENT
Start: 2023-01-01 | End: 2023-01-01

## 2023-01-01 RX ORDER — MUPIROCIN 20 MG/G
2 OINTMENT TOPICAL 3 TIMES DAILY
Qty: 1 | Refills: 0 | Status: ACTIVE | COMMUNITY
Start: 2023-01-01 | End: 1900-01-01

## 2023-01-01 RX ORDER — HYDRALAZINE HCL 50 MG
10 TABLET ORAL ONCE
Refills: 0 | Status: COMPLETED | OUTPATIENT
Start: 2023-01-01 | End: 2023-01-01

## 2023-01-01 RX ORDER — INSULIN LISPRO 100/ML
7 VIAL (ML) SUBCUTANEOUS
Refills: 0 | Status: DISCONTINUED | OUTPATIENT
Start: 2023-01-01 | End: 2023-01-01

## 2023-01-01 RX ORDER — MORPHINE SULFATE 50 MG/1
5 CAPSULE, EXTENDED RELEASE ORAL EVERY 4 HOURS
Refills: 0 | Status: DISCONTINUED | OUTPATIENT
Start: 2023-01-01 | End: 2023-01-01

## 2023-01-01 RX ORDER — DEXTROSE 50 % IN WATER 50 %
50 SYRINGE (ML) INTRAVENOUS ONCE
Refills: 0 | Status: COMPLETED | OUTPATIENT
Start: 2023-01-01 | End: 2023-01-01

## 2023-01-01 RX ORDER — INSULIN GLARGINE 100 [IU]/ML
38 INJECTION, SOLUTION SUBCUTANEOUS ONCE
Refills: 0 | Status: DISCONTINUED | OUTPATIENT
Start: 2023-01-01 | End: 2023-01-01

## 2023-01-01 RX ORDER — POTASSIUM PHOSPHATE, MONOBASIC POTASSIUM PHOSPHATE, DIBASIC 236; 224 MG/ML; MG/ML
30 INJECTION, SOLUTION INTRAVENOUS ONCE
Refills: 0 | Status: COMPLETED | OUTPATIENT
Start: 2023-01-01 | End: 2023-01-01

## 2023-01-01 RX ORDER — ATROPINE SULFATE 1 %
1 DROPS OPHTHALMIC (EYE) EVERY 8 HOURS
Refills: 0 | Status: DISCONTINUED | OUTPATIENT
Start: 2023-01-01 | End: 2023-01-01

## 2023-01-01 RX ORDER — SODIUM CHLORIDE 9 MG/ML
50 INJECTION, SOLUTION INTRAVENOUS ONCE
Refills: 0 | Status: DISCONTINUED | OUTPATIENT
Start: 2023-01-01 | End: 2023-01-01

## 2023-01-01 RX ORDER — DIPHENHYDRAMINE HYDROCHLORIDE AND LIDOCAINE HYDROCHLORIDE AND ALUMINUM HYDROXIDE AND MAGNESIUM HYDRO
KIT
Qty: 1 | Refills: 2 | Status: ACTIVE | COMMUNITY
Start: 2023-01-01

## 2023-01-01 RX ORDER — INSULIN LISPRO 100/ML
10 VIAL (ML) SUBCUTANEOUS ONCE
Refills: 0 | Status: COMPLETED | OUTPATIENT
Start: 2023-01-01 | End: 2023-01-01

## 2023-01-01 RX ORDER — ONDANSETRON 8 MG/1
16 TABLET, FILM COATED ORAL ONCE
Refills: 0 | Status: COMPLETED | OUTPATIENT
Start: 2023-01-01 | End: 2023-01-01

## 2023-01-01 RX ORDER — TIMOLOL 0.5 %
1 DROPS OPHTHALMIC (EYE)
Qty: 0 | Refills: 0 | DISCHARGE

## 2023-01-01 RX ORDER — INSULIN LISPRO 100/ML
VIAL (ML) SUBCUTANEOUS
Refills: 0 | Status: DISCONTINUED | OUTPATIENT
Start: 2023-01-01 | End: 2023-01-01

## 2023-01-01 RX ORDER — NYSTATIN 100000 [USP'U]/ML
100000 SUSPENSION ORAL 4 TIMES DAILY
Qty: 140 | Refills: 0 | Status: DISCONTINUED | COMMUNITY
Start: 2023-01-01 | End: 2023-01-01

## 2023-01-01 RX ORDER — FAMOTIDINE 10 MG/ML
20 INJECTION INTRAVENOUS ONCE
Refills: 0 | Status: COMPLETED | OUTPATIENT
Start: 2023-01-01 | End: 2023-01-01

## 2023-01-01 RX ORDER — SODIUM CHLORIDE 9 MG/ML
1000 INJECTION INTRAMUSCULAR; INTRAVENOUS; SUBCUTANEOUS
Refills: 0 | Status: DISCONTINUED | OUTPATIENT
Start: 2023-01-01 | End: 2023-01-01

## 2023-01-01 RX ORDER — DIPHENHYDRAMINE HYDROCHLORIDE AND LIDOCAINE HYDROCHLORIDE AND ALUMINUM HYDROXIDE AND MAGNESIUM HYDRO
KIT
Qty: 1 | Refills: 2 | Status: ACTIVE | COMMUNITY
Start: 2023-01-01 | End: 1900-01-01

## 2023-01-01 RX ORDER — SODIUM CHLORIDE 9 MG/ML
500 INJECTION INTRAMUSCULAR; INTRAVENOUS; SUBCUTANEOUS ONCE
Refills: 0 | Status: COMPLETED | OUTPATIENT
Start: 2023-01-01 | End: 2023-01-01

## 2023-01-01 RX ORDER — DEXAMETHASONE 0.5 MG/5ML
12 ELIXIR ORAL ONCE
Refills: 0 | Status: DISCONTINUED | OUTPATIENT
Start: 2023-01-01 | End: 2023-01-01

## 2023-01-01 RX ORDER — HYDROMORPHONE HYDROCHLORIDE 2 MG/ML
1 INJECTION INTRAMUSCULAR; INTRAVENOUS; SUBCUTANEOUS
Refills: 0 | Status: DISCONTINUED | OUTPATIENT
Start: 2023-01-01 | End: 2023-01-01

## 2023-01-01 RX ORDER — GUAIFENESIN AND DEXTROMETHORPHAN HYDROBROMIDE 1200; 60 MG/1; MG/1
60-1200 TABLET, EXTENDED RELEASE ORAL
Qty: 1 | Refills: 2 | Status: ACTIVE | COMMUNITY
Start: 2023-01-01 | End: 1900-01-01

## 2023-01-01 RX ORDER — POTASSIUM CHLORIDE 20 MEQ
40 PACKET (EA) ORAL ONCE
Refills: 0 | Status: COMPLETED | OUTPATIENT
Start: 2023-01-01 | End: 2023-01-01

## 2023-01-01 RX ORDER — INSULIN LISPRO 100/ML
7 VIAL (ML) SUBCUTANEOUS
Qty: 0 | Refills: 0 | DISCHARGE

## 2023-01-01 RX ORDER — HYDROMORPHONE HYDROCHLORIDE 2 MG/ML
1 INJECTION INTRAMUSCULAR; INTRAVENOUS; SUBCUTANEOUS EVERY 4 HOURS
Refills: 0 | Status: DISCONTINUED | OUTPATIENT
Start: 2023-01-01 | End: 2023-01-01

## 2023-01-01 RX ORDER — SILVER SULFADIAZINE 10 MG/G
1 CREAM TOPICAL TWICE DAILY
Qty: 1 | Refills: 1 | Status: ACTIVE | COMMUNITY
Start: 2023-01-01 | End: 1900-01-01

## 2023-01-01 RX ORDER — PACLITAXEL 6 MG/ML
80 INJECTION, SOLUTION, CONCENTRATE INTRAVENOUS ONCE
Refills: 0 | Status: COMPLETED | OUTPATIENT
Start: 2023-01-01 | End: 2023-01-01

## 2023-01-01 RX ORDER — POLYETHYLENE GLYCOL 3350 17 G/17G
17 POWDER, FOR SOLUTION ORAL
Qty: 0 | Refills: 0 | DISCHARGE
Start: 2023-01-01

## 2023-01-01 RX ORDER — VANCOMYCIN HCL 1 G
1000 VIAL (EA) INTRAVENOUS EVERY 12 HOURS
Refills: 0 | Status: DISCONTINUED | OUTPATIENT
Start: 2023-01-01 | End: 2023-01-01

## 2023-01-01 RX ORDER — METOPROLOL TARTRATE 50 MG
2.5 TABLET ORAL EVERY 6 HOURS
Refills: 0 | Status: DISCONTINUED | OUTPATIENT
Start: 2023-01-01 | End: 2023-01-01

## 2023-01-01 RX ORDER — MORPHINE SULFATE 50 MG/1
4 CAPSULE, EXTENDED RELEASE ORAL ONCE
Refills: 0 | Status: DISCONTINUED | OUTPATIENT
Start: 2023-01-01 | End: 2023-01-01

## 2023-01-01 RX ORDER — DIPHENHYDRAMINE HYDROCHLORIDE AND LIDOCAINE HYDROCHLORIDE AND ALUMINUM HYDROXIDE AND MAGNESIUM HYDRO
4 KIT EVERY 6 HOURS
Refills: 0 | Status: DISCONTINUED | OUTPATIENT
Start: 2023-01-01 | End: 2023-01-01

## 2023-01-01 RX ORDER — FAMOTIDINE 10 MG/ML
20 INJECTION INTRAVENOUS ONCE
Refills: 0 | Status: DISCONTINUED | OUTPATIENT
Start: 2023-01-01 | End: 2023-01-01

## 2023-01-01 RX ORDER — BRINZOLAMIDE/BRIMONIDINE TARTRATE 10; 2 MG/ML; MG/ML
1 SUSPENSION/ DROPS OPHTHALMIC
Qty: 0 | Refills: 0 | DISCHARGE

## 2023-01-01 RX ORDER — OLANZAPINE 15 MG/1
2.5 TABLET, FILM COATED ORAL EVERY 6 HOURS
Refills: 0 | Status: DISCONTINUED | OUTPATIENT
Start: 2023-01-01 | End: 2023-01-01

## 2023-01-01 RX ORDER — CISPLATIN 1 MG/ML
55 INJECTION, SOLUTION INTRAVENOUS ONCE
Refills: 0 | Status: DISCONTINUED | OUTPATIENT
Start: 2023-01-01 | End: 2023-01-01

## 2023-01-01 RX ORDER — TAMSULOSIN HYDROCHLORIDE 0.4 MG/1
0.4 CAPSULE ORAL AT BEDTIME
Refills: 0 | Status: DISCONTINUED | OUTPATIENT
Start: 2023-01-01 | End: 2023-01-01

## 2023-01-01 RX ORDER — PIPERACILLIN AND TAZOBACTAM 4; .5 G/20ML; G/20ML
3.38 INJECTION, POWDER, LYOPHILIZED, FOR SOLUTION INTRAVENOUS ONCE
Refills: 0 | Status: DISCONTINUED | OUTPATIENT
Start: 2023-01-01 | End: 2023-01-01

## 2023-01-01 RX ORDER — HALOPERIDOL DECANOATE 100 MG/ML
5 INJECTION INTRAMUSCULAR ONCE
Refills: 0 | Status: COMPLETED | OUTPATIENT
Start: 2023-01-01 | End: 2023-01-01

## 2023-01-01 RX ORDER — SODIUM CHLORIDE 9 MG/ML
4 INJECTION INTRAMUSCULAR; INTRAVENOUS; SUBCUTANEOUS EVERY 4 HOURS
Refills: 0 | Status: DISCONTINUED | OUTPATIENT
Start: 2023-01-01 | End: 2023-01-01

## 2023-01-01 RX ORDER — SODIUM CHLORIDE 9 MG/ML
1000 INJECTION, SOLUTION INTRAVENOUS
Refills: 0 | Status: COMPLETED | OUTPATIENT
Start: 2023-01-01 | End: 2023-01-01

## 2023-01-01 RX ORDER — CISPLATIN 1 MG/ML
74 INJECTION, SOLUTION INTRAVENOUS ONCE
Refills: 0 | Status: DISCONTINUED | OUTPATIENT
Start: 2023-01-01 | End: 2023-01-01

## 2023-01-01 RX ORDER — LANCETS 33 GAUGE
EACH MISCELLANEOUS
Refills: 0 | Status: ACTIVE | COMMUNITY

## 2023-01-01 RX ORDER — LOPERAMIDE HCL 2 MG
2 TABLET ORAL EVERY 12 HOURS
Refills: 0 | Status: DISCONTINUED | OUTPATIENT
Start: 2023-01-01 | End: 2023-01-01

## 2023-01-01 RX ORDER — HYDROMORPHONE HYDROCHLORIDE 2 MG/ML
1 INJECTION INTRAMUSCULAR; INTRAVENOUS; SUBCUTANEOUS ONCE
Refills: 0 | Status: DISCONTINUED | OUTPATIENT
Start: 2023-01-01 | End: 2023-01-01

## 2023-01-01 RX ORDER — ISOSORBIDE MONONITRATE 60 MG/1
30 TABLET, EXTENDED RELEASE ORAL DAILY
Refills: 0 | Status: DISCONTINUED | OUTPATIENT
Start: 2023-01-01 | End: 2023-01-01

## 2023-01-01 RX ORDER — OXYCODONE 5 MG/1
5 TABLET ORAL
Qty: 30 | Refills: 0 | Status: ACTIVE | COMMUNITY
Start: 2023-01-01 | End: 1900-01-01

## 2023-01-01 RX ORDER — OLANZAPINE 15 MG/1
2.5 TABLET, FILM COATED ORAL ONCE
Refills: 0 | Status: DISCONTINUED | OUTPATIENT
Start: 2023-01-01 | End: 2023-01-01

## 2023-01-01 RX ORDER — HALOPERIDOL DECANOATE 100 MG/ML
5 INJECTION INTRAMUSCULAR ONCE
Refills: 0 | Status: DISCONTINUED | OUTPATIENT
Start: 2023-01-01 | End: 2023-01-01

## 2023-01-01 RX ORDER — INSULIN GLARGINE 100 [IU]/ML
24 INJECTION, SOLUTION SUBCUTANEOUS EVERY MORNING
Refills: 0 | Status: COMPLETED | OUTPATIENT
Start: 2023-01-01 | End: 2023-01-01

## 2023-01-01 RX ORDER — SCOPALAMINE 1 MG/3D
1 PATCH, EXTENDED RELEASE TRANSDERMAL
Refills: 0 | Status: DISCONTINUED | OUTPATIENT
Start: 2023-01-01 | End: 2023-01-01

## 2023-01-01 RX ORDER — METOPROLOL TARTRATE 50 MG
12.5 TABLET ORAL DAILY
Refills: 0 | Status: DISCONTINUED | OUTPATIENT
Start: 2023-01-01 | End: 2023-01-01

## 2023-01-01 RX ORDER — PREGABALIN 225 MG/1
1000 CAPSULE ORAL EVERY 24 HOURS
Refills: 0 | Status: COMPLETED | OUTPATIENT
Start: 2023-01-01 | End: 2023-01-01

## 2023-01-01 RX ORDER — HYDRALAZINE HCL 50 MG
10 TABLET ORAL EVERY 4 HOURS
Refills: 0 | Status: DISCONTINUED | OUTPATIENT
Start: 2023-01-01 | End: 2023-01-01

## 2023-01-01 RX ORDER — LANOLIN ALCOHOL/MO/W.PET/CERES
3 CREAM (GRAM) TOPICAL AT BEDTIME
Refills: 0 | Status: DISCONTINUED | OUTPATIENT
Start: 2023-01-01 | End: 2023-01-01

## 2023-01-01 RX ORDER — INSULIN GLARGINE 100 [IU]/ML
15 INJECTION, SOLUTION SUBCUTANEOUS AT BEDTIME
Refills: 0 | Status: DISCONTINUED | OUTPATIENT
Start: 2023-01-01 | End: 2023-01-01

## 2023-01-01 RX ORDER — ENOXAPARIN SODIUM 100 MG/ML
40 INJECTION SUBCUTANEOUS EVERY 24 HOURS
Refills: 0 | Status: DISCONTINUED | OUTPATIENT
Start: 2023-01-01 | End: 2023-01-01

## 2023-01-01 RX ORDER — HYDROMORPHONE HYDROCHLORIDE 2 MG/ML
0.5 INJECTION INTRAMUSCULAR; INTRAVENOUS; SUBCUTANEOUS EVERY 4 HOURS
Refills: 0 | Status: DISCONTINUED | OUTPATIENT
Start: 2023-01-01 | End: 2023-01-01

## 2023-01-01 RX ORDER — INSULIN HUMAN 100 [IU]/ML
2 INJECTION, SOLUTION SUBCUTANEOUS EVERY 6 HOURS
Refills: 0 | Status: DISCONTINUED | OUTPATIENT
Start: 2023-01-01 | End: 2023-01-01

## 2023-01-01 RX ORDER — OXYCODONE HYDROCHLORIDE 5 MG/1
5 TABLET ORAL EVERY 6 HOURS
Refills: 0 | Status: DISCONTINUED | OUTPATIENT
Start: 2023-01-01 | End: 2023-01-01

## 2023-01-01 RX ORDER — FLUCONAZOLE 150 MG/1
400 TABLET ORAL EVERY 24 HOURS
Refills: 0 | Status: DISCONTINUED | OUTPATIENT
Start: 2023-01-01 | End: 2023-01-01

## 2023-01-01 RX ORDER — INSULIN LISPRO 100/ML
6 VIAL (ML) SUBCUTANEOUS
Refills: 0 | Status: DISCONTINUED | OUTPATIENT
Start: 2023-01-01 | End: 2023-01-01

## 2023-01-01 RX ORDER — POLYETHYLENE GLYCOL 3350 17 G/17G
17 POWDER, FOR SOLUTION ORAL DAILY
Refills: 0 | Status: DISCONTINUED | OUTPATIENT
Start: 2023-01-01 | End: 2023-01-01

## 2023-01-01 RX ORDER — MORPHINE SULFATE 50 MG/1
10 CAPSULE, EXTENDED RELEASE ORAL EVERY 4 HOURS
Refills: 0 | Status: DISCONTINUED | OUTPATIENT
Start: 2023-01-01 | End: 2023-01-01

## 2023-01-01 RX ORDER — FENTANYL CITRATE 50 UG/ML
25 INJECTION INTRAVENOUS
Qty: 1 | Refills: 0
Start: 2023-01-01 | End: 2023-01-01

## 2023-01-01 RX ORDER — NYSTATIN 100000 [USP'U]/ML
100000 SUSPENSION ORAL 4 TIMES DAILY
Qty: 140 | Refills: 0 | Status: ACTIVE | COMMUNITY
Start: 2023-01-01 | End: 1900-01-01

## 2023-01-01 RX ORDER — ROBINUL 0.2 MG/ML
0.4 INJECTION INTRAMUSCULAR; INTRAVENOUS
Refills: 0 | Status: DISCONTINUED | OUTPATIENT
Start: 2023-01-01 | End: 2023-01-01

## 2023-01-01 RX ORDER — MIRABEGRON 50 MG/1
1 TABLET, EXTENDED RELEASE ORAL
Refills: 0 | DISCHARGE

## 2023-01-01 RX ORDER — LIDOCAINE HYDROCHLORIDE 20 MG/ML
2 SOLUTION ORAL; TOPICAL
Qty: 400 | Refills: 2 | Status: ACTIVE | COMMUNITY
Start: 2023-01-01 | End: 1900-01-01

## 2023-01-01 RX ORDER — POTASSIUM CHLORIDE 20 MEQ
10 PACKET (EA) ORAL
Refills: 0 | Status: COMPLETED | OUTPATIENT
Start: 2023-01-01 | End: 2023-01-01

## 2023-01-01 RX ORDER — ENOXAPARIN SODIUM 100 MG/ML
40 INJECTION SUBCUTANEOUS ONCE
Refills: 0 | Status: COMPLETED | OUTPATIENT
Start: 2023-01-01 | End: 2023-01-01

## 2023-01-01 RX ORDER — FOSAPREPITANT DIMEGLUMINE 150 MG/5ML
150 INJECTION, POWDER, LYOPHILIZED, FOR SOLUTION INTRAVENOUS ONCE
Refills: 0 | Status: DISCONTINUED | OUTPATIENT
Start: 2023-01-01 | End: 2023-01-01

## 2023-01-01 RX ORDER — MORPHINE SULFATE 50 MG/1
2.5 CAPSULE, EXTENDED RELEASE ORAL
Qty: 75 | Refills: 0
Start: 2023-01-01 | End: 2023-01-01

## 2023-01-01 RX ORDER — INSULIN GLARGINE 100 [IU]/ML
45 INJECTION, SOLUTION SUBCUTANEOUS EVERY MORNING
Refills: 0 | Status: DISCONTINUED | OUTPATIENT
Start: 2023-01-01 | End: 2023-01-01

## 2023-01-01 RX ORDER — DEXTROSE 10 % IN WATER 10 %
1000 INTRAVENOUS SOLUTION INTRAVENOUS
Refills: 0 | Status: DISCONTINUED | OUTPATIENT
Start: 2023-01-01 | End: 2023-01-01

## 2023-01-01 RX ORDER — SODIUM CHLORIDE 9 MG/ML
500 INJECTION, SOLUTION INTRAVENOUS
Refills: 0 | Status: COMPLETED | OUTPATIENT
Start: 2023-01-01 | End: 2023-01-01

## 2023-01-01 RX ORDER — THIAMINE MONONITRATE (VIT B1) 100 MG
500 TABLET ORAL EVERY 24 HOURS
Refills: 0 | Status: DISCONTINUED | OUTPATIENT
Start: 2023-01-01 | End: 2023-01-01

## 2023-01-01 RX ORDER — INSULIN GLARGINE 100 [IU]/ML
25 INJECTION, SOLUTION SUBCUTANEOUS AT BEDTIME
Refills: 0 | Status: DISCONTINUED | OUTPATIENT
Start: 2023-01-01 | End: 2023-01-01

## 2023-01-01 RX ORDER — CISPLATIN 1 MG/ML
55 INJECTION, SOLUTION INTRAVENOUS ONCE
Refills: 0 | Status: COMPLETED | OUTPATIENT
Start: 2023-01-01 | End: 2023-01-01

## 2023-01-01 RX ORDER — DEXAMETHASONE 0.5 MG/5ML
12 ELIXIR ORAL ONCE
Refills: 0 | Status: COMPLETED | OUTPATIENT
Start: 2023-01-01 | End: 2023-01-01

## 2023-01-01 RX ORDER — ATROPINE SULFATE 1 %
1 DROPS OPHTHALMIC (EYE) DAILY
Refills: 0 | Status: DISCONTINUED | OUTPATIENT
Start: 2023-01-01 | End: 2023-01-01

## 2023-01-01 RX ORDER — ATROPINE SULFATE 1 %
1 DROPS OPHTHALMIC (EYE) EVERY 24 HOURS
Refills: 0 | Status: DISCONTINUED | OUTPATIENT
Start: 2023-01-01 | End: 2023-01-01

## 2023-01-01 RX ORDER — NALOXONE HYDROCHLORIDE 4 MG/.1ML
0.4 SPRAY NASAL
Qty: 1 | Refills: 0
Start: 2023-01-01

## 2023-01-01 RX ORDER — ACETAMINOPHEN 500 MG/1
500 TABLET, COATED ORAL 4 TIMES DAILY
Qty: 50 | Refills: 2 | Status: ACTIVE | COMMUNITY
Start: 2023-01-01 | End: 1900-01-01

## 2023-01-01 RX ORDER — PANTOPRAZOLE SODIUM 20 MG/1
40 TABLET, DELAYED RELEASE ORAL
Refills: 0 | Status: DISCONTINUED | OUTPATIENT
Start: 2023-01-01 | End: 2023-01-01

## 2023-01-01 RX ORDER — MAGNESIUM SULFATE 500 MG/ML
1 VIAL (ML) INJECTION ONCE
Refills: 0 | Status: COMPLETED | OUTPATIENT
Start: 2023-01-01 | End: 2023-01-01

## 2023-01-01 RX ORDER — HYDROMORPHONE HYDROCHLORIDE 2 MG/ML
0.5 INJECTION INTRAMUSCULAR; INTRAVENOUS; SUBCUTANEOUS
Qty: 100 | Refills: 0 | Status: DISCONTINUED | OUTPATIENT
Start: 2023-01-01 | End: 2023-01-01

## 2023-01-01 RX ORDER — OLANZAPINE 15 MG/1
5 TABLET, FILM COATED ORAL ONCE
Refills: 0 | Status: DISCONTINUED | OUTPATIENT
Start: 2023-01-01 | End: 2023-01-01

## 2023-01-01 RX ORDER — OXYCODONE AND ACETAMINOPHEN 5; 325 MG/1; MG/1
1 TABLET ORAL EVERY 4 HOURS
Refills: 0 | Status: DISCONTINUED | OUTPATIENT
Start: 2023-01-01 | End: 2023-01-01

## 2023-01-01 RX ORDER — CISPLATIN 1 MG/ML
74 INJECTION, SOLUTION INTRAVENOUS ONCE
Refills: 0 | Status: COMPLETED | OUTPATIENT
Start: 2023-01-01 | End: 2023-01-01

## 2023-01-01 RX ORDER — SODIUM CHLORIDE 9 MG/ML
500 INJECTION INTRAMUSCULAR; INTRAVENOUS; SUBCUTANEOUS ONCE
Refills: 0 | Status: DISCONTINUED | OUTPATIENT
Start: 2023-01-01 | End: 2023-01-01

## 2023-01-01 RX ORDER — AMPICILLIN SODIUM AND SULBACTAM SODIUM 250; 125 MG/ML; MG/ML
1.5 INJECTION, POWDER, FOR SUSPENSION INTRAMUSCULAR; INTRAVENOUS EVERY 6 HOURS
Refills: 0 | Status: DISCONTINUED | OUTPATIENT
Start: 2023-01-01 | End: 2023-01-01

## 2023-01-01 RX ORDER — PHYTONADIONE (VIT K1) 5 MG
5 TABLET ORAL ONCE
Refills: 0 | Status: COMPLETED | OUTPATIENT
Start: 2023-01-01 | End: 2023-01-01

## 2023-01-01 RX ORDER — LIDOCAINE 4 G/100G
5 CREAM TOPICAL
Refills: 0 | Status: DISCONTINUED | OUTPATIENT
Start: 2023-01-01 | End: 2023-01-01

## 2023-01-01 RX ORDER — SENNOSIDES 8.8 MG/5ML
8.8 LIQUID ORAL
Qty: 1 | Refills: 0 | Status: ACTIVE | COMMUNITY
Start: 2023-01-01 | End: 1900-01-01

## 2023-01-01 RX ORDER — SENNA PLUS 8.6 MG/1
2 TABLET ORAL
Qty: 0 | Refills: 0 | DISCHARGE
Start: 2023-01-01

## 2023-01-01 RX ORDER — HEPARIN SODIUM 5000 [USP'U]/ML
5000 INJECTION INTRAVENOUS; SUBCUTANEOUS EVERY 8 HOURS
Refills: 0 | Status: COMPLETED | OUTPATIENT
Start: 2023-01-01 | End: 2023-01-01

## 2023-01-01 RX ORDER — LISINOPRIL 2.5 MG/1
20 TABLET ORAL DAILY
Refills: 0 | Status: DISCONTINUED | OUTPATIENT
Start: 2023-01-01 | End: 2023-01-01

## 2023-01-01 RX ORDER — LATANOPROST 0.05 MG/ML
1 SOLUTION/ DROPS OPHTHALMIC; TOPICAL
Qty: 0 | Refills: 0 | DISCHARGE

## 2023-01-01 RX ORDER — PANTOPRAZOLE SODIUM 20 MG/1
40 TABLET, DELAYED RELEASE ORAL ONCE
Refills: 0 | Status: COMPLETED | OUTPATIENT
Start: 2023-01-01 | End: 2023-01-01

## 2023-01-01 RX ORDER — HYDROMORPHONE HYDROCHLORIDE 2 MG/ML
0.5 INJECTION INTRAMUSCULAR; INTRAVENOUS; SUBCUTANEOUS EVERY 6 HOURS
Refills: 0 | Status: DISCONTINUED | OUTPATIENT
Start: 2023-01-01 | End: 2023-01-01

## 2023-01-01 RX ORDER — LIDOCAINE 4 G/100G
5 CREAM TOPICAL EVERY 12 HOURS
Refills: 0 | Status: DISCONTINUED | OUTPATIENT
Start: 2023-01-01 | End: 2023-01-01

## 2023-01-01 RX ORDER — DIPHENHYDRAMINE HYDROCHLORIDE AND LIDOCAINE HYDROCHLORIDE AND ALUMINUM HYDROXIDE AND MAGNESIUM HYDRO
1 KIT
Qty: 0 | Refills: 0 | DISCHARGE
Start: 2023-01-01

## 2023-01-01 RX ORDER — METOPROLOL TARTRATE 50 MG
12.5 TABLET ORAL EVERY 24 HOURS
Refills: 0 | Status: DISCONTINUED | OUTPATIENT
Start: 2023-01-01 | End: 2023-01-01

## 2023-01-01 RX ORDER — FLUCONAZOLE 10 MG/ML
10 POWDER, FOR SUSPENSION ORAL DAILY
Qty: 16 | Refills: 0 | Status: ACTIVE | COMMUNITY
Start: 2023-01-01 | End: 1900-01-01

## 2023-01-01 RX ORDER — INSULIN HUMAN 100 [IU]/ML
3 INJECTION, SOLUTION SUBCUTANEOUS EVERY 6 HOURS
Refills: 0 | Status: DISCONTINUED | OUTPATIENT
Start: 2023-01-01 | End: 2023-01-01

## 2023-01-01 RX ORDER — METOPROLOL TARTRATE 50 MG
1 TABLET ORAL
Refills: 0 | DISCHARGE

## 2023-01-01 RX ORDER — MEN-PHOR .5; .5 G/G; G/G
1 LOTION TOPICAL ONCE
Refills: 0 | Status: COMPLETED | OUTPATIENT
Start: 2023-01-01 | End: 2023-01-01

## 2023-01-01 RX ORDER — ONDANSETRON 8 MG/1
8 TABLET, ORALLY DISINTEGRATING ORAL EVERY 8 HOURS
Qty: 90 | Refills: 2 | Status: ACTIVE | COMMUNITY
Start: 2023-01-01 | End: 1900-01-01

## 2023-01-01 RX ORDER — CEFAZOLIN SODIUM 1 G
1000 VIAL (EA) INJECTION EVERY 12 HOURS
Refills: 0 | Status: COMPLETED | OUTPATIENT
Start: 2023-01-01 | End: 2023-01-01

## 2023-01-01 RX ORDER — ACETAMINOPHEN 500 MG
2 TABLET ORAL
Qty: 40 | Refills: 0
Start: 2023-01-01 | End: 2023-01-01

## 2023-01-01 RX ORDER — MORPHINE SULFATE 50 MG/1
4 CAPSULE, EXTENDED RELEASE ORAL
Refills: 0 | Status: DISCONTINUED | OUTPATIENT
Start: 2023-01-01 | End: 2023-01-01

## 2023-01-01 RX ORDER — MAGNESIUM SULFATE 500 MG/ML
2 VIAL (ML) INJECTION
Refills: 0 | Status: COMPLETED | OUTPATIENT
Start: 2023-01-01 | End: 2023-01-01

## 2023-01-01 RX ORDER — INSULIN LISPRO 100/ML
2 VIAL (ML) SUBCUTANEOUS EVERY 6 HOURS
Refills: 0 | Status: DISCONTINUED | OUTPATIENT
Start: 2023-01-01 | End: 2023-01-01

## 2023-01-01 RX ORDER — ACETAMINOPHEN 500 MG
600 TABLET ORAL EVERY 6 HOURS
Refills: 0 | Status: DISCONTINUED | OUTPATIENT
Start: 2023-01-01 | End: 2023-01-01

## 2023-01-01 RX ORDER — HALOPERIDOL DECANOATE 100 MG/ML
2 INJECTION INTRAMUSCULAR ONCE
Refills: 0 | Status: COMPLETED | OUTPATIENT
Start: 2023-01-01 | End: 2023-01-01

## 2023-01-01 RX ORDER — INSULIN GLARGINE 100 [IU]/ML
50 INJECTION, SOLUTION SUBCUTANEOUS EVERY MORNING
Refills: 0 | Status: DISCONTINUED | OUTPATIENT
Start: 2023-01-01 | End: 2023-01-01

## 2023-01-01 RX ORDER — FENTANYL CITRATE 50 UG/ML
1 INJECTION INTRAVENOUS
Refills: 0 | Status: DISCONTINUED | OUTPATIENT
Start: 2023-01-01 | End: 2023-01-01

## 2023-01-01 RX ORDER — ATROPINE SULFATE 1 %
1 DROPS OPHTHALMIC (EYE) THREE TIMES A DAY
Refills: 0 | Status: DISCONTINUED | OUTPATIENT
Start: 2023-01-01 | End: 2023-01-01

## 2023-01-01 RX ORDER — HUMAN INSULIN 100 [IU]/ML
20 INJECTION, SUSPENSION SUBCUTANEOUS ONCE
Refills: 0 | Status: DISCONTINUED | OUTPATIENT
Start: 2023-01-01 | End: 2023-01-01

## 2023-01-01 RX ORDER — CEFTRIAXONE 500 MG/1
1000 INJECTION, POWDER, FOR SOLUTION INTRAMUSCULAR; INTRAVENOUS EVERY 24 HOURS
Refills: 0 | Status: DISCONTINUED | OUTPATIENT
Start: 2023-01-01 | End: 2023-01-01

## 2023-01-01 RX ORDER — MOMETASONE FUROATE 1 MG/G
0.1 CREAM TOPICAL TWICE DAILY
Qty: 1 | Refills: 3 | Status: ACTIVE | COMMUNITY
Start: 2023-01-01 | End: 1900-01-01

## 2023-01-01 RX ORDER — ROBINUL 0.2 MG/ML
0.4 INJECTION INTRAMUSCULAR; INTRAVENOUS EVERY 6 HOURS
Refills: 0 | Status: DISCONTINUED | OUTPATIENT
Start: 2023-01-01 | End: 2023-01-01

## 2023-01-01 RX ORDER — ONDANSETRON 8 MG/1
4 TABLET, FILM COATED ORAL EVERY 6 HOURS
Refills: 0 | Status: DISCONTINUED | OUTPATIENT
Start: 2023-01-01 | End: 2023-01-01

## 2023-01-01 RX ORDER — MAGNESIUM SULFATE 500 MG/ML
2 VIAL (ML) INJECTION ONCE
Refills: 0 | Status: DISCONTINUED | OUTPATIENT
Start: 2023-01-01 | End: 2023-01-01

## 2023-01-01 RX ORDER — FLUCONAZOLE 150 MG/1
400 TABLET ORAL
Refills: 0 | DISCHARGE

## 2023-01-01 RX ADMIN — Medication 101 MILLIGRAM(S): at 22:52

## 2023-01-01 RX ADMIN — Medication 1.25 MILLIGRAM(S): at 22:32

## 2023-01-01 RX ADMIN — PANTOPRAZOLE SODIUM 40 MILLIGRAM(S): 20 TABLET, DELAYED RELEASE ORAL at 07:09

## 2023-01-01 RX ADMIN — DIPHENHYDRAMINE HYDROCHLORIDE AND LIDOCAINE HYDROCHLORIDE AND ALUMINUM HYDROXIDE AND MAGNESIUM HYDRO 4 MILLILITER(S): KIT at 13:20

## 2023-01-01 RX ADMIN — DIPHENHYDRAMINE HYDROCHLORIDE AND LIDOCAINE HYDROCHLORIDE AND ALUMINUM HYDROXIDE AND MAGNESIUM HYDRO 4 MILLILITER(S): KIT at 00:42

## 2023-01-01 RX ADMIN — ENOXAPARIN SODIUM 40 MILLIGRAM(S): 100 INJECTION SUBCUTANEOUS at 13:04

## 2023-01-01 RX ADMIN — Medication 3 MILLILITER(S): at 07:27

## 2023-01-01 RX ADMIN — ALBUTEROL 2 PUFF(S): 90 AEROSOL, METERED ORAL at 10:56

## 2023-01-01 RX ADMIN — INSULIN GLARGINE 15 UNIT(S): 100 INJECTION, SOLUTION SUBCUTANEOUS at 22:14

## 2023-01-01 RX ADMIN — Medication 1 MILLIGRAM(S): at 20:50

## 2023-01-01 RX ADMIN — SODIUM CHLORIDE 1000 MILLILITER(S): 9 INJECTION INTRAMUSCULAR; INTRAVENOUS; SUBCUTANEOUS at 21:38

## 2023-01-01 RX ADMIN — HYDROMORPHONE HYDROCHLORIDE 0.5 MILLIGRAM(S): 2 INJECTION INTRAMUSCULAR; INTRAVENOUS; SUBCUTANEOUS at 06:05

## 2023-01-01 RX ADMIN — Medication 1 DROP(S): at 22:04

## 2023-01-01 RX ADMIN — Medication 1 DROP(S): at 06:53

## 2023-01-01 RX ADMIN — Medication 1 DROP(S): at 22:36

## 2023-01-01 RX ADMIN — DIPHENHYDRAMINE HYDROCHLORIDE AND LIDOCAINE HYDROCHLORIDE AND ALUMINUM HYDROXIDE AND MAGNESIUM HYDRO 10 MILLILITER(S): KIT at 14:03

## 2023-01-01 RX ADMIN — DIPHENHYDRAMINE HYDROCHLORIDE AND LIDOCAINE HYDROCHLORIDE AND ALUMINUM HYDROXIDE AND MAGNESIUM HYDRO 4 MILLILITER(S): KIT at 12:55

## 2023-01-01 RX ADMIN — MORPHINE SULFATE 10 MILLIGRAM(S): 50 CAPSULE, EXTENDED RELEASE ORAL at 03:00

## 2023-01-01 RX ADMIN — Medication 3 MILLILITER(S): at 02:36

## 2023-01-01 RX ADMIN — PANTOPRAZOLE SODIUM 40 MILLIGRAM(S): 20 TABLET, DELAYED RELEASE ORAL at 00:24

## 2023-01-01 RX ADMIN — HEPARIN SODIUM 5000 UNIT(S): 5000 INJECTION INTRAVENOUS; SUBCUTANEOUS at 22:34

## 2023-01-01 RX ADMIN — LIDOCAINE 5 MILLILITER(S): 4 CREAM TOPICAL at 06:08

## 2023-01-01 RX ADMIN — Medication 6 UNIT(S): at 17:44

## 2023-01-01 RX ADMIN — Medication 2: at 23:06

## 2023-01-01 RX ADMIN — Medication 1 DROP(S): at 14:48

## 2023-01-01 RX ADMIN — Medication 2.5 MILLIGRAM(S): at 16:10

## 2023-01-01 RX ADMIN — Medication 50 MILLILITER(S): at 04:40

## 2023-01-01 RX ADMIN — Medication 2 PACKET(S): at 00:06

## 2023-01-01 RX ADMIN — Medication 1 APPLICATION(S): at 07:17

## 2023-01-01 RX ADMIN — HYDROMORPHONE HYDROCHLORIDE 0.5 MG/HR: 2 INJECTION INTRAMUSCULAR; INTRAVENOUS; SUBCUTANEOUS at 14:17

## 2023-01-01 RX ADMIN — ALBUTEROL 2 PUFF(S): 90 AEROSOL, METERED ORAL at 07:09

## 2023-01-01 RX ADMIN — ENOXAPARIN SODIUM 40 MILLIGRAM(S): 100 INJECTION SUBCUTANEOUS at 11:58

## 2023-01-01 RX ADMIN — DIPHENHYDRAMINE HYDROCHLORIDE AND LIDOCAINE HYDROCHLORIDE AND ALUMINUM HYDROXIDE AND MAGNESIUM HYDRO 4 MILLILITER(S): KIT at 00:08

## 2023-01-01 RX ADMIN — Medication 3 MILLILITER(S): at 03:11

## 2023-01-01 RX ADMIN — HALOPERIDOL DECANOATE 5 MILLIGRAM(S): 100 INJECTION INTRAMUSCULAR at 06:47

## 2023-01-01 RX ADMIN — Medication 2: at 09:14

## 2023-01-01 RX ADMIN — SCOPALAMINE 1 PATCH: 1 PATCH, EXTENDED RELEASE TRANSDERMAL at 18:02

## 2023-01-01 RX ADMIN — ISOSORBIDE MONONITRATE 30 MILLIGRAM(S): 60 TABLET, EXTENDED RELEASE ORAL at 11:47

## 2023-01-01 RX ADMIN — DIPHENHYDRAMINE HYDROCHLORIDE AND LIDOCAINE HYDROCHLORIDE AND ALUMINUM HYDROXIDE AND MAGNESIUM HYDRO 4 MILLILITER(S): KIT at 06:33

## 2023-01-01 RX ADMIN — LIDOCAINE 5 MILLILITER(S): 4 CREAM TOPICAL at 06:15

## 2023-01-01 RX ADMIN — Medication 1 DROP(S): at 14:06

## 2023-01-01 RX ADMIN — Medication 1 DROP(S): at 22:27

## 2023-01-01 RX ADMIN — Medication 1 DROP(S): at 06:09

## 2023-01-01 RX ADMIN — MORPHINE SULFATE 10 MILLIGRAM(S): 50 CAPSULE, EXTENDED RELEASE ORAL at 08:00

## 2023-01-01 RX ADMIN — Medication 1 DROP(S): at 21:32

## 2023-01-01 RX ADMIN — Medication 100 GRAM(S): at 12:47

## 2023-01-01 RX ADMIN — Medication 3 MILLILITER(S): at 22:00

## 2023-01-01 RX ADMIN — Medication 2: at 13:17

## 2023-01-01 RX ADMIN — Medication 4: at 09:43

## 2023-01-01 RX ADMIN — HYDROMORPHONE HYDROCHLORIDE 0.5 MILLIGRAM(S): 2 INJECTION INTRAMUSCULAR; INTRAVENOUS; SUBCUTANEOUS at 06:28

## 2023-01-01 RX ADMIN — HYDROMORPHONE HYDROCHLORIDE 0.5 MILLIGRAM(S): 2 INJECTION INTRAMUSCULAR; INTRAVENOUS; SUBCUTANEOUS at 04:44

## 2023-01-01 RX ADMIN — DIPHENHYDRAMINE HYDROCHLORIDE AND LIDOCAINE HYDROCHLORIDE AND ALUMINUM HYDROXIDE AND MAGNESIUM HYDRO 4 MILLILITER(S): KIT at 06:57

## 2023-01-01 RX ADMIN — LATANOPROST 1 DROP(S): 0.05 SOLUTION/ DROPS OPHTHALMIC; TOPICAL at 21:05

## 2023-01-01 RX ADMIN — Medication 0.5 MILLIGRAM(S): at 00:34

## 2023-01-01 RX ADMIN — Medication 1 DROP(S): at 11:42

## 2023-01-01 RX ADMIN — Medication 1 MILLIGRAM(S): at 09:34

## 2023-01-01 RX ADMIN — FAMOTIDINE 208 MILLIGRAM(S): 10 INJECTION INTRAVENOUS at 14:16

## 2023-01-01 RX ADMIN — Medication 6: at 07:08

## 2023-01-01 RX ADMIN — SODIUM CHLORIDE 502 MILLILITER(S): 9 INJECTION, SOLUTION INTRAVENOUS at 16:34

## 2023-01-01 RX ADMIN — Medication 0.5 MILLIGRAM(S): at 15:10

## 2023-01-01 RX ADMIN — Medication 2.5 MILLIGRAM(S): at 12:24

## 2023-01-01 RX ADMIN — SCOPALAMINE 1 PATCH: 1 PATCH, EXTENDED RELEASE TRANSDERMAL at 19:25

## 2023-01-01 RX ADMIN — PIPERACILLIN AND TAZOBACTAM 25 GRAM(S): 4; .5 INJECTION, POWDER, LYOPHILIZED, FOR SOLUTION INTRAVENOUS at 21:05

## 2023-01-01 RX ADMIN — HYDROMORPHONE HYDROCHLORIDE 1 MILLIGRAM(S): 2 INJECTION INTRAMUSCULAR; INTRAVENOUS; SUBCUTANEOUS at 10:39

## 2023-01-01 RX ADMIN — HYDROMORPHONE HYDROCHLORIDE 1 MILLIGRAM(S): 2 INJECTION INTRAMUSCULAR; INTRAVENOUS; SUBCUTANEOUS at 04:57

## 2023-01-01 RX ADMIN — Medication 1 APPLICATION(S): at 18:35

## 2023-01-01 RX ADMIN — SODIUM CHLORIDE 4 MILLILITER(S): 9 INJECTION INTRAMUSCULAR; INTRAVENOUS; SUBCUTANEOUS at 06:09

## 2023-01-01 RX ADMIN — Medication 3 MILLILITER(S): at 02:01

## 2023-01-01 RX ADMIN — Medication 3 MILLILITER(S): at 10:00

## 2023-01-01 RX ADMIN — DIPHENHYDRAMINE HYDROCHLORIDE AND LIDOCAINE HYDROCHLORIDE AND ALUMINUM HYDROXIDE AND MAGNESIUM HYDRO 4 MILLILITER(S): KIT at 05:34

## 2023-01-01 RX ADMIN — Medication 1 MILLIGRAM(S): at 03:51

## 2023-01-01 RX ADMIN — Medication 1 APPLICATION(S): at 06:39

## 2023-01-01 RX ADMIN — DIPHENHYDRAMINE HYDROCHLORIDE AND LIDOCAINE HYDROCHLORIDE AND ALUMINUM HYDROXIDE AND MAGNESIUM HYDRO 4 MILLILITER(S): KIT at 00:21

## 2023-01-01 RX ADMIN — Medication 100 MILLIGRAM(S): at 15:11

## 2023-01-01 RX ADMIN — OLANZAPINE 5 MILLIGRAM(S): 15 TABLET, FILM COATED ORAL at 21:59

## 2023-01-01 RX ADMIN — DIPHENHYDRAMINE HYDROCHLORIDE AND LIDOCAINE HYDROCHLORIDE AND ALUMINUM HYDROXIDE AND MAGNESIUM HYDRO 4 MILLILITER(S): KIT at 18:35

## 2023-01-01 RX ADMIN — Medication 1000 MILLIGRAM(S): at 11:48

## 2023-01-01 RX ADMIN — HYDROMORPHONE HYDROCHLORIDE 0.5 MILLIGRAM(S): 2 INJECTION INTRAMUSCULAR; INTRAVENOUS; SUBCUTANEOUS at 06:43

## 2023-01-01 RX ADMIN — ENOXAPARIN SODIUM 40 MILLIGRAM(S): 100 INJECTION SUBCUTANEOUS at 17:59

## 2023-01-01 RX ADMIN — DIPHENHYDRAMINE HYDROCHLORIDE AND LIDOCAINE HYDROCHLORIDE AND ALUMINUM HYDROXIDE AND MAGNESIUM HYDRO 4 MILLILITER(S): KIT at 18:22

## 2023-01-01 RX ADMIN — Medication 400 MILLIGRAM(S): at 21:45

## 2023-01-01 RX ADMIN — Medication 3 MILLILITER(S): at 17:48

## 2023-01-01 RX ADMIN — PANTOPRAZOLE SODIUM 40 MILLIGRAM(S): 20 TABLET, DELAYED RELEASE ORAL at 00:08

## 2023-01-01 RX ADMIN — Medication 202 MILLIGRAM(S): at 13:50

## 2023-01-01 RX ADMIN — HYDROMORPHONE HYDROCHLORIDE 0.2 MG/HR: 2 INJECTION INTRAMUSCULAR; INTRAVENOUS; SUBCUTANEOUS at 13:20

## 2023-01-01 RX ADMIN — HYDROMORPHONE HYDROCHLORIDE 0.5 MILLIGRAM(S): 2 INJECTION INTRAMUSCULAR; INTRAVENOUS; SUBCUTANEOUS at 17:34

## 2023-01-01 RX ADMIN — ONDANSETRON 232 MILLIGRAM(S): 8 TABLET, FILM COATED ORAL at 13:47

## 2023-01-01 RX ADMIN — Medication 2: at 00:27

## 2023-01-01 RX ADMIN — ENOXAPARIN SODIUM 40 MILLIGRAM(S): 100 INJECTION SUBCUTANEOUS at 12:48

## 2023-01-01 RX ADMIN — Medication 1 APPLICATION(S): at 18:12

## 2023-01-01 RX ADMIN — DIPHENHYDRAMINE HYDROCHLORIDE AND LIDOCAINE HYDROCHLORIDE AND ALUMINUM HYDROXIDE AND MAGNESIUM HYDRO 4 MILLILITER(S): KIT at 23:21

## 2023-01-01 RX ADMIN — Medication 6: at 12:02

## 2023-01-01 RX ADMIN — HYDROMORPHONE HYDROCHLORIDE 0.5 MILLIGRAM(S): 2 INJECTION INTRAMUSCULAR; INTRAVENOUS; SUBCUTANEOUS at 03:56

## 2023-01-01 RX ADMIN — Medication 1000 MILLIGRAM(S): at 05:36

## 2023-01-01 RX ADMIN — ALBUTEROL 2 PUFF(S): 90 AEROSOL, METERED ORAL at 09:02

## 2023-01-01 RX ADMIN — DIPHENHYDRAMINE HYDROCHLORIDE AND LIDOCAINE HYDROCHLORIDE AND ALUMINUM HYDROXIDE AND MAGNESIUM HYDRO 4 MILLILITER(S): KIT at 00:14

## 2023-01-01 RX ADMIN — ALBUTEROL 2 PUFF(S): 90 AEROSOL, METERED ORAL at 11:26

## 2023-01-01 RX ADMIN — Medication 6 UNIT(S): at 17:23

## 2023-01-01 RX ADMIN — Medication 12.5 MILLIGRAM(S): at 18:06

## 2023-01-01 RX ADMIN — Medication 1 APPLICATION(S): at 19:58

## 2023-01-01 RX ADMIN — Medication 3 MILLILITER(S): at 21:50

## 2023-01-01 RX ADMIN — Medication 1 APPLICATION(S): at 14:04

## 2023-01-01 RX ADMIN — HYDROMORPHONE HYDROCHLORIDE 1 MILLIGRAM(S): 2 INJECTION INTRAMUSCULAR; INTRAVENOUS; SUBCUTANEOUS at 05:04

## 2023-01-01 RX ADMIN — Medication 25 GRAM(S): at 09:51

## 2023-01-01 RX ADMIN — HYDROMORPHONE HYDROCHLORIDE 0.5 MILLIGRAM(S): 2 INJECTION INTRAMUSCULAR; INTRAVENOUS; SUBCUTANEOUS at 01:59

## 2023-01-01 RX ADMIN — HEPARIN SODIUM 5000 UNIT(S): 5000 INJECTION INTRAVENOUS; SUBCUTANEOUS at 22:03

## 2023-01-01 RX ADMIN — Medication 3 MILLILITER(S): at 17:37

## 2023-01-01 RX ADMIN — Medication 25 GRAM(S): at 07:29

## 2023-01-01 RX ADMIN — Medication 7 UNIT(S): at 09:17

## 2023-01-01 RX ADMIN — Medication 8: at 12:07

## 2023-01-01 RX ADMIN — LIDOCAINE 5 MILLILITER(S): 4 CREAM TOPICAL at 06:32

## 2023-01-01 RX ADMIN — Medication 2: at 07:04

## 2023-01-01 RX ADMIN — DIPHENHYDRAMINE HYDROCHLORIDE AND LIDOCAINE HYDROCHLORIDE AND ALUMINUM HYDROXIDE AND MAGNESIUM HYDRO 4 MILLILITER(S): KIT at 17:38

## 2023-01-01 RX ADMIN — Medication 1 APPLICATION(S): at 12:42

## 2023-01-01 RX ADMIN — INSULIN GLARGINE 15 UNIT(S): 100 INJECTION, SOLUTION SUBCUTANEOUS at 22:35

## 2023-01-01 RX ADMIN — Medication 100 MILLIEQUIVALENT(S): at 12:54

## 2023-01-01 RX ADMIN — ENOXAPARIN SODIUM 40 MILLIGRAM(S): 100 INJECTION SUBCUTANEOUS at 17:19

## 2023-01-01 RX ADMIN — Medication 1 APPLICATION(S): at 06:08

## 2023-01-01 RX ADMIN — SODIUM CHLORIDE 4 MILLILITER(S): 9 INJECTION INTRAMUSCULAR; INTRAVENOUS; SUBCUTANEOUS at 22:33

## 2023-01-01 RX ADMIN — ALBUTEROL 2 PUFF(S): 90 AEROSOL, METERED ORAL at 11:43

## 2023-01-01 RX ADMIN — LIDOCAINE 5 MILLILITER(S): 4 CREAM TOPICAL at 22:22

## 2023-01-01 RX ADMIN — Medication 3 MILLILITER(S): at 22:03

## 2023-01-01 RX ADMIN — LIDOCAINE 5 MILLILITER(S): 4 CREAM TOPICAL at 07:15

## 2023-01-01 RX ADMIN — ROBINUL 0.4 MILLIGRAM(S): 0.2 INJECTION INTRAMUSCULAR; INTRAVENOUS at 23:39

## 2023-01-01 RX ADMIN — Medication 10 MILLIGRAM(S): at 13:06

## 2023-01-01 RX ADMIN — AMPICILLIN SODIUM AND SULBACTAM SODIUM 100 GRAM(S): 250; 125 INJECTION, POWDER, FOR SUSPENSION INTRAMUSCULAR; INTRAVENOUS at 18:01

## 2023-01-01 RX ADMIN — HYDROMORPHONE HYDROCHLORIDE 0.5 MILLIGRAM(S): 2 INJECTION INTRAMUSCULAR; INTRAVENOUS; SUBCUTANEOUS at 05:07

## 2023-01-01 RX ADMIN — Medication 1 APPLICATION(S): at 07:51

## 2023-01-01 RX ADMIN — SCOPALAMINE 1 PATCH: 1 PATCH, EXTENDED RELEASE TRANSDERMAL at 18:51

## 2023-01-01 RX ADMIN — ENOXAPARIN SODIUM 40 MILLIGRAM(S): 100 INJECTION SUBCUTANEOUS at 17:32

## 2023-01-01 RX ADMIN — Medication 1 APPLICATION(S): at 17:55

## 2023-01-01 RX ADMIN — SCOPALAMINE 1 PATCH: 1 PATCH, EXTENDED RELEASE TRANSDERMAL at 16:12

## 2023-01-01 RX ADMIN — Medication 1 APPLICATION(S): at 06:33

## 2023-01-01 RX ADMIN — Medication 1.25 MILLIGRAM(S): at 00:41

## 2023-01-01 RX ADMIN — PANTOPRAZOLE SODIUM 40 MILLIGRAM(S): 20 TABLET, DELAYED RELEASE ORAL at 12:48

## 2023-01-01 RX ADMIN — Medication 2: at 18:19

## 2023-01-01 RX ADMIN — ROBINUL 0.4 MILLIGRAM(S): 0.2 INJECTION INTRAMUSCULAR; INTRAVENOUS at 12:13

## 2023-01-01 RX ADMIN — HYDROMORPHONE HYDROCHLORIDE 0.5 MILLIGRAM(S): 2 INJECTION INTRAMUSCULAR; INTRAVENOUS; SUBCUTANEOUS at 23:20

## 2023-01-01 RX ADMIN — Medication 6 UNIT(S): at 16:54

## 2023-01-01 RX ADMIN — ROBINUL 0.4 MILLIGRAM(S): 0.2 INJECTION INTRAMUSCULAR; INTRAVENOUS at 12:01

## 2023-01-01 RX ADMIN — Medication 50 MILLILITER(S): at 06:08

## 2023-01-01 RX ADMIN — ALBUTEROL 2 PUFF(S): 90 AEROSOL, METERED ORAL at 21:40

## 2023-01-01 RX ADMIN — Medication 100 MILLIEQUIVALENT(S): at 10:24

## 2023-01-01 RX ADMIN — Medication 1 APPLICATION(S): at 06:49

## 2023-01-01 RX ADMIN — HYDROMORPHONE HYDROCHLORIDE 1 MILLIGRAM(S): 2 INJECTION INTRAMUSCULAR; INTRAVENOUS; SUBCUTANEOUS at 06:52

## 2023-01-01 RX ADMIN — Medication 1 DROP(S): at 13:58

## 2023-01-01 RX ADMIN — LATANOPROST 1 DROP(S): 0.05 SOLUTION/ DROPS OPHTHALMIC; TOPICAL at 21:48

## 2023-01-01 RX ADMIN — HYDROMORPHONE HYDROCHLORIDE 0.5 MILLIGRAM(S): 2 INJECTION INTRAMUSCULAR; INTRAVENOUS; SUBCUTANEOUS at 09:30

## 2023-01-01 RX ADMIN — Medication 3 MILLILITER(S): at 14:47

## 2023-01-01 RX ADMIN — Medication 3 MILLILITER(S): at 10:57

## 2023-01-01 RX ADMIN — Medication 10 MILLIGRAM(S): at 18:45

## 2023-01-01 RX ADMIN — Medication 1 DROP(S): at 15:24

## 2023-01-01 RX ADMIN — Medication 10 UNIT(S): at 06:43

## 2023-01-01 RX ADMIN — Medication 3 MILLILITER(S): at 09:55

## 2023-01-01 RX ADMIN — Medication 2: at 19:06

## 2023-01-01 RX ADMIN — LIDOCAINE 5 MILLILITER(S): 4 CREAM TOPICAL at 18:02

## 2023-01-01 RX ADMIN — PALONOSETRON HYDROCHLORIDE 0.25 MILLIGRAM(S): 0.25 INJECTION, SOLUTION INTRAVENOUS at 15:10

## 2023-01-01 RX ADMIN — FAMOTIDINE 20 MILLIGRAM(S): 10 INJECTION INTRAVENOUS at 12:27

## 2023-01-01 RX ADMIN — Medication 400 MILLIGRAM(S): at 11:47

## 2023-01-01 RX ADMIN — HYDROMORPHONE HYDROCHLORIDE 0.5 MILLIGRAM(S): 2 INJECTION INTRAMUSCULAR; INTRAVENOUS; SUBCUTANEOUS at 22:58

## 2023-01-01 RX ADMIN — INSULIN GLARGINE 48 UNIT(S): 100 INJECTION, SOLUTION SUBCUTANEOUS at 07:05

## 2023-01-01 RX ADMIN — DIPHENHYDRAMINE HYDROCHLORIDE AND LIDOCAINE HYDROCHLORIDE AND ALUMINUM HYDROXIDE AND MAGNESIUM HYDRO 4 MILLILITER(S): KIT at 18:12

## 2023-01-01 RX ADMIN — Medication 3 MILLILITER(S): at 01:42

## 2023-01-01 RX ADMIN — FAMOTIDINE 20 MILLIGRAM(S): 10 INJECTION INTRAVENOUS at 14:31

## 2023-01-01 RX ADMIN — ENOXAPARIN SODIUM 40 MILLIGRAM(S): 100 INJECTION SUBCUTANEOUS at 12:21

## 2023-01-01 RX ADMIN — SODIUM CHLORIDE 4 MILLILITER(S): 9 INJECTION INTRAMUSCULAR; INTRAVENOUS; SUBCUTANEOUS at 09:56

## 2023-01-01 RX ADMIN — Medication 81 MILLIGRAM(S): at 11:25

## 2023-01-01 RX ADMIN — PANTOPRAZOLE SODIUM 40 MILLIGRAM(S): 20 TABLET, DELAYED RELEASE ORAL at 00:12

## 2023-01-01 RX ADMIN — Medication 2: at 08:56

## 2023-01-01 RX ADMIN — DIPHENHYDRAMINE HYDROCHLORIDE AND LIDOCAINE HYDROCHLORIDE AND ALUMINUM HYDROXIDE AND MAGNESIUM HYDRO 4 MILLILITER(S): KIT at 05:04

## 2023-01-01 RX ADMIN — HYDROMORPHONE HYDROCHLORIDE 0.5 MILLIGRAM(S): 2 INJECTION INTRAMUSCULAR; INTRAVENOUS; SUBCUTANEOUS at 00:25

## 2023-01-01 RX ADMIN — Medication 1.25 MILLIGRAM(S): at 00:12

## 2023-01-01 RX ADMIN — INSULIN GLARGINE 15 UNIT(S): 100 INJECTION, SOLUTION SUBCUTANEOUS at 23:07

## 2023-01-01 RX ADMIN — ONDANSETRON 16 MILLIGRAM(S): 8 TABLET, FILM COATED ORAL at 14:00

## 2023-01-01 RX ADMIN — Medication 0.5 MILLIGRAM(S): at 00:03

## 2023-01-01 RX ADMIN — Medication 1 APPLICATION(S): at 06:59

## 2023-01-01 RX ADMIN — SODIUM CHLORIDE 1000 MILLILITER(S): 9 INJECTION INTRAMUSCULAR; INTRAVENOUS; SUBCUTANEOUS at 15:05

## 2023-01-01 RX ADMIN — SODIUM CHLORIDE 4 MILLILITER(S): 9 INJECTION INTRAMUSCULAR; INTRAVENOUS; SUBCUTANEOUS at 17:55

## 2023-01-01 RX ADMIN — Medication 0.5 MILLIGRAM(S): at 04:09

## 2023-01-01 RX ADMIN — ALBUTEROL 2 PUFF(S): 90 AEROSOL, METERED ORAL at 05:04

## 2023-01-01 RX ADMIN — DIPHENHYDRAMINE HYDROCHLORIDE AND LIDOCAINE HYDROCHLORIDE AND ALUMINUM HYDROXIDE AND MAGNESIUM HYDRO 4 MILLILITER(S): KIT at 17:57

## 2023-01-01 RX ADMIN — DIPHENHYDRAMINE HYDROCHLORIDE AND LIDOCAINE HYDROCHLORIDE AND ALUMINUM HYDROXIDE AND MAGNESIUM HYDRO 4 MILLILITER(S): KIT at 00:02

## 2023-01-01 RX ADMIN — Medication 105 MILLIGRAM(S): at 11:41

## 2023-01-01 RX ADMIN — Medication 1 DROP(S): at 06:50

## 2023-01-01 RX ADMIN — ATORVASTATIN CALCIUM 80 MILLIGRAM(S): 80 TABLET, FILM COATED ORAL at 21:05

## 2023-01-01 RX ADMIN — HYDROMORPHONE HYDROCHLORIDE 0.5 MILLIGRAM(S): 2 INJECTION INTRAMUSCULAR; INTRAVENOUS; SUBCUTANEOUS at 22:34

## 2023-01-01 RX ADMIN — CISPLATIN 55 MILLIGRAM(S): 1 INJECTION, SOLUTION INTRAVENOUS at 15:22

## 2023-01-01 RX ADMIN — Medication 81 MILLIGRAM(S): at 14:01

## 2023-01-01 RX ADMIN — OLANZAPINE 2.5 MILLIGRAM(S): 15 TABLET, FILM COATED ORAL at 00:04

## 2023-01-01 RX ADMIN — HYDROMORPHONE HYDROCHLORIDE 0.5 MILLIGRAM(S): 2 INJECTION INTRAMUSCULAR; INTRAVENOUS; SUBCUTANEOUS at 10:50

## 2023-01-01 RX ADMIN — ISOSORBIDE MONONITRATE 30 MILLIGRAM(S): 60 TABLET, EXTENDED RELEASE ORAL at 11:57

## 2023-01-01 RX ADMIN — FAMOTIDINE 208 MILLIGRAM(S): 10 INJECTION INTRAVENOUS at 14:15

## 2023-01-01 RX ADMIN — Medication 7 UNIT(S): at 17:25

## 2023-01-01 RX ADMIN — Medication 250 MILLIGRAM(S): at 06:13

## 2023-01-01 RX ADMIN — HEPARIN SODIUM 5000 UNIT(S): 5000 INJECTION INTRAVENOUS; SUBCUTANEOUS at 14:05

## 2023-01-01 RX ADMIN — ATORVASTATIN CALCIUM 80 MILLIGRAM(S): 80 TABLET, FILM COATED ORAL at 21:43

## 2023-01-01 RX ADMIN — Medication 3 MILLILITER(S): at 05:26

## 2023-01-01 RX ADMIN — DIPHENHYDRAMINE HYDROCHLORIDE AND LIDOCAINE HYDROCHLORIDE AND ALUMINUM HYDROXIDE AND MAGNESIUM HYDRO 10 MILLILITER(S): KIT at 13:52

## 2023-01-01 RX ADMIN — Medication 100 MILLIEQUIVALENT(S): at 08:34

## 2023-01-01 RX ADMIN — Medication 2.5 MILLIGRAM(S): at 06:23

## 2023-01-01 RX ADMIN — Medication 1 DROP(S): at 15:26

## 2023-01-01 RX ADMIN — Medication 2.5 MILLIGRAM(S): at 03:05

## 2023-01-01 RX ADMIN — LIDOCAINE 5 MILLILITER(S): 4 CREAM TOPICAL at 20:02

## 2023-01-01 RX ADMIN — DIPHENHYDRAMINE HYDROCHLORIDE AND LIDOCAINE HYDROCHLORIDE AND ALUMINUM HYDROXIDE AND MAGNESIUM HYDRO 4 MILLILITER(S): KIT at 12:16

## 2023-01-01 RX ADMIN — HYDROMORPHONE HYDROCHLORIDE 0.5 MILLIGRAM(S): 2 INJECTION INTRAMUSCULAR; INTRAVENOUS; SUBCUTANEOUS at 10:15

## 2023-01-01 RX ADMIN — Medication 12.5 MILLIGRAM(S): at 21:21

## 2023-01-01 RX ADMIN — Medication 250 MILLIGRAM(S): at 07:03

## 2023-01-01 RX ADMIN — DIPHENHYDRAMINE HYDROCHLORIDE AND LIDOCAINE HYDROCHLORIDE AND ALUMINUM HYDROXIDE AND MAGNESIUM HYDRO 4 MILLILITER(S): KIT at 17:56

## 2023-01-01 RX ADMIN — POLYETHYLENE GLYCOL 3350 17 GRAM(S): 17 POWDER, FOR SOLUTION ORAL at 12:08

## 2023-01-01 RX ADMIN — Medication 1000 MILLIGRAM(S): at 20:30

## 2023-01-01 RX ADMIN — Medication 40 MILLIEQUIVALENT(S): at 17:07

## 2023-01-01 RX ADMIN — PIPERACILLIN AND TAZOBACTAM 25 GRAM(S): 4; .5 INJECTION, POWDER, LYOPHILIZED, FOR SOLUTION INTRAVENOUS at 06:14

## 2023-01-01 RX ADMIN — Medication 4: at 17:50

## 2023-01-01 RX ADMIN — HEPARIN SODIUM 5000 UNIT(S): 5000 INJECTION INTRAVENOUS; SUBCUTANEOUS at 21:18

## 2023-01-01 RX ADMIN — Medication 2: at 06:55

## 2023-01-01 RX ADMIN — HYDROMORPHONE HYDROCHLORIDE 0.5 MILLIGRAM(S): 2 INJECTION INTRAMUSCULAR; INTRAVENOUS; SUBCUTANEOUS at 12:12

## 2023-01-01 RX ADMIN — DIPHENHYDRAMINE HYDROCHLORIDE AND LIDOCAINE HYDROCHLORIDE AND ALUMINUM HYDROXIDE AND MAGNESIUM HYDRO 4 MILLILITER(S): KIT at 00:00

## 2023-01-01 RX ADMIN — Medication 1 APPLICATION(S): at 18:36

## 2023-01-01 RX ADMIN — HYDROMORPHONE HYDROCHLORIDE 1 MILLIGRAM(S): 2 INJECTION INTRAMUSCULAR; INTRAVENOUS; SUBCUTANEOUS at 07:09

## 2023-01-01 RX ADMIN — SODIUM CHLORIDE 75 MILLILITER(S): 9 INJECTION, SOLUTION INTRAVENOUS at 20:55

## 2023-01-01 RX ADMIN — HEPARIN SODIUM 5000 UNIT(S): 5000 INJECTION INTRAVENOUS; SUBCUTANEOUS at 22:59

## 2023-01-01 RX ADMIN — ENOXAPARIN SODIUM 40 MILLIGRAM(S): 100 INJECTION SUBCUTANEOUS at 12:43

## 2023-01-01 RX ADMIN — SODIUM CHLORIDE 70 MILLILITER(S): 9 INJECTION, SOLUTION INTRAVENOUS at 16:25

## 2023-01-01 RX ADMIN — HYDROMORPHONE HYDROCHLORIDE 0.5 MILLIGRAM(S): 2 INJECTION INTRAMUSCULAR; INTRAVENOUS; SUBCUTANEOUS at 18:59

## 2023-01-01 RX ADMIN — HYDROMORPHONE HYDROCHLORIDE 0.5 MILLIGRAM(S): 2 INJECTION INTRAMUSCULAR; INTRAVENOUS; SUBCUTANEOUS at 09:50

## 2023-01-01 RX ADMIN — Medication 1 APPLICATION(S): at 17:57

## 2023-01-01 RX ADMIN — Medication 2: at 00:08

## 2023-01-01 RX ADMIN — Medication 1 DROP(S): at 07:18

## 2023-01-01 RX ADMIN — ROBINUL 0.4 MILLIGRAM(S): 0.2 INJECTION INTRAMUSCULAR; INTRAVENOUS at 09:47

## 2023-01-01 RX ADMIN — MORPHINE SULFATE 10 MILLIGRAM(S): 50 CAPSULE, EXTENDED RELEASE ORAL at 22:25

## 2023-01-01 RX ADMIN — AMPICILLIN SODIUM AND SULBACTAM SODIUM 100 GRAM(S): 250; 125 INJECTION, POWDER, FOR SUSPENSION INTRAMUSCULAR; INTRAVENOUS at 05:00

## 2023-01-01 RX ADMIN — Medication 8: at 11:58

## 2023-01-01 RX ADMIN — HEPARIN SODIUM 5000 UNIT(S): 5000 INJECTION INTRAVENOUS; SUBCUTANEOUS at 07:17

## 2023-01-01 RX ADMIN — SODIUM CHLORIDE 75 MILLILITER(S): 9 INJECTION, SOLUTION INTRAVENOUS at 11:52

## 2023-01-01 RX ADMIN — DIPHENHYDRAMINE HYDROCHLORIDE AND LIDOCAINE HYDROCHLORIDE AND ALUMINUM HYDROXIDE AND MAGNESIUM HYDRO 10 MILLILITER(S): KIT at 11:25

## 2023-01-01 RX ADMIN — PANTOPRAZOLE SODIUM 40 MILLIGRAM(S): 20 TABLET, DELAYED RELEASE ORAL at 13:05

## 2023-01-01 RX ADMIN — Medication 1 MILLIGRAM(S): at 15:54

## 2023-01-01 RX ADMIN — SODIUM CHLORIDE 75 MILLILITER(S): 9 INJECTION, SOLUTION INTRAVENOUS at 01:28

## 2023-01-01 RX ADMIN — Medication 3 MILLILITER(S): at 01:44

## 2023-01-01 RX ADMIN — Medication 1 APPLICATION(S): at 06:24

## 2023-01-01 RX ADMIN — PIPERACILLIN AND TAZOBACTAM 25 GRAM(S): 4; .5 INJECTION, POWDER, LYOPHILIZED, FOR SOLUTION INTRAVENOUS at 09:54

## 2023-01-01 RX ADMIN — CEFTRIAXONE 100 MILLIGRAM(S): 500 INJECTION, POWDER, FOR SOLUTION INTRAMUSCULAR; INTRAVENOUS at 07:11

## 2023-01-01 RX ADMIN — Medication 1 DROP(S): at 14:00

## 2023-01-01 RX ADMIN — HALOPERIDOL DECANOATE 2 MILLIGRAM(S): 100 INJECTION INTRAMUSCULAR at 16:03

## 2023-01-01 RX ADMIN — PIPERACILLIN AND TAZOBACTAM 25 GRAM(S): 4; .5 INJECTION, POWDER, LYOPHILIZED, FOR SOLUTION INTRAVENOUS at 14:13

## 2023-01-01 RX ADMIN — PANTOPRAZOLE SODIUM 40 MILLIGRAM(S): 20 TABLET, DELAYED RELEASE ORAL at 23:48

## 2023-01-01 RX ADMIN — Medication 1 APPLICATION(S): at 05:34

## 2023-01-01 RX ADMIN — Medication 4: at 07:19

## 2023-01-01 RX ADMIN — ENOXAPARIN SODIUM 40 MILLIGRAM(S): 100 INJECTION SUBCUTANEOUS at 18:45

## 2023-01-01 RX ADMIN — INSULIN GLARGINE 15 UNIT(S): 100 INJECTION, SOLUTION SUBCUTANEOUS at 00:10

## 2023-01-01 RX ADMIN — Medication 81 MILLIGRAM(S): at 12:53

## 2023-01-01 RX ADMIN — SODIUM CHLORIDE 100 MILLILITER(S): 9 INJECTION INTRAMUSCULAR; INTRAVENOUS; SUBCUTANEOUS at 09:46

## 2023-01-01 RX ADMIN — ALBUTEROL 2 PUFF(S): 90 AEROSOL, METERED ORAL at 16:34

## 2023-01-01 RX ADMIN — Medication 3 MILLILITER(S): at 16:45

## 2023-01-01 RX ADMIN — Medication 25 GRAM(S): at 09:45

## 2023-01-01 RX ADMIN — LIDOCAINE 5 MILLILITER(S): 4 CREAM TOPICAL at 18:54

## 2023-01-01 RX ADMIN — QUETIAPINE FUMARATE 50 MILLIGRAM(S): 200 TABLET, FILM COATED ORAL at 00:05

## 2023-01-01 RX ADMIN — CISPLATIN 55 MILLIGRAM(S): 1 INJECTION, SOLUTION INTRAVENOUS at 16:20

## 2023-01-01 RX ADMIN — DIPHENHYDRAMINE HYDROCHLORIDE AND LIDOCAINE HYDROCHLORIDE AND ALUMINUM HYDROXIDE AND MAGNESIUM HYDRO 4 MILLILITER(S): KIT at 12:09

## 2023-01-01 RX ADMIN — FAMOTIDINE 208 MILLIGRAM(S): 10 INJECTION INTRAVENOUS at 14:33

## 2023-01-01 RX ADMIN — HYDROMORPHONE HYDROCHLORIDE 0.5 MILLIGRAM(S): 2 INJECTION INTRAMUSCULAR; INTRAVENOUS; SUBCUTANEOUS at 00:45

## 2023-01-01 RX ADMIN — ALBUTEROL 2 PUFF(S): 90 AEROSOL, METERED ORAL at 05:16

## 2023-01-01 RX ADMIN — Medication 2: at 18:00

## 2023-01-01 RX ADMIN — SENNA PLUS 2 TABLET(S): 8.6 TABLET ORAL at 21:00

## 2023-01-01 RX ADMIN — SODIUM CHLORIDE 4 MILLILITER(S): 9 INJECTION INTRAMUSCULAR; INTRAVENOUS; SUBCUTANEOUS at 21:50

## 2023-01-01 RX ADMIN — Medication 4: at 06:36

## 2023-01-01 RX ADMIN — HYDROMORPHONE HYDROCHLORIDE 0.5 MILLIGRAM(S): 2 INJECTION INTRAMUSCULAR; INTRAVENOUS; SUBCUTANEOUS at 02:00

## 2023-01-01 RX ADMIN — Medication 212 MILLIGRAM(S): at 14:05

## 2023-01-01 RX ADMIN — Medication 2: at 13:08

## 2023-01-01 RX ADMIN — Medication 3 MILLILITER(S): at 21:23

## 2023-01-01 RX ADMIN — Medication 1 DROP(S): at 12:08

## 2023-01-01 RX ADMIN — ENOXAPARIN SODIUM 40 MILLIGRAM(S): 100 INJECTION SUBCUTANEOUS at 18:40

## 2023-01-01 RX ADMIN — Medication 3 MILLILITER(S): at 06:13

## 2023-01-01 RX ADMIN — PIPERACILLIN AND TAZOBACTAM 25 GRAM(S): 4; .5 INJECTION, POWDER, LYOPHILIZED, FOR SOLUTION INTRAVENOUS at 22:14

## 2023-01-01 RX ADMIN — Medication 650 MILLIGRAM(S): at 11:47

## 2023-01-01 RX ADMIN — HYDROMORPHONE HYDROCHLORIDE 0.5 MILLIGRAM(S): 2 INJECTION INTRAMUSCULAR; INTRAVENOUS; SUBCUTANEOUS at 22:30

## 2023-01-01 RX ADMIN — PANTOPRAZOLE SODIUM 40 MILLIGRAM(S): 20 TABLET, DELAYED RELEASE ORAL at 12:37

## 2023-01-01 RX ADMIN — Medication 1 DROP(S): at 22:02

## 2023-01-01 RX ADMIN — Medication 1 APPLICATION(S): at 22:15

## 2023-01-01 RX ADMIN — Medication 25 GRAM(S): at 12:56

## 2023-01-01 RX ADMIN — Medication 1 DROP(S): at 21:37

## 2023-01-01 RX ADMIN — PIPERACILLIN AND TAZOBACTAM 25 GRAM(S): 4; .5 INJECTION, POWDER, LYOPHILIZED, FOR SOLUTION INTRAVENOUS at 13:45

## 2023-01-01 RX ADMIN — HYDROMORPHONE HYDROCHLORIDE 1 MILLIGRAM(S): 2 INJECTION INTRAMUSCULAR; INTRAVENOUS; SUBCUTANEOUS at 13:22

## 2023-01-01 RX ADMIN — Medication 1 APPLICATION(S): at 17:19

## 2023-01-01 RX ADMIN — Medication 1 APPLICATION(S): at 09:46

## 2023-01-01 RX ADMIN — Medication 100 MILLIGRAM(S): at 16:46

## 2023-01-01 RX ADMIN — DIPHENHYDRAMINE HYDROCHLORIDE AND LIDOCAINE HYDROCHLORIDE AND ALUMINUM HYDROXIDE AND MAGNESIUM HYDRO 4 MILLILITER(S): KIT at 13:30

## 2023-01-01 RX ADMIN — Medication 12.5 MILLIGRAM(S): at 09:45

## 2023-01-01 RX ADMIN — Medication 81 MILLIGRAM(S): at 12:17

## 2023-01-01 RX ADMIN — HYDROMORPHONE HYDROCHLORIDE 1 MILLIGRAM(S): 2 INJECTION INTRAMUSCULAR; INTRAVENOUS; SUBCUTANEOUS at 00:17

## 2023-01-01 RX ADMIN — Medication 1 DROP(S): at 12:41

## 2023-01-01 RX ADMIN — Medication 1 APPLICATION(S): at 06:47

## 2023-01-01 RX ADMIN — ROBINUL 0.4 MILLIGRAM(S): 0.2 INJECTION INTRAMUSCULAR; INTRAVENOUS at 15:40

## 2023-01-01 RX ADMIN — PANTOPRAZOLE SODIUM 40 MILLIGRAM(S): 20 TABLET, DELAYED RELEASE ORAL at 11:56

## 2023-01-01 RX ADMIN — Medication 0.5 MILLIGRAM(S): at 03:46

## 2023-01-01 RX ADMIN — LIDOCAINE 5 MILLILITER(S): 4 CREAM TOPICAL at 18:23

## 2023-01-01 RX ADMIN — POTASSIUM PHOSPHATE, MONOBASIC POTASSIUM PHOSPHATE, DIBASIC 83.33 MILLIMOLE(S): 236; 224 INJECTION, SOLUTION INTRAVENOUS at 10:58

## 2023-01-01 RX ADMIN — PALONOSETRON HYDROCHLORIDE 0.25 MILLIGRAM(S): 0.25 INJECTION, SOLUTION INTRAVENOUS at 13:20

## 2023-01-01 RX ADMIN — Medication 3 MILLILITER(S): at 12:48

## 2023-01-01 RX ADMIN — Medication 3 MILLILITER(S): at 22:33

## 2023-01-01 RX ADMIN — HYDROMORPHONE HYDROCHLORIDE 0.5 MILLIGRAM(S): 2 INJECTION INTRAMUSCULAR; INTRAVENOUS; SUBCUTANEOUS at 04:47

## 2023-01-01 RX ADMIN — Medication 2.5 MILLIGRAM(S): at 19:03

## 2023-01-01 RX ADMIN — Medication 2.5 MILLIGRAM(S): at 23:07

## 2023-01-01 RX ADMIN — Medication 3 MILLILITER(S): at 21:35

## 2023-01-01 RX ADMIN — ATORVASTATIN CALCIUM 80 MILLIGRAM(S): 80 TABLET, FILM COATED ORAL at 22:22

## 2023-01-01 RX ADMIN — SODIUM CHLORIDE 4 MILLILITER(S): 9 INJECTION INTRAMUSCULAR; INTRAVENOUS; SUBCUTANEOUS at 09:54

## 2023-01-01 RX ADMIN — PANTOPRAZOLE SODIUM 40 MILLIGRAM(S): 20 TABLET, DELAYED RELEASE ORAL at 12:24

## 2023-01-01 RX ADMIN — HYDROMORPHONE HYDROCHLORIDE 0.5 MILLIGRAM(S): 2 INJECTION INTRAMUSCULAR; INTRAVENOUS; SUBCUTANEOUS at 21:03

## 2023-01-01 RX ADMIN — Medication 12.5 MILLIGRAM(S): at 06:55

## 2023-01-01 RX ADMIN — Medication 3 MILLILITER(S): at 00:04

## 2023-01-01 RX ADMIN — DIPHENHYDRAMINE HYDROCHLORIDE AND LIDOCAINE HYDROCHLORIDE AND ALUMINUM HYDROXIDE AND MAGNESIUM HYDRO 4 MILLILITER(S): KIT at 00:12

## 2023-01-01 RX ADMIN — Medication 7 UNIT(S): at 16:51

## 2023-01-01 RX ADMIN — Medication 1 DROP(S): at 21:50

## 2023-01-01 RX ADMIN — Medication 1 DROP(S): at 23:18

## 2023-01-01 RX ADMIN — LIDOCAINE 5 MILLILITER(S): 4 CREAM TOPICAL at 06:21

## 2023-01-01 RX ADMIN — HYDROMORPHONE HYDROCHLORIDE 0.5 MILLIGRAM(S): 2 INJECTION INTRAMUSCULAR; INTRAVENOUS; SUBCUTANEOUS at 02:09

## 2023-01-01 RX ADMIN — SODIUM CHLORIDE 1000 MILLILITER(S): 9 INJECTION INTRAMUSCULAR; INTRAVENOUS; SUBCUTANEOUS at 11:49

## 2023-01-01 RX ADMIN — OLANZAPINE 2.5 MILLIGRAM(S): 15 TABLET, FILM COATED ORAL at 00:10

## 2023-01-01 RX ADMIN — SODIUM CHLORIDE 4 MILLILITER(S): 9 INJECTION INTRAMUSCULAR; INTRAVENOUS; SUBCUTANEOUS at 01:08

## 2023-01-01 RX ADMIN — LIDOCAINE 5 MILLILITER(S): 4 CREAM TOPICAL at 06:52

## 2023-01-01 RX ADMIN — HYDROMORPHONE HYDROCHLORIDE 0.5 MILLIGRAM(S): 2 INJECTION INTRAMUSCULAR; INTRAVENOUS; SUBCUTANEOUS at 10:52

## 2023-01-01 RX ADMIN — Medication 3 MILLILITER(S): at 09:47

## 2023-01-01 RX ADMIN — Medication 3 MILLILITER(S): at 08:00

## 2023-01-01 RX ADMIN — DIPHENHYDRAMINE HYDROCHLORIDE AND LIDOCAINE HYDROCHLORIDE AND ALUMINUM HYDROXIDE AND MAGNESIUM HYDRO 4 MILLILITER(S): KIT at 09:09

## 2023-01-01 RX ADMIN — ROBINUL 0.4 MILLIGRAM(S): 0.2 INJECTION INTRAMUSCULAR; INTRAVENOUS at 03:51

## 2023-01-01 RX ADMIN — INSULIN GLARGINE 10 UNIT(S): 100 INJECTION, SOLUTION SUBCUTANEOUS at 22:39

## 2023-01-01 RX ADMIN — Medication 3 MILLILITER(S): at 19:07

## 2023-01-01 RX ADMIN — Medication 2.5 MILLIGRAM(S): at 06:45

## 2023-01-01 RX ADMIN — Medication 1 DROP(S): at 21:17

## 2023-01-01 RX ADMIN — Medication 6: at 07:47

## 2023-01-01 RX ADMIN — Medication 3 MILLILITER(S): at 19:53

## 2023-01-01 RX ADMIN — Medication 1 APPLICATION(S): at 18:42

## 2023-01-01 RX ADMIN — HALOPERIDOL DECANOATE 5 MILLIGRAM(S): 100 INJECTION INTRAMUSCULAR at 01:27

## 2023-01-01 RX ADMIN — Medication 12.5 MILLIGRAM(S): at 07:11

## 2023-01-01 RX ADMIN — HYDROMORPHONE HYDROCHLORIDE 1 MILLIGRAM(S): 2 INJECTION INTRAMUSCULAR; INTRAVENOUS; SUBCUTANEOUS at 04:58

## 2023-01-01 RX ADMIN — Medication 105 MILLIGRAM(S): at 10:39

## 2023-01-01 RX ADMIN — PACLITAXEL 80 MILLIGRAM(S): 6 INJECTION, SOLUTION, CONCENTRATE INTRAVENOUS at 14:10

## 2023-01-01 RX ADMIN — Medication 12.5 MILLIGRAM(S): at 22:01

## 2023-01-01 RX ADMIN — Medication 3 MILLILITER(S): at 22:16

## 2023-01-01 RX ADMIN — ATORVASTATIN CALCIUM 80 MILLIGRAM(S): 80 TABLET, FILM COATED ORAL at 22:42

## 2023-01-01 RX ADMIN — HYDROMORPHONE HYDROCHLORIDE 0.5 MILLIGRAM(S): 2 INJECTION INTRAMUSCULAR; INTRAVENOUS; SUBCUTANEOUS at 07:11

## 2023-01-01 RX ADMIN — INSULIN HUMAN 3 UNIT(S): 100 INJECTION, SOLUTION SUBCUTANEOUS at 08:45

## 2023-01-01 RX ADMIN — Medication 1 DROP(S): at 06:25

## 2023-01-01 RX ADMIN — Medication 3 MILLILITER(S): at 03:46

## 2023-01-01 RX ADMIN — ENOXAPARIN SODIUM 40 MILLIGRAM(S): 100 INJECTION SUBCUTANEOUS at 12:14

## 2023-01-01 RX ADMIN — Medication 12 MILLIGRAM(S): at 14:45

## 2023-01-01 RX ADMIN — Medication 3 MILLILITER(S): at 14:05

## 2023-01-01 RX ADMIN — HYDROMORPHONE HYDROCHLORIDE 0.5 MILLIGRAM(S): 2 INJECTION INTRAMUSCULAR; INTRAVENOUS; SUBCUTANEOUS at 07:00

## 2023-01-01 RX ADMIN — Medication 202 MILLIGRAM(S): at 12:50

## 2023-01-01 RX ADMIN — SCOPALAMINE 1 PATCH: 1 PATCH, EXTENDED RELEASE TRANSDERMAL at 17:27

## 2023-01-01 RX ADMIN — Medication 600 MILLIGRAM(S): at 21:00

## 2023-01-01 RX ADMIN — LIDOCAINE 5 MILLILITER(S): 4 CREAM TOPICAL at 18:33

## 2023-01-01 RX ADMIN — INSULIN GLARGINE 15 UNIT(S): 100 INJECTION, SOLUTION SUBCUTANEOUS at 23:45

## 2023-01-01 RX ADMIN — HYDROMORPHONE HYDROCHLORIDE 0.2 MG/HR: 2 INJECTION INTRAMUSCULAR; INTRAVENOUS; SUBCUTANEOUS at 12:45

## 2023-01-01 RX ADMIN — Medication 25 MILLIGRAM(S): at 07:08

## 2023-01-01 RX ADMIN — Medication 2: at 18:27

## 2023-01-01 RX ADMIN — Medication 12.5 MILLIGRAM(S): at 20:49

## 2023-01-01 RX ADMIN — SODIUM CHLORIDE 60 MILLILITER(S): 9 INJECTION, SOLUTION INTRAVENOUS at 15:28

## 2023-01-01 RX ADMIN — HYDROMORPHONE HYDROCHLORIDE 0.5 MILLIGRAM(S): 2 INJECTION INTRAMUSCULAR; INTRAVENOUS; SUBCUTANEOUS at 01:03

## 2023-01-01 RX ADMIN — Medication 12.5 MILLIGRAM(S): at 08:46

## 2023-01-01 RX ADMIN — PIPERACILLIN AND TAZOBACTAM 25 GRAM(S): 4; .5 INJECTION, POWDER, LYOPHILIZED, FOR SOLUTION INTRAVENOUS at 22:00

## 2023-01-01 RX ADMIN — Medication 25 GRAM(S): at 11:23

## 2023-01-01 RX ADMIN — Medication 2: at 05:45

## 2023-01-01 RX ADMIN — MORPHINE SULFATE 10 MILLIGRAM(S): 50 CAPSULE, EXTENDED RELEASE ORAL at 03:55

## 2023-01-01 RX ADMIN — Medication 1 APPLICATION(S): at 17:09

## 2023-01-01 RX ADMIN — SODIUM CHLORIDE 1000 MILLILITER(S): 9 INJECTION INTRAMUSCULAR; INTRAVENOUS; SUBCUTANEOUS at 20:39

## 2023-01-01 RX ADMIN — HYDROMORPHONE HYDROCHLORIDE 0.5 MILLIGRAM(S): 2 INJECTION INTRAMUSCULAR; INTRAVENOUS; SUBCUTANEOUS at 02:21

## 2023-01-01 RX ADMIN — Medication 100 MILLIGRAM(S): at 16:05

## 2023-01-01 RX ADMIN — INSULIN GLARGINE 15 UNIT(S): 100 INJECTION, SOLUTION SUBCUTANEOUS at 22:17

## 2023-01-01 RX ADMIN — Medication 1.25 MILLIGRAM(S): at 04:48

## 2023-01-01 RX ADMIN — ATORVASTATIN CALCIUM 80 MILLIGRAM(S): 80 TABLET, FILM COATED ORAL at 00:05

## 2023-01-01 RX ADMIN — SENNA PLUS 2 TABLET(S): 8.6 TABLET ORAL at 21:51

## 2023-01-01 RX ADMIN — Medication 10 MILLIGRAM(S): at 14:37

## 2023-01-01 RX ADMIN — SENNA PLUS 2 TABLET(S): 8.6 TABLET ORAL at 21:04

## 2023-01-01 RX ADMIN — QUETIAPINE FUMARATE 25 MILLIGRAM(S): 200 TABLET, FILM COATED ORAL at 21:52

## 2023-01-01 RX ADMIN — Medication 1 MILLIGRAM(S): at 04:38

## 2023-01-01 RX ADMIN — HYDROMORPHONE HYDROCHLORIDE 0.5 MILLIGRAM(S): 2 INJECTION INTRAMUSCULAR; INTRAVENOUS; SUBCUTANEOUS at 10:06

## 2023-01-01 RX ADMIN — Medication 1 DROP(S): at 14:12

## 2023-01-01 RX ADMIN — PANTOPRAZOLE SODIUM 40 MILLIGRAM(S): 20 TABLET, DELAYED RELEASE ORAL at 23:58

## 2023-01-01 RX ADMIN — Medication 3 MILLILITER(S): at 13:43

## 2023-01-01 RX ADMIN — HYDROMORPHONE HYDROCHLORIDE 0.5 MILLIGRAM(S): 2 INJECTION INTRAMUSCULAR; INTRAVENOUS; SUBCUTANEOUS at 11:14

## 2023-01-01 RX ADMIN — Medication 1 DROP(S): at 14:53

## 2023-01-01 RX ADMIN — Medication 81 MILLIGRAM(S): at 09:45

## 2023-01-01 RX ADMIN — Medication 2 MILLIGRAM(S): at 17:47

## 2023-01-01 RX ADMIN — HYDROMORPHONE HYDROCHLORIDE 1 MILLIGRAM(S): 2 INJECTION INTRAMUSCULAR; INTRAVENOUS; SUBCUTANEOUS at 14:23

## 2023-01-01 RX ADMIN — Medication 1 DROP(S): at 07:13

## 2023-01-01 RX ADMIN — Medication 3 MILLILITER(S): at 06:26

## 2023-01-01 RX ADMIN — Medication 1 APPLICATION(S): at 19:40

## 2023-01-01 RX ADMIN — Medication 2.5 MILLIGRAM(S): at 17:12

## 2023-01-01 RX ADMIN — ROBINUL 0.4 MILLIGRAM(S): 0.2 INJECTION INTRAMUSCULAR; INTRAVENOUS at 10:16

## 2023-01-01 RX ADMIN — Medication 1 DROP(S): at 12:16

## 2023-01-01 RX ADMIN — MORPHINE SULFATE 4 MILLIGRAM(S): 50 CAPSULE, EXTENDED RELEASE ORAL at 20:40

## 2023-01-01 RX ADMIN — HYDROMORPHONE HYDROCHLORIDE 0.5 MILLIGRAM(S): 2 INJECTION INTRAMUSCULAR; INTRAVENOUS; SUBCUTANEOUS at 13:17

## 2023-01-01 RX ADMIN — Medication 2: at 12:55

## 2023-01-01 RX ADMIN — SENNA PLUS 2 TABLET(S): 8.6 TABLET ORAL at 21:47

## 2023-01-01 RX ADMIN — HYDROMORPHONE HYDROCHLORIDE 0.5 MILLIGRAM(S): 2 INJECTION INTRAMUSCULAR; INTRAVENOUS; SUBCUTANEOUS at 15:29

## 2023-01-01 RX ADMIN — Medication 1 APPLICATION(S): at 11:19

## 2023-01-01 RX ADMIN — Medication 1 DROP(S): at 22:34

## 2023-01-01 RX ADMIN — HEPARIN SODIUM 5000 UNIT(S): 5000 INJECTION INTRAVENOUS; SUBCUTANEOUS at 07:48

## 2023-01-01 RX ADMIN — Medication 6: at 11:59

## 2023-01-01 RX ADMIN — Medication 3 MILLILITER(S): at 03:00

## 2023-01-01 RX ADMIN — DIPHENHYDRAMINE HYDROCHLORIDE AND LIDOCAINE HYDROCHLORIDE AND ALUMINUM HYDROXIDE AND MAGNESIUM HYDRO 10 MILLILITER(S): KIT at 14:01

## 2023-01-01 RX ADMIN — PIPERACILLIN AND TAZOBACTAM 25 GRAM(S): 4; .5 INJECTION, POWDER, LYOPHILIZED, FOR SOLUTION INTRAVENOUS at 02:01

## 2023-01-01 RX ADMIN — Medication 3 MILLILITER(S): at 14:12

## 2023-01-01 RX ADMIN — DIPHENHYDRAMINE HYDROCHLORIDE AND LIDOCAINE HYDROCHLORIDE AND ALUMINUM HYDROXIDE AND MAGNESIUM HYDRO 10 MILLILITER(S): KIT at 12:07

## 2023-01-01 RX ADMIN — PIPERACILLIN AND TAZOBACTAM 25 GRAM(S): 4; .5 INJECTION, POWDER, LYOPHILIZED, FOR SOLUTION INTRAVENOUS at 14:25

## 2023-01-01 RX ADMIN — Medication 1 APPLICATION(S): at 18:20

## 2023-01-01 RX ADMIN — ALBUTEROL 2 PUFF(S): 90 AEROSOL, METERED ORAL at 09:15

## 2023-01-01 RX ADMIN — SODIUM CHLORIDE 75 MILLILITER(S): 9 INJECTION, SOLUTION INTRAVENOUS at 21:45

## 2023-01-01 RX ADMIN — LIDOCAINE 5 MILLILITER(S): 4 CREAM TOPICAL at 18:19

## 2023-01-01 RX ADMIN — Medication 400 MILLIGRAM(S): at 20:00

## 2023-01-01 RX ADMIN — Medication 3 MILLILITER(S): at 20:00

## 2023-01-01 RX ADMIN — ALBUTEROL 2 PUFF(S): 90 AEROSOL, METERED ORAL at 16:38

## 2023-01-01 RX ADMIN — Medication 2: at 16:15

## 2023-01-01 RX ADMIN — INSULIN GLARGINE 15 UNIT(S): 100 INJECTION, SOLUTION SUBCUTANEOUS at 23:06

## 2023-01-01 RX ADMIN — SCOPALAMINE 1 PATCH: 1 PATCH, EXTENDED RELEASE TRANSDERMAL at 07:23

## 2023-01-01 RX ADMIN — Medication 1 DROP(S): at 14:45

## 2023-01-01 RX ADMIN — Medication 1.25 MILLIGRAM(S): at 04:38

## 2023-01-01 RX ADMIN — SCOPALAMINE 1 PATCH: 1 PATCH, EXTENDED RELEASE TRANSDERMAL at 06:14

## 2023-01-01 RX ADMIN — HYDROMORPHONE HYDROCHLORIDE 0.5 MILLIGRAM(S): 2 INJECTION INTRAMUSCULAR; INTRAVENOUS; SUBCUTANEOUS at 06:54

## 2023-01-01 RX ADMIN — ISOSORBIDE MONONITRATE 30 MILLIGRAM(S): 60 TABLET, EXTENDED RELEASE ORAL at 12:18

## 2023-01-01 RX ADMIN — Medication 100 MILLIGRAM(S): at 15:40

## 2023-01-01 RX ADMIN — LIDOCAINE 5 MILLILITER(S): 4 CREAM TOPICAL at 19:07

## 2023-01-01 RX ADMIN — Medication 2: at 18:05

## 2023-01-01 RX ADMIN — Medication 1 MILLIGRAM(S): at 23:18

## 2023-01-01 RX ADMIN — HEPARIN SODIUM 5000 UNIT(S): 5000 INJECTION INTRAVENOUS; SUBCUTANEOUS at 15:24

## 2023-01-01 RX ADMIN — Medication 3 MILLILITER(S): at 09:11

## 2023-01-01 RX ADMIN — HYDROMORPHONE HYDROCHLORIDE 0.5 MILLIGRAM(S): 2 INJECTION INTRAMUSCULAR; INTRAVENOUS; SUBCUTANEOUS at 09:34

## 2023-01-01 RX ADMIN — Medication 204 MILLIGRAM(S): at 14:22

## 2023-01-01 RX ADMIN — SODIUM CHLORIDE 75 MILLILITER(S): 9 INJECTION, SOLUTION INTRAVENOUS at 21:04

## 2023-01-01 RX ADMIN — Medication 3 MILLILITER(S): at 06:09

## 2023-01-01 RX ADMIN — HYDROMORPHONE HYDROCHLORIDE 0.5 MG/HR: 2 INJECTION INTRAMUSCULAR; INTRAVENOUS; SUBCUTANEOUS at 15:34

## 2023-01-01 RX ADMIN — Medication 12.5 MILLIGRAM(S): at 10:15

## 2023-01-01 RX ADMIN — INSULIN GLARGINE 10 UNIT(S): 100 INJECTION, SOLUTION SUBCUTANEOUS at 22:45

## 2023-01-01 RX ADMIN — HEPARIN SODIUM 5000 UNIT(S): 5000 INJECTION INTRAVENOUS; SUBCUTANEOUS at 06:29

## 2023-01-01 RX ADMIN — HYDROMORPHONE HYDROCHLORIDE 0.5 MILLIGRAM(S): 2 INJECTION INTRAMUSCULAR; INTRAVENOUS; SUBCUTANEOUS at 09:44

## 2023-01-01 RX ADMIN — Medication 1 DROP(S): at 14:19

## 2023-01-01 RX ADMIN — ATORVASTATIN CALCIUM 80 MILLIGRAM(S): 80 TABLET, FILM COATED ORAL at 22:13

## 2023-01-01 RX ADMIN — SODIUM CHLORIDE 110 MILLILITER(S): 9 INJECTION, SOLUTION INTRAVENOUS at 08:55

## 2023-01-01 RX ADMIN — Medication 1.25 MILLIGRAM(S): at 17:29

## 2023-01-01 RX ADMIN — HYDROMORPHONE HYDROCHLORIDE 0.5 MILLIGRAM(S): 2 INJECTION INTRAMUSCULAR; INTRAVENOUS; SUBCUTANEOUS at 08:40

## 2023-01-01 RX ADMIN — Medication 1 DROP(S): at 13:33

## 2023-01-01 RX ADMIN — SODIUM CHLORIDE 60 MILLILITER(S): 9 INJECTION, SOLUTION INTRAVENOUS at 22:34

## 2023-01-01 RX ADMIN — HYDROMORPHONE HYDROCHLORIDE 0.5 MILLIGRAM(S): 2 INJECTION INTRAMUSCULAR; INTRAVENOUS; SUBCUTANEOUS at 00:24

## 2023-01-01 RX ADMIN — ROBINUL 0.4 MILLIGRAM(S): 0.2 INJECTION INTRAMUSCULAR; INTRAVENOUS at 10:52

## 2023-01-01 RX ADMIN — Medication 3 MILLILITER(S): at 06:08

## 2023-01-01 RX ADMIN — HYDROMORPHONE HYDROCHLORIDE 0.5 MILLIGRAM(S): 2 INJECTION INTRAMUSCULAR; INTRAVENOUS; SUBCUTANEOUS at 07:09

## 2023-01-01 RX ADMIN — ALBUTEROL 2 PUFF(S): 90 AEROSOL, METERED ORAL at 03:49

## 2023-01-01 RX ADMIN — HYDROMORPHONE HYDROCHLORIDE 1 MILLIGRAM(S): 2 INJECTION INTRAMUSCULAR; INTRAVENOUS; SUBCUTANEOUS at 06:36

## 2023-01-01 RX ADMIN — Medication 3 MILLILITER(S): at 18:18

## 2023-01-01 RX ADMIN — DIPHENHYDRAMINE HYDROCHLORIDE AND LIDOCAINE HYDROCHLORIDE AND ALUMINUM HYDROXIDE AND MAGNESIUM HYDRO 4 MILLILITER(S): KIT at 06:08

## 2023-01-01 RX ADMIN — ALBUTEROL 2 PUFF(S): 90 AEROSOL, METERED ORAL at 04:00

## 2023-01-01 RX ADMIN — DIPHENHYDRAMINE HYDROCHLORIDE AND LIDOCAINE HYDROCHLORIDE AND ALUMINUM HYDROXIDE AND MAGNESIUM HYDRO 4 MILLILITER(S): KIT at 17:26

## 2023-01-01 RX ADMIN — SODIUM CHLORIDE 80 MILLILITER(S): 9 INJECTION INTRAMUSCULAR; INTRAVENOUS; SUBCUTANEOUS at 10:05

## 2023-01-01 RX ADMIN — PIPERACILLIN AND TAZOBACTAM 200 GRAM(S): 4; .5 INJECTION, POWDER, LYOPHILIZED, FOR SOLUTION INTRAVENOUS at 11:46

## 2023-01-01 RX ADMIN — HYDROMORPHONE HYDROCHLORIDE 0.5 MILLIGRAM(S): 2 INJECTION INTRAMUSCULAR; INTRAVENOUS; SUBCUTANEOUS at 15:10

## 2023-01-01 RX ADMIN — LIDOCAINE 5 MILLILITER(S): 4 CREAM TOPICAL at 06:48

## 2023-01-01 RX ADMIN — HYDROMORPHONE HYDROCHLORIDE 1 MILLIGRAM(S): 2 INJECTION INTRAMUSCULAR; INTRAVENOUS; SUBCUTANEOUS at 00:05

## 2023-01-01 RX ADMIN — MORPHINE SULFATE 10 MILLIGRAM(S): 50 CAPSULE, EXTENDED RELEASE ORAL at 07:05

## 2023-01-01 RX ADMIN — HYDROMORPHONE HYDROCHLORIDE 0.5 MILLIGRAM(S): 2 INJECTION INTRAMUSCULAR; INTRAVENOUS; SUBCUTANEOUS at 13:50

## 2023-01-01 RX ADMIN — Medication 81 MILLIGRAM(S): at 18:55

## 2023-01-01 RX ADMIN — Medication 1 DROP(S): at 11:25

## 2023-01-01 RX ADMIN — Medication 3 MILLILITER(S): at 07:33

## 2023-01-01 RX ADMIN — DIPHENHYDRAMINE HYDROCHLORIDE AND LIDOCAINE HYDROCHLORIDE AND ALUMINUM HYDROXIDE AND MAGNESIUM HYDRO 4 MILLILITER(S): KIT at 12:58

## 2023-01-01 RX ADMIN — ALBUTEROL 2 PUFF(S): 90 AEROSOL, METERED ORAL at 06:44

## 2023-01-01 RX ADMIN — ROBINUL 0.4 MILLIGRAM(S): 0.2 INJECTION INTRAMUSCULAR; INTRAVENOUS at 09:42

## 2023-01-01 RX ADMIN — ROBINUL 0.4 MILLIGRAM(S): 0.2 INJECTION INTRAMUSCULAR; INTRAVENOUS at 23:17

## 2023-01-01 RX ADMIN — SODIUM CHLORIDE 100 MILLILITER(S): 9 INJECTION INTRAMUSCULAR; INTRAVENOUS; SUBCUTANEOUS at 18:06

## 2023-01-01 RX ADMIN — PANTOPRAZOLE SODIUM 40 MILLIGRAM(S): 20 TABLET, DELAYED RELEASE ORAL at 06:49

## 2023-01-01 RX ADMIN — Medication 6: at 12:00

## 2023-01-01 RX ADMIN — Medication 3 MILLILITER(S): at 06:25

## 2023-01-01 RX ADMIN — Medication 6 UNIT(S): at 07:04

## 2023-01-01 RX ADMIN — HYDROMORPHONE HYDROCHLORIDE 0.5 MILLIGRAM(S): 2 INJECTION INTRAMUSCULAR; INTRAVENOUS; SUBCUTANEOUS at 19:12

## 2023-01-01 RX ADMIN — HYDROMORPHONE HYDROCHLORIDE 0.5 MILLIGRAM(S): 2 INJECTION INTRAMUSCULAR; INTRAVENOUS; SUBCUTANEOUS at 14:06

## 2023-01-01 RX ADMIN — Medication 400 MILLIGRAM(S): at 19:47

## 2023-01-01 RX ADMIN — Medication 12.5 MILLIGRAM(S): at 18:21

## 2023-01-01 RX ADMIN — DIPHENHYDRAMINE HYDROCHLORIDE AND LIDOCAINE HYDROCHLORIDE AND ALUMINUM HYDROXIDE AND MAGNESIUM HYDRO 4 MILLILITER(S): KIT at 06:46

## 2023-01-01 RX ADMIN — SCOPALAMINE 1 PATCH: 1 PATCH, EXTENDED RELEASE TRANSDERMAL at 19:12

## 2023-01-01 RX ADMIN — DIPHENHYDRAMINE HYDROCHLORIDE AND LIDOCAINE HYDROCHLORIDE AND ALUMINUM HYDROXIDE AND MAGNESIUM HYDRO 10 MILLILITER(S): KIT at 11:59

## 2023-01-01 RX ADMIN — ROBINUL 0.4 MILLIGRAM(S): 0.2 INJECTION INTRAMUSCULAR; INTRAVENOUS at 22:12

## 2023-01-01 RX ADMIN — HYDROMORPHONE HYDROCHLORIDE 0.5 MILLIGRAM(S): 2 INJECTION INTRAMUSCULAR; INTRAVENOUS; SUBCUTANEOUS at 20:10

## 2023-01-01 RX ADMIN — Medication 12.5 MILLIGRAM(S): at 21:52

## 2023-01-01 RX ADMIN — ENOXAPARIN SODIUM 40 MILLIGRAM(S): 100 INJECTION SUBCUTANEOUS at 18:46

## 2023-01-01 RX ADMIN — Medication 2: at 09:21

## 2023-01-01 RX ADMIN — Medication 81 MILLIGRAM(S): at 12:43

## 2023-01-01 RX ADMIN — PIPERACILLIN AND TAZOBACTAM 25 GRAM(S): 4; .5 INJECTION, POWDER, LYOPHILIZED, FOR SOLUTION INTRAVENOUS at 14:19

## 2023-01-01 RX ADMIN — DIPHENHYDRAMINE HYDROCHLORIDE AND LIDOCAINE HYDROCHLORIDE AND ALUMINUM HYDROXIDE AND MAGNESIUM HYDRO 4 MILLILITER(S): KIT at 06:14

## 2023-01-01 RX ADMIN — Medication 3 MILLILITER(S): at 14:00

## 2023-01-01 RX ADMIN — ATORVASTATIN CALCIUM 80 MILLIGRAM(S): 80 TABLET, FILM COATED ORAL at 21:21

## 2023-01-01 RX ADMIN — FOSAPREPITANT DIMEGLUMINE 150 MILLIGRAM(S): 150 INJECTION, POWDER, LYOPHILIZED, FOR SOLUTION INTRAVENOUS at 15:20

## 2023-01-01 RX ADMIN — DIPHENHYDRAMINE HYDROCHLORIDE AND LIDOCAINE HYDROCHLORIDE AND ALUMINUM HYDROXIDE AND MAGNESIUM HYDRO 4 MILLILITER(S): KIT at 13:34

## 2023-01-01 RX ADMIN — Medication 1 DROP(S): at 06:57

## 2023-01-01 RX ADMIN — Medication 0: at 22:45

## 2023-01-01 RX ADMIN — HYDROMORPHONE HYDROCHLORIDE 0.5 MILLIGRAM(S): 2 INJECTION INTRAMUSCULAR; INTRAVENOUS; SUBCUTANEOUS at 05:46

## 2023-01-01 RX ADMIN — Medication 0: at 22:16

## 2023-01-01 RX ADMIN — Medication 1 DROP(S): at 06:36

## 2023-01-01 RX ADMIN — FAMOTIDINE 20 MILLIGRAM(S): 10 INJECTION INTRAVENOUS at 13:48

## 2023-01-01 RX ADMIN — Medication 4: at 17:49

## 2023-01-01 RX ADMIN — Medication 1 DROP(S): at 07:52

## 2023-01-01 RX ADMIN — HYDROMORPHONE HYDROCHLORIDE 0.5 MILLIGRAM(S): 2 INJECTION INTRAMUSCULAR; INTRAVENOUS; SUBCUTANEOUS at 16:07

## 2023-01-01 RX ADMIN — PANTOPRAZOLE SODIUM 40 MILLIGRAM(S): 20 TABLET, DELAYED RELEASE ORAL at 00:05

## 2023-01-01 RX ADMIN — Medication 250 MILLIGRAM(S): at 12:54

## 2023-01-01 RX ADMIN — INSULIN GLARGINE 15 UNIT(S): 100 INJECTION, SOLUTION SUBCUTANEOUS at 22:28

## 2023-01-01 RX ADMIN — DIPHENHYDRAMINE HYDROCHLORIDE AND LIDOCAINE HYDROCHLORIDE AND ALUMINUM HYDROXIDE AND MAGNESIUM HYDRO 4 MILLILITER(S): KIT at 13:18

## 2023-01-01 RX ADMIN — Medication 1 APPLICATION(S): at 13:52

## 2023-01-01 RX ADMIN — DIPHENHYDRAMINE HYDROCHLORIDE AND LIDOCAINE HYDROCHLORIDE AND ALUMINUM HYDROXIDE AND MAGNESIUM HYDRO 4 MILLILITER(S): KIT at 19:05

## 2023-01-01 RX ADMIN — PIPERACILLIN AND TAZOBACTAM 25 GRAM(S): 4; .5 INJECTION, POWDER, LYOPHILIZED, FOR SOLUTION INTRAVENOUS at 17:48

## 2023-01-01 RX ADMIN — Medication 3 MILLILITER(S): at 05:00

## 2023-01-01 RX ADMIN — Medication 2: at 12:07

## 2023-01-01 RX ADMIN — Medication 2.5 MILLIGRAM(S): at 00:00

## 2023-01-01 RX ADMIN — RANOLAZINE 500 MILLIGRAM(S): 500 TABLET, FILM COATED, EXTENDED RELEASE ORAL at 05:00

## 2023-01-01 RX ADMIN — HYDROMORPHONE HYDROCHLORIDE 0.5 MILLIGRAM(S): 2 INJECTION INTRAMUSCULAR; INTRAVENOUS; SUBCUTANEOUS at 14:08

## 2023-01-01 RX ADMIN — SCOPALAMINE 1 PATCH: 1 PATCH, EXTENDED RELEASE TRANSDERMAL at 00:17

## 2023-01-01 RX ADMIN — SODIUM CHLORIDE 4 MILLILITER(S): 9 INJECTION INTRAMUSCULAR; INTRAVENOUS; SUBCUTANEOUS at 02:01

## 2023-01-01 RX ADMIN — Medication 1 APPLICATION(S): at 17:52

## 2023-01-01 RX ADMIN — Medication 3 MILLILITER(S): at 21:39

## 2023-01-01 RX ADMIN — Medication 100 MILLIEQUIVALENT(S): at 16:07

## 2023-01-01 RX ADMIN — Medication 2.5 MILLIGRAM(S): at 17:50

## 2023-01-01 RX ADMIN — LISINOPRIL 20 MILLIGRAM(S): 2.5 TABLET ORAL at 05:31

## 2023-01-01 RX ADMIN — Medication 2.5 MILLIGRAM(S): at 17:25

## 2023-01-01 RX ADMIN — Medication 1.25 MILLIGRAM(S): at 22:58

## 2023-01-01 RX ADMIN — LIDOCAINE 5 MILLILITER(S): 4 CREAM TOPICAL at 06:47

## 2023-01-01 RX ADMIN — SCOPALAMINE 1 PATCH: 1 PATCH, EXTENDED RELEASE TRANSDERMAL at 08:23

## 2023-01-01 RX ADMIN — Medication 1.25 MILLIGRAM(S): at 15:06

## 2023-01-01 RX ADMIN — Medication 25 GRAM(S): at 00:06

## 2023-01-01 RX ADMIN — DIPHENHYDRAMINE HYDROCHLORIDE AND LIDOCAINE HYDROCHLORIDE AND ALUMINUM HYDROXIDE AND MAGNESIUM HYDRO 4 MILLILITER(S): KIT at 13:01

## 2023-01-01 RX ADMIN — ALBUTEROL 2 PUFF(S): 90 AEROSOL, METERED ORAL at 17:02

## 2023-01-01 RX ADMIN — Medication 1 APPLICATION(S): at 19:59

## 2023-01-01 RX ADMIN — Medication 3 MILLILITER(S): at 23:18

## 2023-01-01 RX ADMIN — LATANOPROST 1 DROP(S): 0.05 SOLUTION/ DROPS OPHTHALMIC; TOPICAL at 21:07

## 2023-01-01 RX ADMIN — Medication 3 MILLILITER(S): at 13:58

## 2023-01-01 RX ADMIN — Medication 1 APPLICATION(S): at 19:02

## 2023-01-01 RX ADMIN — SODIUM CHLORIDE 150 MILLILITER(S): 9 INJECTION INTRAMUSCULAR; INTRAVENOUS; SUBCUTANEOUS at 11:50

## 2023-01-01 RX ADMIN — OLANZAPINE 5 MILLIGRAM(S): 15 TABLET, FILM COATED ORAL at 21:29

## 2023-01-01 RX ADMIN — DIPHENHYDRAMINE HYDROCHLORIDE AND LIDOCAINE HYDROCHLORIDE AND ALUMINUM HYDROXIDE AND MAGNESIUM HYDRO 4 MILLILITER(S): KIT at 18:13

## 2023-01-01 RX ADMIN — Medication 1 APPLICATION(S): at 12:02

## 2023-01-01 RX ADMIN — Medication 25 GRAM(S): at 09:47

## 2023-01-01 RX ADMIN — HYDROMORPHONE HYDROCHLORIDE 0.5 MILLIGRAM(S): 2 INJECTION INTRAMUSCULAR; INTRAVENOUS; SUBCUTANEOUS at 12:10

## 2023-01-01 RX ADMIN — PACLITAXEL 80 MILLIGRAM(S): 6 INJECTION, SOLUTION, CONCENTRATE INTRAVENOUS at 15:10

## 2023-01-01 RX ADMIN — PIPERACILLIN AND TAZOBACTAM 25 GRAM(S): 4; .5 INJECTION, POWDER, LYOPHILIZED, FOR SOLUTION INTRAVENOUS at 05:20

## 2023-01-01 RX ADMIN — PANTOPRAZOLE SODIUM 40 MILLIGRAM(S): 20 TABLET, DELAYED RELEASE ORAL at 00:42

## 2023-01-01 RX ADMIN — Medication 1 MILLIGRAM(S): at 06:28

## 2023-01-01 RX ADMIN — Medication 1.25 MILLIGRAM(S): at 04:46

## 2023-01-01 RX ADMIN — Medication 1 DROP(S): at 12:01

## 2023-01-01 RX ADMIN — Medication 1 APPLICATION(S): at 07:00

## 2023-01-01 RX ADMIN — HYDROMORPHONE HYDROCHLORIDE 1 MILLIGRAM(S): 2 INJECTION INTRAMUSCULAR; INTRAVENOUS; SUBCUTANEOUS at 22:36

## 2023-01-01 RX ADMIN — Medication 3 MILLILITER(S): at 22:28

## 2023-01-01 RX ADMIN — Medication 0.5 MILLIGRAM(S): at 19:59

## 2023-01-01 RX ADMIN — OLANZAPINE 2.5 MILLIGRAM(S): 15 TABLET, FILM COATED ORAL at 00:46

## 2023-01-01 RX ADMIN — Medication 1 DROP(S): at 23:44

## 2023-01-01 RX ADMIN — Medication 81 MILLIGRAM(S): at 12:42

## 2023-01-01 RX ADMIN — ENOXAPARIN SODIUM 40 MILLIGRAM(S): 100 INJECTION SUBCUTANEOUS at 18:03

## 2023-01-01 RX ADMIN — Medication 1 DROP(S): at 22:00

## 2023-01-01 RX ADMIN — Medication 3 MILLILITER(S): at 06:55

## 2023-01-01 RX ADMIN — Medication 3 MILLILITER(S): at 23:16

## 2023-01-01 RX ADMIN — ONDANSETRON 232 MILLIGRAM(S): 8 TABLET, FILM COATED ORAL at 13:45

## 2023-01-01 RX ADMIN — CISPLATIN 74 MILLIGRAM(S): 1 INJECTION, SOLUTION INTRAVENOUS at 15:59

## 2023-01-01 RX ADMIN — Medication 6 UNIT(S): at 10:04

## 2023-01-01 RX ADMIN — ENOXAPARIN SODIUM 40 MILLIGRAM(S): 100 INJECTION SUBCUTANEOUS at 19:04

## 2023-01-01 RX ADMIN — Medication 2: at 23:59

## 2023-01-01 RX ADMIN — Medication 3 MILLILITER(S): at 05:31

## 2023-01-01 RX ADMIN — HYDROMORPHONE HYDROCHLORIDE 0.5 MILLIGRAM(S): 2 INJECTION INTRAMUSCULAR; INTRAVENOUS; SUBCUTANEOUS at 01:25

## 2023-01-01 RX ADMIN — DIPHENHYDRAMINE HYDROCHLORIDE AND LIDOCAINE HYDROCHLORIDE AND ALUMINUM HYDROXIDE AND MAGNESIUM HYDRO 4 MILLILITER(S): KIT at 17:20

## 2023-01-01 RX ADMIN — Medication 2.5 MILLIGRAM(S): at 20:02

## 2023-01-01 RX ADMIN — Medication 1 DROP(S): at 14:08

## 2023-01-01 RX ADMIN — ENOXAPARIN SODIUM 40 MILLIGRAM(S): 100 INJECTION SUBCUTANEOUS at 17:46

## 2023-01-01 RX ADMIN — Medication 400 MILLIGRAM(S): at 13:45

## 2023-01-01 RX ADMIN — PANTOPRAZOLE SODIUM 40 MILLIGRAM(S): 20 TABLET, DELAYED RELEASE ORAL at 13:56

## 2023-01-01 RX ADMIN — Medication 70 MILLILITER(S): at 12:47

## 2023-01-01 RX ADMIN — ROBINUL 0.4 MILLIGRAM(S): 0.2 INJECTION INTRAMUSCULAR; INTRAVENOUS at 21:33

## 2023-01-01 RX ADMIN — PACLITAXEL 80 MILLIGRAM(S): 6 INJECTION, SOLUTION, CONCENTRATE INTRAVENOUS at 14:05

## 2023-01-01 RX ADMIN — Medication 10 MILLIGRAM(S): at 14:46

## 2023-01-01 RX ADMIN — PALONOSETRON HYDROCHLORIDE 0.25 MILLIGRAM(S): 0.25 INJECTION, SOLUTION INTRAVENOUS at 14:16

## 2023-01-01 RX ADMIN — ROBINUL 0.4 MILLIGRAM(S): 0.2 INJECTION INTRAMUSCULAR; INTRAVENOUS at 04:02

## 2023-01-01 RX ADMIN — Medication 12.5 MILLIGRAM(S): at 00:04

## 2023-01-01 RX ADMIN — AMPICILLIN SODIUM AND SULBACTAM SODIUM 100 GRAM(S): 250; 125 INJECTION, POWDER, FOR SUSPENSION INTRAMUSCULAR; INTRAVENOUS at 23:42

## 2023-01-01 RX ADMIN — Medication 4: at 19:44

## 2023-01-01 RX ADMIN — ALBUTEROL 2 PUFF(S): 90 AEROSOL, METERED ORAL at 04:14

## 2023-01-01 RX ADMIN — Medication 2: at 01:08

## 2023-01-01 RX ADMIN — Medication 2.5 MILLIGRAM(S): at 07:14

## 2023-01-01 RX ADMIN — Medication 2: at 00:22

## 2023-01-01 RX ADMIN — HEPARIN SODIUM 5000 UNIT(S): 5000 INJECTION INTRAVENOUS; SUBCUTANEOUS at 15:23

## 2023-01-01 RX ADMIN — HYDROMORPHONE HYDROCHLORIDE 1 MILLIGRAM(S): 2 INJECTION INTRAMUSCULAR; INTRAVENOUS; SUBCUTANEOUS at 18:23

## 2023-01-01 RX ADMIN — Medication 0.5 MILLIGRAM(S): at 05:05

## 2023-01-01 RX ADMIN — Medication 25 MILLIGRAM(S): at 15:00

## 2023-01-01 RX ADMIN — SODIUM CHLORIDE 1000 MILLILITER(S): 9 INJECTION INTRAMUSCULAR; INTRAVENOUS; SUBCUTANEOUS at 18:07

## 2023-01-01 RX ADMIN — HYDROMORPHONE HYDROCHLORIDE 0.5 MILLIGRAM(S): 2 INJECTION INTRAMUSCULAR; INTRAVENOUS; SUBCUTANEOUS at 22:00

## 2023-01-01 RX ADMIN — SCOPALAMINE 1 PATCH: 1 PATCH, EXTENDED RELEASE TRANSDERMAL at 07:06

## 2023-01-01 RX ADMIN — Medication 1 APPLICATION(S): at 05:46

## 2023-01-01 RX ADMIN — Medication 12.5 MILLIGRAM(S): at 09:04

## 2023-01-01 RX ADMIN — SENNA PLUS 2 TABLET(S): 8.6 TABLET ORAL at 22:13

## 2023-01-01 RX ADMIN — HYDROMORPHONE HYDROCHLORIDE 0.5 MILLIGRAM(S): 2 INJECTION INTRAMUSCULAR; INTRAVENOUS; SUBCUTANEOUS at 05:45

## 2023-01-01 RX ADMIN — PANTOPRAZOLE SODIUM 40 MILLIGRAM(S): 20 TABLET, DELAYED RELEASE ORAL at 04:02

## 2023-01-01 RX ADMIN — Medication 1 APPLICATION(S): at 18:10

## 2023-01-01 RX ADMIN — HYDROMORPHONE HYDROCHLORIDE 0.5 MILLIGRAM(S): 2 INJECTION INTRAMUSCULAR; INTRAVENOUS; SUBCUTANEOUS at 04:25

## 2023-01-01 RX ADMIN — HYDROMORPHONE HYDROCHLORIDE 0.5 MILLIGRAM(S): 2 INJECTION INTRAMUSCULAR; INTRAVENOUS; SUBCUTANEOUS at 23:15

## 2023-01-01 RX ADMIN — INSULIN GLARGINE 10 UNIT(S): 100 INJECTION, SOLUTION SUBCUTANEOUS at 22:30

## 2023-01-01 RX ADMIN — LATANOPROST 1 DROP(S): 0.05 SOLUTION/ DROPS OPHTHALMIC; TOPICAL at 21:04

## 2023-01-01 RX ADMIN — HYDROMORPHONE HYDROCHLORIDE 0.5 MILLIGRAM(S): 2 INJECTION INTRAMUSCULAR; INTRAVENOUS; SUBCUTANEOUS at 06:08

## 2023-01-01 RX ADMIN — PIPERACILLIN AND TAZOBACTAM 25 GRAM(S): 4; .5 INJECTION, POWDER, LYOPHILIZED, FOR SOLUTION INTRAVENOUS at 05:15

## 2023-01-01 RX ADMIN — HYDROMORPHONE HYDROCHLORIDE 0.5 MILLIGRAM(S): 2 INJECTION INTRAMUSCULAR; INTRAVENOUS; SUBCUTANEOUS at 22:09

## 2023-01-01 RX ADMIN — Medication 6 UNIT(S): at 12:04

## 2023-01-01 RX ADMIN — SODIUM CHLORIDE 75 MILLILITER(S): 9 INJECTION, SOLUTION INTRAVENOUS at 18:06

## 2023-01-01 RX ADMIN — Medication 4: at 09:02

## 2023-01-01 RX ADMIN — SODIUM CHLORIDE 100 MILLILITER(S): 9 INJECTION INTRAMUSCULAR; INTRAVENOUS; SUBCUTANEOUS at 20:30

## 2023-01-01 RX ADMIN — Medication 1.25 MILLIGRAM(S): at 15:23

## 2023-01-01 RX ADMIN — INSULIN GLARGINE 48 UNIT(S): 100 INJECTION, SOLUTION SUBCUTANEOUS at 08:43

## 2023-01-01 RX ADMIN — LIDOCAINE 5 MILLILITER(S): 4 CREAM TOPICAL at 06:45

## 2023-01-01 RX ADMIN — SODIUM CHLORIDE 1000 MILLILITER(S): 9 INJECTION INTRAMUSCULAR; INTRAVENOUS; SUBCUTANEOUS at 15:17

## 2023-01-01 RX ADMIN — Medication 2.5 MILLIGRAM(S): at 00:14

## 2023-01-01 RX ADMIN — Medication 4: at 06:59

## 2023-01-01 RX ADMIN — HYDROMORPHONE HYDROCHLORIDE 0.5 MILLIGRAM(S): 2 INJECTION INTRAMUSCULAR; INTRAVENOUS; SUBCUTANEOUS at 07:20

## 2023-01-01 RX ADMIN — Medication 2: at 00:01

## 2023-01-01 RX ADMIN — AMPICILLIN SODIUM AND SULBACTAM SODIUM 100 GRAM(S): 250; 125 INJECTION, POWDER, FOR SUSPENSION INTRAMUSCULAR; INTRAVENOUS at 22:00

## 2023-01-01 RX ADMIN — DIPHENHYDRAMINE HYDROCHLORIDE AND LIDOCAINE HYDROCHLORIDE AND ALUMINUM HYDROXIDE AND MAGNESIUM HYDRO 4 MILLILITER(S): KIT at 18:02

## 2023-01-01 RX ADMIN — Medication 25 GRAM(S): at 10:46

## 2023-01-01 RX ADMIN — Medication 1000 MILLIGRAM(S): at 14:17

## 2023-01-01 RX ADMIN — DIPHENHYDRAMINE HYDROCHLORIDE AND LIDOCAINE HYDROCHLORIDE AND ALUMINUM HYDROXIDE AND MAGNESIUM HYDRO 4 MILLILITER(S): KIT at 00:53

## 2023-01-01 RX ADMIN — MORPHINE SULFATE 10 MILLIGRAM(S): 50 CAPSULE, EXTENDED RELEASE ORAL at 22:02

## 2023-01-01 RX ADMIN — Medication 4: at 00:03

## 2023-01-01 RX ADMIN — Medication 6: at 18:31

## 2023-01-01 RX ADMIN — HYDROMORPHONE HYDROCHLORIDE 0.5 MILLIGRAM(S): 2 INJECTION INTRAMUSCULAR; INTRAVENOUS; SUBCUTANEOUS at 21:48

## 2023-01-01 RX ADMIN — PIPERACILLIN AND TAZOBACTAM 25 GRAM(S): 4; .5 INJECTION, POWDER, LYOPHILIZED, FOR SOLUTION INTRAVENOUS at 05:05

## 2023-01-01 RX ADMIN — Medication 2.5 MILLIGRAM(S): at 18:20

## 2023-01-01 RX ADMIN — Medication 1 APPLICATION(S): at 16:24

## 2023-01-01 RX ADMIN — Medication 1 DROP(S): at 15:05

## 2023-01-01 RX ADMIN — Medication 2: at 17:48

## 2023-01-01 RX ADMIN — Medication 2.5 MILLIGRAM(S): at 10:21

## 2023-01-01 RX ADMIN — DIPHENHYDRAMINE HYDROCHLORIDE AND LIDOCAINE HYDROCHLORIDE AND ALUMINUM HYDROXIDE AND MAGNESIUM HYDRO 4 MILLILITER(S): KIT at 05:46

## 2023-01-01 RX ADMIN — HEPARIN SODIUM 5000 UNIT(S): 5000 INJECTION INTRAVENOUS; SUBCUTANEOUS at 14:13

## 2023-01-01 RX ADMIN — Medication 1 MILLIGRAM(S): at 21:33

## 2023-01-01 RX ADMIN — DIPHENHYDRAMINE HYDROCHLORIDE AND LIDOCAINE HYDROCHLORIDE AND ALUMINUM HYDROXIDE AND MAGNESIUM HYDRO 4 MILLILITER(S): KIT at 06:21

## 2023-01-01 RX ADMIN — Medication 3 MILLILITER(S): at 16:05

## 2023-01-01 RX ADMIN — ATORVASTATIN CALCIUM 80 MILLIGRAM(S): 80 TABLET, FILM COATED ORAL at 23:06

## 2023-01-01 RX ADMIN — Medication 1 APPLICATION(S): at 17:56

## 2023-01-01 RX ADMIN — LIDOCAINE 5 MILLILITER(S): 4 CREAM TOPICAL at 06:56

## 2023-01-01 RX ADMIN — Medication 7 UNIT(S): at 12:35

## 2023-01-01 RX ADMIN — Medication 3 MILLILITER(S): at 10:02

## 2023-01-01 RX ADMIN — Medication 1000 MILLIGRAM(S): at 22:45

## 2023-01-01 RX ADMIN — Medication 2: at 00:24

## 2023-01-01 RX ADMIN — DIPHENHYDRAMINE HYDROCHLORIDE AND LIDOCAINE HYDROCHLORIDE AND ALUMINUM HYDROXIDE AND MAGNESIUM HYDRO 4 MILLILITER(S): KIT at 17:08

## 2023-01-01 RX ADMIN — Medication 1 APPLICATION(S): at 06:04

## 2023-01-01 RX ADMIN — Medication 3 MILLILITER(S): at 18:27

## 2023-01-01 RX ADMIN — Medication 81 MILLIGRAM(S): at 13:21

## 2023-01-01 RX ADMIN — Medication 3 MILLILITER(S): at 01:54

## 2023-01-01 RX ADMIN — INSULIN GLARGINE 10 UNIT(S): 100 INJECTION, SOLUTION SUBCUTANEOUS at 22:14

## 2023-01-01 RX ADMIN — QUETIAPINE FUMARATE 50 MILLIGRAM(S): 200 TABLET, FILM COATED ORAL at 22:13

## 2023-01-01 RX ADMIN — DIPHENHYDRAMINE HYDROCHLORIDE AND LIDOCAINE HYDROCHLORIDE AND ALUMINUM HYDROXIDE AND MAGNESIUM HYDRO 4 MILLILITER(S): KIT at 06:24

## 2023-01-01 RX ADMIN — HEPARIN SODIUM 5000 UNIT(S): 5000 INJECTION INTRAVENOUS; SUBCUTANEOUS at 06:09

## 2023-01-01 RX ADMIN — Medication 1 DROP(S): at 05:28

## 2023-01-01 RX ADMIN — ALBUTEROL 2 PUFF(S): 90 AEROSOL, METERED ORAL at 09:47

## 2023-01-01 RX ADMIN — DIPHENHYDRAMINE HYDROCHLORIDE AND LIDOCAINE HYDROCHLORIDE AND ALUMINUM HYDROXIDE AND MAGNESIUM HYDRO 4 MILLILITER(S): KIT at 23:47

## 2023-01-01 RX ADMIN — POLYETHYLENE GLYCOL 3350 17 GRAM(S): 17 POWDER, FOR SOLUTION ORAL at 11:58

## 2023-01-01 RX ADMIN — POLYETHYLENE GLYCOL 3350 17 GRAM(S): 17 POWDER, FOR SOLUTION ORAL at 17:04

## 2023-01-01 RX ADMIN — Medication 0.5 MILLIGRAM(S): at 07:11

## 2023-01-01 RX ADMIN — Medication 1 DROP(S): at 06:29

## 2023-01-01 RX ADMIN — INSULIN GLARGINE 50 UNIT(S): 100 INJECTION, SOLUTION SUBCUTANEOUS at 09:16

## 2023-01-01 RX ADMIN — Medication 25 GRAM(S): at 13:07

## 2023-01-01 RX ADMIN — Medication 12 MILLIGRAM(S): at 14:20

## 2023-01-01 RX ADMIN — Medication 1 DROP(S): at 06:00

## 2023-01-01 RX ADMIN — LIDOCAINE 5 MILLILITER(S): 4 CREAM TOPICAL at 17:57

## 2023-01-01 RX ADMIN — HYDROMORPHONE HYDROCHLORIDE 0.5 MILLIGRAM(S): 2 INJECTION INTRAMUSCULAR; INTRAVENOUS; SUBCUTANEOUS at 12:55

## 2023-01-01 RX ADMIN — Medication 2: at 13:32

## 2023-01-01 RX ADMIN — PANTOPRAZOLE SODIUM 40 MILLIGRAM(S): 20 TABLET, DELAYED RELEASE ORAL at 12:53

## 2023-01-01 RX ADMIN — Medication 12.5 MILLIGRAM(S): at 05:30

## 2023-01-01 RX ADMIN — Medication 2.5 MILLIGRAM(S): at 11:33

## 2023-01-01 RX ADMIN — Medication 25 GRAM(S): at 11:14

## 2023-01-01 RX ADMIN — ALBUTEROL 2 PUFF(S): 90 AEROSOL, METERED ORAL at 04:30

## 2023-01-01 RX ADMIN — HALOPERIDOL DECANOATE 1 MILLIGRAM(S): 100 INJECTION INTRAMUSCULAR at 15:55

## 2023-01-01 RX ADMIN — HEPARIN SODIUM 5000 UNIT(S): 5000 INJECTION INTRAVENOUS; SUBCUTANEOUS at 13:33

## 2023-01-01 RX ADMIN — SODIUM CHLORIDE 4 MILLILITER(S): 9 INJECTION INTRAMUSCULAR; INTRAVENOUS; SUBCUTANEOUS at 18:27

## 2023-01-01 RX ADMIN — Medication 2.5 MILLIGRAM(S): at 18:10

## 2023-01-01 RX ADMIN — Medication 1 DROP(S): at 06:40

## 2023-01-01 RX ADMIN — ATORVASTATIN CALCIUM 80 MILLIGRAM(S): 80 TABLET, FILM COATED ORAL at 22:16

## 2023-01-01 RX ADMIN — Medication 2.5 MILLIGRAM(S): at 05:31

## 2023-01-01 RX ADMIN — LIDOCAINE 5 MILLILITER(S): 4 CREAM TOPICAL at 11:10

## 2023-01-01 RX ADMIN — Medication 2.5 MILLIGRAM(S): at 23:11

## 2023-01-01 RX ADMIN — ROBINUL 0.4 MILLIGRAM(S): 0.2 INJECTION INTRAMUSCULAR; INTRAVENOUS at 09:45

## 2023-01-01 RX ADMIN — HEPARIN SODIUM 5000 UNIT(S): 5000 INJECTION INTRAVENOUS; SUBCUTANEOUS at 14:02

## 2023-01-01 RX ADMIN — Medication 4: at 06:24

## 2023-01-01 RX ADMIN — Medication 1 DROP(S): at 22:03

## 2023-01-01 RX ADMIN — Medication 25 MILLIGRAM(S): at 13:05

## 2023-01-01 RX ADMIN — DIPHENHYDRAMINE HYDROCHLORIDE AND LIDOCAINE HYDROCHLORIDE AND ALUMINUM HYDROXIDE AND MAGNESIUM HYDRO 4 MILLILITER(S): KIT at 18:36

## 2023-01-01 RX ADMIN — HEPARIN SODIUM 5000 UNIT(S): 5000 INJECTION INTRAVENOUS; SUBCUTANEOUS at 06:23

## 2023-01-01 RX ADMIN — Medication 650 MILLIGRAM(S): at 09:15

## 2023-01-01 RX ADMIN — DIPHENHYDRAMINE HYDROCHLORIDE AND LIDOCAINE HYDROCHLORIDE AND ALUMINUM HYDROXIDE AND MAGNESIUM HYDRO 10 MILLILITER(S): KIT at 11:18

## 2023-01-01 RX ADMIN — HYDROMORPHONE HYDROCHLORIDE 0.5 MILLIGRAM(S): 2 INJECTION INTRAMUSCULAR; INTRAVENOUS; SUBCUTANEOUS at 18:27

## 2023-01-01 RX ADMIN — PANTOPRAZOLE SODIUM 40 MILLIGRAM(S): 20 TABLET, DELAYED RELEASE ORAL at 11:44

## 2023-01-01 RX ADMIN — ONDANSETRON 16 MILLIGRAM(S): 8 TABLET, FILM COATED ORAL at 14:04

## 2023-01-01 RX ADMIN — Medication 0.5 MILLIGRAM(S): at 13:38

## 2023-01-01 RX ADMIN — ENOXAPARIN SODIUM 40 MILLIGRAM(S): 100 INJECTION SUBCUTANEOUS at 17:03

## 2023-01-01 RX ADMIN — HYDROMORPHONE HYDROCHLORIDE 0.5 MG/HR: 2 INJECTION INTRAMUSCULAR; INTRAVENOUS; SUBCUTANEOUS at 16:16

## 2023-01-01 RX ADMIN — HYDROMORPHONE HYDROCHLORIDE 0.5 MILLIGRAM(S): 2 INJECTION INTRAMUSCULAR; INTRAVENOUS; SUBCUTANEOUS at 19:00

## 2023-01-01 RX ADMIN — Medication 1 APPLICATION(S): at 12:08

## 2023-01-01 RX ADMIN — Medication 81 MILLIGRAM(S): at 12:06

## 2023-01-01 RX ADMIN — HEPARIN SODIUM 5000 UNIT(S): 5000 INJECTION INTRAVENOUS; SUBCUTANEOUS at 21:44

## 2023-01-01 RX ADMIN — Medication 1 DROP(S): at 11:57

## 2023-01-01 RX ADMIN — ROBINUL 0.4 MILLIGRAM(S): 0.2 INJECTION INTRAMUSCULAR; INTRAVENOUS at 04:39

## 2023-01-01 RX ADMIN — DIPHENHYDRAMINE HYDROCHLORIDE AND LIDOCAINE HYDROCHLORIDE AND ALUMINUM HYDROXIDE AND MAGNESIUM HYDRO 4 MILLILITER(S): KIT at 11:40

## 2023-01-01 RX ADMIN — PANTOPRAZOLE SODIUM 40 MILLIGRAM(S): 20 TABLET, DELAYED RELEASE ORAL at 12:06

## 2023-01-01 RX ADMIN — DIPHENHYDRAMINE HYDROCHLORIDE AND LIDOCAINE HYDROCHLORIDE AND ALUMINUM HYDROXIDE AND MAGNESIUM HYDRO 10 MILLILITER(S): KIT at 11:50

## 2023-01-01 RX ADMIN — Medication 3 MILLILITER(S): at 13:32

## 2023-01-01 RX ADMIN — PIPERACILLIN AND TAZOBACTAM 200 GRAM(S): 4; .5 INJECTION, POWDER, LYOPHILIZED, FOR SOLUTION INTRAVENOUS at 07:03

## 2023-01-01 RX ADMIN — CISPLATIN 74 MILLIGRAM(S): 1 INJECTION, SOLUTION INTRAVENOUS at 17:01

## 2023-01-01 RX ADMIN — INSULIN GLARGINE 15 UNIT(S): 100 INJECTION, SOLUTION SUBCUTANEOUS at 21:47

## 2023-01-01 RX ADMIN — SODIUM CHLORIDE 75 MILLILITER(S): 9 INJECTION INTRAMUSCULAR; INTRAVENOUS; SUBCUTANEOUS at 02:02

## 2023-01-01 RX ADMIN — Medication 1 MILLIGRAM(S): at 09:47

## 2023-01-01 RX ADMIN — Medication 400 MILLIGRAM(S): at 18:56

## 2023-01-01 RX ADMIN — SODIUM CHLORIDE 80 MILLILITER(S): 9 INJECTION INTRAMUSCULAR; INTRAVENOUS; SUBCUTANEOUS at 23:49

## 2023-01-01 RX ADMIN — Medication 2 MILLIGRAM(S): at 06:21

## 2023-01-01 RX ADMIN — Medication 10 MILLIGRAM(S): at 12:45

## 2023-01-01 RX ADMIN — DIPHENHYDRAMINE HYDROCHLORIDE AND LIDOCAINE HYDROCHLORIDE AND ALUMINUM HYDROXIDE AND MAGNESIUM HYDRO 4 MILLILITER(S): KIT at 00:04

## 2023-01-01 RX ADMIN — Medication 1 DROP(S): at 06:33

## 2023-01-01 RX ADMIN — ENOXAPARIN SODIUM 40 MILLIGRAM(S): 100 INJECTION SUBCUTANEOUS at 18:01

## 2023-01-01 RX ADMIN — HEPARIN SODIUM 5000 UNIT(S): 5000 INJECTION INTRAVENOUS; SUBCUTANEOUS at 06:54

## 2023-01-01 RX ADMIN — Medication 2 MILLIGRAM(S): at 18:39

## 2023-01-01 RX ADMIN — HYDROMORPHONE HYDROCHLORIDE 1 MILLIGRAM(S): 2 INJECTION INTRAMUSCULAR; INTRAVENOUS; SUBCUTANEOUS at 03:08

## 2023-01-01 RX ADMIN — Medication 1 DROP(S): at 22:55

## 2023-01-01 RX ADMIN — LIDOCAINE 5 MILLILITER(S): 4 CREAM TOPICAL at 06:12

## 2023-01-01 RX ADMIN — Medication 25 GRAM(S): at 08:56

## 2023-01-01 RX ADMIN — Medication 1.25 MILLIGRAM(S): at 04:41

## 2023-01-01 RX ADMIN — Medication 25 MILLIGRAM(S): at 14:05

## 2023-01-01 RX ADMIN — PACLITAXEL 80 MILLIGRAM(S): 6 INJECTION, SOLUTION, CONCENTRATE INTRAVENOUS at 15:12

## 2023-01-01 RX ADMIN — HYDROMORPHONE HYDROCHLORIDE 0.5 MILLIGRAM(S): 2 INJECTION INTRAMUSCULAR; INTRAVENOUS; SUBCUTANEOUS at 05:50

## 2023-01-01 RX ADMIN — HYDROMORPHONE HYDROCHLORIDE 0.5 MILLIGRAM(S): 2 INJECTION INTRAMUSCULAR; INTRAVENOUS; SUBCUTANEOUS at 03:49

## 2023-01-01 RX ADMIN — Medication 0.5 MILLIGRAM(S): at 23:30

## 2023-01-01 RX ADMIN — Medication 212 MILLIGRAM(S): at 14:31

## 2023-01-01 RX ADMIN — SODIUM CHLORIDE 250 MILLILITER(S): 9 INJECTION, SOLUTION INTRAVENOUS at 17:02

## 2023-01-01 RX ADMIN — HYDROMORPHONE HYDROCHLORIDE 0.5 MILLIGRAM(S): 2 INJECTION INTRAMUSCULAR; INTRAVENOUS; SUBCUTANEOUS at 13:13

## 2023-01-01 RX ADMIN — HYDROMORPHONE HYDROCHLORIDE 0.5 MILLIGRAM(S): 2 INJECTION INTRAMUSCULAR; INTRAVENOUS; SUBCUTANEOUS at 09:27

## 2023-01-01 RX ADMIN — Medication 3 MILLILITER(S): at 22:39

## 2023-01-01 RX ADMIN — ISOSORBIDE MONONITRATE 30 MILLIGRAM(S): 60 TABLET, EXTENDED RELEASE ORAL at 17:27

## 2023-01-01 RX ADMIN — Medication 70 MILLILITER(S): at 23:30

## 2023-01-01 RX ADMIN — DIPHENHYDRAMINE HYDROCHLORIDE AND LIDOCAINE HYDROCHLORIDE AND ALUMINUM HYDROXIDE AND MAGNESIUM HYDRO 4 MILLILITER(S): KIT at 18:20

## 2023-01-01 RX ADMIN — HYDROMORPHONE HYDROCHLORIDE 0.5 MILLIGRAM(S): 2 INJECTION INTRAMUSCULAR; INTRAVENOUS; SUBCUTANEOUS at 07:50

## 2023-01-01 RX ADMIN — DIPHENHYDRAMINE HYDROCHLORIDE AND LIDOCAINE HYDROCHLORIDE AND ALUMINUM HYDROXIDE AND MAGNESIUM HYDRO 4 MILLILITER(S): KIT at 11:32

## 2023-01-01 RX ADMIN — Medication 3 MILLILITER(S): at 23:45

## 2023-01-01 RX ADMIN — Medication 1 MILLIGRAM(S): at 03:29

## 2023-01-01 RX ADMIN — Medication 50 MILLILITER(S): at 06:22

## 2023-01-01 RX ADMIN — Medication 650 MILLIGRAM(S): at 12:00

## 2023-01-01 RX ADMIN — Medication 2: at 17:04

## 2023-01-01 RX ADMIN — FOSAPREPITANT DIMEGLUMINE 500 MILLIGRAM(S): 150 INJECTION, POWDER, LYOPHILIZED, FOR SOLUTION INTRAVENOUS at 12:55

## 2023-01-01 RX ADMIN — HYDROMORPHONE HYDROCHLORIDE 1 MILLIGRAM(S): 2 INJECTION INTRAMUSCULAR; INTRAVENOUS; SUBCUTANEOUS at 21:40

## 2023-01-01 RX ADMIN — Medication 1 MILLIGRAM(S): at 10:16

## 2023-01-01 RX ADMIN — ROBINUL 0.4 MILLIGRAM(S): 0.2 INJECTION INTRAMUSCULAR; INTRAVENOUS at 23:57

## 2023-01-01 RX ADMIN — INSULIN GLARGINE 10 UNIT(S): 100 INJECTION, SOLUTION SUBCUTANEOUS at 22:13

## 2023-01-01 RX ADMIN — DIPHENHYDRAMINE HYDROCHLORIDE AND LIDOCAINE HYDROCHLORIDE AND ALUMINUM HYDROXIDE AND MAGNESIUM HYDRO 4 MILLILITER(S): KIT at 23:48

## 2023-01-01 RX ADMIN — Medication 2.5 MILLIGRAM(S): at 11:50

## 2023-01-01 RX ADMIN — Medication 0.5 MILLIGRAM(S): at 15:51

## 2023-01-01 RX ADMIN — Medication 1 APPLICATION(S): at 05:37

## 2023-01-01 RX ADMIN — Medication 1 DROP(S): at 23:21

## 2023-01-01 RX ADMIN — PALONOSETRON HYDROCHLORIDE 0.25 MILLIGRAM(S): 0.25 INJECTION, SOLUTION INTRAVENOUS at 12:06

## 2023-01-01 RX ADMIN — Medication 400 MILLIGRAM(S): at 07:27

## 2023-01-01 RX ADMIN — PANTOPRAZOLE SODIUM 40 MILLIGRAM(S): 20 TABLET, DELAYED RELEASE ORAL at 13:22

## 2023-01-01 RX ADMIN — Medication 1000 MILLIGRAM(S): at 07:52

## 2023-01-01 RX ADMIN — ROBINUL 0.4 MILLIGRAM(S): 0.2 INJECTION INTRAMUSCULAR; INTRAVENOUS at 09:34

## 2023-01-01 RX ADMIN — Medication 3 MILLILITER(S): at 09:29

## 2023-01-01 RX ADMIN — POTASSIUM PHOSPHATE, MONOBASIC POTASSIUM PHOSPHATE, DIBASIC 62.5 MILLIMOLE(S): 236; 224 INJECTION, SOLUTION INTRAVENOUS at 10:49

## 2023-01-01 RX ADMIN — Medication 202 MILLIGRAM(S): at 14:46

## 2023-01-01 RX ADMIN — HYDROMORPHONE HYDROCHLORIDE 0.5 MILLIGRAM(S): 2 INJECTION INTRAMUSCULAR; INTRAVENOUS; SUBCUTANEOUS at 05:23

## 2023-01-01 RX ADMIN — Medication 3 MILLILITER(S): at 19:21

## 2023-01-01 RX ADMIN — Medication 100 MILLIGRAM(S): at 15:45

## 2023-01-01 RX ADMIN — SODIUM CHLORIDE 100 MILLILITER(S): 9 INJECTION, SOLUTION INTRAVENOUS at 12:06

## 2023-01-01 RX ADMIN — FOSAPREPITANT DIMEGLUMINE 150 MILLIGRAM(S): 150 INJECTION, POWDER, LYOPHILIZED, FOR SOLUTION INTRAVENOUS at 13:25

## 2023-01-01 RX ADMIN — PIPERACILLIN AND TAZOBACTAM 25 GRAM(S): 4; .5 INJECTION, POWDER, LYOPHILIZED, FOR SOLUTION INTRAVENOUS at 21:28

## 2023-01-01 RX ADMIN — Medication 1 MILLIGRAM(S): at 10:10

## 2023-01-01 RX ADMIN — SODIUM CHLORIDE 4 MILLILITER(S): 9 INJECTION INTRAMUSCULAR; INTRAVENOUS; SUBCUTANEOUS at 06:13

## 2023-01-01 RX ADMIN — PIPERACILLIN AND TAZOBACTAM 25 GRAM(S): 4; .5 INJECTION, POWDER, LYOPHILIZED, FOR SOLUTION INTRAVENOUS at 16:27

## 2023-01-01 RX ADMIN — HYDROMORPHONE HYDROCHLORIDE 0.5 MILLIGRAM(S): 2 INJECTION INTRAMUSCULAR; INTRAVENOUS; SUBCUTANEOUS at 18:53

## 2023-01-01 RX ADMIN — HEPARIN SODIUM 5000 UNIT(S): 5000 INJECTION INTRAVENOUS; SUBCUTANEOUS at 15:09

## 2023-01-01 RX ADMIN — Medication 2: at 12:48

## 2023-01-01 RX ADMIN — HYDROMORPHONE HYDROCHLORIDE 0.5 MILLIGRAM(S): 2 INJECTION INTRAMUSCULAR; INTRAVENOUS; SUBCUTANEOUS at 02:50

## 2023-01-01 RX ADMIN — Medication 3 MILLILITER(S): at 21:44

## 2023-01-01 RX ADMIN — LIDOCAINE 5 MILLILITER(S): 4 CREAM TOPICAL at 18:11

## 2023-01-01 RX ADMIN — INSULIN GLARGINE 45 UNIT(S): 100 INJECTION, SOLUTION SUBCUTANEOUS at 09:16

## 2023-01-01 RX ADMIN — SCOPALAMINE 1 PATCH: 1 PATCH, EXTENDED RELEASE TRANSDERMAL at 06:47

## 2023-01-01 RX ADMIN — ATORVASTATIN CALCIUM 80 MILLIGRAM(S): 80 TABLET, FILM COATED ORAL at 22:02

## 2023-01-01 RX ADMIN — HYDROMORPHONE HYDROCHLORIDE 1 MILLIGRAM(S): 2 INJECTION INTRAMUSCULAR; INTRAVENOUS; SUBCUTANEOUS at 21:20

## 2023-01-01 RX ADMIN — Medication 100 MILLIGRAM(S): at 08:30

## 2023-01-01 RX ADMIN — HEPARIN SODIUM 5000 UNIT(S): 5000 INJECTION INTRAVENOUS; SUBCUTANEOUS at 06:34

## 2023-01-01 RX ADMIN — Medication 1 DROP(S): at 22:23

## 2023-01-01 RX ADMIN — Medication 1 MILLIGRAM(S): at 22:42

## 2023-01-01 RX ADMIN — Medication 3 MILLILITER(S): at 00:48

## 2023-01-01 RX ADMIN — Medication 0: at 22:03

## 2023-01-01 RX ADMIN — Medication 25 MILLIGRAM(S): at 15:01

## 2023-01-01 RX ADMIN — LIDOCAINE 5 MILLILITER(S): 4 CREAM TOPICAL at 06:09

## 2023-01-01 RX ADMIN — HEPARIN SODIUM 5000 UNIT(S): 5000 INJECTION INTRAVENOUS; SUBCUTANEOUS at 13:36

## 2023-01-01 RX ADMIN — HYDROMORPHONE HYDROCHLORIDE 0.5 MILLIGRAM(S): 2 INJECTION INTRAMUSCULAR; INTRAVENOUS; SUBCUTANEOUS at 23:18

## 2023-01-01 RX ADMIN — HYDROMORPHONE HYDROCHLORIDE 0.5 MILLIGRAM(S): 2 INJECTION INTRAMUSCULAR; INTRAVENOUS; SUBCUTANEOUS at 16:30

## 2023-01-01 RX ADMIN — SODIUM CHLORIDE 100 MILLILITER(S): 9 INJECTION INTRAMUSCULAR; INTRAVENOUS; SUBCUTANEOUS at 12:28

## 2023-01-01 RX ADMIN — SODIUM CHLORIDE 4 MILLILITER(S): 9 INJECTION INTRAMUSCULAR; INTRAVENOUS; SUBCUTANEOUS at 14:13

## 2023-01-01 RX ADMIN — HEPARIN SODIUM 5000 UNIT(S): 5000 INJECTION INTRAVENOUS; SUBCUTANEOUS at 05:31

## 2023-01-01 RX ADMIN — Medication 2: at 00:50

## 2023-01-01 RX ADMIN — SODIUM CHLORIDE 500 MILLILITER(S): 9 INJECTION INTRAMUSCULAR; INTRAVENOUS; SUBCUTANEOUS at 11:23

## 2023-01-01 RX ADMIN — Medication 2.5 MILLIGRAM(S): at 11:51

## 2023-01-01 RX ADMIN — Medication 1 APPLICATION(S): at 06:40

## 2023-01-01 RX ADMIN — Medication 25 GRAM(S): at 07:27

## 2023-01-01 RX ADMIN — Medication 1 DROP(S): at 16:46

## 2023-01-01 RX ADMIN — HYDROMORPHONE HYDROCHLORIDE 0.5 MILLIGRAM(S): 2 INJECTION INTRAMUSCULAR; INTRAVENOUS; SUBCUTANEOUS at 22:26

## 2023-01-01 RX ADMIN — Medication 105 MILLIGRAM(S): at 08:47

## 2023-01-01 RX ADMIN — Medication 2.5 MILLIGRAM(S): at 22:00

## 2023-01-01 RX ADMIN — Medication 3 MILLILITER(S): at 10:06

## 2023-01-01 RX ADMIN — SODIUM CHLORIDE 100 MILLILITER(S): 9 INJECTION INTRAMUSCULAR; INTRAVENOUS; SUBCUTANEOUS at 12:45

## 2023-01-01 RX ADMIN — Medication 3 MILLILITER(S): at 07:13

## 2023-01-01 RX ADMIN — HYDROMORPHONE HYDROCHLORIDE 0.5 MILLIGRAM(S): 2 INJECTION INTRAMUSCULAR; INTRAVENOUS; SUBCUTANEOUS at 08:08

## 2023-01-01 RX ADMIN — SCOPALAMINE 1 PATCH: 1 PATCH, EXTENDED RELEASE TRANSDERMAL at 19:22

## 2023-01-01 RX ADMIN — Medication 3 MILLILITER(S): at 07:00

## 2023-01-01 RX ADMIN — Medication 2: at 17:53

## 2023-01-01 RX ADMIN — LIDOCAINE 5 MILLILITER(S): 4 CREAM TOPICAL at 06:36

## 2023-01-01 RX ADMIN — Medication 3 MILLILITER(S): at 09:53

## 2023-01-01 RX ADMIN — PANTOPRAZOLE SODIUM 40 MILLIGRAM(S): 20 TABLET, DELAYED RELEASE ORAL at 11:51

## 2023-01-01 RX ADMIN — Medication 6: at 18:13

## 2023-01-01 RX ADMIN — LATANOPROST 1 DROP(S): 0.05 SOLUTION/ DROPS OPHTHALMIC; TOPICAL at 23:27

## 2023-01-01 RX ADMIN — Medication 1 MILLIGRAM(S): at 21:29

## 2023-01-01 RX ADMIN — Medication 25 GRAM(S): at 11:44

## 2023-01-01 RX ADMIN — DIPHENHYDRAMINE HYDROCHLORIDE AND LIDOCAINE HYDROCHLORIDE AND ALUMINUM HYDROXIDE AND MAGNESIUM HYDRO 4 MILLILITER(S): KIT at 12:27

## 2023-01-01 RX ADMIN — Medication 2: at 13:35

## 2023-01-01 RX ADMIN — DIPHENHYDRAMINE HYDROCHLORIDE AND LIDOCAINE HYDROCHLORIDE AND ALUMINUM HYDROXIDE AND MAGNESIUM HYDRO 4 MILLILITER(S): KIT at 00:06

## 2023-01-01 RX ADMIN — INSULIN GLARGINE 48 UNIT(S): 100 INJECTION, SOLUTION SUBCUTANEOUS at 07:19

## 2023-01-01 RX ADMIN — Medication 2.5 MILLIGRAM(S): at 00:07

## 2023-01-01 RX ADMIN — HYDROMORPHONE HYDROCHLORIDE 0.5 MILLIGRAM(S): 2 INJECTION INTRAMUSCULAR; INTRAVENOUS; SUBCUTANEOUS at 03:50

## 2023-01-01 RX ADMIN — ALBUTEROL 2 PUFF(S): 90 AEROSOL, METERED ORAL at 22:22

## 2023-01-01 RX ADMIN — INSULIN GLARGINE 15 UNIT(S): 100 INJECTION, SOLUTION SUBCUTANEOUS at 22:39

## 2023-01-01 RX ADMIN — LIDOCAINE 5 MILLILITER(S): 4 CREAM TOPICAL at 06:55

## 2023-01-01 RX ADMIN — ALBUTEROL 2 PUFF(S): 90 AEROSOL, METERED ORAL at 17:32

## 2023-01-01 RX ADMIN — HYDROMORPHONE HYDROCHLORIDE 0.5 MILLIGRAM(S): 2 INJECTION INTRAMUSCULAR; INTRAVENOUS; SUBCUTANEOUS at 13:38

## 2023-01-01 RX ADMIN — Medication 1 DROP(S): at 14:40

## 2023-01-01 RX ADMIN — ROBINUL 0.4 MILLIGRAM(S): 0.2 INJECTION INTRAMUSCULAR; INTRAVENOUS at 10:10

## 2023-01-01 RX ADMIN — Medication 1 APPLICATION(S): at 18:43

## 2023-01-01 RX ADMIN — PIPERACILLIN AND TAZOBACTAM 25 GRAM(S): 4; .5 INJECTION, POWDER, LYOPHILIZED, FOR SOLUTION INTRAVENOUS at 01:44

## 2023-01-01 RX ADMIN — Medication 5 MILLIGRAM(S): at 19:35

## 2023-01-01 RX ADMIN — INSULIN GLARGINE 10 UNIT(S): 100 INJECTION, SOLUTION SUBCUTANEOUS at 00:41

## 2023-01-01 RX ADMIN — HYDROMORPHONE HYDROCHLORIDE 1 MILLIGRAM(S): 2 INJECTION INTRAMUSCULAR; INTRAVENOUS; SUBCUTANEOUS at 09:47

## 2023-01-01 RX ADMIN — HYDROMORPHONE HYDROCHLORIDE 1 MILLIGRAM(S): 2 INJECTION INTRAMUSCULAR; INTRAVENOUS; SUBCUTANEOUS at 01:05

## 2023-01-01 RX ADMIN — FAMOTIDINE 208 MILLIGRAM(S): 10 INJECTION INTRAVENOUS at 13:30

## 2023-01-01 RX ADMIN — PIPERACILLIN AND TAZOBACTAM 25 GRAM(S): 4; .5 INJECTION, POWDER, LYOPHILIZED, FOR SOLUTION INTRAVENOUS at 05:16

## 2023-01-01 RX ADMIN — ENOXAPARIN SODIUM 40 MILLIGRAM(S): 100 INJECTION SUBCUTANEOUS at 17:07

## 2023-01-01 RX ADMIN — Medication 2: at 22:29

## 2023-01-01 RX ADMIN — LIDOCAINE 5 MILLILITER(S): 4 CREAM TOPICAL at 17:58

## 2023-01-01 RX ADMIN — Medication 12.5 MILLIGRAM(S): at 11:12

## 2023-01-01 RX ADMIN — POLYETHYLENE GLYCOL 3350 17 GRAM(S): 17 POWDER, FOR SOLUTION ORAL at 12:16

## 2023-01-01 RX ADMIN — Medication 1 DROP(S): at 17:32

## 2023-01-01 RX ADMIN — Medication 1 DROP(S): at 05:47

## 2023-01-01 RX ADMIN — POLYETHYLENE GLYCOL 3350 17 GRAM(S): 17 POWDER, FOR SOLUTION ORAL at 12:01

## 2023-01-01 RX ADMIN — SODIUM CHLORIDE 1000 MILLILITER(S): 9 INJECTION INTRAMUSCULAR; INTRAVENOUS; SUBCUTANEOUS at 16:17

## 2023-01-01 RX ADMIN — ROBINUL 0.4 MILLIGRAM(S): 0.2 INJECTION INTRAMUSCULAR; INTRAVENOUS at 22:20

## 2023-01-01 RX ADMIN — HEPARIN SODIUM 5000 UNIT(S): 5000 INJECTION INTRAVENOUS; SUBCUTANEOUS at 11:33

## 2023-01-01 RX ADMIN — HYDROMORPHONE HYDROCHLORIDE 0.5 MILLIGRAM(S): 2 INJECTION INTRAMUSCULAR; INTRAVENOUS; SUBCUTANEOUS at 10:58

## 2023-01-01 RX ADMIN — INSULIN GLARGINE 20 UNIT(S): 100 INJECTION, SOLUTION SUBCUTANEOUS at 21:44

## 2023-01-01 RX ADMIN — PANTOPRAZOLE SODIUM 40 MILLIGRAM(S): 20 TABLET, DELAYED RELEASE ORAL at 06:01

## 2023-01-01 RX ADMIN — Medication 3 MILLILITER(S): at 16:47

## 2023-01-01 RX ADMIN — Medication 3 MILLILITER(S): at 07:14

## 2023-01-01 RX ADMIN — Medication 4: at 13:56

## 2023-01-01 RX ADMIN — DIPHENHYDRAMINE HYDROCHLORIDE AND LIDOCAINE HYDROCHLORIDE AND ALUMINUM HYDROXIDE AND MAGNESIUM HYDRO 4 MILLILITER(S): KIT at 18:04

## 2023-01-01 RX ADMIN — Medication 1.25 MILLIGRAM(S): at 16:04

## 2023-01-01 RX ADMIN — PANTOPRAZOLE SODIUM 40 MILLIGRAM(S): 20 TABLET, DELAYED RELEASE ORAL at 06:13

## 2023-01-01 RX ADMIN — Medication 2.5 MILLIGRAM(S): at 17:38

## 2023-01-01 RX ADMIN — Medication 204 MILLIGRAM(S): at 14:31

## 2023-01-01 RX ADMIN — INSULIN GLARGINE 15 UNIT(S): 100 INJECTION, SOLUTION SUBCUTANEOUS at 22:30

## 2023-01-01 RX ADMIN — OLANZAPINE 5 MILLIGRAM(S): 15 TABLET, FILM COATED ORAL at 21:23

## 2023-01-01 RX ADMIN — ENOXAPARIN SODIUM 40 MILLIGRAM(S): 100 INJECTION SUBCUTANEOUS at 18:19

## 2023-01-01 RX ADMIN — Medication 3 MILLILITER(S): at 15:05

## 2023-01-01 RX ADMIN — Medication 3 MILLILITER(S): at 23:48

## 2023-01-01 RX ADMIN — ALBUTEROL 2 PUFF(S): 90 AEROSOL, METERED ORAL at 09:43

## 2023-01-01 RX ADMIN — HEPARIN SODIUM 5000 UNIT(S): 5000 INJECTION INTRAVENOUS; SUBCUTANEOUS at 21:24

## 2023-01-01 RX ADMIN — ALBUTEROL 2 PUFF(S): 90 AEROSOL, METERED ORAL at 21:33

## 2023-01-01 RX ADMIN — Medication 81 MILLIGRAM(S): at 16:07

## 2023-01-01 RX ADMIN — Medication 100 MILLIGRAM(S): at 10:02

## 2023-01-01 RX ADMIN — Medication 8: at 11:42

## 2023-01-01 RX ADMIN — Medication 10 MILLIGRAM(S): at 13:32

## 2023-01-01 RX ADMIN — Medication 1 APPLICATION(S): at 17:34

## 2023-01-01 RX ADMIN — SODIUM CHLORIDE 150 MILLILITER(S): 9 INJECTION INTRAMUSCULAR; INTRAVENOUS; SUBCUTANEOUS at 13:27

## 2023-01-01 RX ADMIN — PANTOPRAZOLE SODIUM 40 MILLIGRAM(S): 20 TABLET, DELAYED RELEASE ORAL at 12:33

## 2023-01-01 RX ADMIN — Medication 3 MILLILITER(S): at 22:37

## 2023-01-01 RX ADMIN — Medication 2: at 12:15

## 2023-01-01 RX ADMIN — Medication 1 APPLICATION(S): at 18:02

## 2023-01-01 RX ADMIN — Medication 4: at 16:52

## 2023-01-01 RX ADMIN — Medication 2: at 07:14

## 2023-01-01 RX ADMIN — Medication 4: at 18:28

## 2023-01-01 RX ADMIN — Medication 2.5 MILLIGRAM(S): at 12:34

## 2023-01-01 RX ADMIN — Medication 1 DROP(S): at 13:34

## 2023-01-01 RX ADMIN — Medication 1 DROP(S): at 06:05

## 2023-01-01 RX ADMIN — Medication 1 APPLICATION(S): at 23:07

## 2023-01-01 RX ADMIN — Medication 1 APPLICATION(S): at 06:13

## 2023-01-01 RX ADMIN — DIPHENHYDRAMINE HYDROCHLORIDE AND LIDOCAINE HYDROCHLORIDE AND ALUMINUM HYDROXIDE AND MAGNESIUM HYDRO 10 MILLILITER(S): KIT at 11:44

## 2023-01-01 RX ADMIN — Medication 2: at 16:55

## 2023-01-01 RX ADMIN — HYDROMORPHONE HYDROCHLORIDE 0.5 MILLIGRAM(S): 2 INJECTION INTRAMUSCULAR; INTRAVENOUS; SUBCUTANEOUS at 14:48

## 2023-01-01 RX ADMIN — DIPHENHYDRAMINE HYDROCHLORIDE AND LIDOCAINE HYDROCHLORIDE AND ALUMINUM HYDROXIDE AND MAGNESIUM HYDRO 4 MILLILITER(S): KIT at 22:23

## 2023-01-01 RX ADMIN — Medication 1 DROP(S): at 13:30

## 2023-01-01 RX ADMIN — INSULIN GLARGINE 15 UNIT(S): 100 INJECTION, SOLUTION SUBCUTANEOUS at 00:03

## 2023-01-01 RX ADMIN — Medication 7 UNIT(S): at 09:05

## 2023-01-01 RX ADMIN — Medication 1 DROP(S): at 12:03

## 2023-01-01 RX ADMIN — Medication 1 APPLICATION(S): at 06:54

## 2023-01-01 RX ADMIN — Medication 1 DROP(S): at 21:47

## 2023-01-01 RX ADMIN — HYDROMORPHONE HYDROCHLORIDE 0.5 MILLIGRAM(S): 2 INJECTION INTRAMUSCULAR; INTRAVENOUS; SUBCUTANEOUS at 02:46

## 2023-01-01 RX ADMIN — HYDROMORPHONE HYDROCHLORIDE 0.5 MILLIGRAM(S): 2 INJECTION INTRAMUSCULAR; INTRAVENOUS; SUBCUTANEOUS at 00:55

## 2023-01-01 RX ADMIN — SODIUM CHLORIDE 75 MILLILITER(S): 9 INJECTION, SOLUTION INTRAVENOUS at 17:02

## 2023-01-01 RX ADMIN — PIPERACILLIN AND TAZOBACTAM 25 GRAM(S): 4; .5 INJECTION, POWDER, LYOPHILIZED, FOR SOLUTION INTRAVENOUS at 06:02

## 2023-01-01 RX ADMIN — Medication 4: at 12:34

## 2023-01-01 RX ADMIN — LIDOCAINE 5 MILLILITER(S): 4 CREAM TOPICAL at 18:06

## 2023-01-01 RX ADMIN — LIDOCAINE 5 MILLILITER(S): 4 CREAM TOPICAL at 06:46

## 2023-01-01 RX ADMIN — Medication 1 MILLIGRAM(S): at 17:58

## 2023-01-01 RX ADMIN — Medication 1 DROP(S): at 21:38

## 2023-01-01 RX ADMIN — Medication 1.25 MILLIGRAM(S): at 11:41

## 2023-01-01 RX ADMIN — ISOSORBIDE MONONITRATE 30 MILLIGRAM(S): 60 TABLET, EXTENDED RELEASE ORAL at 12:00

## 2023-01-01 RX ADMIN — HYDROMORPHONE HYDROCHLORIDE 0.5 MILLIGRAM(S): 2 INJECTION INTRAMUSCULAR; INTRAVENOUS; SUBCUTANEOUS at 09:20

## 2023-01-01 RX ADMIN — ROBINUL 0.4 MILLIGRAM(S): 0.2 INJECTION INTRAMUSCULAR; INTRAVENOUS at 15:54

## 2023-01-01 RX ADMIN — Medication 400 MILLIGRAM(S): at 05:06

## 2023-01-01 RX ADMIN — AMPICILLIN SODIUM AND SULBACTAM SODIUM 100 GRAM(S): 250; 125 INJECTION, POWDER, FOR SUSPENSION INTRAMUSCULAR; INTRAVENOUS at 12:08

## 2023-01-01 RX ADMIN — HYDROMORPHONE HYDROCHLORIDE 1 MILLIGRAM(S): 2 INJECTION INTRAMUSCULAR; INTRAVENOUS; SUBCUTANEOUS at 07:30

## 2023-01-01 RX ADMIN — ATORVASTATIN CALCIUM 80 MILLIGRAM(S): 80 TABLET, FILM COATED ORAL at 22:12

## 2023-01-01 RX ADMIN — Medication 3 MILLILITER(S): at 03:33

## 2023-01-01 RX ADMIN — Medication 1 APPLICATION(S): at 17:51

## 2023-01-01 RX ADMIN — Medication 2.5 MILLIGRAM(S): at 07:16

## 2023-01-01 RX ADMIN — RANOLAZINE 500 MILLIGRAM(S): 500 TABLET, FILM COATED, EXTENDED RELEASE ORAL at 18:45

## 2023-01-01 RX ADMIN — Medication 2: at 07:03

## 2023-01-01 RX ADMIN — PANTOPRAZOLE SODIUM 40 MILLIGRAM(S): 20 TABLET, DELAYED RELEASE ORAL at 18:24

## 2023-01-01 RX ADMIN — Medication 1 DROP(S): at 23:49

## 2023-01-01 RX ADMIN — Medication 6 UNIT(S): at 12:01

## 2023-01-01 RX ADMIN — PIPERACILLIN AND TAZOBACTAM 25 GRAM(S): 4; .5 INJECTION, POWDER, LYOPHILIZED, FOR SOLUTION INTRAVENOUS at 13:37

## 2023-01-01 RX ADMIN — PIPERACILLIN AND TAZOBACTAM 25 GRAM(S): 4; .5 INJECTION, POWDER, LYOPHILIZED, FOR SOLUTION INTRAVENOUS at 21:45

## 2023-01-01 RX ADMIN — PACLITAXEL 80 MILLIGRAM(S): 6 INJECTION, SOLUTION, CONCENTRATE INTRAVENOUS at 16:12

## 2023-01-01 RX ADMIN — HEPARIN SODIUM 5000 UNIT(S): 5000 INJECTION INTRAVENOUS; SUBCUTANEOUS at 07:01

## 2023-01-01 RX ADMIN — Medication 0.5 MILLIGRAM(S): at 10:05

## 2023-01-01 RX ADMIN — Medication 2: at 23:16

## 2023-01-01 RX ADMIN — HYDROMORPHONE HYDROCHLORIDE 0.5 MILLIGRAM(S): 2 INJECTION INTRAMUSCULAR; INTRAVENOUS; SUBCUTANEOUS at 09:35

## 2023-01-01 RX ADMIN — LIDOCAINE 5 MILLILITER(S): 4 CREAM TOPICAL at 06:22

## 2023-01-01 RX ADMIN — Medication 1.25 MILLIGRAM(S): at 09:47

## 2023-01-01 RX ADMIN — Medication 4: at 13:36

## 2023-01-01 RX ADMIN — Medication 25 GRAM(S): at 12:16

## 2023-01-01 RX ADMIN — SENNA PLUS 2 TABLET(S): 8.6 TABLET ORAL at 21:05

## 2023-01-01 RX ADMIN — HYDROMORPHONE HYDROCHLORIDE 0.5 MILLIGRAM(S): 2 INJECTION INTRAMUSCULAR; INTRAVENOUS; SUBCUTANEOUS at 03:28

## 2023-01-01 RX ADMIN — AMPICILLIN SODIUM AND SULBACTAM SODIUM 100 GRAM(S): 250; 125 INJECTION, POWDER, FOR SUSPENSION INTRAMUSCULAR; INTRAVENOUS at 12:16

## 2023-01-01 RX ADMIN — LIDOCAINE 5 MILLILITER(S): 4 CREAM TOPICAL at 17:55

## 2023-01-01 RX ADMIN — DIPHENHYDRAMINE HYDROCHLORIDE AND LIDOCAINE HYDROCHLORIDE AND ALUMINUM HYDROXIDE AND MAGNESIUM HYDRO 4 MILLILITER(S): KIT at 12:40

## 2023-01-01 RX ADMIN — Medication 2: at 19:07

## 2023-01-01 RX ADMIN — PANTOPRAZOLE SODIUM 40 MILLIGRAM(S): 20 TABLET, DELAYED RELEASE ORAL at 06:33

## 2023-01-01 RX ADMIN — DIPHENHYDRAMINE HYDROCHLORIDE AND LIDOCAINE HYDROCHLORIDE AND ALUMINUM HYDROXIDE AND MAGNESIUM HYDRO 4 MILLILITER(S): KIT at 06:49

## 2023-01-01 RX ADMIN — Medication 2.5 MILLIGRAM(S): at 12:54

## 2023-01-01 RX ADMIN — FAMOTIDINE 20 MILLIGRAM(S): 10 INJECTION INTRAVENOUS at 14:28

## 2023-01-01 RX ADMIN — HYDROMORPHONE HYDROCHLORIDE 0.5 MILLIGRAM(S): 2 INJECTION INTRAMUSCULAR; INTRAVENOUS; SUBCUTANEOUS at 19:54

## 2023-01-01 RX ADMIN — Medication 25 GRAM(S): at 11:58

## 2023-01-01 RX ADMIN — Medication 1 MILLIGRAM(S): at 09:42

## 2023-01-01 RX ADMIN — DIPHENHYDRAMINE HYDROCHLORIDE AND LIDOCAINE HYDROCHLORIDE AND ALUMINUM HYDROXIDE AND MAGNESIUM HYDRO 4 MILLILITER(S): KIT at 11:50

## 2023-01-01 RX ADMIN — ATORVASTATIN CALCIUM 80 MILLIGRAM(S): 80 TABLET, FILM COATED ORAL at 21:09

## 2023-01-01 RX ADMIN — Medication 25 GRAM(S): at 09:54

## 2023-01-01 RX ADMIN — POTASSIUM PHOSPHATE, MONOBASIC POTASSIUM PHOSPHATE, DIBASIC 62.5 MILLIMOLE(S): 236; 224 INJECTION, SOLUTION INTRAVENOUS at 12:06

## 2023-01-01 RX ADMIN — HYDROMORPHONE HYDROCHLORIDE 0.5 MILLIGRAM(S): 2 INJECTION INTRAMUSCULAR; INTRAVENOUS; SUBCUTANEOUS at 11:10

## 2023-01-01 RX ADMIN — Medication 1 DROP(S): at 21:22

## 2023-01-01 RX ADMIN — SODIUM CHLORIDE 75 MILLILITER(S): 9 INJECTION, SOLUTION INTRAVENOUS at 01:09

## 2023-01-01 RX ADMIN — PANTOPRAZOLE SODIUM 40 MILLIGRAM(S): 20 TABLET, DELAYED RELEASE ORAL at 23:11

## 2023-01-01 RX ADMIN — SCOPALAMINE 1 PATCH: 1 PATCH, EXTENDED RELEASE TRANSDERMAL at 06:01

## 2023-01-01 RX ADMIN — HYDROMORPHONE HYDROCHLORIDE 0.5 MILLIGRAM(S): 2 INJECTION INTRAMUSCULAR; INTRAVENOUS; SUBCUTANEOUS at 02:35

## 2023-01-01 RX ADMIN — HYDROMORPHONE HYDROCHLORIDE 0.5 MILLIGRAM(S): 2 INJECTION INTRAMUSCULAR; INTRAVENOUS; SUBCUTANEOUS at 21:40

## 2023-01-01 RX ADMIN — Medication 1 APPLICATION(S): at 06:01

## 2023-01-01 RX ADMIN — LIDOCAINE 5 MILLILITER(S): 4 CREAM TOPICAL at 05:06

## 2023-01-01 RX ADMIN — PACLITAXEL 80 MILLIGRAM(S): 6 INJECTION, SOLUTION, CONCENTRATE INTRAVENOUS at 16:03

## 2023-01-01 RX ADMIN — HYDROMORPHONE HYDROCHLORIDE 0.5 MILLIGRAM(S): 2 INJECTION INTRAMUSCULAR; INTRAVENOUS; SUBCUTANEOUS at 11:46

## 2023-01-01 RX ADMIN — Medication 1 APPLICATION(S): at 06:14

## 2023-01-01 RX ADMIN — PANTOPRAZOLE SODIUM 40 MILLIGRAM(S): 20 TABLET, DELAYED RELEASE ORAL at 00:00

## 2023-01-01 RX ADMIN — HYDROMORPHONE HYDROCHLORIDE 1 MILLIGRAM(S): 2 INJECTION INTRAMUSCULAR; INTRAVENOUS; SUBCUTANEOUS at 07:06

## 2023-01-01 RX ADMIN — HYDROMORPHONE HYDROCHLORIDE 0.5 MILLIGRAM(S): 2 INJECTION INTRAMUSCULAR; INTRAVENOUS; SUBCUTANEOUS at 21:33

## 2023-01-01 RX ADMIN — Medication 105 MILLIGRAM(S): at 10:15

## 2023-01-01 RX ADMIN — Medication 100 MILLIEQUIVALENT(S): at 10:46

## 2023-01-01 RX ADMIN — Medication 1 DROP(S): at 15:11

## 2023-01-01 RX ADMIN — Medication 25 GRAM(S): at 16:13

## 2023-01-01 RX ADMIN — HYDROMORPHONE HYDROCHLORIDE 0.5 MILLIGRAM(S): 2 INJECTION INTRAMUSCULAR; INTRAVENOUS; SUBCUTANEOUS at 09:57

## 2023-01-01 RX ADMIN — Medication 1.25 MILLIGRAM(S): at 21:18

## 2023-01-01 RX ADMIN — PANTOPRAZOLE SODIUM 40 MILLIGRAM(S): 20 TABLET, DELAYED RELEASE ORAL at 11:50

## 2023-01-01 RX ADMIN — Medication 2: at 12:20

## 2023-01-01 RX ADMIN — Medication 6: at 07:04

## 2023-01-01 RX ADMIN — HYDROMORPHONE HYDROCHLORIDE 0.5 MILLIGRAM(S): 2 INJECTION INTRAMUSCULAR; INTRAVENOUS; SUBCUTANEOUS at 04:33

## 2023-01-01 RX ADMIN — Medication 25 GRAM(S): at 12:48

## 2023-01-01 RX ADMIN — PANTOPRAZOLE SODIUM 40 MILLIGRAM(S): 20 TABLET, DELAYED RELEASE ORAL at 16:04

## 2023-01-01 RX ADMIN — MORPHINE SULFATE 10 MILLIGRAM(S): 50 CAPSULE, EXTENDED RELEASE ORAL at 04:28

## 2023-01-01 RX ADMIN — POLYETHYLENE GLYCOL 3350 17 GRAM(S): 17 POWDER, FOR SOLUTION ORAL at 12:40

## 2023-01-01 RX ADMIN — Medication 1 DROP(S): at 21:52

## 2023-01-01 RX ADMIN — POLYETHYLENE GLYCOL 3350 17 GRAM(S): 17 POWDER, FOR SOLUTION ORAL at 11:40

## 2023-01-01 RX ADMIN — Medication 400 MILLIGRAM(S): at 19:52

## 2023-01-01 RX ADMIN — SCOPALAMINE 1 PATCH: 1 PATCH, EXTENDED RELEASE TRANSDERMAL at 19:33

## 2023-01-01 RX ADMIN — OLANZAPINE 5 MILLIGRAM(S): 15 TABLET, FILM COATED ORAL at 20:09

## 2023-01-01 RX ADMIN — ROBINUL 0.4 MILLIGRAM(S): 0.2 INJECTION INTRAMUSCULAR; INTRAVENOUS at 05:40

## 2023-01-01 RX ADMIN — Medication 400 MILLIGRAM(S): at 06:52

## 2023-01-01 RX ADMIN — Medication 5: at 00:09

## 2023-01-01 RX ADMIN — HEPARIN SODIUM 5000 UNIT(S): 5000 INJECTION INTRAVENOUS; SUBCUTANEOUS at 22:22

## 2023-01-01 RX ADMIN — Medication 6: at 18:33

## 2023-01-01 RX ADMIN — SODIUM CHLORIDE 500 MILLILITER(S): 9 INJECTION INTRAMUSCULAR; INTRAVENOUS; SUBCUTANEOUS at 11:44

## 2023-01-01 RX ADMIN — Medication 81 MILLIGRAM(S): at 12:15

## 2023-01-01 RX ADMIN — Medication 600 MILLIGRAM(S): at 19:40

## 2023-01-01 RX ADMIN — ATORVASTATIN CALCIUM 80 MILLIGRAM(S): 80 TABLET, FILM COATED ORAL at 22:23

## 2023-01-01 RX ADMIN — HYDROMORPHONE HYDROCHLORIDE 0.5 MILLIGRAM(S): 2 INJECTION INTRAMUSCULAR; INTRAVENOUS; SUBCUTANEOUS at 21:30

## 2023-01-01 RX ADMIN — HYDROMORPHONE HYDROCHLORIDE 0.5 MILLIGRAM(S): 2 INJECTION INTRAMUSCULAR; INTRAVENOUS; SUBCUTANEOUS at 22:06

## 2023-01-01 RX ADMIN — HEPARIN SODIUM 5000 UNIT(S): 5000 INJECTION INTRAVENOUS; SUBCUTANEOUS at 14:53

## 2023-01-01 RX ADMIN — SENNA PLUS 2 TABLET(S): 8.6 TABLET ORAL at 21:07

## 2023-01-01 RX ADMIN — Medication 1 APPLICATION(S): at 06:28

## 2023-01-01 RX ADMIN — Medication 4: at 12:04

## 2023-01-01 RX ADMIN — Medication 0.5 MILLIGRAM(S): at 01:38

## 2023-01-01 RX ADMIN — SODIUM CHLORIDE 150 MILLILITER(S): 9 INJECTION INTRAMUSCULAR; INTRAVENOUS; SUBCUTANEOUS at 12:54

## 2023-01-01 RX ADMIN — Medication 4: at 00:13

## 2023-01-01 RX ADMIN — LATANOPROST 1 DROP(S): 0.05 SOLUTION/ DROPS OPHTHALMIC; TOPICAL at 00:15

## 2023-01-01 RX ADMIN — Medication 1 DROP(S): at 23:47

## 2023-01-01 RX ADMIN — Medication 3 MILLILITER(S): at 06:46

## 2023-01-01 RX ADMIN — ROBINUL 0.4 MILLIGRAM(S): 0.2 INJECTION INTRAMUSCULAR; INTRAVENOUS at 06:27

## 2023-01-01 RX ADMIN — HYDROMORPHONE HYDROCHLORIDE 0.5 MILLIGRAM(S): 2 INJECTION INTRAMUSCULAR; INTRAVENOUS; SUBCUTANEOUS at 13:30

## 2023-01-01 RX ADMIN — Medication 6: at 18:29

## 2023-01-01 RX ADMIN — SODIUM CHLORIDE 1000 MILLILITER(S): 9 INJECTION INTRAMUSCULAR; INTRAVENOUS; SUBCUTANEOUS at 12:29

## 2023-01-01 RX ADMIN — Medication 1 DROP(S): at 06:47

## 2023-01-01 RX ADMIN — HYDROMORPHONE HYDROCHLORIDE 0.5 MILLIGRAM(S): 2 INJECTION INTRAMUSCULAR; INTRAVENOUS; SUBCUTANEOUS at 23:28

## 2023-01-01 RX ADMIN — Medication 1 APPLICATION(S): at 12:07

## 2023-01-01 RX ADMIN — ATORVASTATIN CALCIUM 80 MILLIGRAM(S): 80 TABLET, FILM COATED ORAL at 21:07

## 2023-01-01 RX ADMIN — Medication 2.5 MILLIGRAM(S): at 17:46

## 2023-01-01 RX ADMIN — INSULIN GLARGINE 15 UNIT(S): 100 INJECTION, SOLUTION SUBCUTANEOUS at 22:22

## 2023-01-01 RX ADMIN — Medication 3 MILLILITER(S): at 16:39

## 2023-01-01 RX ADMIN — Medication 1 APPLICATION(S): at 17:59

## 2023-01-01 RX ADMIN — INSULIN GLARGINE 10 UNIT(S): 100 INJECTION, SOLUTION SUBCUTANEOUS at 23:20

## 2023-01-01 RX ADMIN — INSULIN HUMAN 2 UNIT(S): 100 INJECTION, SOLUTION SUBCUTANEOUS at 18:16

## 2023-01-01 RX ADMIN — ATORVASTATIN CALCIUM 80 MILLIGRAM(S): 80 TABLET, FILM COATED ORAL at 21:00

## 2023-01-01 RX ADMIN — Medication 105 MILLIGRAM(S): at 11:57

## 2023-01-01 RX ADMIN — LIDOCAINE 5 MILLILITER(S): 4 CREAM TOPICAL at 17:32

## 2023-01-01 RX ADMIN — Medication 3 MILLILITER(S): at 03:06

## 2023-01-01 RX ADMIN — Medication 1 DROP(S): at 22:14

## 2023-01-01 RX ADMIN — ISOSORBIDE MONONITRATE 30 MILLIGRAM(S): 60 TABLET, EXTENDED RELEASE ORAL at 11:40

## 2023-01-01 RX ADMIN — Medication 2: at 13:31

## 2023-01-01 RX ADMIN — OLANZAPINE 5 MILLIGRAM(S): 15 TABLET, FILM COATED ORAL at 18:18

## 2023-01-01 RX ADMIN — ATORVASTATIN CALCIUM 80 MILLIGRAM(S): 80 TABLET, FILM COATED ORAL at 21:52

## 2023-01-01 RX ADMIN — FAMOTIDINE 208 MILLIGRAM(S): 10 INJECTION INTRAVENOUS at 12:12

## 2023-01-01 RX ADMIN — Medication 1 APPLICATION(S): at 06:53

## 2023-01-01 RX ADMIN — LIDOCAINE 5 MILLILITER(S): 4 CREAM TOPICAL at 17:07

## 2023-01-01 RX ADMIN — OLANZAPINE 2.5 MILLIGRAM(S): 15 TABLET, FILM COATED ORAL at 00:51

## 2023-01-01 RX ADMIN — Medication 25 GRAM(S): at 12:19

## 2023-01-01 RX ADMIN — Medication 1 DROP(S): at 05:32

## 2023-01-01 RX ADMIN — SODIUM CHLORIDE 50 MILLILITER(S): 9 INJECTION INTRAMUSCULAR; INTRAVENOUS; SUBCUTANEOUS at 12:00

## 2023-01-01 RX ADMIN — Medication 1 APPLICATION(S): at 18:53

## 2023-01-01 RX ADMIN — Medication 100 MILLIGRAM(S): at 16:35

## 2023-01-01 RX ADMIN — INSULIN GLARGINE 48 UNIT(S): 100 INJECTION, SOLUTION SUBCUTANEOUS at 10:06

## 2023-01-01 RX ADMIN — SODIUM CHLORIDE 75 MILLILITER(S): 9 INJECTION, SOLUTION INTRAVENOUS at 06:55

## 2023-01-01 RX ADMIN — PIPERACILLIN AND TAZOBACTAM 25 GRAM(S): 4; .5 INJECTION, POWDER, LYOPHILIZED, FOR SOLUTION INTRAVENOUS at 14:34

## 2023-01-01 RX ADMIN — SCOPALAMINE 1 PATCH: 1 PATCH, EXTENDED RELEASE TRANSDERMAL at 07:21

## 2023-01-01 RX ADMIN — SODIUM CHLORIDE 70 MILLILITER(S): 9 INJECTION, SOLUTION INTRAVENOUS at 22:34

## 2023-01-01 RX ADMIN — SCOPALAMINE 1 PATCH: 1 PATCH, EXTENDED RELEASE TRANSDERMAL at 18:10

## 2023-01-01 RX ADMIN — Medication 2.5 MILLIGRAM(S): at 05:48

## 2023-01-01 RX ADMIN — Medication 4: at 17:55

## 2023-01-01 RX ADMIN — ALBUTEROL 2 PUFF(S): 90 AEROSOL, METERED ORAL at 10:18

## 2023-01-01 RX ADMIN — HYDROMORPHONE HYDROCHLORIDE 1 MILLIGRAM(S): 2 INJECTION INTRAMUSCULAR; INTRAVENOUS; SUBCUTANEOUS at 13:23

## 2023-01-01 RX ADMIN — DIPHENHYDRAMINE HYDROCHLORIDE AND LIDOCAINE HYDROCHLORIDE AND ALUMINUM HYDROXIDE AND MAGNESIUM HYDRO 4 MILLILITER(S): KIT at 06:09

## 2023-01-01 RX ADMIN — HYDROMORPHONE HYDROCHLORIDE 0.5 MILLIGRAM(S): 2 INJECTION INTRAMUSCULAR; INTRAVENOUS; SUBCUTANEOUS at 07:37

## 2023-01-01 RX ADMIN — HYDROMORPHONE HYDROCHLORIDE 0.5 MILLIGRAM(S): 2 INJECTION INTRAMUSCULAR; INTRAVENOUS; SUBCUTANEOUS at 22:20

## 2023-01-01 RX ADMIN — INSULIN GLARGINE 10 UNIT(S): 100 INJECTION, SOLUTION SUBCUTANEOUS at 22:22

## 2023-01-01 RX ADMIN — Medication 8: at 00:54

## 2023-01-01 RX ADMIN — Medication 1 DROP(S): at 22:48

## 2023-01-01 RX ADMIN — HYDROMORPHONE HYDROCHLORIDE 1 MILLIGRAM(S): 2 INJECTION INTRAMUSCULAR; INTRAVENOUS; SUBCUTANEOUS at 13:58

## 2023-01-01 RX ADMIN — HYDROMORPHONE HYDROCHLORIDE 0.5 MILLIGRAM(S): 2 INJECTION INTRAMUSCULAR; INTRAVENOUS; SUBCUTANEOUS at 02:25

## 2023-01-01 RX ADMIN — PANTOPRAZOLE SODIUM 40 MILLIGRAM(S): 20 TABLET, DELAYED RELEASE ORAL at 23:45

## 2023-01-01 RX ADMIN — SODIUM CHLORIDE 75 MILLILITER(S): 9 INJECTION INTRAMUSCULAR; INTRAVENOUS; SUBCUTANEOUS at 09:50

## 2023-01-01 RX ADMIN — Medication 3 MILLILITER(S): at 03:14

## 2023-01-01 RX ADMIN — DIPHENHYDRAMINE HYDROCHLORIDE AND LIDOCAINE HYDROCHLORIDE AND ALUMINUM HYDROXIDE AND MAGNESIUM HYDRO 4 MILLILITER(S): KIT at 17:34

## 2023-01-01 RX ADMIN — FOSAPREPITANT DIMEGLUMINE 500 MILLIGRAM(S): 150 INJECTION, POWDER, LYOPHILIZED, FOR SOLUTION INTRAVENOUS at 14:50

## 2023-01-01 RX ADMIN — ALBUTEROL 2 PUFF(S): 90 AEROSOL, METERED ORAL at 22:14

## 2023-01-01 RX ADMIN — SODIUM CHLORIDE 80 MILLILITER(S): 9 INJECTION, SOLUTION INTRAVENOUS at 22:01

## 2023-01-01 RX ADMIN — ALBUTEROL 2 PUFF(S): 90 AEROSOL, METERED ORAL at 05:20

## 2023-01-01 RX ADMIN — Medication 202 MILLIGRAM(S): at 14:50

## 2023-01-01 RX ADMIN — PIPERACILLIN AND TAZOBACTAM 25 GRAM(S): 4; .5 INJECTION, POWDER, LYOPHILIZED, FOR SOLUTION INTRAVENOUS at 09:56

## 2023-01-01 RX ADMIN — HEPARIN SODIUM 5000 UNIT(S): 5000 INJECTION INTRAVENOUS; SUBCUTANEOUS at 23:46

## 2023-01-01 RX ADMIN — Medication 0.5 MILLIGRAM(S): at 06:43

## 2023-01-01 RX ADMIN — PANTOPRAZOLE SODIUM 40 MILLIGRAM(S): 20 TABLET, DELAYED RELEASE ORAL at 00:21

## 2023-01-01 RX ADMIN — Medication 12.5 MILLIGRAM(S): at 11:41

## 2023-01-01 RX ADMIN — ALBUTEROL 2 PUFF(S): 90 AEROSOL, METERED ORAL at 21:34

## 2023-01-01 RX ADMIN — SENNA PLUS 2 TABLET(S): 8.6 TABLET ORAL at 21:44

## 2023-01-01 RX ADMIN — Medication 2: at 00:10

## 2023-01-01 RX ADMIN — Medication 7 UNIT(S): at 11:58

## 2023-01-01 RX ADMIN — HYDROMORPHONE HYDROCHLORIDE 0.5 MILLIGRAM(S): 2 INJECTION INTRAMUSCULAR; INTRAVENOUS; SUBCUTANEOUS at 18:54

## 2023-01-01 RX ADMIN — PIPERACILLIN AND TAZOBACTAM 25 GRAM(S): 4; .5 INJECTION, POWDER, LYOPHILIZED, FOR SOLUTION INTRAVENOUS at 05:04

## 2023-01-01 RX ADMIN — INSULIN HUMAN 2 UNIT(S): 100 INJECTION, SOLUTION SUBCUTANEOUS at 00:51

## 2023-01-01 RX ADMIN — ENOXAPARIN SODIUM 40 MILLIGRAM(S): 100 INJECTION SUBCUTANEOUS at 17:57

## 2023-01-01 RX ADMIN — Medication 12.5 MILLIGRAM(S): at 10:24

## 2023-01-01 RX ADMIN — PIPERACILLIN AND TAZOBACTAM 25 GRAM(S): 4; .5 INJECTION, POWDER, LYOPHILIZED, FOR SOLUTION INTRAVENOUS at 10:30

## 2023-01-01 RX ADMIN — Medication 1000 MILLIGRAM(S): at 07:53

## 2023-01-01 RX ADMIN — HYDROMORPHONE HYDROCHLORIDE 0.5 MILLIGRAM(S): 2 INJECTION INTRAMUSCULAR; INTRAVENOUS; SUBCUTANEOUS at 21:29

## 2023-01-01 RX ADMIN — SODIUM CHLORIDE 60 MILLILITER(S): 9 INJECTION, SOLUTION INTRAVENOUS at 12:50

## 2023-01-01 RX ADMIN — Medication 2: at 19:03

## 2023-01-01 RX ADMIN — DIPHENHYDRAMINE HYDROCHLORIDE AND LIDOCAINE HYDROCHLORIDE AND ALUMINUM HYDROXIDE AND MAGNESIUM HYDRO 4 MILLILITER(S): KIT at 00:25

## 2023-01-01 RX ADMIN — ROBINUL 0.4 MILLIGRAM(S): 0.2 INJECTION INTRAMUSCULAR; INTRAVENOUS at 21:27

## 2023-01-01 RX ADMIN — Medication 3 MILLILITER(S): at 13:33

## 2023-01-01 RX ADMIN — Medication 2: at 12:30

## 2023-01-01 RX ADMIN — Medication 2 MILLIGRAM(S): at 14:18

## 2023-01-01 RX ADMIN — LATANOPROST 1 DROP(S): 0.05 SOLUTION/ DROPS OPHTHALMIC; TOPICAL at 21:00

## 2023-01-01 RX ADMIN — Medication 2: at 17:44

## 2023-01-01 RX ADMIN — HYDROMORPHONE HYDROCHLORIDE 1 MILLIGRAM(S): 2 INJECTION INTRAMUSCULAR; INTRAVENOUS; SUBCUTANEOUS at 18:33

## 2023-01-01 RX ADMIN — POLYETHYLENE GLYCOL 3350 17 GRAM(S): 17 POWDER, FOR SOLUTION ORAL at 12:03

## 2023-01-01 RX ADMIN — ONDANSETRON 4 MILLIGRAM(S): 8 TABLET, FILM COATED ORAL at 07:11

## 2023-01-01 RX ADMIN — Medication 1 APPLICATION(S): at 06:46

## 2023-01-01 RX ADMIN — ROBINUL 0.4 MILLIGRAM(S): 0.2 INJECTION INTRAMUSCULAR; INTRAVENOUS at 18:29

## 2023-01-01 RX ADMIN — Medication 12.5 MILLIGRAM(S): at 23:18

## 2023-01-01 RX ADMIN — Medication 1 APPLICATION(S): at 22:23

## 2023-01-01 RX ADMIN — ALBUTEROL 2 PUFF(S): 90 AEROSOL, METERED ORAL at 10:50

## 2023-01-01 RX ADMIN — Medication 6 UNIT(S): at 09:17

## 2023-01-01 RX ADMIN — LIDOCAINE 5 MILLILITER(S): 4 CREAM TOPICAL at 05:31

## 2023-01-01 RX ADMIN — SENNA PLUS 2 TABLET(S): 8.6 TABLET ORAL at 00:05

## 2023-01-01 RX ADMIN — HYDROMORPHONE HYDROCHLORIDE 0.5 MILLIGRAM(S): 2 INJECTION INTRAMUSCULAR; INTRAVENOUS; SUBCUTANEOUS at 18:00

## 2023-01-01 RX ADMIN — DIPHENHYDRAMINE HYDROCHLORIDE AND LIDOCAINE HYDROCHLORIDE AND ALUMINUM HYDROXIDE AND MAGNESIUM HYDRO 4 MILLILITER(S): KIT at 17:49

## 2023-01-01 RX ADMIN — HEPARIN SODIUM 5000 UNIT(S): 5000 INJECTION INTRAVENOUS; SUBCUTANEOUS at 06:56

## 2023-01-01 RX ADMIN — DIPHENHYDRAMINE HYDROCHLORIDE AND LIDOCAINE HYDROCHLORIDE AND ALUMINUM HYDROXIDE AND MAGNESIUM HYDRO 4 MILLILITER(S): KIT at 06:00

## 2023-01-01 RX ADMIN — Medication 0.5 MILLIGRAM(S): at 14:39

## 2023-01-01 RX ADMIN — SODIUM CHLORIDE 75 MILLILITER(S): 9 INJECTION, SOLUTION INTRAVENOUS at 22:25

## 2023-01-01 RX ADMIN — HYDROMORPHONE HYDROCHLORIDE 0.5 MILLIGRAM(S): 2 INJECTION INTRAMUSCULAR; INTRAVENOUS; SUBCUTANEOUS at 09:47

## 2023-01-01 RX ADMIN — PANTOPRAZOLE SODIUM 40 MILLIGRAM(S): 20 TABLET, DELAYED RELEASE ORAL at 00:14

## 2023-01-01 RX ADMIN — OLANZAPINE 2.5 MILLIGRAM(S): 15 TABLET, FILM COATED ORAL at 18:31

## 2023-01-01 RX ADMIN — LISINOPRIL 20 MILLIGRAM(S): 2.5 TABLET ORAL at 06:55

## 2023-01-01 RX ADMIN — HEPARIN SODIUM 5000 UNIT(S): 5000 INJECTION INTRAVENOUS; SUBCUTANEOUS at 14:12

## 2023-01-01 RX ADMIN — Medication 2.5 MILLIGRAM(S): at 04:01

## 2023-01-01 RX ADMIN — DIPHENHYDRAMINE HYDROCHLORIDE AND LIDOCAINE HYDROCHLORIDE AND ALUMINUM HYDROXIDE AND MAGNESIUM HYDRO 4 MILLILITER(S): KIT at 07:50

## 2023-01-01 RX ADMIN — Medication 3 MILLILITER(S): at 15:22

## 2023-01-01 RX ADMIN — HYDROMORPHONE HYDROCHLORIDE 0.5 MILLIGRAM(S): 2 INJECTION INTRAMUSCULAR; INTRAVENOUS; SUBCUTANEOUS at 19:35

## 2023-01-01 RX ADMIN — FAMOTIDINE 20 MILLIGRAM(S): 10 INJECTION INTRAVENOUS at 12:16

## 2023-01-01 RX ADMIN — Medication 1 DROP(S): at 14:14

## 2023-01-01 RX ADMIN — HYDROMORPHONE HYDROCHLORIDE 1 MILLIGRAM(S): 2 INJECTION INTRAMUSCULAR; INTRAVENOUS; SUBCUTANEOUS at 23:06

## 2023-01-01 RX ADMIN — HYDROMORPHONE HYDROCHLORIDE 1 MILLIGRAM(S): 2 INJECTION INTRAMUSCULAR; INTRAVENOUS; SUBCUTANEOUS at 23:00

## 2023-01-01 RX ADMIN — Medication 100 MILLIGRAM(S): at 16:15

## 2023-01-01 RX ADMIN — HYDROMORPHONE HYDROCHLORIDE 0.2 MG/HR: 2 INJECTION INTRAMUSCULAR; INTRAVENOUS; SUBCUTANEOUS at 12:55

## 2023-01-01 RX ADMIN — ALBUTEROL 2 PUFF(S): 90 AEROSOL, METERED ORAL at 05:05

## 2023-01-01 RX ADMIN — DIPHENHYDRAMINE HYDROCHLORIDE AND LIDOCAINE HYDROCHLORIDE AND ALUMINUM HYDROXIDE AND MAGNESIUM HYDRO 4 MILLILITER(S): KIT at 18:10

## 2023-01-01 RX ADMIN — Medication 4: at 18:10

## 2023-01-01 RX ADMIN — Medication 20 MILLIGRAM(S): at 17:05

## 2023-01-01 RX ADMIN — Medication 6 UNIT(S): at 12:02

## 2023-01-01 RX ADMIN — Medication 1 APPLICATION(S): at 18:26

## 2023-01-01 RX ADMIN — DIPHENHYDRAMINE HYDROCHLORIDE AND LIDOCAINE HYDROCHLORIDE AND ALUMINUM HYDROXIDE AND MAGNESIUM HYDRO 4 MILLILITER(S): KIT at 06:40

## 2023-01-01 RX ADMIN — HEPARIN SODIUM 5000 UNIT(S): 5000 INJECTION INTRAVENOUS; SUBCUTANEOUS at 06:52

## 2023-01-01 RX ADMIN — ROBINUL 0.4 MILLIGRAM(S): 0.2 INJECTION INTRAMUSCULAR; INTRAVENOUS at 17:57

## 2023-01-01 RX ADMIN — Medication 650 MILLIGRAM(S): at 02:18

## 2023-01-01 RX ADMIN — ROBINUL 0.4 MILLIGRAM(S): 0.2 INJECTION INTRAMUSCULAR; INTRAVENOUS at 03:29

## 2023-01-01 RX ADMIN — DIPHENHYDRAMINE HYDROCHLORIDE AND LIDOCAINE HYDROCHLORIDE AND ALUMINUM HYDROXIDE AND MAGNESIUM HYDRO 4 MILLILITER(S): KIT at 23:22

## 2023-01-01 RX ADMIN — ALBUTEROL 2 PUFF(S): 90 AEROSOL, METERED ORAL at 05:14

## 2023-01-01 RX ADMIN — OLANZAPINE 2.5 MILLIGRAM(S): 15 TABLET, FILM COATED ORAL at 02:01

## 2023-01-01 RX ADMIN — DIPHENHYDRAMINE HYDROCHLORIDE AND LIDOCAINE HYDROCHLORIDE AND ALUMINUM HYDROXIDE AND MAGNESIUM HYDRO 4 MILLILITER(S): KIT at 00:56

## 2023-01-01 RX ADMIN — Medication 3 MILLILITER(S): at 10:21

## 2023-01-01 RX ADMIN — INSULIN GLARGINE 48 UNIT(S): 100 INJECTION, SOLUTION SUBCUTANEOUS at 09:47

## 2023-01-01 RX ADMIN — HYDROMORPHONE HYDROCHLORIDE 0.5 MILLIGRAM(S): 2 INJECTION INTRAMUSCULAR; INTRAVENOUS; SUBCUTANEOUS at 23:45

## 2023-01-01 RX ADMIN — PIPERACILLIN AND TAZOBACTAM 25 GRAM(S): 4; .5 INJECTION, POWDER, LYOPHILIZED, FOR SOLUTION INTRAVENOUS at 13:03

## 2023-01-01 RX ADMIN — SENNA PLUS 2 TABLET(S): 8.6 TABLET ORAL at 22:15

## 2023-01-01 RX ADMIN — PREGABALIN 1000 MICROGRAM(S): 225 CAPSULE ORAL at 12:15

## 2023-01-01 RX ADMIN — Medication 1 APPLICATION(S): at 05:27

## 2023-01-01 RX ADMIN — HYDROMORPHONE HYDROCHLORIDE 1 MILLIGRAM(S): 2 INJECTION INTRAMUSCULAR; INTRAVENOUS; SUBCUTANEOUS at 11:57

## 2023-01-01 RX ADMIN — ISOSORBIDE MONONITRATE 30 MILLIGRAM(S): 60 TABLET, EXTENDED RELEASE ORAL at 12:41

## 2023-01-01 RX ADMIN — Medication 4: at 18:31

## 2023-01-01 RX ADMIN — ROBINUL 0.4 MILLIGRAM(S): 0.2 INJECTION INTRAMUSCULAR; INTRAVENOUS at 16:11

## 2023-01-01 RX ADMIN — Medication 100 MILLIGRAM(S): at 22:01

## 2023-01-01 RX ADMIN — Medication 1 DROP(S): at 07:00

## 2023-01-01 RX ADMIN — DIPHENHYDRAMINE HYDROCHLORIDE AND LIDOCAINE HYDROCHLORIDE AND ALUMINUM HYDROXIDE AND MAGNESIUM HYDRO 4 MILLILITER(S): KIT at 06:32

## 2023-01-01 RX ADMIN — Medication 1 DROP(S): at 06:23

## 2023-01-01 RX ADMIN — Medication 3 MILLILITER(S): at 14:08

## 2023-01-01 RX ADMIN — HYDROMORPHONE HYDROCHLORIDE 1 MILLIGRAM(S): 2 INJECTION INTRAMUSCULAR; INTRAVENOUS; SUBCUTANEOUS at 11:27

## 2023-01-01 RX ADMIN — ALBUTEROL 2 PUFF(S): 90 AEROSOL, METERED ORAL at 16:52

## 2023-01-01 RX ADMIN — SODIUM CHLORIDE 70 MILLILITER(S): 9 INJECTION, SOLUTION INTRAVENOUS at 16:40

## 2023-01-01 RX ADMIN — FAMOTIDINE 20 MILLIGRAM(S): 10 INJECTION INTRAVENOUS at 13:44

## 2023-01-01 RX ADMIN — Medication 3 MILLILITER(S): at 17:25

## 2023-01-01 RX ADMIN — Medication 1 DROP(S): at 14:03

## 2023-01-01 RX ADMIN — Medication 1 DROP(S): at 14:31

## 2023-01-01 RX ADMIN — Medication 12.5 MILLIGRAM(S): at 22:41

## 2023-01-01 RX ADMIN — HYDROMORPHONE HYDROCHLORIDE 0.5 MILLIGRAM(S): 2 INJECTION INTRAMUSCULAR; INTRAVENOUS; SUBCUTANEOUS at 17:57

## 2023-01-01 RX ADMIN — Medication 4: at 11:56

## 2023-01-01 RX ADMIN — ALBUTEROL 2 PUFF(S): 90 AEROSOL, METERED ORAL at 23:06

## 2023-01-01 RX ADMIN — Medication 1 APPLICATION(S): at 13:20

## 2023-01-01 RX ADMIN — LIDOCAINE 5 MILLILITER(S): 4 CREAM TOPICAL at 18:22

## 2023-01-01 RX ADMIN — ALBUTEROL 2 PUFF(S): 90 AEROSOL, METERED ORAL at 16:15

## 2023-01-01 RX ADMIN — LIDOCAINE 5 MILLILITER(S): 4 CREAM TOPICAL at 00:04

## 2023-01-01 RX ADMIN — HYDROMORPHONE HYDROCHLORIDE 0.5 MILLIGRAM(S): 2 INJECTION INTRAMUSCULAR; INTRAVENOUS; SUBCUTANEOUS at 01:10

## 2023-01-01 RX ADMIN — Medication 1 APPLICATION(S): at 11:50

## 2023-01-01 RX ADMIN — Medication 1 DROP(S): at 16:12

## 2023-01-01 RX ADMIN — Medication 1 DROP(S): at 22:39

## 2023-01-01 RX ADMIN — HYDROMORPHONE HYDROCHLORIDE 0.5 MILLIGRAM(S): 2 INJECTION INTRAMUSCULAR; INTRAVENOUS; SUBCUTANEOUS at 12:08

## 2023-01-01 RX ADMIN — OLANZAPINE 5 MILLIGRAM(S): 15 TABLET, FILM COATED ORAL at 07:34

## 2023-01-01 RX ADMIN — Medication 6: at 01:06

## 2023-01-01 RX ADMIN — PIPERACILLIN AND TAZOBACTAM 25 GRAM(S): 4; .5 INJECTION, POWDER, LYOPHILIZED, FOR SOLUTION INTRAVENOUS at 17:54

## 2023-01-01 RX ADMIN — HYDROMORPHONE HYDROCHLORIDE 1 MILLIGRAM(S): 2 INJECTION INTRAMUSCULAR; INTRAVENOUS; SUBCUTANEOUS at 12:58

## 2023-01-01 RX ADMIN — Medication 25 GRAM(S): at 07:52

## 2023-01-01 RX ADMIN — HYDROMORPHONE HYDROCHLORIDE 0.5 MILLIGRAM(S): 2 INJECTION INTRAMUSCULAR; INTRAVENOUS; SUBCUTANEOUS at 18:37

## 2023-01-01 RX ADMIN — Medication 100 MILLIGRAM(S): at 21:23

## 2023-01-01 RX ADMIN — LIDOCAINE 5 MILLILITER(S): 4 CREAM TOPICAL at 05:48

## 2023-01-01 RX ADMIN — HYDROMORPHONE HYDROCHLORIDE 0.5 MILLIGRAM(S): 2 INJECTION INTRAMUSCULAR; INTRAVENOUS; SUBCUTANEOUS at 14:14

## 2023-01-01 RX ADMIN — Medication 2: at 22:00

## 2023-01-01 RX ADMIN — SODIUM CHLORIDE 100 MILLILITER(S): 9 INJECTION, SOLUTION INTRAVENOUS at 02:02

## 2023-01-01 RX ADMIN — PANTOPRAZOLE SODIUM 40 MILLIGRAM(S): 20 TABLET, DELAYED RELEASE ORAL at 11:42

## 2023-01-01 RX ADMIN — Medication 2.5 MILLIGRAM(S): at 21:40

## 2023-01-01 RX ADMIN — Medication 12.5 GRAM(S): at 18:00

## 2023-01-01 RX ADMIN — HEPARIN SODIUM 5000 UNIT(S): 5000 INJECTION INTRAVENOUS; SUBCUTANEOUS at 21:50

## 2023-01-01 RX ADMIN — Medication 4: at 06:33

## 2023-01-01 RX ADMIN — DIPHENHYDRAMINE HYDROCHLORIDE AND LIDOCAINE HYDROCHLORIDE AND ALUMINUM HYDROXIDE AND MAGNESIUM HYDRO 4 MILLILITER(S): KIT at 13:57

## 2023-01-01 RX ADMIN — Medication 2: at 12:04

## 2023-01-01 RX ADMIN — HYDROMORPHONE HYDROCHLORIDE 0.5 MILLIGRAM(S): 2 INJECTION INTRAMUSCULAR; INTRAVENOUS; SUBCUTANEOUS at 15:23

## 2023-01-01 RX ADMIN — Medication 25 GRAM(S): at 10:40

## 2023-01-01 RX ADMIN — ALBUTEROL 2 PUFF(S): 90 AEROSOL, METERED ORAL at 21:59

## 2023-01-01 RX ADMIN — FAMOTIDINE 208 MILLIGRAM(S): 10 INJECTION INTRAVENOUS at 13:33

## 2023-01-01 RX ADMIN — HYDROMORPHONE HYDROCHLORIDE 0.5 MILLIGRAM(S): 2 INJECTION INTRAMUSCULAR; INTRAVENOUS; SUBCUTANEOUS at 18:28

## 2023-01-01 RX ADMIN — Medication 1.25 MILLIGRAM(S): at 09:50

## 2023-01-01 RX ADMIN — SCOPALAMINE 1 PATCH: 1 PATCH, EXTENDED RELEASE TRANSDERMAL at 17:22

## 2023-01-01 RX ADMIN — Medication 3 MILLILITER(S): at 15:48

## 2023-01-01 RX ADMIN — LIDOCAINE 5 MILLILITER(S): 4 CREAM TOPICAL at 07:51

## 2023-01-01 RX ADMIN — Medication 3 MILLILITER(S): at 10:45

## 2023-01-01 RX ADMIN — ENOXAPARIN SODIUM 40 MILLIGRAM(S): 100 INJECTION SUBCUTANEOUS at 21:04

## 2023-01-01 RX ADMIN — Medication 2: at 08:47

## 2023-01-01 RX ADMIN — DIPHENHYDRAMINE HYDROCHLORIDE AND LIDOCAINE HYDROCHLORIDE AND ALUMINUM HYDROXIDE AND MAGNESIUM HYDRO 4 MILLILITER(S): KIT at 07:00

## 2023-01-01 RX ADMIN — Medication 3 MILLILITER(S): at 15:52

## 2023-01-01 RX ADMIN — DIPHENHYDRAMINE HYDROCHLORIDE AND LIDOCAINE HYDROCHLORIDE AND ALUMINUM HYDROXIDE AND MAGNESIUM HYDRO 4 MILLILITER(S): KIT at 19:03

## 2023-01-01 RX ADMIN — Medication 2: at 18:23

## 2023-01-01 RX ADMIN — Medication 2.5 MILLIGRAM(S): at 00:01

## 2023-01-01 RX ADMIN — ALBUTEROL 2 PUFF(S): 90 AEROSOL, METERED ORAL at 16:05

## 2023-01-01 RX ADMIN — QUETIAPINE FUMARATE 25 MILLIGRAM(S): 200 TABLET, FILM COATED ORAL at 21:46

## 2023-01-01 RX ADMIN — ROBINUL 0.4 MILLIGRAM(S): 0.2 INJECTION INTRAMUSCULAR; INTRAVENOUS at 21:30

## 2023-01-01 RX ADMIN — PACLITAXEL 80 MILLIGRAM(S): 6 INJECTION, SOLUTION, CONCENTRATE INTRAVENOUS at 15:05

## 2023-01-01 RX ADMIN — Medication 105 MILLIGRAM(S): at 10:25

## 2023-01-01 RX ADMIN — DIPHENHYDRAMINE HYDROCHLORIDE AND LIDOCAINE HYDROCHLORIDE AND ALUMINUM HYDROXIDE AND MAGNESIUM HYDRO 4 MILLILITER(S): KIT at 11:58

## 2023-01-01 RX ADMIN — SODIUM CHLORIDE 1000 MILLILITER(S): 9 INJECTION INTRAMUSCULAR; INTRAVENOUS; SUBCUTANEOUS at 13:25

## 2023-01-01 RX ADMIN — PREGABALIN 1000 MICROGRAM(S): 225 CAPSULE ORAL at 11:57

## 2023-01-01 RX ADMIN — SODIUM CHLORIDE 75 MILLILITER(S): 9 INJECTION, SOLUTION INTRAVENOUS at 22:34

## 2023-01-01 RX ADMIN — Medication 1 DROP(S): at 22:16

## 2023-01-01 RX ADMIN — DIPHENHYDRAMINE HYDROCHLORIDE AND LIDOCAINE HYDROCHLORIDE AND ALUMINUM HYDROXIDE AND MAGNESIUM HYDRO 4 MILLILITER(S): KIT at 07:17

## 2023-01-01 RX ADMIN — SENNA PLUS 2 TABLET(S): 8.6 TABLET ORAL at 21:52

## 2023-01-01 RX ADMIN — Medication 1 APPLICATION(S): at 17:26

## 2023-01-01 RX ADMIN — Medication 1.25 MILLIGRAM(S): at 17:14

## 2023-01-01 RX ADMIN — Medication 50 MILLILITER(S): at 06:38

## 2023-01-01 RX ADMIN — HEPARIN SODIUM 5000 UNIT(S): 5000 INJECTION INTRAVENOUS; SUBCUTANEOUS at 22:27

## 2023-01-01 RX ADMIN — SODIUM CHLORIDE 500 MILLILITER(S): 9 INJECTION INTRAMUSCULAR; INTRAVENOUS; SUBCUTANEOUS at 12:23

## 2023-01-01 RX ADMIN — Medication 3 MILLILITER(S): at 11:31

## 2023-01-01 RX ADMIN — Medication 12.5 MILLIGRAM(S): at 06:01

## 2023-01-01 RX ADMIN — ATORVASTATIN CALCIUM 80 MILLIGRAM(S): 80 TABLET, FILM COATED ORAL at 21:04

## 2023-01-01 RX ADMIN — SODIUM CHLORIDE 1000 MILLILITER(S): 9 INJECTION INTRAMUSCULAR; INTRAVENOUS; SUBCUTANEOUS at 14:05

## 2023-01-01 RX ADMIN — LIDOCAINE 5 MILLILITER(S): 4 CREAM TOPICAL at 17:44

## 2023-01-01 RX ADMIN — HUMAN INSULIN 15 UNIT(S): 100 INJECTION, SUSPENSION SUBCUTANEOUS at 14:15

## 2023-01-01 RX ADMIN — LIDOCAINE 5 MILLILITER(S): 4 CREAM TOPICAL at 07:09

## 2023-01-01 RX ADMIN — ROBINUL 0.4 MILLIGRAM(S): 0.2 INJECTION INTRAMUSCULAR; INTRAVENOUS at 05:38

## 2023-01-01 RX ADMIN — Medication 2: at 13:02

## 2023-01-01 RX ADMIN — Medication 5: at 12:35

## 2023-01-01 RX ADMIN — LATANOPROST 1 DROP(S): 0.05 SOLUTION/ DROPS OPHTHALMIC; TOPICAL at 21:45

## 2023-01-01 RX ADMIN — ALBUTEROL 2 PUFF(S): 90 AEROSOL, METERED ORAL at 17:17

## 2023-01-01 RX ADMIN — PIPERACILLIN AND TAZOBACTAM 25 GRAM(S): 4; .5 INJECTION, POWDER, LYOPHILIZED, FOR SOLUTION INTRAVENOUS at 21:40

## 2023-01-01 RX ADMIN — Medication 3 MILLILITER(S): at 22:08

## 2023-01-01 RX ADMIN — Medication 4: at 17:22

## 2023-01-01 RX ADMIN — Medication 2.5 MILLIGRAM(S): at 06:56

## 2023-01-01 RX ADMIN — Medication 1 DROP(S): at 06:32

## 2023-01-01 RX ADMIN — HYDROMORPHONE HYDROCHLORIDE 0.5 MILLIGRAM(S): 2 INJECTION INTRAMUSCULAR; INTRAVENOUS; SUBCUTANEOUS at 13:59

## 2023-01-01 RX ADMIN — Medication 6: at 07:03

## 2023-01-01 RX ADMIN — Medication 1 DROP(S): at 22:52

## 2023-01-01 RX ADMIN — Medication 105 MILLIGRAM(S): at 09:11

## 2023-01-01 RX ADMIN — INSULIN GLARGINE 20 UNIT(S): 100 INJECTION, SOLUTION SUBCUTANEOUS at 23:00

## 2023-01-01 RX ADMIN — Medication 3 MILLILITER(S): at 16:58

## 2023-01-01 RX ADMIN — LIDOCAINE 5 MILLILITER(S): 4 CREAM TOPICAL at 19:37

## 2023-01-01 RX ADMIN — Medication 3 MILLILITER(S): at 01:08

## 2023-01-01 RX ADMIN — Medication 1 DROP(S): at 06:55

## 2023-01-01 RX ADMIN — Medication 3 MILLILITER(S): at 12:53

## 2023-01-01 RX ADMIN — Medication 204 MILLIGRAM(S): at 12:27

## 2023-01-01 RX ADMIN — SODIUM CHLORIDE 500 MILLILITER(S): 9 INJECTION INTRAMUSCULAR; INTRAVENOUS; SUBCUTANEOUS at 12:45

## 2023-01-01 RX ADMIN — Medication 100 MILLIGRAM(S): at 15:10

## 2023-01-01 RX ADMIN — Medication 1 DROP(S): at 15:23

## 2023-01-01 RX ADMIN — ROBINUL 0.4 MILLIGRAM(S): 0.2 INJECTION INTRAMUSCULAR; INTRAVENOUS at 09:43

## 2023-01-01 RX ADMIN — Medication 2.5 MILLIGRAM(S): at 23:46

## 2023-01-01 RX ADMIN — HEPARIN SODIUM 5000 UNIT(S): 5000 INJECTION INTRAVENOUS; SUBCUTANEOUS at 22:35

## 2023-01-01 RX ADMIN — ISOSORBIDE MONONITRATE 30 MILLIGRAM(S): 60 TABLET, EXTENDED RELEASE ORAL at 12:01

## 2023-01-01 RX ADMIN — Medication 3 MILLILITER(S): at 19:23

## 2023-01-01 RX ADMIN — Medication 2.5 MILLIGRAM(S): at 13:07

## 2023-01-01 RX ADMIN — Medication 10 MILLIGRAM(S): at 19:05

## 2023-01-01 RX ADMIN — HYDROMORPHONE HYDROCHLORIDE 1 MILLIGRAM(S): 2 INJECTION INTRAMUSCULAR; INTRAVENOUS; SUBCUTANEOUS at 08:09

## 2023-01-01 RX ADMIN — Medication 1 MILLIGRAM(S): at 04:39

## 2023-01-01 RX ADMIN — HYDROMORPHONE HYDROCHLORIDE 0.5 MILLIGRAM(S): 2 INJECTION INTRAMUSCULAR; INTRAVENOUS; SUBCUTANEOUS at 15:25

## 2023-01-01 RX ADMIN — HYDROMORPHONE HYDROCHLORIDE 0.5 MILLIGRAM(S): 2 INJECTION INTRAMUSCULAR; INTRAVENOUS; SUBCUTANEOUS at 21:50

## 2023-01-01 RX ADMIN — ISOSORBIDE MONONITRATE 30 MILLIGRAM(S): 60 TABLET, EXTENDED RELEASE ORAL at 12:36

## 2023-01-01 RX ADMIN — DIPHENHYDRAMINE HYDROCHLORIDE AND LIDOCAINE HYDROCHLORIDE AND ALUMINUM HYDROXIDE AND MAGNESIUM HYDRO 4 MILLILITER(S): KIT at 13:32

## 2023-01-01 RX ADMIN — Medication 3 MILLILITER(S): at 02:41

## 2023-01-01 RX ADMIN — Medication 5: at 17:42

## 2023-01-01 RX ADMIN — HYDROMORPHONE HYDROCHLORIDE 0.5 MILLIGRAM(S): 2 INJECTION INTRAMUSCULAR; INTRAVENOUS; SUBCUTANEOUS at 03:26

## 2023-01-01 RX ADMIN — Medication 6 UNIT(S): at 12:41

## 2023-01-01 RX ADMIN — HEPARIN SODIUM 5000 UNIT(S): 5000 INJECTION INTRAVENOUS; SUBCUTANEOUS at 06:14

## 2023-01-01 RX ADMIN — SODIUM CHLORIDE 70 MILLILITER(S): 9 INJECTION, SOLUTION INTRAVENOUS at 09:47

## 2023-01-01 RX ADMIN — DIPHENHYDRAMINE HYDROCHLORIDE AND LIDOCAINE HYDROCHLORIDE AND ALUMINUM HYDROXIDE AND MAGNESIUM HYDRO 4 MILLILITER(S): KIT at 19:51

## 2023-01-01 RX ADMIN — Medication 0.5 MILLIGRAM(S): at 09:45

## 2023-01-01 RX ADMIN — HYDROMORPHONE HYDROCHLORIDE 0.5 MILLIGRAM(S): 2 INJECTION INTRAMUSCULAR; INTRAVENOUS; SUBCUTANEOUS at 05:03

## 2023-01-01 RX ADMIN — PANTOPRAZOLE SODIUM 40 MILLIGRAM(S): 20 TABLET, DELAYED RELEASE ORAL at 06:03

## 2023-01-01 RX ADMIN — Medication 3 MILLILITER(S): at 10:08

## 2023-01-01 RX ADMIN — SENNA PLUS 2 TABLET(S): 8.6 TABLET ORAL at 22:06

## 2023-01-01 RX ADMIN — PIPERACILLIN AND TAZOBACTAM 25 GRAM(S): 4; .5 INJECTION, POWDER, LYOPHILIZED, FOR SOLUTION INTRAVENOUS at 21:23

## 2023-01-01 RX ADMIN — HYDROMORPHONE HYDROCHLORIDE 1 MILLIGRAM(S): 2 INJECTION INTRAMUSCULAR; INTRAVENOUS; SUBCUTANEOUS at 02:38

## 2023-01-01 RX ADMIN — HEPARIN SODIUM 5000 UNIT(S): 5000 INJECTION INTRAVENOUS; SUBCUTANEOUS at 13:55

## 2023-01-01 RX ADMIN — HYDROMORPHONE HYDROCHLORIDE 0.5 MILLIGRAM(S): 2 INJECTION INTRAMUSCULAR; INTRAVENOUS; SUBCUTANEOUS at 11:49

## 2023-01-01 RX ADMIN — Medication 1 DROP(S): at 13:43

## 2023-01-01 RX ADMIN — Medication 204 MILLIGRAM(S): at 13:17

## 2023-01-01 RX ADMIN — Medication 2: at 08:59

## 2023-01-01 RX ADMIN — Medication 1 MILLIGRAM(S): at 15:40

## 2023-01-01 RX ADMIN — Medication 3 MILLILITER(S): at 16:29

## 2023-01-01 RX ADMIN — PANTOPRAZOLE SODIUM 40 MILLIGRAM(S): 20 TABLET, DELAYED RELEASE ORAL at 06:21

## 2023-01-01 RX ADMIN — ENOXAPARIN SODIUM 40 MILLIGRAM(S): 100 INJECTION SUBCUTANEOUS at 17:48

## 2023-01-01 RX ADMIN — MEN-PHOR 1 APPLICATION(S): .5; .5 LOTION TOPICAL at 18:39

## 2023-01-01 RX ADMIN — Medication 3 MILLILITER(S): at 15:10

## 2023-01-01 RX ADMIN — Medication 25 GRAM(S): at 13:51

## 2023-01-01 RX ADMIN — DIPHENHYDRAMINE HYDROCHLORIDE AND LIDOCAINE HYDROCHLORIDE AND ALUMINUM HYDROXIDE AND MAGNESIUM HYDRO 4 MILLILITER(S): KIT at 18:24

## 2023-01-01 RX ADMIN — Medication 1 DROP(S): at 12:37

## 2023-01-01 RX ADMIN — POLYETHYLENE GLYCOL 3350 17 GRAM(S): 17 POWDER, FOR SOLUTION ORAL at 11:47

## 2023-01-01 RX ADMIN — Medication 25 GRAM(S): at 13:26

## 2023-01-01 RX ADMIN — Medication 1.25 MILLIGRAM(S): at 06:09

## 2023-01-01 RX ADMIN — HEPARIN SODIUM 5000 UNIT(S): 5000 INJECTION INTRAVENOUS; SUBCUTANEOUS at 06:46

## 2023-01-01 RX ADMIN — FLUCONAZOLE 400 MILLIGRAM(S): 150 TABLET ORAL at 13:21

## 2023-01-01 RX ADMIN — Medication 1 APPLICATION(S): at 11:59

## 2023-01-01 RX ADMIN — Medication 12.5 MILLIGRAM(S): at 07:08

## 2023-01-01 RX ADMIN — ROBINUL 0.4 MILLIGRAM(S): 0.2 INJECTION INTRAMUSCULAR; INTRAVENOUS at 00:45

## 2023-01-01 RX ADMIN — HYDROMORPHONE HYDROCHLORIDE 0.5 MILLIGRAM(S): 2 INJECTION INTRAMUSCULAR; INTRAVENOUS; SUBCUTANEOUS at 11:00

## 2023-01-01 RX ADMIN — HYDROMORPHONE HYDROCHLORIDE 0.5 MILLIGRAM(S): 2 INJECTION INTRAMUSCULAR; INTRAVENOUS; SUBCUTANEOUS at 07:38

## 2023-01-01 RX ADMIN — LIDOCAINE 5 MILLILITER(S): 4 CREAM TOPICAL at 05:27

## 2023-01-01 RX ADMIN — SODIUM CHLORIDE 500 MILLILITER(S): 9 INJECTION INTRAMUSCULAR; INTRAVENOUS; SUBCUTANEOUS at 13:25

## 2023-01-01 RX ADMIN — Medication 1 DROP(S): at 06:14

## 2023-01-01 RX ADMIN — HYDROMORPHONE HYDROCHLORIDE 0.5 MILLIGRAM(S): 2 INJECTION INTRAMUSCULAR; INTRAVENOUS; SUBCUTANEOUS at 09:45

## 2023-01-01 RX ADMIN — INSULIN GLARGINE 10 UNIT(S): 100 INJECTION, SOLUTION SUBCUTANEOUS at 22:38

## 2023-01-01 RX ADMIN — Medication 2: at 13:24

## 2023-01-01 RX ADMIN — DIPHENHYDRAMINE HYDROCHLORIDE AND LIDOCAINE HYDROCHLORIDE AND ALUMINUM HYDROXIDE AND MAGNESIUM HYDRO 4 MILLILITER(S): KIT at 12:08

## 2023-01-01 RX ADMIN — DIPHENHYDRAMINE HYDROCHLORIDE AND LIDOCAINE HYDROCHLORIDE AND ALUMINUM HYDROXIDE AND MAGNESIUM HYDRO 4 MILLILITER(S): KIT at 19:37

## 2023-01-01 RX ADMIN — PANTOPRAZOLE SODIUM 40 MILLIGRAM(S): 20 TABLET, DELAYED RELEASE ORAL at 11:32

## 2023-01-01 RX ADMIN — Medication 1000 MILLIGRAM(S): at 19:55

## 2023-01-01 RX ADMIN — PREGABALIN 1000 MICROGRAM(S): 225 CAPSULE ORAL at 09:21

## 2023-01-01 RX ADMIN — Medication 1 DROP(S): at 06:41

## 2023-01-01 RX ADMIN — HYDROMORPHONE HYDROCHLORIDE 1 MILLIGRAM(S): 2 INJECTION INTRAMUSCULAR; INTRAVENOUS; SUBCUTANEOUS at 04:45

## 2023-01-01 RX ADMIN — ALBUTEROL 2 PUFF(S): 90 AEROSOL, METERED ORAL at 22:58

## 2023-01-01 RX ADMIN — Medication 10: at 00:42

## 2023-01-01 RX ADMIN — Medication 100 MILLIEQUIVALENT(S): at 11:33

## 2023-01-01 RX ADMIN — PANTOPRAZOLE SODIUM 40 MILLIGRAM(S): 20 TABLET, DELAYED RELEASE ORAL at 05:46

## 2023-01-01 RX ADMIN — Medication 3 MILLILITER(S): at 17:55

## 2023-01-01 RX ADMIN — ENOXAPARIN SODIUM 40 MILLIGRAM(S): 100 INJECTION SUBCUTANEOUS at 18:12

## 2023-01-01 RX ADMIN — ROBINUL 0.4 MILLIGRAM(S): 0.2 INJECTION INTRAMUSCULAR; INTRAVENOUS at 18:19

## 2023-01-01 RX ADMIN — ALBUTEROL 2 PUFF(S): 90 AEROSOL, METERED ORAL at 11:13

## 2023-01-01 RX ADMIN — SODIUM CHLORIDE 1000 MILLILITER(S): 9 INJECTION, SOLUTION INTRAVENOUS at 18:28

## 2023-01-01 RX ADMIN — Medication 3 MILLILITER(S): at 19:19

## 2023-01-01 RX ADMIN — Medication 40 MILLIEQUIVALENT(S): at 12:15

## 2023-01-01 RX ADMIN — HYDROMORPHONE HYDROCHLORIDE 0.5 MILLIGRAM(S): 2 INJECTION INTRAMUSCULAR; INTRAVENOUS; SUBCUTANEOUS at 00:50

## 2023-01-01 RX ADMIN — PACLITAXEL 80 MILLIGRAM(S): 6 INJECTION, SOLUTION, CONCENTRATE INTRAVENOUS at 15:03

## 2023-01-01 RX ADMIN — Medication 1 MILLIGRAM(S): at 17:34

## 2023-01-01 RX ADMIN — HEPARIN SODIUM 5000 UNIT(S): 5000 INJECTION INTRAVENOUS; SUBCUTANEOUS at 22:01

## 2023-01-01 RX ADMIN — ALBUTEROL 2 PUFF(S): 90 AEROSOL, METERED ORAL at 21:06

## 2023-01-01 RX ADMIN — HYDROMORPHONE HYDROCHLORIDE 0.5 MILLIGRAM(S): 2 INJECTION INTRAMUSCULAR; INTRAVENOUS; SUBCUTANEOUS at 10:57

## 2023-01-01 RX ADMIN — ALBUTEROL 2 PUFF(S): 90 AEROSOL, METERED ORAL at 15:39

## 2023-01-01 RX ADMIN — HYDROMORPHONE HYDROCHLORIDE 0.5 MILLIGRAM(S): 2 INJECTION INTRAMUSCULAR; INTRAVENOUS; SUBCUTANEOUS at 03:58

## 2023-01-01 RX ADMIN — Medication 85 MILLIMOLE(S): at 12:55

## 2023-01-01 RX ADMIN — Medication 250 MILLIGRAM(S): at 12:00

## 2023-01-01 RX ADMIN — HYDROMORPHONE HYDROCHLORIDE 1 MILLIGRAM(S): 2 INJECTION INTRAMUSCULAR; INTRAVENOUS; SUBCUTANEOUS at 10:47

## 2023-01-01 RX ADMIN — ATORVASTATIN CALCIUM 80 MILLIGRAM(S): 80 TABLET, FILM COATED ORAL at 21:47

## 2023-01-01 RX ADMIN — SODIUM CHLORIDE 80 MILLILITER(S): 9 INJECTION, SOLUTION INTRAVENOUS at 09:42

## 2023-01-01 RX ADMIN — HYDROMORPHONE HYDROCHLORIDE 1 MILLIGRAM(S): 2 INJECTION INTRAMUSCULAR; INTRAVENOUS; SUBCUTANEOUS at 18:02

## 2023-01-01 RX ADMIN — Medication 25 GRAM(S): at 15:12

## 2023-01-01 RX ADMIN — SODIUM CHLORIDE 75 MILLILITER(S): 9 INJECTION INTRAMUSCULAR; INTRAVENOUS; SUBCUTANEOUS at 06:28

## 2023-01-01 RX ADMIN — ALBUTEROL 2 PUFF(S): 90 AEROSOL, METERED ORAL at 21:46

## 2023-01-01 RX ADMIN — INSULIN GLARGINE 20 UNIT(S): 100 INJECTION, SOLUTION SUBCUTANEOUS at 21:23

## 2023-01-01 RX ADMIN — HYDROMORPHONE HYDROCHLORIDE 0.5 MILLIGRAM(S): 2 INJECTION INTRAMUSCULAR; INTRAVENOUS; SUBCUTANEOUS at 12:42

## 2023-01-01 RX ADMIN — ROBINUL 0.4 MILLIGRAM(S): 0.2 INJECTION INTRAMUSCULAR; INTRAVENOUS at 17:21

## 2023-01-01 RX ADMIN — Medication 2: at 10:01

## 2023-01-01 RX ADMIN — Medication 3 MILLILITER(S): at 18:09

## 2023-01-01 RX ADMIN — HYDROMORPHONE HYDROCHLORIDE 0.5 MILLIGRAM(S): 2 INJECTION INTRAMUSCULAR; INTRAVENOUS; SUBCUTANEOUS at 22:05

## 2023-01-01 RX ADMIN — MORPHINE SULFATE 10 MILLIGRAM(S): 50 CAPSULE, EXTENDED RELEASE ORAL at 02:34

## 2023-01-01 RX ADMIN — DIPHENHYDRAMINE HYDROCHLORIDE AND LIDOCAINE HYDROCHLORIDE AND ALUMINUM HYDROXIDE AND MAGNESIUM HYDRO 4 MILLILITER(S): KIT at 06:52

## 2023-01-01 RX ADMIN — INSULIN GLARGINE 48 UNIT(S): 100 INJECTION, SOLUTION SUBCUTANEOUS at 07:04

## 2023-01-01 RX ADMIN — SODIUM CHLORIDE 500 MILLILITER(S): 9 INJECTION, SOLUTION INTRAVENOUS at 19:05

## 2023-01-01 RX ADMIN — Medication 650 MILLIGRAM(S): at 03:18

## 2023-01-01 RX ADMIN — Medication 4: at 18:00

## 2023-01-01 RX ADMIN — Medication 12.5 MILLIGRAM(S): at 17:54

## 2023-01-01 RX ADMIN — Medication 1 DROP(S): at 06:45

## 2023-01-01 RX ADMIN — Medication 25 GRAM(S): at 15:04

## 2023-01-01 RX ADMIN — Medication 2.5 MILLIGRAM(S): at 23:48

## 2023-01-01 RX ADMIN — Medication 250 MILLIGRAM(S): at 14:03

## 2023-01-01 RX ADMIN — HYDROMORPHONE HYDROCHLORIDE 0.5 MILLIGRAM(S): 2 INJECTION INTRAMUSCULAR; INTRAVENOUS; SUBCUTANEOUS at 01:51

## 2023-01-01 RX ADMIN — DIPHENHYDRAMINE HYDROCHLORIDE AND LIDOCAINE HYDROCHLORIDE AND ALUMINUM HYDROXIDE AND MAGNESIUM HYDRO 4 MILLILITER(S): KIT at 01:03

## 2023-01-01 RX ADMIN — SODIUM CHLORIDE 4 MILLILITER(S): 9 INJECTION INTRAMUSCULAR; INTRAVENOUS; SUBCUTANEOUS at 14:05

## 2023-01-01 SDOH — SOCIAL STABILITY - SOCIAL INSECURITY: OTHER SPECIFIED PROBLEMS RELATED TO PRIMARY SUPPORT GROUP: Z63.8

## 2023-06-02 PROBLEM — I73.9 PERIPHERAL VASCULAR DISEASE, UNSPECIFIED: Chronic | Status: ACTIVE | Noted: 2022-08-12

## 2023-06-05 NOTE — PHYSICAL EXAM
[FreeTextEntry1] :   Hoarse and raspy voice with a breathy quality, decreased range and pitch control, decreased projection [Midline] : trachea located in midline position [Normal] : no rashes

## 2023-06-05 NOTE — PROCEDURE
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  After decongestant and anesthesia were applied the laryngoscope was passed.  The nasal cavities, nasopharynx, oropharynx, hypopharynx, and larynx were all examined.  Vocal folds were examined during respiration and phonation.  The following findings were noted:\par \par Findings:  \par Nose: Septum is midline, turbinates are normal, nasal airways patent, mucosa normal\par Nasopharynx: Adenoids normal, no masses, eustachian tube normal\par Oropharynx: Pharyngeal walls symmetric and without lesion. Tonsils/fossae symmetric\par Hypopharynx: Hypopharynx and pyriform sinuses without lesion. No masses or asymmetry.  No pooling of secretions.\par Larynx:  Epiglottis and aryepiglottic folds were sharp and crisp bilaterally.  Bilateral false vocal folds normal appearance. Airway was widely patent.\par \par Strobe Exam Ratings\par 		\par TVF Appearance: glottic lesion emanating mostly from the commissure and left vocal fold\par TVF Mobility: normal\par Edema/hypertrophy: swollen more on the left\par Mucus on TVF: minimal\par Glottic Closure: large glottic  gap\par Mucosal Wave: significantly reduced\par Amplitude of Vibration: significantly reduced\par Phase: unable to evaluate \par  supraglottic hyperfunction: Mild\par Other Findings:\par \par Condition: Stable.  Patient tolerated procedure well.\par \par Complications: None\par \par --------------------------------------------------------------------\par \par Procedure: Pharyngeal and speech evaluation, by cine or video - CPT 71788	\par Voice assessment was recorded via video recording on this date.\par \par Clarity: very hoarse\par Range: significantly reduced\par Pitch Control: reduced\par Projection: mild\par Tremor: none\par Cough: none\par \par Condition: Stable.  Patient tolerated procedure well.\par Complications: None\par

## 2023-06-05 NOTE — HISTORY OF PRESENT ILLNESS
[de-identified] :  6/5/2023\par  77-year-old gentleman who presents with 3-month history of dysphonia.  He notes that his voice became hoarse and raspy.  This has been persistent.  He finds that it is difficult to speak with more exertion.  He notes issues with chewing eating swallowing with coughing and choking especially with liquids.  No ear, nose, throat symptoms otherwise.  He is a non-smoker nondrinker.  Nothing like this is happened previously.

## 2023-06-05 NOTE — ASSESSMENT
[FreeTextEntry1] :  77-year-old gentleman who presents with concern for 3-month history of dysphonia.  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  At this time I am recommending CT neck with contrast for further evaluation.  Ultimately patient will require OR for surgical excision and biopsy including suspension MicroDirect laryngoscopy and CO2 laser excision.  Patient with significant cardiac history and will need both primary care clearance as well as cardiac clearance.  He will need an anticoagulation plan as he does take Plavix.  I would like to have this completed within the next month.  Risk, benefits and alternatives of surgery were discussed including bleeding, infection, pain, risk of anesthesia, problems chewing or swallowing, changed or worsened voice, chipped teeth, tongue numbness, taste disturbance, referred ear pain, need for further procedures and possible time off of work.\par \par – CT neck with contrast\par – Plan for OR for suspension MicroDirect laryngoscopy with excision/biopsy of laryngeal lesion, using CO2 laser\par – Preoperative clearance including cardiac clearance and anticoagulation plan\par

## 2023-07-13 PROBLEM — J38.3 LESION OF VOCAL FOLD: Status: ACTIVE | Noted: 2023-01-01

## 2023-07-13 NOTE — HISTORY OF PRESENT ILLNESS
[Home] : at home, [unfilled] , at the time of the visit. [Medical Office: ([facility])___] : at the medical office located in  [Other:____] : [unfilled] [Verbal consent obtained from patient] : the patient, [unfilled] [de-identified] :  6/5/2023\par  77-year-old gentleman who presents with 3-month history of dysphonia.  He notes that his voice became hoarse and raspy.  This has been persistent.  He finds that it is difficult to speak with more exertion.  He notes issues with chewing eating swallowing with coughing and choking especially with liquids.  No ear, nose, throat symptoms otherwise.  He is a non-smoker nondrinker.  Nothing like this is happened previously.\par - [FreeTextEntry1] : 7/11/2023\par  since the last visit the patient was not cleared by his cardiologist to proceed with surgery at least now without stopping anticoagulation.  He did not follow-up for his CT neck until recently.  Over the past month the patient has developed worsening dysphagia and pain when swallowing.  No new ear, nose, throat symptoms otherwise.  Presents today to review CT  neck findings and discuss neck steps.

## 2023-07-13 NOTE — ASSESSMENT
[FreeTextEntry1] :  78-year-old gentleman who presents with concern for 3-month history of dysphonia.  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  At this time I am recommending CT neck with contrast for further evaluation.  Ultimately patient will require OR for surgical excision and biopsy including suspension MicroDirect laryngoscopy and CO2 laser excision.  Patient with significant cardiac history and will need both primary care clearance as well as cardiac clearance.  He will need an anticoagulation plan as he does take Plavix.  I would like to have this completed within the next month.  Risk, benefits and alternatives of surgery were discussed including bleeding, infection, pain, risk of anesthesia, problems chewing or swallowing, changed or worsened voice, chipped teeth, tongue numbness, taste disturbance, referred ear pain, need for further procedures and possible time off of work.\par \par   7/11/2023:   again the above was discussed at length.  I think it is important that the patient proceed with surgical intervention requiring biopsy of the lesion.  CT findings are  concerning for malignancy.  Due to worsening pain and discomfort we would like to get this done sooner than later.  We will plan for admission over the next week with in-house clearance and plan for OR in the upcoming days.  Patient's granddaughter understood this and all questions were answered at length.\par \par –   [facility] and clearance for surgery\par – Plan for OR for suspension MicroDirect laryngoscopy with excision/biopsy of laryngeal lesion, using CO2 laser\par – Preoperative clearance including cardiac clearance and anticoagulation plan\par

## 2023-07-14 NOTE — ED ADULT NURSE NOTE - NS ED NOTE ABUSE RESPONSE YN
"    Preventive Care at the 2 Year Visit  Growth Measurements & Percentiles  Head Circumference: 19.1\" (48.5 cm) (76 %, Source: CDC 0-36 Months) 76 %ile based on [facility] 0-36 Months head circumference-for-age data using vitals from 9/18/2017.   Weight: 27 lbs 3.2 oz / 12.3 kg (actual weight) / 56 %ile based on CDC 2-20 Years weight-for-age data using vitals from 9/18/2017.   Length: 2' 10.4\" / 87.4 cm 72 %ile based on CDC 2-20 Years stature-for-age data using vitals from 9/18/2017.   Weight for length: 48 %ile based on [facility] 2-20 Years weight-for-recumbent length data using vitals from 9/18/2017.    Your child s next Preventive Check-up will be at 3 years of age    Development  At this age, your child may:    climb and go down steps alone, one step at a time, holding the railing or holding someone s hand    open doors and climb on furniture    use a cup and spoon well    kick a ball    throw a ball overhand    take off clothing    stack five or six blocks    have a vocabulary of at least 20 to 50 words, make two-word phrases and call herself by name    respond to two-part verbal commands    show interest in toilet training    enjoy imitating adults    show interest in helping get dressed, and washing and drying her hands    use toys well    Feeding Tips    Let your child feed herself.  It will be messy, but this is another step toward independence.    Give your child healthy snacks like fruits and vegetables.    Do not to let your child eat non-food things such as dirt, rocks or paper.  Call the clinic if your child will not stop this behavior.    Sleep    You may move your child from a crib to a regular bed, however, do not rush this until your child is ready.  This is important if your child climbs out of the crib.    Your child may or may not take naps.  If your toddler does not nap, you may want to start a  quiet time.     He or she may  fight  sleep as a way of controlling his or her surroundings. Continue your " regular nighttime routine: bath, brushing teeth and reading. This will help your child take charge of the nighttime process.    Praise your child for positive behavior.    Let your child talk about nightmares.  Provide comfort and reassurance.    If your toddler has night terrors, she may cry, look terrified, be confused and look glassy-eyed.  This typically occurs during the first half of the night and can last up to 15 minutes.  Your toddler should fall asleep after the episode.  It s common if your toddler doesn t remember what happened in the morning.  Night terrors are not a problem.  Try to not let your toddler get too tired before bed.      Safety    Use an approved toddler car seat every time your child rides in the car.   At [age] years of age, you may turn the car seat to face forward.  The seat must still be in the back seat.  Every child needs to be in the back seat through age 12.    Keep all medicines, cleaning supplies and poisons out of your child s reach.  Call the poison control center or your health care provider for directions in case your child swallows poison.    Put the poison control number on all phones:  1-401.249.3744.    Use sunscreen with a SPF of more than 15 when your toddler is outside.    Do not let your child play with plastic bags or latex balloons.    Always watch your child when playing outside near a street.    Make a safe play area, if possible.    Always watch your child near water.    Do not let your child run around while eating.  This will prevent choking.    Give your child safe toys.  Do not let him or her play with toys that have small or sharp parts.    Never leave your child alone in the bathtub or near water.    Do not leave your child alone in the car, even if he or she is asleep.    What Your Toddler Needs    Make sure your child is getting consistent discipline at home and at day care.  Talk with your  provider if this isn t the case.    If you choose to use   time-out,  calmly but firmly tell your child why they are in time-out.  Time-out should be immediate.  The time-out spot should be non-threatening (for example - sit on a step).  You can use a timer that beeps at one minute, or ask your child to  come back when you are ready to say sorry.   Treat your child normally when the time-out is over.    Limit screen time (TV, computer, video games) to less than 2 hours per day.    Dental Care    Brush your child s teeth one to two times each day with a soft-bristled toothbrush.    Use a small amount (no more than pea size) of fluoridated toothpaste two times daily.    Let your child play with the toothbrush after brushing.    Your pediatric provider will speak with you regarding the need to make regular dental appointments for cleanings and check-ups starting when your child s first tooth appears.  (Your child may need fluoride supplements if you have well water.)           Yes

## 2023-07-14 NOTE — H&P ADULT - ASSESSMENT
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    Plan:  - Pain control prn  - Reg diet cc  - Medicine consult for pre-op optimization  - Cards consult for pre-op optimization  - F/u AC plan  - Likely plan for DL/biopsy next week

## 2023-07-14 NOTE — H&P ADULT - HISTORY OF PRESENT ILLNESS
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 He notes that his voice became hoarse and raspy, which has been persistent. He finds that it is difficult to speak with more exertion. He notes issues with chewing, eating, swallowing with coughing and choking especially with liquids. He is a non-smoker nondrinker. No symptoms like this in the past.

## 2023-07-14 NOTE — ED PROVIDER NOTE - PROGRESS NOTE DETAILS
S/p ENT evaluation, will need admission for further workup and management, possible operative intervention.  Imaging not indicated given previous recent imaging of neck.

## 2023-07-14 NOTE — ED ADULT TRIAGE NOTE - CHIEF COMPLAINT QUOTE
Pt c/o throat pain due to mass x 3-4 months, sent by MD Styles for eval. Pt denies fevers, sob. PMH DM, HTN.

## 2023-07-14 NOTE — ED ADULT NURSE NOTE - OBJECTIVE STATEMENT
78 yr male presents to the ER with c/o throat pain for 5mo. Pt has mass in his throat causing his pain. Pt has dysphagia, difficulty eating, drinking, lost his voice. Pt family states "His doctor here told him to come to the ER to get admitted for his surgery." Pt is in constant pain 10/10 midline to throat. Patient denies chest pain, discomfort, shortness of breath, difficulty breathing and any form of distress not noted. Patient oriented to ED area. All needs attended. Pt on cardiac monitor. Rounding in progress. Fall risk precautions maintained.

## 2023-07-14 NOTE — ED PROVIDER NOTE - OBJECTIVE STATEMENT
78M with HTN HLD DM PVD, CAD s/p CABG and PCI, now with 3-4 months of progressive throat pain and trouble swallowing, associated with hoarse voice, no shortness of breath but has been having cough, sent in by outpatient team for further workup and management.

## 2023-07-14 NOTE — H&P ADULT - NSHPPHYSICALEXAM_GEN_ALL_CORE
General: well-developed, NAD  Eyes: no eye drainage or redness, blind in both eyes  Ears: external ears normal  Nose: nares patent  Mouth: No oral lesions; no gross abnormalities  Neck: trachea midline. + hoarse voice  Respiratory: unlabored respirations  Cardiovascular: regular rate  Gastrointestinal: Soft, nondistended  Extremities: No edema, warm and well perfused  Skin: No lesions; no rash    Procedure: Flexible laryngoscopy    Pre-procedure diagnosis/Indication for procedure: To evaluate larynx    Description:    A flexible endoscope was used to examine the left and right nasal cavities, posterior oropharynx, hypopharynx, and larynx. The nasal valve areas were examined for abnormalities or collapse. The inferior and middle turbinates were evaluated. The middle and superior meatuses, the sphenoethmoid recesses, and the nasopharynx were examined and inspected for mucopurulence, polyps, and nasal masses. The oropharynx, tongue base/vallecula, epiglottis, aryepiglottic folds, arytenoids, vocal cords, hypopharynx were also inspected for swelling, inflammation, or dysfunction. The patient tolerated the procedure without complications.    Nasopharynx wnl  BOT/vallecula normal  Epiglottis sharp  AE folds nonedematous  Arytenoids mobile. Webbing visualized inferior to b/l arytenoids at midline  Vocal folds mobile bilaterally  No masses or lesions visualized in post cricoid space or pyriform sinuses bilaterally  No active bleeding or significant obstruction noted in supraglottic area.

## 2023-07-14 NOTE — ED ADULT NURSE NOTE - NSFALLHARMRISKINTERV_ED_ALL_ED

## 2023-07-14 NOTE — ED PROVIDER NOTE - PHYSICAL EXAMINATION
General: comfortable, resting in ED  HEENT: atraumatic, no eye erythema or discharge, Mallampati II with no pharyngeal exudate / erythema / mass, hoarse voice  Pulm: no cyanosis, no added work of breathing  Cardiac: extremities warm, intact peripheral pulse  GI: no abdominal distension, abdomen nontender  Neuro: alert, conversant  Psych: neutral affect, cooperative  Msk: no gross deformity or instability  Skin: no erythema or rash

## 2023-07-14 NOTE — H&P ADULT - ATTENDING COMMENTS
Agree with the history, exam, assessment and plan as outlined above.  I personally evaluated the patient and performed laryngoscopy.

## 2023-07-14 NOTE — PATIENT PROFILE ADULT - FALL HARM RISK - HARM RISK INTERVENTIONS

## 2023-07-14 NOTE — ED PROVIDER NOTE - NS ED ROS FT
REVIEW OF SYSTEMS  positive for: headache, cough, sore throat,   negative for: fever, eye pain, runny nose, shortness of breath, chest pain, stomach pain, vomiting, diarrhea, dysuria, myalgias, rash

## 2023-07-14 NOTE — ED PROVIDER NOTE - CLINICAL SUMMARY MEDICAL DECISION MAKING FREE TEXT BOX
Concern for throat pain of unclear etiology.  Given chronic progressive nature, concern for neoplastic vs infectious process, will obtain CT imaging for further diagnostic clarity.  Unlikely foreign body, however radiopaque foreign body will be visualized on imaging for above.  Will send preoprative laboratory evaluation given age and risk factors.  Patient comfortable in ED, will monitor.  Outpatient specialty care ongoing, will attempt to coordinate care with ENT consult team.

## 2023-07-14 NOTE — ED ADULT NURSE NOTE - IS THE PATIENT ABLE TO BE SCREENED?
Yes

## 2023-07-15 NOTE — CONSULT NOTE ADULT - ATTENDING COMMENTS
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    -  # [id number]     DM ( Hb Aic 7.9 ) on insulin regime- renal function stable with hyperglycemia  - stated he took Lantus 50 units daily ( not 45 )  - premeal he took ( 6 units in am , 15 units for lunch and 10 units for dinner -machine would read out the dosage for him   - hyperglycemia noted on finger stick-  - currently getting Lantus 45 units--to increase to home dose 50 units daily   - to give premeal insulin 6 units along with ISS.    Legally blind and ambulate with cane and assist, stated can ambulate for 2-3 blocks     Hyperlipidemia- continue with statin    CAD/CABG-PCI -most recent in August 2022  pt still on DAPT as outpt  - would get Cardiology for recommendation  - DAPT timing as per cardiology  - to continue with metoprolol 100 mg daily.  - to hold ACEI/ARB on day of surgery  benazapril (or lisinopril while inpatient) 20mg daily   - continue isosorbide mononitrate 30mg daily   - continue atorvastatin 80 mg per day     - BPH -to continue tamsulosin   -bowel regime with senna and miralax.    will watch out for chest x ray- non-smoker.  pt is at intermediate to high cardiac risk -     Thank you for allowing medicine to participate in the care, dw Dr. Andreea Kohler , will follow.
-Pre-op - 78M w/ HTN, HLD, DM, CAD s/p MIDCAB in Apr 2019 w/ subsequent PCI in Aug 2022 (WINTER x 1 to pLCx); Currently admitted for progressive dysphagia and dysphonia 2/2 glottic mass  -Ok to hold DAPT for biopsy; c/w Lipitor 80 daily  -c/w Toprol 12.5 daily(reduced from home dose), d/c Ranolazine d/t persistent bradycardia  -c/w Imdur 30 daily and Lisinopril 20 daily - both should be held on day of procedure, while Toprol 12.5 to be continued  -Give Lasix 20 IV x 1  -Would start ASA monotherapy post procedure when safe from surgical perspective  -Obtain TTE on Monday  -Obtain daily 12-lead EKG  -Cardiology will continue to follow    Maya Martinez MD

## 2023-07-15 NOTE — CONSULT NOTE ADULT - SUBJECTIVE AND OBJECTIVE BOX
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 He notes that his voice became hoarse and raspy and he has had a dry cough for the past 2-4 months. He finds that it is difficult to speak with more exertion. He notes issues with chewing, eating, swallowing with coughing and choking especially with liquids. No SOB, no fever.     He is a non-smoker and nondrinker.     PAST MEDICAL & SURGICAL HISTORY:  HTN (hypertension)      HLD (hyperlipidemia)      DM (diabetes mellitus)      CAD (coronary artery disease)      Glaucoma      PVD (peripheral vascular disease)      No significant past surgical history        Home Meds: Home Medications:  HumaLOG 100 units/mL injectable solution: 7 unit(s) injectable 3 times a day with meals (13 Aug 2022 22:07)  Lantus 100 units/mL subcutaneous solution: 45 unit(s) subcutaneous once a day (in the morning) (13 Aug 2022 22:07)  latanoprost 0.005% ophthalmic solution: 1 drop(s) to each affected eye once a day (in the evening) left eye (11 Aug 2022 23:58)  Simbrinza 1%- 0.2% ophthalmic suspension: 1 drop(s) to each affected eye 3 times a day (11 Aug 2022 23:58)  timolol maleate 0.5% ophthalmic solution: 1 drop(s) to each affected eye once a day (11 Aug 2022 23:58)    Allergies: Allergies    No Known Allergies    Intolerances      Soc:   Advanced Directives: Presumed Full Code     CURRENT MEDICATIONS:   --------------------------------------------------------------------------------------  Neurologic Medications    Respiratory Medications    Cardiovascular Medications  isosorbide   mononitrate ER Tablet (IMDUR) 30 milliGRAM(s) Oral daily  lisinopril 20 milliGRAM(s) Oral daily  metoprolol succinate ER 12.5 milliGRAM(s) Oral daily  ranolazine 500 milliGRAM(s) Oral two times a day    Gastrointestinal Medications  dextrose 5%. 1000 milliLiter(s) IV Continuous <Continuous>  dextrose 5%. 1000 milliLiter(s) IV Continuous <Continuous>    Genitourinary Medications    Hematologic/Oncologic Medications  enoxaparin Injectable 40 milliGRAM(s) SubCutaneous every 24 hours    Antimicrobial/Immunologic Medications    Endocrine/Metabolic Medications  atorvastatin 80 milliGRAM(s) Oral at bedtime  dextrose 50% Injectable 12.5 Gram(s) IV Push once  dextrose 50% Injectable 25 Gram(s) IV Push once  dextrose 50% Injectable 25 Gram(s) IV Push once  dextrose Oral Gel 15 Gram(s) Oral once PRN Blood Glucose LESS THAN 70 milliGRAM(s)/deciliter  glucagon  Injectable 1 milliGRAM(s) IntraMuscular once  insulin glargine Injectable (LANTUS) 45 Unit(s) SubCutaneous every morning  insulin lispro (ADMELOG) corrective regimen sliding scale   SubCutaneous three times a day before meals  insulin lispro (ADMELOG) corrective regimen sliding scale   SubCutaneous at bedtime  insulin lispro Injectable (ADMELOG) 7 Unit(s) SubCutaneous three times a day before meals    Topical/Other Medications  latanoprost 0.005% Ophthalmic Solution 1 Drop(s) Both EYES at bedtime  timolol 0.5% Solution 1 Drop(s) Both EYES daily    --------------------------------------------------------------------------------------    VITAL SIGNS, INS/OUTS (last 24 hours):  --------------------------------------------------------------------------------------  ICU Vital Signs Last 24 Hrs  T(C): 37.1 (15 Jul 2023 09:21), Max: 37.3 (14 Jul 2023 22:02)  T(F): 98.7 (15 Jul 2023 09:21), Max: 99.2 (14 Jul 2023 22:02)  HR: 48 (15 Jul 2023 08:10) (48 - 66)  BP: 154/70 (15 Jul 2023 08:10) (133/63 - 188/79)  BP(mean): 101 (15 Jul 2023 08:10) (91 - 115)  ABP: --  ABP(mean): --  RR: 16 (15 Jul 2023 08:10) (16 - 18)  SpO2: 96% (15 Jul 2023 08:10) (93% - 98%)    O2 Parameters below as of 15 Jul 2023 08:10  Patient On (Oxygen Delivery Method): room air      I&O's Summary    14 Jul 2023 07:01  -  15 Jul 2023 07:00  --------------------------------------------------------  IN: 50 mL / OUT: 600 mL / NET: -550 mL    15 Jul 2023 07:01  -  15 Jul 2023 12:52  --------------------------------------------------------  IN: 180 mL / OUT: 0 mL / NET: 180 mL      --------------------------------------------------------------------------------------    EXAM:  GENERAL: NAD, lying in bed comfortably  HEAD:  Atraumatic, Normocephalic  EYES: EOMI, PERRLA, conjunctiva and sclera clear  ENT: Moist mucous membranes. +Hoarse voice.   NECK: Supple, No JVD  CHEST/LUNG: Clear to auscultation bilaterally; No rales, rhonchi, wheezing, or rubs. Unlabored respirations  HEART: Regular rate and rhythm; No murmurs, rubs, or gallops  ABDOMEN: BSx4; Soft, nontender, nondistended  EXTREMITIES:  2+ Peripheral Pulses, brisk capillary refill. No clubbing, cyanosis, or edema. +Stasis dermatitis in b/l LEs.   NERVOUS SYSTEM:  A&Ox3, no focal deficits   SKIN: No rashes or lesions    LABS  --------------------------------------------------------------------------------------  Labs:  CAPILLARY BLOOD GLUCOSE      POCT Blood Glucose.: 234 mg/dL (15 Jul 2023 11:44)  POCT Blood Glucose.: 168 mg/dL (15 Jul 2023 09:12)  POCT Blood Glucose.: 246 mg/dL (15 Jul 2023 06:09)  POCT Blood Glucose.: 237 mg/dL (14 Jul 2023 22:07)                          10.8   5.20  )-----------( 201      ( 15 Jul 2023 06:32 )             33.0       Auto Neutrophil %: 63.8 % (07-14-23 @ 13:40)  Auto Immature Granulocyte %: 0.4 % (07-14-23 @ 13:40)    07-15    142  |  108  |  15  ----------------------------<  238<H>  4.6   |  26  |  0.96      Calcium: 8.7 mg/dL (07-15-23 @ 06:32)      LFTs:             6.2  | 0.5  | 14       ------------------[78      ( 15 Jul 2023 06:32 )  3.4  | x    | 11          Lipase:x      Amylase:x             Coags:     13.9   ----< 1.17    ( 14 Jul 2023 13:40 )     30.5        Urinalysis Basic - ( 15 Jul 2023 06:32 )    Color: x / Appearance: x / SG: x / pH: x  Gluc: 238 mg/dL / Ketone: x  / Bili: x / Urobili: x   Blood: x / Protein: x / Nitrite: x   Leuk Esterase: x / RBC: x / WBC x   Sq Epi: x / Non Sq Epi: x / Bacteria: x    --------------------------------------------------------------------------------------    OTHER LABS    IMAGING RESULTS  CT neck done, read pending      ASSESSMENT/ PLAN:  78M with PMHx of HTN, HLD, DM, PVD s/p stenting, CAD s/p CABG and PCI, now with 3-4 months of progressive dysphonia and dysphagia, 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  Medicine consulted for pre-op evaluation.    #Pre-Op Evaluation  -Pt with CAD s/p CABG in 2017 then NSTEMI in 2017 s/p WINTER placement to oLCx on 8/12/22  -Last TTE: 8/12/22: EF 50-55%, WMA, mild LVH, normal RV size/fxn, no sig valvular disease, PASP 37mmHg, no pericardial effusion  -EKG 7/15/23: Sinus viral - rate of 55  -Exercise tolerance - 2-3 blocks prior to needing a rest - METS 2-3  -No drinking, no smoking history  -RCRI 2 points = 10.1% risk of death/MI/cardiac arrest in 30 days  -Bryant: 1.4% risk of MI or cardiac arrest     -Would recommend CXR and cardiology consult prior to surgery     #DM  -c/w home lantus 48u in the mornings and mISS sliding scale three times per day with meals  -CC diet    #Urinary retention/BPH  -c/w home tamsulosin 0.4mg nightly  -Hold home mirabegron 50mg daily until after surgery    #CAD and PVD  -c/w home atorvastatin 80mg daily, metoprolol succinate 100mg daily and benazapril (or lisinopril while inpatient) 20mg daily and isosorbide mononitrate 30mg daily   -Hold home ASA 81mg daily, clopidogrel 75mg daily until after surgery   Med Consult Note  ==============================================================================================================  HPI:  78M with PMHx of HTN, HLD, DM, PVD s/p stenting, CAD s/p CABG and PCI, now with 3-4 months of progressive dysphonia and dysphagia, 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 He notes that his voice became hoarse and raspy and he has had a dry cough for the past 2-4 months. He finds that it is difficult to speak with more exertion. He notes issues with chewing, eating, swallowing with coughing and choking especially with liquids. No SOB, no fever.     He is a non-smoker and nondrinker.     PAST MEDICAL & SURGICAL HISTORY:  HTN (hypertension)      HLD (hyperlipidemia)      DM (diabetes mellitus)      CAD (coronary artery disease)      Glaucoma      PVD (peripheral vascular disease)      No significant past surgical history        Home Meds: Home Medications:  HumaLOG 100 units/mL injectable solution: 7 unit(s) injectable 3 times a day with meals (13 Aug 2022 22:07)  Lantus 100 units/mL subcutaneous solution: 45 unit(s) subcutaneous once a day (in the morning) (13 Aug 2022 22:07)  latanoprost 0.005% ophthalmic solution: 1 drop(s) to each affected eye once a day (in the evening) left eye (11 Aug 2022 23:58)  Simbrinza 1%- 0.2% ophthalmic suspension: 1 drop(s) to each affected eye 3 times a day (11 Aug 2022 23:58)  timolol maleate 0.5% ophthalmic solution: 1 drop(s) to each affected eye once a day (11 Aug 2022 23:58)    Allergies: Allergies    No Known Allergies    Intolerances      Soc:   Advanced Directives: Presumed Full Code     CURRENT MEDICATIONS:   --------------------------------------------------------------------------------------  Neurologic Medications    Respiratory Medications    Cardiovascular Medications  isosorbide   mononitrate ER Tablet (IMDUR) 30 milliGRAM(s) Oral daily  lisinopril 20 milliGRAM(s) Oral daily  metoprolol succinate ER 12.5 milliGRAM(s) Oral daily  ranolazine 500 milliGRAM(s) Oral two times a day    Gastrointestinal Medications  dextrose 5%. 1000 milliLiter(s) IV Continuous <Continuous>  dextrose 5%. 1000 milliLiter(s) IV Continuous <Continuous>    Genitourinary Medications    Hematologic/Oncologic Medications  enoxaparin Injectable 40 milliGRAM(s) SubCutaneous every 24 hours    Antimicrobial/Immunologic Medications    Endocrine/Metabolic Medications  atorvastatin 80 milliGRAM(s) Oral at bedtime  dextrose 50% Injectable 12.5 Gram(s) IV Push once  dextrose 50% Injectable 25 Gram(s) IV Push once  dextrose 50% Injectable 25 Gram(s) IV Push once  dextrose Oral Gel 15 Gram(s) Oral once PRN Blood Glucose LESS THAN 70 milliGRAM(s)/deciliter  glucagon  Injectable 1 milliGRAM(s) IntraMuscular once  insulin glargine Injectable (LANTUS) 45 Unit(s) SubCutaneous every morning  insulin lispro (ADMELOG) corrective regimen sliding scale   SubCutaneous three times a day before meals  insulin lispro (ADMELOG) corrective regimen sliding scale   SubCutaneous at bedtime  insulin lispro Injectable (ADMELOG) 7 Unit(s) SubCutaneous three times a day before meals    Topical/Other Medications  latanoprost 0.005% Ophthalmic Solution 1 Drop(s) Both EYES at bedtime  timolol 0.5% Solution 1 Drop(s) Both EYES daily    --------------------------------------------------------------------------------------    VITAL SIGNS, INS/OUTS (last 24 hours):  --------------------------------------------------------------------------------------  ICU Vital Signs Last 24 Hrs  T(C): 37.1 (15 Jul 2023 09:21), Max: 37.3 (14 Jul 2023 22:02)  T(F): 98.7 (15 Jul 2023 09:21), Max: 99.2 (14 Jul 2023 22:02)  HR: 48 (15 Jul 2023 08:10) (48 - 66)  BP: 154/70 (15 Jul 2023 08:10) (133/63 - 188/79)  BP(mean): 101 (15 Jul 2023 08:10) (91 - 115)  ABP: --  ABP(mean): --  RR: 16 (15 Jul 2023 08:10) (16 - 18)  SpO2: 96% (15 Jul 2023 08:10) (93% - 98%)    O2 Parameters below as of 15 Jul 2023 08:10  Patient On (Oxygen Delivery Method): room air      I&O's Summary    14 Jul 2023 07:01  -  15 Jul 2023 07:00  --------------------------------------------------------  IN: 50 mL / OUT: 600 mL / NET: -550 mL    15 Jul 2023 07:01  -  15 Jul 2023 12:52  --------------------------------------------------------  IN: 180 mL / OUT: 0 mL / NET: 180 mL      --------------------------------------------------------------------------------------    EXAM:  GENERAL: NAD, lying in bed comfortably  HEAD:  Atraumatic, Normocephalic  EYES: EOMI, PERRLA, conjunctiva and sclera clear  ENT: Moist mucous membranes. +Hoarse voice.   NECK: Supple, No JVD  CHEST/LUNG: Clear to auscultation bilaterally; No rales, rhonchi, wheezing, or rubs. Unlabored respirations  HEART: Regular rate and rhythm; No murmurs, rubs, or gallops  ABDOMEN: BSx4; Soft, nontender, nondistended  EXTREMITIES:  2+ Peripheral Pulses, brisk capillary refill. No clubbing, cyanosis, or edema. +Stasis dermatitis in b/l LEs.   NERVOUS SYSTEM:  A&Ox3, no focal deficits   SKIN: No rashes or lesions    LABS  --------------------------------------------------------------------------------------  Labs:  CAPILLARY BLOOD GLUCOSE      POCT Blood Glucose.: 234 mg/dL (15 Jul 2023 11:44)  POCT Blood Glucose.: 168 mg/dL (15 Jul 2023 09:12)  POCT Blood Glucose.: 246 mg/dL (15 Jul 2023 06:09)  POCT Blood Glucose.: 237 mg/dL (14 Jul 2023 22:07)                          10.8   5.20  )-----------( 201      ( 15 Jul 2023 06:32 )             33.0       Auto Neutrophil %: 63.8 % (07-14-23 @ 13:40)  Auto Immature Granulocyte %: 0.4 % (07-14-23 @ 13:40)    07-15    142  |  108  |  15  ----------------------------<  238<H>  4.6   |  26  |  0.96      Calcium: 8.7 mg/dL (07-15-23 @ 06:32)      LFTs:             6.2  | 0.5  | 14       ------------------[78      ( 15 Jul 2023 06:32 )  3.4  | x    | 11          Lipase:x      Amylase:x             Coags:     13.9   ----< 1.17    ( 14 Jul 2023 13:40 )     30.5        Urinalysis Basic - ( 15 Jul 2023 06:32 )    Color: x / Appearance: x / SG: x / pH: x  Gluc: 238 mg/dL / Ketone: x  / Bili: x / Urobili: x   Blood: x / Protein: x / Nitrite: x   Leuk Esterase: x / RBC: x / WBC x   Sq Epi: x / Non Sq Epi: x / Bacteria: x    --------------------------------------------------------------------------------------    OTHER LABS    IMAGING RESULTS  CT neck done, read pending      ASSESSMENT/ PLAN:  78M with PMHx of HTN, HLD, DM, PVD s/p stenting, CAD s/p CABG and PCI, now with 3-4 months of progressive dysphonia and dysphagia, 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  Medicine consulted for pre-op evaluation.    #Pre-Op Evaluation  -Pt with CAD s/p CABG in 2017 then NSTEMI in 2017 s/p WINTER placement to oLCx on 8/12/22  -Last TTE: 8/12/22: EF 50-55%, WMA, mild LVH, normal RV size/fxn, no sig valvular disease, PASP 37mmHg, no pericardial effusion  -EKG 7/15/23: Sinus viral - rate of 55  -Exercise tolerance - 2-3 blocks prior to needing a rest - METS 2-3  -No drinking, no smoking history  -RCRI 2 points = 10.1% risk of death/MI/cardiac arrest in 30 days  -Bryant: 1.4% risk of MI or cardiac arrest   -Would recommend CXR and cardiology consult prior to surgery     #DM  -c/w home lantus 48u in the mornings and mISS sliding scale three times per day with meals  -CC diet    #Urinary retention/BPH  -c/w home tamsulosin 0.4mg nightly  -Hold home mirabegron 50mg daily until after surgery  -Would recommend adding a bowel regimen - miralax and senna    #CAD and PVD  -c/w home atorvastatin 80mg daily, metoprolol succinate 100mg daily and benazapril (or lisinopril while inpatient) 20mg daily and isosorbide mononitrate 30mg daily   -Restart ASA 81mg daily and clopidogrel 75mg daily per cardiology    Med Consult Note  ==============================================================================================================  HPI:  78M with PMHx of HTN, HLD, DM, PVD s/p stenting, CAD s/p CABG and PCI, now with 3-4 months of progressive dysphonia and dysphagia, 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 He notes that his voice became hoarse and raspy and he has had a dry cough for the past 2-4 months. He finds that it is difficult to speak with more exertion. He notes issues with chewing, eating, swallowing with coughing and choking especially with liquids. No SOB, no fever.     He is a non-smoker and nondrinker.     PAST MEDICAL & SURGICAL HISTORY:  HTN (hypertension)      HLD (hyperlipidemia)      DM (diabetes mellitus)      CAD (coronary artery disease)      Glaucoma      PVD (peripheral vascular disease)      No significant past surgical history        Home Meds: Home Medications:  HumaLOG 100 units/mL injectable solution: 7 unit(s) injectable 3 times a day with meals (13 Aug 2022 22:07)  Lantus 100 units/mL subcutaneous solution: 45 unit(s) subcutaneous once a day (in the morning) (13 Aug 2022 22:07)  latanoprost 0.005% ophthalmic solution: 1 drop(s) to each affected eye once a day (in the evening) left eye (11 Aug 2022 23:58)  Simbrinza 1%- 0.2% ophthalmic suspension: 1 drop(s) to each affected eye 3 times a day (11 Aug 2022 23:58)  timolol maleate 0.5% ophthalmic solution: 1 drop(s) to each affected eye once a day (11 Aug 2022 23:58)    Allergies: Allergies    No Known Allergies    Intolerances      Soc:   Advanced Directives: Presumed Full Code     CURRENT MEDICATIONS:   --------------------------------------------------------------------------------------  Neurologic Medications    Respiratory Medications    Cardiovascular Medications  isosorbide   mononitrate ER Tablet (IMDUR) 30 milliGRAM(s) Oral daily  lisinopril 20 milliGRAM(s) Oral daily  metoprolol succinate ER 12.5 milliGRAM(s) Oral daily  ranolazine 500 milliGRAM(s) Oral two times a day    Gastrointestinal Medications  dextrose 5%. 1000 milliLiter(s) IV Continuous <Continuous>  dextrose 5%. 1000 milliLiter(s) IV Continuous <Continuous>    Genitourinary Medications    Hematologic/Oncologic Medications  enoxaparin Injectable 40 milliGRAM(s) SubCutaneous every 24 hours    Antimicrobial/Immunologic Medications    Endocrine/Metabolic Medications  atorvastatin 80 milliGRAM(s) Oral at bedtime  dextrose 50% Injectable 12.5 Gram(s) IV Push once  dextrose 50% Injectable 25 Gram(s) IV Push once  dextrose 50% Injectable 25 Gram(s) IV Push once  dextrose Oral Gel 15 Gram(s) Oral once PRN Blood Glucose LESS THAN 70 milliGRAM(s)/deciliter  glucagon  Injectable 1 milliGRAM(s) IntraMuscular once  insulin glargine Injectable (LANTUS) 45 Unit(s) SubCutaneous every morning  insulin lispro (ADMELOG) corrective regimen sliding scale   SubCutaneous three times a day before meals  insulin lispro (ADMELOG) corrective regimen sliding scale   SubCutaneous at bedtime  insulin lispro Injectable (ADMELOG) 7 Unit(s) SubCutaneous three times a day before meals    Topical/Other Medications  latanoprost 0.005% Ophthalmic Solution 1 Drop(s) Both EYES at bedtime  timolol 0.5% Solution 1 Drop(s) Both EYES daily    --------------------------------------------------------------------------------------    VITAL SIGNS, INS/OUTS (last 24 hours):  --------------------------------------------------------------------------------------  ICU Vital Signs Last 24 Hrs  T(C): 37.1 (15 Jul 2023 09:21), Max: 37.3 (14 Jul 2023 22:02)  T(F): 98.7 (15 Jul 2023 09:21), Max: 99.2 (14 Jul 2023 22:02)  HR: 48 (15 Jul 2023 08:10) (48 - 66)  BP: 154/70 (15 Jul 2023 08:10) (133/63 - 188/79)  BP(mean): 101 (15 Jul 2023 08:10) (91 - 115)  ABP: --  ABP(mean): --  RR: 16 (15 Jul 2023 08:10) (16 - 18)  SpO2: 96% (15 Jul 2023 08:10) (93% - 98%)    O2 Parameters below as of 15 Jul 2023 08:10  Patient On (Oxygen Delivery Method): room air      I&O's Summary    14 Jul 2023 07:01  -  15 Jul 2023 07:00  --------------------------------------------------------  IN: 50 mL / OUT: 600 mL / NET: -550 mL    15 Jul 2023 07:01  -  15 Jul 2023 12:52  --------------------------------------------------------  IN: 180 mL / OUT: 0 mL / NET: 180 mL      --------------------------------------------------------------------------------------    EXAM:  GENERAL: NAD, lying in bed comfortably  HEAD:  Atraumatic, Normocephalic  EYES: EOMI, PERRLA, conjunctiva and sclera clear  ENT: Moist mucous membranes. +Hoarse voice.   NECK: Supple, No JVD  CHEST/LUNG: Clear to auscultation bilaterally; No rales, rhonchi, wheezing, or rubs. Unlabored respirations  HEART: Regular rate and rhythm; No murmurs, rubs, or gallops  ABDOMEN: BSx4; Soft, nontender, nondistended  EXTREMITIES:  2+ Peripheral Pulses, brisk capillary refill. No clubbing, cyanosis, or edema. +Stasis dermatitis in b/l LEs.   NERVOUS SYSTEM:  A&Ox3, no focal deficits   SKIN: No rashes or lesions    LABS  --------------------------------------------------------------------------------------  Labs:  CAPILLARY BLOOD GLUCOSE      POCT Blood Glucose.: 234 mg/dL (15 Jul 2023 11:44)  POCT Blood Glucose.: 168 mg/dL (15 Jul 2023 09:12)  POCT Blood Glucose.: 246 mg/dL (15 Jul 2023 06:09)  POCT Blood Glucose.: 237 mg/dL (14 Jul 2023 22:07)                          10.8   5.20  )-----------( 201      ( 15 Jul 2023 06:32 )             33.0       Auto Neutrophil %: 63.8 % (07-14-23 @ 13:40)  Auto Immature Granulocyte %: 0.4 % (07-14-23 @ 13:40)    07-15    142  |  108  |  15  ----------------------------<  238<H>  4.6   |  26  |  0.96      Calcium: 8.7 mg/dL (07-15-23 @ 06:32)      LFTs:             6.2  | 0.5  | 14       ------------------[78      ( 15 Jul 2023 06:32 )  3.4  | x    | 11          Lipase:x      Amylase:x             Coags:     13.9   ----< 1.17    ( 14 Jul 2023 13:40 )     30.5        Urinalysis Basic - ( 15 Jul 2023 06:32 )    Color: x / Appearance: x / SG: x / pH: x  Gluc: 238 mg/dL / Ketone: x  / Bili: x / Urobili: x   Blood: x / Protein: x / Nitrite: x   Leuk Esterase: x / RBC: x / WBC x   Sq Epi: x / Non Sq Epi: x / Bacteria: x    --------------------------------------------------------------------------------------    OTHER LABS    IMAGING RESULTS  CT neck done, read pending      ASSESSMENT/ PLAN:  78M with PMHx of HTN, HLD, DM, PVD s/p stenting, CAD s/p CABG and PCI, now with 3-4 months of progressive dysphonia and dysphagia, 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  Medicine consulted for pre-op evaluation.    #Pre-Op Evaluation  -Pt with CAD s/p CABG in 2017 then NSTEMI in 2017 s/p WINTER placement to oLCx on 8/12/22  -Last TTE: 8/12/22: EF 50-55%, WMA, mild LVH, normal RV size/fxn, no sig valvular disease, PASP 37mmHg, no pericardial effusion  -EKG 7/15/23: Sinus viral - rate of 55  -Exercise tolerance - 2-3 blocks prior to needing a rest - METS 2-3  -No drinking, no smoking history  -RCRI 2 points = 10.1% risk of death/MI/cardiac arrest in 30 days  -Bryant: 1.4% risk of MI or cardiac arrest   -Would recommend CXR and cardiology consult prior to surgery     #DM  -c/w home lantus 48u in the mornings and mISS sliding scale three times per day with meals  -CC diet    #Urinary retention/BPH  -c/w home tamsulosin 0.4mg nightly  -Hold home mirabegron 50mg daily until after surgery  -Would recommend adding a bowel regimen - miralax and senna    #CAD and PVD  -c/w home atorvastatin 80mg daily, metoprolol succinate 100mg daily and benazapril (or lisinopril while inpatient) 20mg daily and isosorbide mononitrate 30mg daily   - to hold ACEI- Benazapril on day of procedure  - pt on DAPT - would defer timing of  ASA to Plavix to cardiology

## 2023-07-15 NOTE — CONSULT NOTE ADULT - SUBJECTIVE AND OBJECTIVE BOX
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 He notes that his voice became hoarse and raspy, which has been persistent. He finds that it is difficult to speak with more exertion. He notes issues with chewing, eating, swallowing with coughing and choking especially with liquids. He is a non-smoker nondrinker. No symptoms like this in the past.   (14 Jul 2023 18:42)        Pt seen and examined at bedside, NAD, ROS s/f ?, otherwise remainder of ROS is unremarkable     ROS: Per HPI    PAST MEDICAL & SURGICAL HISTORY:  HTN (hypertension)      HLD (hyperlipidemia)      DM (diabetes mellitus)      CAD (coronary artery disease)      Glaucoma      PVD (peripheral vascular disease)      No significant past surgical history        SOCIAL HISTORY:  FAMILY HISTORY:  No pertinent family history in first degree relatives      ALLERGIES: 	  No Known Allergies          MEDICATIONS:  atorvastatin 80 milliGRAM(s) Oral at bedtime  dextrose 5%. 1000 milliLiter(s) IV Continuous <Continuous>  dextrose 5%. 1000 milliLiter(s) IV Continuous <Continuous>  dextrose 50% Injectable 12.5 Gram(s) IV Push once  dextrose 50% Injectable 25 Gram(s) IV Push once  dextrose 50% Injectable 25 Gram(s) IV Push once  dextrose Oral Gel 15 Gram(s) Oral once PRN  enoxaparin Injectable 40 milliGRAM(s) SubCutaneous every 24 hours  glucagon  Injectable 1 milliGRAM(s) IntraMuscular once  insulin glargine Injectable (LANTUS) 45 Unit(s) SubCutaneous every morning  insulin lispro (ADMELOG) corrective regimen sliding scale   SubCutaneous three times a day before meals  insulin lispro (ADMELOG) corrective regimen sliding scale   SubCutaneous at bedtime  insulin lispro Injectable (ADMELOG) 7 Unit(s) SubCutaneous three times a day before meals  isosorbide   mononitrate ER Tablet (IMDUR) 30 milliGRAM(s) Oral daily  latanoprost 0.005% Ophthalmic Solution 1 Drop(s) Both EYES at bedtime  lisinopril 20 milliGRAM(s) Oral daily  metoprolol succinate ER 12.5 milliGRAM(s) Oral daily  ranolazine 500 milliGRAM(s) Oral two times a day  timolol 0.5% Solution 1 Drop(s) Both EYES daily      T(C): 37.1 (07-15-23 @ 14:19), Max: 37.3 (07-14-23 @ 22:02)  HR: 48 (07-15-23 @ 08:10) (48 - 66)  BP: 154/70 (07-15-23 @ 08:10) (133/63 - 188/79)  RR: 16 (07-15-23 @ 08:10) (16 - 18)  SpO2: 96% (07-15-23 @ 08:10) (93% - 98%)    07-14-23 @ 07:01  -  07-15-23 @ 07:00  --------------------------------------------------------  IN: 50 mL / OUT: 600 mL / NET: -550 mL    07-15-23 @ 07:01  -  07-15-23 @ 15:47  --------------------------------------------------------  IN: 420 mL / OUT: 400 mL / NET: 20 mL        PHYSICAL EXAM:    Constitutional: resting comfortably in bed; NAD  HEENT: NC/AT, PERRL, EOMI, anicteric sclera, no nasal discharge; uvula midline, no oropharyngeal erythema or exudates; MMM  Neck: supple; no thyromegaly, JVP ? cm H20, JVD +/-  Respiratory: CTA B/L; no W/R/R, no retractions  Cardiac: +S1/S2; RRR; no M/R/G; PMI non-displaced  Gastrointestinal: soft, NT/ND; no rebound or guarding; +BSx4  Extremities: WWP, no clubbing or cyanosis; no peripheral edema  Musculoskeletal: NROM x4; no joint swelling, tenderness or erythema  Vascular: 2+ radial, DP/PT pulses B/L  Dermatologic: skin warm, dry and intact; no rashes, wounds, or scars  Lymphatic: no submandibular or cervical LAD  Neurologic: AAOx3; CNII-XII grossly intact; no focal deficits        I&O's Summary    14 Jul 2023 07:01  -  15 Jul 2023 07:00  --------------------------------------------------------  IN: 50 mL / OUT: 600 mL / NET: -550 mL    15 Jul 2023 07:01  -  15 Jul 2023 15:47  --------------------------------------------------------  IN: 420 mL / OUT: 400 mL / NET: 20 mL        LABS:	 	                        10.8   5.20  )-----------( 201      ( 15 Jul 2023 06:32 )             33.0     07-15    142  |  108  |  15  ----------------------------<  238<H>  4.6   |  26  |  0.96    Ca    8.7      15 Jul 2023 06:32    TPro  6.2  /  Alb  3.4  /  TBili  0.5  /  DBili  x   /  AST  14  /  ALT  11  /  AlkPhos  78  07-15        proBNP:   Lipid Profile:   HgA1c:   TSH:     TELEMETRY: 	    ECG:  	  RADIOLOGY:   ECHO:  STRESS:  CATH:   HPI:  78M with HTN HLD DM PVD, CAD s/p MIDCAB and PCI (most recent WINTER to LCx in 8/2022) now with 3-4 months of progressive dysphonia and dysphagia, 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 He notes that his voice became hoarse and raspy, which has been persistent. He finds that it is difficult to speak with more exertion. He notes issues with chewing, eating, swallowing with coughing and choking especially with liquids.  No symptoms like this in the past. He was planned to have outpatient pre-op consultations done but due to difficulty with scheduling and progressive symptoms, patient was admitted for pre-op workup and planned for DL + biopsy of mass with ENT.    Pt seen and examined at bedside, NAD. Denies any chest pain, palpitations.    ROS: Per HPI    PAST MEDICAL & SURGICAL HISTORY:  HTN (hypertension)  HLD (hyperlipidemia)  DM (diabetes mellitus)  CAD (coronary artery disease)  Glaucoma  PVD (peripheral vascular disease)    No significant past surgical history      SOCIAL HISTORY: denies tobacco or ETOH  FAMILY HISTORY:  No pertinent family history in first degree relatives      ALLERGIES: 	  No Known Allergies        MEDICATIONS:  atorvastatin 80 milliGRAM(s) Oral at bedtime  dextrose 5%. 1000 milliLiter(s) IV Continuous <Continuous>  dextrose 5%. 1000 milliLiter(s) IV Continuous <Continuous>  dextrose 50% Injectable 12.5 Gram(s) IV Push once  dextrose 50% Injectable 25 Gram(s) IV Push once  dextrose 50% Injectable 25 Gram(s) IV Push once  dextrose Oral Gel 15 Gram(s) Oral once PRN  enoxaparin Injectable 40 milliGRAM(s) SubCutaneous every 24 hours  glucagon  Injectable 1 milliGRAM(s) IntraMuscular once  insulin glargine Injectable (LANTUS) 45 Unit(s) SubCutaneous every morning  insulin lispro (ADMELOG) corrective regimen sliding scale   SubCutaneous three times a day before meals  insulin lispro (ADMELOG) corrective regimen sliding scale   SubCutaneous at bedtime  insulin lispro Injectable (ADMELOG) 7 Unit(s) SubCutaneous three times a day before meals  isosorbide   mononitrate ER Tablet (IMDUR) 30 milliGRAM(s) Oral daily  latanoprost 0.005% Ophthalmic Solution 1 Drop(s) Both EYES at bedtime  lisinopril 20 milliGRAM(s) Oral daily  metoprolol succinate ER 12.5 milliGRAM(s) Oral daily  ranolazine 500 milliGRAM(s) Oral two times a day  timolol 0.5% Solution 1 Drop(s) Both EYES daily      T(C): 37.1 (07-15-23 @ 14:19), Max: 37.3 (07-14-23 @ 22:02)  HR: 48 (07-15-23 @ 08:10) (48 - 66)  BP: 154/70 (07-15-23 @ 08:10) (133/63 - 188/79)  RR: 16 (07-15-23 @ 08:10) (16 - 18)  SpO2: 96% (07-15-23 @ 08:10) (93% - 98%)    07-14-23 @ 07:01  -  07-15-23 @ 07:00  --------------------------------------------------------  IN: 50 mL / OUT: 600 mL / NET: -550 mL    07-15-23 @ 07:01  -  07-15-23 @ 15:47  --------------------------------------------------------  IN: 420 mL / OUT: 400 mL / NET: 20 mL        PHYSICAL EXAM:    Constitutional: resting comfortably in bed; NAD  HEENT: NC/AT, MMM  Neck: supple; no JVD   Respiratory: CTA B/L; no W/R/R, no retractions  Cardiac: +S1/S2; RRR; no M/R/G; PMI non-displaced  Gastrointestinal: soft, NT/ND; no rebound or guarding; +BSx4  Extremities: WWP, no clubbing or cyanosis; trace pedal edema  Musculoskeletal: no joint swelling, tenderness or erythema  Vascular: 2+ radial, DP/PT pulses B/L  Dermatologic: skin warm, dry and intact; no rashes, wounds, or scars  Lymphatic: no submandibular or cervical LAD  Neurologic: AAOx3; CNII-XII grossly intact; no focal deficits      I&O's Summary    14 Jul 2023 07:01  -  15 Jul 2023 07:00  --------------------------------------------------------  IN: 50 mL / OUT: 600 mL / NET: -550 mL    15 Jul 2023 07:01  -  15 Jul 2023 15:47  --------------------------------------------------------  IN: 420 mL / OUT: 400 mL / NET: 20 mL        LABS:	 	                        10.8   5.20  )-----------( 201      ( 15 Jul 2023 06:32 )             33.0     07-15    142  |  108  |  15  ----------------------------<  238<H>  4.6   |  26  |  0.96    Ca    8.7      15 Jul 2023 06:32    TPro  6.2  /  Alb  3.4  /  TBili  0.5  /  DBili  x   /  AST  14  /  ALT  11  /  AlkPhos  78  07-15      Lipid Profile: , TG 64, HDL 42, LDL 48  HgA1c: 7.9  TSH: none  	    ECG:  none	  RADIOLOGY:   CT soft tissue neck:  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 Submucosal   extent to subglottic larynx and false cord, with equivocal paraglottic   fat invasion that may upstage to T3 disease if cord is not fixated.    ECHO:  8/2022:  CONCLUSIONS:   1. Mid inferolateral segment, mid anterolateral segment, and apical   lateral segment are abnormal.   2. Mild symmetric left ventricular hypertrophy.   3. Mildly reduced left ventricular systolic function.   4. Grade I left ventricular diastolic dysfunction.   5. Normal right ventricular size and systolic function.   6. Normal atria.   7. No significant valvular disease.   8. Pulmonary artery systolic pressure is 37 mmHg.   9. No pericardial effusion.  10. No prior echo is available for comparison.    STRESS:  10/19/18 NST: large sized moderate intensity partially reversible defect in the inferolateral wall suggestive of ischemia with normal LV with EF of 60%.     CATH:   2/27/19 Cardiac cath: PTCA/WINTER-> distalLCx, PTCA ->midLCx ISR, LM normal, midLAD severe ISR, distalLAD mild diffuse disease, midLCx 90% ISR, distalLCx 99%, patent stents in prox/midRCA, EDP 6, EF 55-60%.    4/2019: MIDCAB x 1 (LIMA->LAD)     8/2022: Left main artery: <30% stenosis.    Left anterior descending artery: pLAD 30-50% stenosis, mLAD severe  ISR, vessel supplied distally by BURNETTE to LAD.  Circumflex: pLCx 80% calcified stenosis, mLCx mild to moderate ISR,  dOM1 90% ISR.  Right coronary artery: Large, dominant vessel. mRCA 50% ISR, dRCA MLI.  Graft Angiography   LIMA graft to Left anterior descending artery: Angiography shows a  patent graft.  -> s/p PCI of ostial LCx with placement of 1 WINTER

## 2023-07-15 NOTE — CONSULT NOTE ADULT - ASSESSMENT
- Please obtain EKG daily  - nonurgent TTE to be done on Monday 7/17    - Give IV Lasix 20mg x 1 dose today  - hold ranolazine due to bradycardia    - pt will need to continue at least one antiplatelet agent post-procedure - recommend resuming Aspirin 81mg daily #CAD s/p MIDCAB and PCI with multiple WINTER (most recent to LCx in 8/2022)  - pt has been on DAPT post procedure with ASA/Plavix  - OK to hold DAPT for procedure; would resume at least ASA 81mg post-procedure  - c/w Lipitor 80mg daily  - c/w Toprol 12.5mg qD, continue on day of procedure  - agree with Imdur 30mg qD and Lisinopril 20mg qD  - Please obtain EKGs daily  - hold ranolazine in setting of bradycardia  - nonurgent TTE to be done on Monday 7/17    #Pre-op risk stratification  - pending EKG, TTE    #HFrEF  - last TTE on 8/2022 shows mildly reduced LV fxn & G1DD  - slightly hypervolemic on exam  - Recommend IV Lasix 20mg x 1 dose today  - f/u TTE on Monday to assess LV fxn      Brigitte Chatman DO  Cardiology Fellow, PGY4

## 2023-07-15 NOTE — PROGRESS NOTE ADULT - SUBJECTIVE AND OBJECTIVE BOX
OTOLARYNGOLOGY (ENT) PROGRESS NOTE    PATIENT: LEONARDO HAY  MRN: [id number]  : 45  [id number]  DATE OF SERVICE:  07-15-23  	  Subjective/ Interval:   AFVSS. No acute events overnight. Patient seen and examined at bedside. No complaints this morning.    ALLERGIES:  No Known Allergies      MEDICATIONS:  Antiinfectives:     IV fluids:  dextrose 5%. 1000 milliLiter(s) IV Continuous <Continuous>  dextrose 5%. 1000 milliLiter(s) IV Continuous <Continuous>    Hematologic/Anticoagulation:  enoxaparin Injectable 40 milliGRAM(s) SubCutaneous every 24 hours    Pain medications/Neuro:    Endocrine Medications:   atorvastatin 80 milliGRAM(s) Oral at bedtime  dextrose 50% Injectable 12.5 Gram(s) IV Push once  dextrose 50% Injectable 25 Gram(s) IV Push once  dextrose 50% Injectable 25 Gram(s) IV Push once  dextrose Oral Gel 15 Gram(s) Oral once PRN  glucagon  Injectable 1 milliGRAM(s) IntraMuscular once  insulin glargine Injectable (LANTUS) 45 Unit(s) SubCutaneous every morning  insulin lispro (ADMELOG) corrective regimen sliding scale   SubCutaneous three times a day before meals  insulin lispro (ADMELOG) corrective regimen sliding scale   SubCutaneous at bedtime  insulin lispro Injectable (ADMELOG) 7 Unit(s) SubCutaneous three times a day before meals    All other standing medications:   isosorbide   mononitrate ER Tablet (IMDUR) 30 milliGRAM(s) Oral daily  latanoprost 0.005% Ophthalmic Solution 1 Drop(s) Both EYES at bedtime  lisinopril 20 milliGRAM(s) Oral daily  metoprolol succinate ER 12.5 milliGRAM(s) Oral daily  ranolazine 500 milliGRAM(s) Oral two times a day  timolol 0.5% Solution 1 Drop(s) Both EYES daily    All other PRN medications:    Vital Signs Last 24 Hrs  T(C): 37.1 (15 Jul 2023 09:21), Max: 37.3 (2023 22:02)  T(F): 98.7 (15 Jul 2023 09:21), Max: 99.2 (2023 22:02)  HR: 48 (15 Jul 2023 08:10) (48 - 66)  BP: 154/70 (15 Jul 2023 08:10) (133/63 - 188/79)  BP(mean): 101 (15 Jul 2023 08:10) (91 - 115)  RR: 16 (15 Jul 2023 08:10) (16 - 18)  SpO2: 96% (15 Jul 2023 08:10) (93% - 98%)    Parameters below as of 15 Jul 2023 08:10  Patient On (Oxygen Delivery Method): room air      -14 @ 07:01  -  07-15 @ 07:00  --------------------------------------------------------  IN:    Oral Fluid: 50 mL  Total IN: 50 mL    OUT:    Voided (mL): 600 mL  Total OUT: 600 mL    Total NET: -550 mL    Physical Exam:   General: well-developed, NAD  Eyes: no eye drainage or redness, blind in both eyes  Ears: external ears normal  Nose: nares patent  Mouth: No oral lesions; no gross abnormalities  Neck: trachea midline. + hoarse voice  Respiratory: unlabored respirations  Cardiovascular: regular rate  Gastrointestinal: Soft, nondistended  Extremities: No edema, warm and well perfused  Skin: No lesions; no rash    LABS                       10.8   5.20  )-----------( 201      ( 15 Jul 2023 06:32 )             33.0    07-15    142  |  108  |  15  ----------------------------<  238<H>  4.6   |  26  |  0.96    Ca    8.7      15 Jul 2023 06:32    TPro  6.2  /  Alb  3.4  /  TBili  0.5  /  DBili  x   /  AST  14  /  ALT  11  /  AlkPhos  78  07-15         Coagulation Studies-   PT/INR - ( 2023 13:40 )   PT: 13.9 sec;   INR: 1.17          PTT - ( 2023 13:40 )  PTT:30.5 sec  Urinalysis Basic - ( 15 Jul 2023 06:32 )    Color: x / Appearance: x / SG: x / pH: x  Gluc: 238 mg/dL / Ketone: x  / Bili: x / Urobili: x   Blood: x / Protein: x / Nitrite: x   Leuk Esterase: x / RBC: x / WBC x   Sq Epi: x / Non Sq Epi: x / Bacteria: x      Endocrine Panel-  Calcium: 8.7 mg/dL (07-15 @ 06:32)  Calcium: 8.8 mg/dL ( @ 13:40)                MICROBIOLOGY:

## 2023-07-16 NOTE — PROGRESS NOTE ADULT - ASSESSMENT
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    Plan:  - Pain control prn  - Reg diet cc  - Medicine consult for pre-op optimization: pending cards recs  - Cards consult for pre-op optimization: pending TTE, ordered  - F/u AC plan: ok to hold DAPT per cards  - Likely plan for DL/biopsy next week

## 2023-07-16 NOTE — PROGRESS NOTE ADULT - SUBJECTIVE AND OBJECTIVE BOX
SUBJECTIVE / INTERVAL HPI: Patient seen and examined at bedside. Patient feels good at this time and has no complaints or issues at this time.     VITAL SIGNS:  Vital Signs Last 24 Hrs  T(C): 36.8 (16 Jul 2023 04:39), Max: 37.1 (15 Jul 2023 14:19)  T(F): 98.2 (16 Jul 2023 04:39), Max: 98.8 (15 Jul 2023 14:19)  HR: 58 (16 Jul 2023 08:36) (50 - 58)  BP: 168/75 (16 Jul 2023 08:36) (122/60 - 168/75)  BP(mean): 108 (16 Jul 2023 08:36) (86 - 108)  RR: 17 (16 Jul 2023 08:36) (17 - 17)  SpO2: 94% (16 Jul 2023 08:36) (94% - 98%)    Parameters below as of 16 Jul 2023 08:36  Patient On (Oxygen Delivery Method): room air        PHYSICAL EXAM:    General: WDWN  Neck: supple  Cardiovascular: +S1/S2; RRR  Respiratory: CTA B/L; no W/R/R  Gastrointestinal: soft, NT/ND; +BSx4  Extremities: WWP; no edema, clubbing or cyanosis  Vascular: 2+ radial, DP/PT pulses B/L  Neurological: AAOx3 cataracts  MEDICATIONS:  MEDICATIONS  (STANDING):  atorvastatin 80 milliGRAM(s) Oral at bedtime  dextrose 5%. 1000 milliLiter(s) (50 mL/Hr) IV Continuous <Continuous>  dextrose 5%. 1000 milliLiter(s) (100 mL/Hr) IV Continuous <Continuous>  dextrose 50% Injectable 12.5 Gram(s) IV Push once  dextrose 50% Injectable 25 Gram(s) IV Push once  dextrose 50% Injectable 25 Gram(s) IV Push once  enoxaparin Injectable 40 milliGRAM(s) SubCutaneous every 24 hours  glucagon  Injectable 1 milliGRAM(s) IntraMuscular once  insulin glargine Injectable (LANTUS) 50 Unit(s) SubCutaneous every morning  insulin lispro (ADMELOG) corrective regimen sliding scale   SubCutaneous three times a day before meals  insulin lispro (ADMELOG) corrective regimen sliding scale   SubCutaneous at bedtime  insulin lispro Injectable (ADMELOG) 6 Unit(s) SubCutaneous three times a day before meals  isosorbide   mononitrate ER Tablet (IMDUR) 30 milliGRAM(s) Oral daily  latanoprost 0.005% Ophthalmic Solution 1 Drop(s) Both EYES at bedtime  lisinopril 20 milliGRAM(s) Oral daily  metoprolol succinate ER 12.5 milliGRAM(s) Oral daily  polyethylene glycol 3350 17 Gram(s) Oral daily  senna 2 Tablet(s) Oral at bedtime  timolol 0.5% Solution 1 Drop(s) Both EYES daily    MEDICATIONS  (PRN):  dextrose Oral Gel 15 Gram(s) Oral once PRN Blood Glucose LESS THAN 70 milliGRAM(s)/deciliter      ALLERGIES:  Allergies    No Known Allergies    Intolerances        LABS:                        10.8   5.20  )-----------( 201      ( 15 Jul 2023 06:32 )             33.0     07-15    142  |  108  |  15  ----------------------------<  238<H>  4.6   |  26  |  0.96    Ca    8.7      15 Jul 2023 06:32    TPro  6.2  /  Alb  3.4  /  TBili  0.5  /  DBili  x   /  AST  14  /  ALT  11  /  AlkPhos  78  07-15    PT/INR - ( 14 Jul 2023 13:40 )   PT: 13.9 sec;   INR: 1.17          PTT - ( 14 Jul 2023 13:40 )  PTT:30.5 sec  Urinalysis Basic - ( 15 Jul 2023 06:32 )    Color: x / Appearance: x / SG: x / pH: x  Gluc: 238 mg/dL / Ketone: x  / Bili: x / Urobili: x   Blood: x / Protein: x / Nitrite: x   Leuk Esterase: x / RBC: x / WBC x   Sq Epi: x / Non Sq Epi: x / Bacteria: x      CAPILLARY BLOOD GLUCOSE      POCT Blood Glucose.: 103 mg/dL (16 Jul 2023 09:07)      RADIOLOGY & ADDITIONAL TESTS: Reviewed.    ASSESSMENT:    PLAN:

## 2023-07-16 NOTE — PROGRESS NOTE ADULT - ASSESSMENT
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  Medicine consulted for pre-op evaluation, cardiology consulted for preop optimization.     #DM  - currently on 50 units of lantus in AM - had sugar of 78 this am, can decrease back to 48 units at this time.   - c/w premeal insulin 6 units along with ISS.    #Urinary retention/BPH  -c/w home tamsulosin 0.4mg nightly  -Hold home mirabegron 50mg daily until after surgery  -c/w Miralax and senna    #CAD and PVD  -c/w home atorvastatin 80mg daily, metoprolol succinate 100mg daily and benazapril (or lisinopril while inpatient) 20mg daily and isosorbide mononitrate 30mg daily   - to hold ACEI- Benazapril on day of procedure  -f/u cardiology recommendations    Medicine will continue to follow.   Note not finalized until attending attestation complete.        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  Medicine consulted for pre-op evaluation, cardiology consulted for preop optimization.     #DM  pt with diabetic retinopathy - has severe visual impairement  home regimen 50 units -- had restarted that yesterday  - currently on 50 units of lantus in AM - had sugar of 78 this am, can decrease back to 48 units at this time.   - c/w premeal insulin 6 units along with ISS.    #Urinary retention/BPH  -c/w home tamsulosin 0.4mg nightly  -Hold home mirabegron 50mg daily until after surgery  -c/w Miralax and senna    #CAD and PVD  -c/w home atorvastatin 80mg daily, metoprolol succinate 100mg daily and benazapril (or lisinopril while inpatient) 20mg daily and isosorbide mononitrate 30mg daily   - to hold ACEI- Benazapril on day of procedure  -f/u cardiology recommendations    Medicine will continue to follow.   Note not finalized until attending attestation complete.

## 2023-07-16 NOTE — PROGRESS NOTE ADULT - SUBJECTIVE AND OBJECTIVE BOX
OTOLARYNGOLOGY (ENT) PROGRESS NOTE    PATIENT: LEONARDO HAY  MRN: [id number]  : 45  [id number]  DATE OF SERVICE:  23  	  Subjective/ Interval:   7/15: AFVSS. No acute events overnight. Patient seen and examined at bedside. No complaints this morning.  : AFVSS. No acute events overnight. Patient seen and examined at bedside. sBP mildly elevated to 168 this morning, continues to be asymptomatic.    ALLERGIES:  No Known Allergies      MEDICATIONS:  Antiinfectives:     IV fluids:  dextrose 5%. 1000 milliLiter(s) IV Continuous <Continuous>  dextrose 5%. 1000 milliLiter(s) IV Continuous <Continuous>    Hematologic/Anticoagulation:  enoxaparin Injectable 40 milliGRAM(s) SubCutaneous every 24 hours    Pain medications/Neuro:    Endocrine Medications:   atorvastatin 80 milliGRAM(s) Oral at bedtime  dextrose 50% Injectable 12.5 Gram(s) IV Push once  dextrose 50% Injectable 25 Gram(s) IV Push once  dextrose 50% Injectable 25 Gram(s) IV Push once  dextrose Oral Gel 15 Gram(s) Oral once PRN  glucagon  Injectable 1 milliGRAM(s) IntraMuscular once  insulin glargine Injectable (LANTUS) 50 Unit(s) SubCutaneous every morning  insulin lispro (ADMELOG) corrective regimen sliding scale   SubCutaneous three times a day before meals  insulin lispro (ADMELOG) corrective regimen sliding scale   SubCutaneous at bedtime  insulin lispro Injectable (ADMELOG) 6 Unit(s) SubCutaneous three times a day before meals    All other standing medications:   isosorbide   mononitrate ER Tablet (IMDUR) 30 milliGRAM(s) Oral daily  latanoprost 0.005% Ophthalmic Solution 1 Drop(s) Both EYES at bedtime  lisinopril 20 milliGRAM(s) Oral daily  metoprolol succinate ER 12.5 milliGRAM(s) Oral daily  polyethylene glycol 3350 17 Gram(s) Oral daily  senna 2 Tablet(s) Oral at bedtime  timolol 0.5% Solution 1 Drop(s) Both EYES daily    All other PRN medications:    Vital Signs Last 24 Hrs  T(C): 36.8 (2023 04:39), Max: 37.1 (15 Jul 2023 14:19)  T(F): 98.2 (2023 04:39), Max: 98.8 (15 Jul 2023 14:19)  HR: 58 (2023 08:36) (50 - 58)  BP: 168/75 (2023 08:36) (122/60 - 168/75)  BP(mean): 108 (2023 08:36) (86 - 108)  RR: 17 (2023 08:36) (17 - 17)  SpO2: 94% (2023 08:36) (94% - 98%)    Parameters below as of 2023 08:36  Patient On (Oxygen Delivery Method): room air          07-15 @ 07:  -   @ 07:00  --------------------------------------------------------  IN:    Oral Fluid: 470 mL  Total IN: 470 mL    OUT:    Voided (mL): 1350 mL  Total OUT: 1350 mL    Total NET: -880 mL       @ 07: @ 09:43  --------------------------------------------------------  IN:  Total IN: 0 mL    OUT:    Voided (mL): 300 mL  Total OUT: 300 mL    Total NET: -300 mL    Physical Exam:   General: well-developed, NAD  Eyes: no eye drainage or redness, blind in both eyes  Ears: external ears normal  Nose: nares patent  Mouth: No oral lesions; no gross abnormalities  Neck: trachea midline. + hoarse voice  Respiratory: unlabored respirations  Cardiovascular: regular rate  Gastrointestinal: Soft, nondistended  Extremities: No edema, warm and well perfused  Skin: No lesions; no rash       LABS                       10.8   5.20  )-----------( 201      ( 15 Jul 2023 06:32 )             33.0    07-15    142  |  108  |  15  ----------------------------<  238<H>  4.6   |  26  |  0.96    Ca    8.7      15 Jul 2023 06:32    TPro  6.2  /  Alb  3.4  /  TBili  0.5  /  DBili  x   /  AST  14  /  ALT  11  /  AlkPhos  78  07-15         Coagulation Studies-   PT/INR - ( 2023 13:40 )   PT: 13.9 sec;   INR: 1.17          PTT - ( 2023 13:40 )  PTT:30.5 sec  Urinalysis Basic - ( 15 Jul 2023 06:32 )    Color: x / Appearance: x / SG: x / pH: x  Gluc: 238 mg/dL / Ketone: x  / Bili: x / Urobili: x   Blood: x / Protein: x / Nitrite: x   Leuk Esterase: x / RBC: x / WBC x   Sq Epi: x / Non Sq Epi: x / Bacteria: x      Endocrine Panel-                MICROBIOLOGY:

## 2023-07-17 NOTE — PROGRESS NOTE ADULT - ASSESSMENT
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 Cardiology consulted for perioperative cardiovascular risk stratification.    Recommendations:    #Perioperative Cardiovascular Risk Stratification  -RCRI 0, Bryant 0.0%  - No evidence of ACS, decompensated HF, severe AS, and tachyarrhythmia on clinical and physical exam assessment, had >4 METs of exercise tolerance prior to the hospitalization, no need for further cardiac workup or intervention prior to the scheduled glottic biopsy  - Low risk of perioperative cardiovascular events for a low risk procedure        #CAD s/p MIDCAB and PCI with multiple WINTER (most recent to LCx in 8/2022)  - pt has been on DAPT post procedure with ASA/Plavix  - OK to hold DAPT for procedure; would resume at least ASA 81mg post-procedure  - c/w Lipitor 80mg daily  - c/w Toprol 12.5mg qD, continue on day of procedure  - C/w Imdur 30mg qD and Lisinopril 20mg qD  - Please obtain EKGs daily  - hold ranolazine in setting of bradycardia  - nonurgent TTE to be done on Monday 7/17      #HFrEF  - last TTE on 8/2022 shows mildly reduced LV fxn & G1DD  - slightly hypervolemic on exam  - Recommend IV Lasix 20mg x 1 dose today  - f/u TTE on Monday to assess LV fxn      Pending discussion w/cardiology attending  Pending discussion w/primary team  We will continue to follow, thank you for the consultation    Apolinar Mayes PGY5 CVD Fellow  Cardiology Consult Pager: [phone]   78M with HTN HLD DM PVD, CAD s/p MIDCAB and PCI (most recent WINTER to LCx in 8/2022) now with 3-4 months of progressive dysphonia and dysphagia, 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 Cardiology consulted for perioperative cardiovascular risk stratification.    Recommendations:    #CAD s/p MIDCAB and PCI with multiple WINTER (most recent to LCx in 8/2022)  - Ok to hold plavix for procedure  - Continue ASA 81mg pre/during/post procedure  - c/w Lipitor 80mg daily  - c/w Toprol 12.5mg qD, continue on day of procedure  - C/w Imdur 30mg qD and Lisinopril 20mg qD  - Please obtain EKGs daily  - hold ranolazine in setting of bradycardia  - nonurgent TTE to be done on Monday 7/17      #HFrEF- euvolemic on exam  - last TTE on 8/2022 shows mildly reduced LV fxn & G1DD  - f/u TTE on Monday to assess LV fxn      Case reviewed w/attending cardiologist  Plan discussed w/primary team  We will continue to follow, thank you for the consultation    Apolinar Mayes PGY5 CVD Fellow  Cardiology Consult Pager: [phone]   78M with HTN HLD DM PVD, CAD s/p MIDCAB and PCI (most recent WINTER to LCx in 8/2022) now with 3-4 months of progressive dysphonia and dysphagia, 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 Cardiology consulted for perioperative cardiovascular risk stratification.    Recommendations:    #CAD s/p MIDCAB and PCI with multiple WINTER (most recent to LCx in 8/2022): ECG NSR w/nonspecific ST T changes in v4-6  - Ok to hold ASA/plavix for procedure if procedure is end of this week  - If procedure is next week, restart and hold ASA 5-7 days before procedure  - c/w Lipitor 80mg daily  - c/w Toprol 12.5mg qD, continue on day of procedure  - C/w Imdur 30mg qD and Lisinopril 20mg qD  - Please obtain EKGs daily  - hold ranolazine in setting of bradycardia  - f/u TTE today      #HFrEF- euvolemic on exam  - last TTE on 8/2022 shows mildly reduced LV fxn & G1DD  - f/u TTE on Monday to assess LV fxn      Case reviewed w/attending cardiologist  Plan discussed w/primary team  We will continue to follow, thank you for the consultation    Apolinar Mayes PGY5 CVD Fellow  Cardiology Consult Pager: [phone]

## 2023-07-17 NOTE — PROGRESS NOTE ADULT - SUBJECTIVE AND OBJECTIVE BOX
OTOLARYNGOLOGY (ENT) PROGRESS NOTE    PATIENT: LEONARDO HAY  MRN: [id number]  : 45  [id number]  DATE OF SERVICE:  23  			           Subjective/ Interval:   7/15: AFVSS. No acute events overnight. Patient seen and examined at bedside. No complaints this morning.  : AFVSS. No acute events overnight. Patient seen and examined at bedside. sBP mildly elevated to 168 this morning, continues to be asymptomatic.  : Patient seen this morning, pending TTE today. Will continue medical clearance. No SOB, regular respirations     ALLERGIES:  No Known Allergies      MEDICATIONS:  Antiinfectives:     IV fluids:  dextrose 5%. 1000 milliLiter(s) IV Continuous <Continuous>  dextrose 5%. 1000 milliLiter(s) IV Continuous <Continuous>    Hematologic/Anticoagulation:  enoxaparin Injectable 40 milliGRAM(s) SubCutaneous every 24 hours    Pain medications/Neuro:    Endocrine Medications:   atorvastatin 80 milliGRAM(s) Oral at bedtime  dextrose 50% Injectable 25 Gram(s) IV Push once  dextrose 50% Injectable 12.5 Gram(s) IV Push once  dextrose 50% Injectable 25 Gram(s) IV Push once  dextrose Oral Gel 15 Gram(s) Oral once PRN  glucagon  Injectable 1 milliGRAM(s) IntraMuscular once  insulin glargine Injectable (LANTUS) 48 Unit(s) SubCutaneous every morning  insulin lispro (ADMELOG) corrective regimen sliding scale   SubCutaneous three times a day before meals  insulin lispro (ADMELOG) corrective regimen sliding scale   SubCutaneous at bedtime  insulin lispro Injectable (ADMELOG) 6 Unit(s) SubCutaneous three times a day before meals    All other standing medications:   isosorbide   mononitrate ER Tablet (IMDUR) 30 milliGRAM(s) Oral daily  latanoprost 0.005% Ophthalmic Solution 1 Drop(s) Both EYES at bedtime  lisinopril 20 milliGRAM(s) Oral daily  metoprolol succinate ER 12.5 milliGRAM(s) Oral daily  polyethylene glycol 3350 17 Gram(s) Oral daily  senna 2 Tablet(s) Oral at bedtime  timolol 0.5% Solution 1 Drop(s) Both EYES daily    All other PRN medications:    Vital Signs Last 24 Hrs  T(C): 36.8 (2023 05:12), Max: 37 (2023 10:00)  T(F): 98.2 (2023 05:12), Max: 98.6 (2023 10:00)  HR: 68 (2023 07:00) (56 - 68)  BP: 137/62 (2023 07:00) (127/58 - 169/81)  BP(mean): 89 (2023 07:00) (84 - 118)  RR: 17 (2023 04:34) (17 - 18)  SpO2: 95% (2023 04:34) (93% - 96%)    Parameters below as of 2023 04:34  Patient On (Oxygen Delivery Method): room air          -16 @ 07:01  -  - @ 07:00  --------------------------------------------------------  IN:    Oral Fluid: 1260 mL  Total IN: 1260 mL    OUT:    Voided (mL): 1100 mL  Total OUT: 1100 mL    Total NET: 160 mL        PHYSICAL EXAM:  General: patient relaxing in bed, talking in a hoarse voice   Eyes: no eye drainage or redness, blind in both eyes  Ears: external ears normal  Nose: nares patent  Mouth: No oral lesions; no gross abnormalities  Neck: trachea midline. + hoarse voice  Respiratory: unlabored respirations  Cardiovascular: regular rate  Gastrointestinal: Soft, nondistended  Extremities: No edema, warm and well perfused  Skin: No lesions; no rash      LABS                       12.1   7.13  )-----------( 243      ( 2023 05:30 )             37.6    07-17    139  |  102  |  26<H>  ----------------------------<  81  4.3   |  28  |  1.21    Ca    8.9      2023 05:30  Phos  4.1     07-17  Mg     1.7     07-17           Coagulation Studies-     Urinalysis Basic - ( 2023 05:30 )    Color: x / Appearance: x / SG: x / pH: x  Gluc: 81 mg/dL / Ketone: x  / Bili: x / Urobili: x   Blood: x / Protein: x / Nitrite: x   Leuk Esterase: x / RBC: x / WBC x   Sq Epi: x / Non Sq Epi: x / Bacteria: x      Endocrine Panel-  Calcium: 8.9 mg/dL ( @ 05:30)

## 2023-07-17 NOTE — PROGRESS NOTE ADULT - SUBJECTIVE AND OBJECTIVE BOX
SUBJECTIVE / INTERVAL HPI: Patient seen and examined at bedside.     VITAL SIGNS:  Vital Signs Last 24 Hrs  T(C): 36.8 (17 Jul 2023 09:14), Max: 37 (16 Jul 2023 21:01)  T(F): 98.2 (17 Jul 2023 09:14), Max: 98.6 (16 Jul 2023 21:01)  HR: 64 (17 Jul 2023 11:35) (56 - 68)  BP: 143/65 (17 Jul 2023 11:35) (124/60 - 169/81)  BP(mean): 93 (17 Jul 2023 11:35) (84 - 118)  RR: 17 (17 Jul 2023 11:35) (17 - 18)  SpO2: 96% (17 Jul 2023 11:35) (93% - 96%)    Parameters below as of 17 Jul 2023 11:35  Patient On (Oxygen Delivery Method): room air      PHYSICAL EXAM:    General: WDWN  HEENT: NC/AT;  anicteric sclera; MMM  Neck: supple  Cardiovascular: +S1/S2; RRR  Respiratory: CTA B/L; no W/R/R  Gastrointestinal: soft, NT/ND; +BSx4  Extremities: WWP; no edema, clubbing or cyanosis  Vascular: 2+ radial, DP/PT pulses B/L  Neurological: AAOx3; no focal deficits    MEDICATIONS:  MEDICATIONS  (STANDING):  atorvastatin 80 milliGRAM(s) Oral at bedtime  dextrose 5%. 1000 milliLiter(s) (100 mL/Hr) IV Continuous <Continuous>  dextrose 5%. 1000 milliLiter(s) (50 mL/Hr) IV Continuous <Continuous>  dextrose 50% Injectable 12.5 Gram(s) IV Push once  dextrose 50% Injectable 25 Gram(s) IV Push once  dextrose 50% Injectable 25 Gram(s) IV Push once  enoxaparin Injectable 40 milliGRAM(s) SubCutaneous every 24 hours  glucagon  Injectable 1 milliGRAM(s) IntraMuscular once  insulin glargine Injectable (LANTUS) 48 Unit(s) SubCutaneous every morning  insulin lispro (ADMELOG) corrective regimen sliding scale   SubCutaneous three times a day before meals  insulin lispro (ADMELOG) corrective regimen sliding scale   SubCutaneous at bedtime  insulin lispro Injectable (ADMELOG) 6 Unit(s) SubCutaneous three times a day before meals  isosorbide   mononitrate ER Tablet (IMDUR) 30 milliGRAM(s) Oral daily  latanoprost 0.005% Ophthalmic Solution 1 Drop(s) Both EYES at bedtime  lisinopril 20 milliGRAM(s) Oral daily  metoprolol succinate ER 12.5 milliGRAM(s) Oral daily  polyethylene glycol 3350 17 Gram(s) Oral daily  senna 2 Tablet(s) Oral at bedtime  timolol 0.5% Solution 1 Drop(s) Both EYES daily    MEDICATIONS  (PRN):  dextrose Oral Gel 15 Gram(s) Oral once PRN Blood Glucose LESS THAN 70 milliGRAM(s)/deciliter      ALLERGIES:  Allergies    No Known Allergies    Intolerances        LABS:                        12.1   7.13  )-----------( 243      ( 17 Jul 2023 05:30 )             37.6     07-17    139  |  102  |  26<H>  ----------------------------<  81  4.3   |  28  |  1.21    Ca    8.9      17 Jul 2023 05:30  Phos  4.1     07-17  Mg     1.7     07-17        Urinalysis Basic - ( 17 Jul 2023 05:30 )    Color: x / Appearance: x / SG: x / pH: x  Gluc: 81 mg/dL / Ketone: x  / Bili: x / Urobili: x   Blood: x / Protein: x / Nitrite: x   Leuk Esterase: x / RBC: x / WBC x   Sq Epi: x / Non Sq Epi: x / Bacteria: x      CAPILLARY BLOOD GLUCOSE      POCT Blood Glucose.: 294 mg/dL (17 Jul 2023 11:32)      RADIOLOGY & ADDITIONAL TESTS: Reviewed.    ASSESSMENT:    PLAN:

## 2023-07-17 NOTE — PROGRESS NOTE ADULT - ASSESSMENT
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  Medicine consulted for pre-op evaluation, cardiology consulted for preop optimization.     #DM  pt with diabetic retinopathy - has severe visual impairment  home regimen 50 units -- had brought down to 48 yesterday 2/2 AM sugars in 70's-80s   - continue with 48 units at this time.   - c/w premeal insulin 6 units along with ISS.    #Urinary retention/BPH  -c/w home tamsulosin 0.4mg nightly  -Hold home mirabegron 50mg daily until after surgery  -c/w Miralax and senna    #CAD and PVD  -to hold ACEI- Benazapril on day of procedure  -f/u cardiology recommendations  -f/u TTE    Medicine will continue to follow.   Note not finalized until attending attestation complete.

## 2023-07-17 NOTE — PROGRESS NOTE ADULT - ASSESSMENT
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    Plan:  - Pain control prn  - Reg diet cc  - Medicine consult for pre-op optimization: pending cards recs  - Cards consult for pre-op optimization: pending TTE, ordered  - F/u AC plan: ok to hold DAPT per cards  - Likely plan for DL/biopsy next week        [facility] at [phone] with any questions/concerns.    Vani Desir PA-C  07-17-23 @ 08:11

## 2023-07-17 NOTE — PROGRESS NOTE ADULT - SUBJECTIVE AND OBJECTIVE BOX
**Incomplete Note**    Cardiology Consult    O/N:  Interval History/HPI: Pt seen and examined at bedside, NAD, denies chest pain/discomfort/pressure. ROS unremarkable   Telemetry:      Review of Systems:  CONSTITUTIONAL:  No weight loss, fever, chills, weakness or fatigue.  HEENT:  Eyes:  No visual loss, blurred vision, double vision or yellow sclerae. Ears, Nose, Throat:  No hearing loss, sneezing, congestion, runny nose or sore throat.  SKIN:  No rash or itching.  CARDIOVASCULAR:  No chest pain, chest pressure or chest discomfort. No palpitations or edema.  RESPIRATORY:  No shortness of breath, cough or sputum.  GASTROINTESTINAL:  No anorexia, nausea, vomiting or diarrhea. No abdominal pain or blood.  GENITOURINARY: No Burning on urination.   NEUROLOGICAL:  see HPI  MUSCULOSKELETAL:  No muscle, back pain, joint pain or stiffness.  HEMATOLOGIC:  No anemia, bleeding or bruising.  LYMPHATICS:  No enlarged nodes. No history of splenectomy.  PSYCHIATRIC:  No history of depression or anxiety.  ENDOCRINOLOGIC:  No reports of sweating, cold or heat intolerance. No polyuria or polydipsia.  ALLERGIES:  No history of asthma, hives, eczema or rhinitis.    OBJECTIVE  T(C): 36.8 (07-17-23 @ 05:12), Max: 37 (07-16-23 @ 10:00)  HR: 68 (07-17-23 @ 07:00) (56 - 68)  BP: 137/62 (07-17-23 @ 07:00) (127/58 - 169/81)  RR: 17 (07-17-23 @ 04:34) (17 - 18)  SpO2: 95% (07-17-23 @ 04:34) (93% - 96%)    07-16-23 @ 07:01  -  07-17-23 @ 07:00  --------------------------------------------------------  IN: 1260 mL / OUT: 1100 mL / NET: 160 mL        PHYSICAL EXAM:    Constitutional: resting comfortably in bed; NAD  HEENT: NC/AT, PERRL, EOMI, anicteric sclera, no nasal discharge; uvula midline, no oropharyngeal erythema or exudates; MMM  Neck: supple; no thyromegaly, JVP ? cm H20, JVD +/-  Respiratory: CTA B/L; no W/R/R, no retractions  Cardiac: +S1/S2; RRR; no M/R/G; PMI non-displaced  Gastrointestinal: soft, NT/ND; no rebound or guarding; +BSx4  Extremities: WWP, no clubbing or cyanosis; no peripheral edema  Musculoskeletal: NROM x4; no joint swelling, tenderness or erythema  Vascular: 2+ radial, DP/PT pulses B/L  Dermatologic: skin warm, dry and intact; no rashes, wounds, or scars  Lymphatic: no submandibular or cervical LAD  Neurologic: AAOx3; CNII-XII grossly intact; no focal deficits    LABS:                        12.1   7.13  )-----------( 243      ( 17 Jul 2023 05:30 )             37.6     07-17    139  |  102  |  26<H>  ----------------------------<  81  4.3   |  28  |  1.21    Ca    8.9      17 Jul 2023 05:30  Phos  4.1     07-17  Mg     1.7     07-17        Urinalysis Basic - ( 17 Jul 2023 05:30 )    Color: x / Appearance: x / SG: x / pH: x  Gluc: 81 mg/dL / Ketone: x  / Bili: x / Urobili: x   Blood: x / Protein: x / Nitrite: x   Leuk Esterase: x / RBC: x / WBC x   Sq Epi: x / Non Sq Epi: x / Bacteria: x        RADIOLOGY & ADDITIONAL TESTS:  Reviewed .    MEDICATIONS  (STANDING):  atorvastatin 80 milliGRAM(s) Oral at bedtime  dextrose 5%. 1000 milliLiter(s) (100 mL/Hr) IV Continuous <Continuous>  dextrose 5%. 1000 milliLiter(s) (50 mL/Hr) IV Continuous <Continuous>  dextrose 50% Injectable 12.5 Gram(s) IV Push once  dextrose 50% Injectable 25 Gram(s) IV Push once  dextrose 50% Injectable 25 Gram(s) IV Push once  enoxaparin Injectable 40 milliGRAM(s) SubCutaneous every 24 hours  glucagon  Injectable 1 milliGRAM(s) IntraMuscular once  insulin glargine Injectable (LANTUS) 48 Unit(s) SubCutaneous every morning  insulin lispro (ADMELOG) corrective regimen sliding scale   SubCutaneous three times a day before meals  insulin lispro (ADMELOG) corrective regimen sliding scale   SubCutaneous at bedtime  insulin lispro Injectable (ADMELOG) 6 Unit(s) SubCutaneous three times a day before meals  isosorbide   mononitrate ER Tablet (IMDUR) 30 milliGRAM(s) Oral daily  latanoprost 0.005% Ophthalmic Solution 1 Drop(s) Both EYES at bedtime  lisinopril 20 milliGRAM(s) Oral daily  metoprolol succinate ER 12.5 milliGRAM(s) Oral daily  polyethylene glycol 3350 17 Gram(s) Oral daily  senna 2 Tablet(s) Oral at bedtime  timolol 0.5% Solution 1 Drop(s) Both EYES daily    MEDICATIONS  (PRN):  dextrose Oral Gel 15 Gram(s) Oral once PRN Blood Glucose LESS THAN 70 milliGRAM(s)/deciliter     Cardiology Consult    O/N: KRISTINA  Interval History/HPI: Pt seen and examined at bedside, NAD, denies chest pain/discomfort/pressure. ROS unremarkable   Telemetry: NSR      Review of Systems:  CONSTITUTIONAL:  No weight loss, fever, chills, weakness or fatigue.  HEENT:  Eyes:  No visual loss, blurred vision, double vision or yellow sclerae. Ears, Nose, Throat:  No hearing loss, sneezing, congestion, runny nose or sore throat.  SKIN:  No rash or itching.  CARDIOVASCULAR:  No chest pain, chest pressure or chest discomfort. No palpitations or edema.  RESPIRATORY:  No shortness of breath, cough or sputum.  GASTROINTESTINAL:  No anorexia, nausea, vomiting or diarrhea. No abdominal pain or blood.  GENITOURINARY: No Burning on urination.   NEUROLOGICAL:  see HPI  MUSCULOSKELETAL:  No muscle, back pain, joint pain or stiffness.  HEMATOLOGIC:  No anemia, bleeding or bruising.  LYMPHATICS:  No enlarged nodes. No history of splenectomy.  PSYCHIATRIC:  No history of depression or anxiety.  ENDOCRINOLOGIC:  No reports of sweating, cold or heat intolerance. No polyuria or polydipsia.  ALLERGIES:  No history of asthma, hives, eczema or rhinitis.    OBJECTIVE  T(C): 36.8 (07-17-23 @ 05:12), Max: 37 (07-16-23 @ 10:00)  HR: 68 (07-17-23 @ 07:00) (56 - 68)  BP: 137/62 (07-17-23 @ 07:00) (127/58 - 169/81)  RR: 17 (07-17-23 @ 04:34) (17 - 18)  SpO2: 95% (07-17-23 @ 04:34) (93% - 96%)    07-16-23 @ 07:01  -  07-17-23 @ 07:00  --------------------------------------------------------  IN: 1260 mL / OUT: 1100 mL / NET: 160 mL        PHYSICAL EXAM:    Constitutional: resting comfortably in bed; NAD  HEENT: NC/AT, PERRL, EOMI, anicteric sclera, no nasal discharge; uvula midline, no oropharyngeal erythema or exudates; MMM  Neck: supple; no thyromegaly, JVP 8< cm H20, JVD-  Respiratory: CTA B/L; no W/R/R, no retractions  Cardiac: +S1/S2; RRR; no M/R/G; PMI non-displaced  Gastrointestinal: soft, NT/ND; no rebound or guarding; +BSx4  Extremities: WWP, no clubbing or cyanosis; no peripheral edema  Musculoskeletal: NROM x4; no joint swelling, tenderness or erythema  Vascular: 2+ radial, DP/PT pulses B/L  Dermatologic: skin warm, dry and intact; no rashes, wounds, or scars  Lymphatic: no submandibular or cervical LAD  Neurologic: AAOx3; CNII-XII grossly intact; no focal deficits    LABS:                        12.1   7.13  )-----------( 243      ( 17 Jul 2023 05:30 )             37.6     07-17    139  |  102  |  26<H>  ----------------------------<  81  4.3   |  28  |  1.21    Ca    8.9      17 Jul 2023 05:30  Phos  4.1     07-17  Mg     1.7     07-17        Urinalysis Basic - ( 17 Jul 2023 05:30 )    Color: x / Appearance: x / SG: x / pH: x  Gluc: 81 mg/dL / Ketone: x  / Bili: x / Urobili: x   Blood: x / Protein: x / Nitrite: x   Leuk Esterase: x / RBC: x / WBC x   Sq Epi: x / Non Sq Epi: x / Bacteria: x        RADIOLOGY & ADDITIONAL TESTS:  Reviewed .    MEDICATIONS  (STANDING):  atorvastatin 80 milliGRAM(s) Oral at bedtime  dextrose 5%. 1000 milliLiter(s) (100 mL/Hr) IV Continuous <Continuous>  dextrose 5%. 1000 milliLiter(s) (50 mL/Hr) IV Continuous <Continuous>  dextrose 50% Injectable 12.5 Gram(s) IV Push once  dextrose 50% Injectable 25 Gram(s) IV Push once  dextrose 50% Injectable 25 Gram(s) IV Push once  enoxaparin Injectable 40 milliGRAM(s) SubCutaneous every 24 hours  glucagon  Injectable 1 milliGRAM(s) IntraMuscular once  insulin glargine Injectable (LANTUS) 48 Unit(s) SubCutaneous every morning  insulin lispro (ADMELOG) corrective regimen sliding scale   SubCutaneous three times a day before meals  insulin lispro (ADMELOG) corrective regimen sliding scale   SubCutaneous at bedtime  insulin lispro Injectable (ADMELOG) 6 Unit(s) SubCutaneous three times a day before meals  isosorbide   mononitrate ER Tablet (IMDUR) 30 milliGRAM(s) Oral daily  latanoprost 0.005% Ophthalmic Solution 1 Drop(s) Both EYES at bedtime  lisinopril 20 milliGRAM(s) Oral daily  metoprolol succinate ER 12.5 milliGRAM(s) Oral daily  polyethylene glycol 3350 17 Gram(s) Oral daily  senna 2 Tablet(s) Oral at bedtime  timolol 0.5% Solution 1 Drop(s) Both EYES daily    MEDICATIONS  (PRN):  dextrose Oral Gel 15 Gram(s) Oral once PRN Blood Glucose LESS THAN 70 milliGRAM(s)/deciliter

## 2023-07-18 NOTE — PROGRESS NOTE ADULT - ASSESSMENT
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 Cardiology consulted for perioperative cardiovascular risk stratification.    Recommendations:    #CAD s/p MIDCAB and PCI with multiple WINTER (most recent to LCx in 8/2022): ECG NSR w/nonspecific ST T changes in v4-6  - Ok to hold ASA/plavix for procedure if procedure is end of this week  - If procedure is next week, restart and hold ASA 5-7 days before procedure  - c/w Lipitor 80mg daily  - c/w Toprol 12.5mg qD, continue on day of procedure  - C/w Imdur 30mg qD and Lisinopril 20mg qD  - Please obtain EKGs daily  - hold ranolazine in setting of bradycardia  - f/u TTE today      #HFrEF- euvolemic on exam  - last TTE on 8/2022 shows mildly reduced LV fxn & G1DD  - f/u TTE to assess LV fxn      Pending discussion w/cardiology attending  Pending discussion w/primary team  We will continue to follow, thank you for the consultation      Apolinar Mayes PGY5 CVD Fellow  Cardiology Consult Pager: [phone]   78M with HTN HLD DM PVD, CAD s/p MIDCAB and PCI (most recent WINTER to LCx in 8/2022) now with 3-4 months of progressive dysphonia and dysphagia, 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 Cardiology consulted for perioperative cardiovascular risk stratification.    Recommendations:    #Perioperative Cardiovascular Risk Assessment:  - Manny PAGAN   - 7/18 TTE unchanged from 8/22' TTE  - Intermediate risk of perioperative cardiovascular complications for a intermediate risk procedure  - No evidence of ACS, decompensated HF, severe AS, and tachyarrhythmia on clinical and physical exam assessment, had >4 METs of exercise tolerance prior to the hospitalization, no need for further cardiac workup or intervention prior to the scheduled biopsy  - Confirmed ASA held 1d prior to procedure, please hold ASA 5-7 days before procedure and resume immediately post op in accordance w/ENT    #CAD s/p MIDCAB and PCI with multiple WINTER (most recent to LCx in 8/2022): ECG NSR w/nonspecific ST T changes in v4-6  - Ok to hold ASA/plavix for procedure if procedure is end of this week  - If procedure is next week, restart and hold ASA 5-7 days before procedure  - c/w Lipitor 80mg daily  - c/w Toprol 12.5mg qD, continue on day of procedure  - C/w Imdur 30mg qD and Lisinopril 20mg qD  - Please obtain EKGs daily  - hold ranolazine in setting of bradycardia  - f/u TTE today      #HFrEF- euvolemic on exam  - last TTE on 8/2022 shows mildly reduced LV fxn & G1DD  - 7/17 TTE unchanged from 8/22'TTE      Pending discussion w/cardiology attending  Pending discussion w/primary team  We will continue to follow, thank you for the consultation      Apolinar Mayes PGY5 CVD Fellow  Cardiology Consult Pager: [phone]   78M with HTN HLD DM PVD, CAD s/p MIDCAB and PCI (most recent WINTER to LCx in 8/2022) now with 3-4 months of progressive dysphonia and dysphagia, 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 Cardiology consulted for perioperative cardiovascular risk stratification.    Recommendations:    #Perioperative Cardiovascular Risk Assessment:  - Manny PAGAN   - 7/18 TTE unchanged from 8/22' TTE  - Intermediate risk of perioperative cardiovascular complications for a intermediate risk procedure  - No evidence of ACS, decompensated HF, severe AS, and tachyarrhythmia on clinical and physical exam assessment, had >4 METs of exercise tolerance prior to the hospitalization, n  - Pt is deemed optimized from a cardiovascular standpoint for biopsy, no further testing neede  - Confirmed ASA held 1d prior to procedure, please hold ASA 5-7 days before procedure and resume immediately post op in accordance w/ENT    #CAD s/p MIDCAB and PCI with multiple WINTER (most recent to LCx in 8/2022): ECG NSR w/nonspecific ST T changes in v4-6  - Ok to hold ASA/plavix for procedure if procedure is end of this week  - If procedure is next week, restart and hold ASA 5-7 days before procedure  - c/w Lipitor 80mg daily  - c/w Toprol 12.5mg qD, continue on day of procedure  - C/w Imdur 30mg qD and Lisinopril 20mg qD  - Please obtain EKGs daily  - hold ranolazine in setting of bradycardia  - f/u TTE today      #HFrEF- euvolemic on exam  - last TTE on 8/2022 shows mildly reduced LV fxn & G1DD  - 7/17 TTE unchanged from 8/22'TTE      Pending discussion w/cardiology attending  Pending discussion w/primary team  We will continue to follow, thank you for the consultation      Apolinar Mayes PGY5 CVD Fellow  Cardiology Consult Pager: [phone]   78M with HTN HLD DM PVD, CAD s/p MIDCAB and PCI (most recent WINTER to LCx in 8/2022) now with 3-4 months of progressive dysphonia and dysphagia, 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 Cardiology consulted for perioperative cardiovascular risk stratification for planned DL/biopsy of glottic mass this Thursday 07/19.    Recommendations:    #Perioperative Cardiovascular Risk Assessment:  - Manny PAGAN   - 7/18 TTE unchanged from 8/22' TTE  - Intermediate risk of perioperative cardiovascular complications for a intermediate risk procedure  - No evidence of ACS, decompensated HF, severe AS, and tachyarrhythmia on clinical and physical exam assessment, had >4 METs of exercise tolerance prior to the hospitalization  - Pt is deemed optimized from a cardiovascular standpoint for biopsy, no further testing needed  - Confirmed ASA held 2-3d prior to procedure, please hold ASA 5-7 days before procedure and resume immediately post op in accordance w/ENT    #CAD s/p MIDCAB and PCI with multiple WINTER (most recent to LCx in 8/2022): ECG NSR w/nonspecific ST T changes in v4-6  - Hold ASA 5-7 days before procedure and resume immediately post op in accordance w/ENT  - c/w Lipitor 80mg daily  - c/w Toprol 12.5mg qD, continue on day of procedure  - C/w Imdur 30mg qD and Lisinopril 20mg qD  - Please obtain EKGs daily  - hold ranolazine in setting of bradycardia  - Today's TTE unchanged from previous study      #HFrEF- euvolemic on exam  - last TTE on 8/2022 shows mildly reduced LV fxn & G1DD  - 7/17 TTE unchanged from 8/22'TTE      Case reviewed w/attending cardiologist  Plan discussed w/primary team  We will continue to follow, thank you for the consultation    Apolinar Mayes PGY5 CVD Fellow  Cardiology Consult Pager: [phone]   78M with HTN HLD DM PVD, CAD s/p MIDCAB and PCI (most recent WINTER to LCx in 8/2022) now with 3-4 months of progressive dysphonia and dysphagia, 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 Cardiology consulted for perioperative cardiovascular risk stratification for planned DL/biopsy of glottic mass this Thursday 07/19.    Recommendations:    #Perioperative Cardiovascular Risk Assessment:  - RCRI 1, Bryant 0.3%   - 7/18 TTE unchanged from 8/22' TTE  - Intermediate risk of perioperative cardiovascular complications for a intermediate risk procedure  - No evidence of ACS, decompensated HF, severe AS, and tachyarrhythmia on clinical and physical exam assessment, had >4 METs of exercise tolerance prior to the hospitalization  - Pt is deemed optimized from a cardiovascular standpoint for biopsy, no further testing needed  - Confirmed ASA held 2-3d prior to procedure, please hold ASA 5-7 days before procedure and resume immediately post op in accordance w/ENT    #CAD s/p MIDCAB and PCI with multiple WINTER (most recent to LCx in 8/2022): ECG NSR w/nonspecific ST T changes in v4-6  - Hold ASA 5-7 days before procedure and resume immediately post op in accordance w/ENT  - c/w Lipitor 80mg daily  - c/w Toprol 12.5mg qD, continue on day of procedure  - C/w Imdur 30mg qD and Lisinopril 20mg qD  - Please obtain EKGs daily  - hold ranolazine in setting of bradycardia  - Today's TTE unchanged from previous study      #HFrEF- euvolemic on exam  - last TTE on 8/2022 shows mildly reduced LV fxn & G1DD  - 7/17 TTE unchanged from 8/22'TTE      Case reviewed w/attending cardiologist  Plan discussed w/primary team  We will continue to follow, thank you for the consultation    Apolinar Mayes PGY5 CVD Fellow  Cardiology Consult Pager: [phone]

## 2023-07-18 NOTE — PROGRESS NOTE ADULT - SUBJECTIVE AND OBJECTIVE BOX
**Incomplete Note**    Cardiology Consult    O/N:  Interval History/HPI: Pt seen and examined at bedside, NAD, denies chest pain/discomfort/pressure. ROS unremarkable   Telemetry:      Review of Systems:  CONSTITUTIONAL:  No weight loss, fever, chills, weakness or fatigue.  HEENT:  Eyes:  No visual loss, blurred vision, double vision or yellow sclerae. Ears, Nose, Throat:  No hearing loss, sneezing, congestion, runny nose or sore throat.  SKIN:  No rash or itching.  CARDIOVASCULAR:  No chest pain, chest pressure or chest discomfort. No palpitations or edema.  RESPIRATORY:  No shortness of breath, cough or sputum.  GASTROINTESTINAL:  No anorexia, nausea, vomiting or diarrhea. No abdominal pain or blood.  GENITOURINARY: No Burning on urination.   NEUROLOGICAL:  see HPI  MUSCULOSKELETAL:  No muscle, back pain, joint pain or stiffness.  HEMATOLOGIC:  No anemia, bleeding or bruising.  LYMPHATICS:  No enlarged nodes. No history of splenectomy.  PSYCHIATRIC:  No history of depression or anxiety.  ENDOCRINOLOGIC:  No reports of sweating, cold or heat intolerance. No polyuria or polydipsia.  ALLERGIES:  No history of asthma, hives, eczema or rhinitis.    OBJECTIVE  T(C): 37.1 (07-18-23 @ 05:13), Max: 37.4 (07-17-23 @ 21:56)  HR: 56 (07-18-23 @ 04:35) (56 - 68)  BP: 114/58 (07-18-23 @ 04:35) (114/58 - 143/65)  RR: 17 (07-18-23 @ 04:35) (17 - 18)  SpO2: 95% (07-18-23 @ 04:35) (95% - 96%)    07-17-23 @ 07:01  -  07-18-23 @ 07:00  --------------------------------------------------------  IN: 970 mL / OUT: 2150 mL / NET: -1180 mL        PHYSICAL EXAM:    Constitutional: resting comfortably in bed; NAD  HEENT: NC/AT, PERRL, EOMI, anicteric sclera, no nasal discharge; uvula midline, no oropharyngeal erythema or exudates; MMM  Neck: supple; no thyromegaly, JVP ? cm H20, JVD +/-  Respiratory: CTA B/L; no W/R/R, no retractions  Cardiac: +S1/S2; RRR; no M/R/G; PMI non-displaced  Gastrointestinal: soft, NT/ND; no rebound or guarding; +BSx4  Extremities: WWP, no clubbing or cyanosis; no peripheral edema  Musculoskeletal: NROM x4; no joint swelling, tenderness or erythema  Vascular: 2+ radial, DP/PT pulses B/L  Dermatologic: skin warm, dry and intact; no rashes, wounds, or scars  Lymphatic: no submandibular or cervical LAD  Neurologic: AAOx3; CNII-XII grossly intact; no focal deficits    LABS:                        12.1   7.13  )-----------( 243      ( 17 Jul 2023 05:30 )             37.6     07-17    139  |  102  |  26<H>  ----------------------------<  81  4.3   |  28  |  1.21    Ca    8.9      17 Jul 2023 05:30  Phos  4.1     07-17  Mg     1.7     07-17        Urinalysis Basic - ( 17 Jul 2023 05:30 )    Color: x / Appearance: x / SG: x / pH: x  Gluc: 81 mg/dL / Ketone: x  / Bili: x / Urobili: x   Blood: x / Protein: x / Nitrite: x   Leuk Esterase: x / RBC: x / WBC x   Sq Epi: x / Non Sq Epi: x / Bacteria: x        RADIOLOGY & ADDITIONAL TESTS:  Reviewed .    MEDICATIONS  (STANDING):  atorvastatin 80 milliGRAM(s) Oral at bedtime  dextrose 5%. 1000 milliLiter(s) (50 mL/Hr) IV Continuous <Continuous>  dextrose 5%. 1000 milliLiter(s) (100 mL/Hr) IV Continuous <Continuous>  dextrose 50% Injectable 12.5 Gram(s) IV Push once  dextrose 50% Injectable 25 Gram(s) IV Push once  dextrose 50% Injectable 25 Gram(s) IV Push once  enoxaparin Injectable 40 milliGRAM(s) SubCutaneous every 24 hours  glucagon  Injectable 1 milliGRAM(s) IntraMuscular once  insulin glargine Injectable (LANTUS) 48 Unit(s) SubCutaneous every morning  insulin lispro (ADMELOG) corrective regimen sliding scale   SubCutaneous three times a day before meals  insulin lispro (ADMELOG) corrective regimen sliding scale   SubCutaneous at bedtime  insulin lispro Injectable (ADMELOG) 6 Unit(s) SubCutaneous three times a day before meals  isosorbide   mononitrate ER Tablet (IMDUR) 30 milliGRAM(s) Oral daily  latanoprost 0.005% Ophthalmic Solution 1 Drop(s) Both EYES at bedtime  lisinopril 20 milliGRAM(s) Oral daily  metoprolol succinate ER 12.5 milliGRAM(s) Oral daily  polyethylene glycol 3350 17 Gram(s) Oral daily  senna 2 Tablet(s) Oral at bedtime  timolol 0.5% Solution 1 Drop(s) Both EYES daily    MEDICATIONS  (PRN):  dextrose Oral Gel 15 Gram(s) Oral once PRN Blood Glucose LESS THAN 70 milliGRAM(s)/deciliter     Cardiology Consult    O/N: KRISTINA  Interval History/HPI: Pt seen and examined at bedside, NAD, denies chest pain/discomfort/pressure. ROS unremarkable   Telemetry: NSR w/pvc's      Review of Systems:  CONSTITUTIONAL:  No weight loss, fever, chills, weakness or fatigue.  HEENT:  Eyes:  No visual loss, blurred vision, double vision or yellow sclerae. Ears, Nose, Throat:  No hearing loss, sneezing, congestion, runny nose or sore throat.  SKIN:  No rash or itching.  CARDIOVASCULAR:  No chest pain, chest pressure or chest discomfort. No palpitations or edema.  RESPIRATORY:  No shortness of breath, cough or sputum.  GASTROINTESTINAL:  No anorexia, nausea, vomiting or diarrhea. No abdominal pain or blood.  GENITOURINARY: No Burning on urination.   NEUROLOGICAL:  see HPI  MUSCULOSKELETAL:  No muscle, back pain, joint pain or stiffness.  HEMATOLOGIC:  No anemia, bleeding or bruising.  LYMPHATICS:  No enlarged nodes. No history of splenectomy.  PSYCHIATRIC:  No history of depression or anxiety.  ENDOCRINOLOGIC:  No reports of sweating, cold or heat intolerance. No polyuria or polydipsia.  ALLERGIES:  No history of asthma, hives, eczema or rhinitis.    OBJECTIVE  T(C): 37.1 (07-18-23 @ 05:13), Max: 37.4 (07-17-23 @ 21:56)  HR: 56 (07-18-23 @ 04:35) (56 - 68)  BP: 114/58 (07-18-23 @ 04:35) (114/58 - 143/65)  RR: 17 (07-18-23 @ 04:35) (17 - 18)  SpO2: 95% (07-18-23 @ 04:35) (95% - 96%)    07-17-23 @ 07:01  -  07-18-23 @ 07:00  --------------------------------------------------------  IN: 970 mL / OUT: 2150 mL / NET: -1180 mL        PHYSICAL EXAM:    Constitutional: elderly gentleman resting comfortably in bed; NAD  HEENT: NC/AT, PERRL, EOMI, anicteric sclera, no nasal discharge; uvula midline, no oropharyngeal erythema or exudates; MMM  Neck: supple; no thyromegaly, JVP <8 cm H20, JVD -  Respiratory: CTA B/L; no W/R/R, no retractions  Cardiac: +S1/S2; RRR; no M/R/G; PMI non-displaced  Gastrointestinal: soft, NT/ND; no rebound or guarding; +BSx4  Extremities: WWP, no clubbing or cyanosis; no peripheral edema  Musculoskeletal: NROM x4; no joint swelling, tenderness or erythema  Vascular: 2+ radial, DP/PT pulses B/L  Dermatologic: skin warm, dry and intact; no rashes, wounds, or scars  Lymphatic: no submandibular or cervical LAD  Neurologic: AAOx3; CNII-XII grossly intact; no focal deficits    LABS:                        12.1   7.13  )-----------( 243      ( 17 Jul 2023 05:30 )             37.6     07-17    139  |  102  |  26<H>  ----------------------------<  81  4.3   |  28  |  1.21    Ca    8.9      17 Jul 2023 05:30  Phos  4.1     07-17  Mg     1.7     07-17        Urinalysis Basic - ( 17 Jul 2023 05:30 )    Color: x / Appearance: x / SG: x / pH: x  Gluc: 81 mg/dL / Ketone: x  / Bili: x / Urobili: x   Blood: x / Protein: x / Nitrite: x   Leuk Esterase: x / RBC: x / WBC x   Sq Epi: x / Non Sq Epi: x / Bacteria: x        RADIOLOGY & ADDITIONAL TESTS:  Reviewed .    MEDICATIONS  (STANDING):  atorvastatin 80 milliGRAM(s) Oral at bedtime  dextrose 5%. 1000 milliLiter(s) (50 mL/Hr) IV Continuous <Continuous>  dextrose 5%. 1000 milliLiter(s) (100 mL/Hr) IV Continuous <Continuous>  dextrose 50% Injectable 12.5 Gram(s) IV Push once  dextrose 50% Injectable 25 Gram(s) IV Push once  dextrose 50% Injectable 25 Gram(s) IV Push once  enoxaparin Injectable 40 milliGRAM(s) SubCutaneous every 24 hours  glucagon  Injectable 1 milliGRAM(s) IntraMuscular once  insulin glargine Injectable (LANTUS) 48 Unit(s) SubCutaneous every morning  insulin lispro (ADMELOG) corrective regimen sliding scale   SubCutaneous three times a day before meals  insulin lispro (ADMELOG) corrective regimen sliding scale   SubCutaneous at bedtime  insulin lispro Injectable (ADMELOG) 6 Unit(s) SubCutaneous three times a day before meals  isosorbide   mononitrate ER Tablet (IMDUR) 30 milliGRAM(s) Oral daily  latanoprost 0.005% Ophthalmic Solution 1 Drop(s) Both EYES at bedtime  lisinopril 20 milliGRAM(s) Oral daily  metoprolol succinate ER 12.5 milliGRAM(s) Oral daily  polyethylene glycol 3350 17 Gram(s) Oral daily  senna 2 Tablet(s) Oral at bedtime  timolol 0.5% Solution 1 Drop(s) Both EYES daily    MEDICATIONS  (PRN):  dextrose Oral Gel 15 Gram(s) Oral once PRN Blood Glucose LESS THAN 70 milliGRAM(s)/deciliter

## 2023-07-18 NOTE — PROGRESS NOTE ADULT - ASSESSMENT
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  Medicine consulted for pre-op evaluation, cardiology consulted for preop optimization.     #DM  pt with diabetic retinopathy - has severe visual impairment  home regimen 50 units -- had brought down to 48 with good response in sugars  - c/w premeal insulin 6 units along with ISS.    #Urinary retention/BPH  -c/w home tamsulosin 0.4mg nightly  -Hold home mirabegron 50mg daily until after surgery  -c/w Miralax and senna    #CAD and PVD  -to hold ACEI- Benazapril on day of procedure, asp plavix on hold per cardiology with plans to restart aspirin after procedure   -f/u cardiology recommendations  -f/u TTE    Medicine will continue to follow.   Note not finalized until attending attestation complete.    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  Medicine consulted for pre-op evaluation, cardiology consulted for preop optimization.     #preop optimization  patient medically optimized, agree with cardiology that patient is intermediate risk for intermediate risk procedure.   -recommend reduction of lantus to 50% dose reduction when NPO     #DM  pt with diabetic retinopathy - has severe visual impairment  home regimen 50 units -- had brought down to 48 with good response in sugars  - c/w premeal insulin 6 units along with ISS.    #Urinary retention/BPH  -c/w home tamsulosin 0.4mg nightly  -Hold home mirabegron 50mg daily until after surgery  -c/w Miralax and senna    #CAD and PVD  TTE done unchanged from prior   -to hold ACEI- Benazapril on day of procedure, asp plavix on hold per cardiology with plans to restart aspirin after procedure   -f/u cardiology recommendations      Medicine will continue to follow.   Note not finalized until attending attestation complete.

## 2023-07-18 NOTE — PROGRESS NOTE ADULT - SUBJECTIVE AND OBJECTIVE BOX
SUBJECTIVE / INTERVAL HPI: Patient seen and examined at bedside. Patient feels good at this time and reports no issues.      VITAL SIGNS:  Vital Signs Last 24 Hrs  T(C): 36.5 (18 Jul 2023 09:05), Max: 37.4 (17 Jul 2023 21:56)  T(F): 97.7 (18 Jul 2023 09:05), Max: 99.3 (17 Jul 2023 21:56)  HR: 82 (18 Jul 2023 08:40) (56 - 82)  BP: 130/60 (18 Jul 2023 08:40) (114/58 - 143/65)  BP(mean): 86 (18 Jul 2023 08:40) (79 - 93)  RR: 17 (18 Jul 2023 08:40) (17 - 18)  SpO2: 96% (18 Jul 2023 08:40) (95% - 96%)    Parameters below as of 18 Jul 2023 08:40  Patient On (Oxygen Delivery Method): room air      PHYSICAL EXAM:    General: WDWN  HEENT: NC/AT;  MMM  Neck: supple  Cardiovascular: +S1/S2; RRR  Respiratory: CTA B/L; no W/R/R  Gastrointestinal: soft, NT/ND; +BSx4  Extremities: WWP; no edema, clubbing or cyanosis  Vascular: 2+ radial, DP/PT pulses B/L  Neurological: AAOx3; no focal deficits    MEDICATIONS:  MEDICATIONS  (STANDING):  atorvastatin 80 milliGRAM(s) Oral at bedtime  dextrose 5%. 1000 milliLiter(s) (50 mL/Hr) IV Continuous <Continuous>  dextrose 5%. 1000 milliLiter(s) (100 mL/Hr) IV Continuous <Continuous>  dextrose 50% Injectable 12.5 Gram(s) IV Push once  dextrose 50% Injectable 25 Gram(s) IV Push once  dextrose 50% Injectable 25 Gram(s) IV Push once  enoxaparin Injectable 40 milliGRAM(s) SubCutaneous every 24 hours  glucagon  Injectable 1 milliGRAM(s) IntraMuscular once  insulin glargine Injectable (LANTUS) 48 Unit(s) SubCutaneous every morning  insulin lispro (ADMELOG) corrective regimen sliding scale   SubCutaneous three times a day before meals  insulin lispro (ADMELOG) corrective regimen sliding scale   SubCutaneous at bedtime  insulin lispro Injectable (ADMELOG) 6 Unit(s) SubCutaneous three times a day before meals  isosorbide   mononitrate ER Tablet (IMDUR) 30 milliGRAM(s) Oral daily  latanoprost 0.005% Ophthalmic Solution 1 Drop(s) Both EYES at bedtime  lisinopril 20 milliGRAM(s) Oral daily  metoprolol succinate ER 12.5 milliGRAM(s) Oral daily  polyethylene glycol 3350 17 Gram(s) Oral daily  senna 2 Tablet(s) Oral at bedtime  timolol 0.5% Solution 1 Drop(s) Both EYES daily    MEDICATIONS  (PRN):  dextrose Oral Gel 15 Gram(s) Oral once PRN Blood Glucose LESS THAN 70 milliGRAM(s)/deciliter      ALLERGIES:  Allergies    No Known Allergies    Intolerances        LABS:                        12.1   7.13  )-----------( 243      ( 17 Jul 2023 05:30 )             37.6     07-17    139  |  102  |  26<H>  ----------------------------<  81  4.3   |  28  |  1.21    Ca    8.9      17 Jul 2023 05:30  Phos  4.1     07-17  Mg     1.7     07-17        Urinalysis Basic - ( 17 Jul 2023 05:30 )    Color: x / Appearance: x / SG: x / pH: x  Gluc: 81 mg/dL / Ketone: x  / Bili: x / Urobili: x   Blood: x / Protein: x / Nitrite: x   Leuk Esterase: x / RBC: x / WBC x   Sq Epi: x / Non Sq Epi: x / Bacteria: x      CAPILLARY BLOOD GLUCOSE      POCT Blood Glucose.: 231 mg/dL (18 Jul 2023 09:59)      RADIOLOGY & ADDITIONAL TESTS: Reviewed.    ASSESSMENT:    PLAN:

## 2023-07-18 NOTE — PROGRESS NOTE ADULT - ASSESSMENT
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    Plan:  - Pain control prn  - Reg diet cc  - Medicine consult for pre-op optimization: pending cards recs  - Cards consult for pre-op optimization: pending TTE, ordered  - F/u AC plan: ok to hold DAPT per cards  - Likely plan for DL/biopsy next week    Disposition:   [facility] at [phone] with any questions/concerns.    Rima BERNARDO  07-18-23 @ 07:28

## 2023-07-18 NOTE — PROGRESS NOTE ADULT - SUBJECTIVE AND OBJECTIVE BOX
OTOLARYNGOLOGY (ENT) PROGRESS NOTE    PATIENT: LEONARDO HAY  MRN: [id number]  : 45  [id number]  DATE OF SERVICE:  23  			         Subjective/ Interval:   7/15: AFVSS. No acute events overnight. Patient seen and examined at bedside. No complaints this morning.  : AFVSS. No acute events overnight. Patient seen and examined at bedside. sBP mildly elevated to 168 this morning, continues to be asymptomatic.  : Patient seen this morning, pending TTE today. Will continue medical clearance. No SOB, regular respirations   : Patient seen this morning, pending TTE today. Will continue medical clearance. No SOB, regular respirations.    ALLERGIES:  No Known Allergies      MEDICATIONS:  Antiinfectives:     IV fluids:  dextrose 5%. 1000 milliLiter(s) IV Continuous <Continuous>  dextrose 5%. 1000 milliLiter(s) IV Continuous <Continuous>    Hematologic/Anticoagulation:  enoxaparin Injectable 40 milliGRAM(s) SubCutaneous every 24 hours    Pain medications/Neuro:    Endocrine Medications:   atorvastatin 80 milliGRAM(s) Oral at bedtime  dextrose 50% Injectable 25 Gram(s) IV Push once  dextrose 50% Injectable 25 Gram(s) IV Push once  dextrose 50% Injectable 12.5 Gram(s) IV Push once  dextrose Oral Gel 15 Gram(s) Oral once PRN  glucagon  Injectable 1 milliGRAM(s) IntraMuscular once  insulin glargine Injectable (LANTUS) 48 Unit(s) SubCutaneous every morning  insulin lispro (ADMELOG) corrective regimen sliding scale   SubCutaneous three times a day before meals  insulin lispro (ADMELOG) corrective regimen sliding scale   SubCutaneous at bedtime  insulin lispro Injectable (ADMELOG) 6 Unit(s) SubCutaneous three times a day before meals    All other standing medications:   isosorbide   mononitrate ER Tablet (IMDUR) 30 milliGRAM(s) Oral daily  latanoprost 0.005% Ophthalmic Solution 1 Drop(s) Both EYES at bedtime  lisinopril 20 milliGRAM(s) Oral daily  metoprolol succinate ER 12.5 milliGRAM(s) Oral daily  polyethylene glycol 3350 17 Gram(s) Oral daily  senna 2 Tablet(s) Oral at bedtime  timolol 0.5% Solution 1 Drop(s) Both EYES daily    All other PRN medications:    Vital Signs Last 24 Hrs  T(C): 37.1 (2023 05:13), Max: 37.4 (2023 21:56)  T(F): 98.8 (2023 05:13), Max: 99.3 (2023 21:56)  HR: 56 (2023 04:35) (56 - 68)  BP: 114/58 (2023 04:35) (114/58 - 143/65)  BP(mean): 79 (2023 04:35) (79 - 93)  RR: 17 (2023 04:35) (17 - 18)  SpO2: 95% (2023 04:35) (95% - 96%)    Parameters below as of 2023 04:35  Patient On (Oxygen Delivery Method): room air           @ 07:01  -   @ 07:00  --------------------------------------------------------  IN:    Oral Fluid: 970 mL  Total IN: 970 mL    OUT:    Voided (mL): 2150 mL  Total OUT: 2150 mL    Total NET: -1180 mL                  PHYSICAL EXAM:  General: patient relaxing in bed, talking in a hoarse voice   Eyes: no eye drainage or redness, blind in both eyes  Ears: external ears normal  Nose: nares patent  Mouth: No oral lesions; no gross abnormalities  Neck: trachea midline. + hoarse voice  Respiratory: unlabored respirations  Cardiovascular: regular rate  Gastrointestinal: Soft, nondistended  Extremities: No edema, warm and well perfused  Skin: No lesions; no rash        LABS                       12.1   7.13  )-----------( 243      ( 2023 05:30 )             37.6    07-17    139  |  102  |  26<H>  ----------------------------<  81  4.3   |  28  |  1.21    Ca    8.9      2023 05:30  Phos  4.1     07-  Mg     1.7                Coagulation Studies-     Urinalysis Basic - ( 2023 05:30 )    Color: x / Appearance: x / SG: x / pH: x  Gluc: 81 mg/dL / Ketone: x  / Bili: x / Urobili: x   Blood: x / Protein: x / Nitrite: x   Leuk Esterase: x / RBC: x / WBC x   Sq Epi: x / Non Sq Epi: x / Bacteria: x      Assessment and Plan:  LEONARDO HAY is a  78yMale   .. S/P ...    PLAN:  - Patient seen with Physician Assistant and Resident Team and agrees with plan.    Disposition:   [facility] at [phone] with any questions/concerns.    Rima BERNARDO  23 @ 07:28 OTOLARYNGOLOGY (ENT) PROGRESS NOTE    PATIENT: LEONARDO HAY  MRN: [id number]  : 45  [id number]  DATE OF SERVICE:  23  			         Subjective/ Interval:   7/15: AFVSS. No acute events overnight. Patient seen and examined at bedside. No complaints this morning.  : AFVSS. No acute events overnight. Patient seen and examined at bedside. sBP mildly elevated to 168 this morning, continues to be asymptomatic.  : Patient seen this morning, pending TTE today. Will continue medical clearance. No SOB, regular respirations   : Patient seen this morning, pending TTE today. Will continue medical clearance. No SOB, regular respirations.    ALLERGIES:  No Known Allergies      MEDICATIONS:  Antiinfectives:     IV fluids:  dextrose 5%. 1000 milliLiter(s) IV Continuous <Continuous>  dextrose 5%. 1000 milliLiter(s) IV Continuous <Continuous>    Hematologic/Anticoagulation:  enoxaparin Injectable 40 milliGRAM(s) SubCutaneous every 24 hours    Pain medications/Neuro:    Endocrine Medications:   atorvastatin 80 milliGRAM(s) Oral at bedtime  dextrose 50% Injectable 25 Gram(s) IV Push once  dextrose 50% Injectable 25 Gram(s) IV Push once  dextrose 50% Injectable 12.5 Gram(s) IV Push once  dextrose Oral Gel 15 Gram(s) Oral once PRN  glucagon  Injectable 1 milliGRAM(s) IntraMuscular once  insulin glargine Injectable (LANTUS) 48 Unit(s) SubCutaneous every morning  insulin lispro (ADMELOG) corrective regimen sliding scale   SubCutaneous three times a day before meals  insulin lispro (ADMELOG) corrective regimen sliding scale   SubCutaneous at bedtime  insulin lispro Injectable (ADMELOG) 6 Unit(s) SubCutaneous three times a day before meals    All other standing medications:   isosorbide   mononitrate ER Tablet (IMDUR) 30 milliGRAM(s) Oral daily  latanoprost 0.005% Ophthalmic Solution 1 Drop(s) Both EYES at bedtime  lisinopril 20 milliGRAM(s) Oral daily  metoprolol succinate ER 12.5 milliGRAM(s) Oral daily  polyethylene glycol 3350 17 Gram(s) Oral daily  senna 2 Tablet(s) Oral at bedtime  timolol 0.5% Solution 1 Drop(s) Both EYES daily    All other PRN medications:    Vital Signs Last 24 Hrs  T(C): 37.1 (2023 05:13), Max: 37.4 (2023 21:56)  T(F): 98.8 (2023 05:13), Max: 99.3 (2023 21:56)  HR: 56 (2023 04:35) (56 - 68)  BP: 114/58 (2023 04:35) (114/58 - 143/65)  BP(mean): 79 (2023 04:35) (79 - 93)  RR: 17 (2023 04:35) (17 - 18)  SpO2: 95% (2023 04:35) (95% - 96%)    Parameters below as of 2023 04:35  Patient On (Oxygen Delivery Method): room air           @ 07:01  -   @ 07:00  --------------------------------------------------------  IN:    Oral Fluid: 970 mL  Total IN: 970 mL    OUT:    Voided (mL): 2150 mL  Total OUT: 2150 mL    Total NET: -1180 mL        PHYSICAL EXAM:  General: patient relaxing in bed, talking in a hoarse voice   Eyes: no eye drainage or redness, blind in both eyes  Ears: external ears normal  Nose: nares patent  Mouth: No oral lesions; no gross abnormalities  Neck: trachea midline. + hoarse voice  Respiratory: unlabored respirations  Cardiovascular: regular rate  Gastrointestinal: Soft, nondistended  Extremities: No edema, warm and well perfused  Skin: No lesions; no rash        LABS                       12.1   7.13  )-----------( 243      ( 2023 05:30 )             37.6    07-17    139  |  102  |  26<H>  ----------------------------<  81  4.3   |  28  |  1.21    Ca    8.9      2023 05:30  Phos  4.1     07-  Mg     1.7                Coagulation Studies-     Urinalysis Basic - ( 2023 05:30 )    Color: x / Appearance: x / SG: x / pH: x  Gluc: 81 mg/dL / Ketone: x  / Bili: x / Urobili: x   Blood: x / Protein: x / Nitrite: x   Leuk Esterase: x / RBC: x / WBC x   Sq Epi: x / Non Sq Epi: x / Bacteria: x

## 2023-07-19 NOTE — PROGRESS NOTE ADULT - SUBJECTIVE AND OBJECTIVE BOX
OTOLARYNGOLOGY (ENT) PROGRESS NOTE    PATIENT: LEONARDO HAY  MRN: [id number]  : 45  [id number]  DATE OF SERVICE:  23  	  Subjective/ Interval:   7/15: AFVSS. No acute events overnight. Patient seen and examined at bedside. No complaints this morning.  : AFVSS. No acute events overnight. Patient seen and examined at bedside. sBP mildly elevated to 168 this morning, continues to be asymptomatic.  : Patient seen this morning, pending TTE today. Will continue medical clearance. No SOB, regular respirations   : Patient seen this morning, pending TTE today. Will continue medical clearance. No SOB, regular respirations.  : AFVSS. No acute events overnight. Patient seen and examined at bedside. Optimized by medicine and cardiology teams.    ALLERGIES:  No Known Allergies      MEDICATIONS:  Antiinfectives:     IV fluids:  dextrose 5%. 1000 milliLiter(s) IV Continuous <Continuous>  dextrose 5%. 1000 milliLiter(s) IV Continuous <Continuous>    Hematologic/Anticoagulation:  enoxaparin Injectable 40 milliGRAM(s) SubCutaneous every 24 hours    Pain medications/Neuro:    Endocrine Medications:   atorvastatin 80 milliGRAM(s) Oral at bedtime  dextrose 50% Injectable 12.5 Gram(s) IV Push once  dextrose 50% Injectable 25 Gram(s) IV Push once  dextrose 50% Injectable 25 Gram(s) IV Push once  dextrose Oral Gel 15 Gram(s) Oral once PRN  glucagon  Injectable 1 milliGRAM(s) IntraMuscular once  insulin glargine Injectable (LANTUS) 48 Unit(s) SubCutaneous every morning  insulin lispro (ADMELOG) corrective regimen sliding scale   SubCutaneous three times a day before meals  insulin lispro (ADMELOG) corrective regimen sliding scale   SubCutaneous at bedtime  insulin lispro Injectable (ADMELOG) 6 Unit(s) SubCutaneous three times a day before meals    All other standing medications:   isosorbide   mononitrate ER Tablet (IMDUR) 30 milliGRAM(s) Oral daily  latanoprost 0.005% Ophthalmic Solution 1 Drop(s) Both EYES at bedtime  lisinopril 20 milliGRAM(s) Oral daily  metoprolol succinate ER 12.5 milliGRAM(s) Oral daily  polyethylene glycol 3350 17 Gram(s) Oral daily  senna 2 Tablet(s) Oral at bedtime  timolol 0.5% Solution 1 Drop(s) Both EYES daily    All other PRN medications:    Vital Signs Last 24 Hrs  T(C): 36.5 (2023 04:57), Max: 37.2 (2023 17:53)  T(F): 97.7 (2023 04:57), Max: 99 (2023 17:53)  HR: 58 (2023 05:21) (58 - 94)  BP: 143/63 (2023 05:21) (92/54 - 154/68)  BP(mean): 91 (2023 05:21) (67 - 98)  RR: 17 (2023 03:47) (17 - 18)  SpO2: 98% (2023 03:47) (95% - 98%)    Parameters below as of 2023 03:47  Patient On (Oxygen Delivery Method): room air          17 @ 07:  -   @ 07:00  --------------------------------------------------------  IN:    Oral Fluid: 970 mL  Total IN: 970 mL    OUT:    Voided (mL): 2150 mL  Total OUT: 2150 mL    Total NET: -1180 mL       @ 07:01  -  -19 @ 06:56  --------------------------------------------------------  IN:    Oral Fluid: 960 mL  Total IN: 960 mL    OUT:    Voided (mL): 250 mL  Total OUT: 250 mL    Total NET: 710 mL        PHYSICAL EXAM:  General: patient relaxing in bed, talking in a hoarse voice   Eyes: no eye drainage or redness, blind in both eyes  Ears: external ears normal  Nose: nares patent  Mouth: No oral lesions; no gross abnormalities  Neck: trachea midline. + hoarse voice  Respiratory: unlabored respirations  Cardiovascular: regular rate  Gastrointestinal: Soft, nondistended  Extremities: No edema, warm and well perfused  Skin: No lesions; no rash               LABS             Coagulation Studies-       Endocrine Panel-                MICROBIOLOGY:

## 2023-07-19 NOTE — PROGRESS NOTE ADULT - ASSESSMENT
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 vs Fri. of this week    Plan:  - Pain control prn  - Reg diet cc  - Medicine consult for pre-op optimization: optimized for OR  - Cards consult for pre-op optimization: TTE WNL  - F/u AC plan: ok to hold DAPT per cards  - Likely plan for DL/biopsy Thurs. vs Fri.    [facility] at [phone] with any questions/concerns.

## 2023-07-19 NOTE — DIETITIAN INITIAL EVALUATION ADULT - PERTINENT LABORATORY DATA
POCT Blood Glucose.: 235 mg/dL (07-19-23 @ 11:48)  A1C with Estimated Average Glucose Result: 7.9 % (07-15-23 @ 06:32)   POCT Blood Glucose.: 235 mg/dL (07-19-23 @ 11:48)  A1C with Estimated Average Glucose Result: 7.9 % (07-15-23 @ 06:32)

## 2023-07-19 NOTE — DIETITIAN INITIAL EVALUATION ADULT - ADD RECOMMEND
1. Continue current diet order - CSTCHO  2. Monitor PO intake  >> Consistently meet >75% of estimated needs during admission   3. Monitor chemistry, wt trends, GI function, and skin integrity   4. Encourage pt to meet nutritional needs   5. Honor food preferences as able  6. RD to remain available for additional nutrition interventions and diet edu as needed   **Moderate Nutrition Risk

## 2023-07-19 NOTE — DIETITIAN INITIAL EVALUATION ADULT - OTHER INFO
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    Pt seen by RDN on 8LA for nutrition assessment, OOB in chair.  utilized for interview, ID# [id number]. No reported allergies to food. No cultural, Yarsanism, or other food preferences. Currently placed on a CSTCHO diet. Pt denies difficulty chewing/swallowing. Pt endorses good appetite and intake currently and PTA. Pt endorses he is completing ~70% of meals in-house. PTA, pt was consuming 3 meals/day and reported checking BG at mealtimes to monitor insulin needs, however, was not following a diabetic diet. Pt endorses UBW 170lb x ~3 weeks ago. Admission wt 72.6kg/160lb reflective of -5.9% unintentional wt loss x 3 weeks, clinically significant. +NFPE with moderate muscle wasting/fat loss. Per ASPEN guidelines, pt currently meets criteria for moderate malnutrition in the setting of acute illness AEB significant wt loss and physical findings. Reviewed current diet order and provided education on carbohydrate counting with supporting printed handout in E[place]an (pt visually impaired; expressed he would share with his family). Encouraged continued good PO intake with an emphasis on lean protein. Pt made aware RDN remains available. Will continue to monitor per RD protocol.     GI: Denies N/V/D; endorses chronic constipation. Ordered for Miralax and Senna, reported +BM x 2 days ago. No abd distention noted.  Skin: No edema or pressure injuries documented at this time. Simon 19.  Pain: No pain reported during RD interview

## 2023-07-19 NOTE — DIETITIAN INITIAL EVALUATION ADULT - PERTINENT MEDS FT
MEDICATIONS  (STANDING):  atorvastatin 80 milliGRAM(s) Oral at bedtime  dextrose 5%. 1000 milliLiter(s) (100 mL/Hr) IV Continuous <Continuous>  dextrose 5%. 1000 milliLiter(s) (50 mL/Hr) IV Continuous <Continuous>  dextrose 50% Injectable 25 Gram(s) IV Push once  dextrose 50% Injectable 12.5 Gram(s) IV Push once  dextrose 50% Injectable 25 Gram(s) IV Push once  enoxaparin Injectable 40 milliGRAM(s) SubCutaneous every 24 hours  glucagon  Injectable 1 milliGRAM(s) IntraMuscular once  insulin lispro (ADMELOG) corrective regimen sliding scale   SubCutaneous Before meals and at bedtime  insulin lispro Injectable (ADMELOG) 7 Unit(s) SubCutaneous three times a day before meals  isosorbide   mononitrate ER Tablet (IMDUR) 30 milliGRAM(s) Oral daily  latanoprost 0.005% Ophthalmic Solution 1 Drop(s) Both EYES at bedtime  lisinopril 20 milliGRAM(s) Oral daily  metoprolol succinate ER 12.5 milliGRAM(s) Oral daily  polyethylene glycol 3350 17 Gram(s) Oral daily  senna 2 Tablet(s) Oral at bedtime  timolol 0.5% Solution 1 Drop(s) Both EYES daily    MEDICATIONS  (PRN):  dextrose Oral Gel 15 Gram(s) Oral once PRN Blood Glucose LESS THAN 70 milliGRAM(s)/deciliter

## 2023-07-19 NOTE — PROGRESS NOTE ADULT - SUBJECTIVE AND OBJECTIVE BOX
SUBJECTIVE / INTERVAL HPI: Patient seen and examined at bedside. Patient feels well at this time and has no complaints at this time.     VITAL SIGNS:  Vital Signs Last 24 Hrs  T(C): 36.8 (19 Jul 2023 09:06), Max: 37.2 (18 Jul 2023 17:53)  T(F): 98.3 (19 Jul 2023 09:06), Max: 99 (18 Jul 2023 17:53)  HR: 60 (19 Jul 2023 08:22) (58 - 94)  BP: 105/51 (19 Jul 2023 08:22) (92/54 - 154/68)  BP(mean): 71 (19 Jul 2023 08:22) (67 - 98)  RR: 17 (19 Jul 2023 08:22) (17 - 18)  SpO2: 96% (19 Jul 2023 08:22) (95% - 98%)    Parameters below as of 19 Jul 2023 08:22  Patient On (Oxygen Delivery Method): room air        PHYSICAL EXAM:    General: WDWN  HEENT: NC/AT; MMM  Neck: supple  Cardiovascular: +S1/S2; RRR  Respiratory: CTA B/L; no W/R/R  Gastrointestinal: soft, NT/ND; +BSx4  Extremities: WWP; no edema, clubbing or cyanosis  Vascular: 2+ radial, DP/PT pulses B/L  Neurological: AAOx3; no focal deficits    MEDICATIONS:  MEDICATIONS  (STANDING):  atorvastatin 80 milliGRAM(s) Oral at bedtime  dextrose 5%. 1000 milliLiter(s) (50 mL/Hr) IV Continuous <Continuous>  dextrose 5%. 1000 milliLiter(s) (100 mL/Hr) IV Continuous <Continuous>  dextrose 50% Injectable 12.5 Gram(s) IV Push once  dextrose 50% Injectable 25 Gram(s) IV Push once  dextrose 50% Injectable 25 Gram(s) IV Push once  enoxaparin Injectable 40 milliGRAM(s) SubCutaneous every 24 hours  glucagon  Injectable 1 milliGRAM(s) IntraMuscular once  insulin glargine Injectable (LANTUS) 48 Unit(s) SubCutaneous every morning  insulin lispro (ADMELOG) corrective regimen sliding scale   SubCutaneous three times a day before meals  insulin lispro (ADMELOG) corrective regimen sliding scale   SubCutaneous at bedtime  insulin lispro Injectable (ADMELOG) 6 Unit(s) SubCutaneous three times a day before meals  isosorbide   mononitrate ER Tablet (IMDUR) 30 milliGRAM(s) Oral daily  latanoprost 0.005% Ophthalmic Solution 1 Drop(s) Both EYES at bedtime  lisinopril 20 milliGRAM(s) Oral daily  metoprolol succinate ER 12.5 milliGRAM(s) Oral daily  polyethylene glycol 3350 17 Gram(s) Oral daily  senna 2 Tablet(s) Oral at bedtime  timolol 0.5% Solution 1 Drop(s) Both EYES daily    MEDICATIONS  (PRN):  dextrose Oral Gel 15 Gram(s) Oral once PRN Blood Glucose LESS THAN 70 milliGRAM(s)/deciliter      ALLERGIES:  Allergies    No Known Allergies    Intolerances

## 2023-07-19 NOTE — DIETITIAN NUTRITION RISK NOTIFICATION - ADDITIONAL COMMENTS/DIETITIAN RECOMMENDATIONS
**See Dietitian Initial Evaluation 7/19/23    Malnutrition (acute, moderate) R/T inadequate energy intake to meet nutrition needs AEB -5.9% wt loss x 3 weeks, moderate muscle wasting/fat loss    Recommendations:  1. Continue current diet order - CSTCHO  2. Monitor PO intake  >> Consistently meet >75% of estimated needs during admission   3. Monitor chemistry, wt trends, GI function, and skin integrity   4. Encourage pt to meet nutritional needs   5. Honor food preferences as able  6. RD to remain available for additional nutrition interventions and diet edu as needed   **Moderate Nutrition Risk

## 2023-07-19 NOTE — PROGRESS NOTE ADULT - ASSESSMENT
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  Medicine consulted for pre-op evaluation, cardiology consulted for preop optimization.     #preop optimization  patient medically optimized, agree with cardiology that patient is intermediate risk for intermediate risk procedure.   -recommend reduction of lantus to 50% dose reduction when NPO     #DM  pt with diabetic retinopathy - has severe visual impairment  home regimen 50 units -- had brought down to 48 with good response in sugars  - increase premeal insulin to 7 units along with ISS.    #Urinary retention/BPH  -c/w home tamsulosin 0.4mg nightly  -Hold home mirabegron 50mg daily until after surgery  -c/w Miralax and senna    #CAD and PVD  TTE done unchanged from prior   -to hold ACEI- Benazapril on day of procedure, asp plavix on hold per cardiology with plans to restart aspirin after procedure   -f/u cardiology recommendations    Medicine will continue to follow.   Note not finalized until attending attestation complete.

## 2023-07-19 NOTE — DIETITIAN INITIAL EVALUATION ADULT - OTHER CALCULATIONS
Ht: 5'6", CBW: 72.6kg/160lb, IBW 64.5kg/142lb +/-10%, %% . CBW used to calculate estimated needs based on Standards of Care given pt within % IBW. Adjusted for age, malnutrition. Ht: 5'6" (stated), CBW: 72.6kg/160lb, IBW 64.5kg/142lb +/-10%, %% . CBW used to calculate estimated needs based on Standards of Care given pt within % IBW. Adjusted for age, malnutrition.

## 2023-07-20 NOTE — PROGRESS NOTE ADULT - SUBJECTIVE AND OBJECTIVE BOX
Cardiology Consult    O/N: KRISTINA  Interval History/HPI: Pt seen and examined at bedside, NAD, denies chest pain/discomfort/pressure. ROS unremarkable   Telemetry: NSR      Review of Systems:  CONSTITUTIONAL:  No weight loss, fever, chills, weakness or fatigue.  HEENT:  Eyes:  No visual loss, blurred vision, double vision or yellow sclerae. Ears, Nose, Throat:  No hearing loss, sneezing, congestion, runny nose or sore throat.  SKIN:  No rash or itching.  CARDIOVASCULAR:  No chest pain, chest pressure or chest discomfort. No palpitations or edema.  RESPIRATORY:  No shortness of breath, cough or sputum.  GASTROINTESTINAL:  No anorexia, nausea, vomiting or diarrhea. No abdominal pain or blood.  GENITOURINARY: No Burning on urination.   NEUROLOGICAL:  see HPI  MUSCULOSKELETAL:  No muscle, back pain, joint pain or stiffness.  HEMATOLOGIC:  No anemia, bleeding or bruising.  LYMPHATICS:  No enlarged nodes. No history of splenectomy.  PSYCHIATRIC:  No history of depression or anxiety.  ENDOCRINOLOGIC:  No reports of sweating, cold or heat intolerance. No polyuria or polydipsia.  ALLERGIES:  No history of asthma, hives, eczema or rhinitis.    OBJECTIVE  T(C): 36.8 (07-20-23 @ 09:10), Max: 37.1 (07-19-23 @ 18:08)  HR: 82 (07-20-23 @ 08:55) (56 - 82)  BP: 124/58 (07-20-23 @ 08:35) (92/51 - 124/58)  RR: 18 (07-20-23 @ 08:35) (16 - 18)  SpO2: 96% (07-20-23 @ 08:35) (94% - 99%)    07-19-23 @ 07:01  -  07-20-23 @ 07:00  --------------------------------------------------------  IN: 1040 mL / OUT: 1450 mL / NET: -410 mL        PHYSICAL EXAM:    Constitutional: resting comfortably in bed; NAD  HEENT: NC/AT, PERRL, EOMI, anicteric sclera, no nasal discharge; uvula midline, no oropharyngeal erythema or exudates; MMM  Neck: supple; no thyromegaly, JVP <8 cm H20, JVD -  Respiratory: CTA B/L; no W/R/R, no retractions  Cardiac: +S1/S2; RRR; no M/R/G; PMI non-displaced  Gastrointestinal: soft, NT/ND; no rebound or guarding; +BSx4  Extremities: WWP, no clubbing or cyanosis; no peripheral edema  Musculoskeletal: NROM x4; no joint swelling, tenderness or erythema  Vascular: 2+ radial, DP/PT pulses B/L  Dermatologic: skin warm, dry and intact; no rashes, wounds, or scars  Lymphatic: no submandibular or cervical LAD  Neurologic: AAOx3; CNII-XII grossly intact; no focal deficits    LABS:                RADIOLOGY & ADDITIONAL TESTS:  Reviewed .    MEDICATIONS  (STANDING):  atorvastatin 80 milliGRAM(s) Oral at bedtime  dextrose 5%. 1000 milliLiter(s) (50 mL/Hr) IV Continuous <Continuous>  dextrose 5%. 1000 milliLiter(s) (100 mL/Hr) IV Continuous <Continuous>  dextrose 50% Injectable 25 Gram(s) IV Push once  dextrose 50% Injectable 25 Gram(s) IV Push once  dextrose 50% Injectable 12.5 Gram(s) IV Push once  enoxaparin Injectable 40 milliGRAM(s) SubCutaneous every 24 hours  glucagon  Injectable 1 milliGRAM(s) IntraMuscular once  insulin glargine Injectable (LANTUS) 48 Unit(s) SubCutaneous every morning  insulin lispro (ADMELOG) corrective regimen sliding scale   SubCutaneous Before meals and at bedtime  insulin lispro Injectable (ADMELOG) 7 Unit(s) SubCutaneous three times a day before meals  isosorbide   mononitrate ER Tablet (IMDUR) 30 milliGRAM(s) Oral daily  latanoprost 0.005% Ophthalmic Solution 1 Drop(s) Both EYES at bedtime  lisinopril 20 milliGRAM(s) Oral daily  metoprolol succinate ER 12.5 milliGRAM(s) Oral daily  polyethylene glycol 3350 17 Gram(s) Oral daily  senna 2 Tablet(s) Oral at bedtime  timolol 0.5% Solution 1 Drop(s) Both EYES daily    MEDICATIONS  (PRN):  dextrose Oral Gel 15 Gram(s) Oral once PRN Blood Glucose LESS THAN 70 milliGRAM(s)/deciliter

## 2023-07-20 NOTE — PROGRESS NOTE ADULT - ASSESSMENT
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 Cardiology consulted for perioperative cardiovascular risk stratification for planned DL/biopsy of glottic mass this Thursday 07/19.    Recommendations:    #Perioperative Cardiovascular Risk Assessment:  - RCRI 1, Bryant 0.3%   - 7/18 TTE unchanged from 8/22' TTE  - Intermediate risk of perioperative cardiovascular complications for a intermediate risk procedure  - No evidence of ACS, decompensated HF, severe AS, and tachyarrhythmia on clinical and physical exam assessment, had >4 METs of exercise tolerance prior to the hospitalization  - Pt is deemed optimized from a cardiovascular standpoint for biopsy, no further testing needed  - Please resume ASA 81mg this Sunday 7/23/23 if bx is tomorrow 07/21/23    #CAD s/p MIDCAB and PCI with multiple WINTER (most recent to LCx in 8/2022): ECG NSR w/nonspecific ST T changes in v4-6  - Hold ASA 5-7 days before procedure and resume immediately post op in accordance w/ENT  - c/w Lipitor 80mg daily  - c/w Toprol 12.5mg qD, continue on day of procedure  - C/w Imdur 30mg qD and Lisinopril 20mg qD  - Please obtain EKGs daily  - hold ranolazine in setting of bradycardia  - Today's TTE unchanged from previous study      #HFrEF- euvolemic on exam  - last TTE on 8/2022 shows mildly reduced LV fxn & G1DD  - 7/17 TTE unchanged from 8/22'TTE      Case reviewed w/attending cardiologist  Plan discussed w/primary team  We will continue to follow, thank you for the consultation    Apolinar Mayes PGY5 CVD Fellow  Cardiology Consult Pager: [phone]   78M with HTN HLD DM PVD, CAD s/p MIDCAB and PCI (most recent WINTER to LCx in 8/2022) now with 3-4 months of progressive dysphonia and dysphagia, 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 Cardiology consulted for perioperative cardiovascular risk stratification for planned DL/biopsy of glottic mass this Thursday 07/19.    Recommendations:    #Perioperative Cardiovascular Risk Assessment:  - RCRI 1, Bryant 0.3%   - 7/18 TTE unchanged from 8/22' TTE  - Intermediate risk of perioperative cardiovascular complications for a intermediate risk procedure  - No evidence of ACS, decompensated HF, severe AS, and tachyarrhythmia on clinical and physical exam assessment, had >4 METs of exercise tolerance prior to the hospitalization  - Pt is deemed optimized from a cardiovascular standpoint for biopsy, no further testing needed  - Please resume ASA 81mg this Sunday 7/23/23 or earlier if bx is tomorrow 07/21/23    #CAD s/p MIDCAB and PCI with multiple WINTER (most recent to LCx in 8/2022): ECG NSR w/nonspecific ST T changes in v4-6  - Hold ASA 5-7 days before procedure and resume immediately post op in accordance w/ENT  - c/w Lipitor 80mg daily  - c/w Toprol 12.5mg qD, continue on day of procedure  - C/w Imdur 30mg qD and Lisinopril 20mg qD  - Please obtain EKGs daily  - hold ranolazine in setting of bradycardia  - Today's TTE unchanged from previous study      #HFrEF- euvolemic on exam  - last TTE on 8/2022 shows mildly reduced LV fxn & G1DD  - 7/17 TTE unchanged from 8/22'TTE      Case reviewed w/attending cardiologist  Plan discussed w/primary team  We will continue to follow, thank you for the consultation    Apolinar Mayes PGY5 CVD Fellow  Cardiology Consult Pager: [phone]

## 2023-07-20 NOTE — PROGRESS NOTE ADULT - ASSESSMENT
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  Medicine consulted for pre-op evaluation, cardiology consulted for preop optimization.     #preop optimization  patient medically optimized, agree with cardiology that patient is intermediate risk for intermediate risk procedure.   -recommend reduction of lantus to 50% dose reduction when NPO     #DM  pt with diabetic retinopathy - has severe visual impairment  home regimen 50 units -- had brought down to 48 with good response in sugars  - c/w premeal insulin to 7 units along with ISS & lantus 48 qd    #Urinary retention/BPH  -c/w home tamsulosin 0.4mg nightly  -Hold home mirabegron 50mg daily until after surgery  -c/w Miralax and senna    #CAD and PVD  TTE done unchanged from prior   -to hold ACEI- Benazapril on day of procedure, asp plavix on hold per cardiology with plans to restart aspirin after procedure   -f/u cardiology recommendations    Medicine will continue to follow.   Note not finalized until attending attestation complete.

## 2023-07-20 NOTE — PROGRESS NOTE ADULT - SUBJECTIVE AND OBJECTIVE BOX
SUBJECTIVE / INTERVAL HPI: Patient seen and examined at bedside. Patient feels good at this time, and has no complaints at this time.     VITAL SIGNS:  Vital Signs Last 24 Hrs  T(C): 36.8 (20 Jul 2023 09:10), Max: 37.1 (19 Jul 2023 18:08)  T(F): 98.2 (20 Jul 2023 09:10), Max: 98.7 (19 Jul 2023 18:08)  HR: 82 (20 Jul 2023 08:55) (56 - 82)  BP: 124/58 (20 Jul 2023 08:35) (92/51 - 124/58)  BP(mean): 84 (20 Jul 2023 08:35) (67 - 84)  RR: 18 (20 Jul 2023 08:35) (16 - 18)  SpO2: 96% (20 Jul 2023 08:35) (94% - 99%)    Parameters below as of 20 Jul 2023 08:35  Patient On (Oxygen Delivery Method): room air        PHYSICAL EXAM:    General: WDWN  HEENT: NC/AT; MMM  Neck: supple  Cardiovascular: +S1/S2; RRR  Respiratory: CTA B/L; no W/R/R  Gastrointestinal: soft, NT/ND; +BSx4  Extremities: WWP; no edema, clubbing or cyanosis  Vascular: 2+ radial, DP/PT pulses B/L  Neurological: AAOx3; no focal deficits    MEDICATIONS:  MEDICATIONS  (STANDING):  atorvastatin 80 milliGRAM(s) Oral at bedtime  dextrose 5%. 1000 milliLiter(s) (100 mL/Hr) IV Continuous <Continuous>  dextrose 5%. 1000 milliLiter(s) (50 mL/Hr) IV Continuous <Continuous>  dextrose 50% Injectable 25 Gram(s) IV Push once  dextrose 50% Injectable 25 Gram(s) IV Push once  dextrose 50% Injectable 12.5 Gram(s) IV Push once  enoxaparin Injectable 40 milliGRAM(s) SubCutaneous every 24 hours  glucagon  Injectable 1 milliGRAM(s) IntraMuscular once  insulin glargine Injectable (LANTUS) 48 Unit(s) SubCutaneous every morning  insulin lispro (ADMELOG) corrective regimen sliding scale   SubCutaneous Before meals and at bedtime  insulin lispro Injectable (ADMELOG) 7 Unit(s) SubCutaneous three times a day before meals  isosorbide   mononitrate ER Tablet (IMDUR) 30 milliGRAM(s) Oral daily  latanoprost 0.005% Ophthalmic Solution 1 Drop(s) Both EYES at bedtime  lisinopril 20 milliGRAM(s) Oral daily  metoprolol succinate ER 12.5 milliGRAM(s) Oral daily  polyethylene glycol 3350 17 Gram(s) Oral daily  senna 2 Tablet(s) Oral at bedtime  timolol 0.5% Solution 1 Drop(s) Both EYES daily    MEDICATIONS  (PRN):  dextrose Oral Gel 15 Gram(s) Oral once PRN Blood Glucose LESS THAN 70 milliGRAM(s)/deciliter      ALLERGIES:  Allergies    No Known Allergies    Intolerances        LABS:              CAPILLARY BLOOD GLUCOSE      POCT Blood Glucose.: 326 mg/dL (20 Jul 2023 11:42)      RADIOLOGY & ADDITIONAL TESTS: Reviewed.    ASSESSMENT:    PLAN:

## 2023-07-20 NOTE — PROGRESS NOTE ADULT - SUBJECTIVE AND OBJECTIVE BOX
OTOLARYNGOLOGY (ENT) PROGRESS NOTE    PATIENT: LEONARDO HAY  MRN: [id number]  : 45  [id number]  DATE OF SERVICE:  23  			           Subjective/ Interval:   7/15: AFVSS. No acute events overnight. Patient seen and examined at bedside. No complaints this morning.  : AFVSS. No acute events overnight. Patient seen and examined at bedside. sBP mildly elevated to 168 this morning, continues to be asymptomatic.  : Patient seen this morning, pending TTE today. Will continue medical clearance. No SOB, regular respirations   : Patient seen this morning, pending TTE today. Will continue medical clearance. No SOB, regular respirations.  : AFVSS. No acute events overnight. Patient seen and examined at bedside. Optimized by medicine and cardiology teams.  : AFVSS. No acute events overnight. Patient seen and examined at bedside. Optimized by medicine and cardiology teams. No SOB, regular respirations.      ALLERGIES:  No Known Allergies      MEDICATIONS:  Antiinfectives:     IV fluids:  dextrose 5%. 1000 milliLiter(s) IV Continuous <Continuous>  dextrose 5%. 1000 milliLiter(s) IV Continuous <Continuous>    Hematologic/Anticoagulation:  enoxaparin Injectable 40 milliGRAM(s) SubCutaneous every 24 hours    Pain medications/Neuro:    Endocrine Medications:   atorvastatin 80 milliGRAM(s) Oral at bedtime  dextrose 50% Injectable 25 Gram(s) IV Push once  dextrose 50% Injectable 25 Gram(s) IV Push once  dextrose 50% Injectable 12.5 Gram(s) IV Push once  dextrose Oral Gel 15 Gram(s) Oral once PRN  glucagon  Injectable 1 milliGRAM(s) IntraMuscular once  insulin glargine Injectable (LANTUS) 48 Unit(s) SubCutaneous every morning  insulin lispro (ADMELOG) corrective regimen sliding scale   SubCutaneous Before meals and at bedtime  insulin lispro Injectable (ADMELOG) 7 Unit(s) SubCutaneous three times a day before meals    All other standing medications:   isosorbide   mononitrate ER Tablet (IMDUR) 30 milliGRAM(s) Oral daily  latanoprost 0.005% Ophthalmic Solution 1 Drop(s) Both EYES at bedtime  lisinopril 20 milliGRAM(s) Oral daily  metoprolol succinate ER 12.5 milliGRAM(s) Oral daily  polyethylene glycol 3350 17 Gram(s) Oral daily  senna 2 Tablet(s) Oral at bedtime  timolol 0.5% Solution 1 Drop(s) Both EYES daily    All other PRN medications:    Vital Signs Last 24 Hrs  T(C): 37.1 (2023 05:44), Max: 37.1 (2023 18:08)  T(F): 98.7 (2023 05:44), Max: 98.7 (2023 18:08)  HR: 60 (2023 05:26) (60 - 72)  BP: 94/51 (2023 05:26) (92/51 - 122/56)  BP(mean): 69 (2023 05:26) (67 - 81)  RR: 18 (2023 23:57) (16 - 18)  SpO2: 95% (2023 23:57) (94% - 99%)    Parameters below as of 2023 23:57  Patient On (Oxygen Delivery Method): room air          - @ 07:01  -  20 @ 07:00  --------------------------------------------------------  IN:    Oral Fluid: 1040 mL  Total IN: 1040 mL    OUT:    Voided (mL): 1450 mL  Total OUT: 1450 mL    Total NET: -410 mL      PHYSICAL EXAM:  General: patient relaxing in chair, talking in a hoarse voice   Eyes: no eye drainage or redness, blind in both eyes  Ears: external ears normal  Nose: nares patent  Mouth: No oral lesions; no gross abnormalities  Neck: trachea midline. + hoarse voice  Respiratory: unlabored respirations  Cardiovascular: regular rate  Gastrointestinal: Soft, nondistended  Extremities: No edema, warm and well perfused  Skin: No lesions; no rash    LABS    Coagulation Studies-       Endocrine Panel-  MICROBIOLOGY:  RADIOLOGY & ADDITIONAL STUDIES:

## 2023-07-20 NOTE — PROGRESS NOTE ADULT - ASSESSMENT
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 of this week    Plan:  - Pain control prn  - Reg diet cc/ NPO after midnight  - Medicine consult for pre-op optimization: optimized for OR  - Cards consult for pre-op optimization: TTE WNL  - F/u AC plan: ok to hold DAPT per cards  - Plan for DL/biopsy Fri.  - Patient seen with Physician Assistant and Resident Team and agrees with plan.    Disposition:   [facility] at [phone] with any questions/concerns.    Rima BERNARDO  07-20-23 @ 07:41

## 2023-07-21 NOTE — PROGRESS NOTE ADULT - NUTRITIONAL ASSESSMENT
This patient has been assessed with a concern for Malnutrition and has been determined to have a diagnosis/diagnoses of Moderate protein-calorie malnutrition.    This patient is being managed with:   Diet NPO after Midnight-     NPO Start Date: 20-Jul-2023   NPO Start Time: 23:59  Except Medications  Entered: Jul 20 2023  2:35PM  
This patient has been assessed with a concern for Malnutrition and has been determined to have a diagnosis/diagnoses of Moderate protein-calorie malnutrition.    This patient is being managed with:   Diet NPO after Midnight-     NPO Start Date: 20-Jul-2023   NPO Start Time: 23:59  Entered: Jul 20 2023 12:31PM    Diet Regular-  Consistent Carbohydrate {No Snacks} (CSTCHO)  Entered: Jul 19 2023  9:41AM

## 2023-07-21 NOTE — PROGRESS NOTE ADULT - ASSESSMENT
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 of this week    Plan:  - Pain control prn  - Reg diet cc/ NPO after midnight  - Medicine consult for pre-op optimization: optimized for OR  - Cards consult for pre-op optimization: TTE WNL  - F/u AC plan: ok to hold DAPT per cards - will start aspirin at latest Sunday   - Plan for DL/biopsy today    [facility] at [phone] with any questions/concerns.    Vani Desir PA-C  07-21-23 @ 08:10

## 2023-07-21 NOTE — PROGRESS NOTE ADULT - SUBJECTIVE AND OBJECTIVE BOX
OVERNIGHT EVENTS:    SUBJECTIVE / INTERVAL HPI: Patient seen and examined at bedside. Patient states he has not eaten and is ready for his procedure     VITAL SIGNS:  Vital Signs Last 24 Hrs  T(C): 36.2 (21 Jul 2023 09:55), Max: 37.1 (20 Jul 2023 16:48)  T(F): 97.1 (21 Jul 2023 09:55), Max: 98.8 (20 Jul 2023 16:48)  HR: 62 (21 Jul 2023 08:55) (55 - 76)  BP: 136/63 (21 Jul 2023 08:55) (114/60 - 149/65)  BP(mean): 91 (21 Jul 2023 08:55) (77 - 93)  RR: 17 (21 Jul 2023 08:55) (17 - 18)  SpO2: 95% (21 Jul 2023 08:55) (95% - 96%)    Parameters below as of 21 Jul 2023 08:55  Patient On (Oxygen Delivery Method): room air        PHYSICAL EXAM:    General: WDWN  HEENT: NC/AT;  MMM  Neck: supple  Cardiovascular: +S1/S2; RRR  Respiratory: CTA B/L; no W/R/R  Gastrointestinal: soft, NT/ND; +BSx4  Extremities: WWP; no edema, clubbing or cyanosis  Vascular: 2+ radial, DP/PT pulses B/L  Neurological: AAOx3; no focal deficits    MEDICATIONS:  MEDICATIONS  (STANDING):  atorvastatin 80 milliGRAM(s) Oral at bedtime  dextrose 5% + sodium chloride 0.9%. 1000 milliLiter(s) (110 mL/Hr) IV Continuous <Continuous>  dextrose 5%. 1000 milliLiter(s) (50 mL/Hr) IV Continuous <Continuous>  dextrose 5%. 1000 milliLiter(s) (100 mL/Hr) IV Continuous <Continuous>  dextrose 50% Injectable 25 Gram(s) IV Push once  dextrose 50% Injectable 25 Gram(s) IV Push once  dextrose 50% Injectable 12.5 Gram(s) IV Push once  enoxaparin Injectable 40 milliGRAM(s) SubCutaneous every 24 hours  glucagon  Injectable 1 milliGRAM(s) IntraMuscular once  insulin lispro (ADMELOG) corrective regimen sliding scale   SubCutaneous Before meals and at bedtime  insulin lispro Injectable (ADMELOG) 7 Unit(s) SubCutaneous three times a day before meals  isosorbide   mononitrate ER Tablet (IMDUR) 30 milliGRAM(s) Oral daily  latanoprost 0.005% Ophthalmic Solution 1 Drop(s) Both EYES at bedtime  lisinopril 20 milliGRAM(s) Oral daily  metoprolol succinate ER 12.5 milliGRAM(s) Oral daily  polyethylene glycol 3350 17 Gram(s) Oral daily  senna 2 Tablet(s) Oral at bedtime  timolol 0.5% Solution 1 Drop(s) Both EYES daily    MEDICATIONS  (PRN):  dextrose Oral Gel 15 Gram(s) Oral once PRN Blood Glucose LESS THAN 70 milliGRAM(s)/deciliter      ALLERGIES:  Allergies    No Known Allergies    Intolerances        LABS:                        12.4   6.84  )-----------( 288      ( 21 Jul 2023 08:51 )             37.8     07-21    140  |  106  |  36<H>  ----------------------------<  112<H>  4.8   |  25  |  1.08    Ca    8.9      21 Jul 2023 08:51  Phos  3.6     07-21  Mg     2.0     07-21      PT/INR - ( 21 Jul 2023 08:51 )   PT: 13.3 sec;   INR: 1.12          PTT - ( 21 Jul 2023 08:51 )  PTT:30.6 sec  Urinalysis Basic - ( 21 Jul 2023 08:51 )    Color: x / Appearance: x / SG: x / pH: x  Gluc: 112 mg/dL / Ketone: x  / Bili: x / Urobili: x   Blood: x / Protein: x / Nitrite: x   Leuk Esterase: x / RBC: x / WBC x   Sq Epi: x / Non Sq Epi: x / Bacteria: x      CAPILLARY BLOOD GLUCOSE      POCT Blood Glucose.: 150 mg/dL (21 Jul 2023 10:16)      RADIOLOGY & ADDITIONAL TESTS: Reviewed.    ASSESSMENT:    PLAN:

## 2023-07-21 NOTE — PRE-ANESTHESIA EVALUATION ADULT - NSRADCARDRESULTSFT_GEN_ALL_CORE
TTE: CONCLUSIONS:     1. Technically difficult study.   2. Basal and mid anterolateral wall is abnormal.   3. Mild symmetric left ventricular hypertrophy.   4. Mildly reduced left ventricular systolic function.   5. Normal right ventricular size and systolic function.   6. Normal atria.   7. Aortic sclerosis without significant stenosis.   8. No pericardial effusion.   9. Compared to the previous TTE performed on 8/12/2022, there have been   no significant interval changes. TTE: CONCLUSIONS:     1. Technically difficult study.   2. Basal and mid anterolateral wall is abnormal.   3. Mild symmetric left ventricular hypertrophy.   4. Mildly reduced left ventricular systolic function.   5. Normal right ventricular size and systolic function.   6. Normal atria.    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 Submucosal   extent to subglottic larynx and false cord, with equivocal paraglottic   fat invasion that may upstage to T3 disease if cord is not fixated.    Left cervical lymphadenopathy at levels 2-4. PET-CT is recommended.   7. Aortic sclerosis without significant stenosis.   8. No pericardial effusion.   9. Compared to the previous TTE performed on 8/12/2022, there have been   no significant interval changes.

## 2023-07-21 NOTE — PROGRESS NOTE ADULT - ASSESSMENT
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  Medicine consulted for pre-op evaluation, cardiology consulted for preop optimization.     #preop optimization  patient medically optimized, agree with cardiology that patient is intermediate risk for intermediate risk procedure.     #DM  pt with diabetic retinopathy - has severe visual impairment  home regimen 50 units -- had brought down to 48 with good response in sugars  - c/w premeal insulin to 7 units along with ISS & lantus 48 qd  -recommend reduction of lantus to 50% dose reduction when NPO for procedure     #Urinary retention/BPH  -c/w home tamsulosin 0.4mg nightly  -Hold home mirabegron 50mg daily until after surgery  -c/w Miralax and senna    #CAD and PVD  TTE done unchanged from prior   -to hold ACEI- Benazapril on day of procedure, asp plavix on hold per cardiology with plans to restart aspirin after procedure   -f/u cardiology recommendations    Medicine will continue to follow.   Note not finalized until attending attestation complete.

## 2023-07-21 NOTE — BRIEF OPERATIVE NOTE - NSICDXBRIEFPROCEDURE_GEN_ALL_CORE_FT
PROCEDURES:  Microscopic direct laryngoscopy 21-Jul-2023 19:21:34  Liban Rodriguez  Biopsy of nodule of left vocal cord 21-Jul-2023 19:22:23  Liban Rodriguez

## 2023-07-21 NOTE — PRE-ANESTHESIA EVALUATION ADULT - NSANTHPMHFT_GEN_ALL_CORE
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  Medicine consulted for pre-op evaluation, cardiology consulted for preop optimization. patient medically optimized, agree with cardiology that patient is intermediate risk for intermediate risk procedure.

## 2023-07-21 NOTE — BRIEF OPERATIVE NOTE - OPERATION/FINDINGS
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 Friable, with some purulence expressed. 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

## 2023-07-21 NOTE — PROGRESS NOTE ADULT - SUBJECTIVE AND OBJECTIVE BOX
OTOLARYNGOLOGY (ENT) PROGRESS NOTE    PATIENT: LEONARDO HAY  MRN: [id number]  : 45  [id number]  DATE OF SERVICE:  23  			         Subjective/ Interval:   7/15: AFVSS. No acute events overnight. Patient seen and examined at bedside. No complaints this morning.  : AFVSS. No acute events overnight. Patient seen and examined at bedside. sBP mildly elevated to 168 this morning, continues to be asymptomatic.  : Patient seen this morning, pending TTE today. Will continue medical clearance. No SOB, regular respirations   : Patient seen this morning, pending TTE today. Will continue medical clearance. No SOB, regular respirations.  : AFVSS. No acute events overnight. Patient seen and examined at bedside. Optimized by medicine and cardiology teams.  : AFVSS. No acute events overnight. Patient seen and examined at bedside. Optimized by medicine and cardiology teams. No SOB, regular respirations.  : patient seen this morning, NPO midnight, plan for laryngeal biopsy later today, no difficulty breathing or SOB, pending morning labs   ALLERGIES:  No Known Allergies      MEDICATIONS:  Antiinfectives:     IV fluids:  dextrose 5% + sodium chloride 0.9%. 1000 milliLiter(s) IV Continuous <Continuous>  dextrose 5%. 1000 milliLiter(s) IV Continuous <Continuous>  dextrose 5%. 1000 milliLiter(s) IV Continuous <Continuous>    Hematologic/Anticoagulation:  enoxaparin Injectable 40 milliGRAM(s) SubCutaneous every 24 hours    Pain medications/Neuro:    Endocrine Medications:   atorvastatin 80 milliGRAM(s) Oral at bedtime  dextrose 50% Injectable 25 Gram(s) IV Push once  dextrose 50% Injectable 12.5 Gram(s) IV Push once  dextrose 50% Injectable 25 Gram(s) IV Push once  dextrose Oral Gel 15 Gram(s) Oral once PRN  glucagon  Injectable 1 milliGRAM(s) IntraMuscular once  insulin glargine Injectable (LANTUS) 24 Unit(s) SubCutaneous every morning  insulin lispro (ADMELOG) corrective regimen sliding scale   SubCutaneous Before meals and at bedtime  insulin lispro Injectable (ADMELOG) 7 Unit(s) SubCutaneous three times a day before meals    All other standing medications:   isosorbide   mononitrate ER Tablet (IMDUR) 30 milliGRAM(s) Oral daily  latanoprost 0.005% Ophthalmic Solution 1 Drop(s) Both EYES at bedtime  lisinopril 20 milliGRAM(s) Oral daily  metoprolol succinate ER 12.5 milliGRAM(s) Oral daily  polyethylene glycol 3350 17 Gram(s) Oral daily  senna 2 Tablet(s) Oral at bedtime  timolol 0.5% Solution 1 Drop(s) Both EYES daily    All other PRN medications:    Vital Signs Last 24 Hrs  T(C): 36.6 (2023 03:54), Max: 37.1 (2023 16:48)  T(F): 97.8 (2023 03:54), Max: 98.8 (2023 16:48)  HR: 55 (2023 04:32) (55 - 82)  BP: 149/65 (2023 03:30) (114/60 - 149/65)  BP(mean): 93 (2023 03:30) (77 - 93)  RR: 17 (2023 03:30) (17 - 18)  SpO2: 95% (2023 03:30) (95% - 96%)    Parameters below as of 2023 03:30  Patient On (Oxygen Delivery Method): room air          07-20 @ 07:01  -  07-21 @ 07:00  --------------------------------------------------------  IN:    Oral Fluid: 1730 mL  Total IN: 1730 mL    OUT:    Voided (mL): 1250 mL  Total OUT: 1250 mL    Total NET: 480 mL          PHYSICAL EXAM:  General: patient relaxing in chair, talking in a hoarse voice   Eyes: no eye drainage or redness, blind in both eyes  Ears: external ears normal  Nose: nares patent  Mouth: No oral lesions; no gross abnormalities  Neck: trachea midline. + hoarse voice  Respiratory: unlabored respirations  Cardiovascular: regular rate  Gastrointestinal: Soft, nondistended  Extremities: No edema, warm and well perfused  Skin: No lesions; no rash

## 2023-07-22 NOTE — PROGRESS NOTE ADULT - ASSESSMENT
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 Intraoperative frozen section positive for squamous cell carcinoma. Patient recovering well after biopsy.     Plan:  - Pain control prn  - Advance diet to soft as tolerated  - Medicine following  - Cards consult for pre-op optimization: TTE WNL  - F/u AC plan: ok to hold DAPT per cards - will plan to re-start ASA on Sunday 7/23    [facility] at [phone] with any questions/concerns.    Liban Rodriguez MD PGY2  07-22-23 @ 10:06

## 2023-07-22 NOTE — PROGRESS NOTE ADULT - ATTENDING COMMENTS
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    , seen with Dr. Noel. family at bedside help with translation  seen sitting on bedside chair, tolerating diet.   waiting for TTE  exam - RRR, good air entry bilaterally  chronic skin changes on bilateral lower ext-   blood glucose trend reviewed.    DM ( Hb Aic 7.9 ) with Diabetic retinopathy ( legally blind )  on insulin regime at home - renal function stable with hyperglycemia  - home regime- stated he took Lantus 50 units daily ( not 45 )-- premeal he took ( 6 units in am , 15 units for lunch and 10 units for dinner -machine would read out the dosage for him )  - hyperglycemia noted on finger stick- am 7/16 glucose is 78  -  change lantus to 48 units daily   - to give premeal insulin 6 units along with ISS.    Legally blind and ambulate with cane and assist, stated can ambulate for 2-3 blocks     Hyperlipidemia- continue with statin    CAD/CABG-PCI -most recent in August 2022  pt still on DAPT as outpt- held for procedure, evaluated by cardiology note read. ( Dr. Martinez)  - reduced dose of BB metoprolol 12.5 mg due to bradycardia ( ranolazine held)  - to hold ACEI/ARB on day of surgery  benazapril (or lisinopril while inpatient) 20mg daily   - continue isosorbide mononitrate 30mg daily   - continue atorvastatin 80 mg per day     - BPH -to continue tamsulosin   -bowel regime with senna and miralax.    will watch out for chest x ray- non-smoker.  TTE ordered -   pt is at intermediate to high cardiac risk -     Thank you for allowing medicine to participate in the care, dw Dr. Noel,  will follow.
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  Medicine consulted for pre-op evaluation, cardiology consulted for preop optimization.     CC: Pt seen w. Bert Sampson.  ID# [id number]. Pt OOBTC, has no complaints, tolerated apple juice after procedure yesterday. awaiting family coming in later today to speak to primary ENT team for OR findings and next steps.     #preop optimization  - pt tolerated procedure     # Newly dx laryngeal Ca( SCCA)   - ENT to d/w pt /family treatment plan today   - ASA held   - ampicillin/sulbactam  IVPB 1.5 Gram(s) IV Intermittent every 6 hours per ENT     #DM  pt with diabetic retinopathy - has severe visual impairment  home regimen 50 units -- had brought down to 48 with good response in sugars  - c/w premeal insulin Lispro to 7 units along with ISS & lantus 48U SQ qHS    #Urinary retention/BPH  -c/w home Tamsulosin 0.4mg po qHS  -Hold home mirabegron 50mg daily until after surgery  -c/w Miralax and senna    #CAD and PVD  TTE done unchanged from prior   - cardiology recommendations appreciated  atorvastatin 80 milliGRAM(s) Oral at bedtime  isosorbide   mononitrate ER Tablet (IMDUR) 30 milliGRAM(s) Oral daily  lisinopril 20 milliGRAM(s) Oral daily  metoprolol succinate ER 12.5 milliGRAM(s) Oral daily  - ASA held     # Bowel regimen:  polyethylene glycol 3350 17 Gram(s) Oral daily  senna 2 Tablet(s) Oral at bedtime    # DVT ppx:   enoxaparin Injectable 40 milliGRAM(s) SubCutaneous every 24 hours    Med consult team continues to follow pt with you, thank you.
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  Medicine consulted for pre-op evaluation, cardiology consulted for preop optimization.     #preop optimization  patient medically optimized, agree with cardiology that patient is intermediate risk for intermediate risk procedure.   -recommend reduction of basal insulin lantus to 50% dose reduction ( i.e. 24U) and holding Short-acting insulin Lispro when NPO     #DM  pt with diabetic retinopathy - has severe visual impairment  home regimen 50 units -- had brought down to 48 with good response in sugars  - c/w premeal insulin Lispro to 7 units along with ISS & lantus 48U SQ qHS    #Urinary retention/BPH  -c/w home Tamsulosin 0.4mg po qHS  -Hold home mirabegron 50mg daily until after surgery  -c/w Miralax and senna    #CAD and PVD  TTE done unchanged from prior   -to hold ACEI- Benazapril on day of procedure, asp plavix on hold per cardiology with plans to restart aspirin after procedure   -f/u cardiology recommendations
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  Medicine consulted for pre-op evaluation, cardiology consulted for preop optimization.     CC: Pt seen w. Bert Sampson.  ID# [id number]. Pt OOBTC, has no complaints, NPO awaiting glottic biopsy by ENT today.    #preop optimization  patient medically optimized, agree with cardiology that patient is intermediate risk for intermediate risk procedure.   -recommend reduction of basal insulin Lantus to 50% dose reduction ( i.e. 24U) and holding Short-acting insulin Lispro when NPO     #DM  pt with diabetic retinopathy - has severe visual impairment  home regimen 50 units -- had brought down to 48 with good response in sugars  - c/w premeal insulin Lispro to 7 units along with ISS & lantus 48U SQ qHS    #Urinary retention/BPH  -c/w home Tamsulosin 0.4mg po qHS  -Hold home mirabegron 50mg daily until after surgery  -c/w Miralax and senna    #CAD and PVD  TTE done unchanged from prior   -to hold ACEI- Benazapril on day of procedure, asp plavix on hold per cardiology with plans to restart aspirin after procedure   - cardiology recommendations appreciated    Med consult team continues to follow pt with you, thank you.
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    , seen with Dr. Noel.   seen sitting on bedside chair  do not endorse any pain, no shortness of breath  lungs exam clear  blood sugar trend reviewed.  no edema on lower ext.     DM ( Hb Aic 7.9 ) with Diabetic retinopathy ( legally blind )  on insulin regime at home - renal function stable with hyperglycemia  - home regime- stated he took Lantus 50 units daily ( not 45 )-- premeal he took ( 6 units in am , 15 units for lunch and 10 units for dinner -machine would read out the dosage for him )  - hyperglycemia noted on finger stick-  glucose level acceptable with current regime  -  lantus to 48 units daily --> to change to 24 units while npo for procedure ( 50 % dose reduction)  - to give premeal insulin 6 units along with ISS- hold when he is npo.      Legally blind and ambulate with cane and assist, stated can ambulate for 2-3 blocks     Hyperlipidemia- continue with statin    CAD/CABG-PCI -most recent in August 2022  pt still on DAPT as outpt- held for procedure, evaluated by cardiology note read. ( Dr. Martinez)  - reduced dose of BB metoprolol 12.5 mg due to bradycardia ( ranolazine held due to bradycardia)  - to hold ACEI/ARB on day of surgery currently on lisinopril   -to hold lisinopril 20 mg  on day of surgery  - continue isosorbide mononitrate 30mg daily   - continue atorvastatin 80 mg per day     - BPH -to continue tamsulosin   -bowel regime with senna and miralax.    TTE reviewed.- no significant change from 8/2022  pt is at intermediate cardiac risk for intermediate risk procedure- medically optimized  post op close hemodynamic monitoring.      Thank you for allowing medicine to participate in the care, dw Dr. Noel,  will follow.
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    -  used, seen with Dr. Noel.  ambulatory to bathroom with assist fro mRN.    DM ( Hb Aic 7.9 ) with Diabetic retinopathy ( legally blind )  on insulin regime at home - renal function stable with hyperglycemia  - home regime- stated he took Lantus 50 units daily ( not 45 )-- premeal he took ( 6 units in am , 15 units for lunch and 10 units for dinner -machine would read out the dosage for him )  - hyperglycemia noted on finger stick- am 7/16 glucose is 78  -  change lantus to 48 units daily   - to give premeal insulin 6 units along with ISS.    Legally blind and ambulate with cane and assist, stated can ambulate for 2-3 blocks     Hyperlipidemia- continue with statin    CAD/CABG-PCI -most recent in August 2022  pt still on DAPT as outpt- held for procedure, evaluated by cardiology note read. ( Dr. Martinez)  - reduced dose of BB metoprolol 12.5 mg due to bradycardia ( ranolazine held)  - to hold ACEI/ARB on day of surgery  benazapril (or lisinopril while inpatient) 20mg daily   - continue isosorbide mononitrate 30mg daily   - continue atorvastatin 80 mg per day     - BPH -to continue tamsulosin   -bowel regime with senna and miralax.    will watch out for chest x ray- non-smoker.  TTE ordered -   pt is at intermediate to high cardiac risk -     Thank you for allowing medicine to participate in the care, dw Dr. Noel,  will follow.
79 yo M w/Pmhx of HTN, HLD, DM, CAD s/p MIDCAB in Apr 2019 w/ subsequent PCI with atherectomy of the LcX in Aug 2022),, Currently admitted for progressive dysphagia and dysphonia 2/2 glottic mass pending Laryngoscopy and Biopsy. Cardiology Consulted for Pre-Op optimization    REVIEW OF STUDIES    - TTE 08/2022: EF 50-55%; Mid inferolateral segment, mid anterolateral segment, and apical lateral segment are abnormal.  - TTE 07/2023: EF 50-55%; Mid inferolateral segment, mid anterolateral segment, and apical lateral segment are abnormal.  - LHC 08/2022: Patent LIMA-LAD;  PCI/Shockwave of pLCx (3.5x 24 mm); 50% mRCA ISR; dOM 90% ISR  - EKG 7/16/2023: Sinus bradycardia with Non specific ST changes     # Pre-op Optomization  # CAD S/P MIDCAB and PCI  # HTN  # DM    1) Pre-Operative Cardiac  Optimization  - Patient with known ASCVD with previous MIDCAB and PCI of the LCx with remaining 50% ISR of the mRCA  - Clinically he is not experiencing any chest discomfort, melton or other anginal equivalents  - Tele reviewed with occasional PVCS.  - EKG and Echo from 2022 and 2023 reviewed. Last intervention done in setting of ACS (08/2022). Patient has completed nearly 12 month of DAPT therapy  - Patient is optimized from a cardiac standpoint to undergo Laryngoscopy and Vocal Cord biopsy. No further CV testing needed. No cardiovascular contraindication to procedure  - ASA  has been health since July 11th. Paln for Biopsy tomorrow on 7/21. Would resume ASA 81 mg soon as safe from ENT standpoint given known extensive CVD  -c/w Toprol 12.5 daily (reduced from home dose),  and continue to hold Ranolazine g  -c/w Imdur 30 daily and Lisinopril 20 daily with plan to hold on day of procedure, while Toprol 12.5 to be continued      # CAD S/PMIDCAD and PCI  - See as above  - Continue with Lipitor 80 mg, Toprol 12.5 mg  - Continue to hold Ranolazine  - Patient now off ASA since July 11th. Plan for Biospy/Laryngoscopy on Friday   - Please resume ASA 81 mg soon as stable from ENT standpoint as patient high CVD burden    # HTN  - Cont with Imdur 30, Lisinopril 20 and Toprol 12.5 mg daily    # DM  - A1C of 7.9  - Continue with sliding scale.
77 yo M w/Pmhx of HTN, HLD, DM, CAD s/p MIDCAB in Apr 2019 w/ subsequent PCI with atherectomy of the LcX in Aug 2022),, Currently admitted for progressive dysphagia and dysphonia 2/2 glottic mass pending Laryngoscopy and Biopsy. Cardiology Consulted for Pre-Op optimization    REVIEW OF STUDIES    - TTE 08/2022: EF 50-55%; Mid inferolateral segment, mid anterolateral segment, and apical lateral segment are abnormal.  - LHC 08/2022: Patent LIMA-LAD;  PCI/Shockwave of pLCx (3.5x 24 mm); 50% mRCA ISR; dOM 90% ISR  - EKG 7/16/2023: Sinus bradycardia with Non specific ST changes     # Pre-op Optomization  # CAD S/P MIDCAB and PCI  # HTN  # DM    1) Pre-Operative Optimization  - Patient with known ASCVD with previous MIDCAB and PCI of the LCx with remaining 50% ISR of the mRCA  - Clinically he is not experiencing any chest discomfort, melton or other anginal equivalents  - EKG and Echo from 2022 reviewed. Last intervention done in setting of ACS (08/2022). Patient has completed nearly 12 month of DAPT therapy  - Patient is optimized from a cardiac standpoint to undergo Laryngoscopy and Vocal Cord biopsy  - Would Hold ASA 5-7 days prior to scheduled biopsy and resumed soon as it is safe from an ENT standpoint  -c/w Toprol 12.5 daily (reduced from home dose),  and continue to hold Ranolazine given persistent bradycardia ( Likely from excessive vagal tone from mass)  -c/w Imdur 30 daily and Lisinopril 20 daily with plan to hol on day of procedure, while Toprol 12.5 to be continued  - Will follow up with non urgent TTE pending      # CAD S/PMIDCAD and PCI  - See as above  - Continue with Lipitor 80 mg, Toprol 12.5 mg  - Continue to hold Ranolazine  - Hold ASA 5-7 days prior to Biopsy and resume soon as safe from ENT standpoint    # HTN  - Cont with Imdur 30, Lisinopril 20 and toprol 12.5     # DM  - A1C of 7.9  - Continue with sliding scale    -Cardiology will continue to follow, please call with any questions
79 yo M w/Pmhx of HTN, HLD, DM, CAD s/p MIDCAB in Apr 2019 w/ subsequent PCI with atherectomy of the LcX in Aug 2022),, Currently admitted for progressive dysphagia and dysphonia 2/2 glottic mass pending Laryngoscopy and Biopsy. Cardiology Consulted for Pre-Op optimization    REVIEW OF STUDIES    - TTE 08/2022: EF 50-55%; Mid inferolateral segment, mid anterolateral segment, and apical lateral segment are abnormal.  - TTE 07/2023: EF 50-55%; Mid inferolateral segment, mid anterolateral segment, and apical lateral segment are abnormal.  - LHC 08/2022: Patent LIMA-LAD;  PCI/Shockwave of pLCx (3.5x 24 mm); 50% mRCA ISR; dOM 90% ISR  - EKG 7/16/2023: Sinus bradycardia with Non specific ST changes     # Pre-op Optomization  # CAD S/P MIDCAB and PCI  # HTN  # DM    1) Pre-Operative Cardiac  Optimization  - Patient with known ASCVD with previous MIDCAB and PCI of the LCx with remaining 50% ISR of the mRCA  - Clinically he is not experiencing any chest discomfort, melton or other anginal equivalents  - Tele reviewed with occasional PVCS  - EKG and Echo from 2022 and 2023 reviewed. Last intervention done in setting of ACS (08/2022). Patient has completed nearly 12 month of DAPT therapy  - Patient is optimized from a cardiac standpoint to undergo Laryngoscopy and Vocal Cord biopsy. No further CV testing needed. No cardiovascular contraindication to procedure  - Would Hold ASA 5-7 days prior to scheduled biopsy and resumed soon as it is safe from an ENT standpoint. Patient has been off of ASA since July 11th (8 days)  -c/w Toprol 12.5 daily (reduced from home dose),  and continue to hold Ranolazine given persistent bradycardia ( Likely from excessive vagal tone from mass)  -c/w Imdur 30 daily and Lisinopril 20 daily with plan to hold on day of procedure, while Toprol 12.5 to be continued      # CAD S/PMIDCAD and PCI  - See as above  - Continue with Lipitor 80 mg, Toprol 12.5 mg  - Continue to hold Ranolazine  - Patient now off ASA for 7-8 days. Plan for Biospy/Laryngoscopy on Thursday. Would not delay further  - Would resume ASA 81 mg soon as stable from ENT standpoint    # HTN  - Cont with Imdur 30, Lisinopril 20 and Toprol 12.5 mg daily    # DM  - A1C of 7.9  - Continue with sliding scale

## 2023-07-22 NOTE — PROGRESS NOTE ADULT - SUBJECTIVE AND OBJECTIVE BOX
SUBJECTIVE / INTERVAL HPI: Patient seen and examined at bedside. patient states he feels better at thsi time and the sensation that is in his throat has improved     VITAL SIGNS:  Vital Signs Last 24 Hrs  T(C): 36.4 (22 Jul 2023 09:00), Max: 37.2 (22 Jul 2023 04:51)  T(F): 97.5 (22 Jul 2023 09:00), Max: 98.9 (22 Jul 2023 04:51)  HR: 66 (22 Jul 2023 09:17) (56 - 78)  BP: 148/67 (22 Jul 2023 09:17) (123/59 - 216/90)  BP(mean): 96 (22 Jul 2023 09:17) (85 - 129)  RR: 18 (22 Jul 2023 09:17) (16 - 18)  SpO2: 96% (22 Jul 2023 09:17) (93% - 99%)    Parameters below as of 22 Jul 2023 09:17  Patient On (Oxygen Delivery Method): room air        PHYSICAL EXAM:    General: WDWN  HEENT: NC/AT;  MMM  Neck: supple  Cardiovascular: +S1/S2; RRR  Respiratory: CTA B/L; no W/R/R  Gastrointestinal: soft, NT/ND; +BSx4  Extremities: WWP; no edema, clubbing or cyanosis  Vascular: 2+ radial, DP/PT pulses B/L  Neurological: AAOx3; no focal deficits    MEDICATIONS:  MEDICATIONS  (STANDING):  ampicillin/sulbactam  IVPB 1.5 Gram(s) IV Intermittent every 6 hours  atorvastatin 80 milliGRAM(s) Oral at bedtime  dextrose 5%. 1000 milliLiter(s) (100 mL/Hr) IV Continuous <Continuous>  dextrose 5%. 1000 milliLiter(s) (50 mL/Hr) IV Continuous <Continuous>  dextrose 50% Injectable 25 Gram(s) IV Push once  dextrose 50% Injectable 12.5 Gram(s) IV Push once  dextrose 50% Injectable 25 Gram(s) IV Push once  enoxaparin Injectable 40 milliGRAM(s) SubCutaneous every 24 hours  glucagon  Injectable 1 milliGRAM(s) IntraMuscular once  insulin glargine Injectable (LANTUS) 48 Unit(s) SubCutaneous every morning  insulin lispro (ADMELOG) corrective regimen sliding scale   SubCutaneous Before meals and at bedtime  insulin lispro Injectable (ADMELOG) 7 Unit(s) SubCutaneous three times a day before meals  isosorbide   mononitrate ER Tablet (IMDUR) 30 milliGRAM(s) Oral daily  latanoprost 0.005% Ophthalmic Solution 1 Drop(s) Both EYES at bedtime  lisinopril 20 milliGRAM(s) Oral daily  metoprolol succinate ER 12.5 milliGRAM(s) Oral daily  polyethylene glycol 3350 17 Gram(s) Oral daily  senna 2 Tablet(s) Oral at bedtime  timolol 0.5% Solution 1 Drop(s) Both EYES daily    MEDICATIONS  (PRN):  acetaminophen     Tablet .. 650 milliGRAM(s) Oral every 6 hours PRN Mild Pain (1 - 3), Moderate Pain (4 - 6)  dextrose Oral Gel 15 Gram(s) Oral once PRN Blood Glucose LESS THAN 70 milliGRAM(s)/deciliter  oxycodone    5 mG/acetaminophen 325 mG 1 Tablet(s) Oral every 4 hours PRN Moderate Pain (4 - 6)      ALLERGIES:  Allergies    No Known Allergies    Intolerances        LABS:                        12.4   6.84  )-----------( 288      ( 21 Jul 2023 08:51 )             37.8     07-21    140  |  106  |  36<H>  ----------------------------<  112<H>  4.8   |  25  |  1.08    Ca    8.9      21 Jul 2023 08:51  Phos  3.6     07-21  Mg     2.0     07-21      PT/INR - ( 21 Jul 2023 08:51 )   PT: 13.3 sec;   INR: 1.12          PTT - ( 21 Jul 2023 08:51 )  PTT:30.6 sec  Urinalysis Basic - ( 21 Jul 2023 08:51 )    Color: x / Appearance: x / SG: x / pH: x  Gluc: 112 mg/dL / Ketone: x  / Bili: x / Urobili: x   Blood: x / Protein: x / Nitrite: x   Leuk Esterase: x / RBC: x / WBC x   Sq Epi: x / Non Sq Epi: x / Bacteria: x      CAPILLARY BLOOD GLUCOSE      POCT Blood Glucose.: 224 mg/dL (22 Jul 2023 06:28)      RADIOLOGY & ADDITIONAL TESTS: Reviewed.    ASSESSMENT:    PLAN:

## 2023-07-22 NOTE — PROGRESS NOTE ADULT - ASSESSMENT
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  Medicine consulted for pre-op evaluation, cardiology consulted for preop optimization.     #DM  pt with diabetic retinopathy - has severe visual impairment  home regimen 50 units -- had brought down to 48 with good response in sugars  - c/w premeal insulin to 7 units along with ISS & lantus 48 qd    #Urinary retention/BPH  -c/w home tamsulosin 0.4mg nightly  -Hold home mirabegron 50mg daily until after surgery  -c/w Miralax and senna    #CAD and PVD  TTE done unchanged from prior   cardiology on board - on Imdur 30, Lisinopril 20 and Toprol 12.5 mg Lipitor 80 mg    -f/u cardiology recommendations    Medicine will continue to follow.   Note not finalized until attending attestation complete.

## 2023-07-22 NOTE — PROGRESS NOTE ADULT - SUBJECTIVE AND OBJECTIVE BOX
OTOLARYNGOLOGY (ENT) PROGRESS NOTE    PATIENT: LEONARDO HAY     MRN: [id number]       : 45  ADMISSION:23  DATE OF SERVICE:  23 @ 09:57  			         ID: LEONARDO HAY is a 78yMale with hx HTN, HLD, DM, PVD, CAD s/p CABG/PCI, who was found to have anterior glottic mass on recent CT, was admitted for pre-op medical optimization and underwent DL with biopsy of mass on .     Subjective:  Overnight Interval History:  Patient transferred to floor from PACU in stable condition.   POD 1 s/p biopsy laryngeal mass.      Interval:  7/15: AFVSS. No acute events overnight. Patient seen and examined at bedside. No complaints this morning.  : AFVSS. No acute events overnight. Patient seen and examined at bedside. sBP mildly elevated to 168 this morning, continues to be asymptomatic.  : Patient seen this morning, pending TTE today. Will continue medical clearance. No SOB, regular respirations   : Patient seen this morning, pending TTE today. Will continue medical clearance. No SOB, regular respirations.  : AFVSS. No acute events overnight. Patient seen and examined at bedside. Optimized by medicine and cardiology teams.  : AFVSS. No acute events overnight. Patient seen and examined at bedside. Optimized by medicine and cardiology teams. No SOB, regular respirations.  : patient seen this morning, NPO midnight, plan for laryngeal biopsy later today, no difficulty breathing or SOB.  : POD1 s/p biopsy of left laryngeal mass. Transferred from PACU to floor overnight. Tolerating clear liquid diet. Pain well controlled.     Objective:    Vital Signs:  T(C): 36.4 (23 @ 09:00), Max: 37.2 (23 @ 04:51)  HR: 66 (23 @ 09:17) (56 - 78)  BP: 148/67 (23 @ 09:17) (123/59 - 216/90)  RR: 18 (23 @ 09:17) (16 - 18)  SpO2: 96% (23 @ 09:17) (93% - 99%)    PHYSICAL EXAM:  General: patient relaxing in chair, talking in a hoarse voice   Eyes: no eye drainage or redness, blind in both eyes  Ears: external ears normal  Nose: nares patent  Mouth: No oral lesions; no gross abnormalities  Neck: trachea midline. + hoarse voice  Respiratory: unlabored respirations  Cardiovascular: regular rate  Gastrointestinal: Soft, nondistended  Extremities: No edema, warm and well perfused  Skin: No lesions; no rash     LINES/DRAINS/AIRWAY:    LABORATORY:                        12.4   6.84  )-----------( 288      ( 2023 08:51 )             37.8    07-    140  |  106  |  36<H>  ----------------------------<  112<H>  4.8   |  25  |  1.08    Ca    8.9      2023 08:51  Phos  3.6       Mg     2.0          PT/INR - ( 2023 08:51 )   PT: 13.3 sec;   INR: 1.12          PTT - ( 2023 08:51 )  PTT:30.6 [id number]    MICROBIOLOGY:      I&O:    23 @ 07:23 @ 07:00  --------------------------------------------------------  IN: 2085 mL / OUT: 1300 mL / NET: 785 mL    23 @ 07:23 @ 09:57  --------------------------------------------------------  IN: 50 mL / OUT: 150 mL / NET: -100 mL         IMAGING:     MEDICATIONS:  acetaminophen     Tablet .. 650 milliGRAM(s) Oral every 6 hours PRN  ampicillin/sulbactam  IVPB 1.5 Gram(s) IV Intermittent every 6 hours  atorvastatin 80 milliGRAM(s) Oral at bedtime  dextrose 5%. 1000 milliLiter(s) IV Continuous <Continuous>  dextrose 5%. 1000 milliLiter(s) IV Continuous <Continuous>  dextrose 50% Injectable 25 Gram(s) IV Push once  dextrose 50% Injectable 12.5 Gram(s) IV Push once  dextrose 50% Injectable 25 Gram(s) IV Push once  dextrose Oral Gel 15 Gram(s) Oral once PRN  enoxaparin Injectable 40 milliGRAM(s) SubCutaneous every 24 hours  glucagon  Injectable 1 milliGRAM(s) IntraMuscular once  insulin glargine Injectable (LANTUS) 48 Unit(s) SubCutaneous every morning  insulin lispro (ADMELOG) corrective regimen sliding scale   SubCutaneous Before meals and at bedtime  insulin lispro Injectable (ADMELOG) 7 Unit(s) SubCutaneous three times a day before meals  isosorbide   mononitrate ER Tablet (IMDUR) 30 milliGRAM(s) Oral daily  latanoprost 0.005% Ophthalmic Solution 1 Drop(s) Both EYES at bedtime  lisinopril 20 milliGRAM(s) Oral daily  metoprolol succinate ER 12.5 milliGRAM(s) Oral daily  oxycodone    5 mG/acetaminophen 325 mG 1 Tablet(s) Oral every 4 hours PRN  polyethylene glycol 3350 17 Gram(s) Oral daily  senna 2 Tablet(s) Oral at bedtime  timolol 0.5% Solution 1 Drop(s) Both EYES daily

## 2023-07-23 NOTE — DISCHARGE NOTE PROVIDER - NSDCCPCAREPLAN_GEN_ALL_CORE_FT
PRINCIPAL DISCHARGE DIAGNOSIS  Diagnosis: Laryngeal squamous cell carcinoma  Assessment and Plan of Treatment: You had a biopsy done in the hospital of a mass on your vocal cord that was positive for squamous cell carcinoma, a type of cancer. It is likely what has been causing your voice changes in the past several months. Depending on the size of the cancer and certain features that will be evaluted by the pathologist, the treatment varies. You will discuss treatment plans at the next follow up appointment

## 2023-07-23 NOTE — DISCHARGE NOTE NURSING/CASE MANAGEMENT/SOCIAL WORK - NSDCPEFALRISK_GEN_ALL_CORE
For information on Fall & Injury Prevention, visit: [email or web address] OR  [email or web address] OR  https://www.cdc.gov/steadi/patient.html

## 2023-07-23 NOTE — PROGRESS NOTE ADULT - ASSESSMENT
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 Intraoperative frozen section positive for squamous cell carcinoma. Patient recovering well after biopsy.     Plan:  - Pain control prn  - IV abx  - Advance diet to soft this morning  - Medicine following  - Cards consult for pre-op optimization: TTE WNL  - F/u AC plan: ok to hold DAPT per cards - ASA restarted 7/22  - Lovenox for DVT prophylaxis  - Likely d/c today if tolerating diet without issues    [facility] at [phone] with any questions/concerns.    Liban Rodriguez MD PGY2  07-23-23 @ 08:57

## 2023-07-23 NOTE — DISCHARGE NOTE PROVIDER - DETAILS OF MALNUTRITION DIAGNOSIS/DIAGNOSES
This patient has been assessed with a concern for Malnutrition and was treated during this hospitalization for the following Nutrition diagnosis/diagnoses:     -  07/19/2023: Moderate protein-calorie malnutrition

## 2023-07-23 NOTE — DISCHARGE NOTE PROVIDER - NSDCMRMEDTOKEN_GEN_ALL_CORE_FT
acetaminophen 325 mg oral tablet: 2 tab(s) orally every 6 hours As needed Mild Pain (1 - 3)  amoxicillin-clavulanate 875 mg-125 mg oral tablet: 1 tab(s) orally every 12 hours  aspirin 81 mg oral delayed release tablet: 1 tab(s) orally once a day  atorvastatin 80 mg oral tablet: 1 tab(s) orally once a day (at bedtime)  benazepril 20 mg oral tablet: 1 tab(s) orally once a day   docusate sodium 100 mg oral capsule: 1 cap(s) orally 3 times a day  HumaLOG 100 units/mL injectable solution: 7 unit(s) injectable 3 times a day with meals  ibuprofen 400 mg oral tablet: 1 tab(s) orally every 6 hours As needed Moderate Pain (4 - 6)  isosorbide mononitrate 30 mg oral tablet, extended release: 1 tab(s) orally once a day  Lantus 100 units/mL subcutaneous solution: 45 unit(s) subcutaneous once a day (in the morning)  latanoprost 0.005% ophthalmic solution: 1 drop(s) to each affected eye once a day (in the evening) left eye  Metoprolol Succinate ER 25 mg oral tablet, extended release: 0.5 tab(s) orally once a day   ranolazine 500 mg oral tablet, extended release: 1 tab(s) orally 2 times a day  Simbrinza 1%- 0.2% ophthalmic suspension: 1 drop(s) to each affected eye 3 times a day  timolol maleate 0.5% ophthalmic solution: 1 drop(s) to each affected eye once a day

## 2023-07-23 NOTE — PROGRESS NOTE ADULT - ASSESSMENT
78M [place] speaking with PMHx of HTN, HLD, DM, PVD s/p stenting, CAD s/p CABG and PCI, now with 3-4 months of progressive dysphonia and dysphagia, 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  Medicine consulted for pre-op evaluation, cardiology consulted for preop optimization.     #preop optimization  - pt tolerated procedure : DL w. biopsy 7/21/23 ( Prelim: SCCA)     # Newly dx laryngeal Ca( SCCA)   - ENT to d/w pt /family treatment plan today   - ASA 81mg po daily resumed (7/22)   - ampicillin/sulbactam  IVPB 1.5 Gram(s) IV Intermittent every 6 hours per ENT     #DM  pt with diabetic retinopathy - has severe visual impairment  home regimen 50 units -- had brought down to 48 with good response in sugars  - c/w premeal insulin Lispro to 7 units along with ISS & lantus 48U SQ q mornings    #Urinary retention/BPH  -c/w home Tamsulosin 0.4mg po qHS  -Hold home mirabegron 50mg daily until after surgery  -c/w Miralax and senna    #CAD and PVD  TTE done unchanged from prior   - cardiology recommendations appreciated  atorvastatin 80 milliGRAM(s) Oral at bedtime  isosorbide   mononitrate ER Tablet (IMDUR) 30 milliGRAM(s) Oral daily  lisinopril 20 milliGRAM(s) Oral daily  metoprolol succinate ER 12.5 milliGRAM(s) Oral daily  - ASA 81mg po daily resumed     # Bowel regimen:  polyethylene glycol 3350 17 Gram(s) Oral daily  senna 2 Tablet(s) Oral at bedtime    # DVT ppx:   enoxaparin Injectable 40 milliGRAM(s) SubCutaneous every 24 hours      # Disposition: medically stable, noted plan for d/c home today, resume all home meds including cardiac meds, baby ASA and current Insulin regimen. Advised pt he will need to follow up with ENT and PMD upon discharge     Med consult team continues to follow pt with you, thank you.

## 2023-07-23 NOTE — PROVIDER CONTACT NOTE (OTHER) - SITUATION
Pt  and due for Lantus 48 Units, breakfast ordered but has not arrived yet.
Pt tolerated breakfast and lunch with no complications, no pian discomfort or difficulty swallowing, activating discharge and checking in before discharge.

## 2023-07-23 NOTE — DISCHARGE NOTE PROVIDER - CARE PROVIDER_API CALL
Gabriel Styles  Internal Medicine  [address] [place] [address], [place] [address]  Phone: [phone]  Fax: [phone]  Established Patient  Follow Up Time:

## 2023-07-23 NOTE — PROGRESS NOTE ADULT - SUBJECTIVE AND OBJECTIVE BOX
OTOLARYNGOLOGY (ENT) PROGRESS NOTE    PATIENT: LEONARDO HAY     MRN: [id number]       : 45  ADMISSION:23  DATE OF SERVICE:  23 @ 08:57  			         ID: LEONARDO HAY is a 78yMale with hx HTN, HLD, DM, PVD, CAD s/p CABG/PCI, who was found to have anterior glottic mass on recent CT, was admitted for pre-op medical optimization and underwent DL with biopsy of mass on .     Subjective:  Overnight, no acute issues. Tolerated a clear liquid diet. Sugars elevated yesterday due to patient eating without notifying nursing staff for premeal insulin administration. Reports improvement in voice today. No breathing issues. Mild throat discomfort improving.     POD2 s/p biopsy laryngeal mass.     Interval:  7/15: AFVSS. No acute events overnight. Patient seen and examined at bedside. No complaints this morning.  : AFVSS. No acute events overnight. Patient seen and examined at bedside. sBP mildly elevated to 168 this morning, continues to be asymptomatic.  : Patient seen this morning, pending TTE today. Will continue medical clearance. No SOB, regular respirations   : Patient seen this morning, pending TTE today. Will continue medical clearance. No SOB, regular respirations.  : AFVSS. No acute events overnight. Patient seen and examined at bedside. Optimized by medicine and cardiology teams.  : AFVSS. No acute events overnight. Patient seen and examined at bedside. Optimized by medicine and cardiology teams. No SOB, regular respirations.  : patient seen this morning, NPO midnight, plan for laryngeal biopsy later today, no difficulty breathing or SOB.  : POD1 s/p biopsy of left laryngeal mass. Transferred from PACU to floor overnight. Tolerating clear liquid diet. Pain well controlled.   : POD2 s/p biopsy of left laryngeal mass. Recovering well on floor. Tolerating diet. No bleeding issues. Voice improving.     Objective:  Vital Signs:  T(C): 36.2 (23 @ 04:43), Max: 37 (23 @ 22:03)  HR: 54 (23 @ 03:35) (54 - 80)  BP: 126/61 (23 @ 03:35) (126/61 - 159/69)  RR: 17 (23 @ 03:35) (16 - 18)  SpO2: 95% (23 @ 03:35) (95% - 97%)    PHYSICAL EXAM:  General: patient relaxing in chair, talking in a hoarse voice   Eyes: no eye drainage or redness, blind in both eyes  Ears: external ears normal  Nose: nares patent  Mouth: No oral lesions; no gross abnormalities  Neck: trachea midline. + hoarse voice  Respiratory: unlabored respirations  Cardiovascular: regular rate  Gastrointestinal: Soft, nondistended  Extremities: No edema, warm and well perfused  Skin: No lesions; no rash        I&O:    23 @ 07:01  -  23 @ 07:00  --------------------------------------------------------  IN: 800 mL / OUT: 1900 mL / NET: -1100 mL         IMAGING:     MEDICATIONS:  acetaminophen     Tablet .. 650 milliGRAM(s) Oral every 6 hours PRN  ampicillin/sulbactam  IVPB 1.5 Gram(s) IV Intermittent every 6 hours  aspirin  chewable 81 milliGRAM(s) Oral daily  atorvastatin 80 milliGRAM(s) Oral at bedtime  dextrose 5%. 1000 milliLiter(s) IV Continuous <Continuous>  dextrose 5%. 1000 milliLiter(s) IV Continuous <Continuous>  dextrose 50% Injectable 25 Gram(s) IV Push once  dextrose 50% Injectable 12.5 Gram(s) IV Push once  dextrose 50% Injectable 25 Gram(s) IV Push once  dextrose Oral Gel 15 Gram(s) Oral once PRN  enoxaparin Injectable 40 milliGRAM(s) SubCutaneous every 24 hours  glucagon  Injectable 1 milliGRAM(s) IntraMuscular once  ibuprofen  Tablet. 400 milliGRAM(s) Oral every 6 hours PRN  insulin glargine Injectable (LANTUS) 48 Unit(s) SubCutaneous every morning  insulin lispro (ADMELOG) corrective regimen sliding scale   SubCutaneous Before meals and at bedtime  insulin lispro Injectable (ADMELOG) 7 Unit(s) SubCutaneous three times a day before meals  isosorbide   mononitrate ER Tablet (IMDUR) 30 milliGRAM(s) Oral daily  latanoprost 0.005% Ophthalmic Solution 1 Drop(s) Both EYES at bedtime  lisinopril 20 milliGRAM(s) Oral daily  metoprolol succinate ER 12.5 milliGRAM(s) Oral daily  oxyCODONE    IR 5 milliGRAM(s) Oral every 6 hours PRN  polyethylene glycol 3350 17 Gram(s) Oral daily  senna 2 Tablet(s) Oral at bedtime  timolol 0.5% Solution 1 Drop(s) Both EYES daily

## 2023-07-23 NOTE — DISCHARGE NOTE PROVIDER - NSDCFUADDINST_GEN_ALL_CORE_FT
- Avoid straining your voice or coughing vigorously for the next 5-7 days  - Take your home medications as prescribed  - Ok to take over the counter tylenol and ibuprofen for pain  - Take complete course of antibiotic for 10 days

## 2023-07-23 NOTE — PROVIDER CONTACT NOTE (OTHER) - ACTION/TREATMENT ORDERED:
Per (ENT) should still give insulin as its long acting and will control BGL throughout the day.
Per Mu (ENT) ok to discharge patient.

## 2023-07-23 NOTE — DISCHARGE NOTE PROVIDER - HOSPITAL COURSE
78 M with PMHx HTN, HLD, DM, PVD, CABG s/p CABG + PCI, who was admitted to hospital for preoperative medical optimization and clearance prior to microdirect laryngoscopy with biopsy of left laryngeal mass. Patient previously had 3-4 months of progressive dysphonia and dysphagia; found to have ~2cm anterior glottic mass on CT confirmed with flexible office laryngoscopy. Patient was seen by cardiology and medicine for medical optimization while inpatient. Home medications for HTN were continued. TTE and EKG performed. Diabetes management guided by internal medicine with changes to insulin regimen. After clearance by cardiology and medicine teams, patient underwent MDL with biopsy of left laryngeal mass on 7/21 (HD8). Intraoperative frozen section was positive for squamous cell carcinoma. Also noted likely superinfection of larygneal mass.  Patient tolerated procedure well with some postoperative hypertension managed with short acting intravenous antihypertensives. Was transferred from PACU to the floor for recovery. Started on IV Unasyn for intraop findings of likely infeciton. On POD1, was advanced to clear liquid diet and tolerated well. By POD2, tolerating soft diet with improvement in voice and minimal throat discomfort post biopsy. Stable for discharge home at this time, with plan to continue complete course of oral antibiotics (10 days of augmentin) and follow up with Dr. Styles in 1-2 weeks.

## 2023-07-23 NOTE — PROGRESS NOTE ADULT - SUBJECTIVE AND OBJECTIVE BOX
Patient is a 78y old  Male who presents with a chief complaint of preoperative medical optimization (23 Jul 2023 09:21)    INTERVAL EVENTS: NAEON     SUBJECTIVE:   ID [id number]  Patient was seen and examined at bedside. Tolerated breakfast, no complaints. plan for d/c home after lunch. Pt's wife and daughter will bring pt home this afternoon as d/w RN     Review of systems: No fever, chills, dizziness, HA, Changes in vision, CP, dyspnea, nausea or vomiting, dysuria, changes in bowel movements, LE edema. Rest of 12 point Review of systems negative unless otherwise documented elsewhere in note.     Diet, Consistent Carbohydrate w/Evening Snack (07-22-23 @ 16:56) [Active]      MEDICATIONS:  MEDICATIONS  (STANDING):  ampicillin/sulbactam  IVPB 1.5 Gram(s) IV Intermittent every 6 hours  aspirin  chewable 81 milliGRAM(s) Oral daily  atorvastatin 80 milliGRAM(s) Oral at bedtime  dextrose 5%. 1000 milliLiter(s) (50 mL/Hr) IV Continuous <Continuous>  dextrose 5%. 1000 milliLiter(s) (100 mL/Hr) IV Continuous <Continuous>  dextrose 50% Injectable 25 Gram(s) IV Push once  dextrose 50% Injectable 12.5 Gram(s) IV Push once  dextrose 50% Injectable 25 Gram(s) IV Push once  enoxaparin Injectable 40 milliGRAM(s) SubCutaneous every 24 hours  glucagon  Injectable 1 milliGRAM(s) IntraMuscular once  insulin glargine Injectable (LANTUS) 48 Unit(s) SubCutaneous every morning  insulin lispro (ADMELOG) corrective regimen sliding scale   SubCutaneous Before meals and at bedtime  insulin lispro Injectable (ADMELOG) 7 Unit(s) SubCutaneous three times a day before meals  isosorbide   mononitrate ER Tablet (IMDUR) 30 milliGRAM(s) Oral daily  latanoprost 0.005% Ophthalmic Solution 1 Drop(s) Both EYES at bedtime  lisinopril 20 milliGRAM(s) Oral daily  metoprolol succinate ER 12.5 milliGRAM(s) Oral daily  polyethylene glycol 3350 17 Gram(s) Oral daily  senna 2 Tablet(s) Oral at bedtime  timolol 0.5% Solution 1 Drop(s) Both EYES daily    MEDICATIONS  (PRN):  acetaminophen     Tablet .. 650 milliGRAM(s) Oral every 6 hours PRN Mild Pain (1 - 3)  dextrose Oral Gel 15 Gram(s) Oral once PRN Blood Glucose LESS THAN 70 milliGRAM(s)/deciliter  ibuprofen  Tablet. 400 milliGRAM(s) Oral every 6 hours PRN Moderate Pain (4 - 6)  oxyCODONE    IR 5 milliGRAM(s) Oral every 6 hours PRN Severe Pain (7 - 10)      Allergies    No Known Allergies    Intolerances        OBJECTIVE:  Vital Signs Last 24 Hrs  T(C): 36.8 (23 Jul 2023 09:08), Max: 37 (22 Jul 2023 22:03)  T(F): 98.2 (23 Jul 2023 09:08), Max: 98.6 (22 Jul 2023 22:03)  HR: 72 (23 Jul 2023 12:15) (54 - 72)  BP: 169/72 (23 Jul 2023 12:15) (126/61 - 169/72)  BP(mean): 103 (23 Jul 2023 12:15) (88 - 103)  RR: 17 (23 Jul 2023 12:15) (16 - 17)  SpO2: 98% (23 Jul 2023 12:15) (95% - 98%)    Parameters below as of 23 Jul 2023 12:15  Patient On (Oxygen Delivery Method): room air      I&O's Summary    22 Jul 2023 07:01  -  23 Jul 2023 07:00  --------------------------------------------------------  IN: 800 mL / OUT: 1900 mL / NET: -1100 mL    23 Jul 2023 07:01  -  23 Jul 2023 13:29  --------------------------------------------------------  IN: 950 mL / OUT: 775 mL / NET: 175 mL        PHYSICAL EXAM:  Gen: Reclining in bed at time of exam, appears stated age  HEENT: NCAT, MMM, clear OP  Neck: supple, trachea at midline  CV: RRR, +S1/S2  Pulm: adequate respiratory effort, no increase in work of breathing  Abd: soft, NTND  Skin: warm and dry,   Ext: WWP, no LE edema  Neuro: AOx3, no gross focal neurological deficits  Psych: affect and behavior appropriate, pleasant at time of interview  :     LABS:              CAPILLARY BLOOD GLUCOSE      POCT Blood Glucose.: 146 mg/dL (23 Jul 2023 11:33)  POCT Blood Glucose.: 116 mg/dL (23 Jul 2023 08:40)  POCT Blood Glucose.: 95 mg/dL (23 Jul 2023 06:12)  POCT Blood Glucose.: 82 mg/dL (23 Jul 2023 00:00)  POCT Blood Glucose.: 236 mg/dL (22 Jul 2023 17:15)        MICRODATA:      RADIOLOGY/OTHER STUDIES:

## 2023-07-23 NOTE — PROGRESS NOTE ADULT - PROVIDER SPECIALTY LIST ADULT
Cardiology
ENT
ENT
Internal Medicine
Cardiology
Cardiology
ENT
Internal Medicine
ENT
ENT
Hospitalist
Internal Medicine
ENT
ENT

## 2023-07-23 NOTE — DISCHARGE NOTE NURSING/CASE MANAGEMENT/SOCIAL WORK - PATIENT PORTAL LINK FT
You can access the FollowMyHealth Patient Portal offered by [facility] by registering at the following website: [email or web address]. By joining [facility]’s FollowMyHealth portal, you will also be able to view your health information using other applications (apps) compatible with our system.

## 2023-07-23 NOTE — DISCHARGE NOTE PROVIDER - NSDCCPTREATMENT_GEN_ALL_CORE_FT
PRINCIPAL PROCEDURE  Procedure: Microdirect laryngoscopy with CO2 laser procedure  Findings and Treatment:       SECONDARY PROCEDURE  Procedure: Laryngoscopy, flexible, with laryngeal biopsy  Findings and Treatment:

## 2023-08-08 NOTE — HISTORY OF PRESENT ILLNESS
[Home] : at home, [unfilled] , at the time of the visit. [Medical Office: ([facility])___] : at the medical office located in  [Other:____] : [unfilled] [Verbal consent obtained from patient] : the patient, [unfilled] [de-identified] :  6/5/2023\par   77-year-old gentleman who presents with 3-month history of dysphonia.  He notes that his voice became hoarse and raspy.  This has been persistent.  He finds that it is difficult to speak with more exertion.  He notes issues with chewing eating swallowing with coughing and choking especially with liquids.  No ear, nose, throat symptoms otherwise.  He is a non-smoker nondrinker.  Nothing like this is happened previously.\par  - [FreeTextEntry1] : 7/11/2023  since the last visit the patient was not cleared by his cardiologist to proceed with surgery at least now without stopping anticoagulation.  He did not follow-up for his CT neck until recently.  Over the past month the patient has developed worsening dysphagia and pain when swallowing.  No new ear, nose, throat symptoms otherwise.  Presents today to review CT  neck findings and discuss neck steps. - 8/7/2023 No change in symptoms.  No new symptoms.  Patient did complete PET/CT and presents to review those results.

## 2023-08-08 NOTE — ASSESSMENT
[FreeTextEntry1] :  78-year-old gentleman who presents with concern for 3-month history of dysphonia.  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    8/7/23: Patient now status post procedure and biopsy consistent with squamous of carcinoma.  He did complete PET/CT which did show evidence of uptake in the larynx as well as potentially in the neck.  This was reviewed with the patient.  Formal tumor board will take place next Monday.  In the interim I did advise that he start setting up appointments with both medical oncology as well as radiation oncology.  I also sent a note to these providers and asked that their offices reach out to help expedite and set up appointments.  -Tumor board next Monday, will review the results afterwards -Consultation with medical oncology -Consultation with radiation oncology

## 2023-08-08 NOTE — REASON FOR VISIT
[Home] : at home, [unfilled] , at the time of the visit. [Medical Office: ([facility])___] : at the medical office located in  [Other:____] : [unfilled] [Patient] : the patient

## 2023-08-17 NOTE — REASON FOR VISIT
[Consideration of Curative Therapy] : consideration of curative therapy for [Head and Neck Cancer] : head and neck cancer [Family Member] : family member [Patient Declined  Services] : - None: Patient declined  services [Interpreters_Relationshiptopatient] : Granddaughter  [TWNoteComboBox1] : [place]ian

## 2023-08-17 NOTE — REVIEW OF SYSTEMS
[Fatigue] : fatigue [Dysphagia] : dysphagia [Hoarseness] : hoarseness [Odynophagia] : odynophagia [Anxiety] : anxiety [Negative] : Neurological [Dysphagia: Grade 1 - Symptomatic, able to eat regular diet] : Dysphagia: Grade 1 - Symptomatic, able to eat regular diet [Nausea: Grade 1 - Loss of appetite without alteration in eating habits] : Nausea: Grade 1 - Loss of appetite without alteration in eating habits [Xerostomia: Grade 0] : Xerostomia: Grade 0 [Oral Pain: Grade 0] : Oral Pain: Grade 0 [Dysgeusia: Grade 0] : Dysgeusia: Grade 0

## 2023-08-17 NOTE — PHYSICAL EXAM
[Outer Ear] : the ears and nose were normal in appearance [] : no respiratory distress [Heart Rate And Rhythm] : heart rate and rhythm were normal [Nondistended] : nondistended [Abdomen Tenderness] : non-tender [Normal] : no palpable adenopathy [Cervical Lymph Nodes Enlarged Posterior Bilaterally] : posterior cervical [Cervical Lymph Nodes Enlarged Anterior Bilaterally] : anterior cervical [Supraclavicular Lymph Nodes Enlarged Bilaterally] : supraclavicular [Musculoskeletal - Swelling] : no joint swelling [Skin Color & Pigmentation] : normal skin color and pigmentation [No Focal Deficits] : no focal deficits [Oriented To Time, Place, And Person] : oriented to person, place, and time [de-identified] : patient with diabetes enduced blindness

## 2023-08-17 NOTE — HISTORY OF PRESENT ILLNESS
[FreeTextEntry1] : Mr. 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 Patient presents per Dr. Styles to discuss radiation therapy.   8/17/2023: Consultation (Urdu speaker prefers interpretation by granddaughter) Today the patient reports dealing with throat pain on a regular basis. States pain is about 6/10 and exacerbated by eating/drinking, he is using Advil PRN for relief. Family states the patient has also been experiencing fatigue, his diet has changed to primarily softer foods. In addition to throat symptoms, he has also developed an ache in the left ear and nausea. He denies a history of smoking and alcohol use.   History of Present Illness: _______________________________  6/5/23: Initially presented to ENT c/o 3 month history dysphonia. Physical exam showed large glottic lesion emanating from the anterior commissure and L vocal fold.   7/11/23 CT Neck Soft Tissue showed left anterior vocal cord mass measures up to 2 cm, abutting inner thyroid and cricoid cartilages without gross destruction thereof. Submucosal extent to subglottic larynx and false cord, with equivocal paraglottic fat invasion that may upstage to T3 disease if cord is not fixated.  7/21/23 Biopsy showed Infiltrating squamous cell carcinoma,moderately differentiated, keratinizing.  8/1/23 PET/CT showed FDG avid left laryngeal mass corresponding to patient's known malignancy. Mild FDG avid left level 2 lymph node which is nonspecific and may represent melodie metastasis versus reactive lymph node. Additional left cervical lymph nodes are non-FDG avid.

## 2023-08-17 NOTE — PROCEDURE
[Hoarseness] : hoarseness not clearly evaluated by indirect laryngoscopy [Topical Lidocaine] : topical lidocaine [Flexible Endoscope] : examined with the flexible endoscope [Photographs Taken] : photographs taken [Lesion(s)] : lesion(s) [Normal] : the arytenoid cartilage ~T was normal [de-identified] : left vocal cord lesion and fullness. Apparent full mobility. No clear sub or supraglottic extension

## 2023-08-17 NOTE — VITALS
[Maximal Pain Intensity: 6/10] : 6/10 [Pain Interferes with ADLs] : Pain interferes with activities of daily living. [OTC] : OTC [Least Pain Intensity: 0/10] : 0/10 [80: Normal activity with effort; some signs or symptoms of disease.] : 80: Normal activity with effort; some signs or symptoms of disease.  [ECOG Performance Status: 1 - Restricted in physically strenuous activity but ambulatory and able to carry out work of a light or sedentary nature] : Performance Status: 1 - Restricted in physically strenuous activity but ambulatory and able to carry out work of a light or sedentary nature, e.g., light house work, office work [Date: ____________] : Patient's last distress assessment performed on [unfilled]. [0 - No Distress] : Distress Level: 0

## 2023-08-18 NOTE — REASON FOR VISIT
[Initial Consultation] : an initial consultation [Family Member] : family member [FreeTextEntry2] : Newly diagnosed T3N1M0 squamous cell carcinoma of the glottic larynx

## 2023-08-18 NOTE — HISTORY OF PRESENT ILLNESS
[FreeTextEntry1] : Mr. Robertson is 77 y/o M (non-smoker) PMH DM, PVD (on Plavix) newly diagnosed with laryngeal squamous cell carcinoma. History of Present Illness: _______________________________  6/5/23: initially presented to ENT c/o 3 month history dysphonia. Physical exam showed large glottic lesion emanating from the anterior commissure and L vocal fold.   7/11/23 CT Neck Soft Tissue showed left anterior vocal cord mass measures up to 2 cm, abutting inner thyroid and cricoid cartilages without gross destruction thereof. Submucosal extent to subglottic larynx and false cord, with equivocal paraglottic fat invasion that may upstage to T3 disease if cord is not fixated.  7/21/23 Biopsy showed Infiltrating squamous cell carcinoma,moderately differentiated, keratinizing.  8/1/23 PET/CT showed FDG avid left laryngeal mass corresponding to patient's known malignancy. Mild FDG avid left level 2 lymph node which is nonspecific and may represent melodie metastasis versus reactive lymph node. Additional left cervical lymph nodes are non-FDG avid. [Disease: _____________________] : Disease: [unfilled] [T: ___] : T[unfilled] [N: ___] : N[unfilled] [M: ___] : M[unfilled] [AJCC Stage: ____] : AJCC Stage: [unfilled] [90: Able to carry normal activity; minor signs or symptoms of disease.] : 90: Able to carry normal activity; minor signs or symptoms of disease.  [ECOG Performance Status: 0 - Fully active, able to carry on all pre-disease performance without restriction] : Performance Status: 0 - Fully active, able to carry on all pre-disease performance without restriction [de-identified] : Mr. 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   Onc Hx:   The patient initially presented June 2023 with 3 months of dysphonia. Visualization revealed a large glottic lesion emanating from the anterior commissure and L vocal fold. CT of the neck 7/11 revealed "left anterior vocal cord mass measures up to 2 cm, abutting inner thyroid and cricoid cartilages without gross destruction thereof. Submucosal extent to subglottic larynx and false cord, with equivocal paraglottic fat invasion that may upstage to T3 disease if cord is not fixated." Biopsy 7/21/23 confimed infiltrating squamous cell carcinoma, moderately differentiated and keratinizing. PET/CT 8/1 showed FDG avid left laryngeal mass corresponding to patient's known malignancy and mild FDG avid left level 2 lymph node which is nonspecific and may represent melodie metastasis versus reactive lymph node.    [de-identified] : Invasive squamous cell carcinoma, moderatly differentiated, keratinizing  [de-identified] : Mr. Robertson presents to establish care. He is accompanied by his daughter Bisi and partner Nini. He feels a little weak and has mild throat pain and increased phlegm. No significant weight loss. No trouble swallowing, no shortness of breath. Patient saw radiation oncologist Dr. Kranthi Marr yesterday and is moving forward with simulation in the coming days. Tentative treatment plan to begin week of 9/4.

## 2023-08-18 NOTE — RESULTS/DATA
[FreeTextEntry1] : PET/CT 8/1/2023:  Impression:  FDG avid left laryngeal mass corresponding to patient's known malignancy. Mild FDG avid left level 2 lymph node which is nonspecific and may represent melodie metastasis versus reactive lymph node. Additional left cervical lymph nodes are non-FDG avid.

## 2023-08-18 NOTE — PHYSICAL EXAM
[Fully active, able to carry on all pre-disease performance without restriction] : Status 0 - Fully active, able to carry on all pre-disease performance without restriction [Normal] : grossly intact [de-identified] : Blind [de-identified] : Chronic venous stasis changes. 1+ bilateral pitting edema

## 2023-08-18 NOTE — PHYSICAL EXAM
[Fully active, able to carry on all pre-disease performance without restriction] : Status 0 - Fully active, able to carry on all pre-disease performance without restriction [Normal] : grossly intact [de-identified] : Blind [de-identified] : Chronic venous stasis changes. 1+ bilateral pitting edema

## 2023-08-18 NOTE — HISTORY OF PRESENT ILLNESS
[FreeTextEntry1] : Mr. Robertson is 77 y/o M (non-smoker) PMH DM, PVD (on Plavix) newly diagnosed with laryngeal squamous cell carcinoma. History of Present Illness: _______________________________  6/5/23: initially presented to ENT c/o 3 month history dysphonia. Physical exam showed large glottic lesion emanating from the anterior commissure and L vocal fold.   7/11/23 CT Neck Soft Tissue showed left anterior vocal cord mass measures up to 2 cm, abutting inner thyroid and cricoid cartilages without gross destruction thereof. Submucosal extent to subglottic larynx and false cord, with equivocal paraglottic fat invasion that may upstage to T3 disease if cord is not fixated.  7/21/23 Biopsy showed Infiltrating squamous cell carcinoma,moderately differentiated, keratinizing.  8/1/23 PET/CT showed FDG avid left laryngeal mass corresponding to patient's known malignancy. Mild FDG avid left level 2 lymph node which is nonspecific and may represent melodie metastasis versus reactive lymph node. Additional left cervical lymph nodes are non-FDG avid. [Disease: _____________________] : Disease: [unfilled] [T: ___] : T[unfilled] [N: ___] : N[unfilled] [M: ___] : M[unfilled] [AJCC Stage: ____] : AJCC Stage: [unfilled] [90: Able to carry normal activity; minor signs or symptoms of disease.] : 90: Able to carry normal activity; minor signs or symptoms of disease.  [ECOG Performance Status: 0 - Fully active, able to carry on all pre-disease performance without restriction] : Performance Status: 0 - Fully active, able to carry on all pre-disease performance without restriction [de-identified] : Mr. 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   Onc Hx:   The patient initially presented June 2023 with 3 months of dysphonia. Visualization revealed a large glottic lesion emanating from the anterior commissure and L vocal fold. CT of the neck 7/11 revealed "left anterior vocal cord mass measures up to 2 cm, abutting inner thyroid and cricoid cartilages without gross destruction thereof. Submucosal extent to subglottic larynx and false cord, with equivocal paraglottic fat invasion that may upstage to T3 disease if cord is not fixated." Biopsy 7/21/23 confimed infiltrating squamous cell carcinoma, moderately differentiated and keratinizing. PET/CT 8/1 showed FDG avid left laryngeal mass corresponding to patient's known malignancy and mild FDG avid left level 2 lymph node which is nonspecific and may represent melodie metastasis versus reactive lymph node.    [de-identified] : Invasive squamous cell carcinoma, moderatly differentiated, keratinizing  [de-identified] : Mr. Robertson presents to establish care. He is accompanied by his daughter Bisi and partner Nini. He feels a little weak and has mild throat pain and increased phlegm. No significant weight loss. No trouble swallowing, no shortness of breath. Patient saw radiation oncologist Dr. Kranthi Marr yesterday and is moving forward with simulation in the coming days. Tentative treatment plan to begin week of 9/4.

## 2023-09-05 NOTE — HISTORY OF PRESENT ILLNESS
[Disease: _____________________] : Disease: [unfilled] [T: ___] : T[unfilled] [N: ___] : N[unfilled] [M: ___] : M[unfilled] [AJCC Stage: ____] : AJCC Stage: [unfilled] [90: Able to carry normal activity; minor signs or symptoms of disease.] : 90: Able to carry normal activity; minor signs or symptoms of disease.  [ECOG Performance Status: 0 - Fully active, able to carry on all pre-disease performance without restriction] : Performance Status: 0 - Fully active, able to carry on all pre-disease performance without restriction [de-identified] : Mr. 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 Here today for f/u.   Onc Hx:   The patient initially presented June 2023 with 3 months of dysphonia. Visualization revealed a large glottic lesion emanating from the anterior commissure and L vocal fold. CT of the neck 7/11 revealed "left anterior vocal cord mass measures up to 2 cm, abutting inner thyroid and cricoid cartilages without gross destruction thereof. Submucosal extent to subglottic larynx and false cord, with equivocal paraglottic fat invasion that may upstage to T3 disease if cord is not fixated." Biopsy 7/21/23 confimed infiltrating squamous cell carcinoma, moderately differentiated and keratinizing. PET/CT 8/1 showed FDG avid left laryngeal mass corresponding to patient's known malignancy and mild FDG avid left level 2 lymph node which is nonspecific and may represent melodie metastasis versus reactive lymph node.   Case was discussed at interdisciplinary tumor board and consensus management recommendation was curative intent concurrent chemo RT. Patient established care with Dr. Marr of rad onc and treatment start date was made for 9/2 to accommodate need for dental extractions.  [de-identified] : Invasive squamous cell carcinoma, moderatly differentiated, keratinizing  [de-identified] : Mr. Robertson presents to establish care. He is accompanied by his daughter Yvrose, who states that she should be the preferred contact from now on. Has worsening hoarseness and throat pain but denies difficulty breathing/swallowing. Scheduled for dental extraction of 2 teeth later today, but was told only 1 may be removed if he tolerates the procedure poorly.

## 2023-09-05 NOTE — REASON FOR VISIT
[Follow-Up Visit] : a follow-up [Family Member] : family member [FreeTextEntry2] : Newly diagnosed T3N1M0 squamous cell carcinoma of the glottic larynx

## 2023-09-05 NOTE — PHYSICAL EXAM
[Fully active, able to carry on all pre-disease performance without restriction] : Status 0 - Fully active, able to carry on all pre-disease performance without restriction [Normal] : grossly intact [de-identified] : Blind [de-identified] : Chronic venous stasis changes. 1+ bilateral pitting edema

## 2023-09-05 NOTE — REVIEW OF SYSTEMS
[Dysphagia] : no dysphagia [Loss of Hearing] : no loss of hearing [Nosebleeds] : no nosebleeds [Hoarseness] : hoarseness [Odynophagia] : no odynophagia [Negative] : Allergic/Immunologic

## 2023-09-11 NOTE — PHARMACY COMMUNICATION NOTE - COMMENTS
Pt's potasium level was 6.1 at 11:46 today, Dr Harvey reduced pt's cisplatin from 74 mg to 55mg and also changed the volume of the hydration bag from 500 mg to 1000 ml.  Pt has the lab re-done at 13:18 and the potassium level was 4.1. Dr Harvey decided to resume all the orders to the originals.  [facility] was contacted and they will re-make the orginal order of Cisplatin 74 mg

## 2023-09-20 PROBLEM — Z95.1 S/P CABG X 1: Status: ACTIVE | Noted: 2019-04-05

## 2023-09-20 PROBLEM — E11.8 DIABETES MELLITUS TYPE 2 WITH COMPLICATIONS: Status: ACTIVE | Noted: 2023-01-01

## 2023-09-21 PROBLEM — R49.0 DYSPHONIA: Status: ACTIVE | Noted: 2023-01-01

## 2023-09-21 PROBLEM — H61.23 BILATERAL IMPACTED CERUMEN: Status: ACTIVE | Noted: 2023-01-01

## 2023-10-16 NOTE — DISCHARGE INSTRUCTIONS: CHEMOTHERAPY - NSRNDCEVAL_HEME_A_AMB_QA
Please share these instructions with your physicians if you are seen by a physician between treatment room visits.
[[id number]]

## 2023-10-20 PROBLEM — B37.81 ESOPHAGEAL CANDIDIASIS: Status: ACTIVE | Noted: 2023-01-01

## 2023-10-20 PROBLEM — L58.9 RADIATION-INDUCED DERMATITIS: Status: ACTIVE | Noted: 2023-01-01

## 2023-10-23 PROBLEM — C32.9 LARYNGEAL SQUAMOUS CELL CARCINOMA: Status: ACTIVE | Noted: 2023-01-01

## 2023-10-23 NOTE — PHARMACY COMMUNICATION NOTE - COMMENTS
Patient cleared with preliminary ANC of 1. Hold parameter is ANC<1.5. Per Dr. Harvey OK to treat. Final ANC resulted at 1.21.

## 2023-10-29 NOTE — ED PROVIDER NOTE - PHYSICAL EXAMINATION
Constitutional : non-toxic, no acute distress. awake, alert, oriented to person, place, time/situation.  Head : head normocephalic, atraumatic  EENMT : eyes clear bilaterally, PERRL, EOMI. airway patent. dry mucous membranes. neck supple. tolerating secretion.   Cardiac : Normal rate, regular rhythm. No murmur appreciated, no LE edema.  Resp : Breath sounds clear and equal bilaterally. Respirations even and unlabored.   Gastro : abdomen soft, nontender, nondistended. no rebound or guarding.   MSK :  range of motion is not limited, no muscle or joint tenderness  Vasc : Extremities warm and well perfused. 2+ radial and DP pulses. cap refill <2 seconds  Neuro : Alert and oriented, CNII-XII grossly intact, no focal deficits, no motor or sensory deficits.  Skin : bilateral neck, anterior neck w radiation burns. no spreading redness, no crepitus, no drainage/discharge from wounds. no skin sloughing or blistering.   Psych : Alert and oriented to person, place, time/situation. normal mood and affect. no apparent risk to self or others.

## 2023-10-29 NOTE — H&P ADULT - ASSESSMENT
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 Marr/Dr. Gar), presents to the ED with generalized weakness.

## 2023-10-29 NOTE — H&P ADULT - PROBLEM SELECTOR PROBLEM 4
CAD (coronary artery disease) Hyperkalemia Dysphagia ELENITA (acute kidney injury) Squamous cell carcinoma of larynx

## 2023-10-29 NOTE — H&P ADULT - PROBLEM SELECTOR PROBLEM 5
DM (diabetes mellitus) CAD (coronary artery disease) Hyperkalemia Dysphagia ELENITA (acute kidney injury)

## 2023-10-29 NOTE — ED PROVIDER NOTE - NS ED ATTENDING STATEMENT MOD
This was a shared visit with the MARKELL. I reviewed and verified the documentation and independently performed the documented:

## 2023-10-29 NOTE — H&P ADULT - NSHPLABSRESULTS_GEN_ALL_CORE
.  LABS:                         9.5    2.31  )-----------( 219      ( 29 Oct 2023 20:08 )             28.1     10-29    133<L>  |  96  |  49<H>  ----------------------------<  133<H>  5.4<H>   |  26  |  1.87<H>    Ca    9.4      29 Oct 2023 20:08  Mg     1.6     10-29        Urinalysis Basic - ( 29 Oct 2023 20:08 )    Color: x / Appearance: x / SG: x / pH: x  Gluc: 133 mg/dL / Ketone: x  / Bili: x / Urobili: x   Blood: x / Protein: x / Nitrite: x   Leuk Esterase: x / RBC: x / WBC x   Sq Epi: x / Non Sq Epi: x / Bacteria: x            Lactate, Blood: 1.0 mmol/L (10-29 @ 20:15)      RADIOLOGY, EKG & ADDITIONAL TESTS: Reviewed.

## 2023-10-29 NOTE — H&P ADULT - NSICDXPASTMEDICALHX_GEN_ALL_CORE_FT
PAST MEDICAL HISTORY:  CAD (coronary artery disease)     DM (diabetes mellitus)     Glaucoma     HLD (hyperlipidemia)     HTN (hypertension)     PVD (peripheral vascular disease)

## 2023-10-29 NOTE — ED ADULT NURSE NOTE - OBJECTIVE STATEMENT
78 year old M patient, A+OX3, ambulatory w steady gait presents to ED w c/o of throat pain [id number].  Pt has hx of throat ca on radiation, burns noted throughout throat & chest wall with small exudate.  Pt has not received radiation in 3 weeks due to worsening pain to throat and difficulty swallowing.  Pt in no distress.  Daughter states decreased PO intake due to difficulty swallowing and pain.  Breathing appears easy and unlabored.  Denies n/v/d, states "I feel like I've been having a fever".

## 2023-10-29 NOTE — H&P ADULT - NSHPREVIEWOFSYSTEMS_GEN_ALL_CORE
REVIEW OF SYSTEMS:    CONSTITUTIONAL: No weakness, fevers or chills  EYES/ENT: No visual changes;  No throat pain   NECK: No pain or stiffness  RESPIRATORY: No cough, wheezing, hemoptysis; No shortness of breath  CARDIOVASCULAR: No chest pain or palpitations  GASTROINTESTINAL: No abdominal. No nausea, vomiting. No diarrhea or constipation. No melena.  GENITOURINARY: No dysuria, frequency or hematuria  NEUROLOGICAL: No numbness or weakness  SKIN: No itching, rashes

## 2023-10-29 NOTE — H&P ADULT - PROBLEM SELECTOR PLAN 12
F: s/p 2L NS  E: replete to K>4, Mg>2, Phos>2.5  N: S&S eval   Ppx: heparin subq given poor renal function     Dispo:

## 2023-10-29 NOTE — H&P ADULT - PROBLEM SELECTOR PLAN 6
Last A1c 7.9 in 07/2023. Regimen as follows: Lantus 50mg daily, 12units TID with meals for 48 hour after chemo then 8 TID  - c/w 38 units lantus (75%) given poor PO intake  - hold pre-meal given poor PO intake  - mISS TTE 07/2023: mild LVH, mild reduced LVH hypertrophy, normal RV function, LV systolic function mild decr EF 50-55%. On exam - volume down w/ dry MM, no LE edema. home meds: imdur 30 once a day, lisinopril 20mg once a day, toprol 12.5mg once a day   - c/w Toprol  - hold imdur given soft BPs, resume when appropriate   - hold lisinopril given ELENITA P/w K 5.4. EKG NSR, no peak T waves. Likely 2/2 elevated Cr in the setting of poor PO intake and hypovolemia. s/p 2L NS in ED.  - f/u repeat K  - treat underlying issue as above  - lokelma if continues to rise

## 2023-10-29 NOTE — ED ADULT NURSE NOTE - CHIEF COMPLAINT QUOTE
Pt accompanied by family for difficulty with swallowing, speaking, decreased PO intake x 2 weeks. History of throat CA, supposed to undergo 7 week course of radiation treatment- stopped due to symptoms. Per family, sent by MD Marr. [facility] HTN, HLD, DM, CA, blind.

## 2023-10-29 NOTE — ED ADULT NURSE NOTE - NSFALLHARMRISKINTERV_ED_ALL_ED

## 2023-10-29 NOTE — ED PROVIDER NOTE - CLINICAL SUMMARY MEDICAL DECISION MAKING FREE TEXT BOX
suspect symptoms related to decreased po intake,   assess for anemia, electrolyte derangement, dehydration  plan - labs, ecg,   analgesia prn, iv hydration  plan for admission    discussed w dr sanders - will see pt this week. history of CAD, PVD, DM, squamous cell carcinoma of larynx (follows w rad / onc dr sanders, getting chemo / radiation), here w generalized weakness, fatigue in setting of dec po intake 2/2 painful swallowing from radiation treatments. subjective fevers.   pt well appearing, stable vitals, radiation burns to bilateral neck w/o drainage - no crepitus, no streaking redness. tolerating secretions.   suspect symptoms related to decreased po intake, assess for anemia, electrolyte derangement, dehydration  plan - labs, ecg  analgesia prn, iv hydration  plan for admission    discussed w dr sanders - will see pt this week on admission to decide if continue radiation.

## 2023-10-29 NOTE — H&P ADULT - PROBLEM SELECTOR PLAN 1
Reports progressive weakness over the last 2 weeks assc w/ fatigue. Reports progressive weakness over the last 2 weeks assc w/ fatigue and poor PO intake. Reports this typically happens after chemo. Last meal was 5 days ago, drinks water and tolerates ok. Typically ambulates with cane. Per chart review appears poor PO intake has been ongoing issue while undergoing chemo/radiation. Last chemo 10/23. Labs significant for Na 133, lactate negative, Cr 1.87 (up from 1.0-1.4). On exam - dry MM, overall volume down. s/p 2L NS in ED  - start maintenance fluids   - nutrition consult  - PT/OT  - attempted to initiate GOC however patient did not want to discuss at time of writers assessment Reports progressive weakness over the last 2 weeks assc w/ fatigue and poor PO intake. Reports this typically happens after chemo. Last meal was 5 days ago, drinks water and tolerates ok. Typically ambulates with cane. Per chart review appears poor PO intake has been ongoing issue while undergoing chemo/radiation. Last chemo 10/23. Labs significant for Na 133, lactate negative, Cr 1.87 (up from 1.0-1.4). On exam - dry MM, overall volume down. s/p 2L NS in ED  - start maintenance fluids   - f/u orthostatics   - nutrition consult  - PT/OT  - attempted to initiate GOC however patient did not want to discuss at time of writers assessment C/o odynophagia causing poor PO intake. Last meal was 5 days ago, drinks water and tolerates ok.  Overall poor PO intake given pain from radiation, which was paused for pain. Per chart review appears poor PO intake has been ongoing issue while undergoing chemo/radiation. Last chemo 10/23. Labs significant for Na 133, lactate negative, Cr 1.87 (up from 1.0-1.4). On exam - dry MM, overall volume down. s/p 2L NS in ED. Consider radiation mucositis vs esophagitis   - start maintenance fluids   - dysphagia screen, S&S   - magic mouthash  - viscous lidocaine  - heme onc consult in AM  - notify rad onc (Dr. Marr)  - nutrition consult  - consider GI consult for r/o esphagitis   - attempted to initiate GOC however patient did not want to discuss at time of writers assessment

## 2023-10-29 NOTE — H&P ADULT - PROBLEM SELECTOR PLAN 5
CAD s/p MIDCAB and PCI with multiple WINTER (most recent to LCx in 8/2022), on ASA 81 at home. Currently asymptomatic. EKG on admission w/o ischemic changes  - c/w ASA 81 P/w K 5.4. EKG NSR, no peak T waves. Likely 2/2 elevated Cr in the setting of poor PO intake and hypovolemia. s/p 2L NS in ED.  - f/u repeat K  - treat underlying issue as above  - lokelma if continues to rise Dysphagia and odynophagia since dx in 06/2023, worsening since initiation of chemo/radiation in august/september 2023. Difficulty tolerating food, tolerates liquids ok. Tolerating secretions but reports a lot of phlegm.   - dysphagia screen, -S&S   - nutrition recs   - suction at beside  - attempted to initiate GOC however patient did not want to discuss at time of writers assessment Presenting with Cr 1.87, baseline appears to fluctuate depending on recent chemo 1.0 - 1.4. Likely elevated in the setting of chemo 10/23 and exacerbated by poor PO intake and hypovolemia. s/p 2L NS in ED.  - c/w maintenance fluids   - hold lisinopril 20mg given ELENITA   - continue to trend  - avoid nephrotoxic agents

## 2023-10-29 NOTE — H&P ADULT - PROBLEM SELECTOR PLAN 3
Presenting with Cr 1.87, baseline appears to fluctuate depending on recent chemo 1.0 - 1.4. Likely elevated in the setting of chemo 10/23 and exacerbated by poor PO intake and hypovolemia. s/p 2L NS in ED. Presenting with Cr 1.87, baseline appears to fluctuate depending on recent chemo 1.0 - 1.4. Likely elevated in the setting of chemo 10/23 and exacerbated by poor PO intake and hypovolemia. s/p 2L NS in ED.  - c/w maintenance fluids   - hold lisinopril 20mg given ELENITA   - continue to trend  - avoid nephrotoxic agents Dx 06/2023 with SCC larynx. Chemo/RT initiated 08/2023. Follows with Dr. Marr. Last chemo (taxol/carboplatin) on 10/23, last RT 10/18, tolerated well. RT paused due to pain.   - f/u heme/onc outpatient  - f/u rad onc (Dr. Marr)  - attempted to initiate GOC however patient did not want to discuss at time of writers assessment Dx 06/2023 with SCC larynx. Chemo/RT initiated 08/2023. Follows with Dr. Marr (rad onc) and Dr. Derek Harvey (onc). Last chemo (taxol/carboplatin) on 10/23, last RT 10/18, tolerated well. RT paused due to pain.   - f/u heme/onc outpatient  - f/u rad onc (Dr. Marr)  - attempted to initiate GOC however patient did not want to discuss at time of writers assessment Has b/l neck dermatitis due to radiation, first noticed early 09/13/2023 after 1st/2nd treatment. C/o pain, dysphagia and odynophagia for weeks which was tx with magic mouthwash, silver silvadene cream, fluconazole 400mg once a day (started 10/23). Radiation paused due to pain, last RT 10/28.  last On exam -  Bilateral neck wounds - erythematous with skin breakdown, crusted dead skin peripherally, no drainage, no skin sloughing, at baseline per patient. Reports improvement with cream. No concern for superimposed infection at this time  - monitor off abx   - c/w silvadene cream  - c/w fluconazole cream

## 2023-10-29 NOTE — H&P ADULT - PROBLEM SELECTOR PLAN 10
Last A1c 7.9 in 07/2023. Regimen as follows: Lantus 50mg daily, 12units TID with meals for 48 hour after chemo then 8 TID  - c/w 38 units lantus (75%) given poor PO intake  - hold pre-meal given poor PO intake  - mISS CAD s/p MIDCAB and PCI with multiple WINTER (most recent to LCx in 8/2022), on ASA 81 at home. Currently asymptomatic. EKG on admission w/o ischemic changes  - c/w ASA 81  - c/w lipitor 80mg once a day

## 2023-10-29 NOTE — H&P ADULT - PROBLEM SELECTOR PLAN 11
F: s/p 2L NS  E: replete to K>4, Mg>2, Phos>2.5  N: S&S eval   Ppx: heparin subq given poor renal function     Dispo: Last A1c 7.9 in 07/2023. Regimen as follows: Lantus 50mg daily, 12units TID with meals for 48 hour after chemo then 8 TID  - c/w 38 units lantus (75%) given poor PO intake  - hold pre-meal given poor PO intake  - mISS Last A1c 7.9 in 07/2023. Regimen as follows: Lantus 50mg daily, 12units TID with meals for 48 hour after chemo then 8 TID  - c/w 38 units lantus (75%) given poor PO intake  - hold pre-meal given poor PO intake  - q6hr FS given inability to tolerate PO  - mISS

## 2023-10-29 NOTE — H&P ADULT - PROBLEM SELECTOR PROBLEM 6
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

## 2023-10-29 NOTE — H&P ADULT - NSHPPHYSICALEXAM_GEN_ALL_CORE
T(C): 36.8 (10-29-23 @ 19:33), Max: 36.8 (10-29-23 @ 19:33)  HR: 71 (10-29-23 @ 21:11) (70 - 71)  BP: 124/86 (10-29-23 @ 21:11) (117/54 - 124/86)  RR: 16 (10-29-23 @ 21:11) (16 - 18)  SpO2: 100% (10-29-23 @ 21:11) (97% - 100%)    General: NAD, laying in bed, speaking in full sentences  HEENT: head NC/AT, no conjunctival injection, EOMI, MMM  Neck: supple, no JVD  Cardio: RRR, +S1/S2, no M/R/G  Resp: lungs CTAB, no cough/wheezes/rales/rhonchi  Abdo: soft, NT, ND, +bowel sounds x4  Extremities: WWP, no edema/cyanosis/clubbing   Vasc: 2+ radial and DP pulses b/l  Neuro: A&Ox3  Psych: speech non-pressured, thoughts goal-oriented  Skin: dry, intact, no visible jaundice T(C): 36.8 (10-29-23 @ 19:33), Max: 36.8 (10-29-23 @ 19:33)  HR: 71 (10-29-23 @ 21:11) (70 - 71)  BP: 124/86 (10-29-23 @ 21:11) (117/54 - 124/86)  RR: 16 (10-29-23 @ 21:11) (16 - 18)  SpO2: 100% (10-29-23 @ 21:11) (97% - 100%)    General: NAD, laying in bed, speaking in full sentences  HEENT: head NC/AT, no conjunctival injection, EOMI, dry MM, no thrush, Bilateral neck wounds - erythematous with skin breakdown, crusted dead skin peripherally, no drainage   Neck: supple, no JVD  Cardio: RRR, +S1/S2, no M/R/G  Resp: lungs CTAB, no cough/wheezes/rales/rhonchi  Abdo: soft, NT, ND, +bowel sounds x4  Extremities: WWP, no edema/cyanosis/clubbing   Vasc: 2+ radial and DP pulses b/l  Neuro: A&Ox3  Psych: speech non-pressured, thoughts goal-oriented  Skin: dry, intact, no visible jaundice

## 2023-10-29 NOTE — ED ADULT TRIAGE NOTE - CHIEF COMPLAINT QUOTE
Pt accompanied by family for difficulty with swallowing, speaking, decreased PO intake x 2 weeks. History of throat CA, supposed to undergo 7 week course of radiation treatment- stopped due to symptoms. Per family, sent by MD Marr. [facility] HTN, HLD, DM, CA, blind.

## 2023-10-29 NOTE — ED PROVIDER NOTE - PROGRESS NOTE DETAILS
labs w baseline anemia.   creatinine 1.87 (from 1.4). mild hyponatremia 133, hyperK 5.4. no ecg changes consistent w hyperK.   suspect electrolyte derangements 2/2 dec po intake and dehydration  given 2L fluids. sx improved after morphine. vitals stable. resting comfortably at time of admission.

## 2023-10-29 NOTE — H&P ADULT - HISTORY OF PRESENT ILLNESS
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 Marr), presents to the ED with generalized weakness. Reports progressive weakness over the last 2 weeks assc w/ fatigue. Poor PO intake 2/2 pain from radiation burns on b/l neck. C/o odynophagia but tolerating secretions. Family reports subjective fever x1 day.       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 Marr/Dr. Gar), presents to the ED with generalized weakness. Reports progressive weakness over the last 2 weeks assc w/ fatigue. 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 C/o odynophagia but tolerating secretions. No shortness of breath or difficulty breathing. Reports dermatitis is stable, and reponds well to cream he was given. No drainage or discharge. Saw Rad on on 10/13. Per chart review appears odynophagia was presenting symptom of cancer. Reported subjective fever for 1 day but did not take his temperature at home. ROS otherwise negative.       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 Marr/Dr. Derek Gar), presents to the ED with generalized weakness. Reports progressive weakness over the last 2 weeks assc w/ fatigue. 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 C/o odynophagia but tolerating secretions. No shortness of breath or difficulty breathing. Reports dermatitis is stable, and reponds well to cream he was given. No drainage or discharge. Saw Rad on on 10/13. Per chart review appears odynophagia was presenting symptom of cancer. Reported subjective fever for 1 day but did not take his temperature at home. ROS otherwise negative.       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 Marr/Dr. Derek Gar), presents to the ED with generalized weakness. Reports progressive weakness over the last 2 weeks assc w/ fatigue. 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 C/o odynophagia but tolerating secretions. No shortness of breath or difficulty breathing. Reports dermatitis is stable, and reponds well to cream he was given. No drainage or discharge. Saw Rad on on 10/13. Per chart review appears odynophagia was presenting symptom of cancer. Reported subjective fever for 1 day but did not take his temperature at home. ROS otherwise negative.       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 Marr/Dr. Derek Gar), presents to the ED with generalized weakness. Reports progressive weakness over the last 2 weeks assc w/ fatigue. 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 C/o odynophagia but tolerating secretions. No shortness of breath or difficulty breathing. Reports dermatitis is stable, and reponds well to cream he was given. No drainage or discharge. Saw Rad on on 10/13. Per chart review appears odynophagia was presenting symptom of cancer. Reported subjective fever for 1 day but did not take his temperature at home. ROS otherwise negative.       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 Marr/Dr. Derek Gar), presents to the ED with generalized weakness. Reports progressive weakness over the last 2 weeks assc w/ fatigue. 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 C/o odynophagia but tolerating secretions. No shortness of breath or difficulty breathing. Reports dermatitis is stable, and reponds well to cream he was given. No drainage or discharge. Saw Rad on on 10/13. Per chart review appears odynophagia was presenting symptom of cancer. Reported subjective fever for 1 day but did not take his temperature at home. ROS otherwise negative.       Afebrile, HR 70s, //86, 100% on RA  WBC 2.3, Hb 9.5, plt 219, Na 133, K 5.4, BUN 49/Cr 1.87, lactate 1.0  EKG NSR, no peak T waves   s/p morphine, 2L NS

## 2023-10-29 NOTE — ED PROVIDER NOTE - OBJECTIVE STATEMENT
78 yr old male, history of CAD, PVD, DM, squamous cell carcinoma of larynx (follows w rad / onc dr sanders, getting chemo / radiation), presents to the Emergency Department w generalized weakness. over last two weeks progressive weakness, fatigue. has had increased pain at site of radiation burns on bilateral neck which has lead to dec po intake. had to stop radiation treatments due to symptoms. discomfort w swallowing but is tolerating secretions. per family subjective fevers yesterday.   no cough, cp, sob, abd pain, n/v/d, urinary symptoms.    from rad onc note 10/23 - "noted to have persistent dysphagia with weight loss and grade 2 radiation dermatitis. ... was also given a prescription for oral fluconazole, Silvadene cream, and his oxycodone was increased to 10 mg as needed."

## 2023-10-29 NOTE — H&P ADULT - PROBLEM SELECTOR PLAN 9
CAD s/p MIDCAB and PCI with multiple WINTER (most recent to LCx in 8/2022), on ASA 81 at home. Currently asymptomatic. EKG on admission w/o ischemic changes  - c/w ASA 81  - c/w lipitor 80mg once a day Presenting with hb 9.5. Baseline 9-11. No signs/sx active bleeding. 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

## 2023-10-29 NOTE — H&P ADULT - PROBLEM SELECTOR PLAN 7
F: s/p 2L NS  E: replete to K>4, Mg>2, Phos>2.5  N:   Ppx: heparin subq given poor renal function     Dispo: P/w WBC 2.3, ANC 1700. Baseline fluctuates with chemo, so leukopenia likely due to myelosuppression from chemo. NO signs/sx infection.  - continue to trend  - if febrile, would obtain infectious work up TTE 07/2023: mild LVH, mild reduced LVH hypertrophy, normal RV function, LV systolic function mild decr EF 50-55%. On exam - volume down w/ dry MM, no LE edema. home meds: imdur 30 once a day, lisinopril 20mg once a day, toprol 12.5mg once a day   - c/w Toprol  - hold imdur given soft BPs, resume when appropriate   - hold lisinopril given ELENITA

## 2023-10-29 NOTE — H&P ADULT - PROBLEM SELECTOR PLAN 2
Has b/l neck dermatitis due to radiation. C/o pain, dysphagia and odynophagia for weeks which was tx with magic mouthwash, silver.. Radiation paused due to pain. On exam - Has b/l neck dermatitis due to radiation. C/o pain, dysphagia and odynophagia for weeks which was tx with magic mouthwash, silver silvadene cream, fluconazole 400mg once a day (started 10/23). Radiation paused due to pain (last On exam -  Bilateral neck wounds - erythematous with skin breakdown, crusted dead skin peripherally, no drainage, no skin sloughing, at baseline per patient. Reports improvement with cream.   - c/w silvadene cream  - c/w fluconazole cream  - consider derm consult Has b/l neck dermatitis due to radiation, first noticed early 09/13/2023 after 1st/2nd treatment. C/o pain, dysphagia and odynophagia for weeks which was tx with magic mouthwash, silver silvadene cream, fluconazole 400mg once a day (started 10/23). Radiation paused due to pain, last RT 10/28.  last On exam -  Bilateral neck wounds - erythematous with skin breakdown, crusted dead skin peripherally, no drainage, no skin sloughing, at baseline per patient. Reports improvement with cream.   - c/w silvadene cream  - c/w fluconazole cream  - consider derm consult Reports progressive weakness over the last 2 weeks assc w/ fatigue and poor PO intake. Reports this typically happens after chemo. Last meal was 5 days ago, drinks water and tolerates ok. Typically ambulates with cane. NS in   - management above   - f/u orthostatics   - PT/OT

## 2023-10-29 NOTE — H&P ADULT - ATTENDING COMMENTS
#Odynophagia: p/w FTT, weakness, in setting of decreased PO intake, due worsening odynophagia after radiation tx, now stopped for two weeks. +dry mucus membranes, no thrush seen, mild erythema. Likely radiation mucositis, low c/f infectious esohagitis. c/w pain control, magic mouthwash, viscous lidocaine. Rad/ocn and heme/onc consult in am. Consider GI if worsening      #Radiation dermatitis: unchanged; no prurlence/warmth on exam. ¼ SIRS. Low c/f superimposed cellultiis. Continue to monitor. Consider abx if febrile or worsening exam.

## 2023-10-29 NOTE — H&P ADULT - PROBLEM SELECTOR PLAN 8
Message  GI Reminder Recall Filomena Balderrama:   Date: 08/23/2017   Dear Rosie FREITAS:     Review of our records shows you are due for the following: Follow Up Visit  Please call the following office to schedule your appointment:   [facility], [address] [place] [address], [address] [place] [address] [phone]  We look forward to hearing from you!      Sincerely,     [facility]      Signatures   Electronically signed by : Kacy Ricketts, ; Aug 23 2017  8:43AM EST                       (Author) Presenting with hb 9.5. Baseline 9-11. No signs/sx active bleeding. Likely AOCD vs myelosuppression from chemotherapy  - f/u iron studies  - active T&S P/w WBC 2.3, ANC 1700. Baseline fluctuates with chemo, so leukopenia likely due to myelosuppression from chemo. NO signs/sx infection.  - continue to trend  - if febrile, would obtain infectious work up P/w WBC 2.3, ANC 1700. Baseline fluctuates with chemo, so leukopenia likely due to myelosuppression from chemo. NO signs/sx infection. At this time, radiation dermatitis appears at baseline with no superimposed infection  - continue to trend  - if febrile, would obtain infectious work up

## 2023-10-30 NOTE — PROGRESS NOTE ADULT - PROBLEM SELECTOR PLAN 1
C/o odynophagia causing poor PO intake. Last meal was 5 days ago, drinks water and tolerates ok.  Overall poor PO intake given pain from radiation, which was paused for pain. Per chart review appears poor PO intake has been ongoing issue while undergoing chemo/radiation. Last chemo 10/23.   -c/w D5 and NS 0.45% 1 L at 70cc/hr for 12 hrs  -Per palliative,          Dilaudid 0.5mg IV q3h PRN for Moderate Pain         Dilaudid 1mg IV q3h PRN for Severe Pain         Atropine Sublingual Drops TID for local secretion management         Will explore GOC further, granddaughter HCP   - magic mouthwash  - viscous lidocaine  -Per ENT, NG tube placement   -f/u HIV 1/2  -f/u hepatic panel  - f/u heme onc consult  -f/u nutrition consult  -f/u SLP consult C/o odynophagia causing poor PO intake. Last meal was 5 days ago, drinks water and tolerates ok.  Overall poor PO intake given pain from radiation, which was paused for pain. Per chart review appears poor PO intake has been ongoing issue while undergoing chemo/radiation. Last chemo 10/23.   -c/w D5 and NS 0.45% 1 L at 70cc/hr for 12 hrs  -Per palliative,          Dilaudid 0.5mg IV q3h PRN for Moderate Pain         Dilaudid 1mg IV q3h PRN for Severe Pain         Atropine Sublingual Drops TID for local secretion management         Will explore GOC further, granddaughter HCP   - magic mouthwash  - viscous lidocaine  -Per ENT, NG tube placement   -f/u HIV 1/2  -f/u hepatic panel  - f/u heme onc consult  -f/u gi consult to r/o esophagitis   -f/u nutrition consult  -f/u SLP consult Larynx SCC currently getting radiation and chemotherapy. C/o odynophagia causing poor PO intake. Per chart review appears poor PO intake has been ongoing issue while undergoing chemo/radiation. 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 Marr. Last chemo 10/23. 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 Pt would benefit from pain management as well as alternative means of nutrition and hydration to complete RT and optimize nutrition status.   -c/w D5 and NS 0.45% 1 L at 70cc/hr for 12 hrs  -Per palliative,          Dilaudid 0.5mg IV q3h PRN for Moderate Pain         Dilaudid 1mg IV q3h PRN for Severe Pain         Atropine Sublingual Drops TID for local secretion management         Will explore GOC further, granddaughter HCP   - magic mouthwash  - viscous lidocaine   -f/u HIV 1/2  -f/u heme onc consult

## 2023-10-30 NOTE — PROGRESS NOTE ADULT - PROBLEM SELECTOR PLAN 11
Code: awating GOC conversation   DVT: lovenox  Diet: can not tolerate PO. plan for NG tube Diet: IV fluids, given pt cannot tolerate PO  F: D5 and NS  E: replete PRN  DVT: lovenox  Code: awating GOC conversation

## 2023-10-30 NOTE — CONSULT NOTE ADULT - PROBLEM SELECTOR RECOMMENDATION 9
.  -Dilaudid 1mg IV q6h ATC (hold for lethargy)  -Dilaudid 0.5mg IV q3h PRN for Moderate Pain  -Dilaudid 1mg IV q3h PRN for Severe Pain

## 2023-10-30 NOTE — CONSULT NOTE ADULT - SUBJECTIVE AND OBJECTIVE BOX
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 Marr/Dr. Derek Gar), presents to the ED with generalized weakness. Reports progressive weakness over the last 2 weeks assc w/ fatigue. 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 C/o odynophagia but tolerating secretions. No shortness of breath or difficulty breathing. Reports dermatitis is stable, and reponds well to cream he was given. No drainage or discharge. Saw Rad on on 10/13. Per chart review appears odynophagia was presenting symptom of cancer. Reported subjective fever for 1 day but did not take his temperature at home. ROS otherwise negative.     Patient seen and examined at bedside. Appears overtly comfortable. Denies any significant complaint. Comprehensive symptom assessment and GOC exploration as noted below. Further collateral obtained from primary team, chart, and family.    PERTINENT PM/SXH:   HTN (hypertension)  HLD (hyperlipidemia)  DM (diabetes mellitus)  CAD (coronary artery disease)  Glaucoma  PVD (peripheral vascular disease)  No significant past surgical history    FAMILY HISTORY:  No pertinent family history of cancer in first degree relatives    ITEMS NOT CHECKED ARE NOT PRESENT  SOCIAL HISTORY:   Significant other/partner:  [x]  Children:  []  Substance hx:  []   Tobacco hx:  []   Alcohol hx: []   Home Opioid hx:  [] I-Stop Reference No: [id number]  - no active Rx's  Living Situation: [x]Home  []Long term care  []Rehab []Other  Islam/Spiritual practice: ; Role of organized Mormon [] important [] some [] unable to assess  Coping: [] well [] with difficulty [] poor coping [] unable to assess  Support system: [] strong [] adequate [] inadequate    ADVANCE DIRECTIVES:    []MOLST  []Living Will  DECISION MAKER(s):  [] Health Care Proxy(s)  [] Surrogate(s)  [] Guardian           Name(s)/Phone Number(s):     BASELINE (I)ADLs (prior to admission):  [place]: []Total  [] Moderate []Dependent    ALLERGIES:  No Known Allergies    MEDICATIONS  (STANDING):  aspirin  chewable 81 milliGRAM(s) Oral daily  atorvastatin 80 milliGRAM(s) Oral at bedtime  dextrose 50% Injectable 25 Gram(s) IV Push once  dextrose 50% Injectable 25 Gram(s) IV Push once  dextrose 50% Injectable 25 Gram(s) IV Push once  dextrose 50% Injectable 12.5 Gram(s) IV Push once  FIRST- Mouthwash  BLM 4 milliLiter(s) Swish and Spit every 6 hours  fluconAZOLE   Tablet 400 milliGRAM(s) Oral every 24 hours  glucagon  Injectable 1 milliGRAM(s) IntraMuscular once  heparin   Injectable 5000 Unit(s) SubCutaneous every 8 hours  influenza  Vaccine (HIGH DOSE) 0.7 milliLiter(s) IntraMuscular once  insulin lispro (ADMELOG) corrective regimen sliding scale   SubCutaneous every 6 hours  lidocaine 2% Viscous 5 milliLiter(s) Swish and Spit two times a day  metoprolol succinate ER 12.5 milliGRAM(s) Oral every 24 hours  silver sulfADIAZINE 1% Cream 1 Application(s) Topical two times a day  sodium chloride 0.9%. 1000 milliLiter(s) (75 mL/Hr) IV Continuous <Continuous>    MEDICATIONS  (PRN):  dextrose Oral Gel 15 Gram(s) Oral once PRN Blood Glucose LESS THAN 70 milliGRAM(s)/deciliter    Analgesic Use (Scheduled and PRNs) for past 24 hours:  morphine  - Injectable   4 milliGRAM(s) IV Push (10-29-23 @ 20:40)    PRESENT SYMPTOMS: []Unable to obtain due to poor mentation/encephalopathy  Source if other than patient:  []Family   []Team     Pain: [] yes [] no  QOL impact -   Location -                    Aggravating Factors -  Quality -  Radiation -  Timing -  Severity (0-10 scale) -   Minimal Acceptable Level (0-10 scale) -    Dyspnea:                           []Mild  []Moderate []Severe  Anxiety:                             []Mild []Moderate []Severe  Fatigue:                             []Mild []Moderate []Severe  Nausea:                             []Mild []Moderate []Severe  Loss of Appetite:              []Mild []Moderate []Severe  Constipation:                    []Mild []Moderate []Severe    Other Symptoms:  [x]All other review of systems negative     Palliative Performance Status Version 2:  %  (Functional Assessment Tool)    GENERAL:  [] NAD []Alert []Lethargic  []Cachexia  []Unarousable  []Verbal  []Non-Verbal  BEHAVIORAL:   []Anxiety  []Delirium []Agitation []Cooperative []Oriented x  HEENT:  []Normal  [] Moist Mucous Membranes []Dry mouth   []ET Tube/Trach  []Oral lesions  PULMONARY:   []Clear []Tachypnea  []Audible excessive secretions  []Normal Work of Breathing []Labored Breathing  []Rhonchi []Crackles []Wheezing  CARDIOVASCULAR:    []Regular Rate []Regular Rhythm []Irregular []Tachy  []Yaya  GASTROINTESTINAL:  []Soft  []Distended   []+BS  []Non tender []Tender  []PEG []OGT/ NGT  Last BM:  GENITOURINARY:  []Normal [] Incontinent   []Oliguria/Anuria   []Paige  MUSCULOSKELETAL:   []Normal Extremities  []Weakness  []Bed/Wheelchair bound []Edema  NEUROLOGIC:   []No focal deficits  []Cognitive impairment  []Dysphagia []Dysarthria []Paresis []Encephalopathic  SKIN:   []Normal   []Pressure ulcer(s)  []Rash    Vital Signs Last 24 Hrs  T(C): 36.6 (30 Oct 2023 05:52), Max: 36.8 (29 Oct 2023 19:33)  T(F): 97.8 (30 Oct 2023 05:52), Max: 98.3 (29 Oct 2023 19:33)  HR: 59 (30 Oct 2023 05:52) (59 - 71)  BP: 159/71 (30 Oct 2023 05:52) (117/54 - 159/71)  BP(mean): --  RR: 17 (30 Oct 2023 05:52) (16 - 18)  SpO2: 95% (30 Oct 2023 05:52) (95% - 100%)    Parameters below as of 30 Oct 2023 05:52  Patient On (Oxygen Delivery Method): room air    LABS: Personally reviewed and interpreted                      8.3    1.94  )-----------( 195      ( 30 Oct 2023 05:30 )             25.7   10-30    135  |  102  |  39<H>  ----------------------------<  180<H>  4.7   |  24  |  1.53<H>    Ca    8.6      30 Oct 2023 05:30  Phos  3.2     10-30  Mg     1.6     10-30  TPro  6.0  /  Alb  3.0<L>  /  TBili  0.3  /  DBili  x   /  AST  18  /  ALT  12  /  AlkPhos  64  10-30    RADIOLOGY & ADDITIONAL STUDIES: Personally reviewed and interpreted  < from: NM PET/CT Onc FDG Skull to Thigh, Inital (08.01.23 @ 16:05) >  FDG avid left laryngeal mass corresponding to patient's known malignancy. Mild FDG avid left level 2 lymph node which is nonspecific and may represent melodie metastasis versus reactive lymph node. Additional left cervical lymph nodes are non-FDG avid.    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 Marr/Dr. Derek Gar), presents to the ED with generalized weakness. Reports progressive weakness over the last 2 weeks assc w/ fatigue. 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 C/o odynophagia but tolerating secretions. No shortness of breath or difficulty breathing. Reports dermatitis is stable, and reponds well to cream he was given. No drainage or discharge. Saw Rad on on 10/13. Per chart review appears odynophagia was presenting symptom of cancer. Reported subjective fever for 1 day but did not take his temperature at home. ROS otherwise negative.     Patient seen and examined at bedside. Appears overtly comfortable. Denies any significant complaint. Comprehensive symptom assessment and GOC exploration as noted below. Further collateral obtained from primary team, chart, and family.    PERTINENT PM/SXH:   HTN (hypertension)  HLD (hyperlipidemia)  DM (diabetes mellitus)  CAD (coronary artery disease)  Glaucoma  PVD (peripheral vascular disease)  No significant past surgical history    FAMILY HISTORY:  No pertinent family history of cancer in first degree relatives    ITEMS NOT CHECKED ARE NOT PRESENT  SOCIAL HISTORY:   Significant other/partner:  [x]  Children:  []  Substance hx:  []   Tobacco hx:  []   Alcohol hx: []   Home Opioid hx:  [] I-Stop Reference No: [id number]  - no active Rx's  Living Situation: [x]Home  []Long term care  []Rehab []Other  Rastafari/Spiritual practice: ; Role of organized Protestant [] important [] some [] unable to assess  Coping: [] well [] with difficulty [] poor coping [] unable to assess  Support system: [] strong [] adequate [] inadequate    ADVANCE DIRECTIVES:    []MOLST  []Living Will  DECISION MAKER(s):  [?] Health Care Proxy(s)  [] Surrogate(s)  [] Guardian           Name(s)/Phone Number(s): Yvrose Smith (Granddaughter), [phone]    BASELINE (I)ADLs (prior to admission):  [place]: []Total  [x] Moderate []Dependent    ALLERGIES:  No Known Allergies    MEDICATIONS  (STANDING):  aspirin  chewable 81 milliGRAM(s) Oral daily  atorvastatin 80 milliGRAM(s) Oral at bedtime  dextrose 50% Injectable 25 Gram(s) IV Push once  dextrose 50% Injectable 25 Gram(s) IV Push once  dextrose 50% Injectable 25 Gram(s) IV Push once  dextrose 50% Injectable 12.5 Gram(s) IV Push once  FIRST- Mouthwash  BLM 4 milliLiter(s) Swish and Spit every 6 hours  fluconAZOLE   Tablet 400 milliGRAM(s) Oral every 24 hours  glucagon  Injectable 1 milliGRAM(s) IntraMuscular once  heparin   Injectable 5000 Unit(s) SubCutaneous every 8 hours  influenza  Vaccine (HIGH DOSE) 0.7 milliLiter(s) IntraMuscular once  insulin lispro (ADMELOG) corrective regimen sliding scale   SubCutaneous every 6 hours  lidocaine 2% Viscous 5 milliLiter(s) Swish and Spit two times a day  metoprolol succinate ER 12.5 milliGRAM(s) Oral every 24 hours  silver sulfADIAZINE 1% Cream 1 Application(s) Topical two times a day  sodium chloride 0.9%. 1000 milliLiter(s) (75 mL/Hr) IV Continuous <Continuous>    MEDICATIONS  (PRN):  dextrose Oral Gel 15 Gram(s) Oral once PRN Blood Glucose LESS THAN 70 milliGRAM(s)/deciliter    Analgesic Use (Scheduled and PRNs) for past 24 hours:  morphine  - Injectable   4 milliGRAM(s) IV Push (10-29-23 @ 20:40)    PRESENT SYMPTOMS: []Unable to obtain due to poor mentation/encephalopathy  Source if other than patient:  []Family   []Team     Pain: [] yes [] no  QOL impact -   Location -                    Aggravating Factors -  Quality -  Radiation -  Timing -  Severity (0-10 scale) - 8  Minimal Acceptable Level (0-10 scale) -    Dyspnea:                           []Mild  []Moderate []Severe  Anxiety:                             []Mild []Moderate []Severe  Fatigue:                             []Mild []Moderate []Severe  Nausea:                             []Mild []Moderate []Severe  Loss of Appetite:              []Mild []Moderate []Severe  Constipation:                    []Mild []Moderate []Severe    Other Symptoms:  [x]All other review of systems negative     Palliative Performance Status Version 2:  %  (Functional Assessment Tool)    GENERAL:  [] NAD []Alert []Lethargic  []Cachexia  []Unarousable  []Verbal  []Non-Verbal  BEHAVIORAL:   []Anxiety  []Delirium []Agitation []Cooperative []Oriented x  HEENT:  []Normal  [] Moist Mucous Membranes []Dry mouth   []ET Tube/Trach  []Oral lesions  PULMONARY:   []Clear []Tachypnea  []Audible excessive secretions  []Normal Work of Breathing []Labored Breathing  []Rhonchi []Crackles []Wheezing  CARDIOVASCULAR:    []Regular Rate []Regular Rhythm []Irregular []Tachy  []Yaya  GASTROINTESTINAL:  []Soft  []Distended   []+BS  []Non tender []Tender  []PEG []OGT/ NGT  Last BM:  GENITOURINARY:  []Normal [] Incontinent   []Oliguria/Anuria   []Paige  MUSCULOSKELETAL:   []Normal Extremities  []Weakness  []Bed/Wheelchair bound []Edema  NEUROLOGIC:   []No focal deficits  []Cognitive impairment  []Dysphagia []Dysarthria []Paresis []Encephalopathic  SKIN:   []Normal   []Pressure ulcer(s)  []Rash    Vital Signs Last 24 Hrs  T(C): 36.6 (30 Oct 2023 05:52), Max: 36.8 (29 Oct 2023 19:33)  T(F): 97.8 (30 Oct 2023 05:52), Max: 98.3 (29 Oct 2023 19:33)  HR: 59 (30 Oct 2023 05:52) (59 - 71)  BP: 159/71 (30 Oct 2023 05:52) (117/54 - 159/71)  BP(mean): --  RR: 17 (30 Oct 2023 05:52) (16 - 18)  SpO2: 95% (30 Oct 2023 05:52) (95% - 100%)    Parameters below as of 30 Oct 2023 05:52  Patient On (Oxygen Delivery Method): room air    LABS: Personally reviewed and interpreted                      8.3    1.94  )-----------( 195      ( 30 Oct 2023 05:30 )             25.7   10-30    135  |  102  |  39<H>  ----------------------------<  180<H>  4.7   |  24  |  1.53<H>    Ca    8.6      30 Oct 2023 05:30  Phos  3.2     10-30  Mg     1.6     10-30  TPro  6.0  /  Alb  3.0<L>  /  TBili  0.3  /  DBili  x   /  AST  18  /  ALT  12  /  AlkPhos  64  10-30    RADIOLOGY & ADDITIONAL STUDIES: Personally reviewed and interpreted  < from: NM PET/CT Onc FDG Skull to Thigh, Inital (08.01.23 @ 16:05) >  FDG avid left laryngeal mass corresponding to patient's known malignancy. Mild FDG avid left level 2 lymph node which is nonspecific and may represent melodie metastasis versus reactive lymph node. Additional left cervical lymph nodes are non-FDG avid.    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 Marr/Dr. Derek Gar), presents to the ED with generalized weakness. Reports progressive weakness over the last 2 weeks assc w/ fatigue. 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 C/o odynophagia but tolerating secretions. No shortness of breath or difficulty breathing. Reports dermatitis is stable, and reponds well to cream he was given. No drainage or discharge. Saw Rad on on 10/13. Per chart review appears odynophagia was presenting symptom of cancer. Reported subjective fever for 1 day but did not take his temperature at home. ROS otherwise negative.     Patient seen and examined at bedside. Difficulty speaking due to pain. States his pain is an 8 out of 10. Comprehensive symptom assessment and GOC exploration as noted below. Further collateral obtained from primary team, chart, and family. Extensive discussion with granddaughter who is coordinating patient's care.    PERTINENT PM/SXH:   HTN (hypertension)  HLD (hyperlipidemia)  DM (diabetes mellitus)  CAD (coronary artery disease)  Glaucoma  PVD (peripheral vascular disease)  No significant past surgical history    FAMILY HISTORY:  No pertinent family history of cancer in first degree relatives    ITEMS NOT CHECKED ARE NOT PRESENT  SOCIAL HISTORY:   Significant other/partner:  [x]  Children:  []  Substance hx:  []   Tobacco hx:  []   Alcohol hx: []   Home Opioid hx:  [] I-Stop Reference No: [id number]  - no active Rx's  Living Situation: [x]Home  []Long term care  []Rehab []Other  Yazdanism/Spiritual practice: ; Role of organized Yarsani [] important [x] some [] unable to assess  Coping: [] well [x] with difficulty [] poor coping [] unable to assess  Support system: [] strong [x] adequate [] inadequate    ADVANCE DIRECTIVES:    []MOLST  []Living Will  DECISION MAKER(s):  [?] Health Care Proxy(s)  [] Surrogate(s)  [] Guardian           Name(s)/Phone Number(s): Yvrose Smith (Granddaughter), [phone]    BASELINE (I)ADLs (prior to admission):  [place]: []Total  [x] Moderate []Dependent    ALLERGIES:  No Known Allergies    MEDICATIONS  (STANDING):  aspirin  chewable 81 milliGRAM(s) Oral daily  atorvastatin 80 milliGRAM(s) Oral at bedtime  dextrose 50% Injectable 25 Gram(s) IV Push once  dextrose 50% Injectable 25 Gram(s) IV Push once  dextrose 50% Injectable 25 Gram(s) IV Push once  dextrose 50% Injectable 12.5 Gram(s) IV Push once  FIRST- Mouthwash  BLM 4 milliLiter(s) Swish and Spit every 6 hours  fluconAZOLE   Tablet 400 milliGRAM(s) Oral every 24 hours  glucagon  Injectable 1 milliGRAM(s) IntraMuscular once  heparin   Injectable 5000 Unit(s) SubCutaneous every 8 hours  influenza  Vaccine (HIGH DOSE) 0.7 milliLiter(s) IntraMuscular once  insulin lispro (ADMELOG) corrective regimen sliding scale   SubCutaneous every 6 hours  lidocaine 2% Viscous 5 milliLiter(s) Swish and Spit two times a day  metoprolol succinate ER 12.5 milliGRAM(s) Oral every 24 hours  silver sulfADIAZINE 1% Cream 1 Application(s) Topical two times a day  sodium chloride 0.9%. 1000 milliLiter(s) (75 mL/Hr) IV Continuous <Continuous>    MEDICATIONS  (PRN):  dextrose Oral Gel 15 Gram(s) Oral once PRN Blood Glucose LESS THAN 70 milliGRAM(s)/deciliter    Analgesic Use (Scheduled and PRNs) for past 24 hours:  morphine  - Injectable   4 milliGRAM(s) IV Push (10-29-23 @ 20:40)    PRESENT SYMPTOMS: []Unable to obtain due to poor mentation/encephalopathy  Source if other than patient:  []Family   []Team     Pain: [x] yes [] no  QOL impact - debilitating  Location - nack, throat                   Aggravating Factors - secretions, swallowing  Quality - sharp  Radiation -  Timing - constant  Severity (0-10 scale) - 8  Minimal Acceptable Level (0-10 scale) - 2    Dyspnea:                           []Mild  []Moderate []Severe  Anxiety:                             []Mild []Moderate []Severe  Fatigue:                             []Mild []Moderate []Severe  Nausea:                             []Mild []Moderate []Severe  Loss of Appetite:              []Mild []Moderate [x]Severe  Constipation:                    []Mild []Moderate []Severe    Other Symptoms:  [x]All other review of systems negative     Palliative Performance Status Version 2:  60%  (Functional Assessment Tool)    GENERAL:  [x] NAD []Alert []Lethargic  []Cachexia  []Unarousable  [x]Verbal  []Non-Verbal  BEHAVIORAL:   []Anxiety  []Delirium []Agitation [x]Cooperative [x]Oriented x3  HEENT:  [x]Normal  [] Moist Mucous Membranes [x]Dry mouth   []ET Tube/Trach  []Oral lesions  PULMONARY:   [x]Clear []Tachypnea  []Audible excessive secretions  [x]Normal Work of Breathing []Labored Breathing  []Rhonchi []Crackles []Wheezing  CARDIOVASCULAR:    [x]Regular Rate [x]Regular Rhythm []Irregular []Tachy  []Yaya  GASTROINTESTINAL:  [x]Soft  []Distended   [x]+BS  [x]Non tender []Tender  []PEG []OGT/ NGT  Last BM:  GENITOURINARY:  [x]Normal [] Incontinent   []Oliguria/Anuria   []Paige  MUSCULOSKELETAL:   [x]Normal Extremities  [x]Weakness  []Bed/Wheelchair bound []Edema  NEUROLOGIC:   []No focal deficits  []Cognitive impairment  [x]Dysphagia []Dysarthria []Paresis []Encephalopathic  SKIN:   []Normal   []Pressure ulcer(s)  [x]Rash    Vital Signs Last 24 Hrs  T(C): 36.6 (30 Oct 2023 05:52), Max: 36.8 (29 Oct 2023 19:33)  T(F): 97.8 (30 Oct 2023 05:52), Max: 98.3 (29 Oct 2023 19:33)  HR: 59 (30 Oct 2023 05:52) (59 - 71)  BP: 159/71 (30 Oct 2023 05:52) (117/54 - 159/71)  BP(mean): --  RR: 17 (30 Oct 2023 05:52) (16 - 18)  SpO2: 95% (30 Oct 2023 05:52) (95% - 100%)    Parameters below as of 30 Oct 2023 05:52  Patient On (Oxygen Delivery Method): room air    LABS: Personally reviewed and interpreted                      8.3    1.94  )-----------( 195      ( 30 Oct 2023 05:30 )             25.7   10-30    135  |  102  |  39<H>  ----------------------------<  180<H>  4.7   |  24  |  1.53<H>    Ca    8.6      30 Oct 2023 05:30  Phos  3.2     10-30  Mg     1.6     10-30  TPro  6.0  /  Alb  3.0<L>  /  TBili  0.3  /  DBili  x   /  AST  18  /  ALT  12  /  AlkPhos  64  10-30    RADIOLOGY & ADDITIONAL STUDIES: Personally reviewed and interpreted  < from: NM PET/CT Onc FDG Skull to Thigh, Inital (08.01.23 @ 16:05) >  FDG avid left laryngeal mass corresponding to patient's known malignancy. Mild FDG avid left level 2 lymph node which is nonspecific and may represent melodie metastasis versus reactive lymph node. Additional left cervical lymph nodes are non-FDG avid.    REFERRALS:  [x]Social Work/Case management []PT/OT []Chaplaincy  []Hospice  []Patient/Family Support []Massage Therapy []Music Therapy []Holistic RN []Ethics    DISCUSSION OF CASE: Family - to provide updates and emotional support; Primary Team/RN - to discuss plan of care

## 2023-10-30 NOTE — PROGRESS NOTE ADULT - PROBLEM SELECTOR PLAN 4
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 Chemo/RT initiated 08/2023. Follows with Dr. Marr (rad onc) and Dr. Derek Harvey (onc). Last chemo (taxol/carboplatin) on 10/23, last RT 10/18, tolerated well. RT paused due to pain.   -Per radiation onc., possible in patient radiation therapy Dx 06/2023 with SCC larynx. Chemo/RT initiated 08/2023. Follows with Dr. Marr (rad onc) and Dr. Derek Harvey (onc). Last chemo (taxol/carboplatin) on 10/23, last RT 10/18, tolerated well. RT paused due to pain.   -Per radiation onc., possible in patient radiation therapy  - onc aware pt is admitted, appreciate recs  - palliative consulted for GOC discussion

## 2023-10-30 NOTE — PROGRESS NOTE ADULT - PROBLEM SELECTOR PLAN 10
Last A1c 7.9 in 07/2023. Regimen as follows: Lantus 50mg daily, 12units TID with meals for 48 hour after chemo then 8 TID  - c/w sliding scale   - hold pre-meal given poor PO intake  - q6hr FS given inability to tolerate PO  - mISS

## 2023-10-30 NOTE — PROGRESS NOTE ADULT - PROBLEM SELECTOR PLAN 9
CAD s/p MIDCAB and PCI with multiple WINTER (most recent to LCx in 8/2022), on ASA 81 at home. Currently asymptomatic. EKG on admission w/o ischemic changes  - c/w ASA 81  - c/w lipitor 80mg once a day

## 2023-10-30 NOTE — PROGRESS NOTE ADULT - PROBLEM SELECTOR PLAN 8
Presenting with hb 9.5. Baseline 9-11. No signs/sx active bleeding. Likely AOCD vs myelosuppression from chemotherapy  -iron studies consistent with anemia of chronic disease

## 2023-10-30 NOTE — PROGRESS NOTE ADULT - ASSESSMENT
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 Marr/Dr. Gar), presents to the ED with generalized weakness and odynophagia

## 2023-10-30 NOTE — CONSULT NOTE ADULT - PROBLEM SELECTOR RECOMMENDATION 5
.  Patient is Full Code  -patient appointed granddaughter (Yvrose Smith, [phone]) as alternate decision maker  -see GOC note .  Patient is Full Code  -patient appointed granddaughter (Yvrose Smith, [phone]) as alternate decision maker  -see GOC note    In addition to the E/M visit, an advance care planning meeting was performed. Start time: 10:00AM; End time: 10:46AM; Total time: 46min. A face to face meeting to discuss advance care planning was held today regarding: LEONARDO HAY. Primary decision maker: Patient is able to participate in decision making; Alternate/surrogate: . Discussed advance directives including, but not limited to, healthcare proxy and code status. Decision regarding code status: FULL CODE; Documentation completed today: GOC note

## 2023-10-30 NOTE — CONSULT NOTE ADULT - SUBJECTIVE AND OBJECTIVE BOX
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 Marr/Dr. Derek Gar), presents to the ED with generalized weakness. Reports progressive weakness over the last 2 weeks assc w/ fatigue. 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 C/o odynophagia but tolerating secretions. No shortness of breath or difficulty breathing. Reports dermatitis is stable, and reponds well to cream he was given. No drainage or discharge. Saw Rad on on 10/13. Per chart review appears odynophagia was presenting symptom of cancer. Reported subjective fever for 1 day but did not take his temperature at home. ROS otherwise negative.       Afebrile, HR 70s, //86, 100% on RA  WBC 2.3, Hb 9.5, plt 219, Na 133, K 5.4, BUN 49/Cr 1.87, lactate 1.0  EKG NSR, no peak T waves   s/p morphine, 2L NS  (29 Oct 2023 21:39)    PAST MEDICAL & SURGICAL HISTORY:  HTN (hypertension)      HLD (hyperlipidemia)      DM (diabetes mellitus)      CAD (coronary artery disease)      Glaucoma      PVD (peripheral vascular disease)      No significant past surgical history        MEDICATIONS  (STANDING):  aspirin  chewable 81 milliGRAM(s) Oral daily  atorvastatin 80 milliGRAM(s) Oral at bedtime  dextrose 50% Injectable 25 Gram(s) IV Push once  dextrose 50% Injectable 25 Gram(s) IV Push once  dextrose 50% Injectable 25 Gram(s) IV Push once  dextrose 50% Injectable 12.5 Gram(s) IV Push once  FIRST- Mouthwash  BLM 4 milliLiter(s) Swish and Spit every 6 hours  fluconAZOLE   Tablet 400 milliGRAM(s) Oral every 24 hours  glucagon  Injectable 1 milliGRAM(s) IntraMuscular once  heparin   Injectable 5000 Unit(s) SubCutaneous every 8 hours  influenza  Vaccine (HIGH DOSE) 0.7 milliLiter(s) IntraMuscular once  insulin glargine Injectable (LANTUS) 38 Unit(s) SubCutaneous at bedtime  insulin lispro (ADMELOG) corrective regimen sliding scale   SubCutaneous Before meals and at bedtime  lidocaine 2% Viscous 5 milliLiter(s) Swish and Spit two times a day  metoprolol succinate ER 12.5 milliGRAM(s) Oral every 24 hours  silver sulfADIAZINE 1% Cream 1 Application(s) Topical two times a day  sodium chloride 0.9%. 1000 milliLiter(s) (75 mL/Hr) IV Continuous <Continuous>    MEDICATIONS  (PRN):  dextrose Oral Gel 15 Gram(s) Oral once PRN Blood Glucose LESS THAN 70 milliGRAM(s)/deciliter           FAMILY HISTORY:  No pertinent family history in first degree relatives        CBC Full  -  ( 30 Oct 2023 05:30 )  WBC Count : 1.94 K/uL  RBC Count : 3.07 M/uL  Hemoglobin : 8.3 g/dL  Hematocrit : 25.7 %  Platelet Count - Automated : 195 K/uL  Mean Cell Volume : 83.7 fl  Mean Cell Hemoglobin : 27.0 pg  Mean Cell Hemoglobin Concentration : 32.3 gm/dL  Auto Neutrophil # : 1.32 K/uL  Auto Lymphocyte # : 0.29 K/uL  Auto Monocyte # : 0.27 K/uL  Auto Eosinophil # : 0.02 K/uL  Auto Basophil # : 0.03 K/uL  Auto Neutrophil % : 66.4 %  Auto Lymphocyte % : 15.0 %  Auto Monocyte % : 14.1 %  Auto Eosinophil % : 0.9 %  Auto Basophil % : 1.8 %      10-30    135  |  102  |  39<H>  ----------------------------<  180<H>  4.7   |  24  |  1.53<H>    Ca    8.6      30 Oct 2023 05:30  Phos  3.2     10-30  Mg     1.6     10-30    TPro  6.0  /  Alb  3.0<L>  /  TBili  0.3  /  DBili  x   /  AST  18  /  ALT  12  /  AlkPhos  64  10-30      Urinalysis Basic - ( 30 Oct 2023 05:30 )    Color: x / Appearance: x / SG: x / pH: x  Gluc: 180 mg/dL / Ketone: x  / Bili: x / Urobili: x   Blood: x / Protein: x / Nitrite: x   Leuk Esterase: x / RBC: x / WBC x   Sq Epi: x / Non Sq Epi: x / Bacteria: x        Radiology :             Review of Systems : per HPI         Vital Signs Last 24 Hrs  T(C): 36.6 (30 Oct 2023 05:52), Max: 36.8 (29 Oct 2023 19:33)  T(F): 97.8 (30 Oct 2023 05:52), Max: 98.3 (29 Oct 2023 19:33)  HR: 59 (30 Oct 2023 05:52) (59 - 71)  BP: 159/71 (30 Oct 2023 05:52) (117/54 - 159/71)  BP(mean): --  RR: 17 (30 Oct 2023 05:52) (16 - 18)  SpO2: 95% (30 Oct 2023 05:52) (95% - 100%)    Parameters below as of 30 Oct 2023 05:52  Patient On (Oxygen Delivery Method): room air            Physical Exam :   78 y o man lying comfortably in semi Ballard's position , awake , alert ,  NAD     Head : normocephalic , atraumatic    Eyes : PERRLA , EOMI , no nystagmus , sclera anicteric    ENT / FACE : neg nasal discharge , uvula midline , no oropharyngeal erythema / exudate    Neck : neck wounds , supple , negative JVD , negative carotid bruits , no thyromegaly    Chest : CTA bilaterally , neg wheeze / rhonchi / rales / crackles / egophany    Cardiovascular : regular rate and rhythm , neg murmurs / rubs / gallops    Abdomen : soft , non distended , non tender to palpation in all 4 quadrants ,  normal bowel sounds     Extremities : WWP , neg cyanosis /clubbing / edema      Neurologic Exam :     Alert and oriented  x 3        Motor Exam:                Right UE:                     no focal weakness ,  > 3+/5 throughout      Left UE:                       no focal weakness ,  > 3+/5 throughout          Right LE:          no focal weakness ,  > 3+/5 throughout          Left LE:          no focal weakness ,  > 3+/5 throughout           Sensation :         intact to light touch x 4 extremities                                 DTR :           biceps/brachioradialis : equal                            patella/ankle : equal      Gait :  not tested          PM&R Impression : admitted for c/o generalized weakness/ poor po intake ,  in the setting of 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    2) Current disposition plan recommendation  :    pending functional progress

## 2023-10-30 NOTE — PROGRESS NOTE ADULT - PROBLEM SELECTOR PLAN 3
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 C/o pain, dysphagia and odynophagia for weeks which was tx with magic mouthwash, silver silvadene cream, fluconazole 400mg once a day (started 10/23). Radiation paused due to pain, last RT 10/28.  last On exam -  Bilateral neck wounds - erythematous with skin breakdown, crusted dead skin peripherally, no drainage, no skin sloughing, at baseline per patient. Reports improvement with cream. No concern for superimposed infection at this time  - monitor off abx   - c/w silvadene cream  - c/w fluconazole 400 mg tablet Has b/l neck dermatitis due to radiation, first noticed early 09/13/2023 after 1st/2nd treatment. C/o pain, dysphagia and odynophagia for weeks which was tx with magic mouthwash, silver silvadene cream, fluconazole 400mg once a day (started 10/23). Radiation paused due to pain, last RT 10/28.  On exam -  Bilateral neck wounds - erythematous with skin breakdown, crusted dead skin peripherally, no drainage, no skin sloughing, at baseline per patient. Reports improvement with cream. No concern for superimposed infection at this time  - monitor off abx   - Dr. Marr aware- plan to continue radiation 10/30 (6 treatments left)  - c/w silvadene cream

## 2023-10-30 NOTE — CONSULT NOTE ADULT - CONVERSATION DETAILS
Reviewed patient's hospital course and interval developments. Discussed advanced directives including code status, HCP completion, and hospice eligibility. Patient having difficulty speaking due to symptom burden but he endorsed that his granddaughter would be the best person to coordinate care and would designate her as his HCP.    Patient's goal is to complete cancer treatment but also improve symptom control now. His disease is currently believed to be curable so completing chemo/RT is a priority. Reviewed patient's hospital course and interval developments. Discussed advanced directives including code status, HCP completion, and hospice eligibility. Patient having difficulty speaking due to symptom burden but he endorsed that his granddaughter would be the best person to coordinate care and would designate her as his HCP.    Patient's goal is to complete cancer treatment but also improve symptom control now. His disease is currently believed to be curable so completing chemo/RT is a priority.    Patient has not discussed CPR or intubation with his family but at this time he would need more time to think about those decisions

## 2023-10-30 NOTE — CONSULT NOTE ADULT - PROBLEM SELECTOR RECOMMENDATION 6
.  Complex medical decision making / symptom management in the setting of malignancy.    Will continue to follow for ongoing GOC discussion / titration of palliative regimen. Emotional support provided, questions answered.  Active Psychosocial Referrals:  [x]Social Work/Case management [x]PT/OT [x]Chaplaincy []Hospice  []Patient/Family Support []Holistic RN [x]Massage Therapy []Music Therapy []Ethics  Coping: [] well [x] with difficulty [] poor coping [] unable to assess  Support system: [] strong [x] adequate [] inadequate    For new or uncontrolled symptoms, please call Palliative Care at [phone]). The service is available 24/7 (including nights & weekends) to provide symptom management recommendations over the phone as appropriate

## 2023-10-30 NOTE — SWALLOW BEDSIDE ASSESSMENT ADULT - SWALLOW EVAL: RECOMMENDED DIET
Consider alternative means of nutrition, hydration and medication at this time to optimize nutrition and complete RT

## 2023-10-30 NOTE — CHART NOTE - NSCHARTNOTEFT_GEN_A_CORE
Case discussed with primary team. Based on the history his symptoms of odynophagia developing after receiving XRT seem more likely consistent with radiation esophagitis rather than a mechanical obstruction from stricture or mass. While EGD could help diagnose radiation esophagitis treatment would largely be supportive. If symptoms are severe feeding tube placement could be warranted, however given that endoscopically placed PEGs are pulled through the mouth into the stomach, this could potentially cause more trauma to the affected area and placement via a non-pull-through method would be ideal.    Recommendations:  - Would trial topical anesthetics (eg. viscous lidocaine), sucralfate suspension 1g Q6H, and PPI BID for presumed radiation esophagitis  - Please place GI consult if primary team would like us to evaluate further    Case reviewed with GI attending Dr. Glez.    Chavo ([facility]) MD Jay  Gastroenterology Fellow  Pager (M-F 7a-9k): [phone]  Pager (after hours): Please call  for on-call fellow

## 2023-10-30 NOTE — PROGRESS NOTE ADULT - PROBLEM SELECTOR PLAN 7
P/w WBC 2.3, ANC 1700. Baseline fluctuates with chemo, so leukopenia likely due to myelosuppression from chemo. NO signs/sx infection. At this time, radiation dermatitis appears at baseline with no superimposed infection  - continue to trend  - if febrile, would obtain infectious work up

## 2023-10-30 NOTE — PROGRESS NOTE ADULT - PROBLEM SELECTOR PLAN 5
Presenting with Cr 1.87, baseline appears to fluctuate depending on recent chemo 1.0 - 1.4. Likely elevated in the setting of chemo 10/23 and exacerbated by poor PO intake and hypovolemia. s/p 2L NS in ED.  -Cr trended down to 1.53 on 10/30  - c/w maintenance fluids   - hold lisinopril 20mg given ELENITA   - continue to trend  - avoid nephrotoxic agents Presenting with Cr 1.87, baseline appears to fluctuate depending on recent chemo 1.0 - 1.4. Likely elevated in the setting of chemo 10/23 and exacerbated by poor PO intake and hypovolemia. s/p 2L NS in ED.  -Cr trended down to 1.53 on 10/30  - c/w maintenance fluids   - hold lisinopril 20mg given ELENITA

## 2023-10-30 NOTE — PROGRESS NOTE ADULT - PROBLEM SELECTOR PLAN 6
TTE 07/2023: mild LVH, mild reduced LVH hypertrophy, normal RV function, LV systolic function mild decr EF 50-55%. On exam - volume down w/ dry MM, no LE edema. home meds: imdur 30 once a day, lisinopril 20mg once a day, toprol 12.5mg once a day   - c/w Toprol 12.5 mg  - hold imdur given soft BPs, resume when appropriate   - hold lisinopril given ELENITA

## 2023-10-30 NOTE — CONSULT NOTE ADULT - ASSESSMENT
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 Marr/Dr. 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 Now s/p 7 weeks of curative intent chemoradiation. Cycle 1-3 with cisplatin, followed by 4 remaining cycles of weekly carboplatin/paclitaxel due to elevated creatinine. Last dose on 10/23/23. He has tolerated the regimen so far except for persistent dysphagia and grade 2 radiation dermatitis. Currently admitted for dysphagia/odonophygia not currently controlled by pain medications.     Plan:  - Recommend notifying radiation oncology (had previously scheduled outpatient appointment this week) and for radiation dermatitis  - Palliative care for symptom management of his pain  - Recommend Nutrition Consult   - Agree with IVF given poor PO intake and elevated creatinine on admission    Dispo:  When medically ready for discharge, please schedule an appointment with Dr. Derek Harvey at the [facility] [place] within 2 weeks. Patient should also have follow up arranged with Dr. Marr for remaining radiation cycles. Please ensure that discharge appointment and information is provided in paperwork prior to discharge.     Discussed with hematology oncology attending Dr. Derek Harvey. Recommendations are considered final after attending attestation.

## 2023-10-30 NOTE — SWALLOW BEDSIDE ASSESSMENT ADULT - NS SPL SWALLOW CLINIC TRIAL FT
Pharyngeal stage was significant for overt signs of airway protection deficits in setting of copious secretions and suspected radiation induced pharyngeal mucositis. Pt would benefit from pain management as well as alternative means of nutrition and hydration to complete RT and optimize nutrition status. Above was discussed with MD. Awaiting decision of radiation and medical oncology.

## 2023-10-30 NOTE — CONSULT NOTE ADULT - NS ATTEST RISK PROBLEM GEN_ALL_CORE FT
Chronic Illness With Severe Exacerbation/Progression  Prescription Of Parenteral Controlled Substances

## 2023-10-30 NOTE — CONSULT NOTE ADULT - PROBLEM SELECTOR RECOMMENDATION 3
PPSV = 60%, requires assistance for most ADLs  -PT Eval recommending SOFI  -c/w supportive care, OOB, encourage movement

## 2023-10-30 NOTE — CONSULT NOTE ADULT - PROBLEM SELECTOR RECOMMENDATION 4
.  Ongoing Chemo/RT with curative intent  -no evidence of metastatic disease at this time, not a hospice qualifying disease  -began a discussion regarding long-term nutrition, granddaughter feels that patient's initial instinct would be to decline artificial feeds but she will discuss further

## 2023-10-30 NOTE — CHART NOTE - NSCHARTNOTEFT_GEN_A_CORE
ISTOP Reference #: [id number]    Practitioner Count: 1  Pharmacy Count: 1  Current Opioid Prescriptions: 0  Current Benzodiazepine Prescriptions: 0  Current Stimulant Prescriptions: 0    PDI	My Rx	Current Rx	Drug Type	Rx Written	Rx Dispensed	Drug	Quantity	Days Supply	Prescriber Name	Prescriber SULY #	Payment Method	Dispenser  A	N	N	O	10/06/2023	10/11/2023	oxycodone hcl (ir) 5 mg tablet	28	7	Kranthi Marr	[id number]	Insurance	[facility]

## 2023-10-30 NOTE — SWALLOW BEDSIDE ASSESSMENT ADULT - SLP PERTINENT HISTORY OF CURRENT PROBLEM
h/o CAD, PVD, DM, SCCA of larynx - T3N1M0; Pt undergoing chemo/RT 9/11/23; Chemo tx completed 1 week ago. RT  - 4500/7000 cGy completed, RT was paused 1 week ago due to grade 2 neck dermatitis, odynophagia and inability to tolerate PO. Pt admitted for pain management and nutrition optimization.

## 2023-10-30 NOTE — CONSULT NOTE ADULT - SUBJECTIVE AND OBJECTIVE BOX
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 Marr/Dr. Derek Gar), presents to the ED with generalized weakness. Reports progressive weakness over the last 2 weeks assc w/ fatigue. 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 C/o odynophagia but tolerating secretions. No shortness of breath or difficulty breathing. Reports dermatitis is stable, and reponds well to cream he was given. No drainage or discharge. Saw Rad on on 10/13. Per chart review appears odynophagia was presenting symptom of cancer. Reported subjective fever for 1 day but did not take his temperature at home. ROS otherwise negative.       Afebrile, HR 70s, //86, 100% on RA  WBC 2.3, Hb 9.5, plt 219, Na 133, K 5.4, BUN 49/Cr 1.87, lactate 1.0  EKG NSR, no peak T waves   s/p morphine, 2L NS  (29 Oct 2023 21:39)      SUBJECTIVE: Patient seen and examined at bedside. Endorses pain in mouth and neck, especially when drinking. He is coughing up some white phlegm. Denies fever, headache, nausea, vomiting, chest pain, shortness of breath, abdominal pain, changes in bowel movements or urination.     OBJECTIVE:    VITAL SIGNS:  ICU Vital Signs Last 24 Hrs  T(C): 36.6 (30 Oct 2023 05:52), Max: 36.8 (29 Oct 2023 19:33)  T(F): 97.8 (30 Oct 2023 05:52), Max: 98.3 (29 Oct 2023 19:33)  HR: 59 (30 Oct 2023 05:52) (59 - 71)  BP: 159/71 (30 Oct 2023 05:52) (117/54 - 159/71)  BP(mean): --  ABP: --  ABP(mean): --  RR: 17 (30 Oct 2023 05:52) (16 - 18)  SpO2: 95% (30 Oct 2023 05:52) (95% - 100%)    O2 Parameters below as of 30 Oct 2023 05:52  Patient On (Oxygen Delivery Method): room air              CAPILLARY BLOOD GLUCOSE      POCT Blood Glucose.: 176 mg/dL (30 Oct 2023 18:56)      PHYSICAL EXAM:  General: uncomfortable, but non toxic, answering questions.  HEENT: NC/AT; PERRL, clear conjunctiva  Neck: slight skin peeling and erythema, crusting of skin  Respiratory: CTA b/l  Cardiovascular: +S1/S2; RRR  Abdomen: soft, NT/ND; +BS x4  Extremities: WWP, 2+ peripheral pulses b/l; no LE edema  Skin: normal color and turgor; neck exam as above  Neurological: AAOx3    MEDICATIONS:  MEDICATIONS  (STANDING):  aspirin  chewable 81 milliGRAM(s) Oral daily  atorvastatin 80 milliGRAM(s) Oral at bedtime  atropine 1% Ophthalmic Solution for SubLingual Use 1 Drop(s) SubLingual three times a day  dextrose 10%. 1000 milliLiter(s) (70 mL/Hr) IV Continuous <Continuous>  dextrose 50% Injectable 25 Gram(s) IV Push once  dextrose 50% Injectable 25 Gram(s) IV Push once  dextrose 50% Injectable 12.5 Gram(s) IV Push once  dextrose 50% Injectable 25 Gram(s) IV Push once  dextrose 50% Injectable 12.5 Gram(s) IV Push once  FIRST- Mouthwash  BLM 4 milliLiter(s) Swish and Spit every 6 hours  glucagon  Injectable 1 milliGRAM(s) IntraMuscular once  heparin   Injectable 5000 Unit(s) SubCutaneous every 8 hours  HYDROmorphone  Injectable 1 milliGRAM(s) IV Push every 6 hours  influenza  Vaccine (HIGH DOSE) 0.7 milliLiter(s) IntraMuscular once  insulin lispro (ADMELOG) corrective regimen sliding scale   SubCutaneous every 6 hours  lidocaine 2% Viscous 5 milliLiter(s) Swish and Spit two times a day  metoprolol succinate ER 12.5 milliGRAM(s) Oral every 24 hours  pantoprazole  Injectable 40 milliGRAM(s) IV Push every 24 hours  scopolamine 1 mG/72 Hr(s) Patch 1 Patch Transdermal every 72 hours  silver sulfADIAZINE 1% Cream 1 Application(s) Topical two times a day    MEDICATIONS  (PRN):  dextrose Oral Gel 15 Gram(s) Oral once PRN Blood Glucose LESS THAN 70 milliGRAM(s)/deciliter  HYDROmorphone  Injectable 1 milliGRAM(s) IV Push every 3 hours PRN Severe Pain (7 - 10)  HYDROmorphone  Injectable 0.5 milliGRAM(s) IV Push every 3 hours PRN Moderate Pain (4 - 6)      MEDICAL/SURGICAL Hx:  PAST MEDICAL & SURGICAL HISTORY:  HTN (hypertension)      HLD (hyperlipidemia)      DM (diabetes mellitus)      CAD (coronary artery disease)      Glaucoma      PVD (peripheral vascular disease)      No significant past surgical history          ALLERGIES:  Allergies    No Known Allergies    Intolerances        LABS:                        8.3    1.94  )-----------( 195      ( 30 Oct 2023 05:30 )             25.7     10-30    135  |  102  |  39<H>  ----------------------------<  180<H>  4.7   |  24  |  1.53<H>    Ca    8.6      30 Oct 2023 05:30  Phos  3.2     10-30  Mg     1.6     10-30    TPro  6.0  /  Alb  3.0<L>  /  TBili  0.3  /  DBili  x   /  AST  18  /  ALT  12  /  AlkPhos  64  10-30      Urinalysis Basic - ( 30 Oct 2023 05:30 )    Color: x / Appearance: x / SG: x / pH: x  Gluc: 180 mg/dL / Ketone: x  / Bili: x / Urobili: x   Blood: x / Protein: x / Nitrite: x   Leuk Esterase: x / RBC: x / WBC x   Sq Epi: x / Non Sq Epi: x / Bacteria: x          Culture - Blood (collected 10-30-23 @ 01:39)  Source: .Blood Blood-Peripheral  Preliminary Report (10-30-23 @ 15:00):    No growth at 12 hours    Culture - Blood (collected 10-30-23 @ 01:39)  Source: .Blood Blood-Peripheral  Preliminary Report (10-30-23 @ 15:00):    No growth at 12 hours        RADIOLOGY, PATHOLOGY, & ADDITIONAL TESTS: Reviewed.

## 2023-10-30 NOTE — CONSULT NOTE ADULT - ASSESSMENT
·	Dilaudid 0.5mg IV q3h PRN for Moderate Pain  ·	Dilaudid 1mg IV q3h PRN for Severe Pain  ·	Atropine Sublingual Drops TID for local secretion management  ·	patient unable to discuss advance directives due to symptom burden: he listened to a conversation with his granddaughter, he would like to designate her as his HCP -> they have never discussed NGT or PEG, granddaughter feels that the patient will be against but she will explore further 79yo M with PMH of CAD, PVD, T2DM, and SCC of Larynx p/w weakness and odynophagia. Palliative consulted for complex symptom management in the setting of malignancy.    ·	Dilaudid 0.5mg IV q3h PRN for Moderate Pain  ·	Dilaudid 1mg IV q3h PRN for Severe Pain  ·	Atropine Sublingual Drops TID for local secretion management  ·	patient unable to discuss advance directives due to symptom burden: he listened to a conversation with his granddaughter, he would like to designate her as his HCP -> they have never discussed NGT or PEG, granddaughter feels that the patient will be against but she will explore further

## 2023-10-30 NOTE — PROGRESS NOTE ADULT - ATTENDING COMMENTS
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 Marr/Dr. Gar), presents to the ED with odynophagia    #Odynophagia   #Radiation dermatitis   #Squamous Cell Carcinoma of Larynx   #Leukopenia and anemia 2/2 chemo   #DM   #Hypoglycemia     -Odynophagia likely radiation related, know complication. Spoke to Dr. Marr over Teams to discuss the case. Patient has 6 fractions of radiation left. He was on a break for 1 week due to dermatitis. If he gets a session daily and 2 on Friday he will complete his therapy. He received fluconazole empirically without improvement. Odynophagia will likely resolve within 2 weeks of completion of radiation. Patient will not be receiving any more chemo, confirmed by Dr. Gar.   Palliative care has been consulted for pain management and further GOC with family regarding NG tube and PEG. Recs appreciated. GI recs appreciated   -Continue to monitor cbc daily. Leukopenia and anemia likely 2/2 chemo.   -Started on D10, FS Q6. ISS. Hold Lantus for hypoglycemia. Nutrition consult    Rest as per resident note

## 2023-10-30 NOTE — CONSULT NOTE ADULT - PROBLEM SELECTOR RECOMMENDATION 2
.  -Atropine Sublingual Drops TID for local secretion management  -trial of Scopolamine Patch to reduce secretion burden

## 2023-10-30 NOTE — CONSULT NOTE ADULT - ASSESSMENT
{\rtf1\rpmjwz04796\ansi\ljjscbd0043\ftnbj\uc1\deff0  {\fonttbl{\f0 \fnil Segoe UI;}{\f1 \fnil \fcharset0 Segoe UI;}{\f2 \fnil Times New Williams;}}  {\colortbl ;\naj994\pljvc864\vjpi222 ;\red0\green0\blue0 ;\red0\green0\itam056 ;\red0\green0\blue0 ;}  {\stylesheet{\f0\fs20 Normal;}{\cs1 Default Paragraph Font;}{\cs2\f0\fs16 Line Number;}{\cs3\f2\fs24\ul\cf3 Hyperlink;}}  {\*\revtbl{Unknown;}}  \bsymuj31062\jheyrr17953\sipnt7542\vwsig5607\hjpau6986\zirdn0223\hzqbojh388\cnxuhlt422\nogrowautofit\tgyqfa466\formshade\nofeaturethrottle1\dntblnsbdb\fet4\aendnotes\aftnnrlc\pgbrdrhead\pgbrdrfoot  \sectd\phqhfc31589\wsewmw59457\guttersxn0\ysderwkn0758\hyimdkab7252\uohlkbpp0627\irzhteyw7777\qyuavzr996\tsihxwk457\sbkpage\pgncont\pgndec  \plain\plain\f0\fs24\ql\plain\f0\fs24\plain\f0\fs20\mhpy6004\hich\f0\dbch\f0\loch\f0\fs20\par  I M\par  \par  88 yr old male, history of CAD, PVD, DM, 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 Marr/Dr. Gar), presents to the ED with generalized weakness.\par  \par  \plain\f1\fs20\znyg0746\hich\f1\dbch\f1\loch\f1\cf2\fs20\ul{\field{\*\fldinst HYPERLINK [id number] }{\fldrslt Problem/Plan - 1:}}\plain\f0\fs20\haiz0419\hich\f0\dbch\f0\loch\f0\fs20\ql\par  \'b7  {\*\bkmkstart [id number]}{\*\bkmkend ws02897120878}Problem: {\*\bkmkstart ty65031716777}{\*\bkmkend op85628314605}Odynophagia. \par  \'b7  {\*\bkmkstart ao35022021681}{\*\bkmkend mn47669383738}Plan: {\*\bkmkstart px08980001535}{\*\bkmkend im99980519138}C/o odynophagia causing poor PO intake. Last meal was 5 days ago, drinks water and tolerates ok.  Overall poor PO intake given pain   from radiation, which was paused for pain. Per chart review appears poor PO intake has been ongoing issue while undergoing chemo/radiation. Last chemo 10/23. Labs significant for Na 133, lactate negative, Cr 1.87 (up from 1.0-1.4). On exam - dry MM, overall   volume down. s/p 2L NS in ED. Consider radiation mucositis vs esophagitis \par  - start maintenance fluids \par  - dysphagia screen, S&S \par  - magic mouthash\par  - viscous lidocaine\par  - heme onc consult in AM\par  - notify rad onc (Dr. Marr)\par  - nutrition consult\par  - consider GI consult for r/o esphagitis \par  - attempted to initiate GOC however patient did not want to discuss at time of writers assessment.\par  \par  \plain\f1\fs20\mjyu5899\hich\f1\dbch\f1\loch\f1\cf2\fs20\ul{\field{\*\fldinst HYPERLINK [id number] }{\fldrslt Problem/Plan - 2:}}\plain\f0\fs20\busc8666\hich\f0\dbch\f0\loch\f0\fs20\ql\par  \'b7  {\*\bkmkstart cq55676282645}{\*\bkmkend uw81787987315}Problem: {\*\bkmkstart he29885054331}{\*\bkmkend xe32997211190}Weakness generalized. \par  \'b7  {\*\bkmkstart ci44775508788}{\*\bkmkend oz81250023109}Plan: {\*\bkmkstart zc55549104237}{\*\bkmkend we28477822131}Reports progressive weakness over the last 2 weeks assc w/ fatigue and poor PO intake. Reports this typically happens after chemo.   Last meal was 5 days ago, drinks water and tolerates ok. Typically ambulates with cane. NS in \par  - management above \par  - f/u orthostatics \par  - PT/OT.\par  \par  \plain\f1\fs20\atod7161\hich\f1\dbch\f1\loch\f1\cf2\fs20\ul{\field{\*\fldinst HYPERLINK [id number] }{\fldrslt Problem/Plan - 3:}}\plain\f0\fs20\jybe8678\hich\f0\dbch\f0\loch\f0\fs20\ql\par  \'b7  {\*\bkmkstart kj04780319828}{\*\bkmkend xq12403293705}Problem: {\*\bkmkstart yb80574823933}{\*\bkmkend zj86179265694}Radiation dermatitis. \par  \'b7  {\*\bkmkstart mo92719907849}{\*\bkmkend sf84151872182}Plan: {\*\bkmkstart fx20568173816}{\*\bkmkend nr10005058207}Has b/l neck dermatitis due to radiation, first noticed early 09/13/2023 after 1st/2nd treatment. C/o pain, dysphagia and odynophagia   for weeks which was tx with magic mouthwash, silver silvadene cream, fluconazole 400mg once a day (started 10/23). Radiation paused due to pain, last RT 10/28.  last On exam -  Bilateral neck wounds - erythematous with skin breakdown, crusted dead skin   peripherally, no drainage, no skin sloughing, at baseline per patient. Reports improvement with cream. No concern for superimposed infection at this time\par  - monitor off abx \par  - c/w silvadene cream\par  - c/w fluconazole cream.\par  \par  \plain\f1\fs20\fqzk3104\hich\f1\dbch\f1\loch\f1\cf2\fs20\ul{\field{\*\fldinst HYPERLINK [id number] }{\fldrslt Problem/Plan - 4:}}\plain\f0\fs20\qaho8684\hich\f0\dbch\f0\loch\f0\fs20\ql\par  \'b7  {\*\bkmkstart py01779617396}{\*\bkmkend ma07782056555}Problem: {\*\bkmkstart em53296763951}{\*\bkmkend bn86084301181}Squamous cell carcinoma of larynx. \par  \'b7  {\*\bkmkstart ks24724554948}{\*\bkmkend vl76605244603}Plan: {\*\bkmkstart zk47947538649}{\*\bkmkend pi80796640150}Dx 06/2023 with SCC larynx. Chemo/RT initiated 08/2023. Follows with Dr. Marr (rad onc) and Dr. Derek Harvey (onc). Last chemo   (taxol/carboplatin) on 10/23, last RT 10/18, tolerated well. RT paused due to pain. \par  - see abpve \par  - attempted to initiate GOC however patient did not want to discuss at time of writers assessment.\par  \par  \plain\f1\fs20\moww8785\hich\f1\dbch\f1\loch\f1\cf2\fs20\ul{\field{\*\fldinst HYPERLINK [id number] }{\fldrslt Problem/Plan - 5:}}\plain\f0\fs20\sezf0500\hich\f0\dbch\f0\loch\f0\fs20\ql\par  \'b7  {\*\bkmkstart hz23529362603}{\*\bkmkend aa00616515034}Problem: {\*\bkmkstart dr57972013426}{\*\bkmkend fd10522363378}ELENITA (acute kidney injury). \par  \'b7  {\*\bkmkstart ns04265494387}{\*\bkmkend ju17930113778}Plan: {\*\bkmkstart ec68046321607}{\*\bkmkend iz55678488104}Presenting with Cr 1.87, baseline appears to fluctuate depending on recent chemo 1.0 - 1.4. Likely elevated in the setting of chemo   10/23 and exacerbated by poor PO intake and hypovolemia. s/p 2L NS in ED.\par  - c/w maintenance fluids \par  - hold lisinopril 20mg given ELENITA \par  - continue to trend\par  - avoid nephrotoxic agents.\par  \par  \plain\f1\fs20\oskc3627\hich\f1\dbch\f1\loch\f1\cf2\fs20\ul{\field{\*\fldinst HYPERLINK [id number] }{\fldrslt Problem/Plan - 6:}}\plain\f0\fs20\ndif7161\hich\f0\dbch\f0\loch\f0\fs20\ql\par  \'b7  {\*\bkmkstart pl97656444887}{\*\bkmkend wi21319402413}Problem: {\*\bkmkstart ji50947301430}{\*\bkmkend cm36010918229}Hyperkalemia. \par  \'b7  {\*\bkmkstart yz64855035549}{\*\bkmkend la53806486449}Plan: {\*\bkmkstart xd22724108114}{\*\bkmkend am35528351754}P/w K 5.4. EKG NSR, no peak T waves. Likely 2/2 elevated Cr in the setting of poor PO intake and hypovolemia. s/p 2L NS in ED.\par  - f/u repeat K\par  - treat underlying issue as above\par  - lokelma if continues to rise.\par  \par  \plain\f1\fs20\dfhu2241\hich\f1\dbch\f1\loch\f1\cf2\fs20\ul{\field{\*\fldinst HYPERLINK [id number] }{\fldrslt Problem/Plan - 7:}}\plain\f0\fs20\bfyu7082\hich\f0\dbch\f0\loch\f0\fs20\ql\par  \'b7  {\*\bkmkstart ug90140319336}{\*\bkmkend ae16419014251}Problem: {\*\bkmkstart bj95713288110}{\*\bkmkend lk60700875341}Heart failure with mildly reduced ejection fraction. \par  \'b7  {\*\bkmkstart ml32176675001}{\*\bkmkend un71428777491}Plan: {\*\bkmkstart kw33986305239}{\*\bkmkend og58538560700}TTE 07/2023: mild LVH, mild reduced LVH hypertrophy, normal RV function, LV systolic function mild decr EF 50-55%. On exam - volume   down w/ dry MM, no LE edema. home meds: imdur 30 once a day, lisinopril 20mg once a day, toprol 12.5mg once a day \par  - c/w Toprol\par  - hold imdur given soft BPs, resume when appropriate \par  - hold lisinopril given ELENITA.\par  \par  \plain\f1\fs20\zuve2387\hich\f1\dbch\f1\loch\f1\cf2\fs20\ul{\field{\*\fldinst HYPERLINK [id number] }{\fldrslt Problem/Plan - 8:}}\plain\f0\fs20\hbkj0568\hich\f0\dbch\f0\loch\f0\fs20\ql\par  \'b7  {\*\bkmkstart js48207676530}{\*\bkmkend tf61526623303}Problem: {\*\bkmkstart gq90091101318}{\*\bkmkend rq75552917260}Leukopenia. \par  \'b7  {\*\bkmkstart hk61226857878}{\*\bkmkend cv67732323647}Plan: {\*\bkmkstart fj27243119376}{\*\bkmkend ll92249465429}P/w WBC 2.3, ANC 1700. Baseline fluctuates with chemo, so leukopenia likely due to myelosuppression from chemo. NO signs/sx infection.   At this time, radiation dermatitis appears at baseline with no superimposed infection\par  - continue to trend\par  - if febrile, would obtain infectious work up.\par  \par  \plain\f1\fs20\xgxo5561\hich\f1\dbch\f1\loch\f1\cf2\fs20\ul{\field{\*\fldinst HYPERLINK [id number] }{\fldrslt Problem/Plan - 9:}}\plain\f0\fs20\ojti9245\hich\f0\dbch\f0\loch\f0\fs20\ql\par  \'b7  {\*\bkmkstart na35026076175}{\*\bkmkend mf39580767518}Problem: {\*\bkmkstart rb56689397293}{\*\bkmkend na44203395152}Anemia. \par  \'b7  {\*\bkmkstart hw33649449492}{\*\bkmkend ed34338067827}Plan: {\*\bkmkstart ws95225859469}{\*\bkmkend ff72335316525}Presenting with hb 9.5. Baseline 9-11. No signs/sx active bleeding. Likely AOCD vs myelosuppression from chemotherapy\par  - f/u iron studies\par  - active T&S.\par  \par  \plain\f1\fs20\jdhy9862\hich\f1\dbch\f1\loch\f1\cf2\fs20\ul{\field{\*\fldinst HYPERLINK [id number] }{\fldrslt Problem/Plan - 10:}}\plain\f0\fs20\tqbf4821\hich\f0\dbch\f0\loch\f0\fs20\ql\par  \'b7  {\*\bkmkstart fc44848132262}{\*\bkmkend rk79005611804}Problem: {\*\bkmkstart pe05191235856}{\*\bkmkend gp34580076224}CAD (coronary artery disease). \par  \'b7  {\*\bkmkstart yy87930490019}{\*\bkmkend ft79075726187}Plan; {\*\bkmkstart sr92150686747}{\*\bkmkend br06079248343}CAD s/p MIDCAB and PCI with multiple WINTER (most recent to LCx in 8/2022), on ASA 81 at home. Currently asymptomatic. EKG on admission   w/o ischemic changes\par  - c/w ASA 81\par  - c/w lipitor 80mg once a day.\par  \par  \plain\f1\fs20\wpow5972\hich\f1\dbch\f1\loch\f1\cf2\fs20\ul{\field{\*\fldinst HYPERLINK [id number] }{\fldrslt Problem/Plan - 11:}}\plain\f0\fs20\pwta1796\hich\f0\dbch\f0\loch\f0\fs20\ql\par  \'b7  {\*\bkmkstart tu920036767652}{\*\bkmkend yb143355815941}Problem: {\*\bkmkstart vb739531821889}{\*\bkmkend gh996353937280}DM (diabetes mellitus). \par  \'b7  {\*\bkmkstart zp580172307266}{\*\bkmkend bp895752698742}Plan: {\*\bkmkstart iu131137233981}{\*\bkmkend qm351693068969}Last A1c 7.9 in 07/2023. Regimen as follows: Lantus 50mg daily, 12units TID with meals for 48 hour after chemo then 8 TID\par  - c/w 38 units lantus (75%) given poor PO intake\par  - hold pre-meal given poor PO intake\par  - q6hr FS given inability to tolerate PO\par  - mISS.\plain\f1\fs20\rylr8325\hich\f1\dbch\f1\loch\f1\cf2\fs20\strike\plain\f0\fs20\mslm8137\hich\f0\dbch\f0\loch\f0\fs20\par  \par  \par  }

## 2023-10-30 NOTE — PATIENT PROFILE ADULT - FALL HARM RISK - HARM RISK INTERVENTIONS

## 2023-10-30 NOTE — PROGRESS NOTE ADULT - SUBJECTIVE AND OBJECTIVE BOX
******INCOMPLETE******    Patient is a 78y old  Male who presents with a chief complaint of weakness, poor PO intake (30 Oct 2023 08:54)    OVERNIGHT EVENTS/INTERVAL HPI:    REVIEW OF SYSTEMS:  All other review of systems is negative unless indicated above.    OBJECTIVE:  T(C): 36.6 (10-30-23 @ 05:52), Max: 36.8 (10-29-23 @ 19:33)  HR: 59 (10-30-23 @ 05:52) (59 - 71)  BP: 159/71 (10-30-23 @ 05:52) (117/54 - 159/71)  RR: 17 (10-30-23 @ 05:52) (16 - 18)  SpO2: 95% (10-30-23 @ 05:52) (95% - 100%)  Daily Height in cm: 167.64 (29 Oct 2023 19:33)    Daily     Physical Exam:  General: in no acute distress  Eyes: EOMI intact bilaterally. Anicteric sclerae, moist conjunctivae  HENT: Moist mucous membranes  Neck: Trachea midline, supple  Lungs: CTA B/L. No wheezes, rales, or rhonchi  Cardiovascular: RRR. No murmurs, rubs, or gallops  Abdomen: Soft, non-tender non-distended; No rebound or guarding  Extremities: WWP, No clubbing, cyanosis or edema  MSK: No midline bony tenderness. No CVA tenderness bilaterally  Neurological: Alert and oriented x3  Skin: Warm and dry. No obvious rash     Medications:  MEDICATIONS  (STANDING):  aspirin  chewable 81 milliGRAM(s) Oral daily  atorvastatin 80 milliGRAM(s) Oral at bedtime  dextrose 50% Injectable 25 Gram(s) IV Push once  dextrose 50% Injectable 25 Gram(s) IV Push once  dextrose 50% Injectable 25 Gram(s) IV Push once  dextrose 50% Injectable 12.5 Gram(s) IV Push once  FIRST- Mouthwash  BLM 4 milliLiter(s) Swish and Spit every 6 hours  fluconAZOLE   Tablet 400 milliGRAM(s) Oral every 24 hours  glucagon  Injectable 1 milliGRAM(s) IntraMuscular once  heparin   Injectable 5000 Unit(s) SubCutaneous every 8 hours  influenza  Vaccine (HIGH DOSE) 0.7 milliLiter(s) IntraMuscular once  insulin lispro (ADMELOG) corrective regimen sliding scale   SubCutaneous every 6 hours  lidocaine 2% Viscous 5 milliLiter(s) Swish and Spit two times a day  magnesium sulfate  IVPB 2 Gram(s) IV Intermittent once  metoprolol succinate ER 12.5 milliGRAM(s) Oral every 24 hours  silver sulfADIAZINE 1% Cream 1 Application(s) Topical two times a day  sodium chloride 0.9%. 1000 milliLiter(s) (75 mL/Hr) IV Continuous <Continuous>    MEDICATIONS  (PRN):  dextrose Oral Gel 15 Gram(s) Oral once PRN Blood Glucose LESS THAN 70 milliGRAM(s)/deciliter      Labs:                        8.3    1.94  )-----------( 195      ( 30 Oct 2023 05:30 )             25.7     10-30    135  |  102  |  39<H>  ----------------------------<  180<H>  4.7   |  24  |  1.53<H>    Ca    8.6      30 Oct 2023 05:30  Phos  3.2     10-30  Mg     1.6     10-30    TPro  6.0  /  Alb  3.0<L>  /  TBili  0.3  /  DBili  x   /  AST  18  /  ALT  12  /  AlkPhos  64  10-30      Urinalysis Basic - ( 30 Oct 2023 05:30 )    Color: x / Appearance: x / SG: x / pH: x  Gluc: 180 mg/dL / Ketone: x  / Bili: x / Urobili: x   Blood: x / Protein: x / Nitrite: x   Leuk Esterase: x / RBC: x / WBC x   Sq Epi: x / Non Sq Epi: x / Bacteria: x          Radiology: Reviewed     ******INCOMPLETE******    Patient is a 78y old  Male who presents with a chief complaint of weakness, poor PO intake (30 Oct 2023 08:54)    OVERNIGHT EVENTS/INTERVAL HPI:    No acute overnight events reported. Pt seen and examined at bedside this morning. Pt endorses worsening pain on swallowing. Pt can not speak more than a few words without copious sputum production. Endorses    REVIEW OF SYSTEMS:  All other review of systems is negative unless indicated above.    OBJECTIVE:  T(C): 36.6 (10-30-23 @ 05:52), Max: 36.8 (10-29-23 @ 19:33)  HR: 59 (10-30-23 @ 05:52) (59 - 71)  BP: 159/71 (10-30-23 @ 05:52) (117/54 - 159/71)  RR: 17 (10-30-23 @ 05:52) (16 - 18)  SpO2: 95% (10-30-23 @ 05:52) (95% - 100%)  Daily Height in cm: 167.64 (29 Oct 2023 19:33)    Daily     Physical Exam:  General: in no acute distress  Eyes: EOMI intact bilaterally. Anicteric sclerae, moist conjunctivae  HENT: Moist mucous membranes  Neck: Trachea midline, supple  Lungs: CTA B/L. No wheezes, rales, or rhonchi  Cardiovascular: RRR. No murmurs, rubs, or gallops  Abdomen: Soft, non-tender non-distended; No rebound or guarding  Extremities: WWP, No clubbing, cyanosis or edema  MSK: No midline bony tenderness. No CVA tenderness bilaterally  Neurological: Alert and oriented x3  Skin: Warm and dry. No obvious rash     Medications:  MEDICATIONS  (STANDING):  aspirin  chewable 81 milliGRAM(s) Oral daily  atorvastatin 80 milliGRAM(s) Oral at bedtime  dextrose 50% Injectable 25 Gram(s) IV Push once  dextrose 50% Injectable 25 Gram(s) IV Push once  dextrose 50% Injectable 25 Gram(s) IV Push once  dextrose 50% Injectable 12.5 Gram(s) IV Push once  FIRST- Mouthwash  BLM 4 milliLiter(s) Swish and Spit every 6 hours  fluconAZOLE   Tablet 400 milliGRAM(s) Oral every 24 hours  glucagon  Injectable 1 milliGRAM(s) IntraMuscular once  heparin   Injectable 5000 Unit(s) SubCutaneous every 8 hours  influenza  Vaccine (HIGH DOSE) 0.7 milliLiter(s) IntraMuscular once  insulin lispro (ADMELOG) corrective regimen sliding scale   SubCutaneous every 6 hours  lidocaine 2% Viscous 5 milliLiter(s) Swish and Spit two times a day  magnesium sulfate  IVPB 2 Gram(s) IV Intermittent once  metoprolol succinate ER 12.5 milliGRAM(s) Oral every 24 hours  silver sulfADIAZINE 1% Cream 1 Application(s) Topical two times a day  sodium chloride 0.9%. 1000 milliLiter(s) (75 mL/Hr) IV Continuous <Continuous>    MEDICATIONS  (PRN):  dextrose Oral Gel 15 Gram(s) Oral once PRN Blood Glucose LESS THAN 70 milliGRAM(s)/deciliter      Labs:                        8.3    1.94  )-----------( 195      ( 30 Oct 2023 05:30 )             25.7     10-30    135  |  102  |  39<H>  ----------------------------<  180<H>  4.7   |  24  |  1.53<H>    Ca    8.6      30 Oct 2023 05:30  Phos  3.2     10-30  Mg     1.6     10-30    TPro  6.0  /  Alb  3.0<L>  /  TBili  0.3  /  DBili  x   /  AST  18  /  ALT  12  /  AlkPhos  64  10-30      Urinalysis Basic - ( 30 Oct 2023 05:30 )    Color: x / Appearance: x / SG: x / pH: x  Gluc: 180 mg/dL / Ketone: x  / Bili: x / Urobili: x   Blood: x / Protein: x / Nitrite: x   Leuk Esterase: x / RBC: x / WBC x   Sq Epi: x / Non Sq Epi: x / Bacteria: x          Radiology: Reviewed     ******INCOMPLETE******    Patient is a 78y old  Male who presents with a chief complaint of weakness, poor PO intake (30 Oct 2023 08:54)    OVERNIGHT EVENTS/INTERVAL HPI:    No acute overnight events reported. Pt seen and examined at bedside this morning. Pt endorses worsening pain on swallowing. Pt can not speak more than a few words without copious sputum production. Endorses not being able to tolerate PO intake. Denies fever, chills, headache, SOB, chest pain, palp., racing heart, N/V/D, dysuria.     REVIEW OF SYSTEMS:  All other review of systems is negative unless indicated above.    OBJECTIVE:  T(C): 36.6 (10-30-23 @ 05:52), Max: 36.8 (10-29-23 @ 19:33)  HR: 59 (10-30-23 @ 05:52) (59 - 71)  BP: 159/71 (10-30-23 @ 05:52) (117/54 - 159/71)  RR: 17 (10-30-23 @ 05:52) (16 - 18)  SpO2: 95% (10-30-23 @ 05:52) (95% - 100%)  Daily Height in cm: 167.64 (29 Oct 2023 19:33)    Daily     Physical Exam:  General: in no acute distress  Eyes: EOMI intact bilaterally. Anicteric sclerae, moist conjunctivae  HENT: Moist mucous membranes. No oral lesions, thrush, erythema. Redness and skin peeling in shoulder/neck region due to radiation dermatitis   Neck: Trachea midline, supple  Lungs: CTA B/L. No wheezes, rales, or rhonchi  Cardiovascular: RRR. No murmurs, rubs, or gallops  Abdomen: Soft, non-tender non-distended; No rebound or guarding  Extremities: WWP, No clubbing, cyanosis or edema  MSK: No midline bony tenderness. No CVA tenderness bilaterally  Neurological: Alert and oriented x3  Skin: Warm and dry. No obvious rash     Medications:  MEDICATIONS  (STANDING):  aspirin  chewable 81 milliGRAM(s) Oral daily  atorvastatin 80 milliGRAM(s) Oral at bedtime  atropine 1% Ophthalmic Solution for SubLingual Use 1 Drop(s) SubLingual three times a day  dextrose 50% Injectable 25 Gram(s) IV Push once  dextrose 50% Injectable 25 Gram(s) IV Push once  dextrose 50% Injectable 12.5 Gram(s) IV Push once  dextrose 50% Injectable 25 Gram(s) IV Push once  FIRST- Mouthwash  BLM 4 milliLiter(s) Swish and Spit every 6 hours  fluconAZOLE   Tablet 400 milliGRAM(s) Oral every 24 hours  glucagon  Injectable 1 milliGRAM(s) IntraMuscular once  heparin   Injectable 5000 Unit(s) SubCutaneous every 8 hours  influenza  Vaccine (HIGH DOSE) 0.7 milliLiter(s) IntraMuscular once  insulin lispro (ADMELOG) corrective regimen sliding scale   SubCutaneous every 6 hours  lidocaine 2% Viscous 5 milliLiter(s) Swish and Spit two times a day  metoprolol succinate ER 12.5 milliGRAM(s) Oral every 24 hours  silver sulfADIAZINE 1% Cream 1 Application(s) Topical two times a day  sodium chloride 0.9%. 1000 milliLiter(s) (75 mL/Hr) IV Continuous <Continuous>    MEDICATIONS  (PRN):  dextrose Oral Gel 15 Gram(s) Oral once PRN Blood Glucose LESS THAN 70 milliGRAM(s)/deciliter  HYDROmorphone  Injectable 1 milliGRAM(s) IV Push every 3 hours PRN Severe Pain (7 - 10)  HYDROmorphone  Injectable 0.5 milliGRAM(s) IV Push every 3 hours PRN Moderate Pain (4 - 6)          Labs:                        8.3    1.94  )-----------( 195      ( 30 Oct 2023 05:30 )             25.7     10-30    135  |  102  |  39<H>  ----------------------------<  180<H>  4.7   |  24  |  1.53<H>    Ca    8.6      30 Oct 2023 05:30  Phos  3.2     10-30  Mg     1.6     10-30    TPro  6.0  /  Alb  3.0<L>  /  TBili  0.3  /  DBili  x   /  AST  18  /  ALT  12  /  AlkPhos  64  10-30      Urinalysis Basic - ( 30 Oct 2023 05:30 )    Color: x / Appearance: x / SG: x / pH: x  Gluc: 180 mg/dL / Ketone: x  / Bili: x / Urobili: x   Blood: x / Protein: x / Nitrite: x   Leuk Esterase: x / RBC: x / WBC x   Sq Epi: x / Non Sq Epi: x / Bacteria: x          Radiology: Reviewed OVERNIGHT EVENTS/INTERVAL HPI: No acute overnight events reported. Pt seen and examined at bedside this morning. Pt endorses worsening pain on swallowing. Pt can not speak more than a few words without copious sputum production. Endorses not being able to tolerate PO intake. Denies fever, chills, headache, SOB, chest pain, palp., racing heart, N/V/D, dysuria.     REVIEW OF SYSTEMS:  All other review of systems is negative unless indicated above.    OBJECTIVE:  T(C): 36.6 (10-30-23 @ 05:52), Max: 36.8 (10-29-23 @ 19:33)  HR: 59 (10-30-23 @ 05:52) (59 - 71)  BP: 159/71 (10-30-23 @ 05:52) (117/54 - 159/71)  RR: 17 (10-30-23 @ 05:52) (16 - 18)  SpO2: 95% (10-30-23 @ 05:52) (95% - 100%)  Daily Height in cm: 167.64 (29 Oct 2023 19:33)    Daily     Physical Exam:  General: in no acute distress  Eyes: R eye with ptosis. EOMI intact  HENT: Moist mucous membranes. No oral lesions, thrush, erythema. Erythematous plaque with crusting on bilateral lateral neck  Neck: Trachea midline, supple  Lungs: CTA B/L. No wheezes, rales, or rhonchi  Cardiovascular: RRR. No murmurs, rubs, or gallops  Abdomen: Soft, non-tender non-distended; No rebound or guarding  Extremities: WWP, No clubbing, cyanosis or edema  MSK: 5/5  strength b/l  Neurological: Alert and oriented x3  Skin: Warm and dry. No obvious rash     Medications:  MEDICATIONS  (STANDING):  aspirin  chewable 81 milliGRAM(s) Oral daily  atorvastatin 80 milliGRAM(s) Oral at bedtime  atropine 1% Ophthalmic Solution for SubLingual Use 1 Drop(s) SubLingual three times a day  dextrose 50% Injectable 25 Gram(s) IV Push once  dextrose 50% Injectable 25 Gram(s) IV Push once  dextrose 50% Injectable 12.5 Gram(s) IV Push once  dextrose 50% Injectable 25 Gram(s) IV Push once  FIRST- Mouthwash  BLM 4 milliLiter(s) Swish and Spit every 6 hours  fluconAZOLE   Tablet 400 milliGRAM(s) Oral every 24 hours  glucagon  Injectable 1 milliGRAM(s) IntraMuscular once  heparin   Injectable 5000 Unit(s) SubCutaneous every 8 hours  influenza  Vaccine (HIGH DOSE) 0.7 milliLiter(s) IntraMuscular once  insulin lispro (ADMELOG) corrective regimen sliding scale   SubCutaneous every 6 hours  lidocaine 2% Viscous 5 milliLiter(s) Swish and Spit two times a day  metoprolol succinate ER 12.5 milliGRAM(s) Oral every 24 hours  silver sulfADIAZINE 1% Cream 1 Application(s) Topical two times a day  sodium chloride 0.9%. 1000 milliLiter(s) (75 mL/Hr) IV Continuous <Continuous>    MEDICATIONS  (PRN):  dextrose Oral Gel 15 Gram(s) Oral once PRN Blood Glucose LESS THAN 70 milliGRAM(s)/deciliter  HYDROmorphone  Injectable 1 milliGRAM(s) IV Push every 3 hours PRN Severe Pain (7 - 10)  HYDROmorphone  Injectable 0.5 milliGRAM(s) IV Push every 3 hours PRN Moderate Pain (4 - 6)          Labs:                        8.3    1.94  )-----------( 195      ( 30 Oct 2023 05:30 )             25.7     10-30    135  |  102  |  39<H>  ----------------------------<  180<H>  4.7   |  24  |  1.53<H>    Ca    8.6      30 Oct 2023 05:30  Phos  3.2     10-30  Mg     1.6     10-30    TPro  6.0  /  Alb  3.0<L>  /  TBili  0.3  /  DBili  x   /  AST  18  /  ALT  12  /  AlkPhos  64  10-30      Urinalysis Basic - ( 30 Oct 2023 05:30 )    Color: x / Appearance: x / SG: x / pH: x  Gluc: 180 mg/dL / Ketone: x  / Bili: x / Urobili: x   Blood: x / Protein: x / Nitrite: x   Leuk Esterase: x / RBC: x / WBC x   Sq Epi: x / Non Sq Epi: x / Bacteria: x          Radiology: Reviewed

## 2023-10-30 NOTE — PROGRESS NOTE ADULT - PROBLEM SELECTOR PLAN 2
Reports progressive weakness over the last 2 weeks assc w/ fatigue and poor PO intake. Reports this typically happens after chemo. Last meal was 5 days ago, drinks water and tolerates ok. Typically ambulates with cane. NS in   - management above   -orthostatics wnl  - f/u PT/OT Reports progressive weakness over the last 2 weeks assc w/ fatigue and poor PO intake. Reports this typically happens after chemo. Last meal was 5 days ago, drinks water and tolerates ok. Typically ambulates with cane. orthostatics wnl  - management above   - f/u PT/OT

## 2023-10-31 NOTE — PROGRESS NOTE ADULT - PROBLEM SELECTOR PLAN 11
Diet: IV fluids, given pt cannot tolerate PO  F: D5 and NS  E: replete PRN  DVT: lovenox  Code: awating GOC conversation

## 2023-10-31 NOTE — OCCUPATIONAL THERAPY INITIAL EVALUATION ADULT - NSOTDISCHREC_GEN_A_CORE
IF pt. to be d/c home- would benefit from HOT. Would require wheelchair, RW, commode and shower chair as well as assist x1 for all ADLs/Sub-acute Rehab

## 2023-10-31 NOTE — DIETITIAN INITIAL EVALUATION ADULT - PROBLEM SELECTOR PLAN 1
C/o odynophagia causing poor PO intake. Last meal was 5 days ago, drinks water and tolerates ok.  Overall poor PO intake given pain from radiation, which was paused for pain. Per chart review appears poor PO intake has been ongoing issue while undergoing chemo/radiation. Last chemo 10/23. Labs significant for Na 133, lactate negative, Cr 1.87 (up from 1.0-1.4). On exam - dry MM, overall volume down. s/p 2L NS in ED. Consider radiation mucositis vs esophagitis   - start maintenance fluids   - dysphagia screen, S&S   - magic mouthash  - viscous lidocaine  - heme onc consult in AM  - notify rad onc (Dr. Marr)  - nutrition consult  - consider GI consult for r/o esphagitis   - attempted to initiate GOC however patient did not want to discuss at time of writers assessment

## 2023-10-31 NOTE — DIETITIAN INITIAL EVALUATION ADULT - PERTINENT MEDS FT
MEDICATIONS  (STANDING):  aspirin  chewable 81 milliGRAM(s) Oral daily  atorvastatin 80 milliGRAM(s) Oral at bedtime  atropine 1% Ophthalmic Solution for SubLingual Use 1 Drop(s) SubLingual three times a day  dextrose 10%. 1000 milliLiter(s) (70 mL/Hr) IV Continuous <Continuous>  dextrose 50% Injectable 25 Gram(s) IV Push once  dextrose 50% Injectable 25 Gram(s) IV Push once  dextrose 50% Injectable 25 Gram(s) IV Push once  dextrose 50% Injectable 12.5 Gram(s) IV Push once  FIRST- Mouthwash  BLM 4 milliLiter(s) Swish and Spit every 6 hours  glucagon  Injectable 1 milliGRAM(s) IntraMuscular once  heparin   Injectable 5000 Unit(s) SubCutaneous every 8 hours  HYDROmorphone  Injectable 1 milliGRAM(s) IV Push every 4 hours  influenza  Vaccine (HIGH DOSE) 0.7 milliLiter(s) IntraMuscular once  insulin lispro (ADMELOG) corrective regimen sliding scale   SubCutaneous every 6 hours  lidocaine 2% Viscous 5 milliLiter(s) Swish and Spit two times a day  metoprolol succinate ER 12.5 milliGRAM(s) Oral every 24 hours  pantoprazole  Injectable 40 milliGRAM(s) IV Push every 12 hours  scopolamine 1 mG/72 Hr(s) Patch 1 Patch Transdermal every 72 hours  silver sulfADIAZINE 1% Cream 1 Application(s) Topical two times a day    MEDICATIONS  (PRN):  dextrose Oral Gel 15 Gram(s) Oral once PRN Blood Glucose LESS THAN 70 milliGRAM(s)/deciliter  HYDROmorphone  Injectable 0.5 milliGRAM(s) IV Push every 3 hours PRN Moderate Pain (4 - 6)  HYDROmorphone  Injectable 1 milliGRAM(s) IV Push every 3 hours PRN Severe Pain (7 - 10)

## 2023-10-31 NOTE — PROGRESS NOTE ADULT - PROBLEM SELECTOR PLAN 5
Presenting with Cr 1.87, baseline appears to fluctuate depending on recent chemo 1.0 - 1.4. Likely elevated in the setting of chemo 10/23 and exacerbated by poor PO intake and hypovolemia. s/p 2L NS in ED.  -Cr trended down to 1.19 on 10/30  - c/w maintenance fluids   - hold lisinopril 20mg given ELENITA Presented with Cr 1.87, baseline appears to fluctuate depending on recent chemo 1.0 - 1.4. Likely elevated in the setting of chemo 10/23 and exacerbated by poor PO intake and hypovolemia. s/p 2L NS in ED.  -Cr trended down to 1.19 on 10/31  - c/w maintenance fluids   - hold lisinopril 20mg given ELENITA

## 2023-10-31 NOTE — DIETITIAN INITIAL EVALUATION ADULT - PERTINENT LABORATORY DATA
10-31    132<L>  |  101  |  25<H>  ----------------------------<  254<H>  4.5   |  23  |  1.19    Ca    8.9      31 Oct 2023 05:30  Phos  2.7     10-31  Mg     1.6     10-31    TPro  6.4  /  Alb  2.9<L>  /  TBili  0.4  /  DBili  x   /  AST  18  /  ALT  13  /  AlkPhos  69  10-31  POCT Blood Glucose.: 220 mg/dL (10-31-23 @ 06:22)  A1C with Estimated Average Glucose Result: 9.0 % (10-30-23 @ 05:30)  A1C with Estimated Average Glucose Result: 7.9 % (07-15-23 @ 06:32)

## 2023-10-31 NOTE — DIETITIAN INITIAL EVALUATION ADULT - PROBLEM SELECTOR PLAN 5
Presenting with Cr 1.87, baseline appears to fluctuate depending on recent chemo 1.0 - 1.4. Likely elevated in the setting of chemo 10/23 and exacerbated by poor PO intake and hypovolemia. s/p 2L NS in ED.  - c/w maintenance fluids   - hold lisinopril 20mg given ELENITA   - continue to trend  - avoid nephrotoxic agents

## 2023-10-31 NOTE — PROGRESS NOTE ADULT - PROBLEM SELECTOR PLAN 1
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 C/o odynophagia causing poor PO intake. Per chart review appears poor PO intake has been ongoing issue while undergoing chemo/radiation. 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 Marr. Last chemo 10/23. 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 Pt would benefit from pain management as well as alternative means of nutrition and hydration to complete RT and optimize nutrition status.   -c/w D5 and NS 0.45% 1 L at 70cc/hr for 12 hrs  -Per palliative,          Dilaudid 0.5mg IV q3h PRN for Moderate Pain         Dilaudid 1mg IV q3h PRN for Severe Pain         Atropine Sublingual Drops TID for local secretion management         Will explore GOC further, granddaughter HCP   -Per rad onc, odynophagia will resolve 2 weeks after completion of radiation   -Per heme onc, no additional chemo. 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 C/o odynophagia causing poor PO intake. Per chart review appears poor PO intake has been ongoing issue while undergoing chemo/radiation. 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 Marr. Last chemo 10/23. 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 Pt would benefit from pain management as well as alternative means of nutrition and hydration to complete RT and optimize nutrition status.   -c/w D10 1L 70cc/hr for 10 hrs  -Per palliative,          Dilaudid 0.5mg IV q3h PRN for Moderate Pain         Dilaudid 1mg IV q3h PRN for Severe Pain         Atropine Sublingual Drops TID for local secretion management         Will explore GOC further, granddaughter HCP   -pt can not tolerate anything PO, switch to oral meds  -f/u pallaitive recs for PEG tube vs. NG tube placement, will speak with pt family  -Per rad onc, odynophagia will resolve 2 weeks after completion of radiation   -Per heme onc, no additional chemo. 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 C/o odynophagia causing poor PO intake. Per chart review appears poor PO intake has been ongoing issue while undergoing chemo/radiation. 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 Marr. Last chemo 10/23. 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 Pt would benefit from pain management as well as alternative means of nutrition and hydration to complete RT and optimize nutrition status.   -c/w D10 1L 70cc/hr for 10 hrs  -Per palliative,          Dilaudid 0.5mg IV q3h PRN for Moderate Pain         Dilaudid 1mg IV q3h PRN for Severe Pain         Atropine Sublingual Drops TID for local secretion management         Will explore GOC further, granddaughter is HCP   -pt can not tolerate anything PO, switch to oral meds  -Per SLP, patient is agreeable to PEG tube  -f/u pallaitive recs for PEG tube vs. NG tube placement, will speak with pt family  -Per rad onc, odynophagia will resolve 2 weeks after completion of radiation   -Per heme onc, no additional chemo. 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 C/o odynophagia causing poor PO intake. Per chart review appears poor PO intake has been ongoing issue while undergoing chemo/radiation. 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 Marr. Last chemo 10/23. 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 Pt would benefit from pain management as well as alternative means of nutrition and hydration to complete RT and optimize nutrition status.   -c/w D10 1L 70cc/hr for 10 hrs  -Per palliative,          Dilaudid 0.5mg IV q3h PRN for Moderate Pain         Dilaudid 1mg IV q3h PRN for Severe Pain         Atropine Sublingual Drops TID for local secretion management         Will explore GOC further, granddaughter is HCP   -pt can not tolerate anything PO  -Per SLP, patient is agreeable to PEG tube  -f/u pallaitive recs for PEG tube vs. NG tube placement, will speak with pt family  -Per rad onc, odynophagia will resolve 2 weeks after completion of radiation   -Per heme onc, no additional chemo. 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 C/o odynophagia causing poor PO intake. Per chart review appears poor PO intake has been ongoing issue while undergoing chemo/radiation. 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 Marr. Last chemo 10/23. 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 Pt would benefit from pain management as well as alternative means of nutrition and hydration to complete RT and optimize nutrition status.   -c/w D10 1L 70cc/hr for 10 hrs  -Per palliative,          Dilaudid 0.5mg IV q3h PRN for Moderate Pain         Dilaudid 1mg IV q3h PRN for Severe Pain         Atropine Sublingual Drops TID for local secretion management         Will explore GOC further, granddaughter is HCP   -pt can not tolerate anything PO  -Per SLP, patient is agreeable to PEG tube  -f/u pallaitive recs for PEG tube vs. NG tube placement, will speak with pt family  -Per rad onc, odynophagia and mucositis should likely resolve 2-3 weeks after completion of radiation   -Per heme onc, no additional chemo. 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 C/o odynophagia causing poor PO intake. Per chart review appears poor PO intake has been ongoing issue while undergoing chemo/radiation. 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 Marr. Last chemo 10/23. 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 Pt would benefit from pain management as well as alternative means of nutrition and hydration to complete RT and optimize nutrition status.   -c/w D10 1L 70cc/hr for 10 hrs  -Per palliative,          Dilaudid 0.5mg IV q3h PRN for Moderate Pain         Dilaudid 1mg IV q3h PRN for Severe Pain         Atropine Sublingual Drops TID for local secretion management         Will explore GOC further, granddaughter is HCP   -pt can not tolerate anything PO  -Per SLP, patient is agreeable to PEG tube  -Plan for IR placed PEG tube  -f/u palliative recs for PEG tube vs. NG tube placement, will speak with pt family  -Per rad onc, odynophagia and mucositis should likely resolve 2-3 weeks after completion of radiation   -Per heme onc, no additional chemo. 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 C/o odynophagia causing poor PO intake. Per chart review appears poor PO intake has been ongoing issue while undergoing chemo/radiation. 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 Marr. Last chemo 10/23. 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 Pt would benefit from pain management as well as alternative means of nutrition and hydration to complete RT and optimize nutrition status.   -c/w D10 1L 70cc/hr for 10 hrs  -Plan for IR placed PEG tube- held aspirin given IR would need to hold for 5 days (last dose of asa on 10/30- per wife,  taking aspirin at home  -Per rad onc, odynophagia and mucositis should likely resolve 2-3 weeks after completion of radiation. no radiation on 10/31. Last treatment 10/30  -Per heme onc, no additional chemo. c/w IVF  - palliative:         Dilaudid 1mg IV q4h ATC (hold for lethargy)        Dilaudid 0.5mg IV q3h PRN for Moderate Pain        Dilaudid 1mg IV q3h PRN for Severe Pain  - magic mouthwash  - viscous lidocaine   -scopolamine patch  -c/w pantoprazole 40 mg IV

## 2023-10-31 NOTE — DIETITIAN INITIAL EVALUATION ADULT - PROBLEM SELECTOR PLAN 3
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 C/o pain, dysphagia and odynophagia for weeks which was tx with magic mouthwash, silver silvadene cream, fluconazole 400mg once a day (started 10/23). Radiation paused due to pain, last RT 10/28.  last On exam -  Bilateral neck wounds - erythematous with skin breakdown, crusted dead skin peripherally, no drainage, no skin sloughing, at baseline per patient. Reports improvement with cream. No concern for superimposed infection at this time  - monitor off abx   - c/w silvadene cream  - c/w fluconazole cream

## 2023-10-31 NOTE — OCCUPATIONAL THERAPY INITIAL EVALUATION ADULT - GENERAL OBSERVATIONS, REHAB EVAL
OT IE complete. RN Lizeth clearing pt. for OOB. PT Allie present. Pt. received semi-supine in bed, +heplock with wounds to the anterior neck and family bedside, in NAD, agreeable to session

## 2023-10-31 NOTE — PROGRESS NOTE ADULT - PROBLEM SELECTOR PLAN 7
P/w WBC 2.3, ANC 1700. Baseline fluctuates with chemo, so leukopenia likely due to myelosuppression from chemo. NO signs/sx infection. 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

## 2023-10-31 NOTE — OCCUPATIONAL THERAPY INITIAL EVALUATION ADULT - PLANNED THERAPY INTERVENTIONS, OT EVAL
ADL retraining/IADL retraining/balance training/bed mobility training/cognitive, visual perceptual/neuromuscular re-education/parent/caregiver training.../strengthening/transfer training

## 2023-10-31 NOTE — PROGRESS NOTE ADULT - ASSESSMENT
·	adjusted pain regimen: scheduled Dilaudid 1mg IV q4h ATC, will potentially de-escalate tomorrow and start a long-acting opiate if indicated  ·	tentative plan for long-term feeding tube placement    FULL NOTE TO FOLLOW 77yo M with PMH of CAD, PVD, T2DM, and SCC of Larynx p/w weakness and odynophagia. Palliative consulted for complex symptom management in the setting of malignancy.    ·	adjusted pain regimen: scheduled Dilaudid 1mg IV q4h ATC, will potentially de-escalate tomorrow and start a long-acting opiate if indicated  ·	tentative plan for long-term feeding tube placement  ·	goal is to complete curative-intent chemo/RT

## 2023-10-31 NOTE — OCCUPATIONAL THERAPY INITIAL EVALUATION ADULT - ADDITIONAL COMMENTS
Pt. with rapid recline recently per family. Pt. resides with spouse in a 2nd floor apartment with stairs. Recently has required increased assist for ADLs and use of cane for mobility. He is legally blind

## 2023-10-31 NOTE — DISCHARGE NOTE PROVIDER - NSDCCPCAREPLAN_GEN_ALL_CORE_FT
PRINCIPAL DISCHARGE DIAGNOSIS  Diagnosis: Odynophagia  Assessment and Plan of Treatment: Odynophagia is painful swallowing. Several things can cause this such as foreign body, infection, tumors, or inflammation. Given your diagnosis of squamous cell carcinoma of the larynx and the radiation treatment you are receiving, you have had increased pain with swallowing and pooling of saliva as well as phlegm. You were given pain medication as well as viscous lidocaine to help alleviate the pain. While in patient, oncology, radiation oncology, palliative, and GI were consulted. You receivied the remainder of radiation treatments in-patient.  Please continue to follow with Dr. Marr and Dr. Rin rocha.     PRINCIPAL DISCHARGE DIAGNOSIS  Diagnosis: Odynophagia  Assessment and Plan of Treatment: Odynophagia is painful swallowing. Several things can cause this such as foreign body, infection, tumors, or inflammation. Given your diagnosis of squamous cell carcinoma of the larynx and the radiation treatment you are receiving, you have had increased pain with swallowing and pooling of saliva as well as phlegm. You were given pain medication as well as viscous lidocaine to help alleviate the pain. While in patient, oncology, radiation oncology, palliative, and GI were consulted. You receivied the remainder of radiation treatments in-patient. Please follow up with your appointments and take the pain medications as prescribed.   If your symptoms persist, worsen, or new concerns arise, including decreased intake, sweats, fevers, chills, or other concerns, please see a provider.   Please continue to follow with Dr. Marr and Dr. Harvey outpatient.      SECONDARY DISCHARGE DIAGNOSES  Diagnosis: Weakness generalized  Assessment and Plan of Treatment: Due to your poor feeding that caused significant weakness, you had PEG tube placed for continuning feeding method that will allow vital nutrients to be provided to you.   Please follow up with routine healthcare needs for the tube as advised.    Diagnosis: Pain management  Assessment and Plan of Treatment: Please follow the following plan for pain and symptom management:   Fentanyl 25mcg/hr Patch q72hr as long-acting agent  morphine 5mg given via PEG every 4 hours as needed for Moderate Pain  morphine 10mg given via PEG tube every 4 hours as needed Severe Pain   Aditionally, please take Seroquel 50mg tablet nightly.

## 2023-10-31 NOTE — DIETITIAN NUTRITION RISK NOTIFICATION - ADDITIONAL COMMENTS/DIETITIAN RECOMMENDATIONS
1. If PEG within GOC, recommend Glucerna 1.2 @ 75mL/hr x 24 hours. This provides: 1800mL TV, 2160kcal, 108g protein, 1449mL free water. Meetinkcal/kg, 1.59g/kg protein based on ABW 68kg. Defer fluids to team. Maintain aspiration precautions. Monitor s/s of intolerance; adjust EN as needed.   >>Recommend starting at 20mL/hr and advancing by [id number] to goal as tolerated. Additional free H2O per team. Monitor for s/s intolerance; maintain aspiration precautions at all times.   2. Monitor labs, wt trends, GI function, and skin integrity   3. RD to remain available for additional nutrition interventions as needed

## 2023-10-31 NOTE — DISCHARGE NOTE PROVIDER - CARE PROVIDER_API CALL
Derek Harvey.  Hematology/Oncology  [address]  [place] [address]  Phone: [phone]  Fax: [phone]  Established Patient  Follow Up Time: 1 week   Derek Harvey.  Hematology/Oncology  [address], [address]  [place] [address]  Phone: [phone]  Fax: [phone]  Established Patient  Follow Up Time: 1 week   Derek Harvey.  Hematology/Oncology  [address]  [place] [address]  Phone: [phone]  Fax: [phone]  Established Patient  Follow Up Time: 1 week

## 2023-10-31 NOTE — DISCHARGE NOTE PROVIDER - DETAILS OF MALNUTRITION DIAGNOSIS/DIAGNOSES
This patient has been assessed with a concern for Malnutrition and was treated during this hospitalization for the following Nutrition diagnosis/diagnoses:     -  10/31/2023: Severe protein-calorie malnutrition

## 2023-10-31 NOTE — PROGRESS NOTE ADULT - PROBLEM SELECTOR PLAN 2
Reports progressive weakness over the last 2 weeks assc w/ fatigue and poor PO intake. Reports this typically happens after chemo. Last meal was 5 days ago, drinks water and tolerates ok. Typically ambulates with cane. orthostatics wnl  - management above   - f/u PT/OT

## 2023-10-31 NOTE — PROGRESS NOTE ADULT - PROBLEM SELECTOR PLAN 4
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 Chemo/RT initiated 08/2023. Follows with Dr. Marr (rad onc) and Dr. Derek Harvey (onc). Last chemo (taxol/carboplatin) on 10/23, last RT 10/18, tolerated well. RT paused due to pain.   -Per radiation onc., possible in patient radiation therapy. Has 6 fractions left  - onc aware pt is admitted, appreciate recs  - palliative consulted for GOC discussion

## 2023-10-31 NOTE — DISCHARGE NOTE PROVIDER - PROVIDER TOKENS
PROVIDER:[TOKEN:[[id number]],FOLLOWUP:[1 week],ESTABLISHEDPATIENT:[T]] PROVIDER:[TOKEN:[[id number]],FOLLOWUP:[1 week],ESTABLISHEDPATIENT:[T]] PROVIDER:[TOKEN:[[id number]],FOLLOWUP:[1 week],ESTABLISHEDPATIENT:[T]]

## 2023-10-31 NOTE — PROGRESS NOTE ADULT - PROBLEM SELECTOR PLAN 6
.  Complex medical decision making / symptom management in the setting of malignancy.    Will continue to follow for ongoing GOC discussion / titration of palliative regimen. Emotional support provided, questions answered.  Active Psychosocial Referrals:  [x]Social Work/Case management [x]PT/OT [x]Chaplaincy []Hospice  []Patient/Family Support []Holistic RN [x]Massage Therapy []Music Therapy []Ethics  Coping: [] well [x] with difficulty [] poor coping [] unable to assess  Support system: [] strong [x] adequate [] inadequate    For new or uncontrolled symptoms, please call Palliative Care at [phone]). The service is available 24/7 (including nights & weekends) to provide symptom management recommendations over the phone as appropriate

## 2023-10-31 NOTE — DIETITIAN INITIAL EVALUATION ADULT - ADD RECOMMEND
1. If NGT/PEG within GOC, recommend Glucerna 1.2 @ 75mL/hr x 24 hours. This provides: 1800mL TV, 2160kcal, 108g protein, 1449mL free water. Meetinkcal/kg, 1.59g/kg protein based on ABW 68kg. Defer fluids to team. Maintain aspiration precautions. Monitor s/s of intolerance; adjust EN as needed.   >>Recommend starting at 20mL/hr and advancing by [id number] to goal as tolerated. Additional free H2O per team. Monitor for s/s intolerance; maintain aspiration precautions at all times.   2. Monitor labs, wt trends, GI function, and skin integrity   3. RD to remain available for additional nutrition interventions as needed 1. If PEG within GOC, recommend Glucerna 1.2 @ 75mL/hr x 24 hours. This provides: 1800mL TV, 2160kcal, 108g protein, 1449mL free water. Meetinkcal/kg, 1.59g/kg protein based on ABW 68kg. Defer fluids to team. Maintain aspiration precautions. Monitor s/s of intolerance; adjust EN as needed.   >>Recommend starting at 20mL/hr and advancing by [id number] to goal as tolerated. Additional free H2O per team. Monitor for s/s intolerance; maintain aspiration precautions at all times.   2. Monitor labs, wt trends, GI function, and skin integrity   3. RD to remain available for additional nutrition interventions as needed

## 2023-10-31 NOTE — DIETITIAN INITIAL EVALUATION ADULT - OTHER CALCULATIONS
Based on Standards of Care pt within % IBW (106%) thus actual body weight used for all calculations (150#). Needs adjusted for advanced age, malnutrition, ca on treatment. Fluid recs per team.

## 2023-10-31 NOTE — DISCHARGE NOTE PROVIDER - NSDCMRMEDTOKEN_GEN_ALL_CORE_FT
aspirin 81 mg oral delayed release tablet: 1 tab(s) orally once a day  atorvastatin 80 mg oral tablet: 1 tab(s) orally once a day (at bedtime)  fluconazole 10 mg/mL oral liquid: 400 milliliter(s) orally once a day  HumaLOG 100 units/mL injectable solution: 7 unit(s) injectable 3 times a day with meals  isosorbide mononitrate 30 mg oral tablet, extended release: 1 tab(s) orally once a day  Lantus 100 units/mL subcutaneous solution: 45 unit(s) subcutaneous once a day (in the morning)  metoprolol succinate 100 mg oral capsule, extended release: 1 cap(s) orally once a day  Myrbetriq 50 mg oral tablet, extended release: 1 tab(s) orally once a day  Protonix 40 mg oral delayed release tablet: 1 tab(s) orally once a day   aspirin 81 mg oral delayed release tablet: 1 tab(s) orally once a day  atorvastatin 80 mg oral tablet: 1 tab(s) orally once a day (at bedtime)  Commode: ICD10 R26  fentaNYL 25 mcg/hr transdermal film, extended release: 25 mcg/hr transdermally every 72 hours MDD: 1 patch  fluconazole 10 mg/mL oral liquid: 400 milliliter(s) orally once a day  HumaLOG 100 units/mL injectable solution: 7 unit(s) injectable 3 times a day with meals  isosorbide mononitrate 30 mg oral tablet, extended release: 1 tab(s) orally once a day  Lantus 100 units/mL subcutaneous solution: 45 unit(s) subcutaneous once a day (in the morning)  metoprolol succinate 100 mg oral capsule, extended release: 1 cap(s) orally once a day  morphine 10 mg/0.5 mL oral solution: 0.25 milliliter(s) orally every 4 hours as needed for Pain MDD: 1.5mL  Myrbetriq 50 mg oral tablet, extended release: 1 tab(s) orally once a day  Protonix 40 mg oral delayed release tablet: 1 tab(s) orally once a day  Rolling Walker: ICD10 R26   aspirin 81 mg oral delayed release tablet: 1 tab(s) orally once a day  atorvastatin 80 mg oral tablet: 1 tab(s) orally once a day (at bedtime)  atropine 1% ophthalmic solution: to each affected eye once a day  Commode: ICD10 R26  fentaNYL 25 mcg/hr transdermal film, extended release: 25 mcg/hr transdermally every 72 hours MDD: 1 patch  fluconazole 10 mg/mL oral liquid: 400 milliliter(s) orally once a day  HumaLOG 100 units/mL injectable solution: 7 unit(s) injectable 3 times a day with meals  ipratropium-albuterol 0.5 mg-2.5 mg/3 mL inhalation solution: 3 milliliter(s) inhaled every 6 hours  Lantus 100 units/mL subcutaneous solution: 45 unit(s) subcutaneous once a day (in the morning)  metoprolol succinate 100 mg oral capsule, extended release: 1 cap(s) orally once a day  morphine 10 mg/0.5 mL oral solution: 0.25 milliliter(s) orally every 4 hours as needed for Pain MDD: 1.5mL  naloxone 0.4 mg/mL injectable solution: 0.4 milligram(s) intravenously  polyethylene glycol 3350 oral powder for reconstitution: 17 gram(s) orally every 12 hours As needed Constipation  Protonix 40 mg oral delayed release tablet: 1 tab(s) orally once a day  Rolling Walker: ICD10 R26  senna leaf extract oral tablet: 2 tab(s) orally once a day (at bedtime)  Seroquel 50 mg oral tablet: 1 tab(s) orally once a day (at bedtime)  silver sulfADIAZINE 1% topical cream: 1 Apply topically to affected area 2 times a day

## 2023-10-31 NOTE — DISCHARGE NOTE PROVIDER - DISCHARGE SERVICE FOR PATIENT
on the discharge service for the patient. I have reviewed and made amendments to the documentation where necessary. Erythromycin Counseling:  I discussed with the patient the risks of erythromycin including but not limited to GI upset, allergic reaction, drug rash, diarrhea, increase in liver enzymes, and yeast infections.

## 2023-10-31 NOTE — DIETITIAN INITIAL EVALUATION ADULT - OTHER INFO
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 Marr/Dr. Derek Gar), presents to the ED with generalized weakness. Reports progressive weakness over the last 2 weeks assc w/ fatigue. 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 C/o odynophagia but tolerating secretions. No shortness of breath or difficulty breathing. Reports dermatitis is stable, and reponds well to cream he was given. No drainage or discharge. Saw Rad on on 10/13. Per chart review appears odynophagia was presenting symptom of cancer. Reported subjective fever for 1 day but did not take his temperature at home. ROS otherwise negative.     Patient and family seen at bedside for nutrition consult. Currently NPO. Patient unable to tolerate PO related to pain on swallowing and sputum production. SLP recommendation for alternate means of nutrition/hydration, goals of care discussion ongoing- see TF recommendations below if plan to pursue PEG. PTA, patient reports ongoing poor PO intake x 2 weeks and endorses significant weight loss x 6 months. Dosing weight: 150#, BMI 24.2, reports UBW of 175#. -25#/14% x 6 months, clinically significant. NFPE notable for moderate muscle wasting to temples. Based on ASPEN guidelines, patient meets criteria for severe malnutrition. No pressure injuries or edema documented. Fecal incontinent, last BM 10/27 per EMR. Labs: Na 132, BUN 25, glucose trending  x 24 hours, HgbA1c 9.0%- on insulin regimen. See full nutrition recommendations below.  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 Marr/Dr. Derek Gar), presents to the ED with generalized weakness. Reports progressive weakness over the last 2 weeks assc w/ fatigue. 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 C/o odynophagia but tolerating secretions. No shortness of breath or difficulty breathing. Reports dermatitis is stable, and reponds well to cream he was given. No drainage or discharge. Saw Rad on on 10/13. Per chart review appears odynophagia was presenting symptom of cancer. Reported subjective fever for 1 day but did not take his temperature at home. ROS otherwise negative.     Patient and family seen at bedside for nutrition consult. Currently NPO. Patient unable to tolerate PO related to pain on swallowing and sputum production. SLP recommendation for alternate means of nutrition/hydration, goals of care discussion ongoing- see TF recommendations below if plan to pursue NGT/PEG. PTA, patient reports ongoing poor PO intake x 2 weeks and endorses significant weight loss x 6 months. Dosing weight: 150#, BMI 24.2, reports UBW of 175#. -25#/14% x 6 months, clinically significant. NFPE notable for moderate muscle wasting to temples. Based on ASPEN guidelines, patient meets criteria for severe malnutrition. No pressure injuries or edema documented. Fecal incontinent, last BM 10/27 per EMR. Labs: Na 132, BUN 25, glucose trending  x 24 hours, HgbA1c 9.0%- on insulin regimen. See full nutrition recommendations below.  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 Marr/Dr. Derek Gar), presents to the ED with generalized weakness. Reports progressive weakness over the last 2 weeks assc w/ fatigue. 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 C/o odynophagia but tolerating secretions. No shortness of breath or difficulty breathing. Reports dermatitis is stable, and reponds well to cream he was given. No drainage or discharge. Saw Rad on on 10/13. Per chart review appears odynophagia was presenting symptom of cancer. Reported subjective fever for 1 day but did not take his temperature at home. ROS otherwise negative.     Patient and family seen at bedside for nutrition consult. Currently NPO. Patient unable to tolerate PO related to pain on swallowing and sputum production. SLP recommendation for alternate means of nutrition/hydration, goals of care discussion ongoing- see TF recommendations below if plan to pursue PEG. PTA, patient reports ongoing poor PO intake x 2 weeks and endorses significant weight loss x 6 months. Dosing weight: 150#, BMI 24.2, reports UBW of 175#. -25#/14% x 6 months, clinically significant. NFPE notable for moderate muscle wasting to temples. Based on ASPEN guidelines, patient meets criteria for severe malnutrition. No pressure injuries or edema documented. Fecal incontinent, last BM 10/27 per EMR. Labs: Na 132, BUN 25, glucose trending  x 24 hours, HgbA1c 9.0%- on insulin regimen. See full nutrition recommendations below.

## 2023-10-31 NOTE — OCCUPATIONAL THERAPY INITIAL EVALUATION ADULT - PERTINENT HX OF CURRENT PROBLEM, REHAB EVAL
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 Marr/Dr. Derek Gar), presents to the ED with generalized weakness. Reports progressive weakness over the last 2 weeks assc w/ fatigue. 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 C/o odynophagia but tolerating secretions. No shortness of breath or difficulty breathing. Reports dermatitis is stable, and reponds well to cream he was given. No drainage or discharge. Saw Rad on on 10/13. Per chart review appears odynophagia was presenting symptom of cancer. Reported subjective fever for 1 day but did not take his temperature at home. ROS otherwise negative.

## 2023-10-31 NOTE — PROGRESS NOTE ADULT - PROBLEM SELECTOR PLAN 3
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 C/o pain, dysphagia and odynophagia for weeks which was tx with magic mouthwash, silver silvadene cream, fluconazole 400mg once a day (started 10/23). Radiation paused due to pain, last RT 10/28.  On exam -  Bilateral neck wounds - erythematous with skin breakdown, crusted dead skin peripherally, no drainage, no skin sloughing, at baseline per patient. Reports improvement with cream. No concern for superimposed infection at this time  - monitor off abx   - Dr. 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 C/o pain, dysphagia and odynophagia for weeks which was tx with magic mouthwash, silver silvadene cream, fluconazole 400mg once a day (started 10/23). Radiation paused due to pain, last RT 10/28.  On exam -  Bilateral neck wounds - erythematous with skin breakdown, crusted dead skin peripherally, no drainage, no skin sloughing, at baseline per patient. Reports improvement with cream. No concern for superimposed infection at this time  - monitor off abx   -Per Rad onc, received RT 10/30 and 10/31. Plan for 4 more treatments   - Dr. 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 C/o pain, dysphagia and odynophagia for weeks which was tx with magic mouthwash, silver silvadene cream, fluconazole 400mg once a day (started 10/23). Radiation paused due to pain, last RT 10/28.  On exam -  Bilateral neck wounds - erythematous with skin breakdown, crusted dead skin peripherally, no drainage, no skin sloughing, at baseline per patient. Reports improvement with cream. No concern for superimposed infection at this time  - monitor off abx   -Per Rad onc, received RT 10/30. No treatment on 10/31. Plan for 4 more treatments   - c/w silvadene cream and non-stick silicon bandage

## 2023-10-31 NOTE — DIETITIAN INITIAL EVALUATION ADULT - PROBLEM SELECTOR PLAN 9
Presenting with hb 9.5. Baseline 9-11. No signs/sx active bleeding. 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

## 2023-10-31 NOTE — PHYSICAL THERAPY INITIAL EVALUATION ADULT - ADDITIONAL COMMENTS
PLOF and social history obtained from patient as well as his wife and granddaughter corroborating at bedside. Pt. lives with his wife in an apt with 1 FOS to enter. At baseline, pt. ambulates independently with a SC, and wife assists him with ADLs. Per granddaughter, pt. has had a rapid functional decline over the past 5 days requiring increased assistance for mobility and ADLs.

## 2023-10-31 NOTE — DIETITIAN INITIAL EVALUATION ADULT - PROBLEM SELECTOR PLAN 6
P/w K 5.4. EKG NSR, no peak T waves. Likely 2/2 elevated Cr in the setting of poor PO intake and hypovolemia. s/p 2L NS in ED.  - f/u repeat K  - treat underlying issue as above  - lokelma if continues to rise

## 2023-10-31 NOTE — DISCHARGE NOTE PROVIDER - HOSPITAL COURSE
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 Marr/Dr. Gar), presents to the ED with generalized weakness and odynophagia     Odynophagia.   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 C/o odynophagia causing poor PO intake. Per chart review appears poor PO intake has been ongoing issue while undergoing chemo/radiation. 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 Marr. Last chemo 10/23. 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 Pt would benefit from pain management as well as alternative means of nutrition and hydration to complete RT and optimize nutrition status.   -c/w D5 and NS 0.45% 1 L at 70cc/hr for 12 hrs  -Per palliative,          Dilaudid 0.5mg IV q3h PRN for Moderate Pain         Dilaudid 1mg IV q3h PRN for Severe Pain         Atropine Sublingual Drops TID for local secretion management         Will explore GOC further, granddaughter HCP   - magic mouthwash  - viscous lidocaine   -f/u HIV 1/2  -f/u heme onc consult.    Radiation dermatitis.   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 C/o pain, dysphagia and odynophagia for weeks which was tx with magic mouthwash, silver silvadene cream, fluconazole 400mg once a day (started 10/23). Radiation paused due to pain, last RT 10/28.  On exam -  Bilateral neck wounds - erythematous with skin breakdown, crusted dead skin peripherally, no drainage, no skin sloughing, at baseline per patient. Reports improvement with cream. No concern for superimposed infection at this time  - monitor off abx   - Dr. Marr aware- plan to continue radiation 10/30 (6 treatments left)  - c/w silvadene cream.    Squamous cell carcinoma of larynx.    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 Chemo/RT initiated 08/2023. Follows with Dr. Marr (rad onc) and Dr. Derek Harvey (onc). Last chemo (taxol/carboplatin) on 10/23, last RT 10/18, tolerated well. RT paused due to pain.   -Per radiation onc., possible in patient radiation therapy  - onc aware pt is admitted, appreciate recs  - palliative consulted for GOC discussion.    ELENITA (acute kidney injury).   Presenting with Cr 1.87, baseline appears to fluctuate depending on recent chemo 1.0 - 1.4. Likely elevated in the setting of chemo 10/23 and exacerbated by poor PO intake and hypovolemia. s/p 2L NS in ED.  -Cr trended down to 1.53 on 10/30  - c/w maintenance fluids   - hold lisinopril 20mg given ELENITA.    Heart failure with mildly reduced ejection fraction.   TTE 07/2023: mild LVH, mild reduced LVH hypertrophy, normal RV function, LV systolic function mild decr EF 50-55%. On exam - volume down w/ dry MM, no LE edema. home meds: imdur 30 once a day, lisinopril 20mg once a day, toprol 12.5mg once a day   - c/w Toprol 12.5 mg  - hold imdur given soft BPs, resume when appropriate   - hold lisinopril given ELENITA.    Leukopenia.   P/w WBC 2.3, ANC 1700. Baseline fluctuates with chemo, so leukopenia likely due to myelosuppression from chemo. NO signs/sx infection. At this time, radiation dermatitis appears at baseline with no superimposed infection  - continue to trend  - if febrile, would obtain infectious work up.    Anemia.   Presenting with hb 9.5. Baseline 9-11. No signs/sx active bleeding. Likely AOCD vs myelosuppression from chemotherapy  -iron studies consistent with anemia of chronic disease.    CAD (coronary artery disease).   CAD s/p MIDCAB and PCI with multiple WINTER (most recent to LCx in 8/2022), on ASA 81 at home. Currently asymptomatic. EKG on admission w/o ischemic changes  - c/w ASA 81  - c/w lipitor 80mg once a day.    DM (diabetes mellitus).   Last A1c 7.9 in 07/2023. Regimen as follows: Lantus 50mg daily, 12units TID with meals for 48 hour after chemo then 8 TID    Hospital course (by problem):     Patient was discharged to: (home/[facility]/acute rehab/hospice, etc, and with what services – home health PT/RN? 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 Marr/Dr. Gar), admitted for odynophagia in setting of laryngeal SCC, now s/p PEG 11/14 and expected to continue with PEG tube on disposition.. Patient is medically ready for discharge with home care services given continuous O2 & suctioning requirements, and followup for continuing treatments.      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 Marr/Dr. Derek Gar), presented to the ED with generalized weakness. Reports progressive weakness over the last 2 weeks associated with fatigue. Poor PO intake 2/2 pain from radiation dermatitis on bilateral neck. Complained of odynophagia but tolerating secretions. Saw Rad on on 10/13. Treatment break on 10/20 and restarted inpatient on 10/30. Final RT treatment was on 11/3. Over the course of his stay, the patient has had copious secretions that require increasing suctioning due to RT therapy. The patient was stepped up to [place] for increased suctioning requirements. Patient was planned to have a PEG tube placement on Monday 11/6 for odynophagia and decreased oral intake due to radiation induced mucositis but was delayed due to concern for worsening irritation of his mucosa if he had to be scoped or if an NGT had to be placed. PEG placed on 11/14 with feeds started 11/15 Glucerna 1.2 @ 10cc/hr --> 20cc/hr 11/16 --> 40cc/hr 11/17 (Goal: 65cc/hr Glucerna 1.2). No concern for Refeeding syndrome upon discharge, and patient stable with home care services, and to followup with radiation appointments and visits.     Hospital course (by problem):     Patient was discharged to: home with home services     New medications: Seroquel 50mg qhs   PAIN REGIMEN  Fentanyl 25mcg/hr Patch q72hr as long-acting agent  HYDROmorphone  1mg IV Push every 4 hours PRN Breakthrough pain  morphine 5mg Oral via PEG every 4 hours PRN Moderate Pain (4 - 6)  morphine 10mg Oral every 4 hours PRN Severe Pain (7 - 10)    Items to follow up as outpatient: Radiation Rad-Onc sessions, PEG checks, suctioning and oxygen needs.     Physical exam at the time of discharge:  GENERAL: resting comfortably in bed  HEAD:  Atraumatic, Normocephalic  EYES: severe cataracts bilaterally   NECK: blisters/erythema/macerated lesion most notable over left lower neck, No JVD, Normal thyroid, no enlarged nodes   NERVOUS SYSTEM:  AAOx3; CNII-XII grossly intact; no focal deficits  CHEST/LUNG: interval improvement in upper airway secretions & breathsounds  HEART: S1/S2 normal, no S3, Regular rate and rhythm; No murmurs   ABDOMEN: Soft, Nontender, Nondistended; PEG in place without edema or erythema  EXTREMITIES:  2+ Peripheral Pulses, No clubbing, cyanosis, or edema. Notable closed sore over left heel, and diffuse ecchymoses over bilateral LE.   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 Mrar/Dr. Gar), admitted for odynophagia in setting of laryngeal SCC, now s/p PEG 11/14 and expected to continue with PEG tube on disposition.. Patient is medically ready for discharge with home care services given continuous O2 & suctioning requirements, and followup for continuing treatments.      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 Marr/Dr. Derek Gar), presented to the ED with generalized weakness. Reports progressive weakness over the last 2 weeks associated with fatigue. Poor PO intake 2/2 pain from radiation dermatitis on bilateral neck. Complained of odynophagia but tolerating secretions. Saw Rad on on 10/13. Treatment break on 10/20 and restarted inpatient on 10/30. Final RT treatment was on 11/3. Over the course of his stay, the patient has had copious secretions that require increasing suctioning due to RT therapy. The patient was stepped up to [place] for increased suctioning requirements. Patient was planned to have a PEG tube placement on Monday 11/6 for odynophagia and decreased oral intake due to radiation induced mucositis but was delayed due to concern for worsening irritation of his mucosa if he had to be scoped or if an NGT had to be placed. PEG placed on 11/14 with feeds started 11/15 Glucerna 1.2 @ 10cc/hr --> 20cc/hr 11/16 --> 40cc/hr 11/17 (Goal: 65cc/hr Glucerna 1.2). No concern for Refeeding syndrome upon discharge, and patient stable with home care services, and to followup with radiation appointments and visits.     Hospital course (by problem):     Patient was discharged to: home with home services     New medications: Seroquel 50mg qhs   PAIN REGIMEN  Fentanyl 25mcg/hr Patch q72hr as long-acting agent  HYDROmorphone  1mg IV Push every 4 hours PRN Breakthrough pain  morphine 5mg Oral via PEG every 4 hours PRN Moderate Pain (4 - 6)  morphine 10mg Oral every 4 hours PRN Severe Pain (7 - 10)    Items to follow up as outpatient: Radiation Rad-Onc sessions, PEG checks, suctioning and oxygen needs.     Physical exam at the time of discharge:  GENERAL: resting comfortably in bed  HEAD:  Atraumatic, Normocephalic  EYES: severe cataracts bilaterally   NECK: blisters/erythema/macerated lesion most notable over left lower neck, No JVD, Normal thyroid, no enlarged nodes   NERVOUS SYSTEM:  AAOx3; CNII-XII grossly intact; no focal deficits  CHEST/LUNG: interval improvement in upper airway secretions & breathsounds  HEART: S1/S2 normal, no S3, Regular rate and rhythm; No murmurs   ABDOMEN: Soft, Nontender, Nondistended; PEG in place without edema or erythema  EXTREMITIES:  2+ Peripheral Pulses, No clubbing, cyanosis, or edema. Notable closed sore over left heel, and diffuse ecchymoses over bilateral LE.   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 Marr/Dr. Gar), admitted for odynophagia in setting of laryngeal SCC, now s/p PEG 11/14 and expected to continue with PEG tube on disposition.. Patient is medically ready for discharge with home care services given continuous O2 & suctioning requirements, and followup for continuing treatments.      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 Marr/Dr. Derek Gar), presented to the ED with generalized weakness. Reports progressive weakness over the last 2 weeks associated with fatigue. Poor PO intake 2/2 pain from radiation dermatitis on bilateral neck. Complained of odynophagia but tolerating secretions. Saw Rad on on 10/13. Treatment break on 10/20 and restarted inpatient on 10/30. Final RT treatment was on 11/3. Over the course of his stay, the patient has had copious secretions that require increasing suctioning due to RT therapy. The patient was stepped up to [place] for increased suctioning requirements. Patient was planned to have a PEG tube placement on Monday 11/6 for odynophagia and decreased oral intake due to radiation induced mucositis but was delayed due to concern for worsening irritation of his mucosa if he had to be scoped or if an NGT had to be placed. PEG placed on 11/14 with feeds started 11/15 Glucerna 1.2 @ 10cc/hr --> 20cc/hr 11/16 --> 40cc/hr 11/17 (Goal: 65cc/hr Glucerna 1.2). No concern for Refeeding syndrome upon discharge, and patient stable with home care services, and to followup with radiation appointments and visits.     Hospital course (by problem):     Patient was discharged to: home with home services     New medications: Seroquel 50mg qhs   PAIN REGIMEN  Fentanyl 25mcg/hr Patch q72hr as long-acting agent  HYDROmorphone  1mg IV Push every 4 hours PRN Breakthrough pain  morphine 5mg Oral via PEG every 4 hours PRN Moderate Pain (4 - 6)  morphine 10mg Oral every 4 hours PRN Severe Pain (7 - 10)    Items to follow up as outpatient: Radiation Rad-Onc sessions, PEG checks, suctioning and oxygen needs.     Physical exam at the time of discharge:  GENERAL: resting comfortably in bed  HEAD:  Atraumatic, Normocephalic  EYES: severe cataracts bilaterally   NECK: blisters/erythema/macerated lesion most notable over left lower neck, No JVD, Normal thyroid, no enlarged nodes   NERVOUS SYSTEM:  AAOx3; CNII-XII grossly intact; no focal deficits  CHEST/LUNG: interval improvement in upper airway secretions & breathsounds  HEART: S1/S2 normal, no S3, Regular rate and rhythm; No murmurs   ABDOMEN: Soft, Nontender, Nondistended; PEG in place without edema or erythema  EXTREMITIES:  2+ Peripheral Pulses, No clubbing, cyanosis, or edema. Notable closed sore over left heel, and diffuse ecchymoses over bilateral LE.   PSYCHIATRIC: affect and characteristics of appearance, verbalizations, behaviors are appropriate

## 2023-10-31 NOTE — DIETITIAN INITIAL EVALUATION ADULT - PROBLEM SELECTOR PLAN 4
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 Chemo/RT initiated 08/2023. Follows with Dr. Marr (rad onc) and Dr. Derek Harvey (onc). Last chemo (taxol/carboplatin) on 10/23, last RT 10/18, tolerated well. RT paused due to pain.   - see abpve   - attempted to initiate GOC however patient did not want to discuss at time of writers assessment

## 2023-10-31 NOTE — PROGRESS NOTE ADULT - PROBLEM SELECTOR PLAN 4
.  Ongoing Chemo/RT with curative intent  -no evidence of metastatic disease at this time, not a hospice qualifying disease  -goal is to complete treatment as soon as recommended

## 2023-10-31 NOTE — DIETITIAN INITIAL EVALUATION ADULT - PROBLEM SELECTOR PLAN 7
TTE 07/2023: mild LVH, mild reduced LVH hypertrophy, normal RV function, LV systolic function mild decr EF 50-55%. On exam - volume down w/ dry MM, no LE edema. home meds: imdur 30 once a day, lisinopril 20mg once a day, toprol 12.5mg once a day   - c/w Toprol  - hold imdur given soft BPs, resume when appropriate   - hold lisinopril given ELENITA

## 2023-10-31 NOTE — DISCHARGE NOTE PROVIDER - NSDCHHCONTACT_GEN_ALL_CORE_FT
Face Mask
As certified below, I, or a nurse practitioner or physician assistant working with me, had a face-to-face encounter that meets the physician face-to-face encounter requirements.

## 2023-10-31 NOTE — PROGRESS NOTE ADULT - PROBLEM SELECTOR PLAN 1
.  -Dilaudid 1mg IV q4h ATC (hold for lethargy)  -Dilaudid 0.5mg IV q3h PRN for Moderate Pain  -Dilaudid 1mg IV q3h PRN for Severe Pain    Will potentially de-escalate tomorrow and start a long-acting opiate if indicated

## 2023-10-31 NOTE — PHYSICAL THERAPY INITIAL EVALUATION ADULT - IMPAIRMENTS CONTRIBUTING TO GAIT DEVIATIONS, PT EVAL
decreased aerobic capacity/endurance/impaired balance/pain/impaired postural control/decreased strength

## 2023-10-31 NOTE — CONSULT NOTE ADULT - ASSESSMENT
A/P: Mr. 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    He notes improvement in symptoms with IV opiate regimen. Hydration will help thin secretions, and agree with IV hydration as needed. He may ultimately require a PEG for safe discharge and adequate nutrition intake- he and his family are considering that option now. Recommend continued application of silvadene, consider dressing neck wound in non-stick silicon bandage (ie mepilex foam pads), and titration of opiates to appropriate levels. Would expect skin and mucositis to be significantly improved 2-3 weeks post RT completion.     Patient has been on tx break since 10/20, with resumption 10/30-10/31. He has 4 tx left. In SCC of H&N, prolonged treatment course is associated with poorer outcomes, with each day of tx extension leading to decreased expected disease control. Ideally we will complete as quickly as possible to maximize his likelihood of cure. However patient currently with moderate performance status and significant discomfort from tx. On exam today, apparently with more moist desquamation (though difficult to assess in setting of appropriate ointment application). We will monitor his skin closely, and may need to resume treatment pause tomorrow. Ideally we would stay on course to finish Friday as planned.    Discussed w patient and his family. I spent 70 minutes assessing patient and addressing questions of family.    Dinesh Marr  Rad Onc

## 2023-10-31 NOTE — OCCUPATIONAL THERAPY INITIAL EVALUATION ADULT - DIAGNOSIS, OT EVAL
Pt. admitted to [facility] for increased weakness and impairments in swallowing from radiation therapy. Upon assessment, pt. demo impaired strength, balance, postural control and activity tolerance.

## 2023-10-31 NOTE — DIETITIAN INITIAL EVALUATION ADULT - PROBLEM SELECTOR PLAN 2
Reports progressive weakness over the last 2 weeks assc w/ fatigue and poor PO intake. Reports this typically happens after chemo. Last meal was 5 days ago, drinks water and tolerates ok. Typically ambulates with cane. NS in   - management above   - f/u orthostatics   - PT/OT

## 2023-10-31 NOTE — PROGRESS NOTE ADULT - PROBLEM SELECTOR PLAN 3
.  PPSV = 60%, requires assistance for most ADLs  -PT Eval recommending SOFI  -c/w supportive care, OOB, encourage movement

## 2023-10-31 NOTE — PROGRESS NOTE ADULT - SUBJECTIVE AND OBJECTIVE BOX
******INCOMPLETE******    Patient is a 78y old  Male who presents with a chief complaint of weakness, poor PO intake (31 Oct 2023 06:27)    OVERNIGHT EVENTS/INTERVAL HPI:    REVIEW OF SYSTEMS:  All other review of systems is negative unless indicated above.    OBJECTIVE:  T(C): 37.2 (10-31-23 @ 05:57), Max: 37.4 (10-30-23 @ 20:43)  HR: 67 (10-31-23 @ 05:57) (67 - 70)  BP: 137/53 (10-31-23 @ 05:57) (137/53 - 160/67)  RR: 16 (10-31-23 @ 05:57) (16 - 18)  SpO2: 96% (10-31-23 @ 05:57) (96% - 99%)  Daily     Daily     Physical Exam:  General: in no acute distress  Eyes: EOMI intact bilaterally. Anicteric sclerae, moist conjunctivae  HENT: Moist mucous membranes  Neck: Trachea midline, supple  Lungs: CTA B/L. No wheezes, rales, or rhonchi  Cardiovascular: RRR. No murmurs, rubs, or gallops  Abdomen: Soft, non-tender non-distended; No rebound or guarding  Extremities: WWP, No clubbing, cyanosis or edema  MSK: No midline bony tenderness. No CVA tenderness bilaterally  Neurological: Alert and oriented x3  Skin: Warm and dry. No obvious rash     Medications:  MEDICATIONS  (STANDING):  aspirin  chewable 81 milliGRAM(s) Oral daily  atorvastatin 80 milliGRAM(s) Oral at bedtime  atropine 1% Ophthalmic Solution for SubLingual Use 1 Drop(s) SubLingual three times a day  dextrose 10%. 1000 milliLiter(s) (70 mL/Hr) IV Continuous <Continuous>  dextrose 50% Injectable 25 Gram(s) IV Push once  dextrose 50% Injectable 25 Gram(s) IV Push once  dextrose 50% Injectable 12.5 Gram(s) IV Push once  dextrose 50% Injectable 25 Gram(s) IV Push once  FIRST- Mouthwash  BLM 4 milliLiter(s) Swish and Spit every 6 hours  glucagon  Injectable 1 milliGRAM(s) IntraMuscular once  heparin   Injectable 5000 Unit(s) SubCutaneous every 8 hours  HYDROmorphone  Injectable 1 milliGRAM(s) IV Push every 6 hours  influenza  Vaccine (HIGH DOSE) 0.7 milliLiter(s) IntraMuscular once  insulin lispro (ADMELOG) corrective regimen sliding scale   SubCutaneous every 6 hours  lidocaine 2% Viscous 5 milliLiter(s) Swish and Spit two times a day  metoprolol succinate ER 12.5 milliGRAM(s) Oral every 24 hours  pantoprazole  Injectable 40 milliGRAM(s) IV Push every 24 hours  scopolamine 1 mG/72 Hr(s) Patch 1 Patch Transdermal every 72 hours  silver sulfADIAZINE 1% Cream 1 Application(s) Topical two times a day    MEDICATIONS  (PRN):  dextrose Oral Gel 15 Gram(s) Oral once PRN Blood Glucose LESS THAN 70 milliGRAM(s)/deciliter  HYDROmorphone  Injectable 1 milliGRAM(s) IV Push every 3 hours PRN Severe Pain (7 - 10)  HYDROmorphone  Injectable 0.5 milliGRAM(s) IV Push every 3 hours PRN Moderate Pain (4 - 6)      Labs:                        9.1    1.89  )-----------( 219      ( 31 Oct 2023 05:30 )             28.6     10-31    132<L>  |  101  |  25<H>  ----------------------------<  254<H>  4.5   |  23  |  1.19    Ca    8.9      31 Oct 2023 05:30  Phos  2.7     10-31  Mg     1.6     10-31    TPro  6.4  /  Alb  2.9<L>  /  TBili  0.4  /  DBili  x   /  AST  18  /  ALT  13  /  AlkPhos  69  10-31      Urinalysis Basic - ( 31 Oct 2023 05:30 )    Color: x / Appearance: x / SG: x / pH: x  Gluc: 254 mg/dL / Ketone: x  / Bili: x / Urobili: x   Blood: x / Protein: x / Nitrite: x   Leuk Esterase: x / RBC: x / WBC x   Sq Epi: x / Non Sq Epi: x / Bacteria: x          Radiology: Reviewed     ******INCOMPLETE******    Patient is a 78y old  Male who presents with a chief complaint of weakness, poor PO intake (31 Oct 2023 06:27)    OVERNIGHT EVENTS/INTERVAL HPI: No acute overnight events reported. Pt seen and examined at bedside this morning. Had radiation treatment yesterday. Admits to 9/10 oropharyngeal pain. Does admit IV pain meds are helping with pain. Not producing as much clear sputum as yesterday. Increased skin peeling on BL lateral neck as well as anterior, darker discoloration. Denies fever, chills, headache, chest pain, SOB, abdominal pain, N/V/D, dysuria    REVIEW OF SYSTEMS:  All other review of systems is negative unless indicated above.    OBJECTIVE:  T(C): 37.2 (10-31-23 @ 05:57), Max: 37.4 (10-30-23 @ 20:43)  HR: 67 (10-31-23 @ 05:57) (67 - 70)  BP: 137/53 (10-31-23 @ 05:57) (137/53 - 160/67)  RR: 16 (10-31-23 @ 05:57) (16 - 18)  SpO2: 96% (10-31-23 @ 05:57) (96% - 99%)  Daily     Daily     Physical Exam:  General: in no acute distress  Eyes: R eye with ptosis. EOMI intact  HENT: Moist mucous membranes. No oral lesions, thrush, erythema. Erythematous plaque with crusting on bilateral lateral neck  Neck: Trachea midline, supple  Lungs: CTA B/L. No wheezes, rales, or rhonchi  Cardiovascular: RRR. No murmurs, rubs, or gallops  Abdomen: Soft, non-tender non-distended; No rebound or guarding  Extremities: WWP, No clubbing, cyanosis or edema  MSK: 5/5  strength b/l  Neurological: Alert and oriented x3  Skin: Warm and dry. No obvious rash    Medications:  MEDICATIONS  (STANDING):  aspirin  chewable 81 milliGRAM(s) Oral daily  atorvastatin 80 milliGRAM(s) Oral at bedtime  atropine 1% Ophthalmic Solution for SubLingual Use 1 Drop(s) SubLingual three times a day  dextrose 10%. 1000 milliLiter(s) (70 mL/Hr) IV Continuous <Continuous>  dextrose 50% Injectable 25 Gram(s) IV Push once  dextrose 50% Injectable 25 Gram(s) IV Push once  dextrose 50% Injectable 12.5 Gram(s) IV Push once  dextrose 50% Injectable 25 Gram(s) IV Push once  FIRST- Mouthwash  BLM 4 milliLiter(s) Swish and Spit every 6 hours  glucagon  Injectable 1 milliGRAM(s) IntraMuscular once  heparin   Injectable 5000 Unit(s) SubCutaneous every 8 hours  HYDROmorphone  Injectable 1 milliGRAM(s) IV Push every 6 hours  influenza  Vaccine (HIGH DOSE) 0.7 milliLiter(s) IntraMuscular once  insulin lispro (ADMELOG) corrective regimen sliding scale   SubCutaneous every 6 hours  lidocaine 2% Viscous 5 milliLiter(s) Swish and Spit two times a day  metoprolol succinate ER 12.5 milliGRAM(s) Oral every 24 hours  pantoprazole  Injectable 40 milliGRAM(s) IV Push every 24 hours  scopolamine 1 mG/72 Hr(s) Patch 1 Patch Transdermal every 72 hours  silver sulfADIAZINE 1% Cream 1 Application(s) Topical two times a day    MEDICATIONS  (PRN):  dextrose Oral Gel 15 Gram(s) Oral once PRN Blood Glucose LESS THAN 70 milliGRAM(s)/deciliter  HYDROmorphone  Injectable 1 milliGRAM(s) IV Push every 3 hours PRN Severe Pain (7 - 10)  HYDROmorphone  Injectable 0.5 milliGRAM(s) IV Push every 3 hours PRN Moderate Pain (4 - 6)      Labs:                        9.1    1.89  )-----------( 219      ( 31 Oct 2023 05:30 )             28.6     10-31    132<L>  |  101  |  25<H>  ----------------------------<  254<H>  4.5   |  23  |  1.19    Ca    8.9      31 Oct 2023 05:30  Phos  2.7     10-31  Mg     1.6     10-31    TPro  6.4  /  Alb  2.9<L>  /  TBili  0.4  /  DBili  x   /  AST  18  /  ALT  13  /  AlkPhos  69  10-31      Urinalysis Basic - ( 31 Oct 2023 05:30 )    Color: x / Appearance: x / SG: x / pH: x  Gluc: 254 mg/dL / Ketone: x  / Bili: x / Urobili: x   Blood: x / Protein: x / Nitrite: x   Leuk Esterase: x / RBC: x / WBC x   Sq Epi: x / Non Sq Epi: x / Bacteria: x          Radiology: Reviewed     ******INCOMPLETE******    Patient is a 78y old  Male who presents with a chief complaint of weakness, poor PO intake (31 Oct 2023 06:27)    OVERNIGHT EVENTS/INTERVAL HPI: No acute overnight events reported. Pt seen and examined at bedside this morning. Had radiation treatment yesterday. Admits to 9/10 oropharyngeal pain. Does admit IV pain meds are helping with pain. Not producing as much clear sputum as yesterday. Increased skin peeling on BL lateral neck as well as anterior, darker discoloration. Denies fever, chills, headache, chest pain, SOB, abdominal pain, N/V/D, dysuria    REVIEW OF SYSTEMS:  All other review of systems is negative unless indicated above.    OBJECTIVE:  T(C): 37.2 (10-31-23 @ 05:57), Max: 37.4 (10-30-23 @ 20:43)  HR: 67 (10-31-23 @ 05:57) (67 - 70)  BP: 137/53 (10-31-23 @ 05:57) (137/53 - 160/67)  RR: 16 (10-31-23 @ 05:57) (16 - 18)  SpO2: 96% (10-31-23 @ 05:57) (96% - 99%)  Daily     Daily     Physical Exam:  General: in no acute distress  Eyes: R eye with ptosis. EOMI intact  HENT: Moist mucous membranes. No oral lesions, thrush, erythema. Erythematous plaque with crusting on bilateral lateral neck  Neck: Trachea midline, supple  Lungs: CTA B/L. No wheezes, rales, or rhonchi  Cardiovascular: RRR. No murmurs, rubs, or gallops  Abdomen: Soft, non-tender non-distended; No rebound or guarding  Extremities: WWP, No clubbing, cyanosis or edema  MSK: 5/5  strength b/l  Neurological: Alert and oriented x3  Skin: Warm and dry. No obvious rash    Medications:  MEDICATIONS  (STANDING):  aspirin  chewable 81 milliGRAM(s) Oral daily  atorvastatin 80 milliGRAM(s) Oral at bedtime  atropine 1% Ophthalmic Solution for SubLingual Use 1 Drop(s) SubLingual three times a day  dextrose 10%. 1000 milliLiter(s) (70 mL/Hr) IV Continuous <Continuous>  dextrose 50% Injectable 25 Gram(s) IV Push once  dextrose 50% Injectable 12.5 Gram(s) IV Push once  dextrose 50% Injectable 25 Gram(s) IV Push once  dextrose 50% Injectable 25 Gram(s) IV Push once  FIRST- Mouthwash  BLM 4 milliLiter(s) Swish and Spit every 6 hours  glucagon  Injectable 1 milliGRAM(s) IntraMuscular once  heparin   Injectable 5000 Unit(s) SubCutaneous every 8 hours  HYDROmorphone  Injectable 1 milliGRAM(s) IV Push every 6 hours  influenza  Vaccine (HIGH DOSE) 0.7 milliLiter(s) IntraMuscular once  insulin lispro (ADMELOG) corrective regimen sliding scale   SubCutaneous every 6 hours  lidocaine 2% Viscous 5 milliLiter(s) Swish and Spit two times a day  metoprolol succinate ER 12.5 milliGRAM(s) Oral every 24 hours  pantoprazole  Injectable 40 milliGRAM(s) IV Push every 24 hours  scopolamine 1 mG/72 Hr(s) Patch 1 Patch Transdermal every 72 hours  silver sulfADIAZINE 1% Cream 1 Application(s) Topical two times a day    MEDICATIONS  (PRN):  dextrose Oral Gel 15 Gram(s) Oral once PRN Blood Glucose LESS THAN 70 milliGRAM(s)/deciliter  HYDROmorphone  Injectable 0.5 milliGRAM(s) IV Push every 3 hours PRN Moderate Pain (4 - 6)  HYDROmorphone  Injectable 1 milliGRAM(s) IV Push every 3 hours PRN Severe Pain (7 - 10)        Labs:                        9.1    1.89  )-----------( 219      ( 31 Oct 2023 05:30 )             28.6     10-31    132<L>  |  101  |  25<H>  ----------------------------<  254<H>  4.5   |  23  |  1.19    Ca    8.9      31 Oct 2023 05:30  Phos  2.7     10-31  Mg     1.6     10-31    TPro  6.4  /  Alb  2.9<L>  /  TBili  0.4  /  DBili  x   /  AST  18  /  ALT  13  /  AlkPhos  69  10-31      Urinalysis Basic - ( 31 Oct 2023 05:30 )    Color: x / Appearance: x / SG: x / pH: x  Gluc: 254 mg/dL / Ketone: x  / Bili: x / Urobili: x   Blood: x / Protein: x / Nitrite: x   Leuk Esterase: x / RBC: x / WBC x   Sq Epi: x / Non Sq Epi: x / Bacteria: x          Radiology: Reviewed   OVERNIGHT EVENTS/INTERVAL HPI: No acute overnight events reported. Pt seen and examined at bedside this morning. Had radiation treatment yesterday. Admits to 9/10 oropharyngeal pain. Does admit IV pain meds are helping with pain. Not producing as much clear sputum as yesterday. Increased skin peeling on BL lateral neck as well as anterior, darker discoloration. Denies fever, chills, headache, chest pain, SOB, abdominal pain, N/V/D, dysuria    REVIEW OF SYSTEMS:  All other review of systems is negative unless indicated above.    OBJECTIVE:  T(C): 37.2 (10-31-23 @ 05:57), Max: 37.4 (10-30-23 @ 20:43)  HR: 67 (10-31-23 @ 05:57) (67 - 70)  BP: 137/53 (10-31-23 @ 05:57) (137/53 - 160/67)  RR: 16 (10-31-23 @ 05:57) (16 - 18)  SpO2: 96% (10-31-23 @ 05:57) (96% - 99%)  Daily     Daily     Physical Exam:  General: in no acute distress  Eyes: R eye with ptosis. EOMI intact  HENT: Moist mucous membranes. No oral lesions, thrush, erythema. Erythematous plaque with crusting on bilateral lateral neck  Neck: Trachea midline, supple  Lungs: CTA B/L. No wheezes, rales, or rhonchi  Cardiovascular: RRR. No murmurs, rubs, or gallops  Abdomen: Soft, non-tender non-distended; No rebound or guarding  Extremities: WWP, No clubbing, cyanosis or edema  MSK: 5/5  strength b/l  Neurological: Alert and oriented x3  Skin: Warm and dry. No obvious rash    Medications:  MEDICATIONS  (STANDING):  aspirin  chewable 81 milliGRAM(s) Oral daily  atorvastatin 80 milliGRAM(s) Oral at bedtime  atropine 1% Ophthalmic Solution for SubLingual Use 1 Drop(s) SubLingual three times a day  dextrose 10%. 1000 milliLiter(s) (70 mL/Hr) IV Continuous <Continuous>  dextrose 50% Injectable 25 Gram(s) IV Push once  dextrose 50% Injectable 12.5 Gram(s) IV Push once  dextrose 50% Injectable 25 Gram(s) IV Push once  dextrose 50% Injectable 25 Gram(s) IV Push once  FIRST- Mouthwash  BLM 4 milliLiter(s) Swish and Spit every 6 hours  glucagon  Injectable 1 milliGRAM(s) IntraMuscular once  heparin   Injectable 5000 Unit(s) SubCutaneous every 8 hours  HYDROmorphone  Injectable 1 milliGRAM(s) IV Push every 6 hours  influenza  Vaccine (HIGH DOSE) 0.7 milliLiter(s) IntraMuscular once  insulin lispro (ADMELOG) corrective regimen sliding scale   SubCutaneous every 6 hours  lidocaine 2% Viscous 5 milliLiter(s) Swish and Spit two times a day  metoprolol succinate ER 12.5 milliGRAM(s) Oral every 24 hours  pantoprazole  Injectable 40 milliGRAM(s) IV Push every 24 hours  scopolamine 1 mG/72 Hr(s) Patch 1 Patch Transdermal every 72 hours  silver sulfADIAZINE 1% Cream 1 Application(s) Topical two times a day    MEDICATIONS  (PRN):  dextrose Oral Gel 15 Gram(s) Oral once PRN Blood Glucose LESS THAN 70 milliGRAM(s)/deciliter  HYDROmorphone  Injectable 0.5 milliGRAM(s) IV Push every 3 hours PRN Moderate Pain (4 - 6)  HYDROmorphone  Injectable 1 milliGRAM(s) IV Push every 3 hours PRN Severe Pain (7 - 10)        Labs:                        9.1    1.89  )-----------( 219      ( 31 Oct 2023 05:30 )             28.6     10-31    132<L>  |  101  |  25<H>  ----------------------------<  254<H>  4.5   |  23  |  1.19    Ca    8.9      31 Oct 2023 05:30  Phos  2.7     10-31  Mg     1.6     10-31    TPro  6.4  /  Alb  2.9<L>  /  TBili  0.4  /  DBili  x   /  AST  18  /  ALT  13  /  AlkPhos  69  10-31      Urinalysis Basic - ( 31 Oct 2023 05:30 )    Color: x / Appearance: x / SG: x / pH: x  Gluc: 254 mg/dL / Ketone: x  / Bili: x / Urobili: x   Blood: x / Protein: x / Nitrite: x   Leuk Esterase: x / RBC: x / WBC x   Sq Epi: x / Non Sq Epi: x / Bacteria: x          Radiology: Reviewed

## 2023-10-31 NOTE — PROGRESS NOTE ADULT - PROBLEM SELECTOR PLAN 5
.  Patient is Full Code  -patient appointed granddaughter (Yvrose Smith, [phone]) as alternate decision maker  -see prior GOC note

## 2023-10-31 NOTE — DISCHARGE NOTE PROVIDER - NSDCCPTREATMENT_GEN_ALL_CORE_FT
PRINCIPAL PROCEDURE  Procedure: Insertion, PEG tube  Findings and Treatment:       SECONDARY PROCEDURE  Procedure: Transthoracic echocardiography (TTE)  Findings and Treatment: CONCLUSIONS:   1. Basal and mid inferolateral wall and mid anterolateral segment are   akinetic.   2. Regional wall motion abnormalities consistent with ischemic heart   disease.   3. Mildly reduced left ventricular systolic function.   4. Normal right ventricular size and systolic function.   5. No hemodynamically significant valvular disease.   6. Moderate pulmonary hypertension.   7. No pericardial effusion.

## 2023-10-31 NOTE — PROGRESS NOTE ADULT - PROBLEM SELECTOR PLAN 6
TTE 07/2023: mild LVH, mild reduced LVH hypertrophy, normal RV function, LV systolic function mild decr EF 50-55%. On exam - volume down w/ dry MM, no LE edema. home meds: imdur 30 once a day, lisinopril 20mg once a day, toprol 12.5mg once a day   - c/w Toprol 12.5 mg  - hold imdur given soft BPs, resume when appropriate   - hold lisinopril given ELENITA TTE 07/2023: mild LVH, mild reduced LVH hypertrophy, normal RV function, LV systolic function mild decr EF 50-55%. On exam - volume down w/ dry MM, no LE edema. home meds: imdur 30 once a day, lisinopril 20mg once a day, toprol 12.5mg once a day   - c/w Toprol 12.5 mg- converted to IV 1.25 q6 given pt cannot tolerate PO  - hold imdur given soft BPs, resume when appropriate   - hold lisinopril given ELENITA

## 2023-10-31 NOTE — CONSULT NOTE ADULT - SUBJECTIVE AND OBJECTIVE BOX
Mr. 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 He is undergoing definitive CRT to 70 Gy in 35 fx with concurrent cisplatin (med onc Dr. Harvey, ENT Dr. Styles), and had completed 29/35 treatments at time of this admission. He presents to ED after reporting that he was unable to take PO due to odynophagia refractory to oxycodone 10mg. He is seen as an inpatient for evaluation.    Patient is well known to our department and history is documented in prior consult notes. He has been tolerating treatment well overall, but with expected development of dysphagia and dermatitis in second half of course. He was noted to have moist desquamation on 10/18/23, and on 10/20/23 we elected to hold RT to allow skin recovery for 1 week (patient having received 29/35 fractions). He was also noted to have dysphagia refractory to oxy 5, so careful increase to oxy 10mg q6h PRN was recommended. He received chemo as scheduled on 10/23, and upon planned reassessment 10/25/23 skin was still grade 2 with patchy moist dermatitis so hold was continued as planned. Over weekend, patient's daughter reached out to me to show photos of her father's skin as requested, and also commented that he was unable to take any fluids by mouth for 1 day due to pain. Oxy 10 with minimal effect. Given concerning lack of PO intake, was advised to present to the ED.    In ED, patient with findings of moderate dehydration and was admitted for further control of his tx-related symptoms.    He underwent RT yesterday (10/30) and today (10/31), with 31/35 fx completed. Medical oncology assessed patient and recommended no further chemo.    He was evaluated by pall care, with recommendation for Dilaudid IV PRN and atropine drops for dry mouth.    He was evaluated by GI who recommended topical agents for likely RT esophagitis.    On evaluation today, patient resting comfortably in bed, though reporting 7/10 pain improved with IV pain medications. Still with phlegm. No lightheadedness/dizziness, palpitations, chest pain.    PE: Vitals per chart  Gen: NAD  H&N: clear oral cavity. B/l hyper and hypopigmentation, with grade 2 dermatitis on R and grade 2/3 dermatitis on L, peeling skin and debris from ointments and skin present. No erythema or drainage.  Neuro: Long-standing blindness, otherwise intact

## 2023-10-31 NOTE — PROGRESS NOTE ADULT - SUBJECTIVE AND OBJECTIVE BOX
[facility]  Dickson Roberson, Palliative Care Attending  Contact Info: Call [phone] (HEAL Line) or message on Microsoft Teams (Dickson Roberson)    SUBJECTIVE AND OBJECTIVE:  INTERVAL HPI/OVERNIGHT EVENTS: Interval events noted. See patient's PRN use for the past 24hrs noted below. Comprehensive symptom assessment and GOC exploration as noted below. Extensive time spent discussing plan of care with patient/family. No unexpected adverse effects of opiates noted: no sedation/dizziness/nausea.    ALLERGIES:  No Known Allergies    MEDICATIONS  (STANDING):  atorvastatin 80 milliGRAM(s) Oral at bedtime  atropine 1% Ophthalmic Solution for SubLingual Use 1 Drop(s) SubLingual three times a day  dextrose 10%. 1000 milliLiter(s) (70 mL/Hr) IV Continuous <Continuous>  dextrose 50% Injectable 25 Gram(s) IV Push once  dextrose 50% Injectable 25 Gram(s) IV Push once  dextrose 50% Injectable 12.5 Gram(s) IV Push once  dextrose 50% Injectable 25 Gram(s) IV Push once  FIRST- Mouthwash  BLM 4 milliLiter(s) Swish and Spit every 6 hours  glucagon  Injectable 1 milliGRAM(s) IntraMuscular once  heparin   Injectable 5000 Unit(s) SubCutaneous every 8 hours  HYDROmorphone  Injectable 1 milliGRAM(s) IV Push every 4 hours  influenza  Vaccine (HIGH DOSE) 0.7 milliLiter(s) IntraMuscular once  insulin lispro (ADMELOG) corrective regimen sliding scale   SubCutaneous every 6 hours  lidocaine 2% Viscous 5 milliLiter(s) Swish and Spit two times a day  magnesium sulfate  IVPB 2 Gram(s) IV Intermittent once  metoprolol succinate ER 12.5 milliGRAM(s) Oral every 24 hours  pantoprazole  Injectable 40 milliGRAM(s) IV Push every 12 hours  scopolamine 1 mG/72 Hr(s) Patch 1 Patch Transdermal every 72 hours  silver sulfADIAZINE 1% Cream 1 Application(s) Topical two times a day    MEDICATIONS  (PRN):  dextrose Oral Gel 15 Gram(s) Oral once PRN Blood Glucose LESS THAN 70 milliGRAM(s)/deciliter  HYDROmorphone  Injectable 0.5 milliGRAM(s) IV Push every 3 hours PRN Moderate Pain (4 - 6)  HYDROmorphone  Injectable 1 milliGRAM(s) IV Push every 3 hours PRN Severe Pain (7 - 10)      Analgesic Use (Scheduled and PRNs) for past 24 hours:    HYDROmorphone  Injectable   1 milliGRAM(s) IV Push (10-31-23 @ 12:58)    HYDROmorphone  Injectable   1 milliGRAM(s) IV Push (10-31-23 @ 06:36)   1 milliGRAM(s) IV Push (10-31-23 @ 00:17)   1 milliGRAM(s) IV Push (10-30-23 @ 18:23)    scopolamine 1 mG/72 Hr(s) Patch   1 Patch Transdermal (10-31-23 @ 00:17)      ITEMS UNCHECKED ARE NOT PRESENT  PRESENT SYMPTOMS/REVIEW OF SYSTEMS: []Unable to obtain due to poor mentation   Source if other than patient:  []Family   []Team         Vital Signs Last 24 Hrs  T(C): 37.1 (31 Oct 2023 12:37), Max: 37.4 (30 Oct 2023 20:43)  T(F): 98.7 (31 Oct 2023 12:37), Max: 99.4 (30 Oct 2023 20:43)  HR: 81 (31 Oct 2023 12:37) (67 - 81)  BP: 161/66 (31 Oct 2023 12:37) (137/53 - 161/66)  BP(mean): 81 (31 Oct 2023 05:57) (81 - 81)  RR: 16 (31 Oct 2023 12:37) (16 - 17)  SpO2: 96% (31 Oct 2023 12:37) (96% - 97%)    Parameters below as of 31 Oct 2023 12:37  Patient On (Oxygen Delivery Method): room air        LABS: Personally reviewed and interpreted                        9.1    1.89  )-----------( 219      ( 31 Oct 2023 05:30 )             28.6   10-31    132<L>  |  101  |  25<H>  ----------------------------<  254<H>  4.5   |  23  |  1.19    Ca    8.9      31 Oct 2023 05:30  Phos  2.7     10-31  Mg     1.6     10-31    TPro  6.4  /  Alb  2.9<L>  /  TBili  0.4  /  DBili  x   /  AST  18  /  ALT  13  /  AlkPhos  69  10-31      RADIOLOGY & ADDITIONAL STUDIES: None new    DISCUSSION OF CASE: Family - to provide updates and emotional support; Primary Team/RN - to discuss plan of care [facility]  Dickson Roberson, Palliative Care Attending  Contact Info: Call [phone] (HEAL Line) or message on Microsoft Teams (Dickson Roberson)    SUBJECTIVE AND OBJECTIVE:  INTERVAL HPI/OVERNIGHT EVENTS: Interval events noted. Resting more comfortably compared to yesterday but still having persistent pain. Discussed with wife and granddaughter they discussed long-term feeding tube with the patient. See patient's PRN use for the past 24hrs noted below. Comprehensive symptom assessment and GOC exploration as noted below. Extensive time spent discussing plan of care with patient/family. No unexpected adverse effects of opiates noted: no sedation/dizziness/nausea.    ALLERGIES:  No Known Allergies    MEDICATIONS  (STANDING):  atorvastatin 80 milliGRAM(s) Oral at bedtime  atropine 1% Ophthalmic Solution for SubLingual Use 1 Drop(s) SubLingual three times a day  dextrose 10%. 1000 milliLiter(s) (70 mL/Hr) IV Continuous <Continuous>  dextrose 50% Injectable 25 Gram(s) IV Push once  dextrose 50% Injectable 25 Gram(s) IV Push once  dextrose 50% Injectable 12.5 Gram(s) IV Push once  dextrose 50% Injectable 25 Gram(s) IV Push once  FIRST- Mouthwash  BLM 4 milliLiter(s) Swish and Spit every 6 hours  glucagon  Injectable 1 milliGRAM(s) IntraMuscular once  heparin   Injectable 5000 Unit(s) SubCutaneous every 8 hours  HYDROmorphone  Injectable 1 milliGRAM(s) IV Push every 4 hours  influenza  Vaccine (HIGH DOSE) 0.7 milliLiter(s) IntraMuscular once  insulin lispro (ADMELOG) corrective regimen sliding scale   SubCutaneous every 6 hours  lidocaine 2% Viscous 5 milliLiter(s) Swish and Spit two times a day  magnesium sulfate  IVPB 2 Gram(s) IV Intermittent once  metoprolol succinate ER 12.5 milliGRAM(s) Oral every 24 hours  pantoprazole  Injectable 40 milliGRAM(s) IV Push every 12 hours  scopolamine 1 mG/72 Hr(s) Patch 1 Patch Transdermal every 72 hours  silver sulfADIAZINE 1% Cream 1 Application(s) Topical two times a day    MEDICATIONS  (PRN):  dextrose Oral Gel 15 Gram(s) Oral once PRN Blood Glucose LESS THAN 70 milliGRAM(s)/deciliter  HYDROmorphone  Injectable 0.5 milliGRAM(s) IV Push every 3 hours PRN Moderate Pain (4 - 6)  HYDROmorphone  Injectable 1 milliGRAM(s) IV Push every 3 hours PRN Severe Pain (7 - 10)      Analgesic Use (Scheduled and PRNs) for past 24 hours:  HYDROmorphone  Injectable   1 milliGRAM(s) IV Push (10-31-23 @ 12:58)  HYDROmorphone  Injectable   1 milliGRAM(s) IV Push (10-31-23 @ 06:36)   1 milliGRAM(s) IV Push (10-31-23 @ 00:17)   1 milliGRAM(s) IV Push (10-30-23 @ 18:23)    scopolamine 1 mG/72 Hr(s) Patch   1 Patch Transdermal (10-31-23 @ 00:17)    ITEMS UNCHECKED ARE NOT PRESENT  PRESENT SYMPTOMS/REVIEW OF SYSTEMS: []Unable to obtain due to poor mentation   Source if other than patient:  []Family   []Team     Pain: [x] yes [] no  QOL impact - debilitating  Location - nack, throat                   Aggravating Factors - secretions, swallowing  Quality - sharp  Radiation -  Timing - constant  Severity (0-10 scale) - 6  Minimal Acceptable Level (0-10 scale) - 2    Dyspnea:                           []Mild  []Moderate []Severe  Anxiety:                             []Mild []Moderate []Severe  Fatigue:                             []Mild []Moderate []Severe  Nausea:                             []Mild []Moderate []Severe  Loss of Appetite:              []Mild []Moderate [x]Severe  Constipation:                    []Mild []Moderate []Severe    Other Symptoms:  [x]All other review of systems negative     Palliative Performance Status Version 2:  60%  (Functional Assessment Tool)    GENERAL:  [x] NAD []Alert []Lethargic  []Cachexia  []Unarousable  [x]Verbal  []Non-Verbal  BEHAVIORAL:   []Anxiety  []Delirium []Agitation [x]Cooperative [x]Oriented x3  HEENT:  [x]Normal  [] Moist Mucous Membranes [x]Dry mouth   []ET Tube/Trach  []Oral lesions  PULMONARY:   [x]Clear []Tachypnea  []Audible excessive secretions  [x]Normal Work of Breathing []Labored Breathing  []Rhonchi []Crackles []Wheezing  CARDIOVASCULAR:    [x]Regular Rate [x]Regular Rhythm []Irregular []Tachy  []Yaya  GASTROINTESTINAL:  [x]Soft  []Distended   [x]+BS  [x]Non tender []Tender  []PEG []OGT/ NGT  Last BM:  GENITOURINARY:  [x]Normal [] Incontinent   []Oliguria/Anuria   []Paige  MUSCULOSKELETAL:   [x]Normal Extremities  [x]Weakness  []Bed/Wheelchair bound []Edema  NEUROLOGIC:   []No focal deficits  []Cognitive impairment  [x]Dysphagia []Dysarthria []Paresis []Encephalopathic  SKIN:   []Normal   []Pressure ulcer(s)  [x]Rash    Vital Signs Last 24 Hrs  T(C): 37.1 (31 Oct 2023 12:37), Max: 37.4 (30 Oct 2023 20:43)  T(F): 98.7 (31 Oct 2023 12:37), Max: 99.4 (30 Oct 2023 20:43)  HR: 81 (31 Oct 2023 12:37) (67 - 81)  BP: 161/66 (31 Oct 2023 12:37) (137/53 - 161/66)  BP(mean): 81 (31 Oct 2023 05:57) (81 - 81)  RR: 16 (31 Oct 2023 12:37) (16 - 17)  SpO2: 96% (31 Oct 2023 12:37) (96% - 97%)    Parameters below as of 31 Oct 2023 12:37  Patient On (Oxygen Delivery Method): room air    LABS: Personally reviewed and interpreted                      9.1    1.89  )-----------( 219      ( 31 Oct 2023 05:30 )             28.6   10-31    132<L>  |  101  |  25<H>  ----------------------------<  254<H>  4.5   |  23  |  1.19    Ca    8.9      31 Oct 2023 05:30  Phos  2.7     10-31  Mg     1.6     10-31  TPro  6.4  /  Alb  2.9<L>  /  TBili  0.4  /  DBili  x   /  AST  18  /  ALT  13  /  AlkPhos  69  10-31    RADIOLOGY & ADDITIONAL STUDIES: None new    DISCUSSION OF CASE: Granddaughter - to provide updates and emotional support; Primary Team/RN - to discuss plan of care

## 2023-10-31 NOTE — PROGRESS NOTE ADULT - PROBLEM SELECTOR PLAN 2
.  -Atropine Sublingual Drops TID for local secretion management  -trial of Scopolamine Patch to reduce secretion burden  -tentative plan for long-term feeding tube placement

## 2023-10-31 NOTE — OCCUPATIONAL THERAPY INITIAL EVALUATION ADULT - MODIFIED CLINICAL TEST OF SENSORY INTEGRATION IN BALANCE TEST
Pt. able to engage in approx 4-5 lateral steps with Min Ax2 and RW, required physical assist 2/2 impaired vision ot use and guide RW noted retropulsive, reporting not feeling well, requiring to sit down

## 2023-10-31 NOTE — PROGRESS NOTE ADULT - ATTENDING COMMENTS
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 Marr/Dr. Gar), presents to the ED with odynophagia    #Odynophagia   #Radiation dermatitis   #Squamous Cell Carcinoma of Larynx   #Leukopenia and anemia 2/2 chemo   #Severe protein calorie malnutrition   #DM   #Hypoglycemia     -Odynophagia likely radiation related, know complication. Odynophagia will likely resolve within 2 weeks of completion of radiation. Family agreed to PEG however will need to be off aspirin for 5 days. Last dose was yesterday. Palliative following for pain management.   -2nd fraction of radiation received today by dermatitis worsening so my have to go on break tomorrow again. Dressing changes and wound care according to rad-onc.   -Continue to monitor cbc daily. Leukopenia and anemia likely 2/2 chemo.   -Started on D10, FS Q6. ISS. Hold Lantus for hypoglycemia.     Rest as per resident note .

## 2023-10-31 NOTE — DISCHARGE NOTE PROVIDER - NSDCFUSCHEDAPPT_GEN_ALL_CORE_FT
Derek Harvey  [facility] Physician [facility]  [facility] [address]  Scheduled Appointment: 11/06/2023    Chasity Linn  [facility] Physician [facility]  [facility] [address]  Scheduled Appointment: 12/18/2023     Derek aHrvey  [facility] Physician [facility]  [facility] [address]  Scheduled Appointment: 11/06/2023    Chasity Linn  [facility] Physician [facility]  [facility] [address]  Scheduled Appointment: 12/18/2023     Derek Harvey  [facility]  [facility] [address]  Scheduled Appointment: 11/06/2023    Chasity Linn  [facility] Physician [facility]  [facility] [address]  Scheduled Appointment: 12/18/2023     Chasity Linn Physician [facility]  END[facility]CRIN [address]  Scheduled Appointment: 12/18/2023     Chasity Linn Physician [facility]  [facility]CR[facility] [address]  Scheduled Appointment: 12/18/2023     Chasity Linn Physician [facility]  [facility] [address]  Scheduled Appointment: 12/18/2023

## 2023-11-01 NOTE — PROGRESS NOTE ADULT - SUBJECTIVE AND OBJECTIVE BOX
Rad Onc Progress note    After long discussion with patient and family, all agreed to proceed with tx today given his subjective improvement in dyphagia with opiate regimen. He has now completed tx 10/30, 10/31, 11/1. He is still on track to complete final fraction Friday afternoon, minimizing his overall tx extension time to 1 week. Tolerated tx well.  Still struggling with thick mucus. Skin unchanged    Of note, temp 100.4 this AM in setting of treatment related inflammation, normal upon recheck, no signs of infection.    On exam,   Grade 3 dermatitis bilaterally with ointment and dressing in place.   MMM  Tired but comfortable

## 2023-11-01 NOTE — PROGRESS NOTE ADULT - ASSESSMENT
{\rtf1\thnkcj15708\ansi\volhlmz3805\ftnbj\uc1\deff0  {\fonttbl{\f0 \fnil Segoe UI;}{\f1 \fnil \fcharset0 Segoe UI;}{\f2 \fnil [employer] [place];}}  {\colortbl ;\bip644\cexif741\aohq983 ;\red0\green0\blue0 ;\red0\green0\tfre961 ;\red0\green0\blue0 ;}  {\stylesheet{\f0\fs20 Normal;}{\cs1 Default Paragraph Font;}{\cs2\f0\fs16 Line Number;}{\cs3\f2\fs24\ul\cf3 Hyperlink;}}  {\*\revtbl{Unknown;}}  \pzsinn31765\prjcje88447\fbpdh1987\rlkqe0733\zcdza8569\njvex2905\mzhpjgx909\occefhf217\nogrowautofit\fzilqx748\formshade\nofeaturethrottle1\dntblnsbdb\fet4\aendnotes\aftnnrlc\pgbrdrhead\pgbrdrfoot  \sectd\jicwkt41199\aqtbpd50899\guttersxn0\jsihzhvo8256\miumrpxr7698\ksbnhwww6744\vutbnshg8111\dfostwv956\ztctnay565\sbkpage\pgncont\pgndec  \plain\plain\f0\fs24\ql\plain\f0\fs24\plain\f0\fs20\tkye0827\hich\f0\dbch\f0\loch\f0\fs20\par  I M\par  \par  88 y o male, history of CAD, PVD, DM, 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 Marr/Dr. Gar), presents to the ED with generalized weakness and odynophagia \par  \par  \plain\f1\fs20\qvzm7614\hich\f1\dbch\f1\loch\f1\cf2\fs20\ul{\field{\*\fldinst HYPERLINK [id number] }{\fldrslt Problem/Plan - 1:}}\plain\f0\fs20\zrbh4833\hich\f0\dbch\f0\loch\f0\fs20\ql\par  \'b7  {\*\bkmkstart [id number]}{\*\bkmkend om71523732801}Problem: {\*\bkmkstart qi29067570474}{\*\bkmkend as05998577556}Odynophagia. \par  \'b7  {\*\bkmkstart ro88589478303}{\*\bkmkend eb62346468936}Plan: {\*\bkmkstart ps95470312259}{\*\bkmkend nx62427671925}Larynx SCC currently getting radiation and chemotherapy. C/o odynophagia causing poor PO intake. Per chart review appears poor PO intake   has been ongoing issue while undergoing chemo/radiation. 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 Marr. Last chemo 10/23. S&S: airway protection deficits in setting of copious secretions   and suspected radiation induced pharyngeal mucositis. Pt would benefit from pain management as well as alternative means of nutrition and hydration to complete RT and optimize nutrition status. \par  -c/w D10 1L 70cc/hr for 10 hrs\par  -Plan for IR placed PEG tube- held aspirin given IR would need to hold for 5 days (last dose of asa on 10/30- per wife,  taking aspirin at home\par  -Per rad onc, odynophagia and mucositis should likely resolve 2-3 weeks after completion of radiation. no radiation on 10/31. Last treatment 10/30\par  -Per heme onc, no additional chemo. c/w IVF\par  - palliative: \par        Dilaudid 1mg IV q4h ATC (hold for lethargy)\par        Dilaudid 0.5mg IV q3h PRN for Moderate Pain\par        Dilaudid 1mg IV q3h PRN for Severe Pain\par  - magic mouthwash\par  - viscous lidocaine \par  -scopolamine patch\par  -c/w pantoprazole 40 mg IV.\plain\f1\fs20\xvwj6593\hich\f1\dbch\f1\loch\f1\cf2\fs20\strike\plain\f0\fs20\pawc6924\hich\f0\dbch\f0\loch\f0\fs20\par  \par  \plain\f1\fs20\biew3068\hich\f1\dbch\f1\loch\f1\cf2\fs20\ul{\field{\*\fldinst HYPERLINK [id number] }{\fldrslt Problem/Plan - 2:}}\plain\f0\fs20\morq5095\hich\f0\dbch\f0\loch\f0\fs20\ql\par  \'b7  {\*\bkmkstart fl90844227821}{\*\bkmkend bc16723861036}Problem: {\*\bkmkstart sp99199521193}{\*\bkmkend en02268597902}Weakness generalized. \par  \'b7  {\*\bkmkstart rl23708997951}{\*\bkmkend qm72439399165}Plan: {\*\bkmkstart yr59797293876}{\*\bkmkend gl73576012319}Reports progressive weakness over the last 2 weeks assc w/ fatigue and poor PO intake. Reports this typically happens after chemo.   Last meal was 5 days ago, drinks water and tolerates ok. Typically ambulates with cane. orthostatics wnl\par  - management above \par  - f/u PT/OT.\par  \par  \plain\f1\fs20\vtjk0134\hich\f1\dbch\f1\loch\f1\cf2\fs20\ul{\field{\*\fldinst HYPERLINK [id number] }{\fldrslt Problem/Plan - 3:}}\plain\f0\fs20\riak0861\hich\f0\dbch\f0\loch\f0\fs20\ql\par  \'b7  {\*\bkmkstart gq16414991882}{\*\bkmkend qi98367233615}Problem: {\*\bkmkstart xn51416968019}{\*\bkmkend py77450496733}Radiation dermatitis. \par  \'b7  {\*\bkmkstart bn28215820969}{\*\bkmkend gn63155382359}Plan: {\*\bkmkstart kz68793901707}{\*\bkmkend hq23897275458}Has b/l neck dermatitis due to radiation, first noticed early 09/13/2023 after 1st/2nd treatment. C/o pain, dysphagia and odynophagia   for weeks which was tx with magic mouthwash, silver silvadene cream, fluconazole 400mg once a day (started 10/23). Radiation paused due to pain, last RT 10/28.  On exam -  Bilateral neck wounds - erythematous with skin breakdown, crusted dead skin peripherally,   no drainage, no skin sloughing, at baseline per patient. Reports improvement with cream. No concern for superimposed infection at this time\par  - monitor off abx \par  -Per Rad onc, received RT 10/30. No treatment on 10/31. Plan for 4 more treatments \par  - c/w silvadene cream and non-stick silicon bandage.\plain\f1\fs20\nmbi8053\hich\f1\dbch\f1\loch\f1\cf2\fs20\strike\plain\f0\fs20\trkg0483\hich\f0\dbch\f0\loch\f0\fs20\par  \par  \plain\f1\fs20\iurn8735\hich\f1\dbch\f1\loch\f1\cf2\fs20\ul{\field{\*\fldinst HYPERLINK [id number] }{\fldrslt Problem/Plan - 4:}}\plain\f0\fs20\zjep7521\hich\f0\dbch\f0\loch\f0\fs20\ql\par  \'b7  {\*\bkmkstart yq69931640939}{\*\bkmkend fw51730165190}Problem: {\*\bkmkstart ka52636232955}{\*\bkmkend bv38576319606}Squamous cell carcinoma of larynx. \par  \'b7  {\*\bkmkstart hu66075274852}{\*\bkmkend hz70571221128}Plan: {\*\bkmkstart fx02309154260}{\*\bkmkend cl74766117315}Dx 06/2023 with SCC larynx. Chemo/RT initiated 08/2023. Follows with Dr. Marr (rad onc) and Dr. Derek Harvey (onc). Last chemo   (taxol/carboplatin) on 10/23, last RT 10/18, tolerated well. RT paused due to pain. \par  -Per radiation onc., possible in patient radiation therapy. Has 6 fractions left\par  - onc aware pt is admitted, appreciate recs\par  - palliative consulted for GOC discussion.\par  \par  \plain\f1\fs20\tkpe7237\hich\f1\dbch\f1\loch\f1\cf2\fs20\ul{\field{\*\fldinst HYPERLINK [id number] }{\fldrslt Problem/Plan - 5:}}\plain\f0\fs20\zfxd7777\hich\f0\dbch\f0\loch\f0\fs20\ql\par  \'b7  {\*\bkmkstart vk62908380032}{\*\bkmkend nd92377578061}Problem: {\*\bkmkstart vc60270510979}{\*\bkmkend dt10235909650}ELENITA (acute kidney injury). \par  \'b7  {\*\bkmkstart pm06961903887}{\*\bkmkend qq12872236565}Plan: {\*\bkmkstart yi42593484683}{\*\bkmkend mh53014389010}Presented with Cr 1.87, baseline appears to fluctuate depending on recent chemo 1.0 - 1.4. Likely elevated in the setting of chemo   10/23 and exacerbated by poor PO intake and hypovolemia. s/p 2L NS in ED.\par  -Cr trended down to 1.19 on 10/31\par  - c/w maintenance fluids \par  - hold lisinopril 20mg given ELENITA.\par  \par  \plain\f1\fs20\pnzv3353\hich\f1\dbch\f1\loch\f1\cf2\fs20\ul{\field{\*\fldinst HYPERLINK [id number] }{\fldrslt Problem/Plan - 6:}}\plain\f0\fs20\abhe3222\hich\f0\dbch\f0\loch\f0\fs20\ql\par  \'b7  {\*\bkmkstart of98533038617}{\*\bkmkend gc25026039446}Problem: {\*\bkmkstart sk46266285422}{\*\bkmkend ma34861403326}Heart failure with mildly reduced ejection fraction. \par  \'b7  {\*\bkmkstart or96688391311}{\*\bkmkend fv52719191738}Plan: {\*\bkmkstart ho62244764098}{\*\bkmkend zo22560163908}TTE 07/2023: mild LVH, mild reduced LVH hypertrophy, normal RV function, LV systolic function mild decr EF 50-55%. On exam - volume   down w/ dry MM, no LE edema. home meds: imdur 30 once a day, lisinopril 20mg once a day, toprol 12.5mg once a day \par  - c/w Toprol 12.5 mg- converted to IV 1.25 q6 given pt cannot tolerate PO\par  - hold imdur given soft BPs, resume when appropriate \par  - hold lisinopril given ELENITA.\plain\f1\fs20\deqm1465\hich\f1\dbch\f1\loch\f1\cf2\fs20\strike\plain\f0\fs20\igdj1949\hich\f0\dbch\f0\loch\f0\fs20\par  \par  \plain\f1\fs20\dkvq6166\hich\f1\dbch\f1\loch\f1\cf2\fs20\ul{\field{\*\fldinst HYPERLINK [id number] }{\fldrslt Problem/Plan - 7:}}\plain\f0\fs20\unux5324\hich\f0\dbch\f0\loch\f0\fs20\ql\par  \'b7  {\*\bkmkstart gp09283580578}{\*\bkmkend tq37555703981}Problem: {\*\bkmkstart xk00821032096}{\*\bkmkend zq63845415399}Leukopenia. \par  \'b7  {\*\bkmkstart pq20083208096}{\*\bkmkend no05812088613}Plan: {\*\bkmkstart lc28076141799}{\*\bkmkend qy94244634903}P/w WBC 2.3, ANC 1700. Baseline fluctuates with chemo, so leukopenia likely due to myelosuppression from chemo. NO signs/sx infection.   At this time, radiation dermatitis appears at baseline with no superimposed infection\par  -HIV non reactive \par  - continue to trend\par  - if febrile, would obtain infectious work up.\par  \par  \plain\f1\fs20\vlux1251\hich\f1\dbch\f1\loch\f1\cf2\fs20\ul{\field{\*\fldinst HYPERLINK [id number] }{\fldrslt Problem/Plan - 8:}}\plain\f0\fs20\fihr6730\hich\f0\dbch\f0\loch\f0\fs20\ql\par  \'b7  {\*\bkmkstart fs17848049735}{\*\bkmkend br01255935724}Problem: {\*\bkmkstart py32752530916}{\*\bkmkend uf38639444339}Anemia. \par  \'b7  {\*\bkmkstart xz38262756281}{\*\bkmkend vi06986300619}Plan: {\*\bkmkstart kh54356362712}{\*\bkmkend en79965163617}Presenting with hb 9.5. Baseline 9-11. No signs/sx active bleeding. Likely AOCD vs myelosuppression from chemotherapy\par  -iron studies consistent with anemia of chronic disease.\par  \par  \plain\f1\fs20\nwcs4403\hich\f1\dbch\f1\loch\f1\cf2\fs20\ul{\field{\*\fldinst HYPERLINK [id number] }{\fldrslt Problem/Plan - 9:}}\plain\f0\fs20\wtsv3297\hich\f0\dbch\f0\loch\f0\fs20\ql\par  \'b7  {\*\bkmkstart kj91058389618}{\*\bkmkend hc15073728972}Problem: {\*\bkmkstart jd36423606351}{\*\bkmkend lk74593893757}CAD (coronary artery disease). \par  \'b7  {\*\bkmkstart gl33399856391}{\*\bkmkend we72593359377}Plan: {\*\bkmkstart wp67280982665}{\*\bkmkend rc34902407976}CAD s/p MIDCAB and PCI with multiple WINTER (most recent to LCx in 8/2022), on ASA 81 at home. Currently asymptomatic. EKG on admission   w/o ischemic changes\par  - c/w ASA 81\par  - c/w lipitor 80mg once a day.\par  \par  \plain\f1\fs20\xzoh7442\hich\f1\dbch\f1\loch\f1\cf2\fs20\ul{\field{\*\fldinst HYPERLINK [id number] }{\fldrslt Problem/Plan - 10:}}\plain\f0\fs20\pcel5184\hich\f0\dbch\f0\loch\f0\fs20\ql\par  \'b7  {\*\bkmkstart mn50291798146}{\*\bkmkend pq84494177534}Problem: {\*\bkmkstart nb34584496544}{\*\bkmkend jn35715659347}DM (diabetes mellitus). \par  \'b7  {\*\bkmkstart oi13869411700}{\*\bkmkend qv39381374340}Plan; {\*\bkmkstart xt93914444977}{\*\bkmkend vn37060994056}Last A1c 7.9 in 07/2023. Regimen as follows: Lantus 50mg daily, 12units TID with meals for 48 hour after chemo then 8 TID\par  - c/w sliding scale \par  - hold pre-meal given poor PO intake\par  - q6hr FS given inability to tolerate PO\par  - mISS.\par  \par  \plain\f1\fs20\gswg7157\hich\f1\dbch\f1\loch\f1\cf2\fs20\ul{\field{\*\fldinst HYPERLINK [id number] }{\fldrslt Problem/Plan - 11:}}\plain\f0\fs20\qdtg4192\hich\f0\dbch\f0\loch\f0\fs20\ql\par  \'b7  {\*\bkmkstart fo811745121992}{\*\bkmkend we557079089735}Problem: {\*\bkmkstart sm253399769190}{\*\bkmkend sf847723727498}Prophylactic measure. \par  \'b7  {\*\bkmkstart rl375115570695}{\*\bkmkend gc878209574592}Plan: {\*\bkmkstart wj889615715227}{\*\bkmkend vb665789961900}Diet: IV fluids, given pt cannot tolerate PO\par  F: D5 and NS\par  E: replete PRN\par  DVT: lovenox\par  Code: awating GOC conversation.\par  \par  }

## 2023-11-01 NOTE — PROGRESS NOTE ADULT - PROBLEM SELECTOR PLAN 5
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 Chemo/RT initiated 08/2023. Follows with Dr. Marr (rad onc) and Dr. Derek Harvey (onc). Last chemo (taxol/carboplatin) on 10/23, last RT 10/18, tolerated well. RT paused due to pain.   -Per Rad onc, received RT 10/30. No treatment on 10/31. Plan for 4 more treatment  - onc aware pt is admitted, appreciate recs  - palliative consulted for GOC discussion

## 2023-11-01 NOTE — PROGRESS NOTE ADULT - PROBLEM SELECTOR PLAN 7
Baseline fluctuates with chemo, so leukopenia likely due to myelosuppression from chemo. NO signs/sx infection. 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

## 2023-11-01 NOTE — PROGRESS NOTE ADULT - SUBJECTIVE AND OBJECTIVE BOX
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 Marr/Dr. Derek Gar), presents to the ED with generalized weakness. Reports progressive weakness over the last 2 weeks assc w/ fatigue. 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 C/o odynophagia but tolerating secretions. No shortness of breath or difficulty breathing. Reports dermatitis is stable, and reponds well to cream he was given. No drainage or discharge. Saw Rad on on 10/13. Per chart review appears odynophagia was presenting symptom of cancer. Reported subjective fever for 1 day but did not take his temperature at home. ROS otherwise negative.       Afebrile, HR 70s, //86, 100% on RA  WBC 2.3, Hb 9.5, plt 219, Na 133, K 5.4, BUN 49/Cr 1.87, lactate 1.0  EKG NSR, no peak T waves   s/p morphine, 2L NS  (29 Oct 2023 21:39)                            9.4    2.49  )-----------( 216      ( 01 Nov 2023 07:38 )             29.2       11-01    133<L>  |  99  |  14  ----------------------------<  201<H>  4.4   |  23  |  1.09    Ca    8.9      01 Nov 2023 07:38  Phos  2.7     11-01  Mg     1.6     11-01    TPro  6.4  /  Alb  2.8<L>  /  TBili  0.4  /  DBili  x   /  AST  19  /  ALT  11  /  AlkPhos  71  11-01    Vital Signs Last 24 Hrs  T(C): 37.5 (01 Nov 2023 07:52), Max: 38 (01 Nov 2023 05:59)  T(F): 99.5 (01 Nov 2023 07:52), Max: 100.4 (01 Nov 2023 05:59)  HR: 91 (01 Nov 2023 09:43) (71 - 91)  BP: 119/67 (01 Nov 2023 09:43) (117/58 - 161/66)  BP(mean): 84 (01 Nov 2023 09:43) (84 - 91)  RR: 17 (01 Nov 2023 05:59) (16 - 18)  SpO2: 93% (01 Nov 2023 05:59) (93% - 96%)    Parameters below as of 01 Nov 2023 05:59  Patient On (Oxygen Delivery Method): room air        MEDICATIONS  (STANDING):  atorvastatin 80 milliGRAM(s) Oral at bedtime  atropine 1% Ophthalmic Solution for SubLingual Use 1 Drop(s) SubLingual three times a day  dextrose 5%. 1000 milliLiter(s) (70 mL/Hr) IV Continuous <Continuous>  dextrose 50% Injectable 25 Gram(s) IV Push once  dextrose 50% Injectable 25 Gram(s) IV Push once  dextrose 50% Injectable 25 Gram(s) IV Push once  dextrose 50% Injectable 12.5 Gram(s) IV Push once  FIRST- Mouthwash  BLM 4 milliLiter(s) Swish and Spit every 6 hours  glucagon  Injectable 1 milliGRAM(s) IntraMuscular once  heparin   Injectable 5000 Unit(s) SubCutaneous every 8 hours  HYDROmorphone  Injectable 1 milliGRAM(s) IV Push every 4 hours  influenza  Vaccine (HIGH DOSE) 0.7 milliLiter(s) IntraMuscular once  insulin lispro (ADMELOG) corrective regimen sliding scale   SubCutaneous every 6 hours  lidocaine 2% Viscous 5 milliLiter(s) Swish and Spit two times a day  metoprolol tartrate Injectable 1.25 milliGRAM(s) IV Push every 6 hours  pantoprazole  Injectable 40 milliGRAM(s) IV Push every 12 hours  scopolamine 1 mG/72 Hr(s) Patch 1 Patch Transdermal every 72 hours  silver sulfADIAZINE 1% Cream 1 Application(s) Topical two times a day    MEDICATIONS  (PRN):  dextrose Oral Gel 15 Gram(s) Oral once PRN Blood Glucose LESS THAN 70 milliGRAM(s)/deciliter  HYDROmorphone  Injectable 0.5 milliGRAM(s) IV Push every 3 hours PRN Moderate Pain (4 - 6)  HYDROmorphone  Injectable 1 milliGRAM(s) IV Push every 3 hours PRN Severe Pain (7 - 10)          Initial Functional Status Assessment :       Previous Level of Function:     · Ambulation Skills	needs device  · Transfer Skills	needs device  · ADL Skills	needs device  · Work/Leisure Activity	needs device  · Additional Comments	PLOF and social history obtained from patient as well as his wife and granddaughter corroborating at bedside. Pt. lives with his wife in an apt with 1 FOS to enter. At baseline, pt. ambulates independently with a SC, and wife assists him with ADLs. Per granddaughter, pt. has had a rapid functional decline over the past 5 days requiring increased assistance for mobility and ADLs.    Cognitive Status Examination:   · Orientation	oriented to person, place, time and situation  · Level of Consciousness	alert  · Follows Commands and Answers Questions	100% of the time  · Personal Safety and Judgment	intact    Range of Motion Exam:   · Active Range of Motion Examination	bilateral upper extremity Active ROM was WFL (within functional limits); bilateral  lower extremity Active ROM was WFL (within functional limits)    Manual Muscle Testing:   · Manual Muscle Testing Results	grossly assessed due to  functional mobility testing; >/=3+/5 bilateral UE and LE    Bed Mobility: Sit to Supine:     · Level of [place]	supervision    Bed Mobility: Supine to Sit:     · Level of [place]	supervision    Bed Mobility Analysis:     · Impairments Contributing to Impaired Bed Mobility	pain; impaired postural control; decreased strength; impaired balance; decreased aerobic capacity/endurance    Transfer: Sit to Stand:     · Level of [place]	minimum assist (75% patients effort)  · Physical Assist/Nonphysical Assist	verbal cues; 2 person assist  · Weight-Bearing Restrictions	weight-bearing as tolerated  · Assistive Device	rolling walker    Transfer: Stand to Sit:     · Level of [place]	minimum assist (75% patients effort)  · Physical Assist/Nonphysical Assist	verbal cues; 2 person assist  · Weight-Bearing Restrictions	weight-bearing as tolerated  · Assistive Device	rolling walker    Sit/Stand Transfer Safety Analysis:     · Transfer Safety Concerns Noted	losing balance; decreased weight-shifting ability  · Impairments Contributing to Impaired Transfers	impaired balance; pain; impaired postural control; decreased strength    Gait Skills:     · Level of [place]	minimum assist (75% patients effort)  · Physical Assist/Nonphysical Assist	verbal cues; 2 person assist  · Weight-Bearing Restrictions	weight-bearing as tolerated  · Assistive Device	rolling walker  · Gait Distance	5 side steps to the R    Gait Analysis:     · Gait Pattern Used	3-point gait  · Gait Deviations Noted	decreased merline; decreased step length; decreased stride length; decreased weight-shifting ability  · Impairments Contributing to Gait Deviations	impaired balance; pain; impaired postural control; decreased strength; decreased aerobic capacity/endurance    Balance Skills Assessment:     · Sitting Balance: Static	fair balance  · Sitting Balance: Dynamic	fair balance  · Sit-to-Stand Balance	poor balance  · Standing Balance: Static	fair minus  · Standing Balance: Dynamic	poor balance  · Systems Impairment Contributing to Balance Disturbance	musculoskeletal  · Identified Impairments Contributing to Balance Disturbance	impaired postural control; pain; decreased strength    Sensory Examination:   Sensory Examination:    Grossly Intact:   · Gross Sensory Examination	Grossly Intact      Clinical Impressions:   · Criteria for Skilled Therapeutic Interventions	impairments found; functional limitations in following categories; anticipated discharge recommendation; rehab potential  · Impairments Found (describe specific impairments)	aerobic capacity/endurance; gait, locomotion, and balance; ROM; muscle strength; posture; visual motor  · Functional Limitations in Following Categories (describe specific limitations)	self-care; home management; community/leisure  · Risk Reduction/Prevention (Describe Specific Areas of risk reduction/prevention)	recurrence of condition    Upper Body Dressing Training:     · Level of [place]	minimum assist (75% patients effort)  · Physical Assist/Nonphysical Assist	1 person assist; nonverbal cues (demo/gestures); verbal cues    Lower Body Dressing Training:     · Level of [place]	minimum assist (75% patients effort)  · Physical Assist/Nonphysical Assist	1 person assist; nonverbal cues (demo/gestures); verbal cues        PM&R Impression : as above    Current disposition plan recommendation :    subacute rehab placement

## 2023-11-01 NOTE — PROGRESS NOTE ADULT - PROBLEM SELECTOR PLAN 4
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 C/o pain, dysphagia and odynophagia for weeks which was tx with magic mouthwash, silver silvadene cream, fluconazole 400mg once a day (started 10/23). Radiation paused due to pain, last RT 10/28.  On exam -  Bilateral neck wounds - erythematous with skin breakdown, crusted dead skin peripherally, no drainage, no skin sloughing, at baseline per patient. Reports improvement with cream. No concern for superimposed infection at this time  - monitor off abx   -Per Rad onc, received RT 10/30. No treatment on 10/31. Plan for 4 more treatments   - c/w silvadene cream and non-stick silicon bandage

## 2023-11-01 NOTE — PROGRESS NOTE ADULT - PROBLEM SELECTOR PLAN 1
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 C/o odynophagia causing poor PO intake. Per chart review appears poor PO intake has been ongoing issue while undergoing chemo/radiation. 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 Marr. Last chemo 10/23. 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 Pt would benefit from pain management as well as alternative means of nutrition and hydration to complete RT and optimize nutrition status.   -c/w D5 1L 70cc/hr   -Plan for IR placed PEG tube- held aspirin given IR would need to hold for 5 days (last dose of asa on 10/30- per wife,  taking aspirin at home  -Per rad onc, odynophagia and mucositis should likely resolve 2-3 weeks after completion of radiation. no radiation on 10/31. Last treatment 10/30  -Per heme onc, no additional chemo. c/w IVF  - palliative:         Dilaudid 1mg IV q4h ATC (hold for lethargy)        Dilaudid 0.5mg IV q3h PRN for Moderate Pain        Dilaudid 1mg IV q3h PRN for Severe Pain  - magic mouthwash  - viscous lidocaine   -scopolamine patch  -c/w pantoprazole 40 mg IV

## 2023-11-01 NOTE — PROGRESS NOTE ADULT - PROBLEM SELECTOR PLAN 3
Reports progressive weakness over the last 2 weeks assc w/ fatigue and poor PO intake. Reports this typically happens after chemo.Typically ambulates with cane.  -Per PT, SOFI placement  - f/u PT/OT

## 2023-11-01 NOTE — PROGRESS NOTE ADULT - SUBJECTIVE AND OBJECTIVE BOX
[facility]  Dickson Roberson, Palliative Care Attending  Contact Info: Call [phone] (HEAL Line) or message on Microsoft Teams (Dickson Roberson)    SUBJECTIVE AND OBJECTIVE:  INTERVAL HPI/OVERNIGHT EVENTS: Interval events noted. See patient's PRN use for the past 24hrs noted below. Comprehensive symptom assessment and GOC exploration as noted below. Extensive time spent discussing plan of care with patient/family. No unexpected adverse effects of opiates noted: no sedation/dizziness/nausea.    ALLERGIES:  No Known Allergies    MEDICATIONS  (STANDING):  atorvastatin 80 milliGRAM(s) Oral at bedtime  atropine 1% Ophthalmic Solution for SubLingual Use 1 Drop(s) SubLingual three times a day  dextrose 5%. 1000 milliLiter(s) (70 mL/Hr) IV Continuous <Continuous>  dextrose 50% Injectable 25 Gram(s) IV Push once  dextrose 50% Injectable 25 Gram(s) IV Push once  dextrose 50% Injectable 25 Gram(s) IV Push once  dextrose 50% Injectable 12.5 Gram(s) IV Push once  fentaNYL   Patch  25 MICROgram(s)/Hr 1 Patch Transdermal every 72 hours  FIRST- Mouthwash  BLM 4 milliLiter(s) Swish and Spit every 6 hours  glucagon  Injectable 1 milliGRAM(s) IntraMuscular once  heparin   Injectable 5000 Unit(s) SubCutaneous every 8 hours  HYDROmorphone  Injectable 1 milliGRAM(s) IV Push every 6 hours  influenza  Vaccine (HIGH DOSE) 0.7 milliLiter(s) IntraMuscular once  insulin lispro (ADMELOG) corrective regimen sliding scale   SubCutaneous every 6 hours  lidocaine 2% Viscous 5 milliLiter(s) Swish and Spit two times a day  metoprolol tartrate Injectable 1.25 milliGRAM(s) IV Push every 6 hours  pantoprazole  Injectable 40 milliGRAM(s) IV Push every 12 hours  scopolamine 1 mG/72 Hr(s) Patch 1 Patch Transdermal every 72 hours  silver sulfADIAZINE 1% Cream 1 Application(s) Topical two times a day    MEDICATIONS  (PRN):  dextrose Oral Gel 15 Gram(s) Oral once PRN Blood Glucose LESS THAN 70 milliGRAM(s)/deciliter  HYDROmorphone  Injectable 1 milliGRAM(s) IV Push every 3 hours PRN Severe Pain (7 - 10)  HYDROmorphone  Injectable 0.5 milliGRAM(s) IV Push every 3 hours PRN Moderate Pain (4 - 6)      Analgesic Use (Scheduled and PRNs) for past 24 hours:  acetaminophen   IVPB ..   400 mL/Hr IV Intermittent (11-01-23 @ 06:52)    HYDROmorphone  Injectable   1 milliGRAM(s) IV Push (11-01-23 @ 09:47)   1 milliGRAM(s) IV Push (11-01-23 @ 06:52)   1 milliGRAM(s) IV Push (11-01-23 @ 02:38)   1 milliGRAM(s) IV Push (10-31-23 @ 22:36)   1 milliGRAM(s) IV Push (10-31-23 @ 18:33)      ITEMS UNCHECKED ARE NOT PRESENT  PRESENT SYMPTOMS/REVIEW OF SYSTEMS: []Unable to obtain due to poor mentation   Source if other than patient:  []Family   []Team         Vital Signs Last 24 Hrs  T(C): 37.6 (01 Nov 2023 12:45), Max: 38 (01 Nov 2023 05:59)  T(F): 99.6 (01 Nov 2023 12:45), Max: 100.4 (01 Nov 2023 05:59)  HR: 86 (01 Nov 2023 12:45) (71 - 91)  BP: 121/51 (01 Nov 2023 12:45) (117/58 - 150/62)  BP(mean): 84 (01 Nov 2023 09:43) (84 - 91)  RR: 17 (01 Nov 2023 12:45) (17 - 18)  SpO2: 95% (01 Nov 2023 12:45) (93% - 95%)    Parameters below as of 01 Nov 2023 12:45  Patient On (Oxygen Delivery Method): room air        LABS: Personally reviewed and interpreted                        9.4    2.49  )-----------( 216      ( 01 Nov 2023 07:38 )             29.2   11-01    133<L>  |  99  |  14  ----------------------------<  201<H>  4.4   |  23  |  1.09    Ca    8.9      01 Nov 2023 07:38  Phos  2.7     11-01  Mg     1.6     11-01    TPro  6.4  /  Alb  2.8<L>  /  TBili  0.4  /  DBili  x   /  AST  19  /  ALT  11  /  AlkPhos  71  11-01      RADIOLOGY & ADDITIONAL STUDIES: None new    DISCUSSION OF CASE: Family - to provide updates and emotional support; Primary Team/RN - to discuss plan of care [facility]  Dickson Roberson, Palliative Care Attending  Contact Info: Call [phone] (HEAL Line) or message on Microsoft Teams (Dickson Roberson)    SUBJECTIVE AND OBJECTIVE:  INTERVAL HPI/OVERNIGHT EVENTS: Interval events noted. Pain is overall improved but still present. See patient's PRN use for the past 24hrs noted below. Comprehensive symptom assessment and GOC exploration as noted below. Extensive time spent discussing plan of care with patient/family. No unexpected adverse effects of opiates noted: no sedation/dizziness/nausea.    ALLERGIES:  No Known Allergies    MEDICATIONS  (STANDING):  atorvastatin 80 milliGRAM(s) Oral at bedtime  atropine 1% Ophthalmic Solution for SubLingual Use 1 Drop(s) SubLingual three times a day  dextrose 5%. 1000 milliLiter(s) (70 mL/Hr) IV Continuous <Continuous>  dextrose 50% Injectable 25 Gram(s) IV Push once  dextrose 50% Injectable 25 Gram(s) IV Push once  dextrose 50% Injectable 25 Gram(s) IV Push once  dextrose 50% Injectable 12.5 Gram(s) IV Push once  fentaNYL   Patch  25 MICROgram(s)/Hr 1 Patch Transdermal every 72 hours  FIRST- Mouthwash  BLM 4 milliLiter(s) Swish and Spit every 6 hours  glucagon  Injectable 1 milliGRAM(s) IntraMuscular once  heparin   Injectable 5000 Unit(s) SubCutaneous every 8 hours  HYDROmorphone  Injectable 1 milliGRAM(s) IV Push every 6 hours  influenza  Vaccine (HIGH DOSE) 0.7 milliLiter(s) IntraMuscular once  insulin lispro (ADMELOG) corrective regimen sliding scale   SubCutaneous every 6 hours  lidocaine 2% Viscous 5 milliLiter(s) Swish and Spit two times a day  metoprolol tartrate Injectable 1.25 milliGRAM(s) IV Push every 6 hours  pantoprazole  Injectable 40 milliGRAM(s) IV Push every 12 hours  scopolamine 1 mG/72 Hr(s) Patch 1 Patch Transdermal every 72 hours  silver sulfADIAZINE 1% Cream 1 Application(s) Topical two times a day    MEDICATIONS  (PRN):  dextrose Oral Gel 15 Gram(s) Oral once PRN Blood Glucose LESS THAN 70 milliGRAM(s)/deciliter  HYDROmorphone  Injectable 1 milliGRAM(s) IV Push every 3 hours PRN Severe Pain (7 - 10)  HYDROmorphone  Injectable 0.5 milliGRAM(s) IV Push every 3 hours PRN Moderate Pain (4 - 6)      Analgesic Use (Scheduled and PRNs) for past 24 hours:  acetaminophen   IVPB ..   400 mL/Hr IV Intermittent (11-01-23 @ 06:52)    HYDROmorphone  Injectable   1 milliGRAM(s) IV Push (11-01-23 @ 09:47)   1 milliGRAM(s) IV Push (11-01-23 @ 06:52)   1 milliGRAM(s) IV Push (11-01-23 @ 02:38)   1 milliGRAM(s) IV Push (10-31-23 @ 22:36)   1 milliGRAM(s) IV Push (10-31-23 @ 18:33)      ITEMS UNCHECKED ARE NOT PRESENT  PRESENT SYMPTOMS/REVIEW OF SYSTEMS: []Unable to obtain due to poor mentation   Source if other than patient:  []Family   []Team     Pain: [x] yes [] no  QOL impact - bothersome  Location - nack, throat                   Aggravating Factors - secretions, swallowing  Quality - sharp  Radiation -  Timing - constant  Severity (0-10 scale) - 4  Minimal Acceptable Level (0-10 scale) - 2    Dyspnea:                           []Mild  []Moderate []Severe  Anxiety:                             []Mild []Moderate []Severe  Fatigue:                             []Mild []Moderate []Severe  Nausea:                             []Mild []Moderate []Severe  Loss of Appetite:              []Mild []Moderate [x]Severe  Constipation:                    []Mild []Moderate []Severe    Other Symptoms:  [x]All other review of systems negative     Palliative Performance Status Version 2:  60%  (Functional Assessment Tool)    GENERAL:  [x] NAD []Alert []Lethargic  []Cachexia  []Unarousable  [x]Verbal  []Non-Verbal  BEHAVIORAL:   []Anxiety  []Delirium []Agitation [x]Cooperative [x]Oriented x3  HEENT:  [x]Normal  [] Moist Mucous Membranes [x]Dry mouth   []ET Tube/Trach  []Oral lesions  PULMONARY:   [x]Clear []Tachypnea  []Audible excessive secretions  [x]Normal Work of Breathing []Labored Breathing  []Rhonchi []Crackles []Wheezing  CARDIOVASCULAR:    [x]Regular Rate [x]Regular Rhythm []Irregular []Tachy  []Yaya  GASTROINTESTINAL:  [x]Soft  []Distended   [x]+BS  [x]Non tender []Tender  []PEG []OGT/ NGT  Last BM:  GENITOURINARY:  [x]Normal [] Incontinent   []Oliguria/Anuria   []Paige  MUSCULOSKELETAL:   [x]Normal Extremities  [x]Weakness  []Bed/Wheelchair bound []Edema  NEUROLOGIC:   []No focal deficits  []Cognitive impairment  [x]Dysphagia []Dysarthria []Paresis []Encephalopathic  SKIN:   []Normal   []Pressure ulcer(s)  [x]Rash    Vital Signs Last 24 Hrs  T(C): 37.6 (01 Nov 2023 12:45), Max: 38 (01 Nov 2023 05:59)  T(F): 99.6 (01 Nov 2023 12:45), Max: 100.4 (01 Nov 2023 05:59)  HR: 86 (01 Nov 2023 12:45) (71 - 91)  BP: 121/51 (01 Nov 2023 12:45) (117/58 - 150/62)  BP(mean): 84 (01 Nov 2023 09:43) (84 - 91)  RR: 17 (01 Nov 2023 12:45) (17 - 18)  SpO2: 95% (01 Nov 2023 12:45) (93% - 95%)    Parameters below as of 01 Nov 2023 12:45  Patient On (Oxygen Delivery Method): room air    LABS: Personally reviewed and interpreted                        9.4    2.49  )-----------( 216      ( 01 Nov 2023 07:38 )             29.2   11-01    133<L>  |  99  |  14  ----------------------------<  201<H>  4.4   |  23  |  1.09    Ca    8.9      01 Nov 2023 07:38  Phos  2.7     11-01  Mg     1.6     11-01  TPro  6.4  /  Alb  2.8<L>  /  TBili  0.4  /  DBili  x   /  AST  19  /  ALT  11  /  AlkPhos  71  11-01    RADIOLOGY & ADDITIONAL STUDIES: None new    DISCUSSION OF CASE: Family - to provide updates and emotional support; Primary Team/RN - to discuss plan of care

## 2023-11-01 NOTE — PROGRESS NOTE ADULT - PROBLEM SELECTOR PLAN 5
.  Patient is Full Code  -patient appointed granddaughter (Yvrose Smith, [phone]) as alternate decision maker  -see prior GOC note .  Patient is Full Code  -patient appointed granddaughter (Yvrose Smith, [phone]) as alternate decision maker  -see prior C note    Patient would benefit from outpatient Palliative/Supportive Oncology follow-up on discharge with Dr. Emma Anthony at [facility], include her name and contact info ([phone]) in Discharge Summary

## 2023-11-01 NOTE — PROGRESS NOTE ADULT - ASSESSMENT
Patient continuing treatment with goal to complete this Friday. I agree with family decision to continue treatment and aim for Friday completion- this will mean his treatment time was only extended by 1 week, and further extension of tx package time may jeopardize his outcome. We will continue to assess him each AM. but plan to proceed with daily tx.    For skin care, continue silvadene non-stick dressings, and rinse with saline soaks liberally. It is ok to clean with dabbing of light soap and water.    Continue opiate regimen per palliative care.    Continue hydration as needed.    Dinesh Marr  Rad Onc

## 2023-11-01 NOTE — PROGRESS NOTE ADULT - PROBLEM SELECTOR PLAN 2
Spiked 100.4 fever. Asymptomatic, other vitals WNL  -CXR unremarkable   -UA pending  -Covid and RVP pending  -BCX 11/1 pending

## 2023-11-01 NOTE — PROGRESS NOTE ADULT - PROBLEM SELECTOR PLAN 6
TTE 07/2023: mild LVH, mild reduced LVH hypertrophy, normal RV function, LV systolic function mild decr EF 50-55%. home meds: imdur 30 once a day, lisinopril 20mg once a day, toprol 12.5mg once a day   - c/w Toprol 12.5 mg- converted to IV 1.25 q6 given pt cannot tolerate PO  - hold imdur given soft BPs, resume when appropriate   - hold lisinopril given prior ELENITA and unable to tolerate PO

## 2023-11-01 NOTE — PROGRESS NOTE ADULT - SUBJECTIVE AND OBJECTIVE BOX
OVERNIGHT EVENTS/INTERVAL HPI: Per night team, elevated FS so switched D10 to D5. Pt additionally spiked 100.4 fever. UA, CXR and 10 am Bcx sent.  Pt seen and examined at bedside this morning. Had radiation treatment yesterday. Admits to 5/10 oropharyngeal pain. Does admit IV pain meds are helping with pain. Per nurse, sputum was slightly red his morning. Increased skin peeling on BL lateral neck as well as anterior, darker discoloration. Denies fever, chills, headache, chest pain, SOB, abdominal pain, N/V/D, dysuria    REVIEW OF SYSTEMS:  All other review of systems is negative unless indicated above.    OBJECTIVE:  Vital Signs Last 24 Hrs  T(C): 37.5 (01 Nov 2023 07:52), Max: 38 (01 Nov 2023 05:59)  T(F): 99.5 (01 Nov 2023 07:52), Max: 100.4 (01 Nov 2023 05:59)  HR: 91 (01 Nov 2023 09:43) (71 - 91)  BP: 119/67 (01 Nov 2023 09:43) (117/58 - 161/66)  BP(mean): 84 (01 Nov 2023 09:43) (84 - 91)  RR: 17 (01 Nov 2023 05:59) (16 - 18)  SpO2: 93% (01 Nov 2023 05:59) (93% - 96%)    Parameters below as of 01 Nov 2023 05:59  Patient On (Oxygen Delivery Method): room air        Physical Exam:  General: in no acute distress  Eyes: R eye with ptosis. EOMI intact  HENT: dry mucous membranes. No oral lesions, thrush, erythema. Erythematous plaque with crusting on bilateral lateral neck  Neck: Trachea midline, supple  Lungs: CTA B/L. No wheezes, rales, or rhonchi  Cardiovascular: RRR. No murmurs, rubs, or gallops  Abdomen: Soft, non-tender non-distended; No rebound or guarding  Extremities: WWP, No clubbing, cyanosis or edema  MSK: 5/5  strength b/l  Neurological: Alert and oriented x3  Skin: Warm and dry. No obvious rash    Medications:  MEDICATIONS  (STANDING):  atorvastatin 80 milliGRAM(s) Oral at bedtime  atropine 1% Ophthalmic Solution for SubLingual Use 1 Drop(s) SubLingual three times a day  dextrose 5%. 1000 milliLiter(s) (70 mL/Hr) IV Continuous <Continuous>  dextrose 50% Injectable 25 Gram(s) IV Push once  dextrose 50% Injectable 25 Gram(s) IV Push once  dextrose 50% Injectable 12.5 Gram(s) IV Push once  dextrose 50% Injectable 25 Gram(s) IV Push once  FIRST- Mouthwash  BLM 4 milliLiter(s) Swish and Spit every 6 hours  glucagon  Injectable 1 milliGRAM(s) IntraMuscular once  heparin   Injectable 5000 Unit(s) SubCutaneous every 8 hours  HYDROmorphone  Injectable 1 milliGRAM(s) IV Push every 4 hours  influenza  Vaccine (HIGH DOSE) 0.7 milliLiter(s) IntraMuscular once  insulin lispro (ADMELOG) corrective regimen sliding scale   SubCutaneous every 6 hours  lidocaine 2% Viscous 5 milliLiter(s) Swish and Spit two times a day  metoprolol tartrate Injectable 1.25 milliGRAM(s) IV Push every 6 hours  pantoprazole  Injectable 40 milliGRAM(s) IV Push every 12 hours  scopolamine 1 mG/72 Hr(s) Patch 1 Patch Transdermal every 72 hours  silver sulfADIAZINE 1% Cream 1 Application(s) Topical two times a day    MEDICATIONS  (PRN):  dextrose Oral Gel 15 Gram(s) Oral once PRN Blood Glucose LESS THAN 70 milliGRAM(s)/deciliter  HYDROmorphone  Injectable 0.5 milliGRAM(s) IV Push every 3 hours PRN Moderate Pain (4 - 6)  HYDROmorphone  Injectable 1 milliGRAM(s) IV Push every 3 hours PRN Severe Pain (7 - 10)          Labs:                                   9.4    2.49  )-----------( 216      ( 01 Nov 2023 07:38 )             29.2     11-01    133<L>  |  99  |  14  ----------------------------<  201<H>  4.4   |  23  |  1.09    Ca    8.9      01 Nov 2023 07:38  Phos  2.7     11-01  Mg     1.6     11-01    TPro  6.4  /  Alb  2.8<L>  /  TBili  0.4  /  DBili  x   /  AST  19  /  ALT  11  /  AlkPhos  71  11-01    Urinalysis Basic - ( 01 Nov 2023 07:38 )    Color: x / Appearance: x / SG: x / pH: x  Gluc: 201 mg/dL / Ketone: x  / Bili: x / Urobili: x   Blood: x / Protein: x / Nitrite: x   Leuk Esterase: x / RBC: x / WBC x   Sq Epi: x / Non Sq Epi: x / Bacteria: x              Radiology: Reviewed

## 2023-11-01 NOTE — PROGRESS NOTE ADULT - PROBLEM SELECTOR PLAN 6
.  Complex medical decision making / symptom management in the setting of malignancy.    Will continue to follow for ongoing GOC discussion / titration of palliative regimen. Emotional support provided, questions answered.  Active Psychosocial Referrals:  [x]Social Work/Case management [x]PT/OT [x]Chaplaincy []Hospice  []Patient/Family Support []Holistic RN [x]Massage Therapy []Music Therapy []Ethics  Coping: [] well [x] with difficulty [] poor coping [] unable to assess  Support system: [] strong [x] adequate [] inadequate    For new or uncontrolled symptoms, please call Palliative Care at [phone]). The service is available 24/7 (including nights & weekends) to provide symptom management recommendations over the phone as appropriate

## 2023-11-01 NOTE — PROGRESS NOTE ADULT - PROBLEM SELECTOR PLAN 1
.  -Fentanyl 25mcg/hr Patch q72hr as long-acting agent  -Dilaudid 1mg IV q6h ATC (hold for lethargy)  -Dilaudid 0.5mg IV q3h PRN for Moderate Pain  -Dilaudid 1mg IV q3h PRN for Severe Pain    Will de-escalate IV opiates as Fentanyl Patch becomes therapeutic

## 2023-11-01 NOTE — PROGRESS NOTE ADULT - ASSESSMENT
79yo M with PMH of CAD, PVD, T2DM, and SCC of Larynx p/w weakness and odynophagia. Palliative consulted for complex symptom management in the setting of malignancy.    ·	started Fentanyl 25mcg/hr Patch q72hr as long-acting agent  ·	decreased frequency of scheduled Dilaudid to 1mg IV q6h ATC, will de-escalate as Fentanyl Patch becomes therapeutic  ·	tentative plan for long-term feeding tube placement after the weekend  ·	goal is to complete curative-intent chemo/RT 79yo M with PMH of CAD, PVD, T2DM, and SCC of Larynx p/w weakness and odynophagia. Palliative consulted for complex symptom management in the setting of malignancy.    ·	started Fentanyl 25mcg/hr Patch q72hr as long-acting agent  ·	decreased frequency of scheduled Dilaudid to 1mg IV q6h ATC, will de-escalate as Fentanyl Patch becomes therapeutic  ·	tentative plan for long-term feeding tube placement after the weekend, NGT may be beneficial in the interim  ·	goal is to complete curative-intent chemo/RT 79yo M with PMH of CAD, PVD, T2DM, and SCC of Larynx p/w weakness and odynophagia. Palliative consulted for complex symptom management in the setting of malignancy.    ·	started Fentanyl 25mcg/hr Patch q72hr as long-acting agent  ·	decreased frequency of scheduled Dilaudid to 1mg IV q6h ATC, will de-escalate as Fentanyl Patch becomes therapeutic  ·	tentative plan for long-term feeding tube placement after the weekend, NGT may be beneficial in the interim  ·	goal is to complete curative-intent chemo/RT    Patient would benefit from outpatient Palliative/Supportive Oncology follow-up on discharge with Dr. Emma Anthony at [facility], include her name and contact info ([phone]) in Discharge Summary

## 2023-11-01 NOTE — PROGRESS NOTE ADULT - ATTENDING COMMENTS
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 Marr/Dr. Gar), presents to the ED with odynophagia    #Odynophagia   #Radiation dermatitis   #Squamous Cell Carcinoma of Larynx   #Leukopenia and anemia 2/2 chemo   #Severe protein calorie malnutrition   #DM   #Hypoglycemia     -Odynophagia likely radiation related, know complication. Odynophagia will likely resolve within 2 weeks of completion of radiation. Family agreed to PEG however will need to be off aspirin for 5 days. Last dose was Monday. Spoke with IR , patient will be scheduled for peg on Monday. Palliative following for pain management.   -3nd fraction of radiation received today  -Continue to monitor cbc daily. Leukopenia and anemia likely 2/2 chemo. Had a low grade fever today. CXR w/o infiltrate, follow UA, blood cultures and RVP.  -Started on D5, FS Q6. ISS. Hold Lantus for hypoglycemia.     Rest as per resident note .

## 2023-11-02 NOTE — PROGRESS NOTE ADULT - ASSESSMENT
Plan to complete RT tomorrow.    Continue wound care with saline soaks, silvadene ointment over areas of moist desquamation, and non-stick bandages (particularly if patient is scratching at neck and causing bleeding).  Pain management per palliative  PEG if patient remains unable to tolerate PO      Dinesh Marr  Rad Onc

## 2023-11-02 NOTE — PROGRESS NOTE ADULT - ATTENDING COMMENTS
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 Marr/Dr. Gar), presents to the ED with odynophagia    #Acute metabolic encephalopathy   #Odynophagia   #Radiation dermatitis   #Squamous Cell Carcinoma of Larynx   #Leukopenia and anemia 2/2 chemo   #Severe protein calorie malnutrition   #DM   #Hypoglycemia     -Patient was confused today, found to be retaining 360cc of urine which is likely the reason. s/p straight cath. Scopolamine patch removed.   -Odynophagia likely radiation related, know complication. Odynophagia will likely resolve within 2 weeks of completion of radiation. Family agreed to PEG however will need to be off aspirin for 5 days. Last dose was Monday. Spoke with IR , patient will be scheduled for peg on Monday. Palliative following for pain management.   -3nd fraction of radiation received today  -Continue to monitor cbc daily. Leukopenia and anemia likely 2/2 chemo. Has been fever free or >24hrs, blood cx ngtd,  CXR w/o infiltrate, follow UA and RVP. Monitor off abx as not toxic appearing and vitals stable  -Started on D5, FS Q6. ISS. Hold Lantus for hypoglycemia.     Rest as per resident note . 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 Marr/Dr. Gar), presents to the ED with odynophagia    #Acute metabolic encephalopathy   #Odynophagia   #Radiation dermatitis   #Squamous Cell Carcinoma of Larynx   #Leukopenia and anemia 2/2 chemo   #Severe protein calorie malnutrition   #DM   #Hypoglycemia     -Patient was confused today, likely delirium. found to be retaining 360cc of urine which is likely the reason. s/p straight cath. Scopolamine patch removed.   -Odynophagia likely radiation related, know complication. Odynophagia will likely resolve within 2 weeks of completion of radiation. Family agreed to PEG however will need to be off aspirin for 5 days. Last dose was Monday. Spoke with IR , patient will be scheduled for peg on Monday. Palliative following for pain management.   -3nd fraction of radiation received today  -Continue to monitor cbc daily. Leukopenia and anemia likely 2/2 chemo. Has been fever free or >24hrs, blood cx ngtd,  CXR w/o infiltrate, follow UA and RVP. Monitor off abx as not toxic appearing and vitals stable  -Started on D5, FS Q6. ISS. Hold Lantus for hypoglycemia.     Rest as per resident note . 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 Marr/Dr. Gar), presents to the ED with odynophagia    #Acute metabolic encephalopathy   #Odynophagia   #Radiation dermatitis   #Squamous Cell Carcinoma of Larynx   #Leukopenia and anemia 2/2 chemo   #Severe protein calorie malnutrition   #DM   #Hypoglycemia     -Patient was confused today, likely delirium. found to be retaining 360cc of urine which is likely the reason. s/p straight cath. Scopolamine patch removed.   -Odynophagia likely radiation related, know complication. Odynophagia will likely resolve within 2 weeks of completion of radiation. Family agreed to PEG however will need to be off aspirin for 5 days. Last dose was Monday. Spoke with IR , patient will be scheduled for peg on Monday. Palliative following for pain management.   -4th fraction of radiation received today  -Continue to monitor cbc daily. Leukopenia and anemia likely 2/2 chemo. Has been fever free or >24hrs, blood cx ngtd,  CXR w/o infiltrate, follow UA and RVP. Monitor off abx as not toxic appearing and vitals stable  -Started on D5, FS Q6. ISS. Hold Lantus for hypoglycemia.     Rest as per resident note .

## 2023-11-02 NOTE — PROGRESS NOTE ADULT - PROBLEM SELECTOR PLAN 7
Baseline fluctuates with chemo, so leukopenia likely due to myelosuppression from chemo. NO signs/sx infection. At this time, radiation dermatitis appears at baseline with no superimposed infection  -HIV non reactive   -continue to trend  - if febrile, would obtain infectious work up TTE 07/2023: mild LVH, mild reduced LVH hypertrophy, normal RV function, LV systolic function mild decr EF 50-55%. home meds: imdur 30 once a day, lisinopril 20mg once a day, toprol 12.5mg once a day   - c/w Toprol 12.5 mg- converted to IV 1.25 q6 given pt cannot tolerate PO  - hold imdur given soft BPs, resume when appropriate   - hold lisinopril given prior ELENITA and unable to tolerate PO

## 2023-11-02 NOTE — PROGRESS NOTE ADULT - PROBLEM SELECTOR PLAN 1
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 C/o odynophagia causing poor PO intake. Per chart review appears poor PO intake has been ongoing issue while undergoing chemo/radiation. 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 Marr. Last chemo 10/23. 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   -started NS 1 L 70cc hr for 6 hours then cw D5  -Plan for IR placed PEG tube mondat 11/6  -Per rad onc, odynophagia and mucositis should likely resolve 2-3 weeks after completion of radiation. no radiation on 10/31. Last treatment 10/30  -Per heme onc, no additional chemo. 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 C/o odynophagia causing poor PO intake. Per chart review appears poor PO intake has been ongoing issue while undergoing chemo/radiation. 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 Marr. Last chemo 10/23. 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   -started NS 1 L 70cc hr for 6 hours then cw D5  -Plan for IR placed PEG tube monday 11/6  -Per rad onc, odynophagia and mucositis should likely resolve 2-3 weeks after completion of radiation. no radiation on 10/31. Last treatment 10/30  -Per heme onc, no additional chemo. c/w IVF  - palliative: will update as Fentanyl patch begins working        -Fentanyl 25mcg/hr Patch q72hr as long-acting agent        -Dilaudid 1mg IV q6h ATC (hold for lethargy)        -Dilaudid 0.5mg IV q3h PRN for Moderate Pain        -Dilaudid 1mg IV q3h PRN for Severe Pain  - magic mouthwash  - viscous lidocaine   -scopolamine patch  -c/w pantoprazole 40 mg IV

## 2023-11-02 NOTE — PROGRESS NOTE ADULT - PROBLEM SELECTOR PLAN 3
Reports progressive weakness over the last 2 weeks assc w/ fatigue and poor PO intake. Reports this typically happens after chemo.Typically ambulates with cane.  -Per PT, SOFI placement  - f/u PT/OT Spiked 100.4 fever. Asymptomatic, other vitals WNL  -CXR unremarkable   -f/u bladder scan for urine dribbling  -UA pending  -Covid and RVP pending  -BCX 11/1 NGTD x1

## 2023-11-02 NOTE — PROGRESS NOTE ADULT - PROBLEM SELECTOR PLAN 4
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 C/o pain, dysphagia and odynophagia for weeks which was tx with magic mouthwash, silver silvadene cream, fluconazole 400mg once a day (started 10/23). Radiation paused due to pain, last RT 10/28.  On exam -  Bilateral neck wounds - erythematous with skin breakdown, crusted dead skin peripherally, no drainage, no skin sloughing, at baseline per patient. Reports improvement with cream. No concern for superimposed infection at this time  - monitor off abx   -Per Rad onc, received RT 10/30, 10/31, and 11/1.Plan for final tx friday afternoon.    - c/w silvadene cream and non-stick silicon bandage  -For skin care,  rinse with saline soaks liberally with dabbing of light soap and water Reports progressive weakness over the last 2 weeks assc w/ fatigue and poor PO intake. Reports this typically happens after chemo.Typically ambulates with cane.  -Per PT, SOFI placement  - f/u PT/OT

## 2023-11-02 NOTE — PROGRESS NOTE ADULT - PROBLEM SELECTOR PLAN 9
Last A1c 7.9 in 07/2023. Regimen as follows: Lantus 50mg daily, 12units TID with meals for 48 hour after chemo then 8 TID  - c/w sliding scale   - hold pre-meal given poor PO intake  - q6hr FS given inability to tolerate PO  - mISS CAD s/p MIDCAB and PCI with multiple WINTER (most recent to LCx in 8/2022), on ASA 81 at home. Currently asymptomatic. EKG on admission w/o ischemic changes  - c/w ASA 81  - c/w lipitor 80mg once a day

## 2023-11-02 NOTE — PROGRESS NOTE ADULT - ASSESSMENT
77yo M with PMH of CAD, PVD, T2DM, and SCC of Larynx p/w weakness and odynophagia. Palliative consulted for complex symptom management in the setting of malignancy.    ·	C/w Fentanyl 25mcg/hr Patch q72hr as long-acting agent  ·	C/w scheduled Dilaudid 1mg IV q6h ATC, will de-escalate as Fentanyl Patch becomes therapeutic  ·	tentative plan for long-term feeding tube placement after the weekend, NGT may be beneficial in the interim  ·	goal is to complete curative-intent chemo/RT    Patient would benefit from outpatient Palliative/Supportive Oncology follow-up on discharge with Dr. Emma Anthony at [facility], include her name and contact info ([phone]) in Discharge Summary

## 2023-11-02 NOTE — PROGRESS NOTE ADULT - PROBLEM SELECTOR PLAN 8
CAD s/p MIDCAB and PCI with multiple WINTER (most recent to LCx in 8/2022), on ASA 81 at home. Currently asymptomatic. EKG on admission w/o ischemic changes  - c/w ASA 81  - c/w lipitor 80mg once a day Baseline fluctuates with chemo, so leukopenia likely due to myelosuppression from chemo. NO signs/sx infection. 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

## 2023-11-02 NOTE — PROGRESS NOTE ADULT - PROBLEM SELECTOR PLAN 6
TTE 07/2023: mild LVH, mild reduced LVH hypertrophy, normal RV function, LV systolic function mild decr EF 50-55%. home meds: imdur 30 once a day, lisinopril 20mg once a day, toprol 12.5mg once a day   - c/w Toprol 12.5 mg- converted to IV 1.25 q6 given pt cannot tolerate PO  - hold imdur given soft BPs, resume when appropriate   - hold lisinopril given prior ELENITA and unable to tolerate PO Dx 06/2023 with SCC larynx. Chemo/RT initiated 08/2023. Follows with Dr. Marr (rad onc) and Dr. Derek Harvey (onc). Last chemo (taxol/carboplatin) on 10/23, last RT 10/18, tolerated well. RT paused due to pain.   -Per Rad onc, received RT 10/30. No treatment on 10/31. Plan for 4 more treatment  - onc aware pt is admitted, appreciate recs  - palliative consulted for GOC discussion

## 2023-11-02 NOTE — PROGRESS NOTE ADULT - PROBLEM SELECTOR PLAN 5
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 Chemo/RT initiated 08/2023. Follows with Dr. Marr (rad onc) and Dr. Derek Harvey (onc). Last chemo (taxol/carboplatin) on 10/23, last RT 10/18, tolerated well. RT paused due to pain.   -Per Rad onc, received RT 10/30. No treatment on 10/31. Plan for 4 more treatment  - onc aware pt is admitted, appreciate recs  - palliative consulted for GOC discussion Has b/l neck dermatitis due to radiation, first noticed early 09/13/2023 after 1st/2nd treatment. C/o pain, dysphagia and odynophagia for weeks which was tx with magic mouthwash, silver silvadene cream, fluconazole 400mg once a day (started 10/23). Radiation paused due to pain, last RT 10/28.  On exam -  Bilateral neck wounds - erythematous with skin breakdown, crusted dead skin peripherally, no drainage, no skin sloughing, at baseline per patient. Reports improvement with cream. No concern for superimposed infection at this time  - monitor off abx   -Per Rad onc, received RT 10/30, 10/31, and 11/1.Plan for final tx friday afternoon.    - c/w silvadene cream and non-stick silicon bandage  -For skin care,  rinse with saline soaks liberally with dabbing of light soap and water

## 2023-11-02 NOTE — PROGRESS NOTE ADULT - PROBLEM SELECTOR PLAN 3
.  PPSV = 50%, requires assistance for most ADLs  -PT Eval recommending SOFI  -c/w supportive care, OOB, encourage movement

## 2023-11-02 NOTE — PROGRESS NOTE ADULT - PROBLEM SELECTOR PLAN 6
.  Complex medical decision making / symptom management in the setting of malignancy.    Will continue to follow for ongoing GOC discussion / titration of palliative regimen. Emotional support provided, questions answered.  Active Psychosocial Referrals:  [x]Social Work/Case management [x]PT/OT [x]Chaplaincy []Hospice  []Patient/Family Support []Holistic RN [x]Massage Therapy []Music Therapy []Ethics  Coping: [] well [x] with difficulty [] poor coping [] unable to assess  Support system: [] strong [x] adequate [] inadequate    For new or uncontrolled symptoms, please call Palliative Care at [phone]). The service is available 24/7 (including nights & weekends) to provide symptom management recommendations over the phone as appropriate

## 2023-11-02 NOTE — PROGRESS NOTE ADULT - PROBLEM SELECTOR PLAN 10
Diet: IV fluids, given pt cannot tolerate PO  F: D5 and NS  E: replete PRN  DVT: lovenox  Code: awating GOC conversation Last A1c 7.9 in 07/2023. Regimen as follows: Lantus 50mg daily, 12units TID with meals for 48 hour after chemo then 8 TID  - c/w sliding scale   - hold pre-meal given poor PO intake  - q6hr FS given inability to tolerate PO  - mISS

## 2023-11-02 NOTE — PROGRESS NOTE ADULT - PROBLEM SELECTOR PLAN 5
.  Patient is Full Code  -patient appointed granddaughter (Yvrose Smith, [phone]) as alternate decision maker  -see prior C note    Patient would benefit from outpatient Palliative/Supportive Oncology follow-up on discharge with Dr. Emma Anthony at [facility], include her name and contact info ([phone]) in Discharge Summary

## 2023-11-02 NOTE — PROGRESS NOTE ADULT - PROBLEM SELECTOR PLAN 2
Spiked 100.4 fever. Asymptomatic, other vitals WNL  -CXR unremarkable   -UA pending  -Covid and RVP pending  -BCX 11/1 NGTD x1 Spiked 100.4 fever. 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 C/o odynophagia causing poor PO intake. Per chart review appears poor PO intake has been ongoing issue while undergoing chemo/radiation. 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 Marr. Last chemo 10/23. 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   -started NS 1 L 70cc hr for 6 hours then cw D5  -Plan for IR placed PEG tube monday 11/6  -Per rad onc, odynophagia and mucositis should likely resolve 2-3 weeks after completion of radiation. no radiation on 10/31. Last treatment 10/30  -Per heme onc, no additional chemo. c/w IVF  - palliative: will update as Fentanyl patch begins working        -Fentanyl 25mcg/hr Patch q72hr as long-acting agent        -Dilaudid 1mg IV q6h ATC (hold for lethargy)        -Dilaudid 0.5mg IV q3h PRN for Moderate Pain        -Dilaudid 1mg IV q3h PRN for Severe Pain  - magic mouthwash  - viscous lidocaine   -scopolamine patch  -c/w pantoprazole 40 mg IV

## 2023-11-02 NOTE — PROGRESS NOTE ADULT - SUBJECTIVE AND OBJECTIVE BOX
Language Line  Jacki, #[id number]    SUBJECTIVE AND OBJECTIVE:  Indication for Geriatrics and Palliative Care Services: Symptom management    OVERNIGHT EVENTS: Used dilaudid 1mg IV x4 of ATC meds in 24 hours. Also started on fentanyl 25mcg/h TD patch yesterday. No PRNs used.  Per family, was delirious overnight, confused and agitated. Calm and conversive today.    Allergies    No Known Allergies    Intolerances    MEDICATIONS  (STANDING):  atorvastatin 80 milliGRAM(s) Oral at bedtime  atropine 1% Ophthalmic Solution for SubLingual Use 1 Drop(s) SubLingual three times a day  dextrose 5%. 1000 milliLiter(s) (70 mL/Hr) IV Continuous <Continuous>  dextrose 50% Injectable 25 Gram(s) IV Push once  dextrose 50% Injectable 25 Gram(s) IV Push once  dextrose 50% Injectable 25 Gram(s) IV Push once  dextrose 50% Injectable 12.5 Gram(s) IV Push once  fentaNYL   Patch  25 MICROgram(s)/Hr 1 Patch Transdermal every 72 hours  FIRST- Mouthwash  BLM 4 milliLiter(s) Swish and Spit every 6 hours  glucagon  Injectable 1 milliGRAM(s) IntraMuscular once  heparin   Injectable 5000 Unit(s) SubCutaneous every 8 hours  HYDROmorphone  Injectable 0.5 milliGRAM(s) IV Push every 6 hours  influenza  Vaccine (HIGH DOSE) 0.7 milliLiter(s) IntraMuscular once  insulin lispro (ADMELOG) corrective regimen sliding scale   SubCutaneous every 6 hours  lidocaine 2% Viscous 5 milliLiter(s) Swish and Spit two times a day  metoprolol tartrate Injectable 1.25 milliGRAM(s) IV Push every 6 hours  pantoprazole  Injectable 40 milliGRAM(s) IV Push every 12 hours  silver sulfADIAZINE 1% Cream 1 Application(s) Topical two times a day    MEDICATIONS  (PRN):  dextrose Oral Gel 15 Gram(s) Oral once PRN Blood Glucose LESS THAN 70 milliGRAM(s)/deciliter  HYDROmorphone  Injectable 0.5 milliGRAM(s) IV Push every 3 hours PRN Moderate Pain (4 - 6)  HYDROmorphone  Injectable 1 milliGRAM(s) IV Push every 3 hours PRN Severe Pain (7 - 10)      ITEMS UNCHECKED ARE NOT PRESENT    PRESENT SYMPTOMS: [ ]Unable to self-report  Source if other than patient:  [ ]Family   [ ]Team     Pain:  [X]yes [ ]no  QOL impact - Inability to swallow  Location - Throat  Aggravating factors - Swallowing  Quality - Sharp pain  Radiation - None  Timing- Intermittent (when swallowing)  Severity (0-10 scale): "severe"  Minimal acceptable level (0-10 scale): Unable to describe    Dyspnea:                           [ ]Mild [ ]Moderate [ ]Severe  Anxiety:                             [ ]Mild [ ]Moderate [ ]Severe  Fatigue:                             [ ]Mild [ ]Moderate [ ]Severe  Nausea:                             [ ]Mild [ ]Moderate [ ]Severe  Loss of appetite:              [ ]Mild [ ]Moderate [ ]Severe  Constipation:                    [ ]Mild [ ]Moderate [ ]Severe    Other Symptoms:  [X]All other review of systems negative     Palliative Performance Status Version 2:        50 %      http://npcrc.org/files/news/palliative_performance_scale_ppsv2.pdf    PHYSICAL EXAM:  Vital Signs Last 24 Hrs  T(C): 36.8 (2023 13:16), Max: 37 (2023 21:00)  T(F): 98.2 (2023 13:16), Max: 98.6 (2023 21:00)  HR: 75 (2023 13:16) (70 - 79)  BP: 131/62 (2023 13:16) (125/57 - 136/64)  BP(mean): --  RR: 17 (2023 13:16) (17 - 18)  SpO2: 93% (2023 13:16) (91% - 94%)    Parameters below as of 2023 13:16  Patient On (Oxygen Delivery Method): room air     I&O's Summary    2023 07:01  -  2023 17:36  --------------------------------------------------------  IN: 0 mL / OUT: 300 mL / NET: -300 mL       GENERAL: [ ]Cachexia    [X]Alert  [ ]Oriented x   [ ]Lethargic  [ ]Unarousable  [X]Verbal  [ ]Non-Verbal  Behavioral:   [ ]Anxiety  [ ]Delirium [ ]Agitation [X]Cooperative  HEENT:  [X]Normal   [ ]Dry mouth   [ ]ET Tube/Trach  [ ]Oral lesions  PULMONARY:   [ X]Clear [ ]Tachypnea  [ ]Audible excessive secretions   [ ]Rhonchi        [ ]Right [ ]Left [ ]Bilateral  [ ]Crackles        [ ]Right [ ]Left [ ]Bilateral  [ ]Wheezing     [ ]Right [ ]Left [ ]Bilateral  [ ]Diminished BS [ ] Right [ ]Left [ ]Bilateral  CARDIOVASCULAR:    [X]Regular [ ]Irregular [ ]Tachy  [ ]Yaya [ ]Murmur [ ]Other  GASTROINTESTINAL:  [X]Soft  [ ]Distended   [ ]+BS  [X]Non tender [ ]Tender  [ ]Other [ ]PEG [ ]OGT/ NGT   Last BM:   GENITOURINARY:  [X]Normal [ ]Incontinent   [ ]Oliguria/Anuria   [ ]Paige  MUSCULOSKELETAL:   [ ]Normal   [X]Weakness  [ ]Bed/Wheelchair bound [ ]Edema  NEUROLOGIC:   [X]No focal deficits  [ ] Cognitive impairment  [ ] Dysphagia [ ]Dysarthria [ ] Paresis [ ]Other   SKIN:   [ ]Normal  [ ]Rash  [X]Dermatitis over neck b/l  [ ]Pressure ulcer(s) [ ]y [ ]n present on admission    LABS:                        8.7    2.66  )-----------( 210      ( 2023 07:18 )             26.6   11-02    132<L>  |  97  |  14  ----------------------------<  232<H>  4.6   |  25  |  1.28    Ca    8.9      2023 07:18  Phos  3.1     11-  Mg     2.0     11-    TPro  6.0  /  Alb  2.8<L>  /  TBili  0.4  /  DBili  x   /  AST  20  /  ALT  12  /  AlkPhos  68  11-02      Urinalysis Basic - ( 2023 16:52 )    Color: Yellow / Appearance: Clear / S.015 / pH: x  Gluc: x / Ketone: 15 mg/dL  / Bili: Negative / Urobili: 1.0 mg/dL   Blood: x / Protein: 30 mg/dL / Nitrite: Negative   Leuk Esterase: Negative / RBC: 1 /HPF / WBC 1 /HPF   Sq Epi: x / Non Sq Epi: 1 /HPF / Bacteria: Negative /HPF      RADIOLOGY & ADDITIONAL STUDIES:    Protein Calorie Malnutrition Present: [ ]mild [ ]moderate [ ]severe [ ]underweight [ ]morbid obesity  https://www.andeal.org/vault/2440/web/files/ONC/Table_Clinical%20Characteristics%20to%20Document%20Malnutrition-White%20JV%20et%20al%2020.pdf    Height (cm): 167.6 (10-29-23 @ 19:33), 167.6 (10-23-23 @ 13:00)  Weight (kg): 68 (10-29-23 @ 19:33), 68.946 (10-23-23 @ 13:00), 72.6 (23 @ 13:07)  BMI (kg/m2): 24.2 (10-29-23 @ 19:33), 24.5 (10-23-23 @ 13:00)    [ ]PPSV2 < or = 30%  [ ]significant weight loss [ ]poor nutritional intake [ ]anasarca[ ]Artificial Nutrition    REFERRALS:   [ ]Chaplaincy  [ ]Hospice  [ ]Child Life  [X]Social Work [ ]Patient and Family Support [ ]Case management [ ]Holistic Therapy [ ] Music therapy  [ ] Massage Therapy    DISCUSSION OF CASE: Family - obtained additional history and to provide emotional support;  Primary team - discussed plan of care

## 2023-11-02 NOTE — PROGRESS NOTE ADULT - SUBJECTIVE AND OBJECTIVE BOX
OVERNIGHT EVENTS/INTERVAL HPI: No overnight events reported. Pt seen and examined at bedside this morning. Had radiation treatment yesterday. Admits to 5/10 oropharyngeal pain, has been stable. Mentions "dribbling urine", but no dysuria or urgency. Denies fever, chills, headache, chest pain, SOB, abdominal pain, N/V/D, dysuria    REVIEW OF SYSTEMS:  All other review of systems is negative unless indicated above.    OBJECTIVE:  Vital Signs Last 24 Hrs  T(C): 36.6 (02 Nov 2023 06:41), Max: 37.6 (01 Nov 2023 12:45)  T(F): 97.9 (02 Nov 2023 06:41), Max: 99.6 (01 Nov 2023 12:45)  HR: 70 (02 Nov 2023 06:41) (70 - 86)  BP: 125/57 (02 Nov 2023 06:41) (115/63 - 136/64)  BP(mean): --  RR: 18 (02 Nov 2023 06:41) (17 - 18)  SpO2: 94% (02 Nov 2023 06:41) (91% - 95%)    Parameters below as of 02 Nov 2023 06:41  Patient On (Oxygen Delivery Method): room air      Physical Exam:  General: in no acute distress  Eyes: R eye with ptosis. EOMI intact  HENT: dry mucous membranes. No oral lesions, thrush, erythema. Erythematous plaque with crusting on bilateral lateral neck  Neck: Trachea midline, supple  Lungs: CTA B/L. No wheezes, rales, or rhonchi  Cardiovascular: RRR. No murmurs, rubs, or gallops  Abdomen: Soft, non-tender non-distended; No rebound or guarding  Extremities: WWP, No clubbing, cyanosis or edema  MSK: 5/5  strength b/l  Neurological: Alert and oriented x3  Skin: Warm and dry. No obvious rash    Medications:  MEDICATIONS  (STANDING):  atorvastatin 80 milliGRAM(s) Oral at bedtime  atropine 1% Ophthalmic Solution for SubLingual Use 1 Drop(s) SubLingual three times a day  dextrose 50% Injectable 25 Gram(s) IV Push once  dextrose 50% Injectable 25 Gram(s) IV Push once  dextrose 50% Injectable 25 Gram(s) IV Push once  dextrose 50% Injectable 12.5 Gram(s) IV Push once  fentaNYL   Patch  25 MICROgram(s)/Hr 1 Patch Transdermal every 72 hours  FIRST- Mouthwash  BLM 4 milliLiter(s) Swish and Spit every 6 hours  glucagon  Injectable 1 milliGRAM(s) IntraMuscular once  heparin   Injectable 5000 Unit(s) SubCutaneous every 8 hours  HYDROmorphone  Injectable 1 milliGRAM(s) IV Push every 6 hours  influenza  Vaccine (HIGH DOSE) 0.7 milliLiter(s) IntraMuscular once  insulin lispro (ADMELOG) corrective regimen sliding scale   SubCutaneous every 6 hours  lidocaine 2% Viscous 5 milliLiter(s) Swish and Spit two times a day  metoprolol tartrate Injectable 1.25 milliGRAM(s) IV Push every 6 hours  pantoprazole  Injectable 40 milliGRAM(s) IV Push every 12 hours  scopolamine 1 mG/72 Hr(s) Patch 1 Patch Transdermal every 72 hours  silver sulfADIAZINE 1% Cream 1 Application(s) Topical two times a day  sodium chloride 0.9%. 1000 milliLiter(s) (75 mL/Hr) IV Continuous <Continuous>    MEDICATIONS  (PRN):  dextrose Oral Gel 15 Gram(s) Oral once PRN Blood Glucose LESS THAN 70 milliGRAM(s)/deciliter  HYDROmorphone  Injectable 1 milliGRAM(s) IV Push every 3 hours PRN Severe Pain (7 - 10)  HYDROmorphone  Injectable 0.5 milliGRAM(s) IV Push every 3 hours PRN Moderate Pain (4 - 6)          Labs:                                   8.7    2.66  )-----------( 210      ( 02 Nov 2023 07:18 )             26.6       11-02    132<L>  |  97  |  14  ----------------------------<  232<H>  4.6   |  25  |  1.28    Ca    8.9      02 Nov 2023 07:18  Phos  3.1     11-02  Mg     2.0     11-02    TPro  6.0  /  Alb  2.8<L>  /  TBili  0.4  /  DBili  x   /  AST  20  /  ALT  12  /  AlkPhos  68  11-02      Urinalysis Basic - ( 01 Nov 2023 07:38 )    Color: x / Appearance: x / SG: x / pH: x  Gluc: 201 mg/dL / Ketone: x  / Bili: x / Urobili: x   Blood: x / Protein: x / Nitrite: x   Leuk Esterase: x / RBC: x / WBC x   Sq Epi: x / Non Sq Epi: x / Bacteria: x              Radiology: Reviewed OVERNIGHT EVENTS/INTERVAL HPI: No overnight events reported. Pt seen and examined at bedside this morning. Had radiation treatment yesterday. Admits to 5/10 oropharyngeal pain, has been stable. Mentions "dribbling urine", but no dysuria or urgency. Denies fever, chills, headache, chest pain, SOB, abdominal pain, N/V/D, dysuria    REVIEW OF SYSTEMS:  All other review of systems is negative unless indicated above.    OBJECTIVE:  Vital Signs Last 24 Hrs  T(C): 36.6 (02 Nov 2023 06:41), Max: 37.6 (01 Nov 2023 12:45)  T(F): 97.9 (02 Nov 2023 06:41), Max: 99.6 (01 Nov 2023 12:45)  HR: 70 (02 Nov 2023 06:41) (70 - 86)  BP: 125/57 (02 Nov 2023 06:41) (115/63 - 136/64)  RR: 18 (02 Nov 2023 06:41) (17 - 18)  SpO2: 94% (02 Nov 2023 06:41) (91% - 95%)    Parameters below as of 02 Nov 2023 06:41  Patient On (Oxygen Delivery Method): room air      Physical Exam:  General: in no acute distress  Eyes: R eye with ptosis. EOMI intact  HENT: dry mucous membranes. No oral lesions, thrush, erythema. Erythematous plaque with crusting on bilateral lateral neck  Neck: Trachea midline, supple  Lungs: CTA B/L. No wheezes, rales, or rhonchi  Cardiovascular: RRR. No murmurs, rubs, or gallops  Abdomen: Soft, non-tender non-distended; No rebound or guarding  Extremities: WWP, No clubbing, cyanosis or edema  MSK: 5/5  strength b/l  Neurological: Alert and oriented x3  Skin: Warm and dry. No obvious rash    Medications:  MEDICATIONS  (STANDING):  atorvastatin 80 milliGRAM(s) Oral at bedtime  atropine 1% Ophthalmic Solution for SubLingual Use 1 Drop(s) SubLingual three times a day  dextrose 50% Injectable 25 Gram(s) IV Push once  dextrose 50% Injectable 25 Gram(s) IV Push once  dextrose 50% Injectable 25 Gram(s) IV Push once  dextrose 50% Injectable 12.5 Gram(s) IV Push once  fentaNYL   Patch  25 MICROgram(s)/Hr 1 Patch Transdermal every 72 hours  FIRST- Mouthwash  BLM 4 milliLiter(s) Swish and Spit every 6 hours  glucagon  Injectable 1 milliGRAM(s) IntraMuscular once  heparin   Injectable 5000 Unit(s) SubCutaneous every 8 hours  HYDROmorphone  Injectable 1 milliGRAM(s) IV Push every 6 hours  influenza  Vaccine (HIGH DOSE) 0.7 milliLiter(s) IntraMuscular once  insulin lispro (ADMELOG) corrective regimen sliding scale   SubCutaneous every 6 hours  lidocaine 2% Viscous 5 milliLiter(s) Swish and Spit two times a day  metoprolol tartrate Injectable 1.25 milliGRAM(s) IV Push every 6 hours  pantoprazole  Injectable 40 milliGRAM(s) IV Push every 12 hours  scopolamine 1 mG/72 Hr(s) Patch 1 Patch Transdermal every 72 hours  silver sulfADIAZINE 1% Cream 1 Application(s) Topical two times a day  sodium chloride 0.9%. 1000 milliLiter(s) (75 mL/Hr) IV Continuous <Continuous>    MEDICATIONS  (PRN):  dextrose Oral Gel 15 Gram(s) Oral once PRN Blood Glucose LESS THAN 70 milliGRAM(s)/deciliter  HYDROmorphone  Injectable 1 milliGRAM(s) IV Push every 3 hours PRN Severe Pain (7 - 10)  HYDROmorphone  Injectable 0.5 milliGRAM(s) IV Push every 3 hours PRN Moderate Pain (4 - 6)          Labs:                                   8.7    2.66  )-----------( 210      ( 02 Nov 2023 07:18 )             26.6       11-02    132<L>  |  97  |  14  ----------------------------<  232<H>  4.6   |  25  |  1.28    Ca    8.9      02 Nov 2023 07:18  Phos  3.1     11-02  Mg     2.0     11-02    TPro  6.0  /  Alb  2.8<L>  /  TBili  0.4  /  DBili  x   /  AST  20  /  ALT  12  /  AlkPhos  68  11-02      Urinalysis Basic - ( 01 Nov 2023 07:38 )    Color: x / Appearance: x / SG: x / pH: x  Gluc: 201 mg/dL / Ketone: x  / Bili: x / Urobili: x   Blood: x / Protein: x / Nitrite: x   Leuk Esterase: x / RBC: x / WBC x   Sq Epi: x / Non Sq Epi: x / Bacteria: x              Radiology: Reviewed

## 2023-11-02 NOTE — PROGRESS NOTE ADULT - SUBJECTIVE AND OBJECTIVE BOX
Rad Onc Progress Note    Patient s/p tx 33/35 today. Scheduled to complete final 2 fractions tomorrow. No acute events overnight, though patient does not recall wife was present at bedside yesterday (unclear whether delirium or patient was sleeping/sedated during her presence).    Patient reports improved pain with fentanyl patch. Motivated to complete RT and go home.    Gen: Comfortable in bed, NAD  Psych: A&O, conversing normally  H&N: Skin clearer in setting of rinses, no evidence of infection, bleeding due to excoriation when patient attempted to itch scab

## 2023-11-03 NOTE — PROGRESS NOTE ADULT - ATTENDING COMMENTS
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 Marr/Dr. Gar), presents to the ED with odynophagia    #Acute metabolic encephalopathy   #Odynophagia   #Radiation dermatitis   #Squamous Cell Carcinoma of Larynx   #Leukopenia and anemia 2/2 chemo   #Severe protein calorie malnutrition   #DM   #Hypoglycemia     -Patient has been more delirious likely combination of malnutrition, opioids and urinary retention. Was retaining 360cc of urine. s/p straight cath. Scopolamine patch removed.   -Odynophagia likely radiation related, know complication. Odynophagia will likely resolve within 2 weeks of completion of radiation. Family agreed to PEG however will need to be off aspirin for 5 days. Last dose was Monday. Spoke with IR , patient will be scheduled for peg on Monday. Palliative following for pain management.   -Completed radiation today   -Continue to monitor cbc daily. Leukopenia and anemia likely 2/2 chemo. Has been fever free or >24hrs, blood cx ngtd,  CXR w/o infiltrate,  UA -, follow RVP. Monitor off abx as not toxic appearing and vitals stable  -Started on D5, FS Q6. ISS. Hold Lantus for hypoglycemia.     Rest as per resident note .

## 2023-11-03 NOTE — PROGRESS NOTE ADULT - PROBLEM SELECTOR PLAN 2
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 C/o odynophagia causing poor PO intake. Per chart review appears poor PO intake has been ongoing issue while undergoing chemo/radiation. 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 Marr. Last chemo 10/23. 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    -paused D5 for 6 hours b/c of increased BG and hyponatremia  -Plan for IR placed PEG tube monday 11/6  -Per rad onc, odynophagia and mucositis should likely resolve 2-3 weeks after completion of radiation. no radiation on 10/31. Last treatment 10/30  -Per heme onc, no additional chemo. c/w IVF  - palliative: will update as Fentanyl patch begins working        -Fentanyl 25mcg/hr Patch q72hr as long-acting agent        -Dilaudid 1mg IV q6h ATC (hold for lethargy)        -Dilaudid 0.5mg IV q3h PRN for Moderate Pain        -Dilaudid 1mg IV q3h PRN for Severe Pain  - magic mouthwash  - viscous lidocaine   -scopolamine patch  -c/w pantoprazole 40 mg IV

## 2023-11-03 NOTE — PROGRESS NOTE ADULT - PROBLEM SELECTOR PLAN 3
Spiked 100.4 fever. Asymptomatic, other vitals WNL  -CXR unremarkable   -f/u bladder scan for urine dribbling  -UA pending  -Covid and RVP pending  -BCX 11/1 NGTD x1

## 2023-11-03 NOTE — PROGRESS NOTE ADULT - PROBLEM SELECTOR PLAN 3
Spiked 100.4 fever. Asymptomatic, other vitals WNL  -CXR unremarkable   -f/u bladder scan for urine dribbling  -UA pending  -Covid and RVP pending  -BCX 11/1 NGTD x1 Spiked 100.4 fever 11/2 - no further fevers. Asymptomatic, other vitals WNL  -CXR unremarkable   -f/u bladder scan for urine dribbling  -UA pending  -Covid and RVP pending  -BCX 11/1 NGTD x2

## 2023-11-03 NOTE — PROGRESS NOTE ADULT - PROBLEM SELECTOR PLAN 2
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 C/o odynophagia causing poor PO intake. Per chart review appears poor PO intake has been ongoing issue while undergoing chemo/radiation. 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 Marr. Last chemo 10/23. 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   -started NS 1 L 70cc hr for 6 hours then cw D5  -Plan for IR placed PEG tube monday 11/6  -Per rad onc, odynophagia and mucositis should likely resolve 2-3 weeks after completion of radiation. no radiation on 10/31. Last treatment 10/30  -Per heme onc, no additional chemo. c/w IVF  - palliative: will update as Fentanyl patch begins working        -Fentanyl 25mcg/hr Patch q72hr as long-acting agent        -Dilaudid 1mg IV q6h ATC (hold for lethargy)        -Dilaudid 0.5mg IV q3h PRN for Moderate Pain        -Dilaudid 1mg IV q3h PRN for Severe Pain  - magic mouthwash  - viscous lidocaine   -scopolamine patch  -c/w pantoprazole 40 mg IV

## 2023-11-03 NOTE — PROGRESS NOTE ADULT - SUBJECTIVE AND OBJECTIVE BOX
*INCOMPLETE NOTE*    Hospital course:    INTERVAL HPI/OVERNIGHT EVENTS:  As per night team, no overnight events. Patient seen and examined at bedside. Patient denies fever, chills, dizziness, weakness, HA, CP, SOB, N/V/D/C    VITALS  Vital Signs Last 24 Hrs  T(C): 36.4 (03 Nov 2023 06:24), Max: 36.8 (02 Nov 2023 22:21)  T(F): 97.5 (03 Nov 2023 06:24), Max: 98.3 (02 Nov 2023 22:21)  HR: 99 (03 Nov 2023 06:24) (88 - 103)  BP: 137/64 (03 Nov 2023 06:24) (132/62 - 137/64)  BP(mean): --  RR: 18 (03 Nov 2023 06:24) (18 - 18)  SpO2: 95% (03 Nov 2023 06:24) (94% - 95%)    Parameters below as of 03 Nov 2023 06:24  Patient On (Oxygen Delivery Method): room air        CAPILLARY BLOOD GLUCOSE      POCT Blood Glucose.: 209 mg/dL (03 Nov 2023 12:55)  POCT Blood Glucose.: 184 mg/dL (03 Nov 2023 06:53)  POCT Blood Glucose.: 342 mg/dL (03 Nov 2023 00:53)  POCT Blood Glucose.: 261 mg/dL (02 Nov 2023 18:25)      PHYSICAL EXAM  General: NAD, sitting comfortably in bed   HEENT: PERRL/ EOMI, no scleral icterus, MMM  Neck: Supple, no JVD  Respiratory: lungs CTA b/l, no wheezes/crackles, no accessory muscle use  Cardiovascular: Regular rhythm/rate; +S1 +S2, no murmurs  Gastrointestinal: Soft, NTND, normoactive BS, no rebound, no guarding  Genitourinary: no suprapubic tenderness  Extremities: WWP, no cyanosis, no clubbing, no edema, pulses equal  Neurological: A&Ox3, no gross focal deficits, follows commands  Skin: Normal temperature, warm, dry    MEDICATIONS  (STANDING):  atorvastatin 80 milliGRAM(s) Oral at bedtime  atropine 1% Ophthalmic Solution for SubLingual Use 1 Drop(s) SubLingual three times a day  dextrose 5%. 1000 milliLiter(s) (70 mL/Hr) IV Continuous <Continuous>  dextrose 50% Injectable 25 Gram(s) IV Push once  dextrose 50% Injectable 25 Gram(s) IV Push once  dextrose 50% Injectable 25 Gram(s) IV Push once  dextrose 50% Injectable 12.5 Gram(s) IV Push once  fentaNYL   Patch  25 MICROgram(s)/Hr 1 Patch Transdermal every 72 hours  FIRST- Mouthwash  BLM 4 milliLiter(s) Swish and Spit every 6 hours  glucagon  Injectable 1 milliGRAM(s) IntraMuscular once  heparin   Injectable 5000 Unit(s) SubCutaneous every 8 hours  HYDROmorphone  Injectable 0.5 milliGRAM(s) IV Push every 8 hours  influenza  Vaccine (HIGH DOSE) 0.7 milliLiter(s) IntraMuscular once  insulin lispro (ADMELOG) corrective regimen sliding scale   SubCutaneous every 6 hours  lidocaine 2% Viscous 5 milliLiter(s) Swish and Spit two times a day  metoprolol tartrate Injectable 1.25 milliGRAM(s) IV Push every 6 hours  pantoprazole  Injectable 40 milliGRAM(s) IV Push every 12 hours  silver sulfADIAZINE 1% Cream 1 Application(s) Topical two times a day    MEDICATIONS  (PRN):  dextrose Oral Gel 15 Gram(s) Oral once PRN Blood Glucose LESS THAN 70 milliGRAM(s)/deciliter  HYDROmorphone  Injectable 0.5 milliGRAM(s) IV Push every 3 hours PRN Moderate Pain (4 - 6)  HYDROmorphone  Injectable 1 milliGRAM(s) IV Push every 3 hours PRN Severe Pain (7 - 10)      No Known Allergies      LABS                        9.2    4.25  )-----------( 227      ( 03 Nov 2023 09:25 )             27.9     11-03    134<L>  |  99  |  20  ----------------------------<  170<H>  4.1   |  25  |  1.28    Ca    9.3      03 Nov 2023 09:25  Phos  3.1     11-03  Mg     1.8     11-03    TPro  6.1  /  Alb  2.7<L>  /  TBili  0.4  /  DBili  x   /  AST  75<H>  /  ALT  20  /  AlkPhos  73  11-03      Urinalysis Basic - ( 03 Nov 2023 09:25 )    Color: x / Appearance: x / SG: x / pH: x  Gluc: 170 mg/dL / Ketone: x  / Bili: x / Urobili: x   Blood: x / Protein: x / Nitrite: x   Leuk Esterase: x / RBC: x / WBC x   Sq Epi: x / Non Sq Epi: x / Bacteria: x            RADIOLOGY & ADDITIONAL TESTS: Reviewed

## 2023-11-03 NOTE — PROGRESS NOTE ADULT - PROBLEM SELECTOR PLAN 1
Pt was agitated o/n and received 2x2.5mg Zyprexa for agitation. AM pt was responding and was AOx2, but after RT tx pt was AOx0. Unclear etiology at this point. Pt is AOx1-2. Infectious workup continues to be unremarkable w/ no NGTD and negative UA and CXR. Likely 2/2 to hospital delirium, no FND    - Pending CT head  - Re-orient as possible  - Can give Zyprexa 2.5mg IM if necessary

## 2023-11-03 NOTE — CHART NOTE - NSCHARTNOTEFT_GEN_A_CORE
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 Marr/Dr. 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    Continues to complain of odynophagia, appreciate Palliative recommendations for pain control.    We are following patient peripherally.

## 2023-11-03 NOTE — PROGRESS NOTE ADULT - PROBLEM SELECTOR PLAN 5
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 C/o pain, dysphagia and odynophagia for weeks which was tx with magic mouthwash, silver silvadene cream, fluconazole 400mg once a day (started 10/23). Radiation paused due to pain, last RT 10/28.  On exam -  Bilateral neck wounds - erythematous with skin breakdown, crusted dead skin peripherally, no drainage, no skin sloughing, at baseline per patient. Reports improvement with cream. No concern for superimposed infection at this time  - monitor off abx   -Per Rad onc, received RT 10/30, 10/31, and 11/1.Plan for final tx friday afternoon.    - c/w silvadene cream and non-stick silicon bandage  -For skin care,  rinse with saline soaks liberally with dabbing of light soap and water Has b/l neck dermatitis due to radiation, first noticed early 09/13/2023 after 1st/2nd treatment. C/o pain, dysphagia and odynophagia for weeks which was tx with magic mouthwash, silver silvadene cream, fluconazole 400mg once a day (started 10/23). Radiation paused due to pain, last RT 10/28.  On exam -  Bilateral neck wounds - erythematous with skin breakdown, crusted dead skin peripherally, no drainage, no skin sloughing, at baseline per patient. Reports improvement with cream. No concern for superimposed infection at this time    - monitor off abx   -Per Rad onc, received RT 10/30, 10/31, and 11/1.Plan for final tx friday afternoon.    - c/w silvadene cream and non-stick silicon bandage  -For skin care,  rinse with saline soaks liberally with dabbing of light soap and water

## 2023-11-03 NOTE — PROGRESS NOTE ADULT - PROBLEM SELECTOR PLAN 5
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 C/o pain, dysphagia and odynophagia for weeks which was tx with magic mouthwash, silver silvadene cream, fluconazole 400mg once a day (started 10/23). Radiation paused due to pain, last RT 10/28.  On exam -  Bilateral neck wounds - erythematous with skin breakdown, crusted dead skin peripherally, no drainage, no skin sloughing, at baseline per patient. Reports improvement with cream. No concern for superimposed infection at this time  - monitor off abx   -Per Rad onc, received RT 10/30, 10/31, and 11/1.Plan for final tx friday afternoon.    - c/w silvadene cream and non-stick silicon bandage  -For skin care,  rinse with saline soaks liberally with dabbing of light soap and water

## 2023-11-03 NOTE — PROGRESS NOTE ADULT - PROBLEM SELECTOR PLAN 6
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 Chemo/RT initiated 08/2023. Follows with Dr. Marr (rad onc) and Dr. Derek Harvey (onc). Last chemo (taxol/carboplatin) on 10/23, last RT 10/18, tolerated well. RT paused due to pain.   -Per Rad onc, received RT 10/30. No treatment on 10/31. Plan for 4 more treatment  - onc aware pt is admitted, appreciate recs  - palliative consulted for GOC discussion Dx 06/2023 with SCC larynx. Chemo/RT initiated 08/2023. Follows with Dr. Marr (rad onc) and Dr. Derek Harvey (onc). Last chemo (taxol/carboplatin) on 10/23, last RT 11/3, tolerated well.     - Received RT today 11/3.  - onc aware pt is admitted, appreciate recs  - palliative consulted for GOC discussion

## 2023-11-03 NOTE — PROGRESS NOTE ADULT - SUBJECTIVE AND OBJECTIVE BOX
OVERNIGHT EVENTS/INTERVAL HPI: Pt was severely agitated and delirious w/ hallucinations stating to see daughter/wife. He became combative and required 1x dose of Zyprexa IM 2.5mg. 30 minutes later the patient again was competitive and agitated attempting to leave the bed and required an additional dose of Zyprexa 2.5mg IM    Pt was AOx2 in the AM this morning. Pt had radiation treatment today. Upon return pt was AOx0 w/ Burmese speaking intern used to help communicate w/ patient due to severe hoarseness    Physical Exam:  General: in no acute distress  Eyes: R eye with ptosis. EOMI intact. Severe cataracts present in both eyes  HENT: dry mucous membranes. No oral lesions, thrush, erythema. Erythematous plaque with crusting on bilateral lateral neck  Neck: Trachea midline, supple  Lungs: CTA B/L. No wheezes, rales, or rhonchi  Cardiovascular: RRR. No murmurs, rubs, or gallops  Abdomen: Soft, non-tender non-distended; No rebound or guarding  Extremities: WWP, No clubbing, cyanosis or edema  Neurological: Alert and oriented x3  Skin: Warm and dry. No obvious rash    ICU Vital Signs Last 24 Hrs  T(C): 36.4 (03 Nov 2023 06:24), Max: 36.8 (02 Nov 2023 13:16)  T(F): 97.5 (03 Nov 2023 06:24), Max: 98.3 (02 Nov 2023 22:21)  HR: 99 (03 Nov 2023 06:24) (75 - 103)  BP: 137/64 (03 Nov 2023 06:24) (131/62 - 137/64)  RR: 18 (03 Nov 2023 06:24) (17 - 18)  SpO2: 95% (03 Nov 2023 06:24) (93% - 95%)    O2 Parameters below as of 03 Nov 2023 06:24  Patient On (Oxygen Delivery Method): room air      I&O's Summary    02 Nov 2023 07:01  -  03 Nov 2023 07:00  --------------------------------------------------------  IN: 0 mL / OUT: 300 mL / NET: -300 mL      LABS:                        9.2    4.25  )-----------( 227      ( 03 Nov 2023 09:25 )             27.9     11-03    134<L>  |  99  |  20  ----------------------------<  170<H>  4.1   |  25  |  1.28    Ca    9.3      03 Nov 2023 09:25  Phos  3.1     11-03  Mg     1.8     11-03    TPro  6.1  /  Alb  2.7<L>  /  TBili  0.4  /  DBili  x   /  AST  75<H>  /  ALT  20  /  AlkPhos  73  11-03      Urinalysis Basic - ( 03 Nov 2023 09:25 )    Color: x / Appearance: x / SG: x / pH: x  Gluc: 170 mg/dL / Ketone: x  / Bili: x / Urobili: x   Blood: x / Protein: x / Nitrite: x   Leuk Esterase: x / RBC: x / WBC x   Sq Epi: x / Non Sq Epi: x / Bacteria: x      CAPILLARY BLOOD GLUCOSE      POCT Blood Glucose.: 184 mg/dL (03 Nov 2023 06:53)  POCT Blood Glucose.: 342 mg/dL (03 Nov 2023 00:53)  POCT Blood Glucose.: 261 mg/dL (02 Nov 2023 18:25)  POCT Blood Glucose.: 218 mg/dL (02 Nov 2023 13:28)        Consultant(s) Notes Reviewed:  [x ] YES  [ ] NO    MEDICATIONS  (STANDING):  atorvastatin 80 milliGRAM(s) Oral at bedtime  atropine 1% Ophthalmic Solution for SubLingual Use 1 Drop(s) SubLingual three times a day  dextrose 50% Injectable 25 Gram(s) IV Push once  dextrose 50% Injectable 25 Gram(s) IV Push once  dextrose 50% Injectable 25 Gram(s) IV Push once  dextrose 50% Injectable 12.5 Gram(s) IV Push once  fentaNYL   Patch  25 MICROgram(s)/Hr 1 Patch Transdermal every 72 hours  FIRST- Mouthwash  BLM 4 milliLiter(s) Swish and Spit every 6 hours  glucagon  Injectable 1 milliGRAM(s) IntraMuscular once  heparin   Injectable 5000 Unit(s) SubCutaneous every 8 hours  HYDROmorphone  Injectable 0.5 milliGRAM(s) IV Push every 8 hours  influenza  Vaccine (HIGH DOSE) 0.7 milliLiter(s) IntraMuscular once  insulin lispro (ADMELOG) corrective regimen sliding scale   SubCutaneous every 6 hours  lidocaine 2% Viscous 5 milliLiter(s) Swish and Spit two times a day  metoprolol tartrate Injectable 1.25 milliGRAM(s) IV Push every 6 hours  pantoprazole  Injectable 40 milliGRAM(s) IV Push every 12 hours  silver sulfADIAZINE 1% Cream 1 Application(s) Topical two times a day    MEDICATIONS  (PRN):  dextrose Oral Gel 15 Gram(s) Oral once PRN Blood Glucose LESS THAN 70 milliGRAM(s)/deciliter  HYDROmorphone  Injectable 1 milliGRAM(s) IV Push every 3 hours PRN Severe Pain (7 - 10)  HYDROmorphone  Injectable 0.5 milliGRAM(s) IV Push every 3 hours PRN Moderate Pain (4 - 6)      Care Discussed with Consultants/Other Providers [ x] YES  [ ] NO    RADIOLOGY & ADDITIONAL TESTS:

## 2023-11-04 NOTE — PROGRESS NOTE ADULT - SUBJECTIVE AND OBJECTIVE BOX
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 Marr/Dr. Derek Gar), presents to the ED with generalized weakness. Reports progressive weakness over the last 2 weeks assc w/ fatigue. 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 C/o odynophagia but tolerating secretions. Saw Rad on on 10/13. Treatment break on 10/20 and restarted inpatient on 10/30. Final RT treatment was on 11/3. Over the course of his stay, the patient has had copious secretions that require increasing suctioning due to RT therapy. Patient is planned to have a PEG tube placement on Monday 11/6 for odynophagia and decreased oral intake due to radiation induced mucositis.     INTERVAL HPI/OVERNIGHT EVENTS:  Patient was seen and examined at bedside. As per nurse and patient, no o/n events, patient resting comfortably. No complaints at this time. Patient denies: fever, chills, lightheadedness, weakness, CP, palpitations, SOB, cough, N/V. ROS otherwise negative.    VITAL SIGNS:  T(F): 99.4 (11-04-23 @ 12:47)  HR: 99 (11-04-23 @ 13:05)  BP: 140/66 (11-04-23 @ 13:05)  RR: 18 (11-04-23 @ 13:05)  SpO2: 100% (11-04-23 @ 13:05)  Wt(kg): --        PHYSICAL EXAM:   General: in no acute distress, agitated when spoken to  Eyes: R eye with ptosis. EOMI intact. Severe cataracts present in both eyes  HENT: dry mucous membranes. No oral lesions, thrush, erythema. Erythematous plaque with crusting on bilateral lateral neck  Neck: Trachea midline, supple  Lungs: CTA B/L. No wheezes, rales, or rhonchi  Cardiovascular: RRR. No murmurs, rubs, or gallops  Abdomen: Soft, non-tender non-distended; No rebound or guarding  Extremities: WWP, No clubbing, cyanosis or edema  Neurological: Alert and oriented x1-2  Skin: Warm and dry. No obvious rash    MEDICATIONS  (STANDING):  atorvastatin 80 milliGRAM(s) Oral at bedtime  atropine 1% Ophthalmic Solution for SubLingual Use 1 Drop(s) SubLingual three times a day  dextrose 5%. 1000 milliLiter(s) (70 mL/Hr) IV Continuous <Continuous>  dextrose 50% Injectable 25 Gram(s) IV Push once  dextrose 50% Injectable 12.5 Gram(s) IV Push once  dextrose 50% Injectable 25 Gram(s) IV Push once  dextrose 50% Injectable 25 Gram(s) IV Push once  fentaNYL   Patch  25 MICROgram(s)/Hr 1 Patch Transdermal every 72 hours  FIRST- Mouthwash  BLM 4 milliLiter(s) Swish and Spit every 6 hours  glucagon  Injectable 1 milliGRAM(s) IntraMuscular once  heparin   Injectable 5000 Unit(s) SubCutaneous every 8 hours  HYDROmorphone  Injectable 0.5 milliGRAM(s) IV Push every 8 hours  influenza  Vaccine (HIGH DOSE) 0.7 milliLiter(s) IntraMuscular once  insulin lispro (ADMELOG) corrective regimen sliding scale   SubCutaneous every 6 hours  lidocaine 2% Viscous 5 milliLiter(s) Swish and Spit two times a day  metoprolol tartrate Injectable 1.25 milliGRAM(s) IV Push every 6 hours  pantoprazole  Injectable 40 milliGRAM(s) IV Push every 12 hours  polyethylene glycol 3350 17 Gram(s) Oral daily  senna 2 Tablet(s) Oral at bedtime  silver sulfADIAZINE 1% Cream 1 Application(s) Topical two times a day    MEDICATIONS  (PRN):  dextrose Oral Gel 15 Gram(s) Oral once PRN Blood Glucose LESS THAN 70 milliGRAM(s)/deciliter  HYDROmorphone  Injectable 0.5 milliGRAM(s) IV Push every 3 hours PRN Moderate Pain (4 - 6)  HYDROmorphone  Injectable 1 milliGRAM(s) IV Push every 3 hours PRN Severe Pain (7 - 10)      Allergies    No Known Allergies    Intolerances        LABS:                        9.3    5.11  )-----------( 226      ( 04 Nov 2023 11:13 )             27.9     11-04    135  |  99  |  19  ----------------------------<  206<H>  4.0   |  26  |  1.20    Ca    9.2      04 Nov 2023 11:13  Phos  3.5     11-04  Mg     1.7     11-04    TPro  6.2  /  Alb  2.8<L>  /  TBili  0.4  /  DBili  x   /  AST  85<H>  /  ALT  29  /  AlkPhos  75  11-04      Urinalysis Basic - ( 04 Nov 2023 11:13 )    Color: x / Appearance: x / SG: x / pH: x  Gluc: 206 mg/dL / Ketone: x  / Bili: x / Urobili: x   Blood: x / Protein: x / Nitrite: x   Leuk Esterase: x / RBC: x / WBC x   Sq Epi: x / Non Sq Epi: x / Bacteria: x        RADIOLOGY & ADDITIONAL TESTS:  Reviewed

## 2023-11-04 NOTE — PROGRESS NOTE ADULT - PROBLEM SELECTOR PLAN 6
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 Chemo/RT initiated 08/2023. Follows with Dr. Marr (rad onc) and Dr. Derek Harvey (onc). Last chemo (taxol/carboplatin) on 10/23, last RT 11/3, tolerated well.     - Received RT yesterday 11/3.  - onc aware pt is admitted, appreciate recs  - palliative consulted for GOC discussion

## 2023-11-04 NOTE — PROGRESS NOTE ADULT - SUBJECTIVE AND OBJECTIVE BOX
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 Marr/Dr. Derek Gar), presents to the ED with generalized weakness. Reports progressive weakness over the last 2 weeks assc w/ fatigue. 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 C/o odynophagia but tolerating secretions. No shortness of breath or difficulty breathing. Reports dermatitis is stable, and reponds well to cream he was given. No drainage or discharge. Saw Rad on on 10/13. Per chart review appears odynophagia was presenting symptom of cancer. Reported subjective fever for 1 day but did not take his temperature at home. ROS otherwise negative.       Afebrile, HR 70s, //86, 100% on RA  WBC 2.3, Hb 9.5, plt 219, Na 133, K 5.4, BUN 49/Cr 1.87, lactate 1.0  EKG NSR, no peak T waves   s/p morphine, 2L NS  (29 Oct 2023 21:39)                            9.3    5.11  )-----------( 226      ( 04 Nov 2023 11:13 )             27.9       11-04    135  |  99  |  19  ----------------------------<  206<H>  4.0   |  26  |  1.20    Ca    9.2      04 Nov 2023 11:13  Phos  3.5     11-04  Mg     1.7     11-04    TPro  6.2  /  Alb  2.8<L>  /  TBili  0.4  /  DBili  x   /  AST  85<H>  /  ALT  29  /  AlkPhos  75  11-04    Vital Signs Last 24 Hrs  T(C): --  T(F): --  HR: 99 (04 Nov 2023 13:05) (91 - 113)  BP: 140/66 (04 Nov 2023 13:05) (108/64 - 150/63)  BP(mean): --  RR: 18 (04 Nov 2023 13:05) (18 - 18)  SpO2: 100% (04 Nov 2023 13:05) (91% - 100%)    Parameters below as of 04 Nov 2023 13:05  Patient On (Oxygen Delivery Method): nasal cannula  O2 Flow (L/min): 2      MEDICATIONS  (STANDING):  atorvastatin 80 milliGRAM(s) Oral at bedtime  atropine 1% Ophthalmic Solution for SubLingual Use 1 Drop(s) SubLingual three times a day  dextrose 5%. 1000 milliLiter(s) (70 mL/Hr) IV Continuous <Continuous>  dextrose 50% Injectable 25 Gram(s) IV Push once  dextrose 50% Injectable 12.5 Gram(s) IV Push once  dextrose 50% Injectable 25 Gram(s) IV Push once  dextrose 50% Injectable 25 Gram(s) IV Push once  fentaNYL   Patch  25 MICROgram(s)/Hr 1 Patch Transdermal every 72 hours  FIRST- Mouthwash  BLM 4 milliLiter(s) Swish and Spit every 6 hours  glucagon  Injectable 1 milliGRAM(s) IntraMuscular once  heparin   Injectable 5000 Unit(s) SubCutaneous every 8 hours  HYDROmorphone  Injectable 0.5 milliGRAM(s) IV Push every 8 hours  influenza  Vaccine (HIGH DOSE) 0.7 milliLiter(s) IntraMuscular once  insulin lispro (ADMELOG) corrective regimen sliding scale   SubCutaneous every 6 hours  lidocaine 2% Viscous 5 milliLiter(s) Swish and Spit two times a day  metoprolol tartrate Injectable 1.25 milliGRAM(s) IV Push every 6 hours  pantoprazole  Injectable 40 milliGRAM(s) IV Push every 12 hours  polyethylene glycol 3350 17 Gram(s) Oral daily  senna 2 Tablet(s) Oral at bedtime  silver sulfADIAZINE 1% Cream 1 Application(s) Topical two times a day    MEDICATIONS  (PRN):  dextrose Oral Gel 15 Gram(s) Oral once PRN Blood Glucose LESS THAN 70 milliGRAM(s)/deciliter  HYDROmorphone  Injectable 0.5 milliGRAM(s) IV Push every 3 hours PRN Moderate Pain (4 - 6)  HYDROmorphone  Injectable 1 milliGRAM(s) IV Push every 3 hours PRN Severe Pain (7 - 10)         11/3/2023  Functional Status Assessment :       Pain Assessment/Number Scale (0-10) Adult  Presence of Pain: denies pain/discomfort (Rating = 0)  Pain Rating (0-10): Rest: 0 (no pain/absence of nonverbal indicators of pain)  Pain Rating (0-10): Activity: 0 (no pain/absence of nonverbal indicators of pain)  Comfort/Acceptable Pain Level (0-10): 0     Safety      AM-PAC Functional Assessment: Basic Mobility  Type of Assessment: Daily assessment  Turning from your back to your side while in a flat bed without using bedrails?: 3 = A little assistance  Moving from lying on your back to sitting on the flat side of a flat bed without using bedrails?: 3 = A little assistance  Moving to and from a bed to a chair (including a wheelchair)?: 3 = A little assistance  Standing up from a chair using your arms (e.g. wheelchair or bedside chair)?: 3 = A little assistance  Walking in hospital room?: 3 = A little assistance  Climbing 3-5 steps with a railing?: 3 = A little assistance  Score: 18   Row Comment: Ask the patient "How much help from another person do you currently need? (If the patient hasn't done an activity recently, how much help from another person do you think he/she needs if he/she tried?)    Cognitive/Neuro      Cognitive/Neuro/Behavioral  Cognitive/Neuro/Behavioral [WDL Definition: Alert; opens eyes spontaneously; arouses to voice or touch; oriented x 4; follows commands; speech spontaneous, logical; purposeful motor response; behavior appropriate to situation]: WDL except  Level of Consciousness: alert;  confused  Arousal Level: arouses to voice  Orientation: oriented x 4  Speech: clear;  spontaneous  Mood/Behavior: calm;  cooperative    Language Assistance  Preferred Language to Address Healthcare Preferred Language to Address Healthcare: English    Therapeutic Interventions      Bed Mobility  Bed Mobility Training Rolling/Turning: minimum assist (75% patient effort);  1 person assist  Bed Mobility Training Scooting: minimum assist (75% patient effort);  1 person assist  Bed Mobility Training Sit-to-Supine: minimum assist (75% patient effort);  1 person assist  Bed Mobility Training Supine-to-Sit: minimum assist (75% patient effort);  1 person assist  Bed Mobility Training Limitations: decreased ability to use legs for bridging/pushing;  decreased strength;  impaired balance    Sit-Stand Transfer Training  Transfer Training Sit-to-Stand Transfer: contact guard;  minimum assist (75% patient effort);  1 person assist;  weight-bearing as tolerated   rolling walker  Transfer Training Stand-to-Sit Transfer: contact guard;  minimum assist (75% patient effort);  1 person assist;  supervision;  verbal cues;  weight-bearing as tolerated   rolling walker  Sit-to-Stand Transfer Training Transfer Safety Analysis: decreased balance;  decreased weight-shifting ability;  decreased strength;  impaired balance;  impaired postural control;  rolling walker    Gait Training  Gait Analysis: 2-point gait   decreased merline;  crouch;  decreased hip/knee flexion;  decreased step length;  decreased stride length;  decreased toe clearance;  decreased strength;  impaired balance;  impaired postural control;  30ft ;  rolling walker            PM&R Impression : as above    Current disposition plan recommendation :    subacute rehab placement

## 2023-11-04 NOTE — PROGRESS NOTE ADULT - ASSESSMENT
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 Marr/Dr. Gar), presents to the ED with generalized weakness and odynophagia     Nutritional Assessment:  · Nutritional Assessment	This patient has been assessed with a concern for Malnutrition and has been determined to have a diagnosis/diagnoses of Severe protein-calorie malnutrition.    This patient is being managed with:   Diet NPO-  Except Medications  Entered: Oct 31 2023  8:47AM    This patient has been assessed with a concern for Malnutrition and has been determined to have a diagnosis/diagnoses of Severe protein-calorie malnutrition.    This patient is being managed with:   Diet NPO-  Except Medications  Entered: Oct 31 2023  8:47AM    Problem/Plan - 1:  ·  Problem: Acute metabolic encephalopathy.   ·  Plan: Pt was agitated o/n and received 2x2.5mg Zyprexa for agitation. AM pt was responding and was AOx2, but after RT tx pt was AOx0. Unclear etiology at this point. Pt is AOx1-2. Infectious workup continues to be unremarkable w/ no NGTD and negative UA and CXR. Likely 2/2 to hospital delirium, no FND    - Pending CT head  - Re-orient as possible  - Can give Zyprexa 2.5mg IM if necessary.    Problem/Plan - 2:  ·  Problem: Odynophagia.   ·  Plan: Larynx SCC currently getting radiation and chemotherapy. C/o odynophagia causing poor PO intake. Per chart review appears poor PO intake has been ongoing issue while undergoing chemo/radiation. 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 Marr. Last chemo 10/23. 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    -paused D5 for 6 hours b/c of increased BG and hyponatremia  -Plan for IR placed PEG tube monday 11/6  -Per rad onc, odynophagia and mucositis should likely resolve 2-3 weeks after completion of radiation. no radiation on 10/31. Last treatment 10/30  -Per heme onc, no additional chemo. c/w IVF  - palliative: will update as Fentanyl patch begins working        -Fentanyl 25mcg/hr Patch q72hr as long-acting agent        -Dilaudid 1mg IV q6h ATC (hold for lethargy)        -Dilaudid 0.5mg IV q3h PRN for Moderate Pain        -Dilaudid 1mg IV q3h PRN for Severe Pain  - magic mouthwash  - viscous lidocaine   -scopolamine patch  -c/w pantoprazole 40 mg IV.    Problem/Plan - 3:  ·  Problem: Fever, unspecified.   ·  Plan: Spiked 100.4 fever 11/2 - no further fevers. Asymptomatic, other vitals WNL  -CXR unremarkable   -f/u bladder scan for urine dribbling  -UA pending  -Covid and RVP pending  -BCX 11/1 NGTD x2.    Problem/Plan - 4:  ·  Problem: Weakness generalized.   ·  Plan: Reports progressive weakness over the last 2 weeks assc w/ fatigue and poor PO intake. Reports this typically happens after chemo.Typically ambulates with cane.  -Per PT, SOFI placement  - f/u PT/OT.    Problem/Plan - 5:  ·  Problem: Radiation dermatitis.   ·  Plan: Has b/l neck dermatitis due to radiation, first noticed early 09/13/2023 after 1st/2nd treatment. C/o pain, dysphagia and odynophagia for weeks which was tx with magic mouthwash, silver silvadene cream, fluconazole 400mg once a day (started 10/23). Radiation paused due to pain, last RT 10/28.  On exam -  Bilateral neck wounds - erythematous with skin breakdown, crusted dead skin peripherally, no drainage, no skin sloughing, at baseline per patient. Reports improvement with cream. No concern for superimposed infection at this time    - monitor off abx   -Per Rad onc, received RT 10/30, 10/31, and 11/1.Plan for final tx friday afternoon.    - c/w silvadene cream and non-stick silicon bandage  -For skin care,  rinse with saline soaks liberally with dabbing of light soap and water.    Problem/Plan - 6:  ·  Problem: Squamous cell carcinoma of larynx.   ·  Plan: Dx 06/2023 with SCC larynx. Chemo/RT initiated 08/2023. Follows with Dr. Marr (rad onc) and Dr. Derek Harvey (onc). Last chemo (taxol/carboplatin) on 10/23, last RT 11/3, tolerated well.     - Received RT today 11/3.  - onc aware pt is admitted, appreciate recs  - palliative consulted for GOC discussion.    Problem/Plan - 7:  ·  Problem: Heart failure with mildly reduced ejection fraction.   ·  Plan: TTE 07/2023: mild LVH, mild reduced LVH hypertrophy, normal RV function, LV systolic function mild decr EF 50-55%. home meds: imdur 30 once a day, lisinopril 20mg once a day, toprol 12.5mg once a day   - c/w Toprol 12.5 mg- converted to IV 1.25 q6 given pt cannot tolerate PO  - hold imdur given soft BPs, resume when appropriate   - hold lisinopril given prior ELENITA and unable to tolerate PO.    Problem/Plan - 8:  ·  Problem: Leukopenia.   ·  Plan: Baseline fluctuates with chemo, so leukopenia likely due to myelosuppression from chemo. NO signs/sx infection. At this time, radiation dermatitis appears at baseline with no superimposed infection  -HIV non reactive   -continue to trend  - if febrile, would obtain infectious work up.    Problem/Plan - 9:  ·  Problem: CAD (coronary artery disease).   ·  Plan: CAD s/p MIDCAB and PCI with multiple WINTER (most recent to LCx in 8/2022), on ASA 81 at home. Currently asymptomatic. EKG on admission w/o ischemic changes  - c/w ASA 81  - c/w lipitor 80mg once a day.    Problem/Plan - 10:  ·  Problem: DM (diabetes mellitus).   ·  Plan; Last A1c 7.9 in 07/2023. Regimen as follows: Lantus 50mg daily, 12units TID with meals for 48 hour after chemo then 8 TID  - c/w sliding scale   - hold pre-meal given poor PO intake  - q6hr FS given inability to tolerate PO  - mISS.    Problem/Plan - 11:  ·  Problem: Prophylactic measure.   ·  Plan: Diet: IV fluids, given pt cannot tolerate PO  F: D5 and NS  E: replete PRN  DVT: lovenox  Code: awating GOC conversation.

## 2023-11-04 NOTE — PROGRESS NOTE ADULT - PROBLEM SELECTOR PLAN 2
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 C/o odynophagia causing poor PO intake. Per chart review appears poor PO intake has been ongoing issue while undergoing chemo/radiation. 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 Marr. Last chemo 10/23. 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    -paused D5 for 6 hours b/c of increased BG and hyponatremia  -Plan for IR placed PEG tube monday 11/6  -Per rad onc, odynophagia and mucositis should likely resolve 2-3 weeks after completion of radiation. no radiation on 10/31. Last treatment 10/30  -Per heme onc, no additional chemo. c/w IVF  - palliative: will update as Fentanyl patch begins working        -Fentanyl 25mcg/hr Patch q72hr as long-acting agent        -Dilaudid 1mg IV q6h ATC (hold for lethargy)        -Dilaudid 0.5mg IV q6h PRN for Moderate Pain        -Dilaudid 1mg IV q3h PRN for Severe Pain  - magic mouthwash  - viscous lidocaine   -scopolamine patch  -c/w pantoprazole 40 mg IV

## 2023-11-04 NOTE — PROGRESS NOTE ADULT - ATTENDING COMMENTS
Laryngeal squamous cell carcinoma s/p RT with significant increase in airway secretions. Needs aggressive pulmonary toilet.  Rest of plan as per above.

## 2023-11-04 NOTE — PROGRESS NOTE ADULT - PROBLEM SELECTOR PLAN 3
Spiked 100.4 fever 11/2 - no further fevers. Asymptomatic, other vitals WNL  -CXR unremarkable   -f/u bladder scan for urine dribbling  -UA pending  -Covid and RVP pending  -BCX 11/1 NGTD x2

## 2023-11-04 NOTE — PROGRESS NOTE ADULT - SUBJECTIVE AND OBJECTIVE BOX
INTERVAL HPI/OVERNIGHT EVENTS:  As per night team,lots of secretions,  before lopressor. Patient seen and examined at bedside. Agitated with less secretions. Hoarseness of voice.  Patient denies fever, chills, dizziness, weakness, HA, CP, SOB, N/V/D/C    VITALS  Vital Signs Last 24 Hrs  T(C): 37.4 (03 Nov 2023 14:00), Max: 37.4 (03 Nov 2023 14:00)  T(F): 99.4 (03 Nov 2023 14:00), Max: 99.4 (03 Nov 2023 14:00)  HR: 113 (04 Nov 2023 04:32) (93 - 113)  BP: 108/64 (04 Nov 2023 04:32) (108/64 - 150/63)  BP(mean): --  RR: 18 (03 Nov 2023 22:50) (17 - 18)  SpO2: 97% (04 Nov 2023 04:32) (91% - 97%)    Parameters below as of 03 Nov 2023 22:50  Patient On (Oxygen Delivery Method): room air    CAPILLARY BLOOD GLUCOSE    POCT Blood Glucose.: 292 mg/dL (04 Nov 2023 07:33)  POCT Blood Glucose.: 246 mg/dL (04 Nov 2023 00:04)  POCT Blood Glucose.: 191 mg/dL (03 Nov 2023 21:37)  POCT Blood Glucose.: 250 mg/dL (03 Nov 2023 18:12)  POCT Blood Glucose.: 209 mg/dL (03 Nov 2023 12:55)      PHYSICAL EXAM  General: in no acute distress, agitated when spoken to  Eyes: R eye with ptosis. EOMI intact. Severe cataracts present in both eyes  HENT: dry mucous membranes. No oral lesions, thrush, erythema. Erythematous plaque with crusting on bilateral lateral neck  Neck: Trachea midline, supple  Lungs: CTA B/L. No wheezes, rales, or rhonchi  Cardiovascular: RRR. No murmurs, rubs, or gallops  Abdomen: Soft, non-tender non-distended; No rebound or guarding  Extremities: WWP, No clubbing, cyanosis or edema  Neurological: Alert and oriented x1-2  Skin: Warm and dry. No obvious rash    MEDICATIONS  (STANDING):  atorvastatin 80 milliGRAM(s) Oral at bedtime  atropine 1% Ophthalmic Solution for SubLingual Use 1 Drop(s) SubLingual three times a day  dextrose 5%. 1000 milliLiter(s) (70 mL/Hr) IV Continuous <Continuous>  dextrose 50% Injectable 25 Gram(s) IV Push once  dextrose 50% Injectable 12.5 Gram(s) IV Push once  dextrose 50% Injectable 25 Gram(s) IV Push once  dextrose 50% Injectable 25 Gram(s) IV Push once  fentaNYL   Patch  25 MICROgram(s)/Hr 1 Patch Transdermal every 72 hours  FIRST- Mouthwash  BLM 4 milliLiter(s) Swish and Spit every 6 hours  glucagon  Injectable 1 milliGRAM(s) IntraMuscular once  heparin   Injectable 5000 Unit(s) SubCutaneous every 8 hours  HYDROmorphone  Injectable 0.5 milliGRAM(s) IV Push every 8 hours  influenza  Vaccine (HIGH DOSE) 0.7 milliLiter(s) IntraMuscular once  insulin lispro (ADMELOG) corrective regimen sliding scale   SubCutaneous every 6 hours  lidocaine 2% Viscous 5 milliLiter(s) Swish and Spit two times a day  metoprolol tartrate Injectable 1.25 milliGRAM(s) IV Push every 6 hours  pantoprazole  Injectable 40 milliGRAM(s) IV Push every 12 hours  silver sulfADIAZINE 1% Cream 1 Application(s) Topical two times a day    MEDICATIONS  (PRN):  dextrose Oral Gel 15 Gram(s) Oral once PRN Blood Glucose LESS THAN 70 milliGRAM(s)/deciliter  HYDROmorphone  Injectable 0.5 milliGRAM(s) IV Push every 3 hours PRN Moderate Pain (4 - 6)  HYDROmorphone  Injectable 1 milliGRAM(s) IV Push every 3 hours PRN Severe Pain (7 - 10)      No Known Allergies      LABS                        9.2    4.25  )-----------( 227      ( 03 Nov 2023 09:25 )             27.9     11-03    134<L>  |  99  |  20  ----------------------------<  170<H>  4.1   |  25  |  1.28    Ca    9.3      03 Nov 2023 09:25  Phos  3.1     11-03  Mg     1.8     11-03    TPro  6.1  /  Alb  2.7<L>  /  TBili  0.4  /  DBili  x   /  AST  75<H>  /  ALT  20  /  AlkPhos  73  11-03    Urinalysis Basic - ( 03 Nov 2023 09:25 )    Color: x / Appearance: x / SG: x / pH: x  Gluc: 170 mg/dL / Ketone: x  / Bili: x / Urobili: x   Blood: x / Protein: x / Nitrite: x   Leuk Esterase: x / RBC: x / WBC x   Sq Epi: x / Non Sq Epi: x / Bacteria: x      RADIOLOGY & ADDITIONAL TESTS: Reviewed HOSPITAL COURSE:  78 yr old male, [place] speaking with history of CAD, PVD, DM, HFrEF, 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 Marr/Dr. Derek Gar), presents to the ED with generalized weakness. Reports progressive weakness over the last 2 weeks assc w/ fatigue. 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 C/o odynophagia but tolerating secretions. Saw Rad on on 10/13. Treatment break on 10/20 and restarted inpatient on 10/30. Final RT treatment was on 11/3. Over the course of his stay, the patient has had copious secretions that require increasing suctioning due to RT therapy. Patient is planned to have a PEG tube placement on Monday 11/6 for odynophagia and decreased oral intake due to radiation induced mucositis.     INTERVAL HPI/OVERNIGHT EVENTS:  As per night team,lots of secretions,  before lopressor. Patient seen and examined at bedside. Agitated with secretions. Hoarseness of voice.  Patient denies fever, chills, dizziness, weakness, HA, CP, SOB, N/V/D/C    VITALS  Vital Signs Last 24 Hrs  T(C): 37.4 (03 Nov 2023 14:00), Max: 37.4 (03 Nov 2023 14:00)  T(F): 99.4 (03 Nov 2023 14:00), Max: 99.4 (03 Nov 2023 14:00)  HR: 113 (04 Nov 2023 04:32) (93 - 113)  BP: 108/64 (04 Nov 2023 04:32) (108/64 - 150/63)  BP(mean): --  RR: 18 (03 Nov 2023 22:50) (17 - 18)  SpO2: 97% (04 Nov 2023 04:32) (91% - 97%)    Parameters below as of 03 Nov 2023 22:50  Patient On (Oxygen Delivery Method): room air    CAPILLARY BLOOD GLUCOSE    POCT Blood Glucose.: 292 mg/dL (04 Nov 2023 07:33)  POCT Blood Glucose.: 246 mg/dL (04 Nov 2023 00:04)  POCT Blood Glucose.: 191 mg/dL (03 Nov 2023 21:37)  POCT Blood Glucose.: 250 mg/dL (03 Nov 2023 18:12)  POCT Blood Glucose.: 209 mg/dL (03 Nov 2023 12:55)      PHYSICAL EXAM  General: in no acute distress, agitated when spoken to  Eyes: R eye with ptosis. EOMI intact. Severe cataracts present in both eyes  HENT: dry mucous membranes. No oral lesions, thrush, erythema. Erythematous plaque with crusting on bilateral lateral neck  Neck: Trachea midline, supple  Lungs: CTA B/L. No wheezes, rales, or rhonchi  Cardiovascular: RRR. No murmurs, rubs, or gallops  Abdomen: Soft, non-tender non-distended; No rebound or guarding  Extremities: WWP, No clubbing, cyanosis or edema  Neurological: Alert and oriented x1-2  Skin: Warm and dry. No obvious rash    MEDICATIONS  (STANDING):  atorvastatin 80 milliGRAM(s) Oral at bedtime  atropine 1% Ophthalmic Solution for SubLingual Use 1 Drop(s) SubLingual three times a day  dextrose 5%. 1000 milliLiter(s) (70 mL/Hr) IV Continuous <Continuous>  dextrose 50% Injectable 25 Gram(s) IV Push once  dextrose 50% Injectable 12.5 Gram(s) IV Push once  dextrose 50% Injectable 25 Gram(s) IV Push once  dextrose 50% Injectable 25 Gram(s) IV Push once  fentaNYL   Patch  25 MICROgram(s)/Hr 1 Patch Transdermal every 72 hours  FIRST- Mouthwash  BLM 4 milliLiter(s) Swish and Spit every 6 hours  glucagon  Injectable 1 milliGRAM(s) IntraMuscular once  heparin   Injectable 5000 Unit(s) SubCutaneous every 8 hours  HYDROmorphone  Injectable 0.5 milliGRAM(s) IV Push every 8 hours  influenza  Vaccine (HIGH DOSE) 0.7 milliLiter(s) IntraMuscular once  insulin lispro (ADMELOG) corrective regimen sliding scale   SubCutaneous every 6 hours  lidocaine 2% Viscous 5 milliLiter(s) Swish and Spit two times a day  metoprolol tartrate Injectable 1.25 milliGRAM(s) IV Push every 6 hours  pantoprazole  Injectable 40 milliGRAM(s) IV Push every 12 hours  silver sulfADIAZINE 1% Cream 1 Application(s) Topical two times a day    MEDICATIONS  (PRN):  dextrose Oral Gel 15 Gram(s) Oral once PRN Blood Glucose LESS THAN 70 milliGRAM(s)/deciliter  HYDROmorphone  Injectable 0.5 milliGRAM(s) IV Push every 3 hours PRN Moderate Pain (4 - 6)  HYDROmorphone  Injectable 1 milliGRAM(s) IV Push every 3 hours PRN Severe Pain (7 - 10)      No Known Allergies      LABS                        9.2    4.25  )-----------( 227      ( 03 Nov 2023 09:25 )             27.9     11-03    134<L>  |  99  |  20  ----------------------------<  170<H>  4.1   |  25  |  1.28    Ca    9.3      03 Nov 2023 09:25  Phos  3.1     11-03  Mg     1.8     11-03    TPro  6.1  /  Alb  2.7<L>  /  TBili  0.4  /  DBili  x   /  AST  75<H>  /  ALT  20  /  AlkPhos  73  11-03    Urinalysis Basic - ( 03 Nov 2023 09:25 )    Color: x / Appearance: x / SG: x / pH: x  Gluc: 170 mg/dL / Ketone: x  / Bili: x / Urobili: x   Blood: x / Protein: x / Nitrite: x   Leuk Esterase: x / RBC: x / WBC x   Sq Epi: x / Non Sq Epi: x / Bacteria: x      RADIOLOGY & ADDITIONAL TESTS: Reviewed

## 2023-11-04 NOTE — CONSULT NOTE ADULT - SUBJECTIVE AND OBJECTIVE BOX
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 Marr/Dr. Derek Gar), presents to the ED with generalized weakness. Reports progressive weakness over the last 2 weeks assc w/ fatigue. 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 C/o odynophagia but tolerating secretions. No shortness of breath or difficulty breathing. Reports dermatitis is stable, and reponds well to cream he was given. No drainage or discharge. Saw Rad on on 10/13. Per chart review appears odynophagia was presenting symptom of cancer. Reported subjective fever for 1 day but did not take his temperature at home. ROS otherwise negative.     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 Marr/Dr. Derek Gar), presents to the ED with generalized weakness. Reports progressive weakness over the last 2 weeks assc w/ fatigue. 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 C/o odynophagia but tolerating secretions. Saw Rad on on 10/13. Treatment break on 10/20 and restarted inpatient on 10/30. Final RT treatment was on 11/3. Over the course of his stay, the patient has had copious secretions that require increasing suctioning due to RT therapy. Patient is planned to have a PEG tube placement on Monday 11/6 for odynophagia and decreased oral intake due to radiation induced mucositis.     On exam patient not in distress. Dose have strong cough reflex but unable to expectorate sputum.     PAST MEDICAL & SURGICAL HISTORY:  HTN (hypertension)      HLD (hyperlipidemia)      DM (diabetes mellitus)      CAD (coronary artery disease)      Glaucoma      PVD (peripheral vascular disease)      No significant past surgical history          Home Medications:  fluconazole 10 mg/mL oral liquid: 400 milliliter(s) orally once a day (30 Oct 2023 15:38)  HumaLOG 100 units/mL injectable solution: 7 unit(s) injectable 3 times a day with meals (13 Aug 2022 22:07)  Lantus 100 units/mL subcutaneous solution: 45 unit(s) subcutaneous once a day (in the morning) (13 Aug 2022 22:07)  metoprolol succinate 100 mg oral capsule, extended release: 1 cap(s) orally once a day (30 Oct 2023 15:32)  Myrbetriq 50 mg oral tablet, extended release: 1 tab(s) orally once a day (30 Oct 2023 15:36)  Protonix 40 mg oral delayed release tablet: 1 tab(s) orally once a day (30 Oct 2023 15:33)    MEDICATIONS  (STANDING):  atorvastatin 80 milliGRAM(s) Oral at bedtime  atropine 1% Ophthalmic Solution for SubLingual Use 1 Drop(s) SubLingual three times a day  dextrose 5%. 1000 milliLiter(s) (70 mL/Hr) IV Continuous <Continuous>  dextrose 50% Injectable 25 Gram(s) IV Push once  dextrose 50% Injectable 12.5 Gram(s) IV Push once  dextrose 50% Injectable 25 Gram(s) IV Push once  dextrose 50% Injectable 25 Gram(s) IV Push once  fentaNYL   Patch  25 MICROgram(s)/Hr 1 Patch Transdermal every 72 hours  FIRST- Mouthwash  BLM 4 milliLiter(s) Swish and Spit every 6 hours  glucagon  Injectable 1 milliGRAM(s) IntraMuscular once  heparin   Injectable 5000 Unit(s) SubCutaneous every 8 hours  HYDROmorphone  Injectable 0.5 milliGRAM(s) IV Push every 8 hours  influenza  Vaccine (HIGH DOSE) 0.7 milliLiter(s) IntraMuscular once  insulin lispro (ADMELOG) corrective regimen sliding scale   SubCutaneous every 6 hours  lidocaine 2% Viscous 5 milliLiter(s) Swish and Spit two times a day  metoprolol tartrate Injectable 1.25 milliGRAM(s) IV Push every 6 hours  pantoprazole  Injectable 40 milliGRAM(s) IV Push every 12 hours  polyethylene glycol 3350 17 Gram(s) Oral daily  senna 2 Tablet(s) Oral at bedtime  silver sulfADIAZINE 1% Cream 1 Application(s) Topical two times a day    MEDICATIONS  (PRN):  dextrose Oral Gel 15 Gram(s) Oral once PRN Blood Glucose LESS THAN 70 milliGRAM(s)/deciliter  HYDROmorphone  Injectable 0.5 milliGRAM(s) IV Push every 3 hours PRN Moderate Pain (4 - 6)  HYDROmorphone  Injectable 1 milliGRAM(s) IV Push every 3 hours PRN Severe Pain (7 - 10)      Allergies    No Known Allergies    Intolerances        SOCIAL HX:       FAMILY HISTORY:  FAMILY HISTORY:  No pertinent family history in first degree relatives    :      PHYSICAL EXAM:    ICU Vital Signs Last 24 Hrs  T(C): 37.4 (03 Nov 2023 14:00), Max: 37.4 (03 Nov 2023 14:00)  T(F): 99.4 (03 Nov 2023 14:00), Max: 99.4 (03 Nov 2023 14:00)  HR: 99 (04 Nov 2023 13:05) (91 - 113)  BP: 140/66 (04 Nov 2023 13:05) (108/64 - 150/63)  BP(mean): --  ABP: --  ABP(mean): --  RR: 18 (04 Nov 2023 13:05) (17 - 18)  SpO2: 100% (04 Nov 2023 13:05) (91% - 100%)    O2 Parameters below as of 04 Nov 2023 13:05  Patient On (Oxygen Delivery Method): nasal cannula  O2 Flow (L/min): 2          General: NC/AT, lying in bed   HEENT:  NC/AT, EOMI, sclera anicteric, PERRL       Lymphatic system: No LN  Lungs: Bilateral BS  Cardiovascular: RRR, nl s1, s2, no m/r/g appreciated  Gastrointestinal: abdomen soft, NTND, bowel sounds present  Musculoskeletal: No clubbing.  Moves all extremities.    Skin: Warm, dry, intact. No rashes noted.  Neurological: A&Ox3, strength 5/5 and sensation intact in all extremities         LABS:                          9.3    5.11  )-----------( 226      ( 04 Nov 2023 11:13 )             27.9                                               11-04    135  |  99  |  19  ----------------------------<  206<H>  4.0   |  26  |  1.20    Ca    9.2      04 Nov 2023 11:13  Phos  3.5     11-04  Mg     1.7     11-04    TPro  6.2  /  Alb  2.8<L>  /  TBili  0.4  /  DBili  x   /  AST  85<H>  /  ALT  29  /  AlkPhos  75  11-04                                             Urinalysis Basic - ( 04 Nov 2023 11:13 )    Color: x / Appearance: x / SG: x / pH: x  Gluc: 206 mg/dL / Ketone: x  / Bili: x / Urobili: x   Blood: x / Protein: x / Nitrite: x   Leuk Esterase: x / RBC: x / WBC x   Sq Epi: x / Non Sq Epi: x / Bacteria: x                                                  LIVER FUNCTIONS - ( 04 Nov 2023 11:13 )  Alb: 2.8 g/dL / Pro: 6.2 g/dL / ALK PHOS: 75 U/L / ALT: 29 U/L / AST: 85 U/L / GGT: x                                                  Culture - Blood (collected 01 Nov 2023 15:41)  Source: .Blood Blood  Preliminary Report (03 Nov 2023 16:00):    No growth at 2 days.                                                                                            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 Marr/Dr. Derek Gar), presents to the ED with generalized weakness. Reports progressive weakness over the last 2 weeks assc w/ fatigue. 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 C/o odynophagia but tolerating secretions. No shortness of breath or difficulty breathing. Reports dermatitis is stable, and reponds well to cream he was given. No drainage or discharge. Saw Rad on on 10/13. Per chart review appears odynophagia was presenting symptom of cancer. Reported subjective fever for 1 day but did not take his temperature at home. ROS otherwise negative.     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 Marr/Dr. Derek Gar), presents to the ED with generalized weakness. Reports progressive weakness over the last 2 weeks assc w/ fatigue. 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 C/o odynophagia but tolerating secretions. Saw Rad on on 10/13. Treatment break on 10/20 and restarted inpatient on 10/30. Final RT treatment was on 11/3. Over the course of his stay, the patient has had copious secretions that require increasing suctioning due to RT therapy. Patient is planned to have a PEG tube placement on Monday 11/6 for odynophagia and decreased oral intake due to radiation induced mucositis.     On exam patient not in distress. Dose have strong cough reflex but unable to expectorate sputum. No pain, fever or chills. Speaking full sentences.     PAST MEDICAL & SURGICAL HISTORY:  HTN (hypertension)      HLD (hyperlipidemia)      DM (diabetes mellitus)      CAD (coronary artery disease)      Glaucoma      PVD (peripheral vascular disease)      No significant past surgical history          Home Medications:  fluconazole 10 mg/mL oral liquid: 400 milliliter(s) orally once a day (30 Oct 2023 15:38)  HumaLOG 100 units/mL injectable solution: 7 unit(s) injectable 3 times a day with meals (13 Aug 2022 22:07)  Lantus 100 units/mL subcutaneous solution: 45 unit(s) subcutaneous once a day (in the morning) (13 Aug 2022 22:07)  metoprolol succinate 100 mg oral capsule, extended release: 1 cap(s) orally once a day (30 Oct 2023 15:32)  Myrbetriq 50 mg oral tablet, extended release: 1 tab(s) orally once a day (30 Oct 2023 15:36)  Protonix 40 mg oral delayed release tablet: 1 tab(s) orally once a day (30 Oct 2023 15:33)    MEDICATIONS  (STANDING):  atorvastatin 80 milliGRAM(s) Oral at bedtime  atropine 1% Ophthalmic Solution for SubLingual Use 1 Drop(s) SubLingual three times a day  dextrose 5%. 1000 milliLiter(s) (70 mL/Hr) IV Continuous <Continuous>  dextrose 50% Injectable 25 Gram(s) IV Push once  dextrose 50% Injectable 12.5 Gram(s) IV Push once  dextrose 50% Injectable 25 Gram(s) IV Push once  dextrose 50% Injectable 25 Gram(s) IV Push once  fentaNYL   Patch  25 MICROgram(s)/Hr 1 Patch Transdermal every 72 hours  FIRST- Mouthwash  BLM 4 milliLiter(s) Swish and Spit every 6 hours  glucagon  Injectable 1 milliGRAM(s) IntraMuscular once  heparin   Injectable 5000 Unit(s) SubCutaneous every 8 hours  HYDROmorphone  Injectable 0.5 milliGRAM(s) IV Push every 8 hours  influenza  Vaccine (HIGH DOSE) 0.7 milliLiter(s) IntraMuscular once  insulin lispro (ADMELOG) corrective regimen sliding scale   SubCutaneous every 6 hours  lidocaine 2% Viscous 5 milliLiter(s) Swish and Spit two times a day  metoprolol tartrate Injectable 1.25 milliGRAM(s) IV Push every 6 hours  pantoprazole  Injectable 40 milliGRAM(s) IV Push every 12 hours  polyethylene glycol 3350 17 Gram(s) Oral daily  senna 2 Tablet(s) Oral at bedtime  silver sulfADIAZINE 1% Cream 1 Application(s) Topical two times a day    MEDICATIONS  (PRN):  dextrose Oral Gel 15 Gram(s) Oral once PRN Blood Glucose LESS THAN 70 milliGRAM(s)/deciliter  HYDROmorphone  Injectable 0.5 milliGRAM(s) IV Push every 3 hours PRN Moderate Pain (4 - 6)  HYDROmorphone  Injectable 1 milliGRAM(s) IV Push every 3 hours PRN Severe Pain (7 - 10)      Allergies    No Known Allergies    Intolerances        SOCIAL HX:       FAMILY HISTORY:  FAMILY HISTORY:  No pertinent family history in first degree relatives    :      PHYSICAL EXAM:    ICU Vital Signs Last 24 Hrs  T(C): 37.4 (03 Nov 2023 14:00), Max: 37.4 (03 Nov 2023 14:00)  T(F): 99.4 (03 Nov 2023 14:00), Max: 99.4 (03 Nov 2023 14:00)  HR: 99 (04 Nov 2023 13:05) (91 - 113)  BP: 140/66 (04 Nov 2023 13:05) (108/64 - 150/63)  BP(mean): --  ABP: --  ABP(mean): --  RR: 18 (04 Nov 2023 13:05) (17 - 18)  SpO2: 100% (04 Nov 2023 13:05) (91% - 100%)    O2 Parameters below as of 04 Nov 2023 13:05  Patient On (Oxygen Delivery Method): nasal cannula  O2 Flow (L/min): 2          General: NC/AT, lying in bed, appears comfortable, attempting to cough up secretions   HEENT:  NC/AT, EOMI, sclera anicteric, PERRL, no stridor       Lymphatic system: No LN  Lungs: Bilateral BS  Cardiovascular: RRR, nl s1, s2, no m/r/g appreciated  Gastrointestinal: abdomen soft, NTND, bowel sounds present  Musculoskeletal: No clubbing.  Moves all extremities.    Skin: Warm, dry, intact. No rashes noted.  Neurological: A&Ox3, strength 5/5 and sensation intact in all extremities         LABS:                          9.3    5.11  )-----------( 226      ( 04 Nov 2023 11:13 )             27.9                                               11-04    135  |  99  |  19  ----------------------------<  206<H>  4.0   |  26  |  1.20    Ca    9.2      04 Nov 2023 11:13  Phos  3.5     11-04  Mg     1.7     11-04    TPro  6.2  /  Alb  2.8<L>  /  TBili  0.4  /  DBili  x   /  AST  85<H>  /  ALT  29  /  AlkPhos  75  11-04                                             Urinalysis Basic - ( 04 Nov 2023 11:13 )    Color: x / Appearance: x / SG: x / pH: x  Gluc: 206 mg/dL / Ketone: x  / Bili: x / Urobili: x   Blood: x / Protein: x / Nitrite: x   Leuk Esterase: x / RBC: x / WBC x   Sq Epi: x / Non Sq Epi: x / Bacteria: x                                                  LIVER FUNCTIONS - ( 04 Nov 2023 11:13 )  Alb: 2.8 g/dL / Pro: 6.2 g/dL / ALK PHOS: 75 U/L / ALT: 29 U/L / AST: 85 U/L / GGT: x                                                  Culture - Blood (collected 01 Nov 2023 15:41)  Source: .Blood Blood  Preliminary Report (03 Nov 2023 16:00):    No growth at 2 days.

## 2023-11-04 NOTE — PROGRESS NOTE ADULT - PROBLEM SELECTOR PROBLEM 9
Addended by: ROLAND MOSQUEDA on: 7/27/2023 03:33 PM     Modules accepted: Orders     CAD (coronary artery disease)

## 2023-11-04 NOTE — PROGRESS NOTE ADULT - SUBJECTIVE AND OBJECTIVE BOX
Patient competed final radiation treatment evening of 11/3/23. Seen in room post-tx, patient reports he was comfortable but with notable delirium trying to remove clothes. Unclear etiology, suspect sundowning/hospital induced. Workup so far has been negative.    11/3/23 Exam stable

## 2023-11-04 NOTE — CONSULT NOTE ADULT - ASSESSMENT
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 Marr/Dr. Derek Gar), presents to the ED with generalized weakness. Reports progressive weakness over the last 2 weeks assc w/ fatigue. 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 C/o odynophagia but tolerating secretions with plan for PEG tube on 11/6.        #Odynophagia w/ secretions     Plan: Larynx SCC currently getting radiation and chemotherapy. C/o odynophagia causing poor PO intake. Per chart review appears poor PO intake has been ongoing issue while undergoing chemo/radiation. Pt started on fluconazole for ppx cadidasis esophagitis given no improvement, ok to discontinue by Dr. Marr. Last chemo 10/23. 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    - Transfer to [facility] for closer nursing care and more frequent suctioning  - Chest PT, hypertonic nebulizer q4hr   - CXR clear - monitor off abx   - Aspiration precautions    -Plan for IR placed PEG tube monday 11/6  -Per rad onc, odynophagia and mucositis should likely resolve 2-3 weeks after completion of radiation. no radiation on 10/31. Last treatment 10/30  -Per heme onc, no additional chemo.   - palliative: will update as Fentanyl patch begins working        -Fentanyl 25mcg/hr Patch q72hr as long-acting agent        -Dilaudid 1mg IV q6h ATC (hold for lethargy)        -Dilaudid 0.5mg IV q3h PRN for Moderate Pain        -Dilaudid 1mg IV q3h PRN for Severe Pain  - magic mouthwash  - viscous lidocaine   -scopolamine patch  -c/w pantoprazole 40 mg IV.    Rest of plan remains the same. Pending PEG tube placement on Monday 11/6 and eventual SOFI placement    Dispo: Upgraded to [facility]. Case discussed with pulm fellow and Dr. Hayward

## 2023-11-04 NOTE — PROGRESS NOTE ADULT - PROBLEM SELECTOR PLAN 5
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 C/o pain, dysphagia and odynophagia for weeks which was tx with magic mouthwash, silver silvadene cream, fluconazole 400mg once a day (started 10/23). Radiation paused due to pain, last RT 10/28.  On exam -  Bilateral neck wounds - erythematous with skin breakdown, crusted dead skin peripherally, no drainage, no skin sloughing, at baseline per patient. Reports improvement with cream. No concern for superimposed infection at this time    - monitor off abx   -Per Rad onc, received RT 10/30, 10/31, and 11/1. Last treament 11/.    - c/w silvadene cream and non-stick silicon bandage  -For skin care,  rinse with saline soaks liberally with dabbing of light soap and water

## 2023-11-04 NOTE — PROGRESS NOTE ADULT - PROBLEM SELECTOR PLAN 1
Pt was agitated o/n and received 2x2.5mg Zyprexa for agitation. AM pt was responding and was AOx2, but after RT tx pt was AOx0. Unclear etiology at this point. Pt is AOx1-2. Infectious workup continues to be unremarkable w/ no NGTD and negative UA and CXR. Likely 2/2 to hospital delirium, no FND    - CT head - no acute intracranial hemorrhage or mass effect   - Re-orient as possible  - Can give Zyprexa 2.5mg IM if necessary

## 2023-11-04 NOTE — PROGRESS NOTE ADULT - ATTENDING COMMENTS
Patient seen and examined.  Agree with resident note.  Meds, labs and vitals all reviewed.  Patient with numerous comorbidities, active laryngeal cancer, receiving chemo and RT, presented with odynophagia, profound secretions.  Overnight, needing frequent suctioning, waxing and waning sensorium  ICU consult called for closer airway monitoring and frequent suctioning.  Chest Xray to rule out aspiration is pending.   At the moment, patient is protecting his airway, following commands.

## 2023-11-04 NOTE — PROGRESS NOTE ADULT - ASSESSMENT
Patient now completed CRT for advanced laryngeal cancer. Experiencing acute toxicity including dermatitis and dysphagia, recommend continued supportive care with opiates and IV hydration for dysphagia, with PEG after ASA held per surgical preference. Recommend continued saline soak and silvadene application to areas of moist desquamation on neck, and coverage with non-stick bandage. Recommend close assessment for infectious or metabolic etiologies of delirium (so far negative). Would expect significant improvement in this toxicities in weeks following treatment completion.     Dinesh Marr  Rad Onc

## 2023-11-04 NOTE — PROGRESS NOTE ADULT - PROBLEM SELECTOR PLAN 2
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 C/o odynophagia causing poor PO intake. Per chart review appears poor PO intake has been ongoing issue while undergoing chemo/radiation. 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 Marr. Last chemo 10/23. 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    - Transfer to [facility] for closer nursing care and more frequent suctioning  - Chest PT, hypertonic nebulizer q4hr   - CXR clear - monitor off abx   - Aspiration precautions    -Plan for IR placed PEG tube monday 11/6  -Per rad onc, odynophagia and mucositis should likely resolve 2-3 weeks after completion of radiation. no radiation on 10/31. Last treatment 10/30  -Per heme onc, no additional chemo.   - palliative: will update as Fentanyl patch begins working        -Fentanyl 25mcg/hr Patch q72hr as long-acting agent        -Dilaudid 1mg IV q6h ATC (hold for lethargy)        -Dilaudid 0.5mg IV q3h PRN for Moderate Pain        -Dilaudid 1mg IV q3h PRN for Severe Pain  - magic mouthwash  - viscous lidocaine   -scopolamine patch  -c/w pantoprazole 40 mg IV.  - D5 @ 70ml/Hr to thin out secretions

## 2023-11-04 NOTE — PROGRESS NOTE ADULT - PROBLEM SELECTOR PLAN 6
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 Chemo/RT initiated 08/2023. Follows with Dr. Marr (rad onc) and Dr. Derek Harvey (onc). Last chemo (taxol/carboplatin) on 10/23, last RT 11/3, tolerated well.     - Received RT today 11/3.  - onc aware pt is admitted, appreciate recs  - palliative consulted for GOC discussion

## 2023-11-04 NOTE — PROGRESS NOTE ADULT - PROBLEM SELECTOR PLAN 10
Last A1c 7.9 in 07/2023. Regimen as follows: Lantus 50mg daily, 12units TID with meals for 48 hour after chemo then 8 TID  - c/w sliding scale   - hold pre-meal given poor PO intake  - q6hr FS given inability to tolerate PO  - mISS    #Bowel regimen  - Miralax and Senna

## 2023-11-04 NOTE — PROGRESS NOTE ADULT - PROBLEM SELECTOR PLAN 4
Reports progressive weakness over the last 2 weeks assc w/ fatigue and poor PO intake. Reports this typically happens after chemo.Typically ambulates with cane.  -Per PT, SOFI placement

## 2023-11-04 NOTE — PROGRESS NOTE ADULT - PROBLEM SELECTOR PLAN 3
Spiked 100.4 fever 11/2 - no further fevers. Asymptomatic, other vitals WNL  -CXR unremarkable   -f/u bladder scan for urine dribbling  -UA negatove  -BCX 11/1 NGTD x3

## 2023-11-05 NOTE — PROGRESS NOTE ADULT - PROBLEM SELECTOR PLAN 3
Spiked 100.4 fever 11/2 - no further fevers. Asymptomatic, other vitals WNL  -CXR unremarkable   -f/u bladder scan for urine dribbling  -UA pending  -Covid and RVP pending  -BCX 11/1 NGTD x2 Spiked 100.4 fever 11/2 - no further fevers. Asymptomatic, other vitals WNL  CXR unremarkable   Plan:  -f/u bladder scan for urine dribbling  -UA pending  -Covid and RVP pending  -BCX 11/1 NGTD x2

## 2023-11-05 NOTE — PROGRESS NOTE ADULT - SUBJECTIVE AND OBJECTIVE BOX
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 Marr/Dr. Derek Gar), presents to the ED with generalized weakness. Reports progressive weakness over the last 2 weeks assc w/ fatigue. 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 C/o odynophagia but tolerating secretions. Saw Rad on on 10/13. Treatment break on 10/20 and restarted inpatient on 10/30. Final RT treatment was on 11/3. Over the course of his stay, the patient has had copious secretions that require increasing suctioning due to RT therapy. Patient is planned to have a PEG tube placement on Monday 11/6 for odynophagia and decreased oral intake due to radiation induced mucositis.     OVERNIGHT EVENTS: Patient developed a 101.8 fever, Became tachycardic -> ECG with NSR. Pt has been able to tolerate PO, therefore he was transitioned to NPO    SUBJECTIVE:  The patient was seen and examined today. He states that he wants to go home. The patient's family is at the bedside. I  explained to the patient the importance of staying and getting treated for his current symptoms and the patient understands. The patient's nurse states that he has been producing very little urine.     VITAL SIGNS:  Vital Signs Last 24 Hrs  T(C): 36.7 (05 Nov 2023 17:52), Max: 38.8 (05 Nov 2023 05:04)  T(F): 98 (05 Nov 2023 17:52), Max: 101.8 (05 Nov 2023 05:04)  HR: 100 (05 Nov 2023 17:56) (100 - 110)  BP: 112/56 (05 Nov 2023 17:56) (112/56 - 144/62)  BP(mean): 80 (05 Nov 2023 17:56) (73 - 89)  RR: 19 (05 Nov 2023 17:56) (17 - 19)  SpO2: 98% (05 Nov 2023 17:56) (93% - 98%)    Parameters below as of 05 Nov 2023 17:56  Patient On (Oxygen Delivery Method): nasal cannula  O2 Flow (L/min): 2      PHYSICAL EXAM:  General: NAD; speaking in full sentences  HEENT: R eye with ptosis. EOMI intact. Severe cataracts present in both eyes. Thrush noted, dry mucous membranes  Neck: supple; no JVD  Cardiac: RRR; +S1/S2, no murmurs  Pulm: mild rales in upper airways.   GI: soft, NT/ND, +BS  Extremities: WWP; no edema, clubbing or cyanosis  Vasc: 2+ radial, DP pulses B/L  Neuro: AAOx2 (Self and place); no focal deficits    MEDICATIONS:  MEDICATIONS  (STANDING):  albuterol/ipratropium for Nebulization 3 milliLiter(s) Nebulizer every 4 hours  atorvastatin 80 milliGRAM(s) Oral at bedtime  atropine 1% Ophthalmic Solution for SubLingual Use 1 Drop(s) SubLingual three times a day  dextrose 50% Injectable 25 Gram(s) IV Push once  dextrose 50% Injectable 12.5 Gram(s) IV Push once  dextrose 50% Injectable 25 Gram(s) IV Push once  dextrose 50% Injectable 25 Gram(s) IV Push once  fentaNYL   Patch  25 MICROgram(s)/Hr 1 Patch Transdermal every 72 hours  FIRST- Mouthwash  BLM 4 milliLiter(s) Swish and Spit every 6 hours  glucagon  Injectable 1 milliGRAM(s) IntraMuscular once  heparin   Injectable 5000 Unit(s) SubCutaneous every 8 hours  influenza  Vaccine (HIGH DOSE) 0.7 milliLiter(s) IntraMuscular once  insulin lispro (ADMELOG) corrective regimen sliding scale   SubCutaneous every 6 hours  lidocaine 2% Viscous 5 milliLiter(s) Swish and Spit two times a day  metoprolol tartrate Injectable 1.25 milliGRAM(s) IV Push every 6 hours  pantoprazole  Injectable 40 milliGRAM(s) IV Push every 12 hours  piperacillin/tazobactam IVPB.. 3.375 Gram(s) IV Intermittent every 8 hours  polyethylene glycol 3350 17 Gram(s) Oral daily  senna 2 Tablet(s) Oral at bedtime  silver sulfADIAZINE 1% Cream 1 Application(s) Topical two times a day  sodium chloride 7% Inhalation 4 milliLiter(s) Inhalation every 4 hours  vancomycin  IVPB 1000 milliGRAM(s) IV Intermittent every 24 hours    MEDICATIONS  (PRN):  dextrose Oral Gel 15 Gram(s) Oral once PRN Blood Glucose LESS THAN 70 milliGRAM(s)/deciliter  HYDROmorphone  Injectable 0.5 milliGRAM(s) IV Push every 3 hours PRN Moderate Pain (4 - 6)  HYDROmorphone  Injectable 1 milliGRAM(s) IV Push every 3 hours PRN Severe Pain (7 - 10)      ALLERGIES:  Allergies    No Known Allergies    Intolerances        LABS:                        8.9    8.29  )-----------( 215      ( 05 Nov 2023 03:22 )             27.5     11-05    135  |  99  |  23  ----------------------------<  242<H>  4.1   |  25  |  1.60<H>    Ca    8.8      05 Nov 2023 03:22  Phos  3.8     11-05  Mg     1.7     11-05    TPro  5.8<L>  /  Alb  2.5<L>  /  TBili  0.4  /  DBili  x   /  AST  61<H>  /  ALT  26  /  AlkPhos  72  11-05        RADIOLOGY & ADDITIONAL TESTS: Reviewed.

## 2023-11-05 NOTE — PROGRESS NOTE ADULT - ATTENDING COMMENTS
Laryngeal squamous cell carcinoma s/p RT with significant increase in airway secretions. C/w aggressive pulmonary toilet. Suctioning needs are very high, and is high risk for aspiration.  Rest of plan as per above.

## 2023-11-05 NOTE — PROGRESS NOTE ADULT - PROBLEM SELECTOR PLAN 2
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 C/o odynophagia causing poor PO intake. Per chart review appears poor PO intake has been ongoing issue while undergoing chemo/radiation. 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 Marr. Last chemo 10/23. 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    - Transfer to [facility] for closer nursing care and more frequent suctioning  - Chest PT, hypertonic nebulizer q4hr   - CXR clear - monitor off abx   - Aspiration precautions    -Plan for IR placed PEG tube monday 11/6  -Per rad onc, odynophagia and mucositis should likely resolve 2-3 weeks after completion of radiation. no radiation on 10/31. Last treatment 10/30  -Per heme onc, no additional chemo.   - palliative: will update as Fentanyl patch begins working        -Fentanyl 25mcg/hr Patch q72hr as long-acting agent        -Dilaudid 1mg IV q6h ATC (hold for lethargy)        -Dilaudid 0.5mg IV q3h PRN for Moderate Pain        -Dilaudid 1mg IV q3h PRN for Severe Pain  - magic mouthwash  - viscous lidocaine   -scopolamine patch  -c/w pantoprazole 40 mg IV.  - D5 @ 70ml/Hr to thin out secretions Larynx SCC currently getting radiation and chemotherapy. C/o odynophagia causing poor PO intake. Per chart review appears poor PO intake has been ongoing issue while undergoing chemo/radiation. 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 Marr. Last chemo 10/23. 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  Plan:  - Transfer to [facility] for closer nursing care and more frequent suctioning  - Chest PT, hypertonic nebulizer q4hr   - CXR clear - monitor off abx   - Aspiration precautions    -Plan for IR placed PEG tube monday 11/6  -Per rad onc, odynophagia and mucositis should likely resolve 2-3 weeks after completion of radiation. no radiation on 10/31. Last treatment 10/30  -Per heme onc, no additional chemo.   - palliative: will update as Fentanyl patch begins working        -Fentanyl 25mcg/hr Patch q72hr as long-acting agent        -Dilaudid 1mg IV q6h ATC (hold for lethargy)        -Dilaudid 0.5mg IV q3h PRN for Moderate Pain        -Dilaudid 1mg IV q3h PRN for Severe Pain  - magic mouthwash  - viscous lidocaine   -scopolamine patch  -c/w pantoprazole 40 mg IV.  - D5 @ 70ml/Hr to thin out secretions

## 2023-11-05 NOTE — PROGRESS NOTE ADULT - ASSESSMENT
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 Marr/Dr. 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 Marr/Dr. Gar), presents to the ED with generalized weakness and odynophagia, transferred to [facility] for frequent suctioning

## 2023-11-05 NOTE — PROGRESS NOTE ADULT - PROBLEM SELECTOR PLAN 5
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 C/o pain, dysphagia and odynophagia for weeks which was tx with magic mouthwash, silver silvadene cream, fluconazole 400mg once a day (started 10/23). Radiation paused due to pain, last RT 10/28.  On exam -  Bilateral neck wounds - erythematous with skin breakdown, crusted dead skin peripherally, no drainage, no skin sloughing, at baseline per patient. Reports improvement with cream. No concern for superimposed infection at this time    - monitor off abx   -Per Rad onc, received RT 10/30, 10/31, and 11/1.Plan for final tx friday afternoon.    - c/w silvadene cream and non-stick silicon bandage  -For skin care,  rinse with saline soaks liberally with dabbing of light soap and water Has b/l neck dermatitis due to radiation, first noticed early 09/13/2023 after 1st/2nd treatment. C/o pain, dysphagia and odynophagia for weeks which was tx with magic mouthwash, silver silvadene cream, fluconazole 400mg once a day (started 10/23). Radiation paused due to pain, last RT 10/28.  On exam -  Bilateral neck wounds - erythematous with skin breakdown, crusted dead skin peripherally, no drainage, no skin sloughing, at baseline per patient. Reports improvement with cream. No concern for superimposed infection at this time  Plan:  - monitor off abx   -Per Rad onc, received RT 10/30, 10/31, and 11/1.Plan for final tx friday afternoon.    - c/w silvadene cream and non-stick silicon bandage  -For skin care,  rinse with saline soaks liberally with dabbing of light soap and water Has b/l neck dermatitis due to radiation, first noticed early 09/13/2023 after 1st/2nd treatment. C/o pain, dysphagia and odynophagia for weeks which was tx with magic mouthwash, silver silvadene cream, fluconazole 400mg once a day (started 10/23). Radiation paused due to pain, last RT 10/28.  On exam -  Bilateral neck wounds - erythematous with skin breakdown, crusted dead skin peripherally, no drainage, no skin sloughing, at baseline per patient. Reports improvement with cream. No concern for superimposed infection at this time  Plan:  - monitor off abx   -Per Rad onc, received RT 10/30, 10/31, and 11/1, 11/3  - c/w silvadene cream and non-stick silicon bandage  -For skin care,  rinse with saline soaks liberally with dabbing of light soap and water

## 2023-11-05 NOTE — PROGRESS NOTE ADULT - PROBLEM SELECTOR PLAN 6
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 Chemo/RT initiated 08/2023. Follows with Dr. Marr (rad onc) and Dr. Derek Harvey (onc). Last chemo (taxol/carboplatin) on 10/23, last RT 11/3, tolerated well.     - Received RT today 11/3.  - onc aware pt is admitted, appreciate recs  - palliative consulted for GOC discussion Dx 06/2023 with SCC larynx. Chemo/RT initiated 08/2023. Follows with Dr. Marr (rad onc) and Dr. Derek Harvey (onc). Last chemo (taxol/carboplatin) on 10/23, last RT 11/3, tolerated well.   Plan:  - c/w RT  - onc aware pt is admitted, patient is being treated with curative intent  - palliative consulted on board

## 2023-11-05 NOTE — PROGRESS NOTE ADULT - PROBLEM SELECTOR PLAN 1
Pt was agitated o/n and received 2x2.5mg Zyprexa for agitation. AM pt was responding and was AOx2, but after RT tx pt was AOx0. Unclear etiology at this point. Pt is AOx1-2. Infectious workup continues to be unremarkable w/ no NGTD and negative UA and CXR. Likely 2/2 to hospital delirium, no FND    - Pending CT head  - Re-orient as possible  - Can give Zyprexa 2.5mg IM if necessary Pt was agitated o/n and received 2x2.5mg Zyprexa for agitation. AM pt was responding and was AOx2, but after RT tx pt was AOx0. Unclear etiology at this point. Pt is AOx1-2. Infectious workup continues to be unremarkable w/ no NGTD and negative UA and CXR. Likely 2/2 to hospital delirium, no FND  Plan:  - Pending CT head  - Re-orient as possible  - Can give Zyprexa 2.5mg IM if necessary

## 2023-11-06 NOTE — PROGRESS NOTE ADULT - PROBLEM SELECTOR PLAN 5
Pt w/ uptrending Creatinine since admission, May be pre renal due to volume loss 2/2 secretions.    - continue to trend BUN/Cr  - obtain renal US  - s/p 500cc bolus NS  - NS 100cc/hr continuos

## 2023-11-06 NOTE — PROGRESS NOTE ADULT - PROBLEM SELECTOR PLAN 11
Last A1c 7.9 in 07/2023. Regimen as follows: Lantus 50mg daily, 12units TID with meals for 48 hour after chemo then 8 TID  - c/w sliding scale   - 20 lantus  - bridge today w/ 15 NPH  - hold pre-meal given poor PO intake  - q6hr FS given inability to tolerate PO  - mISS

## 2023-11-06 NOTE — PROGRESS NOTE ADULT - PROBLEM SELECTOR PLAN 7
Pt was agitated o/n and received 2x2.5mg Zyprexa for agitation. AM pt was responding and was AOx2, but after RT tx pt was AOx0. Unclear etiology at this point. Pt is AOx1-2. Infectious workup continues to be unremarkable w/ no NGTD and negative UA and CXR. Likely 2/2 to hospital delirium, no FND  Plan:  - Re-orient as possible  - Can give Zyprexa 2.5mg IM if necessary

## 2023-11-06 NOTE — PROGRESS NOTE ADULT - ATTENDING COMMENTS
Secretions thin and less copious. Swallow evaluation tomorrow if continues to improve. Check sputum C&S. Vanco d/karo and Cr increase noted. Bladder scan q 6. Bolus of NA given and 100 ml/hr started. Check renal U/S. Strict I&O. Repeat electrolytes. Continue close monitoring. Discussed with family.

## 2023-11-06 NOTE — PROGRESS NOTE ADULT - PROBLEM SELECTOR PLAN 1
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 C/o odynophagia causing poor PO intake. Per chart review appears poor PO intake has been ongoing issue while undergoing chemo/radiation. 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 Marr. Last chemo 10/23. 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  Plan:  - Chest PT, hypertonic nebulizer q4hr   - Aspiration precautions    -Plan for IR placed PEG tube monday 11/6 (holding i/s/o increased respirations & unable to scope)  -Per rad onc, odynophagia and mucositis should likely resolve 2-3 weeks after completion of radiation. no radiation on 10/31. Last treatment 10/30  -Per heme onc, no additional chemo.   - palliative: will update as Fentanyl patch begins working        -Fentanyl 25mcg/hr Patch q72hr as long-acting agent        -Dilaudid 1mg IV q6h ATC (hold for lethargy)        -Dilaudid 0.5mg IV q3h PRN for Moderate Pain        -Dilaudid 1mg IV q3h PRN for Severe Pain  - magic mouthwash  - viscous lidocaine   -scopolamine patch  -c/w pantoprazole 40 mg IV.

## 2023-11-06 NOTE — CHART NOTE - NSCHARTNOTEFT_GEN_A_CORE
Admitting Diagnosis:   Patient is a 78y old  Male who presents with a chief complaint of weakness, poor PO intake (06 Nov 2023 08:27)      PAST MEDICAL & SURGICAL HISTORY:  HTN (hypertension)  HLD (hyperlipidemia)  DM (diabetes mellitus)  CAD (coronary artery disease)  Glaucoma  PVD (peripheral vascular disease)  No significant past surgical history      CURRENT NUTRITION ORDER:   - NPO    - NKFA   - Sodium chloride 0.9% 1000 mL @ 100 mL/hr     PO INTAKE:  % [  ]       50-75% [  ]       25-50% [  ]       <25% [  ]       N/A [ xx ]     GI ISSUES:  last bowel movement 10/27      PAIN:  denies pain or discomfort per nursing    SKIN INTEGRITY: Simon scale score 14; no edema noted; skin dermatitis around neck s/p radiation       Labs:   11-06    138  |  99  |  30<H>  ----------------------------<  304<H>  4.2   |  24  |  2.20<H>    Ca    8.8      06 Nov 2023 05:30  Phos  4.8     11-06  Mg     2.4     11-06    TPro  5.7<L>  /  Alb  2.3<L>  /  TBili  0.5  /  DBili  x   /  AST  34  /  ALT  21  /  AlkPhos  67  11-06    CAPILLARY BLOOD GLUCOSE  POCT Blood Glucose.: 236 mg/dL (06 Nov 2023 14:11)  POCT Blood Glucose.: 317 mg/dL (06 Nov 2023 11:37)  POCT Blood Glucose.: 331 mg/dL (06 Nov 2023 11:34)  POCT Blood Glucose.: 299 mg/dL (06 Nov 2023 06:24)  POCT Blood Glucose.: 361 mg/dL (06 Nov 2023 00:05)  POCT Blood Glucose.: 337 mg/dL (05 Nov 2023 21:26)  POCT Blood Glucose.: 299 mg/dL (05 Nov 2023 16:51)      Medications:  MEDICATIONS  (STANDING):  albuterol/ipratropium for Nebulization 3 milliLiter(s) Nebulizer every 4 hours  atropine 1% Ophthalmic Solution for SubLingual Use 1 Drop(s) SubLingual three times a day  dextrose 5%. 1000 milliLiter(s) (50 mL/Hr) IV Continuous <Continuous>  dextrose 5%. 1000 milliLiter(s) (100 mL/Hr) IV Continuous <Continuous>  dextrose 50% Injectable 25 Gram(s) IV Push once  dextrose 50% Injectable 12.5 Gram(s) IV Push once  dextrose 50% Injectable 25 Gram(s) IV Push once  dextrose 50% Injectable 25 Gram(s) IV Push once  fentaNYL   Patch  25 MICROgram(s)/Hr 1 Patch Transdermal every 72 hours  FIRST- Mouthwash  BLM 4 milliLiter(s) Swish and Spit every 6 hours  glucagon  Injectable 1 milliGRAM(s) IntraMuscular once  heparin   Injectable 5000 Unit(s) SubCutaneous every 8 hours  influenza  Vaccine (HIGH DOSE) 0.7 milliLiter(s) IntraMuscular once  insulin glargine Injectable (LANTUS) 20 Unit(s) SubCutaneous at bedtime  insulin lispro (ADMELOG) corrective regimen sliding scale   SubCutaneous every 6 hours  insulin NPH human recombinant 15 Unit(s) SubCutaneous once  lidocaine 2% Viscous 5 milliLiter(s) Swish and Spit two times a day  metoprolol tartrate Injectable 1.25 milliGRAM(s) IV Push every 6 hours  pantoprazole  Injectable 40 milliGRAM(s) IV Push every 12 hours  piperacillin/tazobactam IVPB.. 3.375 Gram(s) IV Intermittent every 8 hours  silver sulfADIAZINE 1% Cream 1 Application(s) Topical two times a day  sodium chloride 0.9%. 1000 milliLiter(s) (100 mL/Hr) IV Continuous <Continuous>  sodium chloride 7% Inhalation 4 milliLiter(s) Inhalation every 4 hours    MEDICATIONS  (PRN):  dextrose Oral Gel 15 Gram(s) Oral once PRN Blood Glucose LESS THAN 70 milliGRAM(s)/deciliter  HYDROmorphone  Injectable 0.5 milliGRAM(s) IV Push every 3 hours PRN Moderate Pain (4 - 6)  HYDROmorphone  Injectable 1 milliGRAM(s) IV Push every 3 hours PRN Severe Pain (7 - 10)      ANTHROPOMETRICS:   Height (inches):  66   Weight (pounds):  149.9 (10/29)   BMI (kg/m^2):  24.2   IBW (pounds):  142 +/- 10%   %IBW:  105.6 %     WEIGHT CHANGE: upon chart review, patient with significant weight loss of 20 pounds or 11.8 % over 5 months (6/5/23: 170 pounds)     ESTIMATED NUTRIENT NEEDS:   Calories:  (25-30 kcal/kg) 4239-8557 kcal   Protein:  (1.2-1.5 g/kg)  g   Fluids:  1 mL/kcal or at team’s discretion   Current body weight used to calculate energy needs due to pt's current body weight within % ideal body weight. Needs adjusted for age and current clinical status.     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 Marr/Dr. Gar), presents to the ED with generalized weakness and odynophagia, transferred to [facility] for frequent suctioning. Met with patient on 8 [facility]. Patient disoriented to situation; patient sleeping at the time of visit. Severe temporalis muscle wasting observed. Patient on 1-to-1 secondary to pulling at tubes. Patient discussed with nursing and team. Chart, labs and meds reviewed. MAPs trending >65 mm Hg @ 68-93. Tmax 37.9 C. Labs significant for H/H 8.3/24.7L; POCT 317H; BUN 30H; creatinine 2.20H; glucose 304H; albumin 2.3L; eGFR 30L; phosphorus 4.8H; HgbA1c 9.0H; Seen by S+S on 10/30 with recommendations to consider alternative means of nutrition, hydration and medication at this time to optimize nutrition and complete Rt    PREVIOUS NUTRITION DIAGNOSIS:   Severe PCM in context of chronic illness related to inability to tolerate PO as evidenced by 14% weight loss x 6 months, meeting <75% nutrition needs >1 month    Active [ xx ]       Resolved [  ]     GOAL: Pt to meet at least 75% of nutritional needs consistently via most appropriate route for nutrition    RECOMMENDATIONS:   - If PEG within GOC, recommend the following tube feed regimen:     >> 1600 mL total volume of Glucerna 1.2 @ 67 mL/hr over 24 hours     >> Start at 10 mL/hr and increase by 10 mL/hr q8h to goal rate; or as tolerated     >> Tube feed provides 1924 kcal, 96 g protein, 1289 mL free fluid and meets all macro- and micronutrient needs     >> Additional free water boluses as per team     >> Hold feeds if increase in pressor requirements, MAPs consistently trending <60 mmHg, lactic acidosis presents, or GI intolerance is noted   - Of note, patient at risk of refeeding in the setting of long-term reduced PO intake; please continue to monitor electrolytes and replete as needed   - Monitor tube feed tolerance, GI distress, labs, weights   - Pain and bowel regimen as per team   - The above recommendations were communicated with the team     EDUCATION:  deferred    RISK LEVEL:  High [ xx ]       Moderate [  ]       Low [  ]     NORBERT Watters, MS, RD, CDN [id number] Admitting Diagnosis:   Patient is a 78y old  Male who presents with a chief complaint of weakness, poor PO intake (06 Nov 2023 08:27)      PAST MEDICAL & SURGICAL HISTORY:  HTN (hypertension)  HLD (hyperlipidemia)  DM (diabetes mellitus)  CAD (coronary artery disease)  Glaucoma  PVD (peripheral vascular disease)  No significant past surgical history      CURRENT NUTRITION ORDER:   - NPO    - NKFA   - Sodium chloride 0.9% 1000 mL @ 100 mL/hr     PO INTAKE:  % [  ]       50-75% [  ]       25-50% [  ]       <25% [  ]       N/A [ xx ]     GI ISSUES:  last bowel movement 10/27      PAIN:  denies pain or discomfort per nursing    SKIN INTEGRITY: Simon scale score 14; no edema noted; skin dermatitis around neck s/p radiation       Labs:   11-06    138  |  99  |  30<H>  ----------------------------<  304<H>  4.2   |  24  |  2.20<H>    Ca    8.8      06 Nov 2023 05:30  Phos  4.8     11-06  Mg     2.4     11-06    TPro  5.7<L>  /  Alb  2.3<L>  /  TBili  0.5  /  DBili  x   /  AST  34  /  ALT  21  /  AlkPhos  67  11-06    CAPILLARY BLOOD GLUCOSE  POCT Blood Glucose.: 236 mg/dL (06 Nov 2023 14:11)  POCT Blood Glucose.: 317 mg/dL (06 Nov 2023 11:37)  POCT Blood Glucose.: 331 mg/dL (06 Nov 2023 11:34)  POCT Blood Glucose.: 299 mg/dL (06 Nov 2023 06:24)  POCT Blood Glucose.: 361 mg/dL (06 Nov 2023 00:05)  POCT Blood Glucose.: 337 mg/dL (05 Nov 2023 21:26)  POCT Blood Glucose.: 299 mg/dL (05 Nov 2023 16:51)      Medications:  MEDICATIONS  (STANDING):  albuterol/ipratropium for Nebulization 3 milliLiter(s) Nebulizer every 4 hours  atropine 1% Ophthalmic Solution for SubLingual Use 1 Drop(s) SubLingual three times a day  dextrose 5%. 1000 milliLiter(s) (50 mL/Hr) IV Continuous <Continuous>  dextrose 5%. 1000 milliLiter(s) (100 mL/Hr) IV Continuous <Continuous>  dextrose 50% Injectable 25 Gram(s) IV Push once  dextrose 50% Injectable 12.5 Gram(s) IV Push once  dextrose 50% Injectable 25 Gram(s) IV Push once  dextrose 50% Injectable 25 Gram(s) IV Push once  fentaNYL   Patch  25 MICROgram(s)/Hr 1 Patch Transdermal every 72 hours  FIRST- Mouthwash  BLM 4 milliLiter(s) Swish and Spit every 6 hours  glucagon  Injectable 1 milliGRAM(s) IntraMuscular once  heparin   Injectable 5000 Unit(s) SubCutaneous every 8 hours  influenza  Vaccine (HIGH DOSE) 0.7 milliLiter(s) IntraMuscular once  insulin glargine Injectable (LANTUS) 20 Unit(s) SubCutaneous at bedtime  insulin lispro (ADMELOG) corrective regimen sliding scale   SubCutaneous every 6 hours  insulin NPH human recombinant 15 Unit(s) SubCutaneous once  lidocaine 2% Viscous 5 milliLiter(s) Swish and Spit two times a day  metoprolol tartrate Injectable 1.25 milliGRAM(s) IV Push every 6 hours  pantoprazole  Injectable 40 milliGRAM(s) IV Push every 12 hours  piperacillin/tazobactam IVPB.. 3.375 Gram(s) IV Intermittent every 8 hours  silver sulfADIAZINE 1% Cream 1 Application(s) Topical two times a day  sodium chloride 0.9%. 1000 milliLiter(s) (100 mL/Hr) IV Continuous <Continuous>  sodium chloride 7% Inhalation 4 milliLiter(s) Inhalation every 4 hours    MEDICATIONS  (PRN):  dextrose Oral Gel 15 Gram(s) Oral once PRN Blood Glucose LESS THAN 70 milliGRAM(s)/deciliter  HYDROmorphone  Injectable 0.5 milliGRAM(s) IV Push every 3 hours PRN Moderate Pain (4 - 6)  HYDROmorphone  Injectable 1 milliGRAM(s) IV Push every 3 hours PRN Severe Pain (7 - 10)      ANTHROPOMETRICS:   Height (inches):  66   Weight (pounds):  149.9 (10/29)   BMI (kg/m^2):  24.2   IBW (pounds):  142 +/- 10%   %IBW:  105.6 %     WEIGHT CHANGE: upon chart review, patient with significant weight loss of 20 pounds or 11.8 % over 5 months (6/5/23: 170 pounds)     ESTIMATED NUTRIENT NEEDS:   Calories:  (25-30 kcal/kg) 9530-0767 kcal   Protein:  (1.2-1.5 g/kg)  g   Fluids:  1 mL/kcal or at team’s discretion   Current body weight used to calculate energy needs due to pt's current body weight within % ideal body weight. Needs adjusted for age and current clinical status.     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 Marr/Dr. Gar), presents to the ED with generalized weakness and odynophagia, transferred to [facility] for frequent suctioning. Met with patient on 8 [facility]. Patient disoriented to situation; patient sleeping at the time of visit. Severe temporalis muscle wasting observed. Patient on 1-to-1 secondary to pulling at tubes. Patient discussed with nursing and team. Chart, labs and meds reviewed. MAPs trending >65 mm Hg @ 68-93. Tmax 37.9 C. Labs significant for H/H 8.3/24.7L; POCT 317H; BUN 30H; creatinine 2.20H; glucose 304H; albumin 2.3L; eGFR 30L; phosphorus 4.8H; HgbA1c 9.0H; Seen by S+S on 10/30 with recommendations to consider alternative means of nutrition, hydration and medication at this time to optimize nutrition and complete Rt    PREVIOUS NUTRITION DIAGNOSIS:   Severe PCM in context of chronic illness related to inability to tolerate PO as evidenced by 14% weight loss x 6 months, meeting <75% nutrition needs >1 month    Active [ xx ]       Resolved [  ]     GOAL: Pt to meet at least 75% of nutritional needs consistently via most appropriate route for nutrition    RECOMMENDATIONS:   - If cleared for an oral diet, recommend regular diet with texture as per team/SLP; please also include Ensure Plus High Protein (350 kcals, 20 g prot each) BID  - If PEG within GOC, recommend the following tube feed regimen when medically cleared:     >> 1600 mL total volume of Glucerna 1.2 @ 67 mL/hr over 24 hours     >> Start at 10 mL/hr and increase by 10 mL/hr q8h to goal rate; or as tolerated     >> Tube feed provides 1924 kcal, 96 g protein, 1289 mL free fluid and meets all macro- and micronutrient needs     >> Additional free water boluses as per team     >> Hold feeds if increase in pressor requirements, MAPs consistently trending <60 mmHg, lactic acidosis presents, or GI intolerance is noted   - Of note, patient at risk of refeeding in the setting of long-term reduced PO intake; please continue to monitor electrolytes and replete as needed   - Monitor tube feed tolerance, GI distress, labs, weights   - Pain and bowel regimen as per team   - The above recommendations were communicated with the team     EDUCATION:  deferred    RISK LEVEL:  High [ xx ]       Moderate [  ]       Low [  ]     NORBERT Watters, MS, RD, CDN [id number]

## 2023-11-06 NOTE — PROGRESS NOTE ADULT - PROBLEM SELECTOR PLAN 6
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 Chemo/RT initiated 08/2023. Follows with Dr. Marr (rad onc) and Dr. Derek Harvey (onc). Last chemo (taxol/carboplatin) on 10/23, last RT 11/3, tolerated well.   Plan:  - c/w RT  - onc aware pt is admitted, patient is being treated with curative intent  - palliative consulted on board

## 2023-11-06 NOTE — PROGRESS NOTE ADULT - PROBLEM SELECTOR PLAN 2
Spiked 100.4 fever 11/2 - no further fevers. Asymptomatic, other vitals WNL  CXR unremarkable   Plan:  -c/w zosyn  -BCX NGTD x2

## 2023-11-06 NOTE — PROGRESS NOTE ADULT - PROBLEM SELECTOR PLAN 4
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 C/o pain, dysphagia and odynophagia for weeks which was tx with magic mouthwash, silver silvadene cream, fluconazole 400mg once a day (started 10/23). Radiation paused due to pain, last RT 10/28.  On exam -  Bilateral neck wounds - erythematous with skin breakdown, crusted dead skin peripherally, no drainage, no skin sloughing, at baseline per patient. Reports improvement with cream. No concern for superimposed infection at this time  Plan:  - monitor off abx   -Per Rad onc, received RT 10/30, 10/31, and 11/1, 11/3  - c/w silvadene cream and non-stick silicon bandage  -For skin care,  rinse with saline soaks liberally with dabbing of light soap and water

## 2023-11-06 NOTE — PROGRESS NOTE ADULT - ASSESSMENT
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 Marr/Dr. Gar), presents to the ED with generalized weakness and odynophagia, transferred to [facility] for frequent suctioning

## 2023-11-06 NOTE — PROGRESS NOTE ADULT - SUBJECTIVE AND OBJECTIVE BOX
INTERVAL HPI/OVERNIGHT EVENTS:  Patient was seen and examined at bedside. As per nurse and patient, no o/n events, patient resting comfortably. No complaints at this time. Patient denies: fever, chills, lightheadedness, weakness, CP, palpitations, SOB, cough, N/V. ROS otherwise negative.    VITAL SIGNS:  T(F): 98.2 (11-06-23 @ 14:10)  HR: 90 (11-06-23 @ 15:41)  BP: 100/45 (11-06-23 @ 15:41)  RR: 18 (11-06-23 @ 15:41)  SpO2: 94% (11-06-23 @ 15:41)  Wt(kg): --      11-05-23 @ 07:01  -  11-06-23 @ 07:00  --------------------------------------------------------  IN: 750 mL / OUT: 0 mL / NET: 750 mL    11-06-23 @ 07:01  -  11-06-23 @ 16:56  --------------------------------------------------------  IN: 525 mL / OUT: 350 mL / NET: 175 mL        PHYSICAL EXAM:     GENERAL: resting comfortably in bed; NAD  HEAD:  Atraumatic, Normocephalic  EYES: EOMI, PERRLA, conjunctiva and sclera clear   NECK: Supple, No JVD, Normal thyroid, no enlarged nodes   NERVOUS SYSTEM:  AAOx3; CNII-XII grossly intact; no focal deficits  CHEST/LUNG: CTA B/L; no W/R/R, no retractions  HEART: S1/S2 normal, no S3, Regular rate and rhythm; No murmurs   ABDOMEN: Soft, Nontender, Nondistended; Bowel sounds present   EXTREMITIES:  2+ Peripheral Pulses, No clubbing, cyanosis, or edema   LYMPH: No lymphadenopathy noted   SKIN: No rashes or lesions  PSYCHIATRIC: affect and characteristics of appearance, verbalizations, behaviors are appropriate    MEDICATIONS  (STANDING):  albuterol/ipratropium for Nebulization 3 milliLiter(s) Nebulizer every 4 hours  atropine 1% Ophthalmic Solution for SubLingual Use 1 Drop(s) SubLingual three times a day  dextrose 5%. 1000 milliLiter(s) (50 mL/Hr) IV Continuous <Continuous>  dextrose 5%. 1000 milliLiter(s) (100 mL/Hr) IV Continuous <Continuous>  dextrose 50% Injectable 25 Gram(s) IV Push once  dextrose 50% Injectable 12.5 Gram(s) IV Push once  dextrose 50% Injectable 25 Gram(s) IV Push once  dextrose 50% Injectable 25 Gram(s) IV Push once  fentaNYL   Patch  25 MICROgram(s)/Hr 1 Patch Transdermal every 72 hours  FIRST- Mouthwash  BLM 4 milliLiter(s) Swish and Spit every 6 hours  glucagon  Injectable 1 milliGRAM(s) IntraMuscular once  heparin   Injectable 5000 Unit(s) SubCutaneous every 8 hours  influenza  Vaccine (HIGH DOSE) 0.7 milliLiter(s) IntraMuscular once  insulin glargine Injectable (LANTUS) 20 Unit(s) SubCutaneous at bedtime  insulin lispro (ADMELOG) corrective regimen sliding scale   SubCutaneous every 6 hours  lidocaine 2% Viscous 5 milliLiter(s) Swish and Spit two times a day  metoprolol tartrate Injectable 2.5 milliGRAM(s) IV Push every 6 hours  pantoprazole  Injectable 40 milliGRAM(s) IV Push every 12 hours  piperacillin/tazobactam IVPB.. 3.375 Gram(s) IV Intermittent every 8 hours  silver sulfADIAZINE 1% Cream 1 Application(s) Topical two times a day  sodium chloride 0.9%. 1000 milliLiter(s) (100 mL/Hr) IV Continuous <Continuous>    MEDICATIONS  (PRN):  dextrose Oral Gel 15 Gram(s) Oral once PRN Blood Glucose LESS THAN 70 milliGRAM(s)/deciliter  HYDROmorphone  Injectable 0.5 milliGRAM(s) IV Push every 3 hours PRN Moderate Pain (4 - 6)  HYDROmorphone  Injectable 1 milliGRAM(s) IV Push every 3 hours PRN Severe Pain (7 - 10)      Allergies    No Known Allergies    Intolerances        LABS:                        8.3    7.34  )-----------( 191      ( 06 Nov 2023 05:30 )             24.7     11-06    138  |  99  |  30<H>  ----------------------------<  304<H>  4.2   |  24  |  2.20<H>    Ca    8.8      06 Nov 2023 05:30  Phos  4.8     11-06  Mg     2.4     11-06    TPro  5.7<L>  /  Alb  2.3<L>  /  TBili  0.5  /  DBili  x   /  AST  34  /  ALT  21  /  AlkPhos  67  11-06    PT/INR - ( 06 Nov 2023 05:30 )   PT: 16.5 sec;   INR: 1.46            Urinalysis Basic - ( 06 Nov 2023 05:30 )    Color: x / Appearance: x / SG: x / pH: x  Gluc: 304 mg/dL / Ketone: x  / Bili: x / Urobili: x   Blood: x / Protein: x / Nitrite: x   Leuk Esterase: x / RBC: x / WBC x   Sq Epi: x / Non Sq Epi: x / Bacteria: x        RADIOLOGY & ADDITIONAL TESTS:  Reviewed

## 2023-11-07 NOTE — PROGRESS NOTE ADULT - PROBLEM SELECTOR PLAN 2
Spiked 100.4 fever 11/2 - no further fevers. Asymptomatic, other vitals WNL  CXR unremarkable   Plan:  -c/w zosyn for total of 5 days  -BCX NGTD x2

## 2023-11-07 NOTE — PROGRESS NOTE ADULT - SUBJECTIVE AND OBJECTIVE BOX
INTERVAL HPI/OVERNIGHT EVENTS:  Patient was seen and examined at bedside. As per nurse and patient, no o/n events, patient resting comfortably. No complaints at this time. Patient denies: fever, chills, lightheadedness, weakness, CP, palpitations, SOB, cough, N/V. ROS otherwise negative.    VITAL SIGNS:  T(F): 98.4 (11-07-23 @ 10:12)  HR: 78 (11-07-23 @ 09:00)  BP: 134/63 (11-07-23 @ 09:00)  RR: 18 (11-07-23 @ 09:00)  SpO2: 95% (11-07-23 @ 09:00)  Wt(kg): --      11-06-23 @ 07:01  -  11-07-23 @ 07:00  --------------------------------------------------------  IN: 1925 mL / OUT: 750 mL / NET: 1175 mL        PHYSICAL EXAM:     GENERAL: resting comfortably in bed; NAD  HEAD:  Atraumatic, Normocephalic  EYES: severe bilateral cataracts  NECK: Supple, No JVD, Normal thyroid, no enlarged nodes   NERVOUS SYSTEM:  unable to assess neurological status, though answers to commands, non verbal currently.  CHEST/LUNG: upper lung crackles 2/2 secretions  HEART: S1/S2 normal, no S3, Regular rate and rhythm; No murmurs   ABDOMEN: Soft, Nontender, Nondistended; Bowel sounds present   EXTREMITIES:  2+ Peripheral Pulses, No clubbing, cyanosis, or edema   LYMPH: No lymphadenopathy noted   SKIN: nuchal skin lesions 2/2 RT  PSYCHIATRIC: affect and characteristics of appearance, verbalizations, behaviors are appropriate    MEDICATIONS  (STANDING):  albuterol/ipratropium for Nebulization 3 milliLiter(s) Nebulizer every 4 hours  atropine 1% Ophthalmic Solution for SubLingual Use 1 Drop(s) SubLingual three times a day  dextrose 5%. 1000 milliLiter(s) (50 mL/Hr) IV Continuous <Continuous>  dextrose 5%. 1000 milliLiter(s) (100 mL/Hr) IV Continuous <Continuous>  dextrose 50% Injectable 25 Gram(s) IV Push once  dextrose 50% Injectable 25 Gram(s) IV Push once  dextrose 50% Injectable 12.5 Gram(s) IV Push once  dextrose 50% Injectable 25 Gram(s) IV Push once  fentaNYL   Patch  25 MICROgram(s)/Hr 1 Patch Transdermal every 72 hours  FIRST- Mouthwash  BLM 4 milliLiter(s) Swish and Spit every 6 hours  glucagon  Injectable 1 milliGRAM(s) IntraMuscular once  heparin   Injectable 5000 Unit(s) SubCutaneous every 8 hours  influenza  Vaccine (HIGH DOSE) 0.7 milliLiter(s) IntraMuscular once  insulin glargine Injectable (LANTUS) 20 Unit(s) SubCutaneous at bedtime  insulin lispro (ADMELOG) corrective regimen sliding scale   SubCutaneous every 6 hours  lidocaine 2% Viscous 5 milliLiter(s) Swish and Spit two times a day  metoprolol tartrate Injectable 2.5 milliGRAM(s) IV Push every 6 hours  pantoprazole  Injectable 40 milliGRAM(s) IV Push every 12 hours  piperacillin/tazobactam IVPB.. 3.375 Gram(s) IV Intermittent every 8 hours  silver sulfADIAZINE 1% Cream 1 Application(s) Topical two times a day  sodium chloride 0.9%. 1000 milliLiter(s) (100 mL/Hr) IV Continuous <Continuous>    MEDICATIONS  (PRN):  dextrose Oral Gel 15 Gram(s) Oral once PRN Blood Glucose LESS THAN 70 milliGRAM(s)/deciliter  HYDROmorphone  Injectable 1 milliGRAM(s) IV Push every 3 hours PRN Severe Pain (7 - 10)  HYDROmorphone  Injectable 0.5 milliGRAM(s) IV Push every 3 hours PRN Moderate Pain (4 - 6)      Allergies    No Known Allergies    Intolerances        LABS:                        7.5    5.22  )-----------( 201      ( 07 Nov 2023 05:30 )             23.9     11-07    144  |  110<H>  |  38<H>  ----------------------------<  139<H>  4.3   |  25  |  2.46<H>    Ca    8.5      07 Nov 2023 05:30  Phos  4.3     11-07  Mg     2.3     11-07    TPro  5.4<L>  /  Alb  2.3<L>  /  TBili  0.4  /  DBili  x   /  AST  29  /  ALT  18  /  AlkPhos  85  11-07    PT/INR - ( 06 Nov 2023 05:30 )   PT: 16.5 sec;   INR: 1.46            Urinalysis Basic - ( 07 Nov 2023 05:30 )    Color: x / Appearance: x / SG: x / pH: x  Gluc: 139 mg/dL / Ketone: x  / Bili: x / Urobili: x   Blood: x / Protein: x / Nitrite: x   Leuk Esterase: x / RBC: x / WBC x   Sq Epi: x / Non Sq Epi: x / Bacteria: x        RADIOLOGY & ADDITIONAL TESTS:  Reviewed

## 2023-11-07 NOTE — PROGRESS NOTE ADULT - PROBLEM SELECTOR PLAN 1
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 C/o odynophagia causing poor PO intake. Per chart review appears poor PO intake has been ongoing issue while undergoing chemo/radiation. 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 Marr. Last chemo 10/23. 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  Plan:  - Chest PT, hypertonic nebulizer q4hr   - Aspiration precautions    -Plan for IR PEG suspended for now i/s/o increased respirations & unable to scope  -Per rad onc, odynophagia and mucositis should likely resolve 2-3 weeks after completion of radiation. no radiation on 10/31. Last treatment 10/30  -Per heme onc, no additional chemo.   - palliative: will update as Fentanyl patch begins working        -Fentanyl 25mcg/hr Patch q72hr as long-acting agent        -Dilaudid 1mg IV q6h ATC (hold for lethargy)        -Dilaudid 0.5mg IV q3h PRN for Moderate Pain        -Dilaudid 1mg IV q3h PRN for Severe Pain  - magic mouthwash  - viscous lidocaine   -scopolamine patch  -c/w pantoprazole 40 mg IV.  - failed S&S on 11/7

## 2023-11-07 NOTE — PROGRESS NOTE ADULT - ATTENDING COMMENTS
Requiring straight cath q 6. UO increased with IVF yesterday and lytes c/w prerenal picture. Continue IVF and if pt fails swallow eval tomorrow then place NGT. Secretions less and cough good. Cr decreased and will repeat CBC and Cr. Discussed with family.

## 2023-11-07 NOTE — PROGRESS NOTE ADULT - SUBJECTIVE AND OBJECTIVE BOX
Language Line  #: [phone]    SUBJECTIVE AND OBJECTIVE:  Indication for Geriatrics and Palliative Care Services: Symptom management    OVERNIGHT EVENTS: Used 0 PRNs dilaudid for pain but endorsing moderate pain this morning. Looks more comfortable today.    Allergies    No Known Allergies    Intolerances    MEDICATIONS  (STANDING):  albuterol/ipratropium for Nebulization 3 milliLiter(s) Nebulizer every 4 hours  atropine 1% Ophthalmic Solution for SubLingual Use 1 Drop(s) SubLingual three times a day  dextrose 5%. 1000 milliLiter(s) (50 mL/Hr) IV Continuous <Continuous>  dextrose 5%. 1000 milliLiter(s) (100 mL/Hr) IV Continuous <Continuous>  dextrose 50% Injectable 25 Gram(s) IV Push once  dextrose 50% Injectable 12.5 Gram(s) IV Push once  dextrose 50% Injectable 25 Gram(s) IV Push once  dextrose 50% Injectable 25 Gram(s) IV Push once  fentaNYL   Patch  25 MICROgram(s)/Hr 1 Patch Transdermal every 72 hours  FIRST- Mouthwash  BLM 4 milliLiter(s) Swish and Spit every 6 hours  glucagon  Injectable 1 milliGRAM(s) IntraMuscular once  heparin   Injectable 5000 Unit(s) SubCutaneous every 8 hours  influenza  Vaccine (HIGH DOSE) 0.7 milliLiter(s) IntraMuscular once  insulin glargine Injectable (LANTUS) 20 Unit(s) SubCutaneous at bedtime  insulin lispro (ADMELOG) corrective regimen sliding scale   SubCutaneous every 6 hours  lidocaine 2% Viscous 5 milliLiter(s) Swish and Spit two times a day  metoprolol tartrate Injectable 2.5 milliGRAM(s) IV Push every 6 hours  pantoprazole  Injectable 40 milliGRAM(s) IV Push every 12 hours  piperacillin/tazobactam IVPB.. 3.375 Gram(s) IV Intermittent every 8 hours  silver sulfADIAZINE 1% Cream 1 Application(s) Topical two times a day  sodium chloride 0.9%. 1000 milliLiter(s) (100 mL/Hr) IV Continuous <Continuous>    MEDICATIONS  (PRN):  dextrose Oral Gel 15 Gram(s) Oral once PRN Blood Glucose LESS THAN 70 milliGRAM(s)/deciliter  HYDROmorphone  Injectable 0.5 milliGRAM(s) IV Push every 3 hours PRN Moderate Pain (4 - 6)  HYDROmorphone  Injectable 1 milliGRAM(s) IV Push every 3 hours PRN Severe Pain (7 - 10)      ITEMS UNCHECKED ARE NOT PRESENT    PRESENT SYMPTOMS: [ ]Unable to self-report  Source if other than patient:  [ ]Family   [ ]Team     Pain:  [X]yes [ ]no  QOL impact - Unable to swallow   Location - Throat  Aggravating factors - Swallowing  Quality - Sharp  Radiation - None  Timing- Constant  Severity (0-10 scale): 6/10  Minimal acceptable level (0-10 scale): 3/10    Dyspnea:                           [ ]Mild [ ]Moderate [ ]Severe  Anxiety:                             [ ]Mild [ ]Moderate [ ]Severe  Fatigue:                             [ ]Mild [ ]Moderate [ ]Severe  Nausea:                             [ ]Mild [ ]Moderate [ ]Severe  Loss of appetite:              [ ]Mild [ ]Moderate [ ]Severe  Constipation:                    [ ]Mild [ ]Moderate [ ]Severe  Other Symptoms:  [X]All other review of systems negative     Palliative Performance Status Version 2:       50%      [email or web address]/[email or web address]    PHYSICAL EXAM:  Vital Signs Last 24 Hrs  T(C): 36.9 (07 Nov 2023 10:12), Max: 37.2 (06 Nov 2023 17:51)  T(F): 98.4 (07 Nov 2023 10:12), Max: 98.9 (06 Nov 2023 17:51)  HR: 83 (07 Nov 2023 04:20) (83 - 98)  BP: 115/56 (07 Nov 2023 04:20) (100/45 - 148/56)  BP(mean): 81 (07 Nov 2023 04:20) (65 - 84)  RR: 19 (07 Nov 2023 04:20) (17 - 19)  SpO2: 96% (07 Nov 2023 04:20) (92% - 98%)    Parameters below as of 07 Nov 2023 04:20  Patient On (Oxygen Delivery Method): room air     I&O's Summary    06 Nov 2023 07:01  -  07 Nov 2023 07:00  --------------------------------------------------------  IN: 1925 mL / OUT: 750 mL / NET: 1175 mL       GENERAL: [ ]Cachexia    [X]Alert  [ ]Oriented x   [ ]Lethargic  [ ]Unarousable  [X]Verbal  [ ]Non-Verbal  Behavioral:   [ ]Anxiety  [ ]Delirium [ ]Agitation [X]Cooperative  HEENT:  [X]Normal   [ ]Dry mouth   [ ]ET Tube/Trach  [ ]Oral lesions  PULMONARY:   [X]Clear [ ]Tachypnea  [ ]Audible excessive secretions   [ ]Rhonchi        [ ]Right [ ]Left [ ]Bilateral  [ ]Crackles        [ ]Right [ ]Left [ ]Bilateral  [ ]Wheezing     [ ]Right [ ]Left [ ]Bilateral  [ ]Diminished BS [ ] Right [ ]Left [ ]Bilateral  CARDIOVASCULAR:    [X]Regular [ ]Irregular [ ]Tachy  [ ]Yaya [ ]Murmur [ ]Other  GASTROINTESTINAL:  [X]Soft  [ ]Distended   [ ]+BS  [X]Non tender [ ]Tender  [ ]Other [ ]PEG [ ]OGT/ NGT   Last BM:   GENITOURINARY:  [X]Normal [ ]Incontinent   [ ]Oliguria/Anuria   [ ]Paige  MUSCULOSKELETAL:   [ ]Normal   [ ]Weakness  [X]Bed/Wheelchair bound [ ]Edema  NEUROLOGIC:   [X]No focal deficits  [ ] Cognitive impairment  [ ] Dysphagia [ ]Dysarthria [ ] Paresis [ ]Other   SKIN:   [ ]Normal  [ ]Rash  [X]Other: Radiation dermatitis of neck b/l  [ ]Pressure ulcer(s) [ ]y [ ]n present on admission    CRITICAL CARE:  [ ]Shock Present  [ ]Septic [ ]Cardiogenic [ ]Neurologic [ ]Hypovolemic  [ ]Vasopressors [ ]Inotropes  [ ]Respiratory failure present [ ]Mechanical Ventilation [ ]Non-invasive ventilatory support [ ]High-Flow   [ ]Acute  [ ]Chronic [ ]Hypoxic  [ ]Hypercarbic [ ]Other  [ ]Other organ failure     LABS:                        7.5    5.22  )-----------( 201      ( 07 Nov 2023 05:30 )             23.9   11-07    144  |  110<H>  |  38<H>  ----------------------------<  139<H>  4.3   |  25  |  2.46<H>    Ca    8.5      07 Nov 2023 05:30  Phos  4.3     11-07  Mg     2.3     11-07    TPro  5.4<L>  /  Alb  2.3<L>  /  TBili  0.4  /  DBili  x   /  AST  29  /  ALT  18  /  AlkPhos  85  11-07  PT/INR - ( 06 Nov 2023 05:30 )   PT: 16.5 sec;   INR: 1.46           Urinalysis Basic - ( 07 Nov 2023 05:30 )    Color: x / Appearance: x / SG: x / pH: x  Gluc: 139 mg/dL / Ketone: x  / Bili: x / Urobili: x   Blood: x / Protein: x / Nitrite: x   Leuk Esterase: x / RBC: x / WBC x   Sq Epi: x / Non Sq Epi: x / Bacteria: x      RADIOLOGY & ADDITIONAL STUDIES:    Protein Calorie Malnutrition Present: [ ]mild [ ]moderate [ ]severe [ ]underweight [ ]morbid obesity  https://www.andeal.org/vault/2440/web/files/ONC/Table_Clinical%20Characteristics%20to%20Document%20Malnutrition-White%20JV%20et%20al%202012.pdf    Height (cm): 167.6 (10-29-23 @ 19:33), 167.6 (10-23-23 @ 13:00)  Weight (kg): 68 (10-29-23 @ 19:33), 68.946 (10-23-23 @ 13:00), 72.6 (07-21-23 @ 13:07)  BMI (kg/m2): 24.2 (10-29-23 @ 19:33), 24.5 (10-23-23 @ 13:00)    [ ]PPSV2 < or = 30%  [ ]significant weight loss [ ]poor nutritional intake [ ]anasarca[ ]Artificial Nutrition    REFERRALS:   [ ]Chaplaincy  [ ]Hospice  [ ]Child Life  [ ]Social Work [ ]Patient and Family Support [ ]Case management [ ]Holistic Therapy [ ] Music therapy  [ ] Massage Therapy

## 2023-11-07 NOTE — PROGRESS NOTE ADULT - PROBLEM SELECTOR PLAN 10
Last A1c 7.9 in 07/2023. Regimen as follows: Lantus 50mg daily, 12units TID with meals for 48 hour after chemo then 8 TID  - c/w sliding scale   - 20 lantus  - hold pre-meal given poor PO intake  - q6hr FS given inability to tolerate PO  - mISS

## 2023-11-07 NOTE — PROGRESS NOTE ADULT - PROBLEM SELECTOR PLAN 5
Pt w/ uptrending Creatinine since admission, May be pre renal due to volume loss 2/2 secretions.    - continue to trend BUN/Cr  - obtain renal US  - NS 100cc/hr continuos  - restart home flomax when pt is able to take PO

## 2023-11-07 NOTE — PROGRESS NOTE ADULT - ASSESSMENT
77yo M with PMH of CAD, PVD, T2DM, and SCC of Larynx p/w weakness and odynophagia. Palliative consulted for complex symptom management in the setting of malignancy.    ·	C/w Fentanyl 25mcg/hr Patch q72hr as long-acting agent  ·	C/w scheduled Dilaudid 1mg q3h PRN for severe pain and 0.5mg IV q3h for moderate pain  ·	tentative plan for long-term feeding tube placement  ·	goal is to complete curative-intent chemo/RT    Patient would benefit from outpatient Palliative/Supportive Oncology follow-up on discharge with Dr. Emma Anthony at [facility]. Please call [phone] when discharge planning to make an appointment.

## 2023-11-07 NOTE — PROGRESS NOTE ADULT - PROBLEM SELECTOR PLAN 1
.  -Fentanyl 25mcg/hr Patch q72hr as long-acting agent  -Dilaudid 1mg IV q3h PRN severe pain  -Dilaudid 0.5mg IV q3h PRN for Moderate Pain

## 2023-11-07 NOTE — PROGRESS NOTE ADULT - PROBLEM SELECTOR PLAN 3
Reports progressive weakness over the last 2 weeks assc w/ fatigue and poor PO intake. Reports this typically happens after chemo.Typically ambulates with cane.  -Per PT, SOFI placement  -f/u PT/OT

## 2023-11-07 NOTE — PROGRESS NOTE ADULT - PROBLEM SELECTOR PLAN 6
.  Complex medical decision making / symptom management in the setting of malignancy.    Will continue to follow for ongoing GOC discussion / titration of palliative regimen. Emotional support provided, questions answered.  Active Psychosocial Referrals:  [x]Social Work/Case management [x]PT/OT [x]Chaplaincy []Hospice  []Patient/Family Support []Holistic RN [x]Massage Therapy []Music Therapy []Ethics  Coping: [] well [x] with difficulty [] poor coping [] unable to assess  Support system: [] strong [x] adequate [] inadequate    For new or uncontrolled symptoms, please call Palliative Care at [phone]). The service is available 24/7 (including nights & weekends) to provide symptom management recommendations over the phone as appropriate

## 2023-11-07 NOTE — PROGRESS NOTE ADULT - PROBLEM SELECTOR PLAN 5
.  Patient is Full Code  -patient appointed granddaughter (Yvrose Smith, [phone]) as alternate decision maker  -see prior C note    Patient would benefit from outpatient Palliative/Supportive Oncology follow-up on discharge with Dr. Emma Anthony at [facility]. Please call [phone] to make an appointment when discharge planning.

## 2023-11-08 NOTE — PROGRESS NOTE ADULT - SUBJECTIVE AND OBJECTIVE BOX
INTERVAL HPI/OVERNIGHT EVENTS:  Patient was seen and examined at bedside. As per nurse and patient, no o/n events, patient resting comfortably. No complaints at this time. Patient denies: fever, chills, lightheadedness, weakness, CP, palpitations, SOB, cough, N/V. ROS otherwise negative.    VITAL SIGNS:  T(F): 97.4 (11-08-23 @ 08:58)  HR: 72 (11-08-23 @ 08:00)  BP: 145/65 (11-08-23 @ 08:00)  RR: 16 (11-08-23 @ 08:00)  SpO2: 100% (11-08-23 @ 08:00)  Wt(kg): --      11-07-23 @ 07:01  -  11-08-23 @ 07:00  --------------------------------------------------------  IN: 1475 mL / OUT: 1639 mL / NET: -164 mL        PHYSICAL EXAM:     GENERAL: resting comfortably in bed; NAD  HEAD:  Atraumatic, Normocephalic  EYES: severe bilateral cataracts  NECK: Supple, No JVD, Normal thyroid, no enlarged nodes   NERVOUS SYSTEM:  unable to assess neurological status, though answers to commands, non verbal currently.  CHEST/LUNG: upper lung crackles 2/2 secretions; cleared up after suctioning  HEART: S1/S2 normal, no S3, Regular rate and rhythm; No murmurs   ABDOMEN: Soft, Nontender, Nondistended; Bowel sounds present   EXTREMITIES:  2+ Peripheral Pulses, No clubbing, cyanosis, or edema   LYMPH: No lymphadenopathy noted   SKIN: nuchal skin lesions 2/2 RT  PSYCHIATRIC: affect and characteristics of appearance, verbalizations, behaviors are appropriate    MEDICATIONS  (STANDING):  albuterol/ipratropium for Nebulization 3 milliLiter(s) Nebulizer every 4 hours  atropine 1% Ophthalmic Solution for SubLingual Use 1 Drop(s) SubLingual three times a day  bisacodyl Suppository 10 milliGRAM(s) Rectal once  ceFAZolin   IVPB 1000 milliGRAM(s) IV Intermittent every 12 hours  dextrose 5% + sodium chloride 0.45%. 1000 milliLiter(s) (80 mL/Hr) IV Continuous <Continuous>  dextrose 5%. 1000 milliLiter(s) (100 mL/Hr) IV Continuous <Continuous>  dextrose 5%. 1000 milliLiter(s) (50 mL/Hr) IV Continuous <Continuous>  dextrose 50% Injectable 25 Gram(s) IV Push once  dextrose 50% Injectable 25 Gram(s) IV Push once  dextrose 50% Injectable 25 Gram(s) IV Push once  dextrose 50% Injectable 12.5 Gram(s) IV Push once  fentaNYL   Patch  25 MICROgram(s)/Hr 1 Patch Transdermal every 72 hours  FIRST- Mouthwash  BLM 4 milliLiter(s) Swish and Spit every 6 hours  glucagon  Injectable 1 milliGRAM(s) IntraMuscular once  heparin   Injectable 5000 Unit(s) SubCutaneous every 8 hours  influenza  Vaccine (HIGH DOSE) 0.7 milliLiter(s) IntraMuscular once  insulin glargine Injectable (LANTUS) 20 Unit(s) SubCutaneous at bedtime  insulin lispro (ADMELOG) corrective regimen sliding scale   SubCutaneous every 6 hours  lidocaine 2% Viscous 5 milliLiter(s) Swish and Spit two times a day  metoprolol tartrate Injectable 2.5 milliGRAM(s) IV Push every 6 hours  pantoprazole  Injectable 40 milliGRAM(s) IV Push every 12 hours  silver sulfADIAZINE 1% Cream 1 Application(s) Topical two times a day    MEDICATIONS  (PRN):  dextrose Oral Gel 15 Gram(s) Oral once PRN Blood Glucose LESS THAN 70 milliGRAM(s)/deciliter  HYDROmorphone  Injectable 0.5 milliGRAM(s) IV Push every 3 hours PRN Moderate Pain (4 - 6)  HYDROmorphone  Injectable 1 milliGRAM(s) IV Push every 3 hours PRN Severe Pain (7 - 10)      Allergies    No Known Allergies    Intolerances        LABS:                        7.5    4.56  )-----------( 183      ( 08 Nov 2023 09:04 )             24.0     11-08    146<H>  |  114<H>  |  32<H>  ----------------------------<  200<H>  3.9   |  25  |  1.84<H>    Ca    8.0<L>      08 Nov 2023 07:20  Phos  2.8     11-08  Mg     1.8     11-08    TPro  5.2<L>  /  Alb  2.2<L>  /  TBili  0.3  /  DBili  x   /  AST  25  /  ALT  15  /  AlkPhos  59  11-08      Urinalysis Basic - ( 08 Nov 2023 07:20 )    Color: x / Appearance: x / SG: x / pH: x  Gluc: 200 mg/dL / Ketone: x  / Bili: x / Urobili: x   Blood: x / Protein: x / Nitrite: x   Leuk Esterase: x / RBC: x / WBC x   Sq Epi: x / Non Sq Epi: x / Bacteria: x        RADIOLOGY & ADDITIONAL TESTS:  Reviewed

## 2023-11-08 NOTE — PROGRESS NOTE ADULT - PROBLEM SELECTOR PLAN 5
.  Patient is Full Code  -patient appointed granddaughter (Yvrose Smith, [phone]) as alternate decision maker  -see prior C note    Patient would benefit from outpatient Palliative/Supportive Oncology follow-up on discharge with Dr. Emma Anthony at [facility]. Please call [phone] to make an appointment when discharge planning.

## 2023-11-08 NOTE — PROGRESS NOTE ADULT - ASSESSMENT
77yo M with PMH of CAD, PVD, T2DM, and SCC of Larynx p/w weakness and odynophagia. Palliative consulted for complex symptom management in the setting of malignancy.    ·	c/w opiate regimen as ordered, tolerating without overt toxicity    Patient would benefit from outpatient Palliative/Supportive Oncology follow-up on discharge with Dr. Emma nAthony at [facility]. Please call [phone] when discharge planning to make an appointment.

## 2023-11-08 NOTE — PROGRESS NOTE ADULT - PROBLEM SELECTOR PLAN 4
.  Ongoing Chemo/RT with curative intent  -no evidence of metastatic disease at this time, not a hospice qualifying disease  -goal is to complete treatment as soon as recommended .  Ongoing Chemo/RT with curative intent  -no evidence of metastatic disease at this time, not a hospice qualifying disease  -goal was to complete treatment as soon as possible, now s/p RT

## 2023-11-08 NOTE — PROGRESS NOTE ADULT - SUBJECTIVE AND OBJECTIVE BOX
[facility] Palliative [facility]  Dickson Roberson, Palliative Care Attending  Contact Info: Call [phone] (HEAL Line) or message on Microsoft Teams (Dickson Roberson)    SUBJECTIVE AND OBJECTIVE:  INTERVAL HPI/OVERNIGHT EVENTS: Interval events noted. See patient's PRN use for the past 24hrs noted below. Comprehensive symptom assessment and GOC exploration as noted below. Extensive time spent discussing plan of care with patient/family. No unexpected adverse effects of opiates noted: no sedation/dizziness/nausea.    ALLERGIES:  No Known Allergies    MEDICATIONS  (STANDING):  albuterol/ipratropium for Nebulization 3 milliLiter(s) Nebulizer every 4 hours  atropine 1% Ophthalmic Solution for SubLingual Use 1 Drop(s) SubLingual three times a day  ceFAZolin   IVPB 1000 milliGRAM(s) IV Intermittent every 12 hours  dextrose 5% + sodium chloride 0.45%. 1000 milliLiter(s) (80 mL/Hr) IV Continuous <Continuous>  dextrose 5%. 1000 milliLiter(s) (100 mL/Hr) IV Continuous <Continuous>  dextrose 5%. 1000 milliLiter(s) (50 mL/Hr) IV Continuous <Continuous>  dextrose 50% Injectable 25 Gram(s) IV Push once  dextrose 50% Injectable 12.5 Gram(s) IV Push once  dextrose 50% Injectable 25 Gram(s) IV Push once  dextrose 50% Injectable 25 Gram(s) IV Push once  fentaNYL   Patch  25 MICROgram(s)/Hr 1 Patch Transdermal every 72 hours  FIRST- Mouthwash  BLM 4 milliLiter(s) Swish and Spit every 6 hours  glucagon  Injectable 1 milliGRAM(s) IntraMuscular once  heparin   Injectable 5000 Unit(s) SubCutaneous every 8 hours  influenza  Vaccine (HIGH DOSE) 0.7 milliLiter(s) IntraMuscular once  insulin glargine Injectable (LANTUS) 20 Unit(s) SubCutaneous at bedtime  insulin lispro (ADMELOG) corrective regimen sliding scale   SubCutaneous every 6 hours  lidocaine 2% Viscous 5 milliLiter(s) Swish and Spit two times a day  metoprolol tartrate Injectable 2.5 milliGRAM(s) IV Push every 6 hours  pantoprazole  Injectable 40 milliGRAM(s) IV Push every 12 hours  silver sulfADIAZINE 1% Cream 1 Application(s) Topical two times a day    MEDICATIONS  (PRN):  dextrose Oral Gel 15 Gram(s) Oral once PRN Blood Glucose LESS THAN 70 milliGRAM(s)/deciliter  HYDROmorphone  Injectable 0.5 milliGRAM(s) IV Push every 3 hours PRN Moderate Pain (4 - 6)  HYDROmorphone  Injectable 1 milliGRAM(s) IV Push every 3 hours PRN Severe Pain (7 - 10)      Analgesic Use (Scheduled and PRNs) for past 24 hours:      ITEMS UNCHECKED ARE NOT PRESENT  PRESENT SYMPTOMS/REVIEW OF SYSTEMS: []Unable to obtain due to poor mentation   Source if other than patient:  []Family   []Team         Vital Signs Last 24 Hrs  T(C): 37.1 (08 Nov 2023 14:04), Max: 37.2 (08 Nov 2023 06:05)  T(F): 98.7 (08 Nov 2023 14:04), Max: 98.9 (08 Nov 2023 06:05)  HR: 78 (08 Nov 2023 12:35) (72 - 94)  BP: 154/67 (08 Nov 2023 12:35) (123/58 - 154/67)  BP(mean): 97 (08 Nov 2023 12:35) (84 - 97)  RR: 16 (08 Nov 2023 12:00) (16 - 18)  SpO2: 98% (08 Nov 2023 12:35) (95% - 100%)    Parameters below as of 08 Nov 2023 12:35  Patient On (Oxygen Delivery Method): room air        LABS: Personally reviewed and interpreted                        7.7    4.08  )-----------( 181      ( 08 Nov 2023 10:00 )             24.4   11-08    146<H>  |  114<H>  |  32<H>  ----------------------------<  200<H>  3.9   |  25  |  1.84<H>    Ca    8.0<L>      08 Nov 2023 07:20  Phos  2.8     11-08  Mg     1.8     11-08    TPro  5.2<L>  /  Alb  2.2<L>  /  TBili  0.3  /  DBili  x   /  AST  25  /  ALT  15  /  AlkPhos  59  11-08      RADIOLOGY & ADDITIONAL STUDIES: None new    DISCUSSION OF CASE: Family - to provide updates and emotional support; Primary Team/RN - to discuss plan of care [facility] and Pall[facility]  Dickson Roberson, Palliative Care Attending  Contact Info: Call [phone] (HEAL Line) or message on Microsoft Teams (Dickosn Roberson)    SUBJECTIVE AND OBJECTIVE:  INTERVAL HPI/OVERNIGHT EVENTS: Interval events noted. Pain is adequately controlled. Harvey to ambulate in the halls. Still with persistent secretions. See patient's PRN use for the past 24hrs noted below. Comprehensive symptom assessment and GOC exploration as noted below. Extensive time spent discussing plan of care with patient/family. No unexpected adverse effects of opiates noted: no sedation/dizziness/nausea.    ALLERGIES:  No Known Allergies    MEDICATIONS  (STANDING):  albuterol/ipratropium for Nebulization 3 milliLiter(s) Nebulizer every 4 hours  atropine 1% Ophthalmic Solution for SubLingual Use 1 Drop(s) SubLingual three times a day  ceFAZolin   IVPB 1000 milliGRAM(s) IV Intermittent every 12 hours  dextrose 5% + sodium chloride 0.45%. 1000 milliLiter(s) (80 mL/Hr) IV Continuous <Continuous>  dextrose 5%. 1000 milliLiter(s) (100 mL/Hr) IV Continuous <Continuous>  dextrose 5%. 1000 milliLiter(s) (50 mL/Hr) IV Continuous <Continuous>  dextrose 50% Injectable 25 Gram(s) IV Push once  dextrose 50% Injectable 12.5 Gram(s) IV Push once  dextrose 50% Injectable 25 Gram(s) IV Push once  dextrose 50% Injectable 25 Gram(s) IV Push once  fentaNYL   Patch  25 MICROgram(s)/Hr 1 Patch Transdermal every 72 hours  FIRST- Mouthwash  BLM 4 milliLiter(s) Swish and Spit every 6 hours  glucagon  Injectable 1 milliGRAM(s) IntraMuscular once  heparin   Injectable 5000 Unit(s) SubCutaneous every 8 hours  influenza  Vaccine (HIGH DOSE) 0.7 milliLiter(s) IntraMuscular once  insulin glargine Injectable (LANTUS) 20 Unit(s) SubCutaneous at bedtime  insulin lispro (ADMELOG) corrective regimen sliding scale   SubCutaneous every 6 hours  lidocaine 2% Viscous 5 milliLiter(s) Swish and Spit two times a day  metoprolol tartrate Injectable 2.5 milliGRAM(s) IV Push every 6 hours  pantoprazole  Injectable 40 milliGRAM(s) IV Push every 12 hours  silver sulfADIAZINE 1% Cream 1 Application(s) Topical two times a day    MEDICATIONS  (PRN):  dextrose Oral Gel 15 Gram(s) Oral once PRN Blood Glucose LESS THAN 70 milliGRAM(s)/deciliter  HYDROmorphone  Injectable 0.5 milliGRAM(s) IV Push every 3 hours PRN Moderate Pain (4 - 6)  HYDROmorphone  Injectable 1 milliGRAM(s) IV Push every 3 hours PRN Severe Pain (7 - 10)    Analgesic Use (Scheduled and PRNs) for past 24 hours:  - none    ITEMS UNCHECKED ARE NOT PRESENT  PRESENT SYMPTOMS/REVIEW OF SYSTEMS: []Unable to obtain due to poor mentation   Source if other than patient:  []Family   []Team     Pain: [x] yes [] no  QOL impact - tolerable  Location - nack, throat                   Aggravating Factors - secretions, swallowing  Quality - sharp  Radiation -  Timing - constant  Severity (0-10 scale) - 2  Minimal Acceptable Level (0-10 scale) - 2    Dyspnea:                           []Mild  []Moderate []Severe  Anxiety:                             []Mild []Moderate []Severe  Fatigue:                             []Mild []Moderate []Severe  Nausea:                             []Mild []Moderate []Severe  Loss of Appetite:              []Mild []Moderate [x]Severe  Constipation:                    []Mild []Moderate []Severe    Other Symptoms:  [x]All other review of systems negative     Palliative Performance Status Version 2:  60%  (Functional Assessment Tool)    GENERAL:  [x] NAD []Alert []Lethargic  []Cachexia  []Unarousable  [x]Verbal  []Non-Verbal  BEHAVIORAL:   []Anxiety  []Delirium []Agitation [x]Cooperative [x]Oriented x3  HEENT:  [x]Normal  [] Moist Mucous Membranes [x]Dry mouth   []ET Tube/Trach  []Oral lesions  PULMONARY:   [x]Clear []Tachypnea  []Audible excessive secretions  [x]Normal Work of Breathing []Labored Breathing  []Rhonchi []Crackles []Wheezing  CARDIOVASCULAR:    [x]Regular Rate [x]Regular Rhythm []Irregular []Tachy  []Yaya  GASTROINTESTINAL:  [x]Soft  []Distended   [x]+BS  [x]Non tender []Tender  []PEG []OGT/ NGT  Last BM:  GENITOURINARY:  [x]Normal [] Incontinent   []Oliguria/Anuria   []Paige  MUSCULOSKELETAL:   [x]Normal Extremities  [x]Weakness  []Bed/Wheelchair bound []Edema  NEUROLOGIC:   []No focal deficits  []Cognitive impairment  [x]Dysphagia []Dysarthria []Paresis []Encephalopathic  SKIN:   []Normal   []Pressure ulcer(s)  [x]Rash    Vital Signs Last 24 Hrs  T(C): 37.1 (08 Nov 2023 14:04), Max: 37.2 (08 Nov 2023 06:05)  T(F): 98.7 (08 Nov 2023 14:04), Max: 98.9 (08 Nov 2023 06:05)  HR: 78 (08 Nov 2023 12:35) (72 - 94)  BP: 154/67 (08 Nov 2023 12:35) (123/58 - 154/67)  BP(mean): 97 (08 Nov 2023 12:35) (84 - 97)  RR: 16 (08 Nov 2023 12:00) (16 - 18)  SpO2: 98% (08 Nov 2023 12:35) (95% - 100%)    Parameters below as of 08 Nov 2023 12:35  Patient On (Oxygen Delivery Method): room air    LABS: Personally reviewed and interpreted                     7.7    4.08  )-----------( 181      ( 08 Nov 2023 10:00 )             24.4   11-08    146<H>  |  114<H>  |  32<H>  ----------------------------<  200<H>  3.9   |  25  |  1.84<H>    Ca    8.0<L>      08 Nov 2023 07:20  Phos  2.8     11-08  Mg     1.8     11-08  TPro  5.2<L>  /  Alb  2.2<L>  /  TBili  0.3  /  DBili  x   /  AST  25  /  ALT  15  /  AlkPhos  59  11-08    RADIOLOGY & ADDITIONAL STUDIES: None new    DISCUSSION OF CASE: Family - to provide updates and emotional support; Primary Team/RN - to discuss plan of care

## 2023-11-08 NOTE — PROGRESS NOTE ADULT - PROBLEM SELECTOR PLAN 5
#Hypernatremia    Pt w/ uptrending Creatinine since admission, May be pre renal due to volume loss 2/2 secretions.    - continue to trend BUN/Cr  - D5 NS 80cc/hr continuos  - restart home flomax when pt is able to take PO

## 2023-11-08 NOTE — PROGRESS NOTE ADULT - PROBLEM SELECTOR PLAN 1
.  -Fentanyl 25mcg/hr Patch q72hr as long-acting agent  -Dilaudid 1mg IV q3h PRN severe pain  -Dilaudid 0.5mg IV q3h PRN for Moderate Pain .  -Fentanyl 25mcg/hr Patch q72hr  -Dilaudid 1mg IV q3h PRN for Severe Pain  -Dilaudid 0.5mg IV q3h PRN for Moderate Pain    Minimal PRN use in past 24hrs

## 2023-11-08 NOTE — PROGRESS NOTE ADULT - PROBLEM SELECTOR PLAN 6
.  Complex medical decision making / symptom management in the setting of malignancy.    Will continue to follow for ongoing GOC discussion / titration of palliative regimen. Emotional support provided, questions answered.  Active Psychosocial Referrals:  [x]Social Work/Case management [x]PT/OT [x]Chaplaincy []Hospice  []Patient/Family Support []Holistic RN [x]Massage Therapy []Music Therapy []Ethics  Coping: [] well [x] with difficulty [] poor coping [] unable to assess  Support system: [] strong [x] adequate [] inadequate    For new or uncontrolled symptoms, please call Palliative Care at [phone]). The service is available 24/7 (including nights & weekends) to provide symptom management recommendations over the phone as appropriate

## 2023-11-08 NOTE — PROGRESS NOTE ADULT - PROBLEM SELECTOR PLAN 4
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 C/o pain, dysphagia and odynophagia for weeks which was tx with magic mouthwash, silver silvadene cream, fluconazole 400mg once a day (started 10/23). Radiation paused due to pain, last RT 10/28.  On exam -  Bilateral neck wounds - erythematous with skin breakdown, crusted dead skin peripherally, no drainage, no skin sloughing, at baseline per patient. Reports improvement with cream. No concern for superimposed infection at this time  Plan:  -Per Rad onc, received RT 10/30, 10/31, and 11/1, 11/3  - c/w silvadene cream and non-stick silicon bandage  -For skin care,  rinse with saline soaks liberally with dabbing of light soap and water

## 2023-11-08 NOTE — PROGRESS NOTE ADULT - PROBLEM SELECTOR PLAN 3
.  PPSV = 50%, requires assistance for most ADLs  -PT Eval recommending SOFI  -c/w supportive care, OOB, encourage movement .  PPSV = 50%, requires assistance for most ADLs  -PT Eval recommending SOFI, family will likely rather take the patient home  -c/w supportive care, OOB, encourage movement

## 2023-11-08 NOTE — PROCEDURE NOTE - ADDITIONAL PROCEDURE DETAILS
PROCEDURE NOTE:PROCEDURE NOTE:    Procedure: Flexible laryngoscopy (CPT 78503)     Pre-Procedure Diagnosis: laryngeal cancer     Post-Procedure Diagnosis: same       Indications for Procedure: LEONARDO HAY is a78y  Male who presents with laryngeal cancer . See above full HPI for further details. A detailed assessment of the nasal cavity, nasopharynx, hypopharynx, oropharynx, and larynx was required. An indirect mirror examination was not sufficient for complete evaluation due to patient discomfort or incomplete view. Therefore flexible laryngoscopy was performed.       Description of Procedure: After obtaining verbal consent, a flexible fiberoptic laryngoscope was inserted into the right nasal cavity. The nasal anatomy was examined for evidence of  nasal cavity obstruction. The septum was examined for clinically significant deviation.  Passing the flexible scope into the oropharynx and hypopharynx allowed examination of the base of tongue and vallecula and epiglottis. The piriform sinuses were examined for lesions and pooling of secretions or visible aspiration. The false vocal cords and true vocal cords were examined. The true vocal cords were examined for mobility, symmetry and closure. The visualized portion of the subglottis examined. The pharynx was examined for  visible extrinsic compression. The patient tolerated the procedure well without complications.      Findings: dry nasal mucosal membranes,  epiglottis crisp,  diffuse erythema,  vocal cords mobile with moderate glottic gap  ( limited exam due to patient's cooperation) trachea visualized and patent,  no airway obstruction, should not anticipate any difficulty with intubation PROCEDURE NOTE:PROCEDURE NOTE:    Procedure: Flexible laryngoscopy (CPT 55287)     Pre-Procedure Diagnosis: laryngeal cancer     Post-Procedure Diagnosis: same       Indications for Procedure: LEONARDO HAY is a78y  Male who presents with laryngeal cancer . See above full HPI for further details. A detailed assessment of the nasal cavity, nasopharynx, hypopharynx, oropharynx, and larynx was required. An indirect mirror examination was not sufficient for complete evaluation due to patient discomfort or incomplete view. Therefore flexible laryngoscopy was performed.       Description of Procedure: After obtaining verbal consent, a flexible fiberoptic laryngoscope was inserted into the right nasal cavity. The nasal anatomy was examined for evidence of  nasal cavity obstruction. The septum was examined for clinically significant deviation.  Passing the flexible scope into the oropharynx and hypopharynx allowed examination of the base of tongue and vallecula and epiglottis. The piriform sinuses were examined for lesions and pooling of secretions or visible aspiration. The false vocal cords and true vocal cords were examined. The true vocal cords were examined for mobility, symmetry and closure. The visualized portion of the subglottis examined. The pharynx was examined for  visible extrinsic compression. The patient tolerated the procedure well without complications.      Findings: dry nasal mucosal membranes,  epiglottis crisp,  diffuse erythema,  vocal cords mobile with moderate glottic gap  ( limited exam due to patient's cooperation) trachea visualized and patent,  no airway obstruction, should not anticipate any difficulty with intubation PROCEDURE NOTE:PROCEDURE NOTE:    Procedure: Flexible laryngoscopy (CPT 53043)     Pre-Procedure Diagnosis: laryngeal cancer     Post-Procedure Diagnosis: same       Indications for Procedure: LEONARDO HAY is a78y  Male who presents with laryngeal cancer . See above full HPI for further details. A detailed assessment of the nasal cavity, nasopharynx, hypopharynx, oropharynx, and larynx was required. An indirect mirror examination was not sufficient for complete evaluation due to patient discomfort or incomplete view. Therefore flexible laryngoscopy was performed.       Description of Procedure: After obtaining verbal consent, a flexible fiberoptic laryngoscope was inserted into the right nasal cavity. The nasal anatomy was examined for evidence of  nasal cavity obstruction. The septum was examined for clinically significant deviation.  Passing the flexible scope into the oropharynx and hypopharynx allowed examination of the base of tongue and vallecula and epiglottis. The piriform sinuses were examined for lesions and pooling of secretions or visible aspiration. The false vocal cords and true vocal cords were examined. The true vocal cords were examined for mobility, symmetry and closure. The visualized portion of the subglottis examined. The pharynx was examined for  visible extrinsic compression. The patient tolerated the procedure well without complications.      Findings: dry nasal mucosal membranes,  epiglottis crisp,  diffuse erythema,  vocal cords mobile with moderate glottic gap  ( limited exam due to patient's cooperation) trachea visualized and patent,  no airway obstruction, should not anticipate any difficulty with intubation

## 2023-11-08 NOTE — PROGRESS NOTE ADULT - ATTENDING COMMENTS
If pt fails speech and swallow will need PEG. Agree that NGt not a great option in terms of irritation and swallow interference. ENT scoped pt today and I am told that if during PEG or otherwise he required intubation, it would not be difficult based on laryngeal cancer lesion. Repeat Hb stable and UO increased with decreased Cr. Replete free water.

## 2023-11-08 NOTE — PROGRESS NOTE ADULT - PROBLEM SELECTOR PLAN 11
Diet: IV fluids, given pt cannot tolerate PO  F: D5 and NS  E: replete PRN  DVT: heparin  Code: Full

## 2023-11-08 NOTE — PROGRESS NOTE ADULT - PROBLEM SELECTOR PLAN 1
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 C/o odynophagia causing poor PO intake. Per chart review appears poor PO intake has been ongoing issue while undergoing chemo/radiation. 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 Marr. Last chemo 10/23. 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  Plan:  - Chest PT, hypertonic nebulizer q4hr   - Aspiration precautions    -Plan for IR PEG suspended for now i/s/o increased respirations & unable to scope  -Per rad onc, odynophagia and mucositis should likely resolve 2-3 weeks after completion of radiation. no radiation on 10/31. Last treatment 10/30  -Per heme onc, no additional chemo.   - palliative: will update as Fentanyl patch begins working        -Fentanyl 25mcg/hr Patch q72hr as long-acting agent        -Dilaudid 1mg IV q6h ATC (hold for lethargy)        -Dilaudid 0.5mg IV q3h PRN for Moderate Pain        -Dilaudid 1mg IV q3h PRN for Severe Pain  - magic mouthwash  - viscous lidocaine   -scopolamine patch  -c/w pantoprazole 40 mg IV.  - failed S&S on 11/7 w/ out plans to repeat within the week; d/w GI if patient can receive PEG/J tube w/ out scope

## 2023-11-08 NOTE — PROGRESS NOTE ADULT - PROBLEM SELECTOR PLAN 6
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 Chemo/RT initiated 08/2023. Follows with Dr. Marr (rad onc) and Dr. Derek Harvey (onc). Last chemo (taxol/carboplatin) on 10/23, last RT 11/3, tolerated well.     - c/w RT  - onc aware pt is admitted, patient is being treated with curative intent  - palliative consulted on board

## 2023-11-08 NOTE — PROGRESS NOTE ADULT - PROBLEM SELECTOR PLAN 2
.  -Atropine Sublingual Drops TID for local secretion management  -trial of Scopolamine Patch to reduce secretion burden  -tentative plan for long-term feeding tube placement .  -Atropine Sublingual Drops TID for local secretion management  -tentative plan for long-term feeding tube placement    Would not add additional anti-cholinergic for secretion management since trial of Scopolamine Patch was stopped due to delirium and urinary retention

## 2023-11-08 NOTE — PROGRESS NOTE ADULT - PROBLEM SELECTOR PLAN 2
Spiked 100.4 fever 11/2 - no further fevers. Asymptomatic, other vitals WNL  CXR unremarkable   Plan:  -c/w ancef until 11/9  -Sputum w. S. aureus  -BCX NGTD x2

## 2023-11-09 NOTE — PROGRESS NOTE ADULT - PROBLEM SELECTOR PLAN 6
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 Chemo/RT initiated 08/2023. Follows with Dr. Marr (rad onc) and Dr. Derek Harvey (onc). Last chemo (taxol/carboplatin) on 10/23, last RT 11/3, tolerated well.     - onc aware pt is admitted, patient is being treated with curative intent  - palliative consulted on board

## 2023-11-09 NOTE — PROGRESS NOTE ADULT - PROBLEM SELECTOR PLAN 2
.  -Atropine Sublingual Drops TID for local secretion management  -tentative plan for long-term feeding tube placement    Would not add additional anti-cholinergic for secretion management since trial of Scopolamine Patch was stopped due to delirium and urinary retention

## 2023-11-09 NOTE — PROGRESS NOTE ADULT - PROBLEM SELECTOR PLAN 5
.  Patient is Full Code  -patient appointed granddaughter (Yvrose Smith, [phone]) as alternate decision maker  -see prior C note    Patient would benefit from outpatient Palliative/Supportive Oncology follow-up on discharge with Dr. Emma Anthony at [facility]. Please call [phone] to make an appointment when discharge planning.

## 2023-11-09 NOTE — PROGRESS NOTE ADULT - SUBJECTIVE AND OBJECTIVE BOX
SUBJECTIVE AND OBJECTIVE:  Indication for Geriatrics and Palliative Care Services: Symptom management    OVERNIGHT EVENTS: Used 0 PRNs overnight. Feeling comfortable this morning.    Allergies    No Known Allergies    Intolerances    MEDICATIONS  (STANDING):  albuterol/ipratropium for Nebulization 3 milliLiter(s) Nebulizer every 4 hours  atropine 1% Ophthalmic Solution for SubLingual Use 1 Drop(s) SubLingual three times a day  dextrose 5%. 1000 milliLiter(s) (50 mL/Hr) IV Continuous <Continuous>  dextrose 5%. 1000 milliLiter(s) (100 mL/Hr) IV Continuous <Continuous>  dextrose 50% Injectable 25 Gram(s) IV Push once  dextrose 50% Injectable 12.5 Gram(s) IV Push once  dextrose 50% Injectable 25 Gram(s) IV Push once  dextrose 50% Injectable 25 Gram(s) IV Push once  fentaNYL   Patch  25 MICROgram(s)/Hr 1 Patch Transdermal every 72 hours  FIRST- Mouthwash  BLM 4 milliLiter(s) Swish and Spit every 6 hours  glucagon  Injectable 1 milliGRAM(s) IntraMuscular once  heparin   Injectable 5000 Unit(s) SubCutaneous every 8 hours  influenza  Vaccine (HIGH DOSE) 0.7 milliLiter(s) IntraMuscular once  insulin glargine Injectable (LANTUS) 15 Unit(s) SubCutaneous at bedtime  insulin lispro (ADMELOG) corrective regimen sliding scale   SubCutaneous every 6 hours  lidocaine 2% Viscous 5 milliLiter(s) Swish and Spit two times a day  metoprolol tartrate Injectable 2.5 milliGRAM(s) IV Push every 6 hours  pantoprazole  Injectable 40 milliGRAM(s) IV Push every 12 hours  silver sulfADIAZINE 1% Cream 1 Application(s) Topical two times a day  sodium chloride 0.225%. 1000 milliLiter(s) (80 mL/Hr) IV Continuous <Continuous>    MEDICATIONS  (PRN):  dextrose Oral Gel 15 Gram(s) Oral once PRN Blood Glucose LESS THAN 70 milliGRAM(s)/deciliter  HYDROmorphone  Injectable 0.5 milliGRAM(s) IV Push every 3 hours PRN Moderate Pain (4 - 6)  HYDROmorphone  Injectable 1 milliGRAM(s) IV Push every 3 hours PRN Severe Pain (7 - 10)      ITEMS UNCHECKED ARE NOT PRESENT    PRESENT SYMPTOMS: [ ]Unable to self-report  Source if other than patient:  [ ]Family   [ ]Team     Pain: [x] yes [] no  QOL impact - tolerable  Location - nack, throat                   Aggravating Factors - secretions, swallowing  Quality - sharp  Radiation -  Timing - constant  Severity (0-10 scale) - 2  Minimal Acceptable Level (0-10 scale) - 2    Dyspnea:                           [ ]Mild [ ]Moderate [ ]Severe  Anxiety:                             [ ]Mild [ ]Moderate [ ]Severe  Fatigue:                             [ ]Mild [ ]Moderate [ ]Severe  Nausea:                             [ ]Mild [ ]Moderate [ ]Severe  Loss of appetite:              [ ]Mild [ ]Moderate [ ]Severe  Constipation:                    [ ]Mild [ ]Moderate [ ]Severe    Other Symptoms:  [X]All other review of systems negative     Palliative Performance Status Version 2:        50 %      http://npcrc.org/files/news/palliative_performance_scale_ppsv2.pdf    PHYSICAL EXAM:  Vital Signs Last 24 Hrs  T(C): 36.9 (09 Nov 2023 14:41), Max: 36.9 (09 Nov 2023 05:10)  T(F): 98.5 (09 Nov 2023 14:41), Max: 98.5 (09 Nov 2023 05:10)  HR: 94 (09 Nov 2023 16:04) (66 - 94)  BP: 132/60 (09 Nov 2023 16:04) (123/60 - 151/65)  BP(mean): 86 (09 Nov 2023 16:04) (85 - 94)  RR: 16 (09 Nov 2023 16:04) (16 - 18)  SpO2: 97% (09 Nov 2023 16:04) (93% - 98%)    Parameters below as of 09 Nov 2023 16:04  Patient On (Oxygen Delivery Method): room air     I&O's Summary    08 Nov 2023 07:01  -  09 Nov 2023 07:00  --------------------------------------------------------  IN: 1680 mL / OUT: 475 mL / NET: 1205 mL    09 Nov 2023 07:01  -  09 Nov 2023 18:22  --------------------------------------------------------  IN: 800 mL / OUT: 1050 mL / NET: -250 mL       GENERAL: [ ]Cachexia    [X]Alert  [X]Oriented x3   [ ]Lethargic  [ ]Unarousable  [x]Verbal  [ ]Non-Verbal  Behavioral:   [ ]Anxiety  [ ]Delirium [ ]Agitation [X]Cooperative  HEENT:  [X]Normal   [ ]Dry mouth   [ ]ET Tube/Trach  [ ]Oral lesions  PULMONARY:   [X]Clear [ ]Tachypnea  [ ]Audible excessive secretions   [ ]Rhonchi        [ ]Right [ ]Left [ ]Bilateral  [ ]Crackles        [ ]Right [ ]Left [ ]Bilateral  [ ]Wheezing     [ ]Right [ ]Left [ ]Bilateral  [ ]Diminished BS [ ] Right [ ]Left [ ]Bilateral  CARDIOVASCULAR:    [X]Regular [ ]Irregular [ ]Tachy  [ ]Yaya [ ]Murmur [ ]Other  GASTROINTESTINAL:  [X]Soft  [ ]Distended   [ ]+BS  [X]Non tender [ ]Tender  [ ]Other [ ]PEG [ ]OGT/ NGT   Last BM:   GENITOURINARY:  [X]Normal [ ]Incontinent   [ ]Oliguria/Anuria   [ ]Paige  MUSCULOSKELETAL:   [ ]Normal   [X]Weakness  [ ]Bed/Wheelchair bound [ ]Edema  NEUROLOGIC:   [X]No focal deficits  [ ] Cognitive impairment  [ ] Dysphagia [ ]Dysarthria [ ] Paresis [ ]Other   SKIN:   [X]Normal  [ ]Rash  [ ]Other  [ ]Pressure ulcer(s) [ ]y [ ]n present on admission    CRITICAL CARE:  [ ]Shock Present  [ ]Septic [ ]Cardiogenic [ ]Neurologic [ ]Hypovolemic  [ ]Vasopressors [ ]Inotropes  [ ]Respiratory failure present [ ]Mechanical Ventilation [ ]Non-invasive ventilatory support [ ]High-Flow   [ ]Acute  [ ]Chronic [ ]Hypoxic  [ ]Hypercarbic [ ]Other  [ ]Other organ failure     LABS:                        7.8    8.02  )-----------( 182      ( 09 Nov 2023 05:30 )             24.8   11-09    147<H>  |  114<H>  |  25<H>  ----------------------------<  175<H>  3.7   |  24  |  1.42<H>    Ca    8.3<L>      09 Nov 2023 05:30  Phos  2.1     11-09  Mg     1.9     11-09    TPro  5.3<L>  /  Alb  2.3<L>  /  TBili  0.4  /  DBili  x   /  AST  25  /  ALT  12  /  AlkPhos  64  11-09      Urinalysis Basic - ( 09 Nov 2023 05:30 )    Color: x / Appearance: x / SG: x / pH: x  Gluc: 175 mg/dL / Ketone: x  / Bili: x / Urobili: x   Blood: x / Protein: x / Nitrite: x   Leuk Esterase: x / RBC: x / WBC x   Sq Epi: x / Non Sq Epi: x / Bacteria: x      RADIOLOGY & ADDITIONAL STUDIES:    Protein Calorie Malnutrition Present: [ ]mild [ ]moderate [ ]severe [ ]underweight [ ]morbid obesity  https://www.andeal.org/vault/2440/web/files/ONC/Table_Clinical%20Characteristics%20to%20Document%20Malnutrition-White%20JV%20et%20al%202012.pdf    Height (cm): 167.6 (10-29-23 @ 19:33), 167.6 (10-23-23 @ 13:00)  Weight (kg): 68 (10-29-23 @ 19:33), 68.946 (10-23-23 @ 13:00), 72.6 (07-21-23 @ 13:07)  BMI (kg/m2): 24.2 (10-29-23 @ 19:33), 24.5 (10-23-23 @ 13:00)    [ ]PPSV2 < or = 30%  [ ]significant weight loss [ ]poor nutritional intake [ ]anasarca[ ]Artificial Nutrition    REFERRALS:   [ ]Chaplaincy  [ ]Hospice  [ ]Child Life  [ ]Social Work [ ]Patient and Family Support [ ]Case management [ ]Holistic Therapy [ ] Music therapy  [ ] Massage Therapy    DISCUSSION OF CASE: Family - obtained additional history and to provide emotional support;  Primary team - discussed plan of care

## 2023-11-09 NOTE — PROGRESS NOTE ADULT - PROBLEM SELECTOR PLAN 2
Spiked 100.4 fever 11/2 - no further fevers. Asymptomatic, other vitals WNL  CXR unremarkable   Plan:  -s/p ancef   -Sputum w. S. aureus  -BCX NGTD

## 2023-11-09 NOTE — PROGRESS NOTE ADULT - PROBLEM SELECTOR PLAN 9
CAD s/p MIDCAB and PCI with multiple WINTER (most recent to LCx in 8/2022), on ASA 81 at home. Currently asymptomatic. EKG on admission w/o ischemic changes  - hold ASA 81 inability to take PO  - hold lipitor 80mg once a day inability to take PO

## 2023-11-09 NOTE — PROGRESS NOTE ADULT - PROBLEM SELECTOR PLAN 4
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 C/o pain, dysphagia and odynophagia for weeks which was tx with magic mouthwash, silver silvadene cream, fluconazole 400mg once a day (started 10/23). Radiation paused due to pain, last RT 10/28.  On exam -  Bilateral neck wounds - erythematous with skin breakdown, crusted dead skin peripherally, no drainage, no skin sloughing, at baseline per patient. Reports improvement with cream. No concern for superimposed infection at this time  Plan:  -Per Rad onc, received RT 10/30, 10/31, and 11/1, 11/3  -c/w silvadene cream and non-stick silicon bandage  -For skin care,  rinse with saline soaks liberally with dabbing of light soap and water

## 2023-11-09 NOTE — PROGRESS NOTE ADULT - PROBLEM SELECTOR PLAN 6
.  Complex medical decision making / symptom management in the setting of malignancy.    Will continue to follow for ongoing GOC discussion / titration of palliative regimen. Emotional support provided, questions answered.  Active Psychosocial Referrals:  [x]Social Work/Case management [x]PT/OT [x]Chaplaincy []Hospice  []Patient/Family Support []Holistic RN [x]Massage Therapy []Music Therapy []Ethics  Coping: [] well [x] with difficulty [] poor coping [] unable to assess  Support system: [] strong [x] adequate [] inadequate    For new or uncontrolled symptoms, please call Palliative Care at [phone]). The service is available 24/7 (including nights & weekends) to provide symptom management recommendations over the phone as appropriate

## 2023-11-09 NOTE — PROGRESS NOTE ADULT - PROBLEM SELECTOR PLAN 4
.  Ongoing Chemo/RT with curative intent  -no evidence of metastatic disease at this time, not a hospice qualifying disease  -goal was to complete treatment as soon as possible, now s/p RT

## 2023-11-09 NOTE — PROGRESS NOTE ADULT - SUBJECTIVE AND OBJECTIVE BOX
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 Marr/Dr. Derek Gar), presents to the ED with generalized weakness. Reports progressive weakness over the last 2 weeks assc w/ fatigue. 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 C/o odynophagia but tolerating secretions. No shortness of breath or difficulty breathing. Reports dermatitis is stable, and reponds well to cream he was given. No drainage or discharge. Saw Rad on on 10/13. Per chart review appears odynophagia was presenting symptom of cancer. Reported subjective fever for 1 day but did not take his temperature at home. ROS otherwise negative.       Afebrile, HR 70s, //86, 100% on RA  WBC 2.3, Hb 9.5, plt 219, Na 133, K 5.4, BUN 49/Cr 1.87, lactate 1.0  EKG NSR, no peak T waves   s/p morphine, 2L NS  (29 Oct 2023 21:39)                            7.8    8.02  )-----------( 182      ( 09 Nov 2023 05:30 )             24.8       11-09    147<H>  |  114<H>  |  25<H>  ----------------------------<  175<H>  3.7   |  24  |  1.42<H>    Ca    8.3<L>      09 Nov 2023 05:30  Phos  2.1     11-09  Mg     1.9     11-09    TPro  5.3<L>  /  Alb  2.3<L>  /  TBili  0.4  /  DBili  x   /  AST  25  /  ALT  12  /  AlkPhos  64  11-09    Vital Signs Last 24 Hrs  T(C): 36.6 (09 Nov 2023 10:24), Max: 37.1 (08 Nov 2023 14:04)  T(F): 97.8 (09 Nov 2023 10:24), Max: 98.7 (08 Nov 2023 14:04)  HR: 72 (09 Nov 2023 12:05) (66 - 86)  BP: 150/65 (09 Nov 2023 12:05) (123/60 - 151/65)  BP(mean): 94 (09 Nov 2023 12:05) (85 - 95)  RR: 18 (09 Nov 2023 12:05) (16 - 18)  SpO2: 97% (09 Nov 2023 12:05) (93% - 100%)    Parameters below as of 09 Nov 2023 12:05  Patient On (Oxygen Delivery Method): room air        MEDICATIONS  (STANDING):  albuterol/ipratropium for Nebulization 3 milliLiter(s) Nebulizer every 4 hours  atropine 1% Ophthalmic Solution for SubLingual Use 1 Drop(s) SubLingual three times a day  dextrose 5%. 1000 milliLiter(s) (50 mL/Hr) IV Continuous <Continuous>  dextrose 5%. 1000 milliLiter(s) (100 mL/Hr) IV Continuous <Continuous>  dextrose 50% Injectable 12.5 Gram(s) IV Push once  dextrose 50% Injectable 25 Gram(s) IV Push once  dextrose 50% Injectable 25 Gram(s) IV Push once  dextrose 50% Injectable 25 Gram(s) IV Push once  fentaNYL   Patch  25 MICROgram(s)/Hr 1 Patch Transdermal every 72 hours  FIRST- Mouthwash  BLM 4 milliLiter(s) Swish and Spit every 6 hours  glucagon  Injectable 1 milliGRAM(s) IntraMuscular once  heparin   Injectable 5000 Unit(s) SubCutaneous every 8 hours  influenza  Vaccine (HIGH DOSE) 0.7 milliLiter(s) IntraMuscular once  insulin glargine Injectable (LANTUS) 20 Unit(s) SubCutaneous at bedtime  insulin lispro (ADMELOG) corrective regimen sliding scale   SubCutaneous every 6 hours  lidocaine 2% Viscous 5 milliLiter(s) Swish and Spit two times a day  metoprolol tartrate Injectable 2.5 milliGRAM(s) IV Push every 6 hours  pantoprazole  Injectable 40 milliGRAM(s) IV Push every 12 hours  silver sulfADIAZINE 1% Cream 1 Application(s) Topical two times a day  sodium chloride 0.225%. 1000 milliLiter(s) (80 mL/Hr) IV Continuous <Continuous>    MEDICATIONS  (PRN):  dextrose Oral Gel 15 Gram(s) Oral once PRN Blood Glucose LESS THAN 70 milliGRAM(s)/deciliter  HYDROmorphone  Injectable 0.5 milliGRAM(s) IV Push every 3 hours PRN Moderate Pain (4 - 6)  HYDROmorphone  Injectable 1 milliGRAM(s) IV Push every 3 hours PRN Severe Pain (7 - 10)          11/8/2023 Functional Status Assessment :       Pain Assessment/Number Scale (0-10) Adult  Presence of Pain: denies pain/discomfort (Rating = 0)  Pain Rating (0-10): Rest: 0 (no pain/absence of nonverbal indicators of pain)  Pain Rating (0-10): Activity: 0 (no pain/absence of nonverbal indicators of pain)    Re-assessment (Number Scale)  Pain Response to Interventions: no change in pain    Safety      AM-PAC Functional Assessment: Basic Mobility  Type of Assessment: Daily assessment  Turning from your back to your side while in a flat bed without using bedrails?: 3 = A little assistance  Moving from lying on your back to sitting on the flat side of a flat bed without using bedrails?: 3 = A little assistance  Moving to and from a bed to a chair (including a wheelchair)?: 3 = A little assistance  Standing up from a chair using your arms (e.g. wheelchair or bedside chair)?: 3 = A little assistance  Walking in hospital room?: 2 = A lot of assistance  Climbing 3-5 steps with a railing?: 1 = Total assistance  Score: 15   Row Comment: Ask the patient "How much help from another person do you currently need? (If the patient hasn't done an activity recently, how much help from another person do you think he/she needs if he/she tried?)    Cognitive/Neuro      Cognitive/Neuro/Behavioral  Level of Consciousness: alert  Arousal Level: arouses to voice  Orientation: time  Speech: hoarse  Mood/Behavior: calm    Language Assistance  Preferred Language to Address Healthcare Preferred Language to Address Healthcare: Divehi  's name: Sharmila [id number]   Patient/Caregiver offered   services: yes  Patient/Caregiver accepted  services: yes    Therapeutic Interventions      Bed Mobility  Bed Mobility Training Sit-to-Supine: moderate assist (50% patient effort);  1 person assist  Bed Mobility Training Supine-to-Sit: moderate assist (50% patient effort);  1 person assist  Bed Mobility Training Limitations: impaired ability to control trunk for mobility;  decreased ability to use legs for bridging/pushing;  decreased strength;  impaired motor control;  impaired postural control    Sit-Stand Transfer Training  Transfer Training Sit-to-Stand Transfer: minimum assist (75% patient effort);  1 person assist;  weight-bearing as tolerated   rolling walker  Transfer Training Stand-to-Sit Transfer: moderate assist (50% patient effort);  1 person assist;  weight-bearing as tolerated   rolling walker  Sit-to-Stand Transfer Training Transfer Safety Analysis: decreased balance;  decreased weight-shifting ability;  decreased strength;  impaired motor control;  impaired postural control;  rolling walker    Gait Training  Gait Training: minimum assist (75% patient effort);  1 person assist;  weight-bearing as tolerated   rolling walker;  50 feet  Gait Analysis: 3-point gait   decreased merline;  decreased step length;  decreased stride length;  decreased strength;  impaired motor control;  impaired postural control;  50 feet;  rolling walker  Type of Rest Type of Rest: sitting  Duration of Rest Duration of Rest: 1 min       AM-PAC Functional Assessment: Daily Activity  Type of Assessment: Daily assessment  Putting on and taking off regular lower body clothing?: 2 = A lot of assistance  Bathing (including washing, rinsing, drying)?: 2 = A lot of assistance  Toileting, which includes using toilet, bedpan or urinal?: 2 = A lot of assistance  Putting on and taking off regular upper body clothing?: 3 = A little assistance  Take care of personal grooming such as brushing teeth?: 4 = No assist / stand by assistance  Eating meals?: 4 = No assist / stand by assistance  Score: 17   Row Comment: Ask the patient "How much help from another person do you currently need? (If the patient hasn't done an activity recently, how much help from another person do you think he/she needs if he/she tried?)    Cognitive/Neuro      Cognitive/Neuro/Behavioral  Cognitive/Neuro/Behavioral [WDL Definition: Alert; opens eyes spontaneously; arouses to voice or touch; oriented x 4; follows commands; speech spontaneous, logical; purposeful motor response; behavior appropriate to situation]: WDL except  Level of Consciousness: alert  Arousal Level: arouses to voice  Orientation: disoriented to;  time  Speech: hoarse;  hypophonic  Mood/Behavior: cooperative;  calm    Language Assistance  Preferred Language to Address Healthcare Preferred Language to Address Healthcare: Divehi  Preferred Sign Language Preferred Sign Language: please see PT Mikel's assessment for interpretor information   Patient/Caregiver offered   services: yes  Patient/Caregiver accepted  services: yes  Date/Time of acceptance([id number] hh:shahnaz): 08-Nov-2023 12:40     Therapeutic Interventions      Bed Mobility  Bed Mobility Training Sit-to-Supine: moderate assist (50% patient effort);  1 person assist;  nonverbal cues (demo/gestures);  verbal cues  Bed Mobility Training Supine-to-Sit: moderate assist (50% patient effort);  1 person assist;  nonverbal cues (demo/gestures);  verbal cues  Bed Mobility Training Limitations: impaired balance;  impaired postural control;  decreased ability to use arms for pushing/pulling;  decreased ability to use legs for bridging/pushing;  impaired ability to control trunk for mobility;  decreased strength    Sit-Stand Transfer Training  Transfer Training Sit-to-Stand Transfer: minimum assist (75% patient effort);  moderate assist (50% patient effort);  1 person + 1 person to manage equipment;  rolling walker  Transfer Training Stand-to-Sit Transfer: moderate assist (50% patient effort);  minimum assist (75% patient effort);  1 person + 1 person to manage equipment;  rolling walker  Sit-to-Stand Transfer Training Transfer Safety Analysis: decreased weight-shifting ability;  decreased step length;  impaired balance;  decreased strength;  impaired postural control    Therapeutic Exercise  Therapeutic Exercise Charges: pt. performed functional mob in hallway with RW and Min Ax1 noted with slight decreased step length     Upper Body Dressing Training  Upper Body Dressing Training Charges: don/doff des gowalec   Upper Body Dressing Training Assistance: minimum assist (75% patient effort);  1 person assist;  verbal cues;  nonverbal cues (demo/gestures);  impaired balance;  impaired postural control;  decreased strength            PM&R Impression : as above    Current disposition plan recommendation :    subacute rehab placement

## 2023-11-09 NOTE — PROGRESS NOTE ADULT - PROBLEM SELECTOR PLAN 1
.  - C/w Fentanyl 25mcg/hr Patch q72hr  - C/w Dilaudid 1mg IV q3h PRN for Severe Pain  - C/w Dilaudid 0.5mg IV q3h PRN for Moderate Pain

## 2023-11-09 NOTE — PROGRESS NOTE ADULT - ASSESSMENT
77yo M with PMH of CAD, PVD, T2DM, and SCC of Larynx p/w weakness and odynophagia. Palliative consulted for complex symptom management in the setting of malignancy.    ·	c/w opiate regimen as ordered, tolerating without overt toxicity    Patient would benefit from outpatient Palliative/Supportive Oncology follow-up on discharge with Dr. Emma Anthony at [facility]. Please call [phone] when discharge planning to make an appointment.

## 2023-11-09 NOTE — PROGRESS NOTE ADULT - PROBLEM SELECTOR PLAN 3
.  PPSV = 50%, requires assistance for most ADLs  -PT Eval recommending [facility], family will likely rather take the patient home  -c/w supportive care, OOB, encourage movement

## 2023-11-09 NOTE — PROGRESS NOTE ADULT - ASSESSMENT
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 Marr/Dr. Gar), presents to the ED with generalized weakness and odynophagia, transferred to [facility] for frequent suctioning    Nutritional Assessment:  · Nutritional Assessment	This patient has been assessed with a concern for Malnutrition and has been determined to have a diagnosis/diagnoses of Severe protein-calorie malnutrition.    This patient is being managed with:   Diet NPO-  Entered: Nov 4 2023 11:10PM    Problem/Plan - 1:  ·  Problem: Odynophagia.   ·  Plan: Larynx SCC currently getting radiation and chemotherapy. C/o odynophagia causing poor PO intake. Per chart review appears poor PO intake has been ongoing issue while undergoing chemo/radiation. 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 Marr. Last chemo 10/23. 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  Plan:  - Chest PT, hypertonic nebulizer q4hr   - Aspiration precautions    -Plan for IR PEG suspended for now i/s/o increased respirations & unable to scope  -Per rad onc, odynophagia and mucositis should likely resolve 2-3 weeks after completion of radiation. no radiation on 10/31. Last treatment 10/30  -Per heme onc, no additional chemo.   - palliative: will update as Fentanyl patch begins working        -Fentanyl 25mcg/hr Patch q72hr as long-acting agent        -Dilaudid 1mg IV q6h ATC (hold for lethargy)        -Dilaudid 0.5mg IV q3h PRN for Moderate Pain        -Dilaudid 1mg IV q3h PRN for Severe Pain  - magic mouthwash  - viscous lidocaine   -scopolamine patch  -c/w pantoprazole 40 mg IV.  - failed S&S on 11/7 w/ out plans to repeat within the week; d/w GI if patient can receive PEG/J tube w/ out scope.    Problem/Plan - 2:  ·  Problem: Fever, unspecified.   ·  Plan: Spiked 100.4 fever 11/2 - no further fevers. Asymptomatic, other vitals WNL  CXR unremarkable   Plan:  -c/w ancef until 11/9  -Sputum w. S. aureus  -BCX NGTD x2.    Problem/Plan - 3:  ·  Problem: Weakness generalized.   ·  Plan: Reports progressive weakness over the last 2 weeks assc w/ fatigue and poor PO intake. Reports this typically happens after chemo.Typically ambulates with cane.  -Per PT, SOFI placement.    Problem/Plan - 4:  ·  Problem: Radiation dermatitis.   ·  Plan: Has b/l neck dermatitis due to radiation, first noticed early 09/13/2023 after 1st/2nd treatment. C/o pain, dysphagia and odynophagia for weeks which was tx with magic mouthwash, silver silvadene cream, fluconazole 400mg once a day (started 10/23). Radiation paused due to pain, last RT 10/28.  On exam -  Bilateral neck wounds - erythematous with skin breakdown, crusted dead skin peripherally, no drainage, no skin sloughing, at baseline per patient. Reports improvement with cream. No concern for superimposed infection at this time  Plan:  -Per Rad onc, received RT 10/30, 10/31, and 11/1, 11/3  - c/w silvadene cream and non-stick silicon bandage  -For skin care,  rinse with saline soaks liberally with dabbing of light soap and water.    Problem/Plan - 5:  ·  Problem: ELENITA (acute kidney injury).   ·  Plan: #Hypernatremia    Pt w/ uptrending Creatinine since admission, May be pre renal due to volume loss 2/2 secretions.    - continue to trend BUN/Cr  - D5 NS 80cc/hr continuos  - restart home flomax when pt is able to take PO.    Problem/Plan - 6:  ·  Problem: Squamous cell carcinoma of larynx.   ·  Plan: Dx 06/2023 with SCC larynx. Chemo/RT initiated 08/2023. Follows with Dr. Marr (rad onc) and Dr. Derek Harvey (onc). Last chemo (taxol/carboplatin) on 10/23, last RT 11/3, tolerated well.     - c/w RT  - onc aware pt is admitted, patient is being treated with curative intent  - palliative consulted on board.    Problem/Plan - 7:  ·  Problem: Acute metabolic encephalopathy.   ·  Plan: Pt was agitated o/n and received 2x2.5mg Zyprexa for agitation. AM pt was responding and was AOx2, but after RT tx pt was AOx0. Unclear etiology at this point. Pt is AOx1-2. Infectious workup continues to be unremarkable w/ no NGTD and negative UA and CXR. Likely 2/2 to hospital delirium, no FND  Plan:  - Re-orient as possible  - Can give Zyprexa 2.5mg IM if necessary.    Problem/Plan - 8:  ·  Problem: Heart failure with mildly reduced ejection fraction.   ·  Plan: TTE 07/2023: mild LVH, mild reduced LVH hypertrophy, normal RV function, LV systolic function mild decr EF 50-55%. home meds: imdur 30 once a day, lisinopril 20mg once a day, toprol 12.5mg once a day   - c/w Toprol 12.5 mg- converted to IV 1.25 q6 given pt cannot tolerate PO  - hold imdur given soft BPs, resume when appropriate   - hold lisinopril given prior ELENITA and unable to tolerate PO.    Problem/Plan - 9:  ·  Problem: CAD (coronary artery disease).   ·  Plan: CAD s/p MIDCAB and PCI with multiple WINTER (most recent to LCx in 8/2022), on ASA 81 at home. Currently asymptomatic. EKG on admission w/o ischemic changes  - c/w ASA 81  - c/w lipitor 80mg once a day.    Problem/Plan - 10:  ·  Problem: DM (diabetes mellitus).   ·  Plan; Last A1c 7.9 in 07/2023. Regimen as follows: Lantus 50mg daily, 12units TID with meals for 48 hour after chemo then 8 TID  - c/w sliding scale   - 20 lantus  - hold pre-meal given poor PO intake  - q6hr FS given inability to tolerate PO  - mISS.    Problem/Plan - 11:  ·  Problem: Prophylactic measure.   ·  Plan: Diet: IV fluids, given pt cannot tolerate PO  F: D5 and NS  E: replete PRN  DVT: heparin  Code: Full.

## 2023-11-09 NOTE — PROGRESS NOTE ADULT - PROBLEM SELECTOR PLAN 1
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 C/o odynophagia causing poor PO intake. Per chart review appears poor PO intake has been ongoing issue while undergoing chemo/radiation. 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 Marr. Last chemo 10/23. 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  Plan:  - Chest PT, hypertonic nebulizer q4hr   - Aspiration precautions    -Per rad onc, odynophagia and mucositis should likely resolve 2-3 weeks after completion of radiation. no radiation on 10/31. Last treatment 10/30  -Per heme onc, no additional chemo.   - palliative: will update as Fentanyl patch begins working        -Fentanyl 25mcg/hr Patch q72hr as long-acting agent        -Dilaudid 1mg IV q6h ATC (hold for lethargy)        -Dilaudid 0.5mg IV q3h PRN for Moderate Pain        -Dilaudid 1mg IV q3h PRN for Severe Pain  - magic mouthwash  - viscous lidocaine   -scopolamine patch  -c/w pantoprazole 40 mg IV.  - failed S&S on 11/7   -consult GI for PEG tube

## 2023-11-09 NOTE — PROGRESS NOTE ADULT - PROBLEM SELECTOR PLAN 5
#Hypernatremia    Pt w/ uptrending Creatinine since admission, May be pre renal due to volume loss 2/2 secretions.    - continue to trend BUN/Cr  - 1/4 NS @ 80cc/hr   - restart home flomax when pt is able to take PO  -q6 bladder scans

## 2023-11-09 NOTE — PROGRESS NOTE ADULT - SUBJECTIVE AND OBJECTIVE BOX
INTERVAL HPI/OVERNIGHT EVENTS:  Patient was seen and examined at bedside. As per nurse and patient, no o/n events, patient resting comfortably. No complaints at this time. Patient denies: fever, chills, lightheadedness, weakness, CP, palpitations, SOB, cough, N/V. ROS otherwise negative.    VITAL SIGNS:  T(F): 97.8 (11-09-23 @ 10:24)  HR: 72 (11-09-23 @ 12:05)  BP: 150/65 (11-09-23 @ 12:05)  RR: 18 (11-09-23 @ 12:05)  SpO2: 97% (11-09-23 @ 12:05)  Wt(kg): --      11-08-23 @ 07:01  -  11-09-23 @ 07:00  --------------------------------------------------------  IN: 1680 mL / OUT: 475 mL / NET: 1205 mL    11-09-23 @ 07:01  -  11-09-23 @ 12:27  --------------------------------------------------------  IN: 320 mL / OUT: 1050 mL / NET: -730 mL        PHYSICAL EXAM:     GENERAL: resting comfortably in bed; NAD  HEAD:  Atraumatic, Normocephalic  EYES: sever cataracts bilaterally   NECK: Supple, No JVD, Normal thyroid, no enlarged nodes   NERVOUS SYSTEM:  AAOx3; CNII-XII grossly intact; no focal deficits  CHEST/LUNG: CTA B/L; no W/R/R, no retractions  HEART: S1/S2 normal, no S3, Regular rate and rhythm; No murmurs   ABDOMEN: Soft, Nontender, Nondistended; Bowel sounds present   EXTREMITIES:  2+ Peripheral Pulses, No clubbing, cyanosis, or edema   LYMPH: No lymphadenopathy noted   SKIN: No rashes or lesions  PSYCHIATRIC: affect and characteristics of appearance, verbalizations, behaviors are appropriate    MEDICATIONS  (STANDING):  albuterol/ipratropium for Nebulization 3 milliLiter(s) Nebulizer every 4 hours  atropine 1% Ophthalmic Solution for SubLingual Use 1 Drop(s) SubLingual three times a day  dextrose 5%. 1000 milliLiter(s) (50 mL/Hr) IV Continuous <Continuous>  dextrose 5%. 1000 milliLiter(s) (100 mL/Hr) IV Continuous <Continuous>  dextrose 50% Injectable 25 Gram(s) IV Push once  dextrose 50% Injectable 25 Gram(s) IV Push once  dextrose 50% Injectable 12.5 Gram(s) IV Push once  dextrose 50% Injectable 25 Gram(s) IV Push once  fentaNYL   Patch  25 MICROgram(s)/Hr 1 Patch Transdermal every 72 hours  FIRST- Mouthwash  BLM 4 milliLiter(s) Swish and Spit every 6 hours  glucagon  Injectable 1 milliGRAM(s) IntraMuscular once  heparin   Injectable 5000 Unit(s) SubCutaneous every 8 hours  influenza  Vaccine (HIGH DOSE) 0.7 milliLiter(s) IntraMuscular once  insulin glargine Injectable (LANTUS) 20 Unit(s) SubCutaneous at bedtime  insulin lispro (ADMELOG) corrective regimen sliding scale   SubCutaneous every 6 hours  lidocaine 2% Viscous 5 milliLiter(s) Swish and Spit two times a day  metoprolol tartrate Injectable 2.5 milliGRAM(s) IV Push every 6 hours  pantoprazole  Injectable 40 milliGRAM(s) IV Push every 12 hours  silver sulfADIAZINE 1% Cream 1 Application(s) Topical two times a day  sodium chloride 0.225%. 1000 milliLiter(s) (80 mL/Hr) IV Continuous <Continuous>    MEDICATIONS  (PRN):  dextrose Oral Gel 15 Gram(s) Oral once PRN Blood Glucose LESS THAN 70 milliGRAM(s)/deciliter  HYDROmorphone  Injectable 0.5 milliGRAM(s) IV Push every 3 hours PRN Moderate Pain (4 - 6)  HYDROmorphone  Injectable 1 milliGRAM(s) IV Push every 3 hours PRN Severe Pain (7 - 10)      Allergies    No Known Allergies    Intolerances        LABS:                        7.8    8.02  )-----------( 182      ( 09 Nov 2023 05:30 )             24.8     11-09    147<H>  |  114<H>  |  25<H>  ----------------------------<  175<H>  3.7   |  24  |  1.42<H>    Ca    8.3<L>      09 Nov 2023 05:30  Phos  2.1     11-09  Mg     1.9     11-09    TPro  5.3<L>  /  Alb  2.3<L>  /  TBili  0.4  /  DBili  x   /  AST  25  /  ALT  12  /  AlkPhos  64  11-09      Urinalysis Basic - ( 09 Nov 2023 05:30 )    Color: x / Appearance: x / SG: x / pH: x  Gluc: 175 mg/dL / Ketone: x  / Bili: x / Urobili: x   Blood: x / Protein: x / Nitrite: x   Leuk Esterase: x / RBC: x / WBC x   Sq Epi: x / Non Sq Epi: x / Bacteria: x        RADIOLOGY & ADDITIONAL TESTS:  Reviewed INTERVAL HPI/OVERNIGHT EVENTS:  Patient was seen and examined at bedside. As per nurse and patient, no o/n events, patient resting comfortably. No complaints at this time. Patient denies: fever, chills, lightheadedness, weakness, CP, palpitations, SOB, cough, N/V. ROS otherwise negative.    VITAL SIGNS:  T(F): 97.8 (11-09-23 @ 10:24)  HR: 72 (11-09-23 @ 12:05)  BP: 150/65 (11-09-23 @ 12:05)  RR: 18 (11-09-23 @ 12:05)  SpO2: 97% (11-09-23 @ 12:05)  Wt(kg): --      11-08-23 @ 07:01  -  11-09-23 @ 07:00  --------------------------------------------------------  IN: 1680 mL / OUT: 475 mL / NET: 1205 mL    11-09-23 @ 07:01  -  11-09-23 @ 12:27  --------------------------------------------------------  IN: 320 mL / OUT: 1050 mL / NET: -730 mL        PHYSICAL EXAM:     GENERAL: resting comfortably in bed; NAD  HEAD:  Atraumatic, Normocephalic  EYES: sever cataracts bilaterally   NECK: Supple, No JVD, Normal thyroid, no enlarged nodes   NERVOUS SYSTEM:  unable to fully assess  CHEST/LUNG: CTA B/L w/ mild upper airway secretions. no W/R/R, no retractions  HEART: S1/S2 normal, no S3, Regular rate and rhythm; No murmurs   ABDOMEN: Soft, Nontender, Nondistended; Bowel sounds present   EXTREMITIES:  2+ Peripheral Pulses, No clubbing, cyanosis, or edema   LYMPH: No lymphadenopathy noted   SKIN: No rashes or lesions  PSYCHIATRIC: affect and characteristics of appearance, verbalizations, behaviors are appropriate    MEDICATIONS  (STANDING):  albuterol/ipratropium for Nebulization 3 milliLiter(s) Nebulizer every 4 hours  atropine 1% Ophthalmic Solution for SubLingual Use 1 Drop(s) SubLingual three times a day  dextrose 5%. 1000 milliLiter(s) (50 mL/Hr) IV Continuous <Continuous>  dextrose 5%. 1000 milliLiter(s) (100 mL/Hr) IV Continuous <Continuous>  dextrose 50% Injectable 25 Gram(s) IV Push once  dextrose 50% Injectable 25 Gram(s) IV Push once  dextrose 50% Injectable 12.5 Gram(s) IV Push once  dextrose 50% Injectable 25 Gram(s) IV Push once  fentaNYL   Patch  25 MICROgram(s)/Hr 1 Patch Transdermal every 72 hours  FIRST- Mouthwash  BLM 4 milliLiter(s) Swish and Spit every 6 hours  glucagon  Injectable 1 milliGRAM(s) IntraMuscular once  heparin   Injectable 5000 Unit(s) SubCutaneous every 8 hours  influenza  Vaccine (HIGH DOSE) 0.7 milliLiter(s) IntraMuscular once  insulin glargine Injectable (LANTUS) 20 Unit(s) SubCutaneous at bedtime  insulin lispro (ADMELOG) corrective regimen sliding scale   SubCutaneous every 6 hours  lidocaine 2% Viscous 5 milliLiter(s) Swish and Spit two times a day  metoprolol tartrate Injectable 2.5 milliGRAM(s) IV Push every 6 hours  pantoprazole  Injectable 40 milliGRAM(s) IV Push every 12 hours  silver sulfADIAZINE 1% Cream 1 Application(s) Topical two times a day  sodium chloride 0.225%. 1000 milliLiter(s) (80 mL/Hr) IV Continuous <Continuous>    MEDICATIONS  (PRN):  dextrose Oral Gel 15 Gram(s) Oral once PRN Blood Glucose LESS THAN 70 milliGRAM(s)/deciliter  HYDROmorphone  Injectable 0.5 milliGRAM(s) IV Push every 3 hours PRN Moderate Pain (4 - 6)  HYDROmorphone  Injectable 1 milliGRAM(s) IV Push every 3 hours PRN Severe Pain (7 - 10)      Allergies    No Known Allergies    Intolerances        LABS:                        7.8    8.02  )-----------( 182      ( 09 Nov 2023 05:30 )             24.8     11-09    147<H>  |  114<H>  |  25<H>  ----------------------------<  175<H>  3.7   |  24  |  1.42<H>    Ca    8.3<L>      09 Nov 2023 05:30  Phos  2.1     11-09  Mg     1.9     11-09    TPro  5.3<L>  /  Alb  2.3<L>  /  TBili  0.4  /  DBili  x   /  AST  25  /  ALT  12  /  AlkPhos  64  11-09      Urinalysis Basic - ( 09 Nov 2023 05:30 )    Color: x / Appearance: x / SG: x / pH: x  Gluc: 175 mg/dL / Ketone: x  / Bili: x / Urobili: x   Blood: x / Protein: x / Nitrite: x   Leuk Esterase: x / RBC: x / WBC x   Sq Epi: x / Non Sq Epi: x / Bacteria: x        RADIOLOGY & ADDITIONAL TESTS:  Reviewed

## 2023-11-09 NOTE — PROGRESS NOTE ADULT - PROBLEM SELECTOR PLAN 10
Last A1c 7.9 in 07/2023. Regimen as follows: Lantus 50mg daily, 12units TID with meals for 48 hour after chemo then 8 TID  - c/w sliding scale   - 20 lantus  - hold pre-meal given poor PO intake  - q6hr FS given inability to tolerate PO

## 2023-11-09 NOTE — PROGRESS NOTE ADULT - ATTENDING COMMENTS
Secretions decreased and renal function continues to improve. replete free water  and BS control. Pt to be evaluated by GI for PEG. Completed course of cefazolin for MSSA in sputum.

## 2023-11-10 NOTE — CONSULT NOTE ADULT - CONSULT REQUESTED DATE/TIME
30-Oct-2023 19:00
04-Nov-2023 10:39
10-Nov-2023 08:49
31-Oct-2023 11:55
30-Oct-2023 12:25
10-Nov-2023 09:31
30-Oct-2023 06:35

## 2023-11-10 NOTE — CONSULT NOTE ADULT - SUBJECTIVE AND OBJECTIVE BOX
Clinical History: DECREASED ORAL INTAKE    No pertinent family history in first degree relatives    Handoff    MEWS Score    HTN (hypertension)    HLD (hyperlipidemia)    DM (diabetes mellitus)    CAD (coronary artery disease)    Glaucoma    PVD (peripheral vascular disease)    Decreased oral intake    Odynophagia    Weakness generalized    ELENITA (acute kidney injury)    Hyperkalemia    CAD (coronary artery disease)    DM (diabetes mellitus)    Prophylactic measure    Radiation dermatitis    Anemia    Leukopenia    Dysphagia    Heart failure with mildly reduced ejection fraction    Squamous cell carcinoma of larynx    Odynophagia    Neoplasm related pain    Debility    Advanced care planning/counseling discussion    Encounter for palliative care    Fever, unspecified    Acute metabolic encephalopathy    ELENITA (acute kidney injury)    No significant past surgical history    WEAKNESS    90+    [id number]        Meds:albuterol/ipratropium for Nebulization 3 milliLiter(s) Nebulizer every 4 hours  atropine 1% Ophthalmic Solution for SubLingual Use 1 Drop(s) SubLingual three times a day  dextrose 5%. 1000 milliLiter(s) IV Continuous <Continuous>  dextrose 5%. 1000 milliLiter(s) IV Continuous <Continuous>  dextrose 50% Injectable 25 Gram(s) IV Push once  dextrose 50% Injectable 25 Gram(s) IV Push once  dextrose 50% Injectable 12.5 Gram(s) IV Push once  dextrose 50% Injectable 25 Gram(s) IV Push once  dextrose Oral Gel 15 Gram(s) Oral once PRN  fentaNYL   Patch  25 MICROgram(s)/Hr 1 Patch Transdermal every 72 hours  FIRST- Mouthwash  BLM 4 milliLiter(s) Swish and Spit every 6 hours  glucagon  Injectable 1 milliGRAM(s) IntraMuscular once  HYDROmorphone  Injectable 0.5 milliGRAM(s) IV Push every 3 hours PRN  HYDROmorphone  Injectable 1 milliGRAM(s) IV Push every 3 hours PRN  influenza  Vaccine (HIGH DOSE) 0.7 milliLiter(s) IntraMuscular once  insulin glargine Injectable (LANTUS) 15 Unit(s) SubCutaneous at bedtime  insulin lispro (ADMELOG) corrective regimen sliding scale   SubCutaneous every 6 hours  lidocaine 2% Viscous 5 milliLiter(s) Swish and Spit two times a day  magnesium sulfate  IVPB 4 Gram(s) IV Intermittent once  metoprolol tartrate Injectable 2.5 milliGRAM(s) IV Push every 6 hours  pantoprazole  Injectable 40 milliGRAM(s) IV Push every 12 hours  silver sulfADIAZINE 1% Cream 1 Application(s) Topical two times a day  sodium chloride 0.225%. 1000 milliLiter(s) IV Continuous <Continuous>      Allergies:No Known Allergies        Labs:                           8.3    6.65  )-----------( 194      ( 10 Nov 2023 05:30 )             27.1     PT/INR - ( 10 Nov 2023 05:30 )   PT: 19.2 sec;   INR: 1.71          PTT - ( 10 Nov 2023 05:30 )  PTT:36.1 sec  11-10    151<H>  |  117<H>  |  23  ----------------------------<  95  3.9   |  24  |  1.25    Ca    8.3<L>      10 Nov 2023 05:30  Phos  3.1     11-10  Mg     1.5     11-10    TPro  5.4<L>  /  Alb  2.2<L>  /  TBili  0.5  /  DBili  x   /  AST  24  /  ALT  9<L>  /  AlkPhos  75  11-10          Imaging Findings:  INTERPRETATION: Technique: A thin section axial acquisition was performed from the skull base to the thoracic inlet. The data was reformatted in the sagittal, coronal and axial planes.    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 Submucosal extent to subglottic larynx and false cord, with equivocal paraglottic fat invasion that may upstage to T3 disease if cord is not fixated.    Left cervical lymphadenopathy at levels 2-4. PET-CT is recommended.

## 2023-11-10 NOTE — PROGRESS NOTE ADULT - PROBLEM SELECTOR PLAN 4
#Hypernatremia    Pt w/ uptrending Creatinine since admission, May be pre renal due to volume loss 2/2 secretions.    - continue to trend BUN/Cr  - 1/4 NS @ 80cc/hr   - restart home flomax when pt is able to take PO  -q6 bladder scans #Hypernatremia    Pt w/ uptrending Creatinine since admission, May be pre renal due to volume loss 2/2 secretions.    - continue to trend BUN/Cr  - D5 100ml/hr  - restart home flomax when pt is able to take PO  -q6 bladder scans

## 2023-11-10 NOTE — PROGRESS NOTE ADULT - ATTENDING COMMENTS
[facility] APPOINTMENT CANCELLATION      Date: 10/27/23    Time: 1:00    Provider name: Dr. Obdulio Sin    Type: In-office visit    Is patient currently in a facility? No    Alternative contact information: n/a    Reason for cancellation: Patient had to cxl his appt for 10/27/23.    Appointment cancelled or rescheduled: Rescheduled for 12/15/23   Looks great, OOB. voiding and ambulating and secretions minimal. Family refused NGT so PEG by IR on Monday. Continue hydration. Completed abx. no

## 2023-11-10 NOTE — CONSULT NOTE ADULT - CONSULT REASON
Assessment for continued RT
Increasing suctioning requirements
PEG
Pain/Symptom Management and Complex Medical Decision Making/GOC in the setting of Malignancy
Pt with larynx SCC c/b radiation-related mucositis, now improved. Consult for PEG tube
PM&R evaluation
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

## 2023-11-10 NOTE — CONSULT NOTE ADULT - SUBJECTIVE AND OBJECTIVE BOX
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 Marr/Dr. Derek Gar), presents to the ED with generalized weakness. Reports progressive weakness over the last 2 weeks assc w/ fatigue. 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 C/o odynophagia but tolerating secretions. No shortness of breath or difficulty breathing. Reports dermatitis is stable, and reponds well to cream he was given. No drainage or discharge. Saw Rad on on 10/13. Per chart review appears odynophagia was presenting symptom of cancer. Reported subjective fever for 1 day but did not take his temperature at home. ROS otherwise negative.       Afebrile, HR 70s, //86, 100% on RA  WBC 2.3, Hb 9.5, plt 219, Na 133, K 5.4, BUN 49/Cr 1.87, lactate 1.0  EKG NSR, no peak T waves   s/p morphine, 2L NS  (29 Oct 2023 21:39)    Allergies    No Known Allergies    Intolerances      Home Medications:  fluconazole 10 mg/mL oral liquid: 400 milliliter(s) orally once a day (30 Oct 2023 15:38)  HumaLOG 100 units/mL injectable solution: 7 unit(s) injectable 3 times a day with meals (13 Aug 2022 22:07)  Lantus 100 units/mL subcutaneous solution: 45 unit(s) subcutaneous once a day (in the morning) (13 Aug 2022 22:07)  metoprolol succinate 100 mg oral capsule, extended release: 1 cap(s) orally once a day (30 Oct 2023 15:32)  Myrbetriq 50 mg oral tablet, extended release: 1 tab(s) orally once a day (30 Oct 2023 15:36)  Protonix 40 mg oral delayed release tablet: 1 tab(s) orally once a day (30 Oct 2023 15:33)    MEDICATIONS:  MEDICATIONS  (STANDING):  albuterol/ipratropium for Nebulization 3 milliLiter(s) Nebulizer every 4 hours  atropine 1% Ophthalmic Solution for SubLingual Use 1 Drop(s) SubLingual three times a day  dextrose 5%. 1000 milliLiter(s) (50 mL/Hr) IV Continuous <Continuous>  dextrose 5%. 1000 milliLiter(s) (100 mL/Hr) IV Continuous <Continuous>  dextrose 5%. 1000 milliLiter(s) (100 mL/Hr) IV Continuous <Continuous>  dextrose 50% Injectable 25 Gram(s) IV Push once  dextrose 50% Injectable 25 Gram(s) IV Push once  dextrose 50% Injectable 12.5 Gram(s) IV Push once  dextrose 50% Injectable 25 Gram(s) IV Push once  fentaNYL   Patch  25 MICROgram(s)/Hr 1 Patch Transdermal every 72 hours  FIRST- Mouthwash  BLM 4 milliLiter(s) Swish and Spit every 6 hours  glucagon  Injectable 1 milliGRAM(s) IntraMuscular once  influenza  Vaccine (HIGH DOSE) 0.7 milliLiter(s) IntraMuscular once  insulin glargine Injectable (LANTUS) 15 Unit(s) SubCutaneous at bedtime  insulin lispro (ADMELOG) corrective regimen sliding scale   SubCutaneous every 6 hours  lidocaine 2% Viscous 5 milliLiter(s) Swish and Spit two times a day  metoprolol tartrate Injectable 2.5 milliGRAM(s) IV Push every 6 hours  pantoprazole  Injectable 40 milliGRAM(s) IV Push every 12 hours  silver sulfADIAZINE 1% Cream 1 Application(s) Topical two times a day    MEDICATIONS  (PRN):  dextrose Oral Gel 15 Gram(s) Oral once PRN Blood Glucose LESS THAN 70 milliGRAM(s)/deciliter  HYDROmorphone  Injectable 0.5 milliGRAM(s) IV Push every 3 hours PRN Moderate Pain (4 - 6)  HYDROmorphone  Injectable 1 milliGRAM(s) IV Push every 3 hours PRN Severe Pain (7 - 10)    PAST MEDICAL & SURGICAL HISTORY:  HTN (hypertension)      HLD (hyperlipidemia)      DM (diabetes mellitus)      CAD (coronary artery disease)      Glaucoma      PVD (peripheral vascular disease)      No significant past surgical history          Vital Signs Last 24 Hrs  T(C): 36.7 (10 Nov 2023 10:00), Max: 36.9 (09 Nov 2023 14:41)  T(F): 98.1 (10 Nov 2023 10:00), Max: 98.5 (09 Nov 2023 14:41)  HR: 80 (10 Nov 2023 08:39) (66 - 94)  BP: 158/71 (10 Nov 2023 08:39) (132/60 - 159/67)  BP(mean): 102 (10 Nov 2023 08:39) (80 - 102)  RR: 17 (10 Nov 2023 08:39) (16 - 20)  SpO2: 98% (10 Nov 2023 08:39) (93% - 100%)    Parameters below as of 10 Nov 2023 08:39  Patient On (Oxygen Delivery Method): nasal cannula  O2 Flow (L/min): 2      11-09 @ 07:01  -  11-10 @ 07:00  --------------------------------------------------------  IN: 1840 mL / OUT: 1250 mL / NET: 590 mL    11-10 @ 07:01  -  11-10 @ 12:50  --------------------------------------------------------  IN: 490 mL / OUT: 0 mL / NET: 490 mL        PHYSICAL EXAM:  General: Alert, no acute distress  Lungs: Normal respiratory effort  Abdomen: Nondistended, soft, nontender, No rebound or guarding  Extremities: No edema  Neurological: Moving all extremities spontaneously    LABS:                        8.3    6.65  )-----------( 194      ( 10 Nov 2023 05:30 )             27.1     11-10    151<H>  |  117<H>  |  23  ----------------------------<  95  3.9   |  24  |  1.25    Ca    8.3<L>      10 Nov 2023 05:30  Phos  3.1     11-10  Mg     1.5     11-10    TPro  5.4<L>  /  Alb  2.2<L>  /  TBili  0.5  /  DBili  x   /  AST  24  /  ALT  9<L>  /  AlkPhos  75  11-10    PT/INR - ( 10 Nov 2023 05:30 )   PT: 19.2 sec;   INR: 1.71          PTT - ( 10 Nov 2023 05:30 )  PTT:36.1 sec    RADIOLOGY & ADDITIONAL STUDIES:  Reviewed. 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 GI is consulted for PEG.    Pt was initially presented with odynophagia which developed after XRT and did not respond to a trial of fluconazole. He was able to finish XRT (last dose 11/3) with course c/b copious secretions thought to be related to XRT with laryngoscopy 11/8 showing diffuse erythema in the oropharynx. Last seen by SLP 11/7 who felt that pt is currently not safe to swallow and that MBS evaluation would be best attempted at a later date after pt had time to improve. Currently his odynophagia has improved after finishing XRT. He does not want a NGT.    Allergies  No Known Allergies    Intolerances      Home Medications:  fluconazole 10 mg/mL oral liquid: 400 milliliter(s) orally once a day (30 Oct 2023 15:38)  HumaLOG 100 units/mL injectable solution: 7 unit(s) injectable 3 times a day with meals (13 Aug 2022 22:07)  Lantus 100 units/mL subcutaneous solution: 45 unit(s) subcutaneous once a day (in the morning) (13 Aug 2022 22:07)  metoprolol succinate 100 mg oral capsule, extended release: 1 cap(s) orally once a day (30 Oct 2023 15:32)  Myrbetriq 50 mg oral tablet, extended release: 1 tab(s) orally once a day (30 Oct 2023 15:36)  Protonix 40 mg oral delayed release tablet: 1 tab(s) orally once a day (30 Oct 2023 15:33)    MEDICATIONS:  MEDICATIONS  (STANDING):  albuterol/ipratropium for Nebulization 3 milliLiter(s) Nebulizer every 4 hours  atropine 1% Ophthalmic Solution for SubLingual Use 1 Drop(s) SubLingual three times a day  dextrose 5%. 1000 milliLiter(s) (50 mL/Hr) IV Continuous <Continuous>  dextrose 5%. 1000 milliLiter(s) (100 mL/Hr) IV Continuous <Continuous>  dextrose 5%. 1000 milliLiter(s) (100 mL/Hr) IV Continuous <Continuous>  dextrose 50% Injectable 25 Gram(s) IV Push once  dextrose 50% Injectable 25 Gram(s) IV Push once  dextrose 50% Injectable 12.5 Gram(s) IV Push once  dextrose 50% Injectable 25 Gram(s) IV Push once  fentaNYL   Patch  25 MICROgram(s)/Hr 1 Patch Transdermal every 72 hours  FIRST- Mouthwash  BLM 4 milliLiter(s) Swish and Spit every 6 hours  glucagon  Injectable 1 milliGRAM(s) IntraMuscular once  influenza  Vaccine (HIGH DOSE) 0.7 milliLiter(s) IntraMuscular once  insulin glargine Injectable (LANTUS) 15 Unit(s) SubCutaneous at bedtime  insulin lispro (ADMELOG) corrective regimen sliding scale   SubCutaneous every 6 hours  lidocaine 2% Viscous 5 milliLiter(s) Swish and Spit two times a day  metoprolol tartrate Injectable 2.5 milliGRAM(s) IV Push every 6 hours  pantoprazole  Injectable 40 milliGRAM(s) IV Push every 12 hours  silver sulfADIAZINE 1% Cream 1 Application(s) Topical two times a day    MEDICATIONS  (PRN):  dextrose Oral Gel 15 Gram(s) Oral once PRN Blood Glucose LESS THAN 70 milliGRAM(s)/deciliter  HYDROmorphone  Injectable 0.5 milliGRAM(s) IV Push every 3 hours PRN Moderate Pain (4 - 6)  HYDROmorphone  Injectable 1 milliGRAM(s) IV Push every 3 hours PRN Severe Pain (7 - 10)    PAST MEDICAL & SURGICAL HISTORY:  HTN (hypertension)      HLD (hyperlipidemia)      DM (diabetes mellitus)      CAD (coronary artery disease)      Glaucoma      PVD (peripheral vascular disease)      No significant past surgical history          Vital Signs Last 24 Hrs  T(C): 36.7 (10 Nov 2023 10:00), Max: 36.9 (09 Nov 2023 14:41)  T(F): 98.1 (10 Nov 2023 10:00), Max: 98.5 (09 Nov 2023 14:41)  HR: 80 (10 Nov 2023 08:39) (66 - 94)  BP: 158/71 (10 Nov 2023 08:39) (132/60 - 159/67)  BP(mean): 102 (10 Nov 2023 08:39) (80 - 102)  RR: 17 (10 Nov 2023 08:39) (16 - 20)  SpO2: 98% (10 Nov 2023 08:39) (93% - 100%)    Parameters below as of 10 Nov 2023 08:39  Patient On (Oxygen Delivery Method): nasal cannula  O2 Flow (L/min): 2      11-09 @ 07:01  -  11-10 @ 07:00  --------------------------------------------------------  IN: 1840 mL / OUT: 1250 mL / NET: 590 mL    11-10 @ 07:01  -  11-10 @ 12:50  --------------------------------------------------------  IN: 490 mL / OUT: 0 mL / NET: 490 mL        PHYSICAL EXAM:  General: Alert, no acute distress  Lungs: Normal respiratory effort  Abdomen: Nondistended, soft, nontender  Extremities: No edema  Neurological: Moving all extremities spontaneously    LABS:                        8.3    6.65  )-----------( 194      ( 10 Nov 2023 05:30 )             27.1     11-10    151<H>  |  117<H>  |  23  ----------------------------<  95  3.9   |  24  |  1.25    Ca    8.3<L>      10 Nov 2023 05:30  Phos  3.1     11-10  Mg     1.5     11-10    TPro  5.4<L>  /  Alb  2.2<L>  /  TBili  0.5  /  DBili  x   /  AST  24  /  ALT  9<L>  /  AlkPhos  75  11-10    PT/INR - ( 10 Nov 2023 05:30 )   PT: 19.2 sec;   INR: 1.71          PTT - ( 10 Nov 2023 05:30 )  PTT:36.1 sec    RADIOLOGY & ADDITIONAL STUDIES:  Reviewed.

## 2023-11-10 NOTE — PROGRESS NOTE ADULT - PROBLEM SELECTOR PLAN 9
Last A1c 7.9 in 07/2023. Regimen as follows: Lantus 50mg daily, 12units TID with meals for 48 hour after chemo then 8 TID  - c/w sliding scale   - 15u lantus  - hold pre-meal given poor PO intake  - q6hr FS given inability to tolerate PO

## 2023-11-10 NOTE — CONSULT NOTE ADULT - ASSESSMENT
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 GI is consulted for PEG.    PEG is indicated for ongoing dysphagia however would note that GI PEGs are placed via pull-through method which involves pulling the bumper through the esophagus and into the stomach. Pt with recent symptoms suggestive of healing radiation esophagitis thus pull-through method may cause additional trauma to the esophagus, thus if possible would recommend placement via a different method.     Recommendations:  - Would discuss case with IR to see if G-tube could be placed via a different technique  - If other placement techniques are not possible GI can re-evaluate and discuss risks/benefits further with patient    We will sign off, but please page if any further questions arise. Please see attending addendum for final recommendations.     Chavo (Gee) MD Jay  Gastroenterology Fellow  Pager (M-F 7a-5p): [phone]  Pager (after hours): Please call  for on-call fellow

## 2023-11-10 NOTE — PROGRESS NOTE ADULT - PROBLEM SELECTOR PLAN 1
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 C/o odynophagia causing poor PO intake. Per chart review appears poor PO intake has been ongoing issue while undergoing chemo/radiation. 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 Marr. Last chemo 10/23. 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  - Chest PT, hypertonic nebulizer q4hr   - Aspiration precautions    -Per rad onc, odynophagia and mucositis should likely resolve 2-3 weeks after completion of radiation. no radiation on 10/31. Last treatment 10/30  -Per heme onc, no additional chemo.   - palliative:         -Fentanyl 25mcg/hr Patch q72hr as long-acting agent        -Dilaudid 0.5mg IV q3h PRN for Moderate Pain        -Dilaudid 1mg IV q3h PRN for Severe Pain        - atropine 1% sublingual TID  - magic mouthwash  - viscous lidocaine   -scopolamine patch  -c/w pantoprazole 40 mg IV.  -failed S&S on 11/7   -IR consulted for PEG Larynx SCC currently getting radiation and chemotherapy. C/o odynophagia causing poor PO intake. Per chart review appears poor PO intake has been ongoing issue while undergoing chemo/radiation. 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 Marr. Last chemo 10/23. 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  - Chest PT, hypertonic nebulizer q4hr   - Aspiration precautions    -Per rad onc, odynophagia and mucositis should likely resolve 2-3 weeks after completion of radiation. no radiation on 10/31. Last treatment 10/30  - magic mouthwash  - viscous lidocaine   -scopolamine patch  -c/w pantoprazole 40 mg IV.  -failed S&S on 11/7   -IR consulted for PEG    - palliative:         -Fentanyl 25mcg/hr Patch q72hr as long-acting agent        -Dilaudid 0.5mg IV q3h PRN for Moderate Pain        -Dilaudid 1mg IV q3h PRN for Severe Pain        - atropine 1% sublingual TID

## 2023-11-10 NOTE — PROGRESS NOTE ADULT - SUBJECTIVE AND OBJECTIVE BOX
INTERVAL HPI/OVERNIGHT EVENTS:  Patient was seen and examined at bedside. As per nurse and patient, no o/n events, patient resting comfortably. No complaints at this time. Patient denies: fever, chills, lightheadedness, weakness, CP, palpitations, SOB, cough, N/V. ROS otherwise negative.    VITAL SIGNS:  T(F): 98 (11-10-23 @ 06:49)  HR: 74 (11-10-23 @ 04:06)  BP: 158/64 (11-10-23 @ 04:06)  RR: 18 (11-10-23 @ 04:06)  SpO2: 96% (11-10-23 @ 04:06)  Wt(kg): --      11-09-23 @ 07:01  -  11-10-23 @ 07:00  --------------------------------------------------------  IN: 1360 mL / OUT: 1250 mL / NET: 110 mL        PHYSICAL EXAM:     GENERAL: resting comfortably in bed; now able to vocalize; NAD  HEAD:  Atraumatic, Normocephalic  EYES: sever cataracts bilaterally   NECK: Supple, No JVD, Normal thyroid, no enlarged nodes   NERVOUS SYSTEM:  no focal deficits  CHEST/LUNG: CTA B/L w/ mild upper airway secretions w/ interval improvement; no W/R/R, no retractions  HEART: S1/S2 normal, no S3, Regular rate and rhythm; No murmurs   ABDOMEN: Soft, Nontender, Nondistended; Bowel sounds present   EXTREMITIES:  2+ Peripheral Pulses, No clubbing, cyanosis, or edema   LYMPH: No lymphadenopathy noted   SKIN: No rashes or lesions  PSYCHIATRIC: affect and characteristics of appearance, verbalizations, behaviors are appropriate    MEDICATIONS  (STANDING):  albuterol/ipratropium for Nebulization 3 milliLiter(s) Nebulizer every 4 hours  atropine 1% Ophthalmic Solution for SubLingual Use 1 Drop(s) SubLingual three times a day  dextrose 5%. 1000 milliLiter(s) (50 mL/Hr) IV Continuous <Continuous>  dextrose 5%. 1000 milliLiter(s) (100 mL/Hr) IV Continuous <Continuous>  dextrose 50% Injectable 25 Gram(s) IV Push once  dextrose 50% Injectable 25 Gram(s) IV Push once  dextrose 50% Injectable 12.5 Gram(s) IV Push once  dextrose 50% Injectable 25 Gram(s) IV Push once  fentaNYL   Patch  25 MICROgram(s)/Hr 1 Patch Transdermal every 72 hours  FIRST- Mouthwash  BLM 4 milliLiter(s) Swish and Spit every 6 hours  glucagon  Injectable 1 milliGRAM(s) IntraMuscular once  heparin   Injectable 5000 Unit(s) SubCutaneous every 8 hours  influenza  Vaccine (HIGH DOSE) 0.7 milliLiter(s) IntraMuscular once  insulin glargine Injectable (LANTUS) 15 Unit(s) SubCutaneous at bedtime  insulin lispro (ADMELOG) corrective regimen sliding scale   SubCutaneous every 6 hours  lidocaine 2% Viscous 5 milliLiter(s) Swish and Spit two times a day  metoprolol tartrate Injectable 2.5 milliGRAM(s) IV Push every 6 hours  pantoprazole  Injectable 40 milliGRAM(s) IV Push every 12 hours  silver sulfADIAZINE 1% Cream 1 Application(s) Topical two times a day  sodium chloride 0.225%. 1000 milliLiter(s) (80 mL/Hr) IV Continuous <Continuous>    MEDICATIONS  (PRN):  dextrose Oral Gel 15 Gram(s) Oral once PRN Blood Glucose LESS THAN 70 milliGRAM(s)/deciliter  HYDROmorphone  Injectable 0.5 milliGRAM(s) IV Push every 3 hours PRN Moderate Pain (4 - 6)  HYDROmorphone  Injectable 1 milliGRAM(s) IV Push every 3 hours PRN Severe Pain (7 - 10)      Allergies    No Known Allergies    Intolerances        LABS:                        7.8    8.02  )-----------( 182      ( 09 Nov 2023 05:30 )             24.8     11-09    147<H>  |  114<H>  |  25<H>  ----------------------------<  175<H>  3.7   |  24  |  1.42<H>    Ca    8.3<L>      09 Nov 2023 05:30  Phos  2.1     11-09  Mg     1.9     11-09    TPro  5.3<L>  /  Alb  2.3<L>  /  TBili  0.4  /  DBili  x   /  AST  25  /  ALT  12  /  AlkPhos  64  11-09      Urinalysis Basic - ( 09 Nov 2023 05:30 )    Color: x / Appearance: x / SG: x / pH: x  Gluc: 175 mg/dL / Ketone: x  / Bili: x / Urobili: x   Blood: x / Protein: x / Nitrite: x   Leuk Esterase: x / RBC: x / WBC x   Sq Epi: x / Non Sq Epi: x / Bacteria: x        RADIOLOGY & ADDITIONAL TESTS:  Reviewed

## 2023-11-10 NOTE — PROGRESS NOTE ADULT - PROBLEM SELECTOR PLAN 5
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 Chemo/RT initiated 08/2023. Follows with Dr. Marr (rad onc) and Dr. Derek Harvey (onc). Last chemo (taxol/carboplatin) on 10/23, last RT 11/3, tolerated well.     - palliative consulted on board

## 2023-11-10 NOTE — CONSULT NOTE ADULT - ATTENDING COMMENTS
Patient is a 78-year-old man with local regionally advanced squamous cell carcinoma of the larynx currently on curative intent concurrent chemoradiation.  Patient is admitted with uncontrolled pain and p.o. intolerance.    At this time, patient has received adequate amount of chemotherapy and further doses likely to cause more harm than benefit.  He is finishing radiation treatment this week under the care of Dr. Kranthi Marr.     Plan:  –Very much appreciate the contributions of palliative care and symptom control  – Agree with consideration for temporary parenteral feeding device to improve nutrition as he recovers from treatment  –Patient will follow-up with me after discharge for continued management of symptoms as he recovers.  Assessment of treatment response is indicated at 12 or greater weeks after completion
79yo M with PMH of laryngeal SCC (s/p chemo, XRT last 11/3), CAD, HF (EF 48% 11/2023), PAD who presented with weakness and odynophagia following radiation therapy with ongoing dysphagia.    Given laryngeal cancer actively being treated and possible seeding in setting of possible radiation injury to esophagus (recent odynophagia), recommend IR consult for gastrostomy placement -- they have already been consulted and are planning for placement early next week.    WILNER Tabor MD  GI Attending
Laryngeal squamous cell carcinoma on RT with significant increase in airway secretions. Needs aggressive pulmonary toilet. Transfer to [facility]. Rest of plan as per above.

## 2023-11-10 NOTE — CONSULT NOTE ADULT - REASON FOR ADMISSION
weakness, poor PO intake

## 2023-11-10 NOTE — CONSULT NOTE ADULT - ASSESSMENT
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 Marr/Dr. Gar), presents to the ED with generalized weakness and odynophagia, transferred to [facility] for frequent suctioning. IR consulted for PEG tube placement. Case reviewed with Dr. Camacho. Plan for procedure today if schedule allows, else Monday.      Recommendations: NPO at midnight prior to procedure with sedation/anesthesia. D/C blood thinners.        Communicated with:  Dr. Alcazar primary team resident

## 2023-11-11 NOTE — PROGRESS NOTE ADULT - SUBJECTIVE AND OBJECTIVE BOX
OVERNIGHT EVENTS:   Na from 151 -> 141, changed from D5 to LR    SUBJECTIVE:  The patient was seen and examined at the bedside this AM. The patient appears comfortable and has no concerns or complaints today.     VITAL SIGNS:  Vital Signs Last 24 Hrs  T(C): 36.6 (11 Nov 2023 05:10), Max: 37.1 (10 Nov 2023 14:47)  T(F): 97.8 (11 Nov 2023 05:10), Max: 98.8 (10 Nov 2023 14:47)  HR: 74 (11 Nov 2023 06:38) (70 - 96)  BP: 152/66 (11 Nov 2023 06:38) (123/86 - 158/68)  BP(mean): 95 (11 Nov 2023 06:38) (84 - 100)  RR: 18 (11 Nov 2023 06:38) (17 - 20)  SpO2: 92% (11 Nov 2023 06:38) (91% - 99%)    Parameters below as of 11 Nov 2023 00:29  Patient On (Oxygen Delivery Method): room air        PHYSICAL EXAM:  General: NAD; speaking in full sentences  HEENT: Cataracts noted b/l, EOMI   Neck: supple; no JVD  Cardiac: RRR; +S1/S2  Pulm:  CTA B/L w/ mild upper airway secretions w/ interval improvement; no W/R/R, no retractions  GI: soft, NT/ND, +BS  Extremities: WWP; no edema, clubbing or cyanosis  Vasc: 2+ radial, DP pulses B/L  Neuro: AAOx3; no focal deficits    MEDICATIONS:  MEDICATIONS  (STANDING):  albuterol/ipratropium for Nebulization 3 milliLiter(s) Nebulizer every 4 hours  atropine 1% Ophthalmic Solution for SubLingual Use 1 Drop(s) SubLingual three times a day  dextrose 5%. 1000 milliLiter(s) (100 mL/Hr) IV Continuous <Continuous>  dextrose 5%. 1000 milliLiter(s) (50 mL/Hr) IV Continuous <Continuous>  dextrose 50% Injectable 25 Gram(s) IV Push once  dextrose 50% Injectable 25 Gram(s) IV Push once  dextrose 50% Injectable 12.5 Gram(s) IV Push once  dextrose 50% Injectable 25 Gram(s) IV Push once  fentaNYL   Patch  25 MICROgram(s)/Hr 1 Patch Transdermal every 72 hours  FIRST- Mouthwash  BLM 4 milliLiter(s) Swish and Spit every 6 hours  glucagon  Injectable 1 milliGRAM(s) IntraMuscular once  influenza  Vaccine (HIGH DOSE) 0.7 milliLiter(s) IntraMuscular once  insulin glargine Injectable (LANTUS) 15 Unit(s) SubCutaneous at bedtime  insulin lispro (ADMELOG) corrective regimen sliding scale   SubCutaneous every 6 hours  lactated ringers. 1000 milliLiter(s) (75 mL/Hr) IV Continuous <Continuous>  lidocaine 2% Viscous 5 milliLiter(s) Swish and Spit two times a day  metoprolol tartrate Injectable 2.5 milliGRAM(s) IV Push every 6 hours  pantoprazole  Injectable 40 milliGRAM(s) IV Push every 12 hours  silver sulfADIAZINE 1% Cream 1 Application(s) Topical two times a day    MEDICATIONS  (PRN):  dextrose Oral Gel 15 Gram(s) Oral once PRN Blood Glucose LESS THAN 70 milliGRAM(s)/deciliter  HYDROmorphone  Injectable 0.5 milliGRAM(s) IV Push every 3 hours PRN Moderate Pain (4 - 6)  HYDROmorphone  Injectable 1 milliGRAM(s) IV Push every 3 hours PRN Severe Pain (7 - 10)      ALLERGIES:  Allergies    No Known Allergies    Intolerances        LABS:                        8.1    5.19  )-----------( 162      ( 11 Nov 2023 07:26 )             25.7     11-11    145  |  111<H>  |  18  ----------------------------<  121<H>  3.5   |  25  |  1.14    Ca    8.2<L>      11 Nov 2023 07:26  Phos  2.7     11-11  Mg     1.9     11-11    TPro  5.2<L>  /  Alb  2.5<L>  /  TBili  0.5  /  DBili  x   /  AST  23  /  ALT  8<L>  /  AlkPhos  64  11-11    PT/INR - ( 11 Nov 2023 07:26 )   PT: 13.9 sec;   INR: 1.23          PTT - ( 11 Nov 2023 07:26 )  PTT:29.4 sec    RADIOLOGY & ADDITIONAL TESTS: Reviewed.

## 2023-11-11 NOTE — PROGRESS NOTE ADULT - PROBLEM SELECTOR PLAN 4
#Hypernatremia    Pt w/ uptrending Creatinine since admission, May be pre renal due to volume loss 2/2 secretions.    - continue to trend BUN/Cr  - D5 100ml/hr  - restart home flomax when pt is able to take PO  -q6 bladder scans

## 2023-11-11 NOTE — PROGRESS NOTE ADULT - PROBLEM SELECTOR PLAN 1
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 C/o odynophagia causing poor PO intake. Per chart review appears poor PO intake has been ongoing issue while undergoing chemo/radiation. 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 Marr. Last chemo 10/23. 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  - Chest PT, hypertonic nebulizer q4hr   - Aspiration precautions    -Per rad onc, odynophagia and mucositis should likely resolve 2-3 weeks after completion of radiation. no radiation on 10/31. Last treatment 10/30  - magic mouthwash  - viscous lidocaine   -scopolamine patch  -c/w pantoprazole 40 mg IV.  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 C/o odynophagia causing poor PO intake. Per chart review appears poor PO intake has been ongoing issue while undergoing chemo/radiation. 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 Marr. Last chemo 10/23. 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  - Chest PT, hypertonic nebulizer q4hr   - Aspiration precautions    -Per rad onc, odynophagia and mucositis should likely resolve 2-3 weeks after completion of radiation. no radiation on 10/31. Last treatment 10/30  - magic mouthwash  - viscous lidocaine   -scopolamine patch  -c/w pantoprazole 40 mg IV.  -failed S&S on 11/7   -IR consulted for PEG, likely to happen Monday. For now we will continue to check INR and correct if needed prior to procedure.     - palliative:         -Fentanyl 25mcg/hr Patch q72hr as long-acting agent        -Dilaudid 0.5mg IV q3h PRN for Moderate Pain        -Dilaudid 1mg IV q3h PRN for Severe Pain        - atropine 1% sublingual TID

## 2023-11-12 NOTE — PROGRESS NOTE ADULT - PROBLEM SELECTOR PLAN 1
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 C/o odynophagia causing poor PO intake. Per chart review appears poor PO intake has been ongoing issue while undergoing chemo/radiation. 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 Marr. Last chemo 10/23. 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  - Chest PT, hypertonic nebulizer q4hr   - Aspiration precautions    -Per rad onc, odynophagia and mucositis should likely resolve 2-3 weeks after completion of radiation. no radiation on 10/31. Last treatment 10/30  - magic mouthwash  - viscous lidocaine   -scopolamine patch  -c/w pantoprazole 40 mg IV.  -failed S&S on 11/7   -IR consulted for PEG, likely to happen Monday. For now we will continue to check INR and correct if needed prior to procedure.     - palliative:         -Fentanyl 25mcg/hr Patch q72hr as long-acting agent        -Dilaudid 0.5mg IV q3h PRN for Moderate Pain        -Dilaudid 1mg IV q3h PRN for Severe Pain        - atropine 1% sublingual TID

## 2023-11-12 NOTE — PROGRESS NOTE ADULT - SUBJECTIVE AND OBJECTIVE BOX
INTERVAL HPI/OVERNIGHT EVENTS:  Patient was seen and examined at bedside. As per nurse and patient, no o/n events, patient resting comfortably. No complaints at this time. Patient denies: fever, chills, lightheadedness, weakness, CP, palpitations, SOB, cough, N/V. ROS otherwise negative.    VITAL SIGNS:  T(F): 98.2 (11-12-23 @ 06:11)  HR: 76 (11-12-23 @ 04:22)  BP: 147/63 (11-12-23 @ 04:22)  RR: 18 (11-12-23 @ 04:22)  SpO2: 97% (11-12-23 @ 04:22)  Wt(kg): --      11-11-23 @ 07:01  -  11-12-23 @ 07:00  --------------------------------------------------------  IN: 1725 mL / OUT: 210 mL / NET: 1515 mL        PHYSICAL EXAM:     General: NAD; speaking in full sentences  HEENT: Cataracts noted b/l, EOMI   Neck: supple; no JVD  Cardiac: RRR; +S1/S2  Pulm:  CTA B/L w/ mild upper airway secretions w/ interval improvement; no W/R/R, no retractions  GI: soft, NT/ND, +BS  Extremities: WWP; no edema, clubbing or cyanosis  Vasc: 2+ radial, DP pulses B/L  Neuro: AAOx3; no focal deficits    MEDICATIONS  (STANDING):  albuterol/ipratropium for Nebulization 3 milliLiter(s) Nebulizer every 4 hours  atropine 1% Ophthalmic Solution for SubLingual Use 1 Drop(s) SubLingual three times a day  dextrose 5% + lactated ringers. 1000 milliLiter(s) (75 mL/Hr) IV Continuous <Continuous>  dextrose 5%. 1000 milliLiter(s) (100 mL/Hr) IV Continuous <Continuous>  dextrose 5%. 1000 milliLiter(s) (50 mL/Hr) IV Continuous <Continuous>  dextrose 50% Injectable 25 Gram(s) IV Push once  dextrose 50% Injectable 25 Gram(s) IV Push once  dextrose 50% Injectable 25 Gram(s) IV Push once  dextrose 50% Injectable 12.5 Gram(s) IV Push once  fentaNYL   Patch  25 MICROgram(s)/Hr 1 Patch Transdermal every 72 hours  FIRST- Mouthwash  BLM 4 milliLiter(s) Swish and Spit every 6 hours  glucagon  Injectable 1 milliGRAM(s) IntraMuscular once  heparin   Injectable 5000 Unit(s) SubCutaneous every 8 hours  influenza  Vaccine (HIGH DOSE) 0.7 milliLiter(s) IntraMuscular once  insulin glargine Injectable (LANTUS) 15 Unit(s) SubCutaneous at bedtime  insulin lispro (ADMELOG) corrective regimen sliding scale   SubCutaneous every 6 hours  lidocaine 2% Viscous 5 milliLiter(s) Swish and Spit two times a day  magnesium sulfate  IVPB 4 Gram(s) IV Intermittent once  metoprolol tartrate Injectable 2.5 milliGRAM(s) IV Push every 6 hours  pantoprazole  Injectable 40 milliGRAM(s) IV Push every 12 hours  potassium chloride  10 mEq/100 mL IVPB 10 milliEquivalent(s) IV Intermittent every 1 hour  silver sulfADIAZINE 1% Cream 1 Application(s) Topical two times a day    MEDICATIONS  (PRN):  dextrose Oral Gel 15 Gram(s) Oral once PRN Blood Glucose LESS THAN 70 milliGRAM(s)/deciliter  HYDROmorphone  Injectable 1 milliGRAM(s) IV Push every 3 hours PRN Severe Pain (7 - 10)  HYDROmorphone  Injectable 0.5 milliGRAM(s) IV Push every 3 hours PRN Moderate Pain (4 - 6)      Allergies    No Known Allergies    Intolerances        LABS:                        7.3    4.21  )-----------( 150      ( 12 Nov 2023 05:54 )             23.9     11-12    144  |  110<H>  |  13  ----------------------------<  164<H>  3.3<L>   |  27  |  1.04    Ca    8.1<L>      12 Nov 2023 05:54  Phos  3.0     11-12  Mg     1.3     11-12    TPro  4.6<L>  /  Alb  1.8<L>  /  TBili  0.4  /  DBili  x   /  AST  19  /  ALT  7<L>  /  AlkPhos  56  11-12    PT/INR - ( 12 Nov 2023 05:54 )   PT: 13.5 sec;   INR: 1.19          PTT - ( 12 Nov 2023 05:54 )  PTT:32.8 sec  Urinalysis Basic - ( 12 Nov 2023 05:54 )    Color: x / Appearance: x / SG: x / pH: x  Gluc: 164 mg/dL / Ketone: x  / Bili: x / Urobili: x   Blood: x / Protein: x / Nitrite: x   Leuk Esterase: x / RBC: x / WBC x   Sq Epi: x / Non Sq Epi: x / Bacteria: x        RADIOLOGY & ADDITIONAL TESTS:  Reviewed

## 2023-11-12 NOTE — PROGRESS NOTE ADULT - PROBLEM SELECTOR PLAN 4
RESOLVED  #Hypernatremia    Pt w/ uptrending Creatinine since admission, May be pre renal due to volume loss 2/2 secretions.    - continue to trend BUN/Cr  - D5+LR 75ml/hr  - restart home flomax when pt is able to take PO  -q6 bladder scans

## 2023-11-13 NOTE — PROGRESS NOTE ADULT - PROBLEM SELECTOR PLAN 1
.  -Fentanyl 25mcg/hr Patch q72hr  -change Moderate PRN to Dilaudid 2mg PO q4h PRN for Moderate Pain  -change Severe PRN to Dilaudid 1mg IV q4h PRN for Severe Pain    Anticipated Discharge Regimen: Fentanyl Patch 25mcg/hr q72hr and Dilaudid 2mg PO q6h PRN for Severe Pain

## 2023-11-13 NOTE — PROGRESS NOTE ADULT - SUBJECTIVE AND OBJECTIVE BOX
[facility]  Dickson Roberson, Palliative Care Attending  Contact Info: Call [phone] (HEAL Line) or message on Microsoft Teams (Dickson Roberson)    SUBJECTIVE AND OBJECTIVE:  INTERVAL HPI/OVERNIGHT EVENTS: Interval events noted. See patient's PRN use for the past 24hrs noted below. Comprehensive symptom assessment and GOC exploration as noted below. Extensive time spent discussing plan of care with patient/family. No unexpected adverse effects of opiates noted: no sedation/dizziness/nausea.    ALLERGIES:  No Known Allergies    MEDICATIONS  (STANDING):  albuterol/ipratropium for Nebulization 3 milliLiter(s) Nebulizer every 4 hours  atropine 1% Ophthalmic Solution for SubLingual Use 1 Drop(s) SubLingual three times a day  dextrose 5% + sodium chloride 0.45%. 1000 milliLiter(s) (75 mL/Hr) IV Continuous <Continuous>  dextrose 5%. 1000 milliLiter(s) (50 mL/Hr) IV Continuous <Continuous>  dextrose 5%. 1000 milliLiter(s) (100 mL/Hr) IV Continuous <Continuous>  dextrose 50% Injectable 25 Gram(s) IV Push once  dextrose 50% Injectable 25 Gram(s) IV Push once  dextrose 50% Injectable 12.5 Gram(s) IV Push once  dextrose 50% Injectable 25 Gram(s) IV Push once  fentaNYL   Patch  25 MICROgram(s)/Hr 1 Patch Transdermal every 72 hours  FIRST- Mouthwash  BLM 4 milliLiter(s) Swish and Spit every 6 hours  glucagon  Injectable 1 milliGRAM(s) IntraMuscular once  influenza  Vaccine (HIGH DOSE) 0.7 milliLiter(s) IntraMuscular once  insulin glargine Injectable (LANTUS) 15 Unit(s) SubCutaneous at bedtime  insulin lispro (ADMELOG) corrective regimen sliding scale   SubCutaneous every 6 hours  lidocaine 2% Viscous 5 milliLiter(s) Swish and Spit two times a day  metoprolol tartrate Injectable 2.5 milliGRAM(s) IV Push every 6 hours  pantoprazole  Injectable 40 milliGRAM(s) IV Push every 12 hours  silver sulfADIAZINE 1% Cream 1 Application(s) Topical two times a day    MEDICATIONS  (PRN):  dextrose Oral Gel 15 Gram(s) Oral once PRN Blood Glucose LESS THAN 70 milliGRAM(s)/deciliter  HYDROmorphone  Injectable 0.5 milliGRAM(s) IV Push every 3 hours PRN Moderate Pain (4 - 6)  HYDROmorphone  Injectable 1 milliGRAM(s) IV Push every 3 hours PRN Severe Pain (7 - 10)      Analgesic Use (Scheduled and PRNs) for past 24 hours:      ITEMS UNCHECKED ARE NOT PRESENT  PRESENT SYMPTOMS/REVIEW OF SYSTEMS: []Unable to obtain due to poor mentation   Source if other than patient:  []Family   []Team         Vital Signs Last 24 Hrs  T(C): 36.6 (13 Nov 2023 14:45), Max: 37.3 (13 Nov 2023 01:00)  T(F): 97.9 (13 Nov 2023 14:45), Max: 99.2 (13 Nov 2023 01:00)  HR: 88 (13 Nov 2023 16:27) (84 - 104)  BP: 125/59 (13 Nov 2023 16:27) (125/59 - 141/81)  BP(mean): 85 (13 Nov 2023 16:27) (85 - 103)  RR: 18 (13 Nov 2023 16:27) (16 - 18)  SpO2: 96% (13 Nov 2023 16:27) (93% - 98%)    Parameters below as of 13 Nov 2023 16:27  Patient On (Oxygen Delivery Method): nasal cannula  O2 Flow (L/min): 2      LABS: Personally reviewed and interpreted                        8.2    6.46  )-----------( 171      ( 13 Nov 2023 05:30 )             26.2   11-13    146<H>  |  110<H>  |  9   ----------------------------<  157<H>  3.7   |  28  |  1.08    Ca    8.5      13 Nov 2023 05:30  Phos  2.5     11-13  Mg     1.6     11-13    TPro  5.5<L>  /  Alb  2.5<L>  /  TBili  0.5  /  DBili  x   /  AST  22  /  ALT  9<L>  /  AlkPhos  65  11-13      RADIOLOGY & ADDITIONAL STUDIES: None new    DISCUSSION OF CASE: Family - to provide updates and emotional support; Primary Team/RN - to discuss plan of care [facility]  Dickson Roberson, Palliative Care Attending  Contact Info: Call [phone] (HEAL Line) or message on [employer] Teams (Dickson Roberson)    SUBJECTIVE AND OBJECTIVE:  INTERVAL HPI/OVERNIGHT EVENTS: Interval events noted. See patient's PRN use for the past 24hrs noted below. Comprehensive symptom assessment and GOC exploration as noted below. Extensive time spent discussing plan of care with patient/family. No unexpected adverse effects of opiates noted: no sedation/dizziness/nausea.    ALLERGIES:  No Known Allergies    MEDICATIONS  (STANDING):  albuterol/ipratropium for Nebulization 3 milliLiter(s) Nebulizer every 4 hours  atropine 1% Ophthalmic Solution for SubLingual Use 1 Drop(s) SubLingual three times a day  dextrose 5% + sodium chloride 0.45%. 1000 milliLiter(s) (75 mL/Hr) IV Continuous <Continuous>  dextrose 5%. 1000 milliLiter(s) (50 mL/Hr) IV Continuous <Continuous>  dextrose 5%. 1000 milliLiter(s) (100 mL/Hr) IV Continuous <Continuous>  dextrose 50% Injectable 25 Gram(s) IV Push once  dextrose 50% Injectable 25 Gram(s) IV Push once  dextrose 50% Injectable 12.5 Gram(s) IV Push once  dextrose 50% Injectable 25 Gram(s) IV Push once  fentaNYL   Patch  25 MICROgram(s)/Hr 1 Patch Transdermal every 72 hours  FIRST- Mouthwash  BLM 4 milliLiter(s) Swish and Spit every 6 hours  glucagon  Injectable 1 milliGRAM(s) IntraMuscular once  influenza  Vaccine (HIGH DOSE) 0.7 milliLiter(s) IntraMuscular once  insulin glargine Injectable (LANTUS) 15 Unit(s) SubCutaneous at bedtime  insulin lispro (ADMELOG) corrective regimen sliding scale   SubCutaneous every 6 hours  lidocaine 2% Viscous 5 milliLiter(s) Swish and Spit two times a day  metoprolol tartrate Injectable 2.5 milliGRAM(s) IV Push every 6 hours  pantoprazole  Injectable 40 milliGRAM(s) IV Push every 12 hours  silver sulfADIAZINE 1% Cream 1 Application(s) Topical two times a day    MEDICATIONS  (PRN):  dextrose Oral Gel 15 Gram(s) Oral once PRN Blood Glucose LESS THAN 70 milliGRAM(s)/deciliter  HYDROmorphone  Injectable 0.5 milliGRAM(s) IV Push every 3 hours PRN Moderate Pain (4 - 6)  HYDROmorphone  Injectable 1 milliGRAM(s) IV Push every 3 hours PRN Severe Pain (7 - 10)    Analgesic Use (Scheduled and PRNs) for past 24 hours:  - none    ITEMS UNCHECKED ARE NOT PRESENT  PRESENT SYMPTOMS/REVIEW OF SYSTEMS: []Unable to obtain due to poor mentation   Source if other than patient:  []Family   []Team     Pain: [x] yes [] no  QOL impact - tolerable  Location - nack, throat                   Aggravating Factors - secretions, swallowing  Quality - sharp  Radiation -  Timing - constant  Severity (0-10 scale) - 2  Minimal Acceptable Level (0-10 scale) - 2    Dyspnea:                           []Mild  []Moderate []Severe  Anxiety:                             []Mild []Moderate []Severe  Fatigue:                             []Mild []Moderate []Severe  Nausea:                             []Mild []Moderate []Severe  Loss of Appetite:              []Mild []Moderate [x]Severe  Constipation:                    []Mild []Moderate []Severe    Other Symptoms:  [x]All other review of systems negative     Palliative Performance Status Version 2:  60%  (Functional Assessment Tool)    GENERAL:  [x] NAD []Alert []Lethargic  []Cachexia  []Unarousable  [x]Verbal  []Non-Verbal  BEHAVIORAL:   []Anxiety  []Delirium []Agitation [x]Cooperative [x]Oriented x3  HEENT:  [x]Normal  [] Moist Mucous Membranes [x]Dry mouth   []ET Tube/Trach  []Oral lesions  PULMONARY:   [x]Clear []Tachypnea  []Audible excessive secretions  [x]Normal Work of Breathing []Labored Breathing  []Rhonchi []Crackles []Wheezing  CARDIOVASCULAR:    [x]Regular Rate [x]Regular Rhythm []Irregular []Tachy  []Yaya  GASTROINTESTINAL:  [x]Soft  []Distended   [x]+BS  [x]Non tender []Tender  []PEG []OGT/ NGT  Last BM:  GENITOURINARY:  [x]Normal [] Incontinent   []Oliguria/Anuria   []Paige  MUSCULOSKELETAL:   [x]Normal Extremities  [x]Weakness  []Bed/Wheelchair bound []Edema  NEUROLOGIC:   []No focal deficits  []Cognitive impairment  [x]Dysphagia []Dysarthria []Paresis []Encephalopathic  SKIN:   []Normal   []Pressure ulcer(s)  [x]Rash    Vital Signs Last 24 Hrs  T(C): 36.6 (13 Nov 2023 14:45), Max: 37.3 (13 Nov 2023 01:00)  T(F): 97.9 (13 Nov 2023 14:45), Max: 99.2 (13 Nov 2023 01:00)  HR: 88 (13 Nov 2023 16:27) (84 - 104)  BP: 125/59 (13 Nov 2023 16:27) (125/59 - 141/81)  BP(mean): 85 (13 Nov 2023 16:27) (85 - 103)  RR: 18 (13 Nov 2023 16:27) (16 - 18)  SpO2: 96% (13 Nov 2023 16:27) (93% - 98%)    Parameters below as of 13 Nov 2023 16:27  Patient On (Oxygen Delivery Method): nasal cannula  O2 Flow (L/min): 2    LABS: Personally reviewed and interpreted                        8.2    6.46  )-----------( 171      ( 13 Nov 2023 05:30 )             26.2   11-13    146<H>  |  110<H>  |  9   ----------------------------<  157<H>  3.7   |  28  |  1.08    Ca    8.5      13 Nov 2023 05:30  Phos  2.5     11-13  Mg     1.6     11-13  TPro  5.5<L>  /  Alb  2.5<L>  /  TBili  0.5  /  DBili  x   /  AST  22  /  ALT  9<L>  /  AlkPhos  65  11-13    RADIOLOGY & ADDITIONAL STUDIES: None new    DISCUSSION OF CASE: Granddaughter - to provide updates and emotional support; Primary Team/RN - to discuss plan of care

## 2023-11-13 NOTE — PROGRESS NOTE ADULT - PROBLEM SELECTOR PLAN 4
#Hypernatremia    Pt w/ uptrending Creatinine since admission, May be pre renal due to volume loss 2/2 secretions.    - continue to trend BUN/Cr  - D5+1/2 NS 75ml/hr  - restart home flomax when pt is able to take PO  - q6 bladder scans

## 2023-11-13 NOTE — PROGRESS NOTE ADULT - PROBLEM SELECTOR PLAN 3
.  PPSV = 50%, requires assistance for most ADLs  -PT Eval recommending SOFI, family will likely prefer taking the patient home -> should reinforce the benefit of home care  -c/w supportive care, OOB, encourage movement

## 2023-11-13 NOTE — PROGRESS NOTE ADULT - PROBLEM SELECTOR PLAN 5
.  Patient is Full Code  -patient appointed granddaughter (Yvrose Smith, [phone]) as alternate decision maker  -see prior C note    Patient would benefit from outpatient Palliative/Supportive Oncology follow-up on discharge with Dr. Emma Anthony at [facility]. Please call [phone] to make an appointment when discharge planning.

## 2023-11-13 NOTE — PROGRESS NOTE ADULT - PROBLEM SELECTOR PLAN 1
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 C/o odynophagia causing poor PO intake. Per chart review appears poor PO intake has been ongoing issue while undergoing chemo/radiation. 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 Marr. Last chemo 10/23. 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  - Chest PT, hypertonic nebulizer q4hr   - Aspiration precautions    -Per rad onc, odynophagia and mucositis should likely resolve 2-3 weeks after completion of radiation. no radiation on 10/31. Last treatment 10/30  - magic mouthwash  - viscous lidocaine   -scopolamine patch  -c/w pantoprazole 40 mg IV.  -failed S&S on 11/7   -IR consulted for PEG, likely to happen 11/13.   - palliative:         -Fentanyl 25mcg/hr Patch q72hr as long-acting agent        -Dilaudid 0.5mg IV q3h PRN for Moderate Pain        -Dilaudid 1mg IV q3h PRN for Severe Pain        - atropine 1% sublingual TID

## 2023-11-13 NOTE — PROGRESS NOTE ADULT - SUBJECTIVE AND OBJECTIVE BOX
complains of pain/discomfort INTERVAL HPI/OVERNIGHT EVENTS:  Patient was seen and examined at bedside. As per nurse and patient, no o/n events, patient resting comfortably. No complaints at this time. Patient denies: fever, chills, lightheadedness, weakness, CP, palpitations, SOB, cough, N/V. ROS otherwise negative.    VITAL SIGNS:  T(F): 97.5 (11-13-23 @ 10:37)  HR: 100 (11-13-23 @ 08:04)  BP: 127/61 (11-13-23 @ 08:04)  RR: 18 (11-13-23 @ 08:04)  SpO2: 94% (11-13-23 @ 08:04)  Wt(kg): --      11-12-23 @ 07:01  -  11-13-23 @ 07:00  --------------------------------------------------------  IN: 1725 mL / OUT: 400 mL / NET: 1325 mL        PHYSICAL EXAM:     General: NAD; speaking in full sentences  HEENT: Cataracts noted b/l, EOMI   Neck: supple; no JVD  Cardiac: RRR; +S1/S2  Pulm:  CTA B/L w/ mild upper airway secretions w/ interval improvement; no W/R/R, no retractions  GI: soft, NT/ND, +BS  Extremities: WWP; no edema, clubbing or cyanosis  Vasc: 2+ radial, DP pulses B/L  Neuro: AAOx3; no focal deficits    MEDICATIONS  (STANDING):  albuterol/ipratropium for Nebulization 3 milliLiter(s) Nebulizer every 4 hours  atropine 1% Ophthalmic Solution for SubLingual Use 1 Drop(s) SubLingual three times a day  dextrose 5% + sodium chloride 0.45%. 1000 milliLiter(s) (75 mL/Hr) IV Continuous <Continuous>  dextrose 5%. 1000 milliLiter(s) (100 mL/Hr) IV Continuous <Continuous>  dextrose 5%. 1000 milliLiter(s) (50 mL/Hr) IV Continuous <Continuous>  dextrose 50% Injectable 25 Gram(s) IV Push once  dextrose 50% Injectable 25 Gram(s) IV Push once  dextrose 50% Injectable 25 Gram(s) IV Push once  dextrose 50% Injectable 12.5 Gram(s) IV Push once  fentaNYL   Patch  25 MICROgram(s)/Hr 1 Patch Transdermal every 72 hours  FIRST- Mouthwash  BLM 4 milliLiter(s) Swish and Spit every 6 hours  glucagon  Injectable 1 milliGRAM(s) IntraMuscular once  influenza  Vaccine (HIGH DOSE) 0.7 milliLiter(s) IntraMuscular once  insulin glargine Injectable (LANTUS) 15 Unit(s) SubCutaneous at bedtime  insulin lispro (ADMELOG) corrective regimen sliding scale   SubCutaneous every 6 hours  lidocaine 2% Viscous 5 milliLiter(s) Swish and Spit two times a day  magnesium sulfate  IVPB 4 Gram(s) IV Intermittent once  metoprolol tartrate Injectable 2.5 milliGRAM(s) IV Push every 6 hours  pantoprazole  Injectable 40 milliGRAM(s) IV Push every 12 hours  silver sulfADIAZINE 1% Cream 1 Application(s) Topical two times a day    MEDICATIONS  (PRN):  dextrose Oral Gel 15 Gram(s) Oral once PRN Blood Glucose LESS THAN 70 milliGRAM(s)/deciliter  HYDROmorphone  Injectable 0.5 milliGRAM(s) IV Push every 3 hours PRN Moderate Pain (4 - 6)  HYDROmorphone  Injectable 1 milliGRAM(s) IV Push every 3 hours PRN Severe Pain (7 - 10)      Allergies    No Known Allergies    Intolerances        LABS:                        8.2    6.46  )-----------( 171      ( 13 Nov 2023 05:30 )             26.2     11-13    146<H>  |  110<H>  |  9   ----------------------------<  157<H>  3.7   |  28  |  1.08    Ca    8.5      13 Nov 2023 05:30  Phos  2.5     11-13  Mg     1.6     11-13    TPro  5.5<L>  /  Alb  2.5<L>  /  TBili  0.5  /  DBili  x   /  AST  22  /  ALT  9<L>  /  AlkPhos  65  11-13    PT/INR - ( 13 Nov 2023 05:30 )   PT: 12.6 sec;   INR: 1.11          PTT - ( 13 Nov 2023 05:30 )  PTT:31.6 sec  Urinalysis Basic - ( 13 Nov 2023 05:30 )    Color: x / Appearance: x / SG: x / pH: x  Gluc: 157 mg/dL / Ketone: x  / Bili: x / Urobili: x   Blood: x / Protein: x / Nitrite: x   Leuk Esterase: x / RBC: x / WBC x   Sq Epi: x / Non Sq Epi: x / Bacteria: x        RADIOLOGY & ADDITIONAL TESTS:  Reviewed

## 2023-11-13 NOTE — PROGRESS NOTE ADULT - PROBLEM SELECTOR PLAN 6
.  Complex medical decision making / symptom management in the setting of malignancy.    Will continue to follow for ongoing GOC discussion / titration of palliative regimen. Emotional support provided, questions answered.  Active Psychosocial Referrals:  [x]Social Work/Case management [x]PT/OT [x]Chaplaincy []Hospice  []Patient/Family Support []Holistic RN [x]Massage Therapy []Music Therapy []Ethics  Coping: [] well [x] with difficulty [] poor coping [] unable to assess  Support system: [] strong [x] adequate [] inadequate    For new or uncontrolled symptoms, please call Palliative Care at [phone]). The service is available 24/7 (including nights & weekends) to provide symptom management recommendations over the phone as appropriate

## 2023-11-13 NOTE — PROGRESS NOTE ADULT - ASSESSMENT
77yo M with PMH of CAD, PVD, T2DM, and SCC of Larynx p/w weakness and odynophagia. Palliative consulted for complex symptom management in the setting of malignancy.    ·	change Moderate PRN to Dilaudid 2mg PO q4h PRN for Moderate Pain  ·	change Severe PRN to Dilaudid 1mg IV q4h PRN for Severe Pain  ·	Anticipated Discharge Regimen: Fentanyl Patch 25mcg/hr q72hr and Dilaudid 2mg PO q6h PRN for Severe Pain    Patient would benefit from outpatient Palliative/Supportive Oncology follow-up on discharge with Dr. Emma Anthony at [facility]. Please call [phone] when discharge planning to make an appointment.

## 2023-11-13 NOTE — CHART NOTE - NSCHARTNOTEFT_GEN_A_CORE
Admitting Diagnosis:   Patient is a 78y old  Male who presents with a chief complaint of weakness, poor PO intake (13 Nov 2023 07:02)      PAST MEDICAL & SURGICAL HISTORY:  HTN (hypertension)      HLD (hyperlipidemia)      DM (diabetes mellitus)      CAD (coronary artery disease)      Glaucoma      PVD (peripheral vascular disease)      No significant past surgical history    Current Nutrition Order: NPO      PO Intake: N/A    GI Issues: Abdomen non-distended/non-tender, +BS x4, last bowel movement 11/9    Pain: 0 per chart review     Skin Integrity: suspected deep tissue injury bilateral heels, +1 bilateral ankles     Labs:   11-13    146<H>  |  110<H>  |  9   ----------------------------<  157<H>  3.7   |  28  |  1.08    Ca    8.5      13 Nov 2023 05:30  Phos  2.5     11-13  Mg     1.6     11-13    TPro  5.5<L>  /  Alb  2.5<L>  /  TBili  0.5  /  DBili  x   /  AST  22  /  ALT  9<L>  /  AlkPhos  65  11-13    CAPILLARY BLOOD GLUCOSE      POCT Blood Glucose.: 181 mg/dL (13 Nov 2023 11:45)  POCT Blood Glucose.: 139 mg/dL (13 Nov 2023 05:43)  POCT Blood Glucose.: 163 mg/dL (12 Nov 2023 23:46)  POCT Blood Glucose.: 139 mg/dL (12 Nov 2023 22:21)  POCT Blood Glucose.: 185 mg/dL (12 Nov 2023 19:00)      Medications:  MEDICATIONS  (STANDING):  albuterol/ipratropium for Nebulization 3 milliLiter(s) Nebulizer every 4 hours  atropine 1% Ophthalmic Solution for SubLingual Use 1 Drop(s) SubLingual three times a day  dextrose 5% + sodium chloride 0.45%. 1000 milliLiter(s) (75 mL/Hr) IV Continuous <Continuous>  dextrose 5%. 1000 milliLiter(s) (100 mL/Hr) IV Continuous <Continuous>  dextrose 5%. 1000 milliLiter(s) (50 mL/Hr) IV Continuous <Continuous>  dextrose 50% Injectable 25 Gram(s) IV Push once  dextrose 50% Injectable 25 Gram(s) IV Push once  dextrose 50% Injectable 12.5 Gram(s) IV Push once  dextrose 50% Injectable 25 Gram(s) IV Push once  fentaNYL   Patch  25 MICROgram(s)/Hr 1 Patch Transdermal every 72 hours  FIRST- Mouthwash  BLM 4 milliLiter(s) Swish and Spit every 6 hours  glucagon  Injectable 1 milliGRAM(s) IntraMuscular once  influenza  Vaccine (HIGH DOSE) 0.7 milliLiter(s) IntraMuscular once  insulin glargine Injectable (LANTUS) 15 Unit(s) SubCutaneous at bedtime  insulin lispro (ADMELOG) corrective regimen sliding scale   SubCutaneous every 6 hours  lidocaine 2% Viscous 5 milliLiter(s) Swish and Spit two times a day  metoprolol tartrate Injectable 2.5 milliGRAM(s) IV Push every 6 hours  pantoprazole  Injectable 40 milliGRAM(s) IV Push every 12 hours  silver sulfADIAZINE 1% Cream 1 Application(s) Topical two times a day    MEDICATIONS  (PRN):  dextrose Oral Gel 15 Gram(s) Oral once PRN Blood Glucose LESS THAN 70 milliGRAM(s)/deciliter  HYDROmorphone  Injectable 0.5 milliGRAM(s) IV Push every 3 hours PRN Moderate Pain (4 - 6)  HYDROmorphone  Injectable 1 milliGRAM(s) IV Push every 3 hours PRN Severe Pain (7 - 10)    ANTHROPOMETRICS:   Height (inches):  66   Weight (pounds):  149.9 (10/29)   BMI (kg/m^2):  24.2   IBW (pounds):  142 +/- 10%   %IBW:  105.6 %     WEIGHT CHANGE: upon chart review, patient with significant weight loss of 20 pounds or 11.8 % over 5 months (6/5/23: 170 pounds)     ESTIMATED NUTRIENT NEEDS:   Calories:  (25-30 kcal/kg) 7189-4431 kcal   Protein:  (1.2-1.5 g/kg)  g   Fluids:  1 mL/kcal or at team’s discretion   Current body weight used to calculate energy needs due to pt's current body weight within % ideal body weight. Needs adjusted for age and current clinical status.     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 Marr/Dr. Gar), presents to the ED with generalized weakness and odynophagia, transferred to [facility] for frequent suctioning.     Pt assessed at bedside on [facility]. Rx and labs reviewed. Pt continues NPO today with plan for PEG placement by IR in setting of inadequate oral intake. Pt with prolonged period of inadequate PO intake and evaluation by speech and swallow team who recommends alternative means of nutrition; see recs below. Pt continues to have variable BG control; recommend carbohydrate controlled formula and continuous infusion over 24hrs until BG have stabilized and pt shown tolerance to tube feeds via newly established PEG. No new complaints of nausea/vomiting/constipation/diarrhea. RDN will continue to reassess, intervene, and monitor as appropriate.     PREVIOUS NUTRITION DIAGNOSIS:   Severe PCM in context of chronic illness related to inability to tolerate PO as evidenced by 14% weight loss x 6 months, meeting <75% nutrition needs >1 month    Active [ x ]       Resolved [  ]     GOAL: Pt to meet at least 75% of nutritional needs consistently via most appropriate route for nutrition    Recommendations:  -Initiate enteral nutrition support via PEG when medically able    *Recommend Glucerna 1.2 @65 ml/hr continuously over 24hrs to provide 1560 ml tube feed, 1872 calories, 94 gProt., and 1256 ml free water. This is 23.2 nonprotein calories and 1.46 gProt. per kg ideal body weight 64.5 kg.   -Maintain aspiration precautions at all times  -Monitor BG control and tube feed tolerance when initiate and adjust nutrition plan as appropriate   -Monitor chemistry, GI function, and skin integrity     Risk Level: High [ x ] Moderate [   ] Low [   ]

## 2023-11-13 NOTE — PROGRESS NOTE ADULT - PROBLEM SELECTOR PLAN 2
.  -Atropine Sublingual Drops TID for local secretion management  -ongoing coordination for long-term feeding tube placement    Would not add additional anti-cholinergic for secretion management since trial of Scopolamine Patch was stopped due to delirium and urinary retention

## 2023-11-13 NOTE — ADVANCED PRACTICE NURSE CONSULT - ASSESSMENT
Received consult for neck radiation dermatitis.  Discussed with resident. Topical therapy has been ordered by rad/onc and has been effective.  WOC RN will defer to these orders. Primary RN can complete routine dressing changes.

## 2023-11-14 NOTE — PRE-ANESTHESIA EVALUATION ADULT - HEIGHT IN INCHES
Lipoma, No Treatment  A lipoma is a local overgrowth of fatty tissue. It appears as a soft raised area, usually less than 2 inches across. It is a benign condition (not cancer).   Home care  General information regarding lipoma includes:  1. No special care is needed for a lipoma.  2. You can consider removal for cosmetic reasons.  3. Sometimes lipomas are uncomfortable because they put pressure on surrounding tissues. This is also a reason to have a lipoma removed.  Follow-up care  Follow up with your healthcare provider, or as advised if you want to have the lipoma removed at a later time.  When to seek medical advice  Call your healthcare provider right away if any of the following occur:  · Redness, pain, tenderness, or drainage from the lipoma  · Lipoma begins to enlarge or change shape  · Changes in the color of the skin over the lipoma  Date Last Reviewed: 6/1/2016  © 5913-7961 [employer], [employer]. [address] [place] [id number]. All rights reserved. This information is not intended as a substitute for professional medical care. Always follow your healthcare professional's instructions.        
6

## 2023-11-14 NOTE — PRE-ANESTHESIA EVALUATION ADULT - NSANTHOSAYNRD_GEN_A_CORE
No. FARSHAD screening performed.  STOP BANG Legend: 0-2 = LOW Risk; 3-4 = INTERMEDIATE Risk; 5-8 = HIGH Risk

## 2023-11-14 NOTE — PROGRESS NOTE ADULT - SUBJECTIVE AND OBJECTIVE BOX
INTERVAL HPI/OVERNIGHT EVENTS:  Patient was seen and examined at bedside. As per nurse and patient, no o/n events, patient resting comfortably. No complaints at this time. Patient denies: fever, chills, lightheadedness, weakness, CP, palpitations, SOB, cough, N/V. ROS otherwise negative.    VITAL SIGNS:  T(F): 99 (11-14-23 @ 09:51)  HR: 86 (11-14-23 @ 08:12)  BP: 127/58 (11-14-23 @ 08:12)  RR: 18 (11-14-23 @ 08:12)  SpO2: 97% (11-14-23 @ 08:12)  Wt(kg): --      11-13-23 @ 07:01  -  11-14-23 @ 07:00  --------------------------------------------------------  IN: 750 mL / OUT: 695 mL / NET: 55 mL        PHYSICAL EXAM:     GENERAL: resting comfortably in bed; NAD  HEAD:  Atraumatic, Normocephalic  EYES: severe cataracts bilaterally   NECK: blisters/erythema/macerated lesion, No JVD, Normal thyroid, no enlarged nodes   NERVOUS SYSTEM:  AAOx3; CNII-XII grossly intact; no focal deficits  CHEST/LUNG: interval improvement in upper airway secretions & BS  HEART: S1/S2 normal, no S3, Regular rate and rhythm; No murmurs   ABDOMEN: Soft, Nontender, Nondistended; Bowel sounds present   EXTREMITIES:  2+ Peripheral Pulses, No clubbing, cyanosis, or edema   LYMPH: No lymphadenopathy noted   SKIN: No rashes or lesions  PSYCHIATRIC: affect and characteristics of appearance, verbalizations, behaviors are appropriate    MEDICATIONS  (STANDING):  albuterol/ipratropium for Nebulization 3 milliLiter(s) Nebulizer every 4 hours  atropine 1% Ophthalmic Solution for SubLingual Use 1 Drop(s) SubLingual three times a day  dextrose 5% + sodium chloride 0.45%. 1000 milliLiter(s) (75 mL/Hr) IV Continuous <Continuous>  dextrose 5%. 1000 milliLiter(s) (100 mL/Hr) IV Continuous <Continuous>  dextrose 5%. 1000 milliLiter(s) (50 mL/Hr) IV Continuous <Continuous>  dextrose 50% Injectable 25 Gram(s) IV Push once  dextrose 50% Injectable 25 Gram(s) IV Push once  dextrose 50% Injectable 25 Gram(s) IV Push once  dextrose 50% Injectable 12.5 Gram(s) IV Push once  fentaNYL   Patch  25 MICROgram(s)/Hr 1 Patch Transdermal every 72 hours  FIRST- Mouthwash  BLM 4 milliLiter(s) Swish and Spit every 6 hours  glucagon  Injectable 1 milliGRAM(s) IntraMuscular once  influenza  Vaccine (HIGH DOSE) 0.7 milliLiter(s) IntraMuscular once  insulin glargine Injectable (LANTUS) 15 Unit(s) SubCutaneous at bedtime  insulin lispro (ADMELOG) corrective regimen sliding scale   SubCutaneous every 6 hours  lidocaine 2% Viscous 5 milliLiter(s) Swish and Spit two times a day  metoprolol tartrate Injectable 2.5 milliGRAM(s) IV Push every 6 hours  pantoprazole  Injectable 40 milliGRAM(s) IV Push every 12 hours  potassium chloride  10 mEq/100 mL IVPB 10 milliEquivalent(s) IV Intermittent every 1 hour  silver sulfADIAZINE 1% Cream 1 Application(s) Topical two times a day    MEDICATIONS  (PRN):  dextrose Oral Gel 15 Gram(s) Oral once PRN Blood Glucose LESS THAN 70 milliGRAM(s)/deciliter  HYDROmorphone  Injectable 0.5 milliGRAM(s) IV Push every 3 hours PRN Moderate Pain (4 - 6)  HYDROmorphone  Injectable 1 milliGRAM(s) IV Push every 3 hours PRN Severe Pain (7 - 10)      Allergies    No Known Allergies    Intolerances        LABS:                        7.5    5.46  )-----------( 150      ( 14 Nov 2023 05:30 )             24.4     11-14    144  |  107  |  6<L>  ----------------------------<  147<H>  3.0<L>   |  30  |  1.10    Ca    8.1<L>      14 Nov 2023 05:30  Phos  2.9     11-14  Mg     1.9     11-14    TPro  5.1<L>  /  Alb  2.2<L>  /  TBili  0.4  /  DBili  x   /  AST  41<H>  /  ALT  9<L>  /  AlkPhos  63  11-14    PT/INR - ( 14 Nov 2023 05:30 )   PT: 12.9 sec;   INR: 1.14          PTT - ( 14 Nov 2023 05:30 )  PTT:27.6 sec  Urinalysis Basic - ( 14 Nov 2023 05:30 )    Color: x / Appearance: x / SG: x / pH: x  Gluc: 147 mg/dL / Ketone: x  / Bili: x / Urobili: x   Blood: x / Protein: x / Nitrite: x   Leuk Esterase: x / RBC: x / WBC x   Sq Epi: x / Non Sq Epi: x / Bacteria: x        RADIOLOGY & ADDITIONAL TESTS:  Reviewed HOSPITAL COURSE:  78 yr old male, [place] speaking with history of CAD, PVD, DM, HFrEF, 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 Marr/Dr. Derek Gar), presents to the ED with generalized weakness. Reports progressive weakness over the last 2 weeks assc w/ fatigue. 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 C/o odynophagia but tolerating secretions. Saw Rad on on 10/13. Treatment break on 10/20 and restarted inpatient on 10/30. Final RT treatment was on 11/3. Over the course of his stay, the patient has had copious secretions that require increasing suctioning due to RT therapy. The patient was stepped up to [facility] for increased suctioning requirements. Patient was planned to have a PEG tube placement on Monday 11/6 for odynophagia and decreased oral intake due to radiation induced mucositis but was delayed  due to concern for worseninig irritation of his mucosa if he had to be scoped or if an NGT had to be placed. PEG placed on 11/14 w/ plan to start feeds on 11/15.    INTERVAL HPI/OVERNIGHT EVENTS:  was seen and examined at bedside. As per nurse and patient, no o/n events, patient resting comfortably. No complaints at this time. Patient denies: fever, chills, lightheadedness, weakness, CP, palpitations, SOB, cough, N/V. ROS otherwise negative.    VITAL SIGNS:  T(F): 99 (11-14-23 @ 09:51)  HR: 86 (11-14-23 @ 08:12)  BP: 127/58 (11-14-23 @ 08:12)  RR: 18 (11-14-23 @ 08:12)  SpO2: 97% (11-14-23 @ 08:12)  Wt(kg): --      11-13-23 @ 07:01  -  11-14-23 @ 07:00  --------------------------------------------------------  IN: 750 mL / OUT: 695 mL / NET: 55 mL        PHYSICAL EXAM:     GENERAL: resting comfortably in bed; NAD  HEAD:  Atraumatic, Normocephalic  EYES: severe cataracts bilaterally   NECK: blisters/erythema/macerated lesion, No JVD, Normal thyroid, no enlarged nodes   NERVOUS SYSTEM:  AAOx3; CNII-XII grossly intact; no focal deficits  CHEST/LUNG: interval improvement in upper airway secretions & BS  HEART: S1/S2 normal, no S3, Regular rate and rhythm; No murmurs   ABDOMEN: Soft, Nontender, Nondistended; Bowel sounds present   EXTREMITIES:  2+ Peripheral Pulses, No clubbing, cyanosis, or edema   LYMPH: No lymphadenopathy noted   SKIN: No rashes or lesions  PSYCHIATRIC: affect and characteristics of appearance, verbalizations, behaviors are appropriate    MEDICATIONS  (STANDING):  albuterol/ipratropium for Nebulization 3 milliLiter(s) Nebulizer every 4 hours  atropine 1% Ophthalmic Solution for SubLingual Use 1 Drop(s) SubLingual three times a day  dextrose 5% + sodium chloride 0.45%. 1000 milliLiter(s) (75 mL/Hr) IV Continuous <Continuous>  dextrose 5%. 1000 milliLiter(s) (100 mL/Hr) IV Continuous <Continuous>  dextrose 5%. 1000 milliLiter(s) (50 mL/Hr) IV Continuous <Continuous>  dextrose 50% Injectable 25 Gram(s) IV Push once  dextrose 50% Injectable 25 Gram(s) IV Push once  dextrose 50% Injectable 25 Gram(s) IV Push once  dextrose 50% Injectable 12.5 Gram(s) IV Push once  fentaNYL   Patch  25 MICROgram(s)/Hr 1 Patch Transdermal every 72 hours  FIRST- Mouthwash  BLM 4 milliLiter(s) Swish and Spit every 6 hours  glucagon  Injectable 1 milliGRAM(s) IntraMuscular once  influenza  Vaccine (HIGH DOSE) 0.7 milliLiter(s) IntraMuscular once  insulin glargine Injectable (LANTUS) 15 Unit(s) SubCutaneous at bedtime  insulin lispro (ADMELOG) corrective regimen sliding scale   SubCutaneous every 6 hours  lidocaine 2% Viscous 5 milliLiter(s) Swish and Spit two times a day  metoprolol tartrate Injectable 2.5 milliGRAM(s) IV Push every 6 hours  pantoprazole  Injectable 40 milliGRAM(s) IV Push every 12 hours  potassium chloride  10 mEq/100 mL IVPB 10 milliEquivalent(s) IV Intermittent every 1 hour  silver sulfADIAZINE 1% Cream 1 Application(s) Topical two times a day    MEDICATIONS  (PRN):  dextrose Oral Gel 15 Gram(s) Oral once PRN Blood Glucose LESS THAN 70 milliGRAM(s)/deciliter  HYDROmorphone  Injectable 0.5 milliGRAM(s) IV Push every 3 hours PRN Moderate Pain (4 - 6)  HYDROmorphone  Injectable 1 milliGRAM(s) IV Push every 3 hours PRN Severe Pain (7 - 10)      Allergies    No Known Allergies    Intolerances        LABS:                        7.5    5.46  )-----------( 150      ( 14 Nov 2023 05:30 )             24.4     11-14    144  |  107  |  6<L>  ----------------------------<  147<H>  3.0<L>   |  30  |  1.10    Ca    8.1<L>      14 Nov 2023 05:30  Phos  2.9     11-14  Mg     1.9     11-14    TPro  5.1<L>  /  Alb  2.2<L>  /  TBili  0.4  /  DBili  x   /  AST  41<H>  /  ALT  9<L>  /  AlkPhos  63  11-14    PT/INR - ( 14 Nov 2023 05:30 )   PT: 12.9 sec;   INR: 1.14          PTT - ( 14 Nov 2023 05:30 )  PTT:27.6 sec  Urinalysis Basic - ( 14 Nov 2023 05:30 )    Color: x / Appearance: x / SG: x / pH: x  Gluc: 147 mg/dL / Ketone: x  / Bili: x / Urobili: x   Blood: x / Protein: x / Nitrite: x   Leuk Esterase: x / RBC: x / WBC x   Sq Epi: x / Non Sq Epi: x / Bacteria: x        RADIOLOGY & ADDITIONAL TESTS:  Reviewed

## 2023-11-14 NOTE — PROGRESS NOTE ADULT - ASSESSMENT
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 Marr/Dr. Gar), presents to the ED with generalized weakness and odynophagia, transferred to [facility] for frequent suctioning    Nutritional Assessment:  · Nutritional Assessment	This patient has been assessed with a concern for Malnutrition and has been determined to have a diagnosis/diagnoses of Severe protein-calorie malnutrition.    This patient is being managed with:   Diet NPO-  Entered: Nov 4 2023 11:10PM    Problem/Plan - 1:  ·  Problem: Odynophagia.   ·  Plan: Larynx SCC currently getting radiation and chemotherapy. C/o odynophagia causing poor PO intake. Per chart review appears poor PO intake has been ongoing issue while undergoing chemo/radiation. 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 Marr. Last chemo 10/23. 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  - Chest PT, hypertonic nebulizer q4hr   - Aspiration precautions    -Per rad onc, odynophagia and mucositis should likely resolve 2-3 weeks after completion of radiation. no radiation on 10/31. Last treatment 10/30  - magic mouthwash  - viscous lidocaine   -scopolamine patch  -c/w pantoprazole 40 mg IV.  -failed S&S on 11/7   -IR consulted for PEG, likely to happen 11/13.   - palliative:         -Fentanyl 25mcg/hr Patch q72hr as long-acting agent        -Dilaudid 0.5mg IV q3h PRN for Moderate Pain        -Dilaudid 1mg IV q3h PRN for Severe Pain        - atropine 1% sublingual TID.    Problem/Plan - 2:  ·  Problem: Weakness generalized.   ·  Plan: Reports progressive weakness over the last 2 weeks assc w/ fatigue and poor PO intake. Reports this typically happens after chemo.Typically ambulates with cane.  -Per PT, SOFI placement.    Problem/Plan - 3:  ·  Problem: Radiation dermatitis.   ·  Plan: Has b/l neck dermatitis due to radiation, first noticed early 09/13/2023 after 1st/2nd treatment. C/o pain, dysphagia and odynophagia for weeks which was tx with magic mouthwash, silver silvadene cream, fluconazole 400mg once a day (started 10/23). Radiation paused due to pain, last RT 10/28.  On exam -  Bilateral neck wounds - erythematous with skin breakdown, crusted dead skin peripherally, no drainage, no skin sloughing, at baseline per patient. Reports improvement with cream. No concern for superimposed infection at this time  Plan:  -Per Rad onc, received RT 10/30, 10/31, and 11/1, 11/3  -c/w silvadene cream and non-stick silicon bandage  -For skin care,  rinse with saline soaks liberally with dabbing of light soap and water.    Problem/Plan - 4:  ·  Problem: ELENITA (acute kidney injury).   ·  Plan: #Hypernatremia    Pt w/ uptrending Creatinine since admission, May be pre renal due to volume loss 2/2 secretions.    - continue to trend BUN/Cr  - D5+1/2 NS 75ml/hr  - restart home flomax when pt is able to take PO  - q6 bladder scans.    Problem/Plan - 5:  ·  Problem: Squamous cell carcinoma of larynx.   ·  Plan: Dx 06/2023 with SCC larynx. Chemo/RT initiated 08/2023. Follows with Dr. Marr (rad onc) and Dr. Derek Harvey (onc). Last chemo (taxol/carboplatin) on 10/23, last RT 11/3, tolerated well.     - palliative consulted on board.    Problem/Plan - 6:  ·  Problem: Acute metabolic encephalopathy.   ·  Plan: Pt was agitated o/n and received 2x2.5mg Zyprexa for agitation. AM pt was responding and was AOx2, but after RT tx pt was AOx0. Unclear etiology at this point. Pt is AOx1-2. Infectious workup continues to be unremarkable w/ no NGTD and negative UA and CXR. Likely 2/2 to hospital delirium, no FND  Plan:  - Re-orient as possible  - Can give Zyprexa 2.5mg IM if necessary.    Problem/Plan - 7:  ·  Problem: Heart failure with mildly reduced ejection fraction.   ·  Plan: TTE 07/2023: mild LVH, mild reduced LVH hypertrophy, normal RV function, LV systolic function mild decr EF 50-55%. home meds: imdur 30 once a day, lisinopril 20mg once a day, toprol 12.5mg once a day   - c/w Toprol 12.5 mg- converted to IV 1.25 q6 given pt cannot tolerate PO  - hold imdur given soft BPs, resume when appropriate   - hold lisinopril given prior ELENITA and unable to tolerate PO.    Problem/Plan - 8:  ·  Problem: CAD (coronary artery disease).   ·  Plan: CAD s/p MIDCAB and PCI with multiple WINTER (most recent to LCx in 8/2022), on ASA 81 at home. Currently asymptomatic. EKG on admission w/o ischemic changes  - hold ASA 81 inability to take PO  - hold lipitor 80mg once a day inability to take PO.    Problem/Plan - 9:  ·  Problem: DM (diabetes mellitus).   ·  Plan: Last A1c 7.9 in 07/2023. Regimen as follows: Lantus 50mg daily, 12units TID with meals for 48 hour after chemo then 8 TID  - c/w sliding scale   - 15u lantus  - hold pre-meal given poor PO intake  - q6hr FS given inability to tolerate PO.    Problem/Plan - 10:  ·  Problem: Prophylactic measure.   ·  Plan; Diet: NPO  F: 1/4 NS  E: replete PRN  DVT: heparin  Code: Full.

## 2023-11-14 NOTE — PROGRESS NOTE ADULT - SUBJECTIVE AND OBJECTIVE BOX
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 Marr/Dr. Derek Gar), presents to the ED with generalized weakness. Reports progressive weakness over the last 2 weeks assc w/ fatigue. 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 C/o odynophagia but tolerating secretions. No shortness of breath or difficulty breathing. Reports dermatitis is stable, and reponds well to cream he was given. No drainage or discharge. Saw Rad on on 10/13. Per chart review appears odynophagia was presenting symptom of cancer. Reported subjective fever for 1 day but did not take his temperature at home. ROS otherwise negative.       Afebrile, HR 70s, //86, 100% on RA  WBC 2.3, Hb 9.5, plt 219, Na 133, K 5.4, BUN 49/Cr 1.87, lactate 1.0  EKG NSR, no peak T waves   s/p morphine, 2L NS  (29 Oct 2023 21:39)                            7.5    5.46  )-----------( 150      ( 14 Nov 2023 05:30 )             24.4       11-14    144  |  107  |  6<L>  ----------------------------<  147<H>  3.0<L>   |  30  |  1.10    Ca    8.1<L>      14 Nov 2023 05:30  Phos  2.9     11-14  Mg     1.9     11-14    TPro  5.1<L>  /  Alb  2.2<L>  /  TBili  0.4  /  DBili  x   /  AST  41<H>  /  ALT  9<L>  /  AlkPhos  63  11-14    Vital Signs Last 24 Hrs  T(C): 37.2 (14 Nov 2023 09:51), Max: 37.4 (14 Nov 2023 06:00)  T(F): 99 (14 Nov 2023 09:51), Max: 99.3 (14 Nov 2023 06:00)  HR: 86 (14 Nov 2023 08:12) (86 - 104)  BP: 127/58 (14 Nov 2023 08:12) (110/53 - 134/61)  BP(mean): 84 (14 Nov 2023 08:12) (76 - 88)  RR: 18 (14 Nov 2023 08:12) (18 - 20)  SpO2: 97% (14 Nov 2023 08:12) (93% - 97%)    Parameters below as of 14 Nov 2023 08:12  Patient On (Oxygen Delivery Method): nasal cannula  O2 Flow (L/min): 2      MEDICATIONS  (STANDING):  albuterol/ipratropium for Nebulization 3 milliLiter(s) Nebulizer every 4 hours  atropine 1% Ophthalmic Solution for SubLingual Use 1 Drop(s) SubLingual three times a day  dextrose 5% + sodium chloride 0.45%. 1000 milliLiter(s) (75 mL/Hr) IV Continuous <Continuous>  dextrose 5%. 1000 milliLiter(s) (50 mL/Hr) IV Continuous <Continuous>  dextrose 5%. 1000 milliLiter(s) (100 mL/Hr) IV Continuous <Continuous>  dextrose 50% Injectable 25 Gram(s) IV Push once  dextrose 50% Injectable 25 Gram(s) IV Push once  dextrose 50% Injectable 25 Gram(s) IV Push once  dextrose 50% Injectable 12.5 Gram(s) IV Push once  fentaNYL   Patch  25 MICROgram(s)/Hr 1 Patch Transdermal every 72 hours  FIRST- Mouthwash  BLM 4 milliLiter(s) Swish and Spit every 6 hours  glucagon  Injectable 1 milliGRAM(s) IntraMuscular once  influenza  Vaccine (HIGH DOSE) 0.7 milliLiter(s) IntraMuscular once  insulin glargine Injectable (LANTUS) 15 Unit(s) SubCutaneous at bedtime  insulin lispro (ADMELOG) corrective regimen sliding scale   SubCutaneous every 6 hours  lidocaine 2% Viscous 5 milliLiter(s) Swish and Spit two times a day  metoprolol tartrate Injectable 2.5 milliGRAM(s) IV Push every 6 hours  pantoprazole  Injectable 40 milliGRAM(s) IV Push every 12 hours  potassium chloride  10 mEq/100 mL IVPB 10 milliEquivalent(s) IV Intermittent every 1 hour  silver sulfADIAZINE 1% Cream 1 Application(s) Topical two times a day    MEDICATIONS  (PRN):  dextrose Oral Gel 15 Gram(s) Oral once PRN Blood Glucose LESS THAN 70 milliGRAM(s)/deciliter  HYDROmorphone  Injectable 0.5 milliGRAM(s) IV Push every 3 hours PRN Moderate Pain (4 - 6)  HYDROmorphone  Injectable 1 milliGRAM(s) IV Push every 3 hours PRN Severe Pain (7 - 10)        11/13/2023 Functional Status Assessment :       Pain Assessment/Number Scale (0-10) Adult  Presence of Pain: denies pain/discomfort (Rating = 0)  Pain Rating (0-10): Rest: 0 (no pain/absence of nonverbal indicators of pain)  Pain Rating (0-10): Activity: 0 (no pain/absence of nonverbal indicators of pain)    Safety      AM-PAC Functional Assessment: Basic Mobility  Type of Assessment: Daily assessment  Turning from your back to your side while in a flat bed without using bedrails?: 2 = A lot of assistance  Moving from lying on your back to sitting on the flat side of a flat bed without using bedrails?: 2 = A lot of assistance  Moving to and from a bed to a chair (including a wheelchair)?: 3 = A little assistance  Standing up from a chair using your arms (e.g. wheelchair or bedside chair)?: 3 = A little assistance  Walking in hospital room?: 2 = A lot of assistance  Climbing 3-5 steps with a railing?: 1 = Total assistance  Score: 13   Row Comment: Ask the patient "How much help from another person do you currently need? (If the patient hasn't done an activity recently, how much help from another person do you think he/she needs if he/she tried?)    Cognitive/Neuro      Cognitive/Neuro/Behavioral  Level of Consciousness: alert  Arousal Level: arouses to voice  Orientation: disoriented to;  situation  Speech: hypophonic  Mood/Behavior: calm    Language Assistance  Preferred Language to Address Healthcare Preferred Language to Address Healthcare: Ukrainian  Preferred Sign Language Preferred Sign Language: also speaks english, wife also present to interpret     Therapeutic Interventions      Bed Mobility  Bed Mobility Training Supine-to-Sit: minimum assist (75% patient effort);  2 person assist  Bed Mobility Training Limitations: decreased ability to use arms for pushing/pulling;  decreased ability to use legs for bridging/pushing;  impaired ability to control trunk for mobility;  decreased strength;  impaired motor control;  impaired postural control    Sit-Stand Transfer Training  Transfer Training Sit-to-Stand Transfer: minimum assist (75% patient effort);  1 person assist;  moderate assist (50% patient effort);  weight-bearing as tolerated  Transfer Training Stand-to-Sit Transfer: minimum assist (75% patient effort);  moderate assist (50% patient effort);  1 person assist;  weight-bearing as tolerated  Sit-to-Stand Transfer Training Transfer Safety Analysis: decreased balance;  decreased step length;  decreased weight-shifting ability;  decreased sensation;  impaired motor control;  impaired postural control    Gait Training  Gait Training: moderate assist (50% patient effort);  minimum assist (75% patient effort);  1 person assist;  weight-bearing as tolerated   bed to chair;  HHAx1  Gait Analysis: 2-point gait   decreased merline;  decreased step length;  decreased stride length;  decreased strength;  impaired motor control;  impaired postural control;  Bed to Chair      AM-PAC Functional Assessment: Daily Activity  Type of Assessment: Daily assessment  Putting on and taking off regular lower body clothing?: 2 = A lot of assistance  Bathing (including washing, rinsing, drying)?: 2 = A lot of assistance  Toileting, which includes using toilet, bedpan or urinal?: 2 = A lot of assistance  Putting on and taking off regular upper body clothing?: 2 = A lot of assistance  Take care of personal grooming such as brushing teeth?: 2 = A lot of assistance  Eating meals?: 3 = A little assistance  Score: 13   Row Comment: Ask the patient "How much help from another person do you currently need? (If the patient hasn't done an activity recently, how much help from another person do you think he/she needs if he/she tried?)    Cognitive/Neuro      Cognitive/Neuro/Behavioral  Cognitive/Neuro/Behavioral [WDL Definition: Alert; opens eyes spontaneously; arouses to voice or touch; oriented x 4; follows commands; speech spontaneous, logical; purposeful motor response; behavior appropriate to situation]: WDL except  Level of Consciousness: alert  Arousal Level: arouses to voice  Orientation: disoriented to;  situation  Speech: hoarse  Mood/Behavior: calm;  cooperative    Language Assistance  Preferred Language to Address Healthcare Preferred Language to Address Healthcare: Ukrainian  Preferred Sign Language Preferred Sign Language: Pt. also speaks English     Therapeutic Interventions      Bed Mobility  Bed Mobility Training Supine-to-Sit: minimum assist (75% patient effort);  1 person assist;  nonverbal cues (demo/gestures);  verbal cues  Bed Mobility Training Limitations: impaired postural control;  decreased strength;  impaired balance;  decreased ability to use arms for pushing/pulling;  decreased ability to use legs for bridging/pushing;  impaired ability to control trunk for mobility    Sit-Stand Transfer Training  Transfer Training Sit-to-Stand Transfer: minimum assist (75% patient effort);  1 person assist;  verbal cues;  nonverbal cues (demo/gestures);  HHA   Transfer Training Stand-to-Sit Transfer: minimum assist (75% patient effort);  1 person assist;  HHA;  nonverbal cues (demo/gestures);  verbal cues;  full weight-bearing  Sit-to-Stand Transfer Training Transfer Safety Analysis: decreased weight-shifting ability;  decreased step length;  impaired balance;  decreased strength;  impaired vision    Therapeutic Exercise  Therapeutic Exercise Detail: pt. able to engage in ~5 steps from bed to chair with Min Ax2 and b/l HHA, noted w. incresed unsteadiness however no LOB     Upper Body Dressing Training  Upper Body Dressing Training Charges: don back gown at EOB   Upper Body Dressing Training Assistance: moderate assist (50% patient effort);  1 person assist;  nonverbal cues (demo/gestures);  verbal cues;  impaired balance;  decreased strength;  pain;  impaired postural control          PM&R Impression : as above    Current disposition plan recommendation :    subacute rehab placement

## 2023-11-14 NOTE — PROGRESS NOTE ADULT - PROBLEM SELECTOR PLAN 4
RESOLVED    #Hypernatremia    Pt w/ uptrending Creatinine since admission, May be pre renal due to volume loss 2/2 secretions.    - continue to trend BUN/Cr  - D5+1/2 NS 75ml/hr  - restart home flomax when pt is able to take PO  - q6 bladder scans

## 2023-11-14 NOTE — PROGRESS NOTE ADULT - PROBLEM SELECTOR PLAN 1
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 C/o odynophagia causing poor PO intake. Per chart review appears poor PO intake has been ongoing issue while undergoing chemo/radiation. 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 Marr. Last chemo 10/23. 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  - Chest PT, hypertonic nebulizer q4hr   - Aspiration precautions    -Per rad onc, odynophagia and mucositis should likely resolve 2-3 weeks after completion of radiation. no radiation on 10/31. Last treatment 10/30  - magic mouthwash  - viscous lidocaine   -scopolamine patch  -c/w pantoprazole 40 mg IV.  -failed S&S on 11/7   -PEG placement schedule w/ IR for today. Start feeds 24h post placement. See nutrition serv. note for feeding recs & monitor for refeeding syndrome  - palliative:         -Fentanyl 25mcg/hr Patch q72hr as long-acting agent        -Dilaudid 0.5mg IV q3h PRN for Moderate Pain        -Dilaudid 1mg IV q3h PRN for Severe Pain        - atropine 1% sublingual TID

## 2023-11-15 NOTE — PROGRESS NOTE ADULT - PROBLEM SELECTOR PLAN 2
Reports progressive weakness over the last 2 weeks assc w/ fatigue and poor PO intake. Reports this typically happens after chemo. Typically ambulates with cane.  -Per PT, SOFI placement  -Out of bed to chair

## 2023-11-15 NOTE — PROGRESS NOTE ADULT - PROBLEM SELECTOR PLAN 7
TTE 07/2023: mild LVH, mild reduced LVH hypertrophy, normal RV function, LV systolic function mild decr EF 50-55%. home meds: imdur 30 once a day, lisinopril 20mg once a day, toprol 12.5mg once a day   - Restart Toprol 12.5 mg- converted to IV 1.25 q6 given pt cannot tolerate PO - via PEG  - hold imdur given soft BPs, resume when appropriate   - hold lisinopril given prior ELENITA and unable to tolerate PO

## 2023-11-15 NOTE — PROGRESS NOTE ADULT - ASSESSMENT
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 Marr/Dr. Gar), admitted [id number] for inability to tolerate PO, odynophagia and frequent suctioning

## 2023-11-15 NOTE — PROGRESS NOTE ADULT - PROBLEM SELECTOR PLAN 1
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 C/o odynophagia causing poor PO intake. Per chart review appears poor PO intake has been ongoing issue while undergoing chemo/radiation. 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 Marr. Last chemo 10/23. 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 PEG placed 11/14  - Chest PT, hypertonic nebulizer q4hr   - Aspiration precautions    -Per rad onc, odynophagia and mucositis should likely resolve 2-3 weeks after completion of radiation. no radiation on 10/31. Last treatment 10/30  - magic mouthwash  - viscous lidocaine   -scopolamine patch  -switch pantoprazole 40 mg qd via PEG  -failed S&S on 11/7 --> re-evaluate with MBS 11/15. Concern for future esophageal atrophy w/ no PO intake  -PEG placed 11/14, plan Start feeds 24h post placement.   -Glucerna 1.2@ 65cc hour: Will start @ 15cc/hr and increase to 30% tomorrow if tolerating  -BMP Mg/Phos @ 6pm to eval for Refeeding syndrome  -Palliative:         -Fentanyl 25mcg/hr Patch q72hr as long-acting agent        -Dilaudid 0.5mg IV q3h PRN for Moderate Pain        -Dilaudid 1mg IV q3h PRN for Severe Pain        - atropine 1% sublingual TID

## 2023-11-15 NOTE — PROGRESS NOTE ADULT - SUBJECTIVE AND OBJECTIVE BOX
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 Marr/Dr. Derek Gar), presents to the ED with generalized weakness. Reports progressive weakness over the last 2 weeks assc w/ fatigue. 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 C/o odynophagia but tolerating secretions. Saw Rad on on 10/13. Treatment break on 10/20 and restarted inpatient on 10/30. Final RT treatment was on 11/3. Over the course of his stay, the patient has had copious secretions that require increasing suctioning due to RT therapy. The patient was stepped up to [place] for increased suctioning requirements. Patient was planned to have a PEG tube placement on Monday 11/6 for odynophagia and decreased oral intake due to radiation induced mucositis but was delayed  due to concern for worseninig irritation of his mucosa if he had to be scoped or if an NGT had to be placed. PEG placed on 11/14 w/ plan to start feeds on 11/15 and monitor for refeeding syndrome.    INTERVAL HPI/OVERNIGHT EVENTS:  Pt was seen and examined at bedside. As per nurse and o/n team pt no acute events, however the pt became agitated and wanted to leave the hospital. He was re-orientable and did not require any medication.      Vital Signs Last 24 Hrs  T(C): 36.7 (15 Nov 2023 09:59), Max: 37.2 (14 Nov 2023 13:58)  T(F): 98 (15 Nov 2023 09:59), Max: 98.6 (14 Nov 2023 22:22)  HR: 90 (15 Nov 2023 10:40) (76 - 102)  BP: 125/61 (15 Nov 2023 10:40) (109/54 - 146/65)    RR: 18 (15 Nov 2023 10:40) (18 - 19)  SpO2: 100% (15 Nov 2023 10:40) (92% - 100%)    O2 Parameters below as of 15 Nov 2023 10:40  Patient On (Oxygen Delivery Method): nasal cannula  O2 Flow (L/min): 2    PHYSICAL EXAM:   GENERAL: resting comfortably in bed; NAD  HEAD:  Atraumatic, Normocephalic  EYES: severe cataracts bilaterally   NECK: blisters/erythema/macerated lesion, No JVD, Normal thyroid, no enlarged nodes   NERVOUS SYSTEM:  AAOx3; CNII-XII grossly intact; no focal deficits  CHEST/LUNG: interval improvement in upper airway secretions & BS  HEART: S1/S2 normal, no S3, Regular rate and rhythm; No murmurs   ABDOMEN: Soft, Nontender, Nondistended; PEG in place w/o edema or erythema  EXTREMITIES:  2+ Peripheral Pulses, No clubbing, cyanosis, or edema   PSYCHIATRIC: affect and characteristics of appearance, verbalizations, behaviors are appropriate      I&O's Summary    14 Nov 2023 07:01  -  15 Nov 2023 07:00  --------------------------------------------------------  IN: 1500 mL / OUT: 800 mL / NET: 700 mL    15 Nov 2023 07:01  -  15 Nov 2023 11:37  --------------------------------------------------------  IN: 375 mL / OUT: 125 mL / NET: 250 mL        LABS:                        7.5    5.46  )-----------( 150      ( 14 Nov 2023 05:30 )             24.4     11-15    141  |  107  |  6<L>  ----------------------------<  151<H>  3.5   |  28  |  1.14    Ca    7.9<L>      15 Nov 2023 05:30  Phos  2.5     11-15  Mg     1.4     11-15    TPro  5.0<L>  /  Alb  2.2<L>  /  TBili  0.4  /  DBili  x   /  AST  28  /  ALT  7<L>  /  AlkPhos  62  11-15    PT/INR - ( 14 Nov 2023 05:30 )   PT: 12.9 sec;   INR: 1.14     PTT - ( 14 Nov 2023 05:30 )  PTT:27.6 sec  Urinalysis Basic - ( 15 Nov 2023 05:30 )    Color: x / Appearance: x / SG: x / pH: x  Gluc: 151 mg/dL / Ketone: x  / Bili: x / Urobili: x   Blood: x / Protein: x / Nitrite: x   Leuk Esterase: x / RBC: x / WBC x   Sq Epi: x / Non Sq Epi: x / Bacteria: x      CAPILLARY BLOOD GLUCOSE      POCT Blood Glucose.: 136 mg/dL (15 Nov 2023 05:43)  POCT Blood Glucose.: 179 mg/dL (15 Nov 2023 00:23)  POCT Blood Glucose.: 156 mg/dL (14 Nov 2023 22:27)  POCT Blood Glucose.: 122 mg/dL (14 Nov 2023 18:11)  POCT Blood Glucose.: 124 mg/dL (14 Nov 2023 11:57)        Consultant(s) Notes Reviewed:  [x ] YES  [ ] NO    MEDICATIONS  (STANDING):  albuterol/ipratropium for Nebulization 3 milliLiter(s) Nebulizer every 6 hours  atropine 1% Ophthalmic Solution for SubLingual Use 1 Drop(s) SubLingual three times a day  cyanocobalamin Injectable 1000 MICROGram(s) IntraMuscular every 24 hours  dextrose 5% + sodium chloride 0.45%. 1000 milliLiter(s) (75 mL/Hr) IV Continuous <Continuous>  dextrose 5%. 1000 milliLiter(s) (100 mL/Hr) IV Continuous <Continuous>  dextrose 5%. 1000 milliLiter(s) (50 mL/Hr) IV Continuous <Continuous>  dextrose 50% Injectable 25 Gram(s) IV Push once  dextrose 50% Injectable 25 Gram(s) IV Push once  dextrose 50% Injectable 12.5 Gram(s) IV Push once  dextrose 50% Injectable 25 Gram(s) IV Push once  enoxaparin Injectable 40 milliGRAM(s) SubCutaneous every 24 hours  fentaNYL   Patch  25 MICROgram(s)/Hr 1 Patch Transdermal every 72 hours  FIRST- Mouthwash  BLM 4 milliLiter(s) Swish and Spit every 6 hours  glucagon  Injectable 1 milliGRAM(s) IntraMuscular once  influenza  Vaccine (HIGH DOSE) 0.7 milliLiter(s) IntraMuscular once  insulin glargine Injectable (LANTUS) 15 Unit(s) SubCutaneous at bedtime  insulin lispro (ADMELOG) corrective regimen sliding scale   SubCutaneous every 6 hours  lidocaine 2% Viscous 5 milliLiter(s) Swish and Spit two times a day  metoprolol tartrate Injectable 2.5 milliGRAM(s) IV Push every 6 hours  silver sulfADIAZINE 1% Cream 1 Application(s) Topical two times a day  thiamine IVPB 500 milliGRAM(s) IV Intermittent every 24 hours    MEDICATIONS  (PRN):  dextrose Oral Gel 15 Gram(s) Oral once PRN Blood Glucose LESS THAN 70 milliGRAM(s)/deciliter  HYDROmorphone  Injectable 0.5 milliGRAM(s) IV Push every 3 hours PRN Moderate Pain (4 - 6)  HYDROmorphone  Injectable 1 milliGRAM(s) IV Push every 3 hours PRN Severe Pain (7 - 10)      Care Discussed with Consultants/Other Providers [ x] YES  [ ] NO    RADIOLOGY & ADDITIONAL TESTS:

## 2023-11-16 NOTE — PROGRESS NOTE ADULT - PROBLEM SELECTOR PLAN 3
.  PPSV = 50%, requires assistance for most ADLs  -PT Eveal recommending SOFI, family wants discharge home

## 2023-11-16 NOTE — PROGRESS NOTE ADULT - SUBJECTIVE AND OBJECTIVE BOX
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 Marr/Dr. Derek Gar), presents to the ED with generalized weakness. Reports progressive weakness over the last 2 weeks assc w/ fatigue. 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 C/o odynophagia but tolerating secretions. Saw Rad on on 10/13. Treatment break on 10/20 and restarted inpatient on 10/30. Final RT treatment was on 11/3. Over the course of his stay, the patient has had copious secretions that require increasing suctioning due to RT therapy. The patient was stepped up to [address] for increased suctioning requirements. Patient was planned to have a PEG tube placement on Monday 11/6 for odynophagia and decreased oral intake due to radiation induced mucositis but was delayed  due to concern for worseninig irritation of his mucosa if he had to be scoped or if an NGT had to be placed. PEG placed on 11/14 w/ plan to start feeds on 11/15 and monitor for refeeding syndrome.    INTERVAL HPI/OVERNIGHT EVENTS:  Pt was seen and examined at bedside. As per nurse and o/n team pt no acute events, however the pt became agitated and wanted to leave the hospital. He was re-orientable and did not require any medication.      Vital Signs Last 24 Hrs  T(C): 36.7 (15 Nov 2023 09:59), Max: 37.2 (14 Nov 2023 13:58)  T(F): 98 (15 Nov 2023 09:59), Max: 98.6 (14 Nov 2023 22:22)  HR: 90 (15 Nov 2023 10:40) (76 - 102)  BP: 125/61 (15 Nov 2023 10:40) (109/54 - 146/65)    RR: 18 (15 Nov 2023 10:40) (18 - 19)  SpO2: 100% (15 Nov 2023 10:40) (92% - 100%)    O2 Parameters below as of 15 Nov 2023 10:40  Patient On (Oxygen Delivery Method): nasal cannula  O2 Flow (L/min): 2    PHYSICAL EXAM:   GENERAL: resting comfortably in bed; NAD  HEAD:  Atraumatic, Normocephalic  EYES: severe cataracts bilaterally   NECK: blisters/erythema/macerated lesion, No JVD, Normal thyroid, no enlarged nodes   NERVOUS SYSTEM:  AAOx3; CNII-XII grossly intact; no focal deficits  CHEST/LUNG: interval improvement in upper airway secretions & BS  HEART: S1/S2 normal, no S3, Regular rate and rhythm; No murmurs   ABDOMEN: Soft, Nontender, Nondistended; PEG in place w/o edema or erythema  EXTREMITIES:  2+ Peripheral Pulses, No clubbing, cyanosis, or edema   PSYCHIATRIC: affect and characteristics of appearance, verbalizations, behaviors are appropriate      I&O's Summary    14 Nov 2023 07:01  -  15 Nov 2023 07:00  --------------------------------------------------------  IN: 1500 mL / OUT: 800 mL / NET: 700 mL    15 Nov 2023 07:01  -  15 Nov 2023 11:37  --------------------------------------------------------  IN: 375 mL / OUT: 125 mL / NET: 250 mL        LABS:                        7.5    5.46  )-----------( 150      ( 14 Nov 2023 05:30 )             24.4     11-15    141  |  107  |  6<L>  ----------------------------<  151<H>  3.5   |  28  |  1.14    Ca    7.9<L>      15 Nov 2023 05:30  Phos  2.5     11-15  Mg     1.4     11-15    TPro  5.0<L>  /  Alb  2.2<L>  /  TBili  0.4  /  DBili  x   /  AST  28  /  ALT  7<L>  /  AlkPhos  62  11-15    PT/INR - ( 14 Nov 2023 05:30 )   PT: 12.9 sec;   INR: 1.14     PTT - ( 14 Nov 2023 05:30 )  PTT:27.6 sec  Urinalysis Basic - ( 15 Nov 2023 05:30 )    Color: x / Appearance: x / SG: x / pH: x  Gluc: 151 mg/dL / Ketone: x  / Bili: x / Urobili: x   Blood: x / Protein: x / Nitrite: x   Leuk Esterase: x / RBC: x / WBC x   Sq Epi: x / Non Sq Epi: x / Bacteria: x      CAPILLARY BLOOD GLUCOSE      POCT Blood Glucose.: 136 mg/dL (15 Nov 2023 05:43)  POCT Blood Glucose.: 179 mg/dL (15 Nov 2023 00:23)  POCT Blood Glucose.: 156 mg/dL (14 Nov 2023 22:27)  POCT Blood Glucose.: 122 mg/dL (14 Nov 2023 18:11)  POCT Blood Glucose.: 124 mg/dL (14 Nov 2023 11:57)        Consultant(s) Notes Reviewed:  [x ] YES  [ ] NO    MEDICATIONS  (STANDING):  albuterol/ipratropium for Nebulization 3 milliLiter(s) Nebulizer every 6 hours  atropine 1% Ophthalmic Solution for SubLingual Use 1 Drop(s) SubLingual three times a day  cyanocobalamin Injectable 1000 MICROGram(s) IntraMuscular every 24 hours  dextrose 5% + sodium chloride 0.45%. 1000 milliLiter(s) (75 mL/Hr) IV Continuous <Continuous>  dextrose 5%. 1000 milliLiter(s) (100 mL/Hr) IV Continuous <Continuous>  dextrose 5%. 1000 milliLiter(s) (50 mL/Hr) IV Continuous <Continuous>  dextrose 50% Injectable 25 Gram(s) IV Push once  dextrose 50% Injectable 25 Gram(s) IV Push once  dextrose 50% Injectable 12.5 Gram(s) IV Push once  dextrose 50% Injectable 25 Gram(s) IV Push once  enoxaparin Injectable 40 milliGRAM(s) SubCutaneous every 24 hours  fentaNYL   Patch  25 MICROgram(s)/Hr 1 Patch Transdermal every 72 hours  FIRST- Mouthwash  BLM 4 milliLiter(s) Swish and Spit every 6 hours  glucagon  Injectable 1 milliGRAM(s) IntraMuscular once  influenza  Vaccine (HIGH DOSE) 0.7 milliLiter(s) IntraMuscular once  insulin glargine Injectable (LANTUS) 15 Unit(s) SubCutaneous at bedtime  insulin lispro (ADMELOG) corrective regimen sliding scale   SubCutaneous every 6 hours  lidocaine 2% Viscous 5 milliLiter(s) Swish and Spit two times a day  metoprolol tartrate Injectable 2.5 milliGRAM(s) IV Push every 6 hours  silver sulfADIAZINE 1% Cream 1 Application(s) Topical two times a day  thiamine IVPB 500 milliGRAM(s) IV Intermittent every 24 hours    MEDICATIONS  (PRN):  dextrose Oral Gel 15 Gram(s) Oral once PRN Blood Glucose LESS THAN 70 milliGRAM(s)/deciliter  HYDROmorphone  Injectable 0.5 milliGRAM(s) IV Push every 3 hours PRN Moderate Pain (4 - 6)  HYDROmorphone  Injectable 1 milliGRAM(s) IV Push every 3 hours PRN Severe Pain (7 - 10)      Care Discussed with Consultants/Other Providers [ x] YES  [ ] NO    RADIOLOGY & ADDITIONAL TESTS: HPI: 78 yr old male, [place] speaking with history of CAD, PVD, DM, HFrEF, 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 Marr/Dr. Derek Gar), presents to the ED with generalized weakness. Reports progressive weakness over the last 2 weeks assc w/ fatigue. PEG placed on 11/14 w/ plan to start feeds on 11/15 and monitor for refeeding syndrome.    INTERVAL HPI/OVERNIGHT EVENTS:  Pt was seen and examined at bedside. As per nurse and o/n team pt no acute events, however the pt became agitated and wanted to leave the hospital. He was re-orientable and did not require any medication.      Vital Signs Last 24 Hrs  T(C): 36.7 (15 Nov 2023 09:59), Max: 37.2 (14 Nov 2023 13:58)  T(F): 98 (15 Nov 2023 09:59), Max: 98.6 (14 Nov 2023 22:22)  HR: 90 (15 Nov 2023 10:40) (76 - 102)  BP: 125/61 (15 Nov 2023 10:40) (109/54 - 146/65)    RR: 18 (15 Nov 2023 10:40) (18 - 19)  SpO2: 100% (15 Nov 2023 10:40) (92% - 100%)    O2 Parameters below as of 15 Nov 2023 10:40  Patient On (Oxygen Delivery Method): nasal cannula  O2 Flow (L/min): 2    PHYSICAL EXAM:   GENERAL: resting comfortably in bed; NAD  HEAD:  Atraumatic, Normocephalic  EYES: severe cataracts bilaterally   NECK: blisters/erythema/macerated lesion, No JVD, Normal thyroid, no enlarged nodes   NERVOUS SYSTEM:  AAOx3; CNII-XII grossly intact; no focal deficits  CHEST/LUNG: interval improvement in upper airway secretions & BS  HEART: S1/S2 normal, no S3, Regular rate and rhythm; No murmurs   ABDOMEN: Soft, Nontender, Nondistended; PEG in place w/o edema or erythema  EXTREMITIES:  2+ Peripheral Pulses, No clubbing, cyanosis, or edema   PSYCHIATRIC: affect and characteristics of appearance, verbalizations, behaviors are appropriate      I&O's Summary    14 Nov 2023 07:01  -  15 Nov 2023 07:00  --------------------------------------------------------  IN: 1500 mL / OUT: 800 mL / NET: 700 mL    15 Nov 2023 07:01  -  15 Nov 2023 11:37  --------------------------------------------------------  IN: 375 mL / OUT: 125 mL / NET: 250 mL        LABS:                        7.5    5.46  )-----------( 150      ( 14 Nov 2023 05:30 )             24.4     11-15    141  |  107  |  6<L>  ----------------------------<  151<H>  3.5   |  28  |  1.14    Ca    7.9<L>      15 Nov 2023 05:30  Phos  2.5     11-15  Mg     1.4     11-15    TPro  5.0<L>  /  Alb  2.2<L>  /  TBili  0.4  /  DBili  x   /  AST  28  /  ALT  7<L>  /  AlkPhos  62  11-15    PT/INR - ( 14 Nov 2023 05:30 )   PT: 12.9 sec;   INR: 1.14     PTT - ( 14 Nov 2023 05:30 )  PTT:27.6 sec  Urinalysis Basic - ( 15 Nov 2023 05:30 )    Color: x / Appearance: x / SG: x / pH: x  Gluc: 151 mg/dL / Ketone: x  / Bili: x / Urobili: x   Blood: x / Protein: x / Nitrite: x   Leuk Esterase: x / RBC: x / WBC x   Sq Epi: x / Non Sq Epi: x / Bacteria: x      CAPILLARY BLOOD GLUCOSE      POCT Blood Glucose.: 136 mg/dL (15 Nov 2023 05:43)  POCT Blood Glucose.: 179 mg/dL (15 Nov 2023 00:23)  POCT Blood Glucose.: 156 mg/dL (14 Nov 2023 22:27)  POCT Blood Glucose.: 122 mg/dL (14 Nov 2023 18:11)  POCT Blood Glucose.: 124 mg/dL (14 Nov 2023 11:57)        Consultant(s) Notes Reviewed:  [x ] YES  [ ] NO    MEDICATIONS  (STANDING):  albuterol/ipratropium for Nebulization 3 milliLiter(s) Nebulizer every 6 hours  atropine 1% Ophthalmic Solution for SubLingual Use 1 Drop(s) SubLingual three times a day  cyanocobalamin Injectable 1000 MICROGram(s) IntraMuscular every 24 hours  dextrose 5% + sodium chloride 0.45%. 1000 milliLiter(s) (75 mL/Hr) IV Continuous <Continuous>  dextrose 5%. 1000 milliLiter(s) (100 mL/Hr) IV Continuous <Continuous>  dextrose 5%. 1000 milliLiter(s) (50 mL/Hr) IV Continuous <Continuous>  dextrose 50% Injectable 25 Gram(s) IV Push once  dextrose 50% Injectable 25 Gram(s) IV Push once  dextrose 50% Injectable 12.5 Gram(s) IV Push once  dextrose 50% Injectable 25 Gram(s) IV Push once  enoxaparin Injectable 40 milliGRAM(s) SubCutaneous every 24 hours  fentaNYL   Patch  25 MICROgram(s)/Hr 1 Patch Transdermal every 72 hours  FIRST- Mouthwash  BLM 4 milliLiter(s) Swish and Spit every 6 hours  glucagon  Injectable 1 milliGRAM(s) IntraMuscular once  influenza  Vaccine (HIGH DOSE) 0.7 milliLiter(s) IntraMuscular once  insulin glargine Injectable (LANTUS) 15 Unit(s) SubCutaneous at bedtime  insulin lispro (ADMELOG) corrective regimen sliding scale   SubCutaneous every 6 hours  lidocaine 2% Viscous 5 milliLiter(s) Swish and Spit two times a day  metoprolol tartrate Injectable 2.5 milliGRAM(s) IV Push every 6 hours  silver sulfADIAZINE 1% Cream 1 Application(s) Topical two times a day  thiamine IVPB 500 milliGRAM(s) IV Intermittent every 24 hours    MEDICATIONS  (PRN):  dextrose Oral Gel 15 Gram(s) Oral once PRN Blood Glucose LESS THAN 70 milliGRAM(s)/deciliter  HYDROmorphone  Injectable 0.5 milliGRAM(s) IV Push every 3 hours PRN Moderate Pain (4 - 6)  HYDROmorphone  Injectable 1 milliGRAM(s) IV Push every 3 hours PRN Severe Pain (7 - 10)      Care Discussed with Consultants/Other Providers [ x] YES  [ ] NO    RADIOLOGY & ADDITIONAL TESTS:

## 2023-11-16 NOTE — PROGRESS NOTE ADULT - PROBLEM SELECTOR PLAN 5
.  Patient is Full Code  -patient appointed granddaughter (Yvrose Smith, [phone]) as alternate decision maker  -see prior C note    Patient would benefit from outpatient Palliative/Supportive Oncology follow-up on discharge with Dr. Emma Anthony at [facility]. Please call [phone] to make an appointment when discharge planning.

## 2023-11-16 NOTE — PROGRESS NOTE ADULT - PROBLEM SELECTOR PLAN 2
.  -Atropine Sublingual Drops TID for local secretion management    Would not add additional anti-cholinergic for secretion management since trial of Scopolamine Patch was stopped due to delirium and urinary retention

## 2023-11-16 NOTE — SWALLOW VFSS/MBS ASSESSMENT ADULT - DIAGNOSTIC IMPRESSIONS
Severe pharyngeal dysphagia with impact to safety and efficiency of the swallow *** Severe pharyngeal dysphagia with impact to safety and efficiency of the swallow. Reduced hyolaryngeal movement, delayed and incomplete laryngeal vestibule closure (wide column), and glottic insufficiency resulted primarily in silent aspiration of thin and thickened liquid consistencies (copious amounts, though not deemed gross) and penetration of pudding consistency. Aspiration of pudding consistency was essentially prevented given prompted cough and subsequent swallows. Implementation of left head turn/tuck reduced airway invasion, though no significant difference across consistencies. Prompted cough was weak and unproductive. Spontaneous cough noted in between frames with expectoration of thick secretions mixed with barium. Reduced bolus drive resulted in pharyngeal retention of increased viscosity, partially cleared with prompted subsequent swallows and postural strategy.     Swallow function c/w site of lesion and treatment hx c/b deconditioning associated with acute toxicities of treatment and malnutrition. Anticipate improvement in swallow function with ongoing overarching medical gains and dysphagia intervention to maximize functional outcomes. Severe pharyngeal dysphagia with impact to safety and efficiency of the swallow. Reduced hyolaryngeal movement, delayed and incomplete laryngeal vestibule closure (wide column), and glottic insufficiency resulted primarily in silent aspiration of thin and thickened liquid consistencies (copious amounts, though not deemed gross) and penetration of pudding consistency. Aspiration of pudding consistency was essentially prevented given implementation of strategies+prompted cough. Implementation of left head turn/tuck reduced airway invasion, though no significant difference across consistencies. Prompted cough was weak and unproductive. Spontaneous cough noted in between frames with expectoration of thick secretions mixed with barium. Reduced bolus drive resulted in pharyngeal retention of increased viscosity, partially cleared with prompted subsequent swallows and postural strategy.     Swallow function c/w site of lesion and treatment hx c/b deconditioning associated with acute toxicities of treatment and malnutrition. Anticipate improvement in swallow function with ongoing overarching medical gains and dysphagia intervention to maximize functional outcomes.    Severe pharyngeal dysphagia, DIGEST Grade 3 (S3, E3)- Dynamic Imaging Grade of Swallowing Toxicity (DIGEST) V2  **Gross aspiration deemed >25% of total bolus volume

## 2023-11-16 NOTE — PROGRESS NOTE ADULT - ASSESSMENT
77yo M with PMH of CAD, PVD, T2DM, and SCC of Larynx p/w weakness and odynophagia. Palliative consulted for complex symptom management in the setting of malignancy.    ·	Transition to PO pain regimen: morphine solution 5mg via PEG q4h PRN moderate pain and morphine 10mg via PEg q4h PRN severe pain  ·	Dilaudid 1mg IV q4h PRN for breakthrough pain  ·	Fentanyl Patch 25mcg/hr q72hr and morphine solution 5mg q4h PRN for Severe Pain    Patient would benefit from outpatient Palliative/Supportive Oncology follow-up on discharge with Dr. Emma Anthony at [facility]. Please call [phone] when discharge planning to make an appointment.

## 2023-11-16 NOTE — SWALLOW VFSS/MBS ASSESSMENT ADULT - RECOMMENDED CONSISTENCY
-NPO with alternate means of nutrition/hydration  -Allow ice chips as tolerated with strict aspiration precautions

## 2023-11-16 NOTE — PROGRESS NOTE ADULT - PROBLEM SELECTOR PLAN 1
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 C/o odynophagia causing poor PO intake. Per chart review appears poor PO intake has been ongoing issue while undergoing chemo/radiation. 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 Marr. Last chemo 10/23. 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 PEG placed 11/14  - Chest PT, hypertonic nebulizer q4hr   - Aspiration precautions    -Per rad onc, odynophagia and mucositis should likely resolve 2-3 weeks after completion of radiation. no radiation on 10/31. Last treatment 10/30  - magic mouthwash  - viscous lidocaine   -scopolamine patch  -switch pantoprazole 40 mg qd via PEG  -failed S&S on 11/7 --> re-evaluate with MBS 11/15. Concern for future esophageal atrophy w/ no PO intake  -PEG placed 11/14, plan Start feeds 24h post placement.   -Glucerna 1.2@ 65cc hour: Will start @ 15cc/hr and increase to 30% tomorrow if tolerating  -BMP Mg/Phos @ 6pm to eval for Refeeding syndrome  -Palliative:         -Fentanyl 25mcg/hr Patch q72hr as long-acting agent        -Dilaudid 0.5mg IV q3h PRN for Moderate Pain        -Dilaudid 1mg IV q3h PRN for Severe Pain        - atropine 1% sublingual TID Larynx SCC currently getting radiation and chemotherapy. C/o odynophagia causing poor PO intake. Per chart review appears poor PO intake has been ongoing issue while undergoing chemo/radiation. 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 Marr. Last chemo 10/23. 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 PEG placed 11/14  - Chest PT, hypertonic nebulizer q4hr   - Aspiration precautions    -Per rad onc, odynophagia and mucositis should likely resolve 2-3 weeks after completion of radiation. no radiation on 10/31. Last treatment 10/30  - magic mouthwash  - viscous lidocaine   -scopolamine patch  -switch pantoprazole 40 mg qd via PEG  -failed S&S on 11/7 --> re-evaluate with MBS 11/15. Concern for future esophageal atrophy w/ no PO intake  -PEG placed 11/14, plan Start feeds 24h post placement.   -Glucerna 1.2@ 65cc hour: Restarted @ 20cc/hr (11/16) and increase to 30cc tomorrow based on repeat labs  -BMP Mg/Phos @ 6pm to eval for Refeeding syndrome  -Palliative:         -Fentanyl 25mcg/hr Patch q72hr as long-acting agent        -Dilaudid 0.5mg IV q3h PRN for Moderate Pain        -Dilaudid 1mg IV q3h PRN for Severe Pain        - atropine 1% sublingual TID

## 2023-11-16 NOTE — SWALLOW VFSS/MBS ASSESSMENT ADULT - PHARYNGEAL PHASE COMMENTS
Controlled bolus size administered for majority of trials to minimize aspiration given degree of deconditioning and suspected low reserve in the setting of prolonged NPO status. Pt s/p PEG 11/14, otherwise was without a nutritional source for ~2 weeks. Primarily on IV fluids.  Initiation of the pharyngeal swallow imaged primarily at the laryngeal surface with liquids. Reduced laryngeal elevation, partial anterior hyoid excursion, no epiglottic inversion and delayed and incomplete laryngeal vestibule closure (wide column) resulted in chronic penetration during the swallow with thin liquids>thickened liquids with subsequent chronic and primarily silent aspiration (>trace, not gross, though not a minimal amount) of penetrated material. Implementation of a left head turn/tuck reduced aspiration across consistencies. No sign difference amongst consistencies. Mildly thick liquids via tsp in neutral position resulted in the least penetration/aspiration across all trials, though trace aspiration of pyriform sinus residual was imaged. Prompted cough was weak and primarily unproductive. Trace loss to the nasopharynx more so related to retrograde flow from reduced bolus drive vs velopharyngeal insufficiency. Reduced BOT retraction (narrow column), reduced pharyngeal stripping, and reduced pharyngoesphageal segment opening resulted in moderate posterior pharyngeal wall and pyriform sinus residue with increased viscosity (noted w/ moderately thick liquids and pudding, though pudding administered with a postural strategy). Prompted subsequent swallows partially cleared residual. Deep penetration after the swallow of pharyngeal residual imaged across consistencies with subsequent aspiration. Aspiration was at times sensate, though cough typically delayed with expectoration of thick secretions mixed with barium in between frames. Aspiration of pudding was not visualized in part due to immediate prompting of a cough-reswallow to prevent further aspiration. Controlled bolus size administered for majority of trials to minimize aspiration given degree of deconditioning and suspected low reserve in the setting of prolonged NPO status. Pt s/p PEG 11/14, otherwise was without a nutritional source for ~2 weeks. Primarily on IV fluids.  Initiation of the pharyngeal swallow imaged primarily at the laryngeal surface with liquids. Reduced laryngeal elevation, partial anterior hyoid excursion, no epiglottic inversion and delayed and incomplete laryngeal vestibule closure (wide column) c/c glottic insufficiency resulted in chronic penetration during the swallow with thin liquids>thickened liquids with subsequent primarily silent aspiration (>trace, not gross, though not a minimal amount). Implementation of a left head turn/tuck reduced aspiration across consistencies. No sign difference amongst consistencies. Mildly thick liquids via tsp in neutral position resulted in the least penetration/aspiration across all trials, though trace aspiration of pyriform sinus residual was imaged. Prompted cough was weak and primarily unproductive. Trace loss to the nasopharynx more so related to retrograde flow from reduced bolus drive vs velopharyngeal insufficiency. Reduced BOT retraction (narrow column), reduced pharyngeal stripping, and reduced pharyngoesphageal segment opening resulted in moderate posterior pharyngeal wall and pyriform sinus residue with increased viscosity (noted w/ moderately thick liquids and pudding, though pudding administered with a postural strategy). Prompted subsequent swallows partially cleared residual. Deep penetration after the swallow of pharyngeal residual imaged across consistencies with subsequent aspiration. Aspiration was at times sensate, though cough was typically delayed with expectoration of thick secretions mixed with barium in between frames. Aspiration of pudding was not visualized in part due to immediate prompted cough-reswallow to prevent further aspiration. Controlled bolus size administered for majority of trials to minimize aspiration given degree of deconditioning and suspected low reserve given overall acuity. Prolonged NPO status during this admission, now s/p PEG 11/14. Pt was without a nutritional source for ~2 weeks. Primarily on IV fluids.  Initiation of the pharyngeal swallow imaged primarily at the laryngeal surface with liquids. Reduced laryngeal elevation, partial anterior hyoid excursion, no epiglottic inversion, and delayed and incomplete laryngeal vestibule closure (wide column) c/b glottic insufficiency resulted in chronic penetration during the swallow with thin liquids>thickened liquids with subsequent primarily silent aspiration (>trace, not gross, though not a minimal amount). Implementation of a left head turn/tuck reduced aspiration across consistencies. No sign difference amongst consistencies. Mildly thick liquids via tsp in neutral position resulted in the least penetration/aspiration across all trials, though trace aspiration of pyriform sinus residual was imaged. Prompted cough was weak and primarily unproductive. Trace loss to the nasopharynx more so related to retrograde flow from reduced bolus drive vs velopharyngeal insufficiency. Reduced BOT retraction (narrow column), reduced pharyngeal stripping, and reduced pharyngoesphageal segment opening resulted in moderate posterior pharyngeal wall and pyriform sinus residue with increased viscosity (noted w/ moderately thick liquids and pudding, though pudding administered with a postural strategy). Prompted subsequent swallows partially cleared residual. Deep penetration after the swallow of pharyngeal residual imaged across consistencies with subsequent aspiration. Aspiration was at times sensate, though cough was typically delayed with expectoration of thick secretions mixed with barium in between frames. Aspiration of pudding was not visualized in part due to immediate prompted cough-reswallow to prevent further aspiration. Controlled bolus size administered for majority of trials to minimize aspiration given degree of deconditioning and suspected low reserve given overall acuity. Prolonged NPO status during this admission, now s/p PEG 11/14. Pt was without a nutritional source for ~2 weeks. Primarily on IV fluids.  Initiation of the pharyngeal swallow imaged primarily at the laryngeal surface with liquids. Reduced laryngeal elevation, partial anterior hyoid excursion, no epiglottic inversion, and delayed and incomplete laryngeal vestibule closure (wide column) c/b glottic insufficiency resulted in chronic penetration during the swallow with thin liquids>thickened liquids with subsequent primarily silent aspiration (>trace, not gross, though not a minimal amount). Implementation of a left head turn/tuck reduced aspiration across consistencies. No sign difference amongst consistencies. Mildly thick liquids via tsp in neutral position resulted in the least penetration/aspiration across all trials, though trace aspiration of pyriform sinus residual was imaged. Prompted cough was weak and primarily unproductive. Trace loss to the nasopharynx more so related to retrograde flow from reduced bolus drive vs velopharyngeal insufficiency. Reduced BOT retraction (narrow column), reduced pharyngeal stripping, and reduced pharyngoesphageal segment opening resulted in moderate posterior pharyngeal wall and pyriform sinus residue with increased viscosity (moderately thick liquids and pudding). Prompted subsequent swallows partially cleared residual. Deep penetration after the swallow of pharyngeal residual imaged across consistencies with subsequent aspiration. Aspiration was at times sensate, though cough was typically delayed with expectoration of thick secretions mixed with barium in between frames. Aspiration of pudding was not visualized in part due to immediate prompted cough-reswallow to prevent further aspiration, though penetration imaged during the swallow.

## 2023-11-16 NOTE — PROGRESS NOTE ADULT - SUBJECTIVE AND OBJECTIVE BOX
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 Marr/Dr. Derek Gar), presents to the ED with generalized weakness. Reports progressive weakness over the last 2 weeks assc w/ fatigue. 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 C/o odynophagia but tolerating secretions. No shortness of breath or difficulty breathing. Reports dermatitis is stable, and reponds well to cream he was given. No drainage or discharge. Saw Rad on on 10/13. Per chart review appears odynophagia was presenting symptom of cancer. Reported subjective fever for 1 day but did not take his temperature at home. ROS otherwise negative.       Afebrile, HR 70s, //86, 100% on RA  WBC 2.3, Hb 9.5, plt 219, Na 133, K 5.4, BUN 49/Cr 1.87, lactate 1.0  EKG NSR, no peak T waves   s/p morphine, 2L NS  (29 Oct 2023 21:39)                            7.5    9.25  )-----------( 141      ( 16 Nov 2023 05:30 )             24.3       11-16    139  |  105  |  8   ----------------------------<  111<H>  4.0   |  29  |  1.11    Ca    7.7<L>      16 Nov 2023 05:30  Phos  3.0     11-16  Mg     1.6     11-16    TPro  5.0<L>  /  Alb  2.2<L>  /  TBili  0.4  /  DBili  x   /  AST  28  /  ALT  7<L>  /  AlkPhos  62  11-15    Vital Signs Last 24 Hrs  T(C): 37.1 (16 Nov 2023 09:42), Max: 37.7 (16 Nov 2023 06:05)  T(F): 98.7 (16 Nov 2023 09:42), Max: 99.8 (16 Nov 2023 06:05)  HR: 96 (16 Nov 2023 10:55) (82 - 100)  BP: 125/59 (16 Nov 2023 10:55) (98/51 - 129/58)  BP(mean): 85 (16 Nov 2023 10:55) (71 - 85)  RR: 16 (16 Nov 2023 10:55) (16 - 16)  SpO2: 99% (16 Nov 2023 10:55) (95% - 100%)    Parameters below as of 16 Nov 2023 10:55  Patient On (Oxygen Delivery Method): nasal cannula  O2 Flow (L/min): 2      MEDICATIONS  (STANDING):  albuterol/ipratropium for Nebulization 3 milliLiter(s) Nebulizer every 6 hours  atropine 1% Ophthalmic Solution for SubLingual Use 1 Drop(s) SubLingual three times a day  cyanocobalamin Injectable 1000 MICROGram(s) IntraMuscular every 24 hours  dextrose 5% + sodium chloride 0.45%. 1000 milliLiter(s) (75 mL/Hr) IV Continuous <Continuous>  dextrose 5%. 1000 milliLiter(s) (50 mL/Hr) IV Continuous <Continuous>  dextrose 5%. 1000 milliLiter(s) (100 mL/Hr) IV Continuous <Continuous>  dextrose 50% Injectable 25 Gram(s) IV Push once  dextrose 50% Injectable 12.5 Gram(s) IV Push once  dextrose 50% Injectable 25 Gram(s) IV Push once  dextrose 50% Injectable 25 Gram(s) IV Push once  enoxaparin Injectable 40 milliGRAM(s) SubCutaneous every 24 hours  fentaNYL   Patch  25 MICROgram(s)/Hr 1 Patch Transdermal every 72 hours  FIRST- Mouthwash  BLM 4 milliLiter(s) Swish and Spit every 6 hours  glucagon  Injectable 1 milliGRAM(s) IntraMuscular once  influenza  Vaccine (HIGH DOSE) 0.7 milliLiter(s) IntraMuscular once  insulin glargine Injectable (LANTUS) 15 Unit(s) SubCutaneous at bedtime  insulin lispro (ADMELOG) corrective regimen sliding scale   SubCutaneous every 6 hours  lidocaine 2% Viscous 5 milliLiter(s) Swish and Spit two times a day  loperamide 2 milliGRAM(s) Oral every 12 hours  magnesium sulfate  IVPB 2 Gram(s) IV Intermittent once  metoprolol succinate ER 12.5 milliGRAM(s) Oral every 24 hours  pantoprazole   Suspension 40 milliGRAM(s) Oral every 24 hours  silver sulfADIAZINE 1% Cream 1 Application(s) Topical two times a day  sodium chloride 0.9% Bolus 1000 milliLiter(s) IV Bolus once  thiamine IVPB 500 milliGRAM(s) IV Intermittent every 24 hours    MEDICATIONS  (PRN):  dextrose Oral Gel 15 Gram(s) Oral once PRN Blood Glucose LESS THAN 70 milliGRAM(s)/deciliter  HYDROmorphone  Injectable 1 milliGRAM(s) IV Push every 3 hours PRN Severe Pain (7 - 10)  HYDROmorphone  Injectable 0.5 milliGRAM(s) IV Push every 3 hours PRN Moderate Pain (4 - 6)        11/15/2023 Functional Status Assessment :       Pain Assessment/Number Scale (0-10) Adult  Presence of Pain: denies pain/discomfort (Rating = 0)  Pain Rating (0-10): Rest: 0 (no pain/absence of nonverbal indicators of pain)  Pain Rating (0-10): Activity: 0 (no pain/absence of nonverbal indicators of pain)    Safety      AM-PAC Functional Assessment: Daily Activity  Type of Assessment: Daily assessment  Putting on and taking off regular lower body clothing?: 3 = A little assistance  Bathing (including washing, rinsing, drying)?: 3 = A little assistance  Toileting, which includes using toilet, bedpan or urinal?: 3 = A little assistance  Putting on and taking off regular upper body clothing?: 3 = A little assistance  Take care of personal grooming such as brushing teeth?: 3 = A little assistance  Eating meals?: 3 = A little assistance  Score: 18   Row Comment: Ask the patient "How much help from another person do you currently need? (If the patient hasn't done an activity recently, how much help from another person do you think he/she needs if he/she tried?)    Cognitive/Neuro      Cognitive/Neuro/Behavioral  Level of Consciousness: alert  Arousal Level: arouses to voice  Mood/Behavior: agitated    Language Assistance  Preferred Language to Address Healthcare Preferred Language to Address Healthcare: Slovak  's name: [facility]hab [facility] Nahomi rubio   Patient/Caregiver offered   services: yes  Patient/Caregiver accepted  services: yes  Date/Time of acceptance(dd-mmm-yyyy hh:mm): 15-Nov-2023 15:05     Therapeutic Interventions      Bed Mobility  Bed Mobility Training Sit-to-Supine: minimum assist (75% patient effort);  1 person assist  Bed Mobility Training Supine-to-Sit: minimum assist (75% patient effort);  1 person assist  Bed Mobility Training Limitations: impaired balance;  decreased strength    Sit-Stand Transfer Training  Transfer Training Sit-to-Stand Transfer: contact guard;  1 person assist;  verbal cues;  rolling walker  Transfer Training Stand-to-Sit Transfer: contact guard;  1 person assist;  verbal cues;  rolling walker  Sit-to-Stand Transfer Training Transfer Safety Analysis: decreased balance;  decreased weight-shifting ability;  impaired balance;  decreased strength    Therapeutic Exercise  Therapeutic Exercise Detail: Pt performed functional mobility ~15ftx2 with CGA with RW with verbal cues to avoid obstacles               PM&R Impression : as above    Current disposition plan recommendation :    subacute rehab placement

## 2023-11-16 NOTE — PROGRESS NOTE ADULT - ASSESSMENT
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 Marr/Dr. Gar), admitted [id number] for inability to tolerate PO, odynophagia and frequent suctioning    Nutritional Assessment:  · Nutritional Assessment	This patient has been assessed with a concern for Malnutrition and has been determined to have a diagnosis/diagnoses of Severe protein-calorie malnutrition.    This patient is being managed with:   Diet NPO-  Entered: Nov 4 2023 11:10PM    Problem/Plan - 1:  ·  Problem: Odynophagia.   ·  Plan: Larynx SCC currently getting radiation and chemotherapy. C/o odynophagia causing poor PO intake. Per chart review appears poor PO intake has been ongoing issue while undergoing chemo/radiation. 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 Marr. Last chemo 10/23. 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 PEG placed 11/14  - Chest PT, hypertonic nebulizer q4hr   - Aspiration precautions    -Per rad onc, odynophagia and mucositis should likely resolve 2-3 weeks after completion of radiation. no radiation on 10/31. Last treatment 10/30  - magic mouthwash  - viscous lidocaine   -scopolamine patch  -switch pantoprazole 40 mg qd via PEG  -failed S&S on 11/7 --> re-evaluate with MBS 11/15. Concern for future esophageal atrophy w/ no PO intake  -PEG placed 11/14, plan Start feeds 24h post placement.   -Glucerna 1.2@ 65cc hour: Will start @ 15cc/hr and increase to 30% tomorrow if tolerating  -BMP Mg/Phos @ 6pm to eval for Refeeding syndrome  -Palliative:         -Fentanyl 25mcg/hr Patch q72hr as long-acting agent        -Dilaudid 0.5mg IV q3h PRN for Moderate Pain        -Dilaudid 1mg IV q3h PRN for Severe Pain        - atropine 1% sublingual TID.    Problem/Plan - 2:  ·  Problem: Weakness generalized.   ·  Plan: Reports progressive weakness over the last 2 weeks assc w/ fatigue and poor PO intake. Reports this typically happens after chemo. Typically ambulates with cane.  -Per PT, SOFI placement  -Out of bed to chair.    Problem/Plan - 3:  ·  Problem: Radiation dermatitis.   ·  Plan: Has b/l neck dermatitis due to radiation, first noticed early 09/13/2023 after 1st/2nd treatment. C/o pain, dysphagia and odynophagia for weeks which was tx with magic mouthwash, silver silvadene cream, fluconazole 400mg once a day (started 10/23). Radiation paused due to pain, last RT 10/28.  On exam -  Bilateral neck wounds - erythematous with skin breakdown, crusted dead skin peripherally, no drainage, no skin sloughing, at baseline per patient. Reports improvement with cream. No concern for superimposed infection at this time  Plan:  -Per Rad onc, received RT 10/30, 10/31, and 11/1, 11/3  -c/w silvadene cream and non-stick silicon bandage  -For skin care,  rinse with saline soaks liberally with dabbing of light soap and water.    Problem/Plan - 4:  ·  Problem: ELENITA (acute kidney injury).   ·  Plan: RESOLVED    #Hypernatremia    Pt w/ uptrending Creatinine since admission, May be pre renal due to volume loss 2/2 secretions.    - continue to trend BUN/Cr  - D5+1/2 NS 75ml/hr  - restart home flomax when pt is able to take PO  - q6 bladder scans.    Problem/Plan - 5:  ·  Problem: Squamous cell carcinoma of larynx.   ·  Plan: Dx 06/2023 with SCC larynx. Chemo/RT initiated 08/2023. Follows with Dr. Marr (rad onc) and Dr. Derek Harvey (onc). Last chemo (taxol/carboplatin) on 10/23, last RT 11/3, tolerated well.     - palliative consulted on board.    Problem/Plan - 6:  ·  Problem: Acute metabolic encephalopathy.   ·  Plan: Pt was agitated o/n and received 2x2.5mg Zyprexa for agitation. AM pt was responding and was AOx2, but after RT tx pt was AOx0. Unclear etiology at this point. Pt is AOx1-2. Infectious workup continues to be unremarkable w/ no NGTD and negative UA and CXR. Likely 2/2 to hospital delirium, no FND  Plan:  - Re-orient as possible  - Can give Zyprexa 2.5mg IM if necessary.    Problem/Plan - 7:  ·  Problem: Heart failure with mildly reduced ejection fraction.   ·  Plan: TTE 07/2023: mild LVH, mild reduced LVH hypertrophy, normal RV function, LV systolic function mild decr EF 50-55%. home meds: imdur 30 once a day, lisinopril 20mg once a day, toprol 12.5mg once a day   - Restart Toprol 12.5 mg- converted to IV 1.25 q6 given pt cannot tolerate PO - via PEG  - hold imdur given soft BPs, resume when appropriate   - hold lisinopril given prior ELENITA and unable to tolerate PO.    Problem/Plan - 8:  ·  Problem: CAD (coronary artery disease).   ·  Plan: CAD s/p MIDCAB and PCI with multiple WINTER (most recent to LCx in 8/2022), on ASA 81 at home. Currently asymptomatic. EKG on admission w/o ischemic changes  - hold ASA 81 inability to take PO  - hold lipitor 80mg once a day inability to take PO.    Problem/Plan - 9:  ·  Problem: DM (diabetes mellitus).   ·  Plan: Last A1c 7.9 in 07/2023. Regimen as follows: Lantus 50mg daily, 12units TID with meals for 48 hour after chemo then 8 TID  - c/w sliding scale   - 15u lantus  - hold pre-meal given poor PO intake  - q6hr FS given inability to tolerate PO.    Problem/Plan - 10:  ·  Problem: Prophylactic measure.   ·  Plan; Diet: NPO  F: 1/4 NS  E: replete PRN  DVT: Resume after PEG   Code: Full.

## 2023-11-16 NOTE — PROGRESS NOTE ADULT - PROBLEM SELECTOR PLAN 6
.  Complex medical decision making / symptom management in the setting of malignancy.    Will continue to follow for ongoing GOC discussion / titration of palliative regimen. Emotional support provided, questions answered.  Active Psychosocial Referrals:  [x]Social Work/Case management [x]PT/OT [x]Chaplaincy []Hospice  []Patient/Family Support []Holistic RN [x]Massage Therapy []Music Therapy []Ethics  Coping: [] well [x] with difficulty [] poor coping [] unable to assess  Support system: [] strong [x] adequate [] inadequate    For new or uncontrolled symptoms, please call Palliative Care at [phone]). The service is available 24/7 (including nights & weekends) to provide symptom management recommendations over the phone as appropriate

## 2023-11-16 NOTE — PROGRESS NOTE ADULT - ASSESSMENT
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 Marr/Dr. Gar), admitted [id number] for inability to tolerate PO, odynophagia and frequent suctioning

## 2023-11-16 NOTE — PROGRESS NOTE ADULT - SUBJECTIVE AND OBJECTIVE BOX
Language Line  #[id number]    SUBJECTIVE AND OBJECTIVE:  Indication for Geriatrics and Palliative Care Services: Symptom management    OVERNIGHT EVENTS:    Allergies    No Known Allergies    Intolerances    MEDICATIONS  (STANDING):  albuterol/ipratropium for Nebulization 3 milliLiter(s) Nebulizer every 6 hours  atropine 1% Ophthalmic Solution for SubLingual Use 1 Drop(s) SubLingual three times a day  cyanocobalamin Injectable 1000 MICROGram(s) IntraMuscular every 24 hours  dextrose 5% + sodium chloride 0.45%. 1000 milliLiter(s) (75 mL/Hr) IV Continuous <Continuous>  dextrose 5%. 1000 milliLiter(s) (50 mL/Hr) IV Continuous <Continuous>  dextrose 5%. 1000 milliLiter(s) (100 mL/Hr) IV Continuous <Continuous>  dextrose 50% Injectable 25 Gram(s) IV Push once  dextrose 50% Injectable 12.5 Gram(s) IV Push once  dextrose 50% Injectable 25 Gram(s) IV Push once  dextrose 50% Injectable 25 Gram(s) IV Push once  enoxaparin Injectable 40 milliGRAM(s) SubCutaneous every 24 hours  fentaNYL   Patch  25 MICROgram(s)/Hr 1 Patch Transdermal every 72 hours  FIRST- Mouthwash  BLM 4 milliLiter(s) Swish and Spit every 6 hours  glucagon  Injectable 1 milliGRAM(s) IntraMuscular once  influenza  Vaccine (HIGH DOSE) 0.7 milliLiter(s) IntraMuscular once  insulin glargine Injectable (LANTUS) 15 Unit(s) SubCutaneous at bedtime  insulin lispro (ADMELOG) corrective regimen sliding scale   SubCutaneous every 6 hours  insulin regular  human recombinant 2 Unit(s) SubCutaneous every 6 hours  lidocaine 2% Viscous 5 milliLiter(s) Swish and Spit two times a day  loperamide 2 milliGRAM(s) Oral every 12 hours  pantoprazole   Suspension 40 milliGRAM(s) Oral every 24 hours  silver sulfADIAZINE 1% Cream 1 Application(s) Topical two times a day  thiamine IVPB 500 milliGRAM(s) IV Intermittent every 24 hours    MEDICATIONS  (PRN):  dextrose Oral Gel 15 Gram(s) Oral once PRN Blood Glucose LESS THAN 70 milliGRAM(s)/deciliter  HYDROmorphone  Injectable 1 milliGRAM(s) IV Push every 4 hours PRN Breakthrough pain  morphine   Solution 5 milliGRAM(s) Oral every 4 hours PRN Moderate Pain (4 - 6)  morphine   Solution 10 milliGRAM(s) Oral every 4 hours PRN Severe Pain (7 - 10)      ITEMS UNCHECKED ARE NOT PRESENT    PRESENT SYMPTOMS: [ ]Unable to self-report  Source if other than patient:  [ ]Family   [ ]Team     Pain:  [X]yes [ ]no  QOL impact - Inability to swallow  Location - Throat  Aggravating factors - swallowing  Quality - Sharp  Radiation - None  Timing- Intermittent  Severity (0-10 scale): 6/10  Minimal acceptable level (0-10 scale): 3/10    Dyspnea:                           [ ]Mild [ ]Moderate [ ]Severe  Anxiety:                             [ ]Mild [ ]Moderate [ ]Severe  Fatigue:                             [ ]Mild [ ]Moderate [ ]Severe  Nausea:                             [ ]Mild [ ]Moderate [ ]Severe  Loss of appetite:              [ ]Mild [ ]Moderate [ ]Severe  Constipation:                    [X]Mild [ ]Moderate [ ]Severe    Other Symptoms:  [X]All other review of systems negative     Palliative Performance Status Version 2:         40%      [email or web address]    PHYSICAL EXAM:  Vital Signs Last 24 Hrs  T(C): 36.3 (16 Nov 2023 16:59), Max: 37.7 (16 Nov 2023 06:05)  T(F): 97.4 (16 Nov 2023 16:59), Max: 99.8 (16 Nov 2023 06:05)  HR: 90 (16 Nov 2023 11:45) (82 - 100)  BP: 120/59 (16 Nov 2023 11:45) (98/51 - 129/58)  BP(mean): 85 (16 Nov 2023 11:45) (71 - 85)  RR: 16 (16 Nov 2023 10:55) (16 - 16)  SpO2: 99% (16 Nov 2023 11:45) (95% - 99%)    Parameters below as of 16 Nov 2023 11:45  Patient On (Oxygen Delivery Method): nasal cannula  O2 Flow (L/min): 2   I&O's Summary    15 Nov 2023 07:01  -  16 Nov 2023 07:00  --------------------------------------------------------  IN: 2000 mL / OUT: 325 mL / NET: 1675 mL    16 Nov 2023 07:01  -  16 Nov 2023 17:22  --------------------------------------------------------  IN: 1050 mL / OUT: 450 mL / NET: 600 mL       GENERAL: [ ]Cachexia    [X]Alert  [ ]Oriented x   [ ]Lethargic  [ ]Unarousable  [X]Verbal  [ ]Non-Verbal  Behavioral:   [ ]Anxiety  [ ]Delirium [ ]Agitation [X]Cooperative  HEENT:  [X]Normal   [ ]Dry mouth   [ ]ET Tube/Trach  [ ]Oral lesions  PULMONARY:   [ X]Clear [ ]Tachypnea  [ ]Audible excessive secretions   [ ]Rhonchi        [ ]Right [ ]Left [ ]Bilateral  [ ]Crackles        [ ]Right [ ]Left [ ]Bilateral  [ ]Wheezing     [ ]Right [ ]Left [ ]Bilateral  [ ]Diminished BS [ ] Right [ ]Left [ ]Bilateral  CARDIOVASCULAR:    [X]Regular [ ]Irregular [ ]Tachy  [ ]Yaya [ ]Murmur [ ]Other  GASTROINTESTINAL:  [X]Soft  [ ]Distended   [ ]+BS  [ ]Non tender [ ]Tender  [ ]Other [X]PEG [ ]OGT/ NGT   Last BM:   GENITOURINARY:  [X]Normal [ ]Incontinent   [ ]Oliguria/Anuria   [ ]Paige  MUSCULOSKELETAL:   [ ]Normal   [X]Weakness  [ ]Bed/Wheelchair bound [ ]Edema  NEUROLOGIC:   [X]No focal deficits  [ ] Cognitive impairment  [ ] Dysphagia [ ]Dysarthria [ ] Paresis [ ]Other   SKIN:   [X]Normal  [ ]Rash  [ ]Other  [ ]Pressure ulcer(s) [ ]y [ ]n present on admission    CRITICAL CARE:  [ ]Shock Present  [ ]Septic [ ]Cardiogenic [ ]Neurologic [ ]Hypovolemic  [ ]Vasopressors [ ]Inotropes  [ ]Respiratory failure present [ ]Mechanical Ventilation [ ]Non-invasive ventilatory support [ ]High-Flow   [ ]Acute  [ ]Chronic [ ]Hypoxic  [ ]Hypercarbic [ ]Other  [ ]Other organ failure     LABS:                        7.5    9.25  )-----------( 141      ( 16 Nov 2023 05:30 )             24.3   11-16    139  |  106  |  x   ----------------------------<  x   4.0   |  x   |  x     Ca    7.7<L>      16 Nov 2023 05:30  Phos  3.0     11-16  Mg     1.6     11-16    TPro  5.0<L>  /  Alb  2.2<L>  /  TBili  0.4  /  DBili  x   /  AST  28  /  ALT  7<L>  /  AlkPhos  62  11-15      Urinalysis Basic - ( 16 Nov 2023 05:30 )    Color: x / Appearance: x / SG: x / pH: x  Gluc: 111 mg/dL / Ketone: x  / Bili: x / Urobili: x   Blood: x / Protein: x / Nitrite: x   Leuk Esterase: x / RBC: x / WBC x   Sq Epi: x / Non Sq Epi: x / Bacteria: x      RADIOLOGY & ADDITIONAL STUDIES:    Protein Calorie Malnutrition Present: [ ]mild [ ]moderate [ ]severe [ ]underweight [ ]morbid obesity  [email or web address]://www.andeal.org/vault/2440/web/files/ONC/Table_Clinical%20Characteristics%20to%20Document%20Malnutrition-White%20JV%20et%[email or web address].pdf    Height (cm): 167.6 (11-14-23 @ 13:58), 167.6 (10-23-23 @ 13:00)  Weight (kg): 68 (11-14-23 @ 13:58), 68.946 (10-23-23 @ 13:00), 72.6 (07-21-23 @ 13:07)  BMI (kg/m2): 24.2 (11-14-23 @ 13:58), 24.5 (10-23-23 @ 13:00)    [ ]PPSV2 < or = 30%  [ ]significant weight loss [ ]poor nutritional intake [ ]anasarca[ ]Artificial Nutrition    REFERRALS:   [ ]Chaplaincy  [ ]Hospice  [ ]Child Life  [X]Social Work [ ]Patient and Family Support [X]Case management [ ]Holistic Therapy [ ] Music therapy  [ ] Massage Therapy    DISCUSSION OF CASE: Family - obtained additional history and to provide emotional support;  Primary team - discussed plan of care

## 2023-11-16 NOTE — PROGRESS NOTE ADULT - PROBLEM SELECTOR PLAN 1
.  - Fentanyl 25mcg/hr Patch q72hr  - morphine solution 5mg via PEG q4h PRN moderate pain and morphine 10mg via PEg q4h PRN severe pain  - Dilaudid 1mg IV q4h PRN for breakthrough pain    Anticipated Discharge Regimen: Fentanyl Patch 25mcg/hr q72hr and morphine 5mg PO q4h Severe Pain

## 2023-11-16 NOTE — PROGRESS NOTE ADULT - PROBLEM SELECTOR PLAN 9
Last A1c 7.9 in 07/2023. Regimen as follows: Lantus 50mg daily, 12units TID with meals for 48 hour after chemo then 8 TID  - c/w sliding scale   - 15u lantus  - hold pre-meal given poor PO intake  - q6hr FS given inability to tolerate PO Last A1c 7.9 in 07/2023. Regimen as follows: Lantus 50mg daily, 12units TID with meals for 48 hour after chemo then 8 TID  - c/w sliding scale   - 15u lantus  - 2u insulin q6  - q6hr FS given inability to tolerate PO

## 2023-11-16 NOTE — SWALLOW VFSS/MBS ASSESSMENT ADULT - ORAL PHASE COMMENTS
Adequate oral containment, mildly prolonged bolus manipulation, timely a/p transport, and functional oral clearance. *** Adequate oral containment, mildly prolonged bolus manipulation, timely a/p transport, and functional oral clearance.

## 2023-11-16 NOTE — SWALLOW VFSS/MBS ASSESSMENT ADULT - ADDITIONAL RECOMMENDATIONS
LT: Patient will safely tolerate the least restrictive diet without overt s/s of penetration/aspiration or changes in pulmonary status.  ST: 1) Patient will complete dysphagia exercises to target efficiency of the swallow (3 sets of 10/daily)- effortful swallow, дмитрий exercise, and Mendelsohn with max cues.  2) Patient will safely tolerate mildly thick liquids (water) via tsp with use of compensatory strategies without overt s/s of aspiration and changes in pulmonary status.  -Outpatient speech pathology follow up recommended for dysphagia intervention to maximize functional outcomes- ~ 2 weeks   -Repeat MBSS in 4-6 weeks (mid December, 2023)

## 2023-11-16 NOTE — SWALLOW VFSS/MBS ASSESSMENT ADULT - ADDITIONAL INFORMATION
Anatomical observations: Mild submental edema, hypopharyngeal fullness/arytenoid fullness imaged likely c/w radiation changes/inflammation

## 2023-11-16 NOTE — PROGRESS NOTE ADULT - ATTENDING COMMENTS
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

## 2023-11-16 NOTE — SWALLOW VFSS/MBS ASSESSMENT ADULT - CONSISTENCIES ADMINISTERED
thin liquids (via tsp x 1, 5mL x 2), mildly thick liquids (via tsp x 3, via cup x 1), moderately x 3, and pudding x 1

## 2023-11-16 NOTE — SWALLOW VFSS/MBS ASSESSMENT ADULT - COMMENTS
Pt known to our service for dysphagia management during this admission.   Pt was not seen pre-RT and throughout RT until this admission.  Odynophagia significantly improved from admission on 10/29/23. Isaias clinical indicators of penetration/aspiration noted across oral intake. MBSS recommended at this point to further assess swallow function and assist in swallow rehabilitation.

## 2023-11-16 NOTE — SWALLOW VFSS/MBS ASSESSMENT ADULT - SLP GENERAL OBSERVATIONS
Patient was seen fully awake and alert, sitting fully upright in chair, on O2 via NC. Pt followed simple directives, though benefited from visual and tactile cues for implementation of postural strategies. Pt communicated basic wants/needs.

## 2023-11-16 NOTE — PROGRESS NOTE ADULT - PROBLEM SELECTOR PLAN 7
TTE 07/2023: mild LVH, mild reduced LVH hypertrophy, normal RV function, LV systolic function mild decr EF 50-55%. home meds: imdur 30 once a day, lisinopril 20mg once a day, toprol 12.5mg once a day   - Restart Toprol 12.5 mg- converted to IV 1.25 q6 given pt cannot tolerate PO - via PEG  - hold imdur given soft BPs, resume when appropriate   - hold lisinopril given prior ELENITA and unable to tolerate PO TTE 07/2023: mild LVH, mild reduced LVH hypertrophy, normal RV function, LV systolic function mild decr EF 50-55%. home meds: imdur 30 once a day, lisinopril 20mg once a day, toprol 12.5mg once a day   - Restart Lopressor 12.5mg BID via PEG  - hold imdur given soft BPs, resume when appropriate   - hold lisinopril given prior ELENITA and unable to tolerate PO

## 2023-11-16 NOTE — SWALLOW VFSS/MBS ASSESSMENT ADULT - SLP PERTINENT HISTORY OF CURRENT PROBLEM
PMHx of CAD, PVD, DM, SCCA of larynx - T3N1M0. Pt had completed 4500/7000 cGy completed, however, RT was paused 1 week prior due to grade 2 neck dermatitis, odynophagia and inability to tolerate PO. Pt admitted for pain management and nutrition optimization. Pt already completed chemo on 10/23/23 and RT on 10/30/23. PEG placed on 11/14/23.

## 2023-11-17 NOTE — PROGRESS NOTE ADULT - SUBJECTIVE AND OBJECTIVE BOX
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 Marr/Dr. Derek Gar), presents to the ED with generalized weakness. Reports progressive weakness over the last 2 weeks assc w/ fatigue. 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 C/o odynophagia but tolerating secretions. Saw Rad on on 10/13. Treatment break on 10/20 and restarted inpatient on 10/30. Final RT treatment was on 11/3. Over the course of his stay, the patient has had copious secretions that require increasing suctioning due to RT therapy. The patient was stepped up to [facility] for increased suctioning requirements. Patient was planned to have a PEG tube placement on Monday 11/6 for odynophagia and decreased oral intake due to radiation induced mucositis but was delayed  due to concern for worsening irritation of his mucosa if he had to be scoped or if an NGT had to be placed. PEG placed on 11/14 w/ feeds started 11/15 Glucerna 1.2 @ 10cc/hr --> 20cc/hr 11/16 --> 40cc/hr 11/17 (Goal: 65cc/hr Glucerna 1.2). No concern for Refeeding syndrome.     INTERVAL HPI/OVERNIGHT EVENTS:  Pt was seen and examined at bedside. As per nurse and o/n team pt no acute events.      Vital Signs Last 24 Hrs  T(C): 36.7 (15 Nov 2023 09:59), Max: 37.2 (14 Nov 2023 13:58)  T(F): 98 (15 Nov 2023 09:59), Max: 98.6 (14 Nov 2023 22:22)  HR: 90 (15 Nov 2023 10:40) (76 - 102)  BP: 125/61 (15 Nov 2023 10:40) (109/54 - 146/65)    RR: 18 (15 Nov 2023 10:40) (18 - 19)  SpO2: 100% (15 Nov 2023 10:40) (92% - 100%)    O2 Parameters below as of 15 Nov 2023 10:40  Patient On (Oxygen Delivery Method): nasal cannula  O2 Flow (L/min): 2    PHYSICAL EXAM:   GENERAL: resting comfortably in bed; NAD  HEAD:  Atraumatic, Normocephalic  EYES: severe cataracts bilaterally   NECK: blisters/erythema/macerated lesion, No JVD, Normal thyroid, no enlarged nodes   NERVOUS SYSTEM:  AAOx3; CNII-XII grossly intact; no focal deficits  CHEST/LUNG: interval improvement in upper airway secretions & BS  HEART: S1/S2 normal, no S3, Regular rate and rhythm; No murmurs   ABDOMEN: Soft, Nontender, Nondistended; PEG in place w/o edema or erythema  EXTREMITIES:  2+ Peripheral Pulses, No clubbing, cyanosis, or edema   PSYCHIATRIC: affect and characteristics of appearance, verbalizations, behaviors are appropriate        Care Discussed with Consultants/Other Providers [ x] YES  [ ] NO    RADIOLOGY & ADDITIONAL TESTS:

## 2023-11-17 NOTE — PROGRESS NOTE ADULT - SUBJECTIVE AND OBJECTIVE BOX
Patient seen at bedside with Dr. Marr, wife at bedside and daughter Bisi on phone translating.  Radiation dermatitis/ moist desquamation healing well. Completed course of chemoRT 11/3/23.   Feedings via PEG. Patient seen by speech and swallowing.  C/o constipation.

## 2023-11-17 NOTE — PROGRESS NOTE ADULT - PROBLEM SELECTOR PLAN 11
Diet: NPO  F: 1/4 NS  E: replete PRN  DVT: Resume after PEG   Code: Full  GI PPx: Pantoprazole 40 mg qd via PEG Diet: PEG, with titration target rate to 63  F: 1/4 NS  E: replete PRN  DVT: Resume after PEG   Code: Full  GI PPx: Pantoprazole 40 mg qd via PEG

## 2023-11-17 NOTE — PROGRESS NOTE ADULT - ASSESSMENT
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 Marr/Dr. Gra), admitted [id number] now s/p PEG w/o evidence of Refeeding syndrome and slowly uptitrating feeds.

## 2023-11-17 NOTE — PROGRESS NOTE ADULT - SUBJECTIVE AND OBJECTIVE BOX
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 Marr/Dr. Derek Gar), presents to the ED with generalized weakness. Reports progressive weakness over the last 2 weeks assc w/ fatigue. 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 C/o odynophagia but tolerating secretions. Saw Rad on on 10/13. Treatment break on 10/20 and restarted inpatient on 10/30. Final RT treatment was on 11/3. Over the course of his stay, the patient has had copious secretions that require increasing suctioning due to RT therapy. The patient was stepped up to [facility] for increased suctioning requirements. Patient was planned to have a PEG tube placement on Monday 11/6 for odynophagia and decreased oral intake due to radiation induced mucositis but was delayed  due to concern for worsening irritation of his mucosa if he had to be scoped or if an NGT had to be placed. PEG placed on 11/14 w/ feeds started 11/15 Glucerna 1.2 @ 10cc/hr --> 20cc/hr 11/16 --> 40cc/hr 11/17 (Goal: 65cc/hr Glucerna 1.2). No concern for Refeeding syndrome.     INTERVAL HPI/OVERNIGHT EVENTS:  Pt was seen and examined at bedside. As per nurse and o/n team pt no acute events.      Vital Signs Last 24 Hrs  T(C): 36.7 (15 Nov 2023 09:59), Max: 37.2 (14 Nov 2023 13:58)  T(F): 98 (15 Nov 2023 09:59), Max: 98.6 (14 Nov 2023 22:22)  HR: 90 (15 Nov 2023 10:40) (76 - 102)  BP: 125/61 (15 Nov 2023 10:40) (109/54 - 146/65)    RR: 18 (15 Nov 2023 10:40) (18 - 19)  SpO2: 100% (15 Nov 2023 10:40) (92% - 100%)    O2 Parameters below as of 15 Nov 2023 10:40  Patient On (Oxygen Delivery Method): nasal cannula  O2 Flow (L/min): 2    PHYSICAL EXAM:   GENERAL: resting comfortably in bed; NAD  HEAD:  Atraumatic, Normocephalic  EYES: severe cataracts bilaterally   NECK: blisters/erythema/macerated lesion, No JVD, Normal thyroid, no enlarged nodes   NERVOUS SYSTEM:  AAOx3; CNII-XII grossly intact; no focal deficits  CHEST/LUNG: interval improvement in upper airway secretions & BS  HEART: S1/S2 normal, no S3, Regular rate and rhythm; No murmurs   ABDOMEN: Soft, Nontender, Nondistended; PEG in place w/o edema or erythema  EXTREMITIES:  2+ Peripheral Pulses, No clubbing, cyanosis, or edema   PSYCHIATRIC: affect and characteristics of appearance, verbalizations, behaviors are appropriate        Care Discussed with Consultants/Other Providers [ x] YES  [ ] NO    RADIOLOGY & ADDITIONAL TESTS:

## 2023-11-17 NOTE — PROGRESS NOTE ADULT - PROBLEM SELECTOR PLAN 4
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 C/o pain, dysphagia and odynophagia for weeks which was tx with magic mouthwash, silver silvadene cream, fluconazole 400mg once a day (started 10/23). Radiation paused due to pain, last RT 10/28.  On exam -  Bilateral neck wounds - erythematous with skin breakdown, crusted dead skin peripherally, no drainage, no skin sloughing, at baseline per patient. Reports improvement with cream. No concern for superimposed infection at this time. Per Oncology tx w/ Chemo and Radiation is complete.  Plan:  -Per Rad onc, received RT 10/30, 10/31, and 11/1, 11/3. Plan to f/u w/ Onc in 1 week  -Check with RT to see when next dosing is scheduled vs complete?  -c/w silvadene cream and non-stick silicon bandage  -For skin care,  rinse with saline soaks liberally with dabbing of light soap and water

## 2023-11-17 NOTE — PROGRESS NOTE ADULT - PROBLEM SELECTOR PLAN 9
CAD s/p MIDCAB and PCI with multiple WINTER (most recent to LCx in 8/2022), on ASA 81 at home. Currently asymptomatic. EKG on admission w/o ischemic changes  - Restart ASA 81 qd through PEG  - Restart lipitor 80mg qd through PEG

## 2023-11-17 NOTE — PROGRESS NOTE ADULT - PROBLEM SELECTOR PLAN 10
Last A1c 7.9 in 07/2023. Regimen as follows: Lantus 50mg daily, 12units TID with meals for 48 hour after chemo then 8 TID  - c/w sliding scale   - 15u lantus  - Monitor POCT: If hyperglycemic tomorrow can add 1u insulin q6 (2u caused hyperglycemia on 20cc/hr Glucerna)  - q6hr FS given inability to tolerate PO

## 2023-11-17 NOTE — PROGRESS NOTE ADULT - ASSESSMENT
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 Marr/Dr. Gar), admitted [id number] now s/p PEG w/o evidence of Refeeding syndrome and slowly uptitrating feeds.

## 2023-11-17 NOTE — PROGRESS NOTE ADULT - PROBLEM SELECTOR PLAN 11
Diet: NPO  F: 1/4 NS  E: replete PRN  DVT: Resume after PEG   Code: Full  GI PPx: Pantoprazole 40 mg qd via PEG

## 2023-11-17 NOTE — PROGRESS NOTE ADULT - PROBLEM SELECTOR PLAN 10
No Last A1c 7.9 in 07/2023. Regimen as follows: Lantus 50mg daily, 12units TID with meals for 48 hour after chemo then 8 TID  - c/w sliding scale   - 15u lantus  - Monitor POCT: If hyperglycemic tomorrow can add 1u insulin q6 (2u caused hyperglycemia on 20cc/hr Glucerna)  - q6hr FS given inability to tolerate PO

## 2023-11-17 NOTE — PROGRESS NOTE ADULT - ASSESSMENT
Radiation wounds healing. RT course completed.  Continue PEG feedings/ speech/swallowing.  Rec increase bowel regime.

## 2023-11-17 NOTE — PROGRESS NOTE ADULT - PROBLEM SELECTOR PLAN 4
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 C/o pain, dysphagia and odynophagia for weeks which was tx with magic mouthwash, silver silvadene cream, fluconazole 400mg once a day (started 10/23). Radiation paused due to pain, last RT 10/28.  On exam -  Bilateral neck wounds - erythematous with skin breakdown, crusted dead skin peripherally, no drainage, no skin sloughing, at baseline per patient. Reports improvement with cream. No concern for superimposed infection at this time  Plan:  -Per Rad onc, received RT 10/30, 10/31, and 11/1, 11/3  -Check with RT to see when next dosing is scheduled vs complete?  -c/w silvadene cream and non-stick silicon bandage  -For skin care,  rinse with saline soaks liberally with dabbing of light soap and water Has b/l neck dermatitis due to radiation, first noticed early 09/13/2023 after 1st/2nd treatment. C/o pain, dysphagia and odynophagia for weeks which was tx with magic mouthwash, silver silvadene cream, fluconazole 400mg once a day (started 10/23). Radiation paused due to pain, last RT 10/28.  On exam -  Bilateral neck wounds - erythematous with skin breakdown, crusted dead skin peripherally, no drainage, no skin sloughing, at baseline per patient. Reports improvement with cream. No concern for superimposed infection at this time. Per Oncology tx w/ Chemo and Radiation is complete.  Plan:  -Per Rad onc, received RT 10/30, 10/31, and 11/1, 11/3. Plan to f/u w/ Onc in 1 week  -Check with RT to see when next dosing is scheduled vs complete?  -c/w silvadene cream and non-stick silicon bandage  -For skin care,  rinse with saline soaks liberally with dabbing of light soap and water

## 2023-11-17 NOTE — PROGRESS NOTE ADULT - PROBLEM SELECTOR PLAN 1
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 C/o odynophagia causing poor PO intake. Per chart review appears poor PO intake has been ongoing issue while undergoing chemo/radiation. 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 Marr. Last chemo 10/23. 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 PEG placed 11/14  - Chest PT, hypertonic nebulizer q4hr   - Aspiration precautions    -Per rad onc, odynophagia and mucositis should likely resolve 2-3 weeks after completion of radiation. no radiation on 10/31. 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 C/o odynophagia causing poor PO intake. Per chart review appears poor PO intake has been ongoing issue while undergoing chemo/radiation. 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 Marr. Last chemo 10/23. 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 PEG placed 11/14  - Onc follow up in 1 week Dr. Derek Harvey (onc)  - Chest PT, hypertonic nebulizer q4hr   - Aspiration precautions    -Per rad onc, odynophagia and mucositis should likely resolve 2-3 weeks after completion of radiation. no radiation on 10/31. Last treatment 10/30  - magic mouthwash  - viscous lidocaine   -scopolamine patch  -BMP Mg/Phos @ 6pm to eval for Refeeding syndrome  -Palliative:         -Fentanyl 25mcg/hr Patch q72hr as long-acting agent        -Dilaudid 0.5mg IV q3h PRN for Moderate Pain        -Dilaudid 1mg IV q3h PRN for Severe Pain        - atropine 1% sublingual TID

## 2023-11-17 NOTE — PROGRESS NOTE ADULT - PROBLEM SELECTOR PLAN 2
Inability to tolerate PO due to Laryngeal SCC    -failed S&S on 11/7 --> re-evaluate with MBS 11/15. Concern for future esophageal atrophy w/ no PO intake  -PEG placed 11/14, plan Start feeds 24h post placement.   -Glucerna 1.2@ (Goal: 65cc/hr) Increase today @ 40cc/hr (11/17) and increase incremently tomorrow  -Banitrol w/ Glucerna feeds for diarrhea  -Loperamide 2mg BID for diarrhea via PEG  -switch pantoprazole 40 mg qd via PEG

## 2023-11-17 NOTE — PROGRESS NOTE ADULT - PROBLEM SELECTOR PLAN 8
TTE 07/2023: mild LVH, mild reduced LVH hypertrophy, normal RV function, LV systolic function mild decr EF 50-55%. home meds: imdur 30 once a day, lisinopril 20mg once a day, toprol 12.5mg once a day   - Restart Lopressor 12.5mg BID via PEG  - hold imdur given soft BPs, resume when appropriate   - hold lisinopril given prior ELENITA and unable to tolerate PO

## 2023-11-17 NOTE — PROGRESS NOTE ADULT - PROBLEM SELECTOR PLAN 1
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 C/o odynophagia causing poor PO intake. Per chart review appears poor PO intake has been ongoing issue while undergoing chemo/radiation. 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 Marr. Last chemo 10/23. 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 PEG placed 11/14  - Onc follow up in 1 week Dr. Derek Harvey (onc)  - Chest PT, hypertonic nebulizer q4hr   - Aspiration precautions    -Per rad onc, odynophagia and mucositis should likely resolve 2-3 weeks after completion of radiation. no radiation on 10/31. Last treatment 10/30  - magic mouthwash  - viscous lidocaine   -scopolamine patch  -BMP Mg/Phos @ 6pm to eval for Refeeding syndrome  -Palliative:         -Fentanyl 25mcg/hr Patch q72hr as long-acting agent        -Dilaudid 0.5mg IV q3h PRN for Moderate Pain        -Dilaudid 1mg IV q3h PRN for Severe Pain        - atropine 1% sublingual TID

## 2023-11-17 NOTE — PROGRESS NOTE ADULT - ATTENDING COMMENTS
CAD, DM2, laryngeal cancer with radiation pharyngeal mucositis, severe protein calorie malnutrition  physical as above  no sign of refeeding syndrome  increase glucerna to 40 ml/hr  continue to follow K, Mg, Phos  follow sugars; regular insulin stopped for hypoglycemia but with increase in feeds will likely need to restart or adjust total daily dose  start ASA and lipitor  BP rising, aim to reintroduce lisinopril  imdur will likely have to change to isordil for PEG administration  decision making of high complexity

## 2023-11-17 NOTE — CHART NOTE - NSCHARTNOTEFT_GEN_A_CORE
Admitting Diagnosis:   Patient is a 78y old  Male who presents with a chief complaint of weakness, poor PO intake (17 Nov 2023 15:45)      PAST MEDICAL & SURGICAL HISTORY:  HTN (hypertension)      HLD (hyperlipidemia)      DM (diabetes mellitus)      CAD (coronary artery disease)      Glaucoma      PVD (peripheral vascular disease)      No significant past surgical history    Current Nutrition Order: Glucerna 1.2 @64 ml/hr ; Banatrol BID     PO Intake: N/A    GI Issues: Abdomen non-distended/non-tender, +BS x4, last bowel movement 11/17    Pain: 0 per chart review     Skin Integrity: deep tissue injury bilateral heel, +1 bilateral ankle     Labs:   11-17    140  |  105  |  8   ----------------------------<  143<H>  3.6   |  28  |  1.03    Ca    7.6<L>      17 Nov 2023 05:30  Phos  3.3     11-17  Mg     1.8     11-17    TPro  4.5<L>  /  Alb  1.9<L>  /  TBili  0.3  /  DBili  x   /  AST  19  /  ALT  6<L>  /  AlkPhos  67  11-16    CAPILLARY BLOOD GLUCOSE      POCT Blood Glucose.: 148 mg/dL (17 Nov 2023 12:28)  POCT Blood Glucose.: 150 mg/dL (17 Nov 2023 06:34)  POCT Blood Glucose.: 52 mg/dL (17 Nov 2023 05:56)  POCT Blood Glucose.: 53 mg/dL (17 Nov 2023 05:55)  POCT Blood Glucose.: 161 mg/dL (16 Nov 2023 23:56)  POCT Blood Glucose.: 179 mg/dL (16 Nov 2023 17:55)      Medications:  MEDICATIONS  (STANDING):  albuterol/ipratropium for Nebulization 3 milliLiter(s) Nebulizer every 6 hours  atorvastatin 80 milliGRAM(s) Oral at bedtime  atropine 1% Ophthalmic Solution for SubLingual Use 1 Drop(s) SubLingual three times a day  camphor 0.5%/menthol 0.5% Topical Lotion 1 Application(s) Topical once  dextrose 5%. 1000 milliLiter(s) (50 mL/Hr) IV Continuous <Continuous>  dextrose 5%. 1000 milliLiter(s) (100 mL/Hr) IV Continuous <Continuous>  dextrose 50% Injectable 25 Gram(s) IV Push once  dextrose 50% Injectable 12.5 Gram(s) IV Push once  dextrose 50% Injectable 25 Gram(s) IV Push once  dextrose 50% Injectable 25 Gram(s) IV Push once  enoxaparin Injectable 40 milliGRAM(s) SubCutaneous every 24 hours  fentaNYL   Patch  25 MICROgram(s)/Hr 1 Patch Transdermal every 72 hours  FIRST- Mouthwash  BLM 4 milliLiter(s) Swish and Spit every 6 hours  glucagon  Injectable 1 milliGRAM(s) IntraMuscular once  influenza  Vaccine (HIGH DOSE) 0.7 milliLiter(s) IntraMuscular once  insulin glargine Injectable (LANTUS) 15 Unit(s) SubCutaneous at bedtime  insulin lispro (ADMELOG) corrective regimen sliding scale   SubCutaneous every 6 hours  lidocaine 2% Viscous 5 milliLiter(s) Swish and Spit two times a day  loperamide 2 milliGRAM(s) Oral every 12 hours  metoprolol tartrate 12.5 milliGRAM(s) Oral every 12 hours  pantoprazole   Suspension 40 milliGRAM(s) Oral every 24 hours  silver sulfADIAZINE 1% Cream 1 Application(s) Topical two times a day  thiamine IVPB 500 milliGRAM(s) IV Intermittent every 24 hours    MEDICATIONS  (PRN):  dextrose Oral Gel 15 Gram(s) Oral once PRN Blood Glucose LESS THAN 70 milliGRAM(s)/deciliter  HYDROmorphone  Injectable 1 milliGRAM(s) IV Push every 4 hours PRN Breakthrough pain  morphine   Solution 5 milliGRAM(s) Oral every 4 hours PRN Moderate Pain (4 - 6)  morphine   Solution 10 milliGRAM(s) Oral every 4 hours PRN Severe Pain (7 - 10)    ANTHROPOMETRICS:   Height (inches):  66   Weight (pounds):  149.9 (10/29)   BMI (kg/m^2):  24.2   IBW (pounds):  142 +/- 10%   %IBW:  105.6 %     WEIGHT CHANGE: upon chart review, patient with significant weight loss of 20 pounds or 11.8 % over 5 months (6/5/23: 170 pounds)     ESTIMATED NUTRIENT NEEDS:   Calories:  (25-30 kcal/kg) 9478-4666 kcal   Protein:  (1.2-1.5 g/kg)  g   Fluids:  1 mL/kcal or at team’s discretion   Current body weight used to calculate energy needs due to pt's current body weight within % ideal body weight. Needs adjusted for age and current clinical status.     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 Marr/Dr. Gar), presents to the ED with generalized weakness and odynophagia, transferred to [facility] for frequent suctioning. 11/14: PEG placed. 11/15: tube feed started, diarrhea. 11/16: Banatrol BID, loperamide.     Pt assessed at bedside. Rx and labs reviewed. Pt now status post PEG placement on 11/14. Pt started on Glucerna 1.2; diarrhea presented after tube feed initiation. [facility] team added banatrol BID and imodium; monitor for improvement in BM frequency and consistency. Spoke with family at bedside; discussed briefly tube feed and rationale. Family requesting ice chips; requested from RN if appropriate. 7[facility] team monitoring and addressing refeeding; K, Mg, and Phos WNL at time of assessment. No other reports GI distress or further nutritional concerns at this time. RDN will continue to reassess, intervene, and monitor as appropriate.     PREVIOUS NUTRITION DIAGNOSIS:   Severe PCM in context of chronic illness related to inability to tolerate PO as evidenced by 14% weight loss x 6 months, meeting <75% nutrition needs >1 month    Active [ x ]       Resolved [  ]     GOAL: Pt to meet at least 75% of nutritional needs consistently via most appropriate route for nutrition    Recommendations:  -Continue enteral nutrition support via PEG    *Recommend Glucerna 1.2 with goal rate of 65 ml/hr continuously over 24hrs to provide 1560 ml tube feed, 1872 calories, 94 gProt., and 1256 ml free water. This is 23.2 nonprotein calories and 1.46 gProt. per kg ideal body weight 64.5 kg.   -Maintain aspiration precautions at all times  -Monitor BG control and tube feed tolerance when initiate and adjust nutrition plan as appropriate   -Monitor chemistry, GI function, and skin integrity     Risk Level: High [ x ] Moderate [   ] Low [   ]. Admitting Diagnosis:   Patient is a 78y old  Male who presents with a chief complaint of weakness, poor PO intake (17 Nov 2023 15:45)      PAST MEDICAL & SURGICAL HISTORY:  HTN (hypertension)      HLD (hyperlipidemia)      DM (diabetes mellitus)      CAD (coronary artery disease)      Glaucoma      PVD (peripheral vascular disease)      No significant past surgical history    Current Nutrition Order: Glucerna 1.2 @64 ml/hr ; Banatrol BID     PO Intake: N/A    GI Issues: Abdomen non-distended/non-tender, +BS x4, last bowel movement 11/17    Pain: 0 per chart review     Skin Integrity: deep tissue injury bilateral heel, +1 bilateral ankle     Labs:   11-17    140  |  105  |  8   ----------------------------<  143<H>  3.6   |  28  |  1.03    Ca    7.6<L>      17 Nov 2023 05:30  Phos  3.3     11-17  Mg     1.8     11-17    TPro  4.5<L>  /  Alb  1.9<L>  /  TBili  0.3  /  DBili  x   /  AST  19  /  ALT  6<L>  /  AlkPhos  67  11-16    CAPILLARY BLOOD GLUCOSE      POCT Blood Glucose.: 148 mg/dL (17 Nov 2023 12:28)  POCT Blood Glucose.: 150 mg/dL (17 Nov 2023 06:34)  POCT Blood Glucose.: 52 mg/dL (17 Nov 2023 05:56)  POCT Blood Glucose.: 53 mg/dL (17 Nov 2023 05:55)  POCT Blood Glucose.: 161 mg/dL (16 Nov 2023 23:56)  POCT Blood Glucose.: 179 mg/dL (16 Nov 2023 17:55)      Medications:  MEDICATIONS  (STANDING):  albuterol/ipratropium for Nebulization 3 milliLiter(s) Nebulizer every 6 hours  atorvastatin 80 milliGRAM(s) Oral at bedtime  atropine 1% Ophthalmic Solution for SubLingual Use 1 Drop(s) SubLingual three times a day  camphor 0.5%/menthol 0.5% Topical Lotion 1 Application(s) Topical once  dextrose 5%. 1000 milliLiter(s) (50 mL/Hr) IV Continuous <Continuous>  dextrose 5%. 1000 milliLiter(s) (100 mL/Hr) IV Continuous <Continuous>  dextrose 50% Injectable 25 Gram(s) IV Push once  dextrose 50% Injectable 12.5 Gram(s) IV Push once  dextrose 50% Injectable 25 Gram(s) IV Push once  dextrose 50% Injectable 25 Gram(s) IV Push once  enoxaparin Injectable 40 milliGRAM(s) SubCutaneous every 24 hours  fentaNYL   Patch  25 MICROgram(s)/Hr 1 Patch Transdermal every 72 hours  FIRST- Mouthwash  BLM 4 milliLiter(s) Swish and Spit every 6 hours  glucagon  Injectable 1 milliGRAM(s) IntraMuscular once  influenza  Vaccine (HIGH DOSE) 0.7 milliLiter(s) IntraMuscular once  insulin glargine Injectable (LANTUS) 15 Unit(s) SubCutaneous at bedtime  insulin lispro (ADMELOG) corrective regimen sliding scale   SubCutaneous every 6 hours  lidocaine 2% Viscous 5 milliLiter(s) Swish and Spit two times a day  loperamide 2 milliGRAM(s) Oral every 12 hours  metoprolol tartrate 12.5 milliGRAM(s) Oral every 12 hours  pantoprazole   Suspension 40 milliGRAM(s) Oral every 24 hours  silver sulfADIAZINE 1% Cream 1 Application(s) Topical two times a day  thiamine IVPB 500 milliGRAM(s) IV Intermittent every 24 hours    MEDICATIONS  (PRN):  dextrose Oral Gel 15 Gram(s) Oral once PRN Blood Glucose LESS THAN 70 milliGRAM(s)/deciliter  HYDROmorphone  Injectable 1 milliGRAM(s) IV Push every 4 hours PRN Breakthrough pain  morphine   Solution 5 milliGRAM(s) Oral every 4 hours PRN Moderate Pain (4 - 6)  morphine   Solution 10 milliGRAM(s) Oral every 4 hours PRN Severe Pain (7 - 10)    ANTHROPOMETRICS:   Height (inches):  66   Weight (pounds):  149.9 (10/29)   BMI (kg/m^2):  24.2   IBW (pounds):  142 +/- 10%   %IBW:  105.6 %     WEIGHT CHANGE: upon chart review, patient with significant weight loss of 20 pounds or 11.8 % over 5 months (6/5/23: 170 pounds)     ESTIMATED NUTRIENT NEEDS:   Calories:  (25-30 kcal/kg) 5284-1157 kcal   Protein:  (1.2-1.5 g/kg)  g   Fluids:  1 mL/kcal or at team’s discretion   Current body weight used to calculate energy needs due to pt's current body weight within % ideal body weight. Needs adjusted for age and current clinical status.     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 Marr/Dr. Gar), presents to the ED with generalized weakness and odynophagia, transferred to [facility] for frequent suctioning. 11/14: PEG placed. 11/15: tube feed started, diarrhea. 11/16: Banatrol BID, loperamide.     Pt assessed at bedside. Rx and labs reviewed. Pt now status post PEG placement on 11/14. Pt started on Glucerna 1.2; diarrhea presented after tube feed initiation. [facility] team added banatrol BID and imodium; monitor for improvement in BM frequency and consistency. Spoke with family at bedside; discussed briefly tube feed and rationale. Family requesting ice chips; requested from RN if appropriate. 7[facility] team monitoring and addressing refeeding; K, Mg, and Phos WNL at time of assessment. No other reports GI distress or further nutritional concerns at this time. RDN will continue to reassess, intervene, and monitor as appropriate.     PREVIOUS NUTRITION DIAGNOSIS:   Severe PCM in context of chronic illness related to inability to tolerate PO as evidenced by 14% weight loss x 6 months, meeting <75% nutrition needs >1 month    Active [ x ]       Resolved [  ]     GOAL: Pt to meet at least 75% of nutritional needs consistently via most appropriate route for nutrition    Recommendations:  -Continue enteral nutrition support via PEG    *Recommend Glucerna 1.2 with goal rate of 65 ml/hr continuously over 24hrs to provide 1560 ml tube feed, 1872 calories, 94 gProt., and 1256 ml free water. This is 23.2 nonprotein calories and 1.46 gProt. per kg ideal body weight 64.5 kg.   -Monitor for electrolyte abnormalities; replete as necessary   -Continue banatrol BID and imodium for diarrhea management   -Maintain aspiration precautions at all times  -Monitor BG control and tube feed tolerance when initiate and adjust nutrition plan as appropriate   -Monitor chemistry, GI function, and skin integrity     Risk Level: High [ x ] Moderate [   ] Low [   ].

## 2023-11-18 NOTE — PROGRESS NOTE ADULT - SUBJECTIVE AND OBJECTIVE BOX
**STEPDOWN FROM [facility] TO [facility]**    OBJECTIVE:    VITAL SIGNS:  ICU Vital Signs Last 24 Hrs  T(C): 37.7 (17 Nov 2023 22:07), Max: 37.7 (17 Nov 2023 22:07)  T(F): 99.8 (17 Nov 2023 22:07), Max: 99.8 (17 Nov 2023 22:07)  HR: 87 (17 Nov 2023 22:07) (80 - 96)  BP: 118/76 (17 Nov 2023 22:07) (111/55 - 132/63)  BP(mean): 90 (17 Nov 2023 22:07) (78 - 90)  RR: 18 (17 Nov 2023 22:07) (17 - 22)  SpO2: 97% (17 Nov 2023 22:07) (96% - 100%)    O2 Parameters below as of 17 Nov 2023 22:07  Patient On (Oxygen Delivery Method): nasal cannula    11-16 @ 07:01  -  11-17 @ 07:00  --------------------------------------------------------  IN: 1165 mL / OUT: 870 mL / NET: 295 mL    11-17 @ 07:01  -  11-18 @ 00:46  --------------------------------------------------------  IN: 40 mL / OUT: 0 mL / NET: 40 mL      CAPILLARY BLOOD GLUCOSE      POCT Blood Glucose.: 149 mg/dL (17 Nov 2023 22:11)      PHYSICAL EXAM:  General: NAD; Sitting up in bed on 7W.  HEENT: NC/AT; PERRL, clear conjunctiva; dry MM. No evidence of thrush.   Neck: Bandage in place. Some visible erythema with skin crusted over. No open lesions/wounds. TTP.  Respiratory: CTA b/l. No wheezes, rhonchi, rales. Satting well on 3L NC.   Cardiovascular: +S1/S2; RRR; No murmurs.   Abdomen: soft, NT/ND; +BS x4. PEG in place.   Extremities: WWP    MEDICATIONS:  MEDICATIONS  (STANDING):  albuterol/ipratropium for Nebulization 3 milliLiter(s) Nebulizer every 6 hours  atorvastatin 80 milliGRAM(s) Oral at bedtime  atropine 1% Ophthalmic Solution for SubLingual Use 1 Drop(s) SubLingual three times a day  dextrose 5%. 1000 milliLiter(s) (50 mL/Hr) IV Continuous <Continuous>  dextrose 5%. 1000 milliLiter(s) (100 mL/Hr) IV Continuous <Continuous>  dextrose 50% Injectable 25 Gram(s) IV Push once  dextrose 50% Injectable 12.5 Gram(s) IV Push once  dextrose 50% Injectable 25 Gram(s) IV Push once  dextrose 50% Injectable 25 Gram(s) IV Push once  enoxaparin Injectable 40 milliGRAM(s) SubCutaneous every 24 hours  fentaNYL   Patch  25 MICROgram(s)/Hr 1 Patch Transdermal every 72 hours  FIRST- Mouthwash  BLM 4 milliLiter(s) Swish and Spit every 6 hours  glucagon  Injectable 1 milliGRAM(s) IntraMuscular once  influenza  Vaccine (HIGH DOSE) 0.7 milliLiter(s) IntraMuscular once  insulin glargine Injectable (LANTUS) 15 Unit(s) SubCutaneous at bedtime  insulin lispro (ADMELOG) corrective regimen sliding scale   SubCutaneous every 6 hours  lidocaine 2% Viscous 5 milliLiter(s) Swish and Spit two times a day  loperamide 2 milliGRAM(s) Oral every 12 hours  metoprolol tartrate 12.5 milliGRAM(s) Oral every 12 hours  pantoprazole   Suspension 40 milliGRAM(s) Oral every 24 hours  silver sulfADIAZINE 1% Cream 1 Application(s) Topical two times a day  thiamine IVPB 500 milliGRAM(s) IV Intermittent every 24 hours    MEDICATIONS  (PRN):  dextrose Oral Gel 15 Gram(s) Oral once PRN Blood Glucose LESS THAN 70 milliGRAM(s)/deciliter  HYDROmorphone  Injectable 1 milliGRAM(s) IV Push every 4 hours PRN Breakthrough pain  morphine   Solution 5 milliGRAM(s) Oral every 4 hours PRN Moderate Pain (4 - 6)  morphine   Solution 10 milliGRAM(s) Oral every 4 hours PRN Severe Pain (7 - 10)      ALLERGIES:  Allergies    No Known Allergies    Intolerances        LABS:                        6.9    5.75  )-----------( 130      ( 17 Nov 2023 05:30 )             22.2     11-17    140  |  105  |  8   ----------------------------<  143<H>  3.6   |  28  |  1.03    Ca    7.6<L>      17 Nov 2023 05:30  Phos  3.3     11-17  Mg     1.8     11-17    TPro  4.5<L>  /  Alb  1.9<L>  /  TBili  0.3  /  DBili  x   /  AST  19  /  ALT  6<L>  /  AlkPhos  67  11-16      Urinalysis Basic - ( 17 Nov 2023 05:30 )    Color: x / Appearance: x / SG: x / pH: x  Gluc: 143 mg/dL / Ketone: x  / Bili: x / Urobili: x   Blood: x / Protein: x / Nitrite: x   Leuk Esterase: x / RBC: x / WBC x   Sq Epi: x / Non Sq Epi: x / Bacteria: x        RADIOLOGY & ADDITIONAL TESTS: Reviewed.   **STEPDOWN FROM [facility] TO [facility]**    OBJECTIVE:    VITAL SIGNS:  ICU Vital Signs Last 24 Hrs  T(C): 37.7 (17 Nov 2023 22:07), Max: 37.7 (17 Nov 2023 22:07)  T(F): 99.8 (17 Nov 2023 22:07), Max: 99.8 (17 Nov 2023 22:07)  HR: 87 (17 Nov 2023 22:07) (80 - 96)  BP: 118/76 (17 Nov 2023 22:07) (111/55 - 132/63)  BP(mean): 90 (17 Nov 2023 22:07) (78 - 90)  RR: 18 (17 Nov 2023 22:07) (17 - 22)  SpO2: 97% (17 Nov 2023 22:07) (96% - 100%)    O2 Parameters below as of 17 Nov 2023 22:07  Patient On (Oxygen Delivery Method): nasal cannula    11-16 @ 07:01  -  11-17 @ 07:00  --------------------------------------------------------  IN: 1165 mL / OUT: 870 mL / NET: 295 mL    11-17 @ 07:01  -  11-18 @ 00:46  --------------------------------------------------------  IN: 40 mL / OUT: 0 mL / NET: 40 mL      CAPILLARY BLOOD GLUCOSE      POCT Blood Glucose.: 149 mg/dL (17 Nov 2023 22:11)      PHYSICAL EXAM:  General: NAD; Sitting up in bed on 7W.  HEENT: NC/AT; PERRL, clear conjunctiva; dry MM. No evidence of thrush.   Neck: Anterior skin over neck with mild erythema, warm/tender to touch. Crusted lesions on lateral aspect with central necrosis. No open lesions/wounds/drainage.  Respiratory: CTA b/l. No wheezes, rhonchi, rales. Frequent dry cough. Satting well on 3L NC.   Cardiovascular: +S1/S2; RRR; No murmurs.   Abdomen: soft, NT/ND; +BS x4. PEG in place.   Extremities: WWP    MEDICATIONS:  MEDICATIONS  (STANDING):  albuterol/ipratropium for Nebulization 3 milliLiter(s) Nebulizer every 6 hours  atorvastatin 80 milliGRAM(s) Oral at bedtime  atropine 1% Ophthalmic Solution for SubLingual Use 1 Drop(s) SubLingual three times a day  dextrose 5%. 1000 milliLiter(s) (50 mL/Hr) IV Continuous <Continuous>  dextrose 5%. 1000 milliLiter(s) (100 mL/Hr) IV Continuous <Continuous>  dextrose 50% Injectable 25 Gram(s) IV Push once  dextrose 50% Injectable 12.5 Gram(s) IV Push once  dextrose 50% Injectable 25 Gram(s) IV Push once  dextrose 50% Injectable 25 Gram(s) IV Push once  enoxaparin Injectable 40 milliGRAM(s) SubCutaneous every 24 hours  fentaNYL   Patch  25 MICROgram(s)/Hr 1 Patch Transdermal every 72 hours  FIRST- Mouthwash  BLM 4 milliLiter(s) Swish and Spit every 6 hours  glucagon  Injectable 1 milliGRAM(s) IntraMuscular once  influenza  Vaccine (HIGH DOSE) 0.7 milliLiter(s) IntraMuscular once  insulin glargine Injectable (LANTUS) 15 Unit(s) SubCutaneous at bedtime  insulin lispro (ADMELOG) corrective regimen sliding scale   SubCutaneous every 6 hours  lidocaine 2% Viscous 5 milliLiter(s) Swish and Spit two times a day  loperamide 2 milliGRAM(s) Oral every 12 hours  metoprolol tartrate 12.5 milliGRAM(s) Oral every 12 hours  pantoprazole   Suspension 40 milliGRAM(s) Oral every 24 hours  silver sulfADIAZINE 1% Cream 1 Application(s) Topical two times a day  thiamine IVPB 500 milliGRAM(s) IV Intermittent every 24 hours    MEDICATIONS  (PRN):  dextrose Oral Gel 15 Gram(s) Oral once PRN Blood Glucose LESS THAN 70 milliGRAM(s)/deciliter  HYDROmorphone  Injectable 1 milliGRAM(s) IV Push every 4 hours PRN Breakthrough pain  morphine   Solution 5 milliGRAM(s) Oral every 4 hours PRN Moderate Pain (4 - 6)  morphine   Solution 10 milliGRAM(s) Oral every 4 hours PRN Severe Pain (7 - 10)      ALLERGIES:  Allergies    No Known Allergies    Intolerances        LABS:                        6.9    5.75  )-----------( 130      ( 17 Nov 2023 05:30 )             22.2     11-17    140  |  105  |  8   ----------------------------<  143<H>  3.6   |  28  |  1.03    Ca    7.6<L>      17 Nov 2023 05:30  Phos  3.3     11-17  Mg     1.8     11-17    TPro  4.5<L>  /  Alb  1.9<L>  /  TBili  0.3  /  DBili  x   /  AST  19  /  ALT  6<L>  /  AlkPhos  67  11-16      Urinalysis Basic - ( 17 Nov 2023 05:30 )    Color: x / Appearance: x / SG: x / pH: x  Gluc: 143 mg/dL / Ketone: x  / Bili: x / Urobili: x   Blood: x / Protein: x / Nitrite: x   Leuk Esterase: x / RBC: x / WBC x   Sq Epi: x / Non Sq Epi: x / Bacteria: x        RADIOLOGY & ADDITIONAL TESTS: Reviewed.

## 2023-11-18 NOTE — PROGRESS NOTE ADULT - SUBJECTIVE AND OBJECTIVE BOX
Patient was seen and examined at bedside. Case discuss with resident. Pt without any events overnight    OBJECTIVE:  Vital Signs Last 24 Hrs  T(C): 36.9 (18 Nov 2023 10:23), Max: 37.7 (17 Nov 2023 22:07)  T(F): 98.4 (18 Nov 2023 10:23), Max: 99.8 (17 Nov 2023 22:07)  HR: 81 (18 Nov 2023 10:23) (81 - 96)  BP: 107/66 (18 Nov 2023 10:23) (107/66 - 125/59)  BP(mean): 90 (17 Nov 2023 22:07) (78 - 90)  RR: 20 (18 Nov 2023 10:23) (17 - 22)  SpO2: 97% (18 Nov 2023 10:23) (96% - 100%)    Parameters below as of 18 Nov 2023 10:23  Patient On (Oxygen Delivery Method): nasal cannula  O2 Flow (L/min): 3      PHYSICAL EXAM:  Gen: NAD laying in bed; Pt was on 3L nasal cannula   CV: RRR, +S1/S2, no mumur  Pulm: CTA b/l no wheezing or crackles   Abd: soft, NTND + BS no rebound or guarding +PEG in place       LABS:                        8.5    6.49  )-----------( 144      ( 18 Nov 2023 05:30 )             27.5     11-18    141  |  106  |  9   ----------------------------<  136<H>  4.2   |  28  |  1.07    Ca    7.8<L>      18 Nov 2023 05:30  Phos  3.3     11-18  Mg     1.8     11-18    TPro  4.5<L>  /  Alb  1.9<L>  /  TBili  0.3  /  DBili  x   /  AST  19  /  ALT  6<L>  /  AlkPhos  67  11-16    LIVER FUNCTIONS - ( 16 Nov 2023 20:28 )  Alb: 1.9 g/dL / Pro: 4.5 g/dL / ALK PHOS: 67 U/L / ALT: 6 U/L / AST: 19 U/L / GGT: x             albuterol/ipratropium for Nebulization 3 milliLiter(s) Nebulizer every 6 hours  atorvastatin 80 milliGRAM(s) Oral at bedtime  atropine 1% Ophthalmic Solution for SubLingual Use 1 Drop(s) SubLingual three times a day  dextrose 5%. 1000 milliLiter(s) IV Continuous <Continuous>  dextrose 5%. 1000 milliLiter(s) IV Continuous <Continuous>  dextrose 50% Injectable 25 Gram(s) IV Push once  dextrose 50% Injectable 12.5 Gram(s) IV Push once  dextrose 50% Injectable 25 Gram(s) IV Push once  dextrose 50% Injectable 25 Gram(s) IV Push once  dextrose Oral Gel 15 Gram(s) Oral once PRN  enoxaparin Injectable 40 milliGRAM(s) SubCutaneous every 24 hours  fentaNYL   Patch  25 MICROgram(s)/Hr 1 Patch Transdermal every 72 hours  FIRST- Mouthwash  BLM 4 milliLiter(s) Swish and Spit every 6 hours  glucagon  Injectable 1 milliGRAM(s) IntraMuscular once  HYDROmorphone  Injectable 1 milliGRAM(s) IV Push every 4 hours PRN  influenza  Vaccine (HIGH DOSE) 0.7 milliLiter(s) IntraMuscular once  insulin glargine Injectable (LANTUS) 15 Unit(s) SubCutaneous at bedtime  insulin lispro (ADMELOG) corrective regimen sliding scale   SubCutaneous every 6 hours  lidocaine 2% Viscous 5 milliLiter(s) Swish and Spit two times a day  metoprolol tartrate 12.5 milliGRAM(s) Oral every 12 hours  morphine   Solution 5 milliGRAM(s) Oral every 4 hours PRN  morphine   Solution 10 milliGRAM(s) Oral every 4 hours PRN  pantoprazole   Suspension 40 milliGRAM(s) Oral every 24 hours  silver sulfADIAZINE 1% Cream 1 Application(s) Topical two times a day  thiamine IVPB 500 milliGRAM(s) IV Intermittent every 24 hours        A/P: 89 yo Slovenian-speaking M with PMHx CAD, PVD, DM, SCC of larynx (chemotx/XRT) initially px from home on 10/29 with 2-week hx of decreased PO intake admitted to [facility] stepped up to 7LA/telemetry on 11/4 in setting of increased secretion with need for closer airway monitoring now s/p PEG placement on tube feeds now stepdown back to [facility].      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   - Will continue tubes feeds as per nutrition recommendation  -Will continue to monitor  electrolytes   - Rad/onc and Palliative care team  following.      #Radiation dermatitis   – Continue Silvadene BID    #Type II Diabetes   – Continue to monitor FS and adjust insulin as required     #Anemia   -Pt s/p transfusion of unit of PRBCS on 11/17 labs  - Will continue to monitor H/H     #Hypoxia  - Pt is currently on 3L NC; Will attempt to titrate to off     #DISPO   -Pt was seen by PT and recommended for SOFI once medically stable

## 2023-11-18 NOTE — PROGRESS NOTE ADULT - ATTENDING COMMENTS
89 yo [place]dean-speaking M with PMHx CAD, PVD, DM, SCC of larynx (chemotx/XRT) initially px from home on 10/29 with 2-week hx of decreased PO intake in setting of known SCC larynx admitted to [facility] stepped up to 7LA/telemetry on 11/4 in setting of increased secretion with need for closer airway monitoring now s/p PEG placement with feeds initiated stable for stepdown back to [facility] for ongoing management.      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 NPO with PEG feeds (Glucerna 1.2 with goal rate 65). No concern for refeeding syndrome at this time. F/U daily BMP, Mg, Phos. Nutrition following. S/S assessment recommending continued NPO with ice chips as tolerated. Aspiration precautions. Sx management of odynophagia with magic mouthwash, viscous lidocaine swish/spit. Rad/onc following. Palliative following.      #Radiation dermatitis – No concern for superimposed infectious at this time. Continue Silvadene BID. Rad/onc following.     #T2DM – Hospital course c/b intermittent episodes of hypoglycemia, most recently FS 50s on 11/17 AM. Nutritional insulin DC’d. Continue Lantus 15U Qhs, mISS with Q6 FS.     #Anemia – Hb 6.9 with MCV 87.7 on 11/17 labs, transfused 1U pRBC. Hb trending 7-8 throughout hospital course. Iron studies from 10/30 suggestive of iron-deficiency anemia. Currently remains NPO for PO meds/feeds. No signs/sx to suggest active bleed. Maintain active T/S. F/U post-transfusion CBC in AM.      #Constipation - Loperamide DC'd.    Agree with remainder of resident plan as above.

## 2023-11-19 NOTE — PROGRESS NOTE ADULT - PROBLEM SELECTOR PLAN 5
RESOLVED    #Hypernatremia    Pt w/ uptrending Creatinine since admission, May be pre renal due to volume loss 2/2 secretions.    - continue to trend BUN/Cr  - D5+1/2 NS 75ml/hr  - restart home flomax when pt is able to take PO  - q12 bladder scans

## 2023-11-19 NOTE — PROGRESS NOTE ADULT - PROBLEM SELECTOR PLAN 1
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 C/o odynophagia causing poor PO intake. Per chart review appears poor PO intake has been ongoing issue while undergoing chemo/radiation. 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 Marr. Last chemo 10/23. 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 PEG placed 11/14  - Onc follow up in 1 week Dr. Derek Harvey (onc)  - Chest PT, hypertonic nebulizer q4hr   - On 3lpm O2 via NC, continue to wean  - Aspiration precautions    -Per rad onc, odynophagia and mucositis should likely resolve 2-3 weeks after completion of radiation. no radiation on 10/31. Last treatment 10/30  - magic mouthwash  - viscous lidocaine   -scopolamine patch  -BMP Mg/Phos @ 6pm to eval for Refeeding syndrome  -Palliative:         -Fentanyl 25mcg/hr Patch q72hr as long-acting agent        -Dilaudid 0.5mg IV q3h PRN for Moderate Pain        -Dilaudid 1mg IV q3h PRN for Severe Pain        - atropine 1% sublingual TID

## 2023-11-19 NOTE — PROGRESS NOTE ADULT - PROBLEM SELECTOR PLAN 11
Diet: PEG, with titration target rate to 63  F: 1/4 NS  E: replete PRN  DVT: Resume after PEG   Code: Full  GI PPx: Pantoprazole 40 mg qd via PEG

## 2023-11-19 NOTE — PROGRESS NOTE ADULT - SUBJECTIVE AND OBJECTIVE BOX
OVERNIGHT EVENTS: 11pm bladder scan w/ 136 cc urine. No other AOE. Weaned O2 from 5-3 lpm    SUBJECTIVE / INTERVAL HPI: Patient seen and examined at bedside. Mild SOB this AM, attempting to climb OOB. Denies CP, fever, NS, chills    VITAL SIGNS:  Vital Signs Last 24 Hrs  T(C): 36.4 (19 Nov 2023 10:11), Max: 37.1 (19 Nov 2023 04:00)  T(F): 97.6 (19 Nov 2023 10:11), Max: 98.7 (19 Nov 2023 04:00)  HR: 101 (19 Nov 2023 10:11) (90 - 104)  BP: 123/68 (19 Nov 2023 10:11) (119/75 - 138/69)  BP(mean): --  RR: 18 (19 Nov 2023 10:11) (16 - 18)  SpO2: 96% (19 Nov 2023 10:11) (88% - 97%)    Parameters below as of 19 Nov 2023 10:11  Patient On (Oxygen Delivery Method): nasal cannula w/ humidification  O2 Flow (L/min): 3    I&O's Summary    18 Nov 2023 07:01  -  19 Nov 2023 07:00  --------------------------------------------------------  IN: 560 mL / OUT: 1370 mL / NET: -810 mL    19 Nov 2023 07:01  -  19 Nov 2023 20:58  --------------------------------------------------------  IN: 0 mL / OUT: 800 mL / NET: -800 mL        PHYSICAL EXAM:    GENERAL: Restless in bed  HEAD:  Atraumatic, Normocephalic  EYES: severe cataracts bilaterally   NECK: blisters/erythema/macerated lesion, No JVD, Normal thyroid, no enlarged nodes   NERVOUS SYSTEM:  AAOx3; CNII-XII grossly intact; no focal deficits  CHEST/LUNG: NRP in place. No increased secretions  HEART: S1/S2 normal, no S3, Regular rate and rhythm; No murmurs   ABDOMEN: Soft, Nontender, Nondistended; PEG in place w/o edema or erythema  EXTREMITIES:  2+ Peripheral Pulses, No clubbing, cyanosis, or edema   PSYCHIATRIC: affect and characteristics of appearance, verbalizations, behaviors are appropriate    MEDICATIONS:  MEDICATIONS  (STANDING):  albuterol/ipratropium for Nebulization 3 milliLiter(s) Nebulizer every 6 hours  atorvastatin 80 milliGRAM(s) Oral at bedtime  atropine 1% Ophthalmic Solution for SubLingual Use 1 Drop(s) SubLingual three times a day  dextrose 5%. 1000 milliLiter(s) (50 mL/Hr) IV Continuous <Continuous>  dextrose 5%. 1000 milliLiter(s) (100 mL/Hr) IV Continuous <Continuous>  dextrose 50% Injectable 25 Gram(s) IV Push once  dextrose 50% Injectable 12.5 Gram(s) IV Push once  dextrose 50% Injectable 25 Gram(s) IV Push once  dextrose 50% Injectable 25 Gram(s) IV Push once  enoxaparin Injectable 40 milliGRAM(s) SubCutaneous every 24 hours  fentaNYL   Patch  25 MICROgram(s)/Hr 1 Patch Transdermal every 72 hours  FIRST- Mouthwash  BLM 4 milliLiter(s) Swish and Spit every 6 hours  glucagon  Injectable 1 milliGRAM(s) IntraMuscular once  influenza  Vaccine (HIGH DOSE) 0.7 milliLiter(s) IntraMuscular once  insulin glargine Injectable (LANTUS) 15 Unit(s) SubCutaneous at bedtime  insulin lispro (ADMELOG) corrective regimen sliding scale   SubCutaneous every 6 hours  lidocaine 2% Viscous 5 milliLiter(s) Swish and Spit two times a day  metoprolol tartrate 12.5 milliGRAM(s) Oral every 12 hours  pantoprazole   Suspension 40 milliGRAM(s) Oral every 24 hours  senna 2 Tablet(s) Oral at bedtime  silver sulfADIAZINE 1% Cream 1 Application(s) Topical two times a day  thiamine IVPB 500 milliGRAM(s) IV Intermittent every 24 hours    MEDICATIONS  (PRN):  dextrose Oral Gel 15 Gram(s) Oral once PRN Blood Glucose LESS THAN 70 milliGRAM(s)/deciliter  HYDROmorphone  Injectable 1 milliGRAM(s) IV Push every 4 hours PRN Breakthrough pain  morphine   Solution 5 milliGRAM(s) Oral every 4 hours PRN Moderate Pain (4 - 6)  morphine   Solution 10 milliGRAM(s) Oral every 4 hours PRN Severe Pain (7 - 10)  polyethylene glycol 3350 17 Gram(s) Oral every 12 hours PRN Constipation      ALLERGIES:  Allergies    No Known Allergies    Intolerances        LABS:                        8.5    6.19  )-----------( 138      ( 19 Nov 2023 09:29 )             28.6     11-19    142  |  104  |  11  ----------------------------<  187<H>  4.7   |  28  |  1.05    Ca    8.2<L>      19 Nov 2023 09:29  Phos  3.4     11-19  Mg     1.4     11-19    TPro  4.9<L>  /  Alb  2.1<L>  /  TBili  0.4  /  DBili  x   /  AST  17  /  ALT  <5<L>  /  AlkPhos  69  11-19      Urinalysis Basic - ( 19 Nov 2023 09:29 )    Color: x / Appearance: x / SG: x / pH: x  Gluc: 187 mg/dL / Ketone: x  / Bili: x / Urobili: x   Blood: x / Protein: x / Nitrite: x   Leuk Esterase: x / RBC: x / WBC x   Sq Epi: x / Non Sq Epi: x / Bacteria: x      CAPILLARY BLOOD GLUCOSE      POCT Blood Glucose.: 133 mg/dL (19 Nov 2023 17:35)      RADIOLOGY & ADDITIONAL TESTS: Reviewed.

## 2023-11-19 NOTE — PROGRESS NOTE ADULT - ASSESSMENT
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 Marr/Dr. Gar), admitted [id number] now s/p PEG w/o evidence of refeeding syndrome (slowly uptitrating feeds), stable for stepdown to [facility] with weaning of O2.

## 2023-11-19 NOTE — PROGRESS NOTE ADULT - ATTENDING COMMENTS
A/P: 89 yo British-speaking M with PMHx CAD, PVD, DM, SCC of larynx (chemotx/XRT) initially px from home on 10/29 with 2-week hx of decreased PO intake admitted to [facility] stepped up to 7LA/telemetry on 11/4 in setting of increased secretion with need for closer airway monitoring now s/p PEG placement on tube feeds now stepdown back to [facility].      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   - Will continue tubes feeds as per nutrition recommendation  -Will continue to monitor electrolytes and replete prn   - Rad/onc and Palliative care team  following.      #Radiation dermatitis   – Continue Silvadene BID    #Type II Diabetes  -Pt's FS are WNL  - Continue to monitor FS and adjust insulin as required     #Anemia  -Pt s/p transfusion of unit of PRBCS on 11/17 labs  - Will continue to monitor H/H     #Hypoxia  - Pt is currently on NC; Will attempt to titrate to off     #DISPO   -Pt was seen by PT and recommended for SOFI once medically stable A/P: 89 yo Scottish-speaking M with PMHx CAD, PVD, DM, SCC of larynx (chemotx/XRT) initially px from home on 10/29 with 2-week hx of decreased PO intake admitted to [facility] stepped up to 7LA/telemetry on 11/4 in setting of increased secretion with need for closer airway monitoring now s/p PEG placement on tube feeds now stepdown back to [facility].      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   - Will continue tubes feeds as per nutrition recommendation  -Will continue to monitor electrolytes and replete prn   - Rad/onc and Palliative care team  following.      #Radiation dermatitis   – Continue Silvadene BID    #Type II Diabetes  -Pt's FS are WNL  - Continue to monitor FS and adjust insulin as required     #Anemia  -Pt s/p transfusion of unit of PRBCS on 11/17 labs  - Will continue to monitor H/H     #Hypoxia  - Pt is currently on NC; Will attempt to titrate to off     #DISPO   -Pt was seen by PT and recommended for SOFI   -Will f/u SW regarding SOFI

## 2023-11-20 NOTE — CHART NOTE - NSCHARTNOTEFT_GEN_A_CORE
Due to patient's larynx squamous cell cancer with odynophagia, patient has severe mobility limitation and requires a standard wheelchair to assist with daily ADLs. Patient cannot ambulate safely with a cane or a walker. Patient has sufficient upper body strength to self propel said wheelchair and has not expressed an unwillingness to use the wheelchair. Patient has ample room in the home and a caregiver to assist with the wheelchair use of a manual wheelchair will significantly improve the patients ability to participate in daily ADLs and the patient well use it on a regular basis in the home. A standard manual wheelchair will significantly improve the patient's ability to participate in MRADs. With a wheelchair, she will require less caregiver assistance, be mobile around the home for longer distances, have reduced fall risk, and be able to complete MRADLs while sitting. The patient is planning to use the wheelchair on a regular basis in the home, and has not expressed an unwillingness to do so. Her home has adequate space and level surfaces to maneuver between rooms in a wheelchair. The patient will self-propel the wheelchair. The patient has sufficient physical and mental capabilities needed to safely self-propel a wheelchair in the home. A walker, cane, or crutches alone would not be sufficient at resolving the patient's mobility limitations because she has limited upper body strength, difficulty walking safely with an assistive device, and difficulty ambulating long distances at home.

## 2023-11-20 NOTE — PROGRESS NOTE ADULT - ATTENDING COMMENTS
Pt is 87 yo man with laryngeal cancer, blindness, CKD, DM, PVD, admitted with odynophagia and had PEG tube inserted for nutrition. Pt is now being prepared for discharge home.   ----needs feeds ordered, hospital bed, oxygen, suction devise,   ----cont with magic mouthwash and visc lido for likely radiation induced esophagitis  ----for laryngeal cancer follow up with Dr. Harvey 1 week post discharge

## 2023-11-20 NOTE — PROGRESS NOTE ADULT - ASSESSMENT
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 Marr/Dr. Gar), admitted [id number] now s/p PEG w/o evidence of refeeding syndrome (slowly uptitrating feeds), stable for stepdown to [facility] with weaning of O2.

## 2023-11-20 NOTE — PROGRESS NOTE ADULT - SUBJECTIVE AND OBJECTIVE BOX
INTERVAL HPI/OVERNIGHT EVENTS:  Patient was seen and examined at bedside. As per nurse and patient, no o/n events. Pt agitated.   Unable to assess ROS     VITAL SIGNS:  T(F): 98 (11-20-23 @ 05:37)  HR: 107 (11-20-23 @ 05:37)  BP: 112/52 (11-20-23 @ 05:37)  RR: 20 (11-20-23 @ 05:37)  SpO2: 93% (11-20-23 @ 05:37)  Wt(kg): --      11-19-23 @ 07:01  -  11-20-23 @ 07:00  --------------------------------------------------------  IN: 0 mL / OUT: 1200 mL / NET: -1200 mL        PHYSICAL EXAM:      GENERAL: Restless in bed  HEAD:  Atraumatic, Normocephalic  EYES: severe cataracts bilaterally   NECK: blisters/erythema/macerated lesion, No JVD, Normal thyroid, no enlarged nodes   NERVOUS SYSTEM:  AAOx3; CNII-XII grossly intact; no focal deficits  CHEST/LUNG: NRP in place. No increased secretions  HEART: S1/S2 normal, no S3, Regular rate and rhythm; No murmurs   ABDOMEN: Soft, Nontender, Nondistended; PEG in place w/o edema or erythema  EXTREMITIES:  2+ Peripheral Pulses, No clubbing, cyanosis, or edema   PSYCHIATRIC: affect and characteristics of appearance, verbalizations, behaviors are appropriate    MEDICATIONS  (STANDING):  albuterol/ipratropium for Nebulization 3 milliLiter(s) Nebulizer every 6 hours  atorvastatin 80 milliGRAM(s) Oral at bedtime  atropine 1% Ophthalmic Solution for SubLingual Use 1 Drop(s) SubLingual three times a day  dextrose 5%. 1000 milliLiter(s) (100 mL/Hr) IV Continuous <Continuous>  dextrose 5%. 1000 milliLiter(s) (50 mL/Hr) IV Continuous <Continuous>  dextrose 50% Injectable 25 Gram(s) IV Push once  dextrose 50% Injectable 25 Gram(s) IV Push once  dextrose 50% Injectable 25 Gram(s) IV Push once  dextrose 50% Injectable 12.5 Gram(s) IV Push once  enoxaparin Injectable 40 milliGRAM(s) SubCutaneous every 24 hours  fentaNYL   Patch  25 MICROgram(s)/Hr 1 Patch Transdermal every 72 hours  FIRST- Mouthwash  BLM 4 milliLiter(s) Swish and Spit every 6 hours  glucagon  Injectable 1 milliGRAM(s) IntraMuscular once  haloperidol    Injectable 1 milliGRAM(s) IV Push once  influenza  Vaccine (HIGH DOSE) 0.7 milliLiter(s) IntraMuscular once  insulin glargine Injectable (LANTUS) 15 Unit(s) SubCutaneous at bedtime  insulin lispro (ADMELOG) corrective regimen sliding scale   SubCutaneous every 6 hours  lidocaine 2% Viscous 5 milliLiter(s) Swish and Spit two times a day  metoprolol tartrate 12.5 milliGRAM(s) Oral every 12 hours  pantoprazole   Suspension 40 milliGRAM(s) Oral every 24 hours  senna 2 Tablet(s) Oral at bedtime  silver sulfADIAZINE 1% Cream 1 Application(s) Topical two times a day  thiamine IVPB 500 milliGRAM(s) IV Intermittent every 24 hours    MEDICATIONS  (PRN):  dextrose Oral Gel 15 Gram(s) Oral once PRN Blood Glucose LESS THAN 70 milliGRAM(s)/deciliter  HYDROmorphone  Injectable 1 milliGRAM(s) IV Push every 4 hours PRN Breakthrough pain  morphine   Solution 5 milliGRAM(s) Oral every 4 hours PRN Moderate Pain (4 - 6)  morphine   Solution 10 milliGRAM(s) Oral every 4 hours PRN Severe Pain (7 - 10)  polyethylene glycol 3350 17 Gram(s) Oral every 12 hours PRN Constipation      Allergies    No Known Allergies    Intolerances        LABS:                        8.3    6.65  )-----------( 141      ( 20 Nov 2023 05:30 )             26.9     11-20    142  |  103  |  14  ----------------------------<  177<H>  5.1   |  36<H>  |  1.14    Ca    8.4      20 Nov 2023 05:30  Phos  3.4     11-20  Mg     2.0     11-20    TPro  4.9<L>  /  Alb  2.1<L>  /  TBili  0.4  /  DBili  x   /  AST  17  /  ALT  <5<L>  /  AlkPhos  69  11-19      Urinalysis Basic - ( 20 Nov 2023 05:30 )    Color: x / Appearance: x / SG: x / pH: x  Gluc: 177 mg/dL / Ketone: x  / Bili: x / Urobili: x   Blood: x / Protein: x / Nitrite: x   Leuk Esterase: x / RBC: x / WBC x   Sq Epi: x / Non Sq Epi: x / Bacteria: x        RADIOLOGY & ADDITIONAL TESTS:  Reviewed

## 2023-11-20 NOTE — CHART NOTE - NSCHARTNOTEFT_GEN_A_CORE
Patient has a medical condition which requires positioning of the body in way not feasible with an ordinary bed. During oxygen testing, the patient de-sated due to their dx of larynx squamous cell carcinoma with odynophagia, and will require oxygen for home use. The patient is mobile in the home and requires a portable system

## 2023-11-20 NOTE — CHART NOTE - NSCHARTNOTEFT_GEN_A_CORE
Patient will need a hospital bed and gel overlay for home use. Patients head needs to be elevated 30 degrees to prevent aspirations and wedges and pillows have been tried and failed. Patient is unable to make frequent changes to body position on their own.The member can independently affect the adjustment by operating the controls.
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
## 2023-11-21 NOTE — PROGRESS NOTE ADULT - PROBLEM SELECTOR PLAN 6
.  Complex medical decision making / symptom management in the setting of malignancy.    Will continue to follow for ongoing GOC discussion / titration of palliative regimen. Emotional support provided, questions answered.  Active Psychosocial Referrals:  [x]Social Work/Case management [x]PT/OT [x]Chaplaincy []Hospice  []Patient/Family Support []Holistic RN [x]Massage Therapy []Music Therapy []Ethics  Coping: [] well [x] with difficulty [] poor coping [] unable to assess  Support system: [] strong [x] adequate [] inadequate    For new or uncontrolled symptoms, please call Palliative Care at [phone]). The service is available 24/7 (including nights & weekends) to provide symptom management recommendations over the phone as appropriate

## 2023-11-21 NOTE — PROGRESS NOTE ADULT - SUBJECTIVE AND OBJECTIVE BOX
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 Marr/Dr. Derek Gar), presents to the ED with generalized weakness. Reports progressive weakness over the last 2 weeks assc w/ fatigue. 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 C/o odynophagia but tolerating secretions. No shortness of breath or difficulty breathing. Reports dermatitis is stable, and reponds well to cream he was given. No drainage or discharge. Saw Rad on on 10/13. Per chart review appears odynophagia was presenting symptom of cancer. Reported subjective fever for 1 day but did not take his temperature at home. ROS otherwise negative.       Afebrile, HR 70s, //86, 100% on RA  WBC 2.3, Hb 9.5, plt 219, Na 133, K 5.4, BUN 49/Cr 1.87, lactate 1.0  EKG NSR, no peak T waves   s/p morphine, 2L NS  (29 Oct 2023 21:39)                            7.9    5.60  )-----------( 146      ( 21 Nov 2023 05:30 )             26.1       11-21    142  |  102  |  15  ----------------------------<  131<H>  4.0   |  35<H>  |  1.15    Ca    7.8<L>      21 Nov 2023 05:30  Phos  3.5     11-21  Mg     1.7     11-21    TPro  4.7<L>  /  Alb  1.9<L>  /  TBili  0.4  /  DBili  x   /  AST  20  /  ALT  5<L>  /  AlkPhos  74  11-21    Vital Signs Last 24 Hrs  T(C): 37.1 (21 Nov 2023 10:23), Max: 37.1 (21 Nov 2023 10:23)  T(F): 98.8 (21 Nov 2023 10:23), Max: 98.8 (21 Nov 2023 10:23)  HR: 90 (21 Nov 2023 10:23) (65 - 90)  BP: 127/70 (21 Nov 2023 10:23) (122/67 - 127/70)  BP(mean): --  RR: 18 (21 Nov 2023 10:23) (18 - 18)  SpO2: 98% (21 Nov 2023 10:23) (93% - 98%)    Parameters below as of 21 Nov 2023 10:23  Patient On (Oxygen Delivery Method): nasal cannula  O2 Flow (L/min): 3      MEDICATIONS  (STANDING):  albuterol/ipratropium for Nebulization 3 milliLiter(s) Nebulizer every 6 hours  atorvastatin 80 milliGRAM(s) Oral at bedtime  atropine 1% Ophthalmic Solution for SubLingual Use 1 Drop(s) SubLingual three times a day  dextrose 5%. 1000 milliLiter(s) (100 mL/Hr) IV Continuous <Continuous>  dextrose 5%. 1000 milliLiter(s) (50 mL/Hr) IV Continuous <Continuous>  dextrose 50% Injectable 25 Gram(s) IV Push once  dextrose 50% Injectable 25 Gram(s) IV Push once  dextrose 50% Injectable 25 Gram(s) IV Push once  dextrose 50% Injectable 12.5 Gram(s) IV Push once  enoxaparin Injectable 40 milliGRAM(s) SubCutaneous every 24 hours  FIRST- Mouthwash  BLM 4 milliLiter(s) Swish and Spit every 6 hours  glucagon  Injectable 1 milliGRAM(s) IntraMuscular once  influenza  Vaccine (HIGH DOSE) 0.7 milliLiter(s) IntraMuscular once  insulin glargine Injectable (LANTUS) 15 Unit(s) SubCutaneous at bedtime  insulin lispro (ADMELOG) corrective regimen sliding scale   SubCutaneous every 6 hours  lidocaine 2% Viscous 5 milliLiter(s) Swish and Spit two times a day  metoprolol tartrate 12.5 milliGRAM(s) Oral every 12 hours  pantoprazole   Suspension 40 milliGRAM(s) Oral every 24 hours  QUEtiapine 25 milliGRAM(s) Oral at bedtime  senna 2 Tablet(s) Oral at bedtime  silver sulfADIAZINE 1% Cream 1 Application(s) Topical two times a day  thiamine IVPB 500 milliGRAM(s) IV Intermittent every 24 hours    MEDICATIONS  (PRN):  dextrose Oral Gel 15 Gram(s) Oral once PRN Blood Glucose LESS THAN 70 milliGRAM(s)/deciliter  HYDROmorphone  Injectable 1 milliGRAM(s) IV Push every 4 hours PRN Breakthrough pain  morphine   Solution 5 milliGRAM(s) Oral every 4 hours PRN Moderate Pain (4 - 6)  morphine   Solution 10 milliGRAM(s) Oral every 4 hours PRN Severe Pain (7 - 10)  polyethylene glycol 3350 17 Gram(s) Oral every 12 hours PRN Constipation        11/20/2023 Functional Status Assessment :       Pain Assessment/Number Scale (0-10) Adult  Presence of Pain: denies pain/discomfort (Rating = 0)  Pain Rating (0-10): Rest: 0 (no pain/absence of nonverbal indicators of pain)  Pain Rating (0-10): Activity: 0 (no pain/absence of nonverbal indicators of pain)    Safety      AM-PAC Functional Assessment: Daily Activity  Type of Assessment: Daily assessment  Putting on and taking off regular lower body clothing?: 3 = A little assistance  Bathing (including washing, rinsing, drying)?: 3 = A little assistance  Toileting, which includes using toilet, bedpan or urinal?: 3 = A little assistance  Putting on and taking off regular upper body clothing?: 3 = A little assistance  Take care of personal grooming such as brushing teeth?: 3 = A little assistance  Eating meals?: 3 = A little assistance  Score: 18   Row Comment: Ask the patient "How much help from another person do you currently need? (If the patient hasn't done an activity recently, how much help from another person do you think he/she needs if he/she tried?)    Cognitive/Neuro      Cognitive/Neuro/Behavioral  Level of Consciousness: confused  Arousal Level: arouses to voice  Orientation: disoriented to;  place;  time;  situation  Speech: hypophonic;  hoarse  Mood/Behavior: calm;  cooperative    Language Assistance  Preferred Language to Address Healthcare Preferred Language to Address Healthcare: [place]  Preferred Sign Language Preferred Sign Language: documenting therapist speaking nonagh to pt, with pt following 100 percent commands throughout.     Therapeutic Interventions      Bed Mobility  Bed Mobility Training Rolling/Turning: supervsion  Bed Mobility Training Scooting: contact guard;  1 person assist;  verbal cues  Bed Mobility Training Bridging: contact guard;  1 person assist;  verbal cues  Bed Mobility Training Sit-to-Supine: contact guard;  1 person assist;  verbal cues  Bed Mobility Training Supine-to-Sit: contact guard;  1 person assist;  verbal cues  Bed Mobility Training Limitations: impaired balance;  decreased strength;  decreased flexibility;  impaired postural control;  cognitive, decreased safety awareness;  impaired vision    Sit-Stand Transfer Training  Transfer Training Sit-to-Stand Transfer: minimum assist (75% patient effort);  1 person assist;  verbal cues;  rolling walker  Transfer Training Stand-to-Sit Transfer: minimum assist (75% patient effort);  1 person assist;  verbal cues;  rolling walker  Sit-to-Stand Transfer Training Transfer Safety Analysis: decreased flexibility;  decreased strength;  impaired balance;  impaired postural control;  cognitive, decreased safety awareness;  impaired vision    Therapeutic Exercise  Therapeutic Exercise Detail: functional mobility training with pt able to ambulate in room/hallway with Min Ax1 using RW, requiring Max verbal/tactile cueing for navigation/obstacle negotiation.     Lower Body Dressing Training  Lower Body Dressing Training Assistance: contact guard;  1 person assist;  verbal cues;  decreased strength;  impaired balance;  impaired postural control;  impaired vision    Upper Body Dressing Training  Upper Body Dressing Training Assistance: contact guard;  1 person assist;  verbal cues;  decreased strength;  impaired balance;  impaired vision;  impaired postural control            PM&R Impression : as above    Current disposition plan recommendation :    d/c home with home physical and occupational therapy  , HHA

## 2023-11-21 NOTE — PROGRESS NOTE ADULT - PROBLEM SELECTOR PLAN 7
Pt was agitated o/n and received 2x2.5mg Zyprexa for agitation. AM pt was responding and was AOx2, but after RT tx pt was AOx0. Unclear etiology at this point. Pt is AOx1-2. Infectious workup continues to be unremarkable w/ no NGTD and negative UA and CXR. Likely 2/2 to hospital delirium, no FND  Plan:  - Re-orient as possible  - Can give Zyprexa 2.5mg IM if necessary Pt was agitated o/n and received 2x2.5mg Zyprexa for agitation. AM pt was responding and was AOx2, but after RT tx pt was AOx0. Unclear etiology at this point. Pt is AOx1-2. Infectious workup continues to be unremarkable w/ no NGTD and negative UA and CXR.     Likely 2/2 to hospital delirium, no FND    Plan:  - Re-orient as possible  - Seroquel 25mg qhs standing started 11/21

## 2023-11-21 NOTE — CHART NOTE - NSCHARTNOTESELECT_GEN_ALL_CORE
Event Note
Gastroenterology
ISTOP
Nutrition Services
Nutrition Services
Event Note
Hematology-Oncology/Event Note
Nutrition Services
Nutrition Services

## 2023-11-21 NOTE — PROGRESS NOTE ADULT - TIME BILLING
Education and Monitoring of Opiates including titration and management of high risk medications.  Discharge Facilitation with ongoing coordination.
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
## 2023-11-21 NOTE — PROGRESS NOTE ADULT - PROBLEM SELECTOR PLAN 5
.  Patient is Full Code  -patient appointed granddaughter (Yvrose Smith, [phone]) as alternate decision maker  -see prior C note    Patient would benefit from outpatient Palliative/Supportive Oncology follow-up on discharge with Dr. Emma Anthony at [facility]. Please call [phone] to make an appointment when discharge planning.

## 2023-11-21 NOTE — PROGRESS NOTE ADULT - ASSESSMENT
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 Marr/Dr. Gar), admitted [id number] now s/p PEG w/o evidence of refeeding syndrome (slowly uptitrating feeds), stable for stepdown to [facility] with weaning of O2. 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 Marr/Dr. Gar), admitted [id number] for odynophagia in setting of laryngeal SCC, now s/p PEG 11/14 & stepdowned to [facility] for transition of care. Patient is medically ready for discharge w/family not amenable for SOFI, now pending social work clearance for home care services d/t continuous O2 & suctioning requirements.

## 2023-11-21 NOTE — PROGRESS NOTE ADULT - ASSESSMENT
{\rtf1\fkblqc73965\ansi\zvhdwmn5487\ftnbj\uc1\deff0  {\fonttbl{\f0 \fnil Segoe UI;}{\f1 \fnil \fcharset0 Segoe UI;}{\f2 \fnil Times New Williams;}}  {\colortbl ;\vnw932\krcod621\bxgo061 ;\red0\green0\blue0 ;\red0\green0\bqgq815 ;\red0\green0\blue0 ;}  {\stylesheet{\f0\fs20 Normal;}{\cs1 Default Paragraph Font;}{\cs2\f0\fs16 Line Number;}{\cs3\f2\fs24\ul\cf3 Hyperlink;}}  {\*\revtbl{Unknown;}}  \hcvkju62227\vehydo03170\mcqpn6920\sdqrv8361\natsl2629\bubef2596\dmhidyx819\rduxoye174\nogrowautofit\sxynqc338\formshade\nofeaturethrottle1\dntblnsbdb\fet4\aendnotes\aftnnrlc\pgbrdrhead\pgbrdrfoot  \sectd\wygjgh56809\ulktxb99260\guttersxn0\zqxfcufk6791\mmjqvbce5116\izpkbynj9014\gtquyuqd8261\tdyicdl690\lgrmuze849\sbkpage\pgncont\pgndec  \plain\plain\f0\fs24\ql\plain\f0\fs24\plain\f0\fs20\aypl4093\hich\f0\dbch\f0\loch\f0\fs20\par  I M\par  \par  88 y o male, history of CAD, PVD, DM, 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 Marr/Dr. Gar), admitted [id number] for odynophagia in setting of laryngeal SCC, now s/p PEG 11/14 & stepdowned to [facility] for transition   of care. Patient is medically ready for discharge w/family not amenable for SOFI, now pending social work clearance for home care services d/t continuous O2 & suctioning requirements. \par  \par  \plain\f1\fs20\yygo7501\hich\f1\dbch\f1\loch\f1\cf2\fs20\ul{\field{\*\fldinst HYPERLINK [id number] }{\fldrslt Nutritional Assessment:}}\plain\f0\fs20\lurs5158\hich\f0\dbch\f0\loch\f0\fs20\ql\par  \trowd\renxge44\lastrow\ykpncow70\trpaddfl3\xdlbstz64\trpaddfr3\trpaddt0\trpaddft3\trpaddb0\trpaddfb3\trleft0  \clvertalt\abfosf81\clpadft3\zjbzql33\clpadfr3\clpadl0\clpadfl3\clpadb0\clpadfb3\frcal2014  \clvertalt\txalql64\clpadft3\sdcgoz80\clpadfr3\clpadl0\clpadfl3\clpadb0\clpadfb3\etzna5869  \pard\intbl\ssparaaux0\s0\fi-120\li120\ql\plain\f0\fs24{\*\bkmkstart [id number]}{\*\bkmkend qt673029557877}\plain\f0\fs20\kffy0923\hich\f0\dbch\f0\loch\f0\fs20 \'b7 Nutritional Assessment\cell  \pard\intbl\ssparaaux0\s0\ql\plain\f0\fs24\plain\f0\fs20\mnvt5916\hich\f0\dbch\f0\loch\f0\fs20 This patient has been assessed with a concern for Malnutrition and has been determined to have a diagnosis/diagnoses of Severe protein-calorie malnutrition.\par  \par  This patient is being managed with: \par  Diet NPO with Tube Feed-\par  Tube Feeding Modality: Gastrostomy\par  Glucerna 1.2 Seamus (GLUCERNARTH)\par  Continuous  Starting Tube Feed Rate \{mL per Hour\}: 20     Every 24 hours\par  Until Goal Tube Feed Rate (mL per Hour): 65\par  Tube Feed Duration (in Hours): 24\par  Tube Feed Start Time: 13:00   Frequency: Every 6 Hours\par  Banatrol TF     Qty per Day:  2\par  Entered: Nov 16 2023 12:47PM\cell  \intbl\row  \pard\ssparaaux0\s0\ql\plain\f0\fs24\plain\f0\fs20\rhja0394\hich\f0\dbch\f0\loch\f0\fs20\par  \plain\f1\fs20\jthz4401\hich\f1\dbch\f1\loch\f1\cf2\fs20\ul{\field{\*\fldinst HYPERLINK [id number] }{\fldrslt Problem/Plan - 1:}}\plain\f0\fs20\jxni4881\hich\f0\dbch\f0\loch\f0\fs20\ql\par  \'b7  {\*\bkmkstart [id number]}{\*\bkmkend ms67045569238}Problem: {\*\bkmkstart gn57087124340}{\*\bkmkend cf90649755457}Odynophagia. \par  \'b7  {\*\bkmkstart oe02348087584}{\*\bkmkend uf75841245043}Plan: {\*\bkmkstart vg99267274578}{\*\bkmkend om47076683203}Larynx SCC currently getting radiation and chemotherapy. C/o odynophagia causing poor PO intake. Per chart review appears poor PO intake   has been ongoing issue while undergoing chemo/radiation. 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 Marr. Last chemo 10/23. S&S: airway protection deficits in setting of copious secretions   and suspected radiation induced pharyngeal mucositis. PEG placed 11/14\par  - Onc follow up in 1 week Dr. Derek Harvey (onc)\par  - Chest PT, hypertonic nebulizer q4hr \par  - On 3lpm O2 via NC, continue to wean\par  - Aspiration precautions  \par  -Per rad onc, odynophagia and mucositis should likely resolve 2-3 weeks after completion of radiation. no radiation on 10/31. Last treatment 10/30\par  - magic mouthwash\par  - viscous lidocaine \par  -scopolamine patch\par  -BMP Mg/Phos @ 6pm to eval for Refeeding syndrome\par  -Palliative: \par  PAIN REGIMEN\par  -> Fentanyl 25mcg/hr Patch q72hr as long-acting agent\par  -> HYDROmorphone  1mg IV Push every 4 hours PRN Breakthrough pain\par  -> morphine 5mg Oral via PEG every 4 hours PRN Moderate Pain (4 - 6)\par  -> morphine 10mg Oral every 4 hours PRN Severe Pain (7 - 10)\par  -> atropine 1% sublingual TID.\plain\f1\fs20\joqz5385\hich\f1\dbch\f1\loch\f1\cf2\fs20\strike\plain\f0\fs20\vbly8576\hich\f0\dbch\f0\loch\f0\fs20\par  \par  \plain\f1\fs20\hzto7557\hich\f1\dbch\f1\loch\f1\cf2\fs20\ul{\field{\*\fldinst HYPERLINK [id number] }{\fldrslt Problem/Plan - 2:}}\plain\f0\fs20\nvjv0539\hich\f0\dbch\f0\loch\f0\fs20\ql\par  \'b7  {\*\bkmkstart nx75601488763}{\*\bkmkend mr35000355791}Problem: {\*\bkmkstart pf80139219283}{\*\bkmkend fj24951647835}Poor feeding. \par  \'b7  {\*\bkmkstart bg94543158790}{\*\bkmkend mm60892316422}Plan: {\*\bkmkstart jh63661342819}{\*\bkmkend np91601414243}Inability to tolerate PO due to Laryngeal SCC\par  \par  -failed S&S on 11/7 --> re-evaluate with MBS 11/15. Concern for future esophageal atrophy w/ no PO intake\par  -PEG placed 11/14, plan Start feeds 24h post placement. \par  -Glucerna 1.2@ (Goal: 65cc/hr) Increase today @ 40cc/hr (11/17) and increase incremently tomorrow\par  -Banitrol w/ Glucerna feeds for diarrhea\par  -Loperamide 2mg BID for diarrhea via PEG\par  -switch pantoprazole 40 mg qd via PEG.\par  \par  \plain\f1\fs20\gpww7210\hich\f1\dbch\f1\loch\f1\cf2\fs20\ul{\field{\*\fldinst HYPERLINK [id number] }{\fldrslt Problem/Plan - 3:}}\plain\f0\fs20\wpau1172\hich\f0\dbch\f0\loch\f0\fs20\ql\par  \'b7  {\*\bkmkstart el33268830309}{\*\bkmkend ci42109046212}Problem: {\*\bkmkstart ck97394275766}{\*\bkmkend hv91180810297}Weakness generalized. \par  \'b7  {\*\bkmkstart dx01259796976}{\*\bkmkend db75853123562}Plan: {\*\bkmkstart wr34349971442}{\*\bkmkend iz24062880714}Reports progressive weakness over the last 2 weeks assc w/ fatigue and poor PO intake. Reports this typically happens after chemo.   Typically ambulates with cane.\par  -Per PT, SOFI placement\par  -Out of bed to chair.\par  \par  \plain\f1\fs20\fkvy1960\hich\f1\dbch\f1\loch\f1\cf2\fs20\ul{\field{\*\fldinst HYPERLINK [id number] }{\fldrslt Problem/Plan - 4:}}\plain\f0\fs20\ahob6740\hich\f0\dbch\f0\loch\f0\fs20\ql\par  \'b7  {\*\bkmkstart le18069807801}{\*\bkmkend ak80959492841}Problem: {\*\bkmkstart ga30081015985}{\*\bkmkend tq47341670731}Radiation dermatitis. \par  \'b7  {\*\bkmkstart zb64570810986}{\*\bkmkend in57997659054}Plan: {\*\bkmkstart tu47850354782}{\*\bkmkend fq08628893579}Has b/l neck dermatitis due to radiation, first noticed early 09/13/2023 after 1st/2nd treatment. C/o pain, dysphagia and odynophagia   for weeks which was tx with magic mouthwash, silver silvadene cream, fluconazole 400mg once a day (started 10/23). Radiation paused due to pain, last RT 10/28.  On exam -  Bilateral neck wounds - erythematous with skin breakdown, crusted dead skin peripherally,   no drainage, no skin sloughing, at baseline per patient. Reports improvement with cream. No concern for superimposed infection at this time. Per Oncology tx w/ Chemo and Radiation is complete.\par  Plan:\par  -Per Rad onc, received RT 10/30, 10/31, and 11/1, 11/3. Plan to f/u w/ Onc in 1 week\par  -Check with RT to see when next dosing is scheduled vs complete?\par  -c/w silvadene cream and non-stick silicon bandage\par  -For skin care,  rinse with saline soaks liberally with dabbing of light soap and water.\par  \par  \plain\f1\fs20\qzdl3270\hich\f1\dbch\f1\loch\f1\cf2\fs20\ul{\field{\*\fldinst HYPERLINK [id number] }{\fldrslt Problem/Plan - 5:}}\plain\f0\fs20\zidd0611\hich\f0\dbch\f0\loch\f0\fs20\ql\par  \'b7  {\*\bkmkstart qh30886623337}{\*\bkmkend wg60368078414}Problem: {\*\bkmkstart cz32141826957}{\*\bkmkend ew68017641081}ELENITA (acute kidney injury). \par  \'b7  {\*\bkmkstart qh69654090659}{\*\bkmkend jr68705165627}Plan: {\*\bkmkstart rw14750682629}{\*\bkmkend lo17599429641}RESOLVED\par  \par  #Hypernatremia\par  \par  Pt w/ uptrending Creatinine since admission, May be pre renal due to volume loss 2/2 secretions.\par  \par  - continue to trend BUN/Cr\par  - D5+1/2 NS 75ml/hr\par  - restart home flomax when pt is able to take PO\par  - q12 bladder scans.\par  \par  \plain\f1\fs20\vrhr1975\hich\f1\dbch\f1\loch\f1\cf2\fs20\ul{\field{\*\fldinst HYPERLINK [id number] }{\fldrslt Problem/Plan - 6:}}\plain\f0\fs20\yqkr7890\hich\f0\dbch\f0\loch\f0\fs20\ql\par  \'b7  {\*\bkmkstart tj28384515171}{\*\bkmkend dq68948225323}Problem: {\*\bkmkstart aa12117459116}{\*\bkmkend lh34530737979}Squamous cell carcinoma of larynx. \par  \'b7  {\*\bkmkstart nx03163348086}{\*\bkmkend ip92458057642}Plan: {\*\bkmkstart uz64835586664}{\*\bkmkend eb57734970714}Dx 06/2023 with SCC larynx. Chemo/RT initiated 08/2023. Follows with Dr. Marr (rad onc) and Dr. Derek Harvey (onc). Last chemo   (taxol/carboplatin) on 10/23, last RT 11/3, tolerated well. \par  \par  - palliative consulted on board.\par  \par  \plain\f1\fs20\beli3744\hich\f1\dbch\f1\loch\f1\cf2\fs20\ul{\field{\*\fldinst HYPERLINK [id number] }{\fldrslt Problem/Plan - 7:}}\plain\f0\fs20\bhog9142\hich\f0\dbch\f0\loch\f0\fs20\ql\par  \'b7  {\*\bkmkstart xm95528027117}{\*\bkmkend qc40453542428}Problem: {\*\bkmkstart vd31824841399}{\*\bkmkend wi13629987265}Acute metabolic encephalopathy. \par  \'b7  {\*\bkmkstart ni79680315952}{\*\bkmkend ou69772870790}Plan: {\*\bkmkstart go13779815717}{\*\bkmkend mb03379054207}Pt was agitated o/n and received 2x2.5mg Zyprexa for agitation. AM pt was responding and was AOx2, but after RT tx pt was AOx0. Unclear   etiology at this point. Pt is AOx1-2. Infectious workup continues to be unremarkable w/ no NGTD and negative UA and CXR. \par  \par  Likely 2/2 to hospital delirium, no FND\par  \par  Plan:\par  - Re-orient as possible\par  - Seroquel 25mg qhs standing started 11/21.\plain\f1\fs20\apbz2884\hich\f1\dbch\f1\loch\f1\cf2\fs20\strike\plain\f0\fs20\hkyt0346\hich\f0\dbch\f0\loch\f0\fs20\par  \par  \plain\f1\fs20\ektd5140\hich\f1\dbch\f1\loch\f1\cf2\fs20\ul{\field{\*\fldinst HYPERLINK [id number] }{\fldrslt Problem/Plan - 8:}}\plain\f0\fs20\efbo3047\hich\f0\dbch\f0\loch\f0\fs20\ql\par  \'b7  {\*\bkmkstart wf09218766070}{\*\bkmkend nc60291016528}Problem: {\*\bkmkstart bh84039280001}{\*\bkmkend nr09982313216}Heart failure with mildly reduced ejection fraction. \par  \'b7  {\*\bkmkstart vd19786707506}{\*\bkmkend fo61111294132}Plan: {\*\bkmkstart ir14909798973}{\*\bkmkend qm19725260816}TTE 07/2023: mild LVH, mild reduced LVH hypertrophy, normal RV function, LV systolic function mild decr EF 50-55%. home meds: imdur   30 once a day, lisinopril 20mg once a day, toprol 12.5mg once a day \par  - Lopressor 12.5mg BID via PEG\par  - hold imdur given soft BPs, resume when appropriate \par  - hold lisinopril given prior ELENITA and unable to tolerate PO.\plain\f1\fs20\tnaw2272\hich\f1\dbch\f1\loch\f1\cf2\fs20\strike\plain\f0\fs20\oaom1759\hich\f0\dbch\f0\loch\f0\fs20\par  \par  \plain\f1\fs20\kisk8755\hich\f1\dbch\f1\loch\f1\cf2\fs20\ul{\field{\*\fldinst HYPERLINK [id number] }{\fldrslt Problem/Plan - 9:}}\plain\f0\fs20\zynf6179\hich\f0\dbch\f0\loch\f0\fs20\ql\par  \'b7  {\*\bkmkstart mg37840668520}{\*\bkmkend jy06681006482}Problem: {\*\bkmkstart st30628808191}{\*\bkmkend ob40105655904}CAD (coronary artery disease). \par  \'b7  {\*\bkmkstart uu68991378202}{\*\bkmkend bs60388207932}Plan: {\*\bkmkstart um54656549405}{\*\bkmkend lk73701356202}CAD s/p MIDCAB and PCI with multiple WINTER (most recent to LCx in 8/2022), on ASA 81 at home. Currently asymptomatic. EKG on admission   w/o ischemic changes\par  - holding Aspirin \par  - Restarted lipitor 80mg qd through PEG.\plain\f1\fs20\tqaq7069\hich\f1\dbch\f1\loch\f1\cf2\fs20\strike\plain\f0\fs20\mmhn1688\hich\f0\dbch\f0\loch\f0\fs20\par  \par  \plain\f1\fs20\otgp8988\hich\f1\dbch\f1\loch\f1\cf2\fs20\ul{\field{\*\fldinst HYPERLINK [id number] }{\fldrslt Problem/Plan - 10:}}\plain\f0\fs20\qvlh3318\hich\f0\dbch\f0\loch\f0\fs20\ql\par  \'b7  {\*\bkmkstart fh17900909375}{\*\bkmkend ze18055878970}Problem: {\*\bkmkstart sg31033836612}{\*\bkmkend jp45046727190}DM (diabetes mellitus). \par  \'b7  {\*\bkmkstart rs39311820279}{\*\bkmkend yj13446387422}Plan; {\*\bkmkstart tr04664163806}{\*\bkmkend lt82297851239}Last A1c 7.9 in 07/2023. Regimen as follows: Lantus 50mg daily, 12units TID with meals for 48 hour after chemo then 8 TID\par  - c/w sliding scale \par  - 15u lantus\par  - Monitor POCT: If hyperglycemic tomorrow can add 1u insulin q6 (2u caused hyperglycemia on 20cc/hr Glucerna)\par  - q6hr FS given inability to tolerate PO.\par  \par  \plain\f1\fs20\pxtk8743\hich\f1\dbch\f1\loch\f1\cf2\fs20\ul{\field{\*\fldinst HYPERLINK [id number] }{\fldrslt Problem/Plan - 11:}}\plain\f0\fs20\yhbg7223\hich\f0\dbch\f0\loch\f0\fs20\ql\par  \'b7  {\*\bkmkstart np381727095145}{\*\bkmkend rn610121899402}Problem: {\*\bkmkstart gz438564101687}{\*\bkmkend le968401128384}Prophylactic measure. \par  \'b7  {\*\bkmkstart ki782535428235}{\*\bkmkend ej952405717902}Plan: {\*\bkmkstart vm398824490958}{\*\bkmkend rt036924172658}Diet: PEG, with titration target rate to 63\par  F: 1/4 NS\par  E: replete PRN\par  DVT: Resume after PEG \par  Code: Full\par  GI PPx: Pantoprazole 40 mg qd via PEG\par  \par  Dispo: pt is medically cleared for dc home care services & needs O2, suctioning, hospital bed, w/c, 3in 1 commode and PEG feeds for dc.\plain\f1\fs20\higq1248\hich\f1\dbch\f1\loch\f1\cf2\fs20\strike\plain\f0\fs20\hzwb3718\hich\f0\dbch\f0\loch\f0\fs20\par  \par  \par  \par  \plain\f1\fs16\elvm7726\hich\f1\dbch\f1\loch\f1\cf2\fs16\par  \par  \plain\f0\fs20\wrml2480\hich\f0\dbch\f0\loch\f0\fs20\par  }
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
## 2023-11-21 NOTE — PROGRESS NOTE ADULT - ASSESSMENT
77yo M with PMH of CAD, PVD, T2DM, and SCC of Larynx p/w weakness and odynophagia. Palliative consulted for complex symptom management in the setting of malignancy.    ·	Discharge planning: fentanyl 25mcg/h patch q72h and morphine solution 5mg q4h PRN pain sent to [facility] pharmacy. Please make sure it is covered and family aware to pick it up on discharge  ·	Please call to make a new patient appointment with me at oncology clinic for 1 week post-discharge. You can do so by calling [phone]

## 2023-11-21 NOTE — PROGRESS NOTE ADULT - PROBLEM SELECTOR PLAN 9
CAD s/p MIDCAB and PCI with multiple WINTER (most recent to [facility] in 8/2022), on ASA 81 at home. Currently asymptomatic. EKG on admission w/o ischemic changes  - Restart ASA 81 qd through PEG  - Restart lipitor 80mg qd through PEG CAD s/p MIDCAB and PCI with multiple WINTER (most recent to [facility] in 8/2022), on ASA 81 at home. Currently asymptomatic. EKG on admission w/o ischemic changes  - holding Aspirin   - Restarted lipitor 80mg qd through PEG

## 2023-11-21 NOTE — PROGRESS NOTE ADULT - PROBLEM SELECTOR PLAN 1
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 C/o odynophagia causing poor PO intake. Per chart review appears poor PO intake has been ongoing issue while undergoing chemo/radiation. 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 Marr. Last chemo 10/23. 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 PEG placed 11/14  - Onc follow up in 1 week Dr. Derek Harvey (onc)  - Chest PT, hypertonic nebulizer q4hr   - On 3lpm O2 via NC, continue to wean  - Aspiration precautions    -Per rad onc, odynophagia and mucositis should likely resolve 2-3 weeks after completion of radiation. no radiation on 10/31. 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 C/o odynophagia causing poor PO intake. Per chart review appears poor PO intake has been ongoing issue while undergoing chemo/radiation. 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 Marr. Last chemo 10/23. 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 PEG placed 11/14  - Onc follow up in 1 week Dr. Derek Harvey (onc)  - Chest PT, hypertonic nebulizer q4hr   - On 3lpm O2 via NC, continue to wean  - Aspiration precautions    -Per rad onc, odynophagia and mucositis should likely resolve 2-3 weeks after completion of radiation. no radiation on 10/31. Last treatment 10/30  - magic mouthwash  - viscous lidocaine   -scopolamine patch  -BMP Mg/Phos @ 6pm to eval for Refeeding syndrome  -Palliative:   PAIN REGIMEN  -> Fentanyl 25mcg/hr Patch q72hr as long-acting agent  -> HYDROmorphone  1mg IV Push every 4 hours PRN Breakthrough pain  -> morphine 5mg Oral via PEG every 4 hours PRN Moderate Pain (4 - 6)  -> morphine 10mg Oral every 4 hours PRN Severe Pain (7 - 10)  -> atropine 1% sublingual TID

## 2023-11-21 NOTE — PROGRESS NOTE ADULT - ATTENDING COMMENTS
Pt is 89 yo man with laryngeal cancer, blindness, CKD, DM, PVD, admitted with odynophagia and had PEG tube inserted for nutrition. Pt is now being prepared for discharge home.   ----ordered for: feeds , hospital bed, oxygen, suction devise,   ----cont with magic mouthwash and visc lido for likely radiation induced esophagitis  ----for laryngeal cancer follow up with Dr. Harvey 1 week post discharge  ----given episodes of agitation at night time will add low dose seroquel

## 2023-11-21 NOTE — PROGRESS NOTE ADULT - PROBLEM SELECTOR PLAN 11
Diet: PEG, with titration target rate to 63  F: 1/4 NS  E: replete PRN  DVT: Resume after PEG   Code: Full  GI PPx: Pantoprazole 40 mg qd via PEG    Dispo: pt is medically cleared for dc & needs O2, suctioning, hospital bed, w/c, 3in 1 commode and PEG feeds for dc Diet: PEG, with titration target rate to 63  F: 1/4 NS  E: replete PRN  DVT: Resume after PEG   Code: Full  GI PPx: Pantoprazole 40 mg qd via PEG    Dispo: pt is medically cleared for dc home care services & needs O2, suctioning, hospital bed, w/c, 3in 1 commode and PEG feeds for dc

## 2023-11-21 NOTE — PROGRESS NOTE ADULT - PROBLEM SELECTOR PLAN 8
TTE 07/2023: mild LVH, mild reduced LVH hypertrophy, normal RV function, LV systolic function mild decr EF 50-55%. home meds: imdur 30 once a day, lisinopril 20mg once a day, toprol 12.5mg once a day   - Restart Lopressor 12.5mg BID via PEG  - hold imdur given soft BPs, resume when appropriate   - hold lisinopril given prior ELENITA and unable to tolerate PO TTE 07/2023: mild LVH, mild reduced LVH hypertrophy, normal RV function, LV systolic function mild decr EF 50-55%. home meds: imdur 30 once a day, lisinopril 20mg once a day, toprol 12.5mg once a day   - Lopressor 12.5mg BID via PEG  - hold imdur given soft BPs, resume when appropriate   - hold lisinopril given prior ELENITA and unable to tolerate PO

## 2023-11-22 NOTE — H&P ADULT - NSHPPHYSICALEXAM_GEN_ALL_CORE
.  VITAL SIGNS:  T(C): 36.6 (11-22-23 @ 18:17), Max: 37.6 (11-22-23 @ 08:24)  T(F): 97.9 (11-22-23 @ 18:17), Max: 99.6 (11-22-23 @ 08:24)  HR: 95 (11-22-23 @ 18:17) (92 - 98)  BP: 131/77 (11-22-23 @ 18:17) (110/66 - 131/77)  BP(mean): --  RR: 18 (11-22-23 @ 18:17) (18 - 19)  SpO2: 98% (11-22-23 @ 18:17) (92% - 98%)  Wt(kg): --    PHYSICAL EXAM:    Constitutional: resting comfortably in bed; NAD  Head: NC/AT  Eyes: PERRL, EOMI, anicteric sclera  ENT: no nasal discharge; uvula midline, no oropharyngeal erythema or exudates; MMM  Neck: supple; no JVD or thyromegaly  Respiratory: CTA B/L; no W/R/R, no retractions  Cardiac: +S1/S2; RRR; no M/R/G; PMI non-displaced  Gastrointestinal: abdomen soft, NT/ND; no rebound or guarding; +BSx4  Back: spine midline, no bony tenderness or step-offs; no CVAT B/L  Extremities: WWP, no clubbing or cyanosis; no peripheral edema  Musculoskeletal: NROM x4; no joint swelling, tenderness or erythema  Vascular: 2+ radial, DP/PT pulses B/L  Dermatologic: skin warm, dry and intact; no rashes, wounds, or scars  Lymphatic: no submandibular or cervical LAD  Neurologic: AAOx3; CNII-XII grossly intact; no focal deficits  Psychiatric: affect and characteristics of appearance, verbalizations, behaviors are appropriate .  VITAL SIGNS:  T(C): 36.6 (11-22-23 @ 18:17), Max: 37.6 (11-22-23 @ 08:24)  T(F): 97.9 (11-22-23 @ 18:17), Max: 99.6 (11-22-23 @ 08:24)  HR: 95 (11-22-23 @ 18:17) (92 - 98)  BP: 131/77 (11-22-23 @ 18:17) (110/66 - 131/77)  BP(mean): --  RR: 18 (11-22-23 @ 18:17) (18 - 19)  SpO2: 98% (11-22-23 @ 18:17) (92% - 98%)  Wt(kg): --    PHYSICAL EXAM:    Constitutional: resting comfortably in bed; NAD on 3L NC  Head: NC/AT  Eyes: PERRL, EOMI, anicteric sclera  ENT: no nasal discharge; dry MM; hoarse voice  Neck: b/l neck dermatitis with closed/scabbed over wounds and dry skin, L side worse than R  Respiratory: CTA B/L; no W/R/R, no retractions  Cardiac: +S1/S2; RRR; no M/R/G  Gastrointestinal: abdomen soft, NT/ND; no rebound or guarding; +BSx4; PEG tube site c/d/i with mild TTP  Extremities: WWP, no clubbing or cyanosis; no peripheral edema  Musculoskeletal: NROM x4; no joint swelling, tenderness or erythema  Vascular: 2+ radial, DP/PT pulses B/L  Dermatologic: dermatitis to the neck L worse than R  Neurologic: AAOx2; CNII-XII grossly intact; no focal deficits  Psychiatric: affect and characteristics of appearance, verbalizations, behaviors are appropriate

## 2023-11-22 NOTE — ED PROVIDER NOTE - PHYSICAL EXAMINATION
CONSTITUTIONAL: Well-appearing;  in no apparent distress.   HEAD: Normocephalic; atraumatic.   EYES: PERRL; EOM intact; conjunctiva and sclera clear  ENT: normal nose; no rhinorrhea; normal pharynx with no erythema or lesions.   NECK: Supple; non-tender; no LAD  CARDIOVASCULAR: Normal S1, S2; No audible murmurs. Regular rate and rhythm.   RESPIRATORY: Breathing easily; breath sounds clear and equal bilaterally; no wheezes, rhonchi, or rales.  GI: Soft; non-distended; non-tender; no palpable organomegaly.   MSK: FROM at all extremities, normal tone   EXT: No cyanosis or edema; N/V intact  SKIN: blisters/erythema/macerated lesion most notable over left lower neck,   NEURO: A & O x 1-2; face symmetric; grossly unremarkable.

## 2023-11-22 NOTE — H&P ADULT - PROBLEM SELECTOR PLAN 5
TTE 07/2023: mild LVH, mild reduced LVH hypertrophy, normal RV function, LV systolic function mild decr EF 50-55%. home meds: toprol 100mg once a day  per chart review.   - Normotensive for now, hold home med  - convert to lopressor 50mg bid via PEG if needed for BP control TTE 07/2023: mild LVH, mild reduced LVH hypertrophy, normal RV function, LV systolic function mild decr EF 50-55%. home meds: toprol 100mg once a day  per chart review.   - Normotensive for now, hold home med  - convert to lopressor 12.5 mg bid via PEG if needed for BP control TTE 07/2023: mild LVH, mild reduced LVH hypertrophy, normal RV function, LV systolic function mild decr EF 50-55%. home meds: discharged on toprol 100mg once a day  per chart review.   - start lopressor 12.5 mg bid via PEG with hold parameters

## 2023-11-22 NOTE — PROGRESS NOTE ADULT - PROBLEM SELECTOR PLAN 1
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 C/o odynophagia causing poor PO intake. Per chart review appears poor PO intake has been ongoing issue while undergoing chemo/radiation. 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 Marr. Last chemo 10/23. 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 PEG placed 11/14  - Onc follow up in 1 week Dr. Derek Harvey (onc)  - Chest PT, hypertonic nebulizer q4hr   - On 3lpm O2 via NC, continue to wean  - Aspiration precautions    -Per rad onc, odynophagia and mucositis should likely resolve 2-3 weeks after completion of radiation. no radiation on 10/31. Last treatment 10/30  - magic mouthwash  - viscous lidocaine   -scopolamine patch  -BMP Mg/Phos @ 6pm to eval for Refeeding syndrome  -Palliative:   PAIN REGIMEN  -> Fentanyl 25mcg/hr Patch q72hr as long-acting agent  -> HYDROmorphone  1mg IV Push every 4 hours PRN Breakthrough pain  -> morphine 5mg Oral via PEG every 4 hours PRN Moderate Pain (4 - 6)  -> morphine 10mg Oral every 4 hours PRN Severe Pain (7 - 10)  -> atropine 1% sublingual TID Additional Progress Note...

## 2023-11-22 NOTE — H&P ADULT - ATTENDING COMMENTS
"[facility] [[id number]]  Chief Complaint   Patient presents with     RECHECK     Chronic cough     Initial BP 96/61 (BP Location: Right arm, Patient Position: Sitting, Cuff Size: Child)   Pulse 102   Temp 98.6  F (37  C) (Oral)   Resp 24   Ht 4' 1.61\" (126 cm)   Wt 71 lb 13.9 oz (32.6 kg)   SpO2 98%   BMI 20.53 kg/m   Estimated body mass index is 20.53 kg/m  as calculated from the following:    Height as of this encounter: 4' 1.61\" (126 cm).    Weight as of this encounter: 71 lb 13.9 oz (32.6 kg).  Medication Reconciliation: complete    Does the patient need any medication refills today? No    Does the patient/parent need MyChart or Proxy acces today? No    Would you like the Covid vaccine today? No     Radha Gaston LPN        "
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     #Diarrhea  - documented as having fecal incontinence while on [facility], will clarify with that team.  tube feeds commonly cause diarrhea.  If diarrhea is persistent, can consider checking C diff and GI PCR    #Odynophagia  --outpatient follow up Dr. Derek Harvey  - continue chest PT  - aspiration precautions  - wean oxygen as able    60 minutes spent on this encounter, including face to face with patient, care coordination and documentation.

## 2023-11-22 NOTE — H&P ADULT - PROBLEM SELECTOR PLAN 7
A1c 9.0 on 10/30. Discharged on lantus 45qd in the AM and lispro 7u TID, however patient discontinued on standing insulin while previously hospitalized due to hypoglycemia episodes.   - obtain collateral  - c/w tube feeds  - POC glucose checks q6h  - low ISS for now, re-evaluate after determining insulin needs A1c 9.0 on 10/30. Discharged on lantus 45qd in the AM and lispro 7u TID, however patient discontinued on standing insulin while previously hospitalized due to hypoglycemia episodes.   - c/w tube feeds  - POC glucose checks q6h  - start lantus 10u (previously on lantus 15u, but hypoglycemic to 60s this AM)  - moderate ISS

## 2023-11-22 NOTE — PATIENT PROFILE ADULT - DEAF OR HARD OF HEARING?
This patient called me to see if she could get in sooner with you.  She has been having a lot of pain.  Her appointment is 9/23/21.  If you can't see her sooner should I start her on some prednisone.  She's been on tramadol and an anti-inflammatory.   no

## 2023-11-22 NOTE — PROGRESS NOTE ADULT - REASON FOR ADMISSION
weakness, poor PO intake

## 2023-11-22 NOTE — ED ADULT TRIAGE NOTE - CHIEF COMPLAINT QUOTE
Pt brought in with wife stating the visiting nurse states the patient needs assistance and is unable to be at home. Pt discharged from the hospital earlier today, currently being treated for throat cancer, states he does not have a hospital bed, cannot walk on his own. Denies new symptoms, pain. Speaking clearly in full sentences.

## 2023-11-22 NOTE — PATIENT PROFILE ADULT - FUNCTIONAL ASSESSMENT - BASIC MOBILITY 6.
3-calculated by average/Not able to assess (calculate score using [facility] averaging method)

## 2023-11-22 NOTE — PROGRESS NOTE ADULT - PROBLEM SELECTOR PLAN 9
CAD s/p MIDCAB and PCI with multiple WINTER (most recent to LCx in 8/2022), on ASA 81 at home. Currently asymptomatic. EKG on admission w/o ischemic changes  - holding Aspirin   - Restarted lipitor 80mg qd through PEG

## 2023-11-22 NOTE — PROGRESS NOTE ADULT - PROBLEM SELECTOR PROBLEM 11
Prophylactic measure
DM (diabetes mellitus)
Prophylactic measure

## 2023-11-22 NOTE — PROGRESS NOTE ADULT - ATTENDING SUPERVISION STATEMENT
Resident

## 2023-11-22 NOTE — H&P ADULT - PROBLEM SELECTOR PLAN 6
CAD s/p MIDCAB and PCI with multiple WINTER (most recent to LCx in 8/2022). Discharged on ASA 81 and Lipitor 80mg.  - c/w ASA and lipitor via PEG

## 2023-11-22 NOTE — H&P ADULT - HISTORY OF PRESENT ILLNESS
HPI: Pt brought in with wife stating the visiting nurse states the patient needs assistance and is unable to be at home. Pt discharged from the hospital earlier today, currently being treated for throat cancer, states he does not have a hospital bed, cannot walk on his own. Denies new symptoms, pain. Speaking clearly in full sentences.    In the ED:  Initial vital signs: T: 97.9 F, HR: 95, BP: 131/77, R: 18, SpO2: 98% on 3L NC  Labs: none  CXR: none  EKG: none  Medications: none  Consults: none  HPI: Patient is a 78-year-old M with PMH of CAD, PVD, DM, 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 Marr/Dr. Gar), recently admitted for odynophagia 2/2 radiation-induced mucositis iso laryngeal SCC, now s/p PEG 11/14 discharged on 11/22 who presents due to inability for safe care at home. Pt brought in with wife, per chart review the visiting nurse felt the patient needed more assistance and was unable to be at home. Pt discharged from the hospital earlier today, currently being treated for throat cancer, states he does not have a hospital bed and cannot walk on his own. Was recommended for SOFI but declined. Patient seen and examined at bedside. Denies any new symptoms at this time. Continues to have mild pain of the neck at site of dermatitis and some discomfort at PEG tube site, otherwise feels well. States he wants to return home once services are set up, does not want to pursue rehab as an option.     In the ED:  Initial vital signs: T: 97.9 F, HR: 95, BP: 131/77, R: 18, SpO2: 98% on 3L NC  Labs: none  CXR: none  EKG: none  Medications: none  Consults: none  HPI: Patient is a 78-year-old M with PMH of CAD, PVD, DM, 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 Marr/Dr. Gar), recently admitted for odynophagia 2/2 radiation-induced mucositis iso laryngeal SCC, now s/p PEG 11/14 discharged on 11/22 who presents due to inability for safe care at home. Pt brought in with wife, per chart review the visiting nurse felt the patient needed more assistance and was unable to be at home. Pt discharged from the hospital earlier today, currently being treated for throat cancer, states he does not have a hospital bed and cannot walk on his own. Was recommended for SOFI but declined. Patient seen and examined at bedside. Denies any new symptoms at this time. Continues to have mild pain of the neck at site of dermatitis and some discomfort at PEG tube site, otherwise feels well. States he wants to return home once services are set up, does not want to pursue rehab as an option.     In the ED:  Initial vital signs: T: 97.9 F, HR: 95, BP: 131/77, R: 18, SpO2: 98% on 3L NC  Labs: no new labs, however 10/22 AM labs significant for Hgb 8.8, Plt 143, Mg 1.4, Phos 2.3  CXR: none  EKG: none  Medications: none  Consults: none

## 2023-11-22 NOTE — H&P ADULT - PROBLEM SELECTOR PLAN 3
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 Chemo/RT initiated 08/2023. Follows with Dr. Marr (rad onc) and Dr. Derek Harvey (onc). Last chemo (taxol/carboplatin) on 10/23, last RT 11/3, tolerated well.   - patient to follow up outpatient

## 2023-11-22 NOTE — PATIENT PROFILE ADULT - FALL HARM RISK - HARM RISK INTERVENTIONS

## 2023-11-22 NOTE — H&P ADULT - PROBLEM SELECTOR PLAN 1
Per chart review from prior admission, patient with significant odynophagia likely 2/2 radiation therapy iso laryngeal squamous cell carcinoma. Follows outpatient with Dr. Marr, last received treatment 11/3. Was seen by speech & swallow multiple times during prior admission and failed PO trials, PEG tube placed on 11/14. Per rad-onc, mucositis would resolve 2-3 weeks after completion of radiation. Previously seen by palliative and placed on a pain regimen.  - c/w fentanyl patch 25mcg q72h  - c/w morphine 5mg via PEG q4h for moderate pain  - c/w morphine 10mg via PEG q4h for severe pain  - c/w atropine 1% sublingual TID to reduce oral secretions  - monitor suctioning requirements  - c/w PEG feeds  - bowel regimen while on opiates Per chart review from prior admission, patient with significant odynophagia likely 2/2 radiation therapy iso laryngeal squamous cell carcinoma. Follows outpatient with Dr. Marr, last received treatment 11/3. Was seen by speech & swallow multiple times during prior admission and failed PO trials, PEG tube placed on 11/14. Per rad-onc, mucositis would resolve 2-3 weeks after completion of radiation. Previously seen by palliative and placed on a pain regimen.  - c/w fentanyl patch 25mcg q72h  - c/w morphine 5mg via PEG q4h for moderate pain  - c/w morphine 10mg via PEG q4h for severe pain  - c/w atropine 1% sublingual TID to reduce oral secretions  - c/w magic mouthwash q6h and lidocaine 2% viscous sol. q12h  - monitor suctioning requirements  - c/w PEG feeds  - bowel regimen while on opiates

## 2023-11-22 NOTE — ED PROVIDER NOTE - OBJECTIVE STATEMENT
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 Marr/Dr. Gar), recently admitted for odynophagia in setting of laryngeal SCC, now s/p PEG 11/14, discharged today with home care services given continuous O2 & suctioning requirements, and followup for continuing treatments came back to ED because wife unable to care for pt. Pt was recommended for SOFI but family preferred for pt to go home with services. Initially they declined a hospital bed because they thought it would not fit but when the visiting nurse came today family realized they needed the bed. Pt confused today and more aggressive. Has not has his tube feeds to day. No fever.

## 2023-11-22 NOTE — PROGRESS NOTE ADULT - SUBJECTIVE AND OBJECTIVE BOX
LEONARDO HAY 78y Male  MRN#: [id number]    HPI  Patient is a 78y old Male who presents with a chief complaint of weakness, poor PO intake (21 Nov 2023 12:47)  Currently admitted to medicine with the primary diagnosis of Odynophagia        Today is hospital day 24d,    Interval or overnight events: None    Today patient was seen and examined bedside. Ongoing concerns addressed and noted.  Denied any new concerns including headaches, fevers, chills, vision changes, malaise, weakness, chest discomfort, cough, dyspnea, nausea, vomiting, diarrhea, constipation, dysuria, skin changes, bruising, heat/cold intolerance    No other new findings or concerns otherwise.     Urinary catheter type, indication, date placed: N/A  Other catheters, ports, etc: N/A    PHYSICAL EXAM  CONSTITUTIONAL: well-developed, well-groomed, no apparent distress  EYES: no conjunctival or scleral injection, non-icteric, PERRLA  ENMT: no external nasal lesions, oral mucosa with moist membranes  NECK: trachea midline, no palpable neck mass   RESPIRATORY: breathing comfortably, lungs CTA without wheeze/rales/rhonchi  CARDIOVASCULAR: regular rate and rhythm; +S1S2, no murmurs, rubs, or gallops, no lower extremity edema, 2+ peripheral pulses  GASTROINTESTINAL: soft, nontender, nondistended; +BS throughout, no rebound/guarding  MUSCULOSKELETAL: no joint effusions, normal strength and tone of extremities   NEUROLOGIC: non-focal, sensation & motor grossly intact.  PSYCHIATRIC: AAOx3, appropriate mood and affect  SKIN: no rashes or lesions, warm to touch, no excessive moisture.     ===================  Home Meds:  Last Order Reconciliation Date: Not Done  acetaminophen 325 mg oral tablet (10-30-23)  acetaminophen 325 mg oral tablet (08-11-22)  amoxicillin-clavulanate 875 mg-125 mg oral tablet (10-30-23)  aspirin 81 mg oral delayed release tablet (08-14-22)  atorvastatin 80 mg oral tablet (08-14-22)  benazepril 20 mg oral tablet (10-30-23)  Commode (11-01-23)  docusate sodium 100 mg oral capsule (10-30-23)  fentaNYL 25 mcg/hr transdermal film, extended release (11-21-23)  fluconazole 10 mg/mL oral liquid (10-30-23)  HumaLOG 100 units/mL injectable solution (08-13-22)  HumaLOG 100 units/mL injectable solution (11-14-18)  ibuprofen 400 mg oral tablet (10-30-23)  isosorbide mononitrate 30 mg oral tablet, extended release (08-14-22)  K-Tab 20 mEq oral tablet, extended release (08-11-22)  Lantus 100 units/mL subcutaneous solution (08-13-22)  Lantus 100 units/mL subcutaneous solution (11-14-18)  Lasix 40 mg oral tablet (08-11-22)  latanoprost 0.005% ophthalmic solution (10-30-23)  metoprolol succinate 100 mg oral capsule, extended release (10-30-23)  Metoprolol Succinate ER 25 mg oral tablet, extended release (10-30-23)  morphine 10 mg/0.5 mL oral solution (11-21-23)  Myrbetriq 50 mg oral tablet, extended release (10-30-23)  oxycodone-acetaminophen 5 mg-325 mg oral tablet (08-11-22)  Protonix 40 mg oral delayed release tablet (10-30-23)  ranolazine 500 mg oral tablet, extended release (10-30-23)  Rolling Walker (11-01-23)  senna oral tablet (08-11-22)  Simbrinza 1%- 0.2% ophthalmic suspension (10-30-23)  Tessalon Perles 100 mg oral capsule (08-11-22)  timolol maleate 0.5% ophthalmic solution (10-30-23)  Tylenol 325 mg oral capsule (10-30-23)    ALLERGIES:  No Known Allergies    MEDICATIONS:  STANDING MEDICATIONS  albuterol/ipratropium for Nebulization 3 milliLiter(s) Nebulizer every 6 hours  atorvastatin 80 milliGRAM(s) Oral at bedtime  atropine 1% Ophthalmic Solution for SubLingual Use 1 Drop(s) SubLingual three times a day  dextrose 5%. 1000 milliLiter(s) (100 mL/Hr) IV Continuous <Continuous>  dextrose 5%. 1000 milliLiter(s) (50 mL/Hr) IV Continuous <Continuous>  dextrose 50% Injectable 25 Gram(s) IV Push once, Stop order after: 1 Doses  dextrose 50% Injectable 25 Gram(s) IV Push once, Stop order after: 1 Doses  dextrose 50% Injectable 12.5 Gram(s) IV Push once, Stop order after: 1 Doses  dextrose 50% Injectable 25 Gram(s) IV Push once, Stop order after: 1 Doses  dextrose Oral Gel 15 Gram(s) Oral once, Stop order after: 1 Doses PRN  enoxaparin Injectable 40 milliGRAM(s) SubCutaneous every 24 hours  fentaNYL   Patch  25 MICROgram(s)/Hr 1 Patch Transdermal every 72 hours  FIRST- Mouthwash  BLM 4 milliLiter(s) Swish and Spit every 6 hours  glucagon  Injectable 1 milliGRAM(s) IntraMuscular once, Stop order after: 1 Doses  haloperidol    Injectable 5 milliGRAM(s) IntraMuscular once, Stop order after: 1 Doses  HYDROmorphone  Injectable 1 milliGRAM(s) IV Push every 4 hours PRN  influenza  Vaccine (HIGH DOSE) 0.7 milliLiter(s) IntraMuscular once  insulin glargine Injectable (LANTUS) 15 Unit(s) SubCutaneous at bedtime  insulin lispro (ADMELOG) corrective regimen sliding scale   SubCutaneous every 6 hours  lidocaine 2% Viscous 5 milliLiter(s) Swish and Spit two times a day  metoprolol tartrate 12.5 milliGRAM(s) Oral every 12 hours  morphine   Solution 5 milliGRAM(s) Oral every 4 hours PRN  morphine   Solution 10 milliGRAM(s) Oral every 4 hours PRN  pantoprazole   Suspension 40 milliGRAM(s) Oral every 24 hours  polyethylene glycol 3350 17 Gram(s) Oral every 12 hours PRN  QUEtiapine 25 milliGRAM(s) Oral at bedtime  senna 2 Tablet(s) Oral at bedtime  silver sulfADIAZINE 1% Cream 1 Application(s) Topical two times a day  thiamine IVPB 500 milliGRAM(s) IV Intermittent every 24 hours      *Specified Medication Categories*  PPI, Bowel ppx: dextrose 5%. 1000 <Continuous>; dextrose 5%. 1000 <Continuous>; pantoprazole   Suspension 40 every 24 hours; polyethylene glycol 3350 17 every 12 hours PRN; senna 2 at bedtime; thiamine IVPB 500 every 24 hours  DVT ppx: enoxaparin Injectable 40 milliGRAM(s) SubCutaneous  Anticoagulation enoxaparin Injectable 40 milliGRAM(s) SubCutaneous every 24 hours      Antimicrobials ceFAZolin   IVPB 1000 IV Intermittent; piperacillin/tazobactam IVPB. 3.375 IV Intermittent; piperacillin/tazobactam IVPB.- 3.375 IV Intermittent  Pain-control agents morphine   Solution 5 milliGRAM(s) Oral every 4 hours PRN  morphine   Solution 10 milliGRAM(s) Oral every 4 hours PRN    [facility] agents atorvastatin 80 at bedtime; dextrose 5%. 1000 <Continuous>; dextrose 5%. 1000 <Continuous>; dextrose 50% Injectable 25 once; dextrose 50% Injectable 25 once; dextrose 50% Injectable 25 once; dextrose 50% Injectable 12.5 once; dextrose Oral Gel 15 once PRN; glucagon  Injectable 1 once; insulin glargine Injectable (LANTUS) 15 at bedtime; insulin lispro (ADMELOG) corrective regimen sliding scale  every 6 hours; metoprolol tartrate 12.5 every 12 hours; thiamine IVPB 500 every 24 hours  Respiratory agents albuterol/ipratropium for Nebulization 3 every 6 hours  Neuro agents atropine 1% Ophthalmic Solution for SubLingual Use 1 three times a day; FIRST- Mouthwash  BLM 4 every 6 hours; haloperidol    Injectable 5 once; HYDROmorphone  Injectable 1 every 4 hours PRN; lidocaine 2% Viscous 5 two times a day; morphine   Solution 5 every 4 hours PRN; morphine   Solution 10 every 4 hours PRN; QUEtiapine 25 at bedtime; silver sulfADIAZINE 1% Cream 1 two times a day  Heme/Onc enoxaparin Injectable 40 milliGRAM(s) SubCutaneous every 24 hours; heparin   Injectable 5000 Unit(s) SubCutaneous every 8 hours; influenza  Vaccine (HIGH DOSE) 0.7 milliLiter(s) IntraMuscular once    Medications Given (24hrs)   albuterol/ipratropium for Nebulization: 3 milliLiter(s) Nebulizer (00:48),  3 milliLiter(s) Nebulizer (20:00),  3 milliLiter(s) Nebulizer (13:32),  3 milliLiter(s) Nebulizer (07:33)  atorvastatin: 80 milliGRAM(s) Oral (21:52)  atropine 1% Ophthalmic Solution for SubLingual Use: 1 Drop(s) SubLingual (21:52),  1 Drop(s) SubLingual (13:34)  enoxaparin Injectable: 40 milliGRAM(s) SubCutaneous (17:19)  FIRST- Mouthwash  BLM: 4 milliLiter(s) Swish and Spit (17:20),  4 milliLiter(s) Swish and Spit (13:34)  haloperidol    Injectable: 5 milliGRAM(s) IntraMuscular (01:27)  haloperidol    Injectable: 5 milliGRAM(s) IntraMuscular (06:47)  insulin glargine Injectable (LANTUS): 15 Unit(s) SubCutaneous (22:35)  insulin lispro (ADMELOG) corrective regimen sliding scale: 2 Unit(s) SubCutaneous (01:08),  2 Unit(s) SubCutaneous (17:44),  2 Unit(s) SubCutaneous (13:32)  metoprolol tartrate: 12.5 milliGRAM(s) Oral (21:52),  12.5 milliGRAM(s) Oral (10:24)  pantoprazole   Suspension: 40 milliGRAM(s) Oral (06:03)  QUEtiapine: 25 milliGRAM(s) Oral (21:52)  senna: 2 Tablet(s) Oral (21:52)  silver sulfADIAZINE 1% Cream: 1 Application(s) Topical (06:04),  1 Application(s) Topical (17:19)  thiamine IVPB: 105 mL/Hr IV Intermittent (10:25)      I&O  I&O's Summary    21 Nov 2023 07:01  -  22 Nov 2023 07:00  --------------------------------------------------------  IN: 0 mL / OUT: 150 mL / NET: -150 mL      Daily     Daily     T(F): 98.2 (11-21), Max: 99.4 (11-21)  HR: 92 (11-21) (65 - 94)  BP: 124/64 (11-21) (115/67 - 127/70)  RR: 18 (11-21) (18 - 18)  SpO2: 97% (11-21) (93% - 98%)    LABS:                        7.9    5.60  )-----------( 146      ( 21 Nov 2023 05:30 )             26.1       Phos  3.5     11-21  Mg     1.7     11-21      WBC & Hg trend    W: 5.60 <--, 6.65 <--, 6.19 <--, 6.49 <--, 5.75 <--  Hb: 7.9<L> <--, 8.3<L> <--, 8.5<L> <--, 8.5<L> <--, 6.9<LL> <--, 7.5<L> <--      Creatinine Trend: 1.15 <--, 1.14 <--, 1.05 <--, 1.07 <--, 1.03 <--, 1.06 <--, 1.03 <--, 1.11 <--, 1.13 <--, 1.14 <--      Urinalysis Basic - ( 21 Nov 2023 05:30 )    Color: x / Appearance: x / SG: x / pH: x  Gluc: 131 mg/dL / Ketone: x  / Bili: x / Urobili: x   Blood: x / Protein: x / Nitrite: x   Leuk Esterase: x / RBC: x / WBC x   Sq Epi: x / Non Sq Epi: x / Bacteria: x                   BMI: BMI (kg/m2): 24.2 (11-14-23 @ 13:58)  HbA1c: A1C with Estimated Average Glucose Result: 9.0 % (10-30-23 @ 05:30)    Glucose: POCT Blood Glucose.: 63 mg/dL (11-22-23 @ 06:13)    BP: 124/64 (11-21-23 @ 21:00) (112/52 - 127/70)  Lipid Panel:       SURGICAL HISTORY  No pertinent family history in first degree relatives    No significant past surgical history      FAMILY HISTORY:  No pertinent family history in first degree relatives          Social History:  lives at home w/ wife  ambulates w/ can (29 Oct 2023 21:39)    Otherwise none.     Consults, Imaging Reviewed.  Consults onboard.     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 Marr/Dr. Derek Gar), presents to the ED with generalized weakness. Reports progressive weakness over the last 2 weeks assc w/ fatigue. 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 C/o odynophagia but tolerating secretions. No shortness of breath or difficulty breathing. Reports dermatitis is stable, and reponds well to cream he was given. No drainage or discharge. Saw Rad on on 10/13. Per chart review appears odynophagia was presenting symptom of cancer. Reported subjective fever for 1 day but did not take his temperature at home. ROS otherwise negative.       Afebrile, HR 70s, //86, 100% on RA  WBC 2.3, Hb 9.5, plt 219, Na 133, K 5.4, BUN 49/Cr 1.87, lactate 1.0  EKG NSR, no peak T waves   s/p morphine, 2L NS  (29 Oct 2023 21:39)

## 2023-11-22 NOTE — PROGRESS NOTE ADULT - PROBLEM SELECTOR PLAN 11
Diet: PEG, with titration target rate to 63  F: 1/4 NS  E: replete PRN  DVT: Resume after PEG   Code: Full  GI PPx: Pantoprazole 40 mg qd via PEG    Dispo: pt is medically cleared for dc home care services & needs O2, suctioning, hospital bed, w/c, 3in 1 commode and PEG feeds for dc

## 2023-11-22 NOTE — DISCHARGE NOTE NURSING/CASE MANAGEMENT/SOCIAL WORK - PATIENT PORTAL LINK FT
You can access the FollowMyHealth Patient Portal offered by [facility] by registering at the following website: [email or web address]. By joining [facility]’s FollowMyHealth portal, you will also be able to view your health information using other applications (apps) compatible with our system.

## 2023-11-22 NOTE — H&P ADULT - ASSESSMENT
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 Marr/Dr. Gar), recently admitted for odynophagia 2/2 radiation-induced mucositis iso laryngeal SCC, now s/p PEG 11/14 discharged on 11/22 who presents due to inability for safe care at home.

## 2023-11-22 NOTE — H&P ADULT - PROBLEM SELECTOR PLAN 4
Patient noted to be intermittently agitation likely 2/2 delirium during prior hospitalization. Started on seroquel 50mg qhs.   - c/w seroquel 50mg qhs via PEG

## 2023-11-22 NOTE — PROGRESS NOTE ADULT - PROBLEM SELECTOR PLAN 7
Pt was agitated o/n and received 2x2.5mg Zyprexa for agitation. AM pt was responding and was AOx2, but after RT tx pt was AOx0. Unclear etiology at this point. Pt is AOx1-2. Infectious workup continues to be unremarkable w/ no NGTD and negative UA and CXR.     Likely 2/2 to hospital delirium, no FND    Plan:  - Re-orient as possible  - Seroquel 25mg qhs standing started 11/21

## 2023-11-22 NOTE — ED ADULT NURSE NOTE - OBJECTIVE STATEMENT
78 y.o. Male BIBA from home after being discharged earlier today from [facility] with home care service c/o medical evaluation. Per wife who states "the visiting nurse said that my  is not in condition to be home." treated for throat cancer finished chemo/radiation last month (burn to L side of neck). Peg tube placed last week and did not receive tube feeds today. Per wife cannot ambulate on his own, he does not have a hospital bed and needs to be sat up even when in bed. Since chemo and radiation treatments he has become very confused a/ox1-2 and is sometimes aggressive, agitated and combative to visiting nurse hitting and spitting at them. since arriving in ED He has been cooperative and re-directable. denies current pain. denies fever at home today since dc. incontinent of urine and stool. Patient is primarily Samoan speaking but understands english. very hoarse voice hard to understand.

## 2023-11-22 NOTE — ED PROVIDER NOTE - CLINICAL SUMMARY MEDICAL DECISION MAKING FREE TEXT BOX
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 Marr/Dr. Gar), recently admitted for odynophagia in setting of laryngeal SCC, now s/p PEG 11/14, discharged today with home care services given continuous O2 & suctioning requirements, and followup for continuing treatments came back to ED because wife unable to care for pt. Pt was recommended for SOFI but family preferred for pt to go home with services. Initially they declined a hospital bed because they thought it would not fit but when the visiting nurse came today family realized they needed the bed. Pt confused today and more aggressive. Has not has his tube feeds to day. No fever. Spoke with family, would still prefer to take patient home however will need admission until hospital bed can be arranged. Labs done this morning, will not repeat now

## 2023-11-22 NOTE — PROGRESS NOTE ADULT - ATTENDING COMMENTS
Pt is 89 yo man with laryngeal cancer, blindness, CKD, DM, PVD, admitted with odynophagia and had PEG tube inserted for nutrition. Pt is now being prepared for discharge home.   ----ordered for: feeds , oxygen, suction devise,   ----cont with magic mouthwash and visc lido for likely radiation induced esophagitis  ----for laryngeal cancer follow up with Dr. Harvey 1 week post discharge  ----given episodes of agitation at night time will add low dose seroquel  Pt is planned for discharge home today with resumption of home care services. Wife is at the bedside. Training on food delivery and suctioning is provided by nursing.

## 2023-11-22 NOTE — PROGRESS NOTE ADULT - NUTRITIONAL ASSESSMENT
This patient has been assessed with a concern for Malnutrition and has been determined to have a diagnosis/diagnoses of Severe protein-calorie malnutrition.    This patient is being managed with:   Diet NPO-  Entered: Nov 4 2023 11:10PM  
This patient has been assessed with a concern for Malnutrition and has been determined to have a diagnosis/diagnoses of Severe protein-calorie malnutrition.    This patient is being managed with:   Diet NPO-  Except Medications  Entered: Oct 31 2023  8:47AM  
This patient has been assessed with a concern for Malnutrition and has been determined to have a diagnosis/diagnoses of Severe protein-calorie malnutrition.    This patient is being managed with:   Diet NPO with Tube Feed-  Tube Feeding Modality: Gastrostomy  Glucerna 1.2 Seamus (GLUCERNARTH)  Continuous  Starting Tube Feed Rate {mL per Hour}: 20     Every 24 hours  Until Goal Tube Feed Rate (mL per Hour): 65  Tube Feed Duration (in Hours): 24  Tube Feed Start Time: 13:00   Frequency: Every 6 Hours  Sarah PETERSEN     Qty per Day:  2  Entered: Nov 16 2023 12:47PM  
This patient has been assessed with a concern for Malnutrition and has been determined to have a diagnosis/diagnoses of Severe protein-calorie malnutrition.    This patient is being managed with:   Diet NPO-  Entered: Nov 4 2023 11:10PM  
This patient has been assessed with a concern for Malnutrition and has been determined to have a diagnosis/diagnoses of Severe protein-calorie malnutrition.    This patient is being managed with:   Diet NPO-  Entered: Nov 4 2023 11:10PM  
This patient has been assessed with a concern for Malnutrition and has been determined to have a diagnosis/diagnoses of Severe protein-calorie malnutrition.    This patient is being managed with:   Diet NPO-  Except Medications  Entered: Oct 31 2023  8:47AM  
This patient has been assessed with a concern for Malnutrition and has been determined to have a diagnosis/diagnoses of Severe protein-calorie malnutrition.    This patient is being managed with:   Diet NPO with Tube Feed-  Tube Feeding Modality: Gastrostomy  Glucerna 1.2 Seamus (GLUCERNARTH)  Continuous  Starting Tube Feed Rate {mL per Hour}: 20     Every 24 hours  Until Goal Tube Feed Rate (mL per Hour): 65  Tube Feed Duration (in Hours): 24  Tube Feed Start Time: 13:00   Frequency: Every 6 Hours  Sarah PETERSEN     Qty per Day:  2  Entered: Nov 16 2023 12:47PM  
This patient has been assessed with a concern for Malnutrition and has been determined to have a diagnosis/diagnoses of Severe protein-calorie malnutrition.    This patient is being managed with:   Diet NPO with Tube Feed-  Tube Feeding Modality: Gastrostomy  Glucerna 1.2 Seamus (GLUCERNARTH)  Continuous  Starting Tube Feed Rate {mL per Hour}: 20     Every 24 hours  Until Goal Tube Feed Rate (mL per Hour): 65  Tube Feed Duration (in Hours): 24  Tube Feed Start Time: 13:00   Frequency: Every 6 Hours  Sarah PETERSEN     Qty per Day:  2  Entered: Nov 16 2023 12:47PM  
This patient has been assessed with a concern for Malnutrition and has been determined to have a diagnosis/diagnoses of Severe protein-calorie malnutrition.    This patient is being managed with:   Diet NPO-  Entered: Nov 4 2023 11:10PM  
This patient has been assessed with a concern for Malnutrition and has been determined to have a diagnosis/diagnoses of Severe protein-calorie malnutrition.    This patient is being managed with:   Diet NPO-  Except Medications  Entered: Oct 31 2023  8:47AM  
This patient has been assessed with a concern for Malnutrition and has been determined to have a diagnosis/diagnoses of Severe protein-calorie malnutrition.    This patient is being managed with:   Diet NPO-  Except Medications  Entered: Oct 31 2023  8:47AM  
This patient has been assessed with a concern for Malnutrition and has been determined to have a diagnosis/diagnoses of Severe protein-calorie malnutrition.    This patient is being managed with:   Diet NPO-  Except Medications  Entered: Oct 31 2023  8:47AM    This patient has been assessed with a concern for Malnutrition and has been determined to have a diagnosis/diagnoses of Severe protein-calorie malnutrition.    This patient is being managed with:   Diet NPO-  Except Medications  Entered: Oct 31 2023  8:47AM  
This patient has been assessed with a concern for Malnutrition and has been determined to have a diagnosis/diagnoses of Severe protein-calorie malnutrition.    This patient is being managed with:   Diet NPO with Tube Feed-  Tube Feeding Modality: Gastrostomy  Glucerna 1.2 Seamus (GLUCERNARTH)  Continuous  Starting Tube Feed Rate {mL per Hour}: 20     Every 24 hours  Until Goal Tube Feed Rate (mL per Hour): 65  Tube Feed Duration (in Hours): 24  Tube Feed Start Time: 13:00   Frequency: Every 6 Hours  Sarah PETERSEN     Qty per Day:  2  Entered: Nov 21 2023  7:58PM  
This patient has been assessed with a concern for Malnutrition and has been determined to have a diagnosis/diagnoses of Severe protein-calorie malnutrition.    This patient is being managed with:   Diet NPO with Tube Feed-  Tube Feeding Modality: Gastrostomy  Glucerna 1.2 Seamus (GLUCERNARTH)  Continuous  Starting Tube Feed Rate {mL per Hour}: 20     Every 24 hours  Until Goal Tube Feed Rate (mL per Hour): 65  Tube Feed Duration (in Hours): 24  Tube Feed Start Time: 13:00   Frequency: Every 6 Hours  Sarah PETERSEN     Qty per Day:  2  Entered: Nov 16 2023 12:47PM  
This patient has been assessed with a concern for Malnutrition and has been determined to have a diagnosis/diagnoses of Severe protein-calorie malnutrition.    This patient is being managed with:   Diet NPO-  Except Medications  Entered: Oct 31 2023  8:47AM  
This patient has been assessed with a concern for Malnutrition and has been determined to have a diagnosis/diagnoses of Severe protein-calorie malnutrition.    This patient is being managed with:   Diet NPO with Tube Feed-  Tube Feeding Modality: Gastrostomy  Glucerna 1.2 Seamus (GLUCERNARTH)  Continuous  Starting Tube Feed Rate {mL per Hour}: 20     Every 24 hours  Until Goal Tube Feed Rate (mL per Hour): 65  Tube Feed Duration (in Hours): 24  Tube Feed Start Time: 13:00   Frequency: Every 6 Hours  Sarah PETERSEN     Qty per Day:  2  Entered: Nov 16 2023 12:47PM  
This patient has been assessed with a concern for Malnutrition and has been determined to have a diagnosis/diagnoses of Severe protein-calorie malnutrition.    This patient is being managed with:   Diet NPO with Tube Feed-  Tube Feeding Modality: Gastrostomy  Glucerna 1.2 Seamus (GLUCERNARTH)  Continuous  Starting Tube Feed Rate {mL per Hour}: 20     Every 24 hours  Until Goal Tube Feed Rate (mL per Hour): 65  Tube Feed Duration (in Hours): 24  Tube Feed Start Time: 13:00   Frequency: Every 6 Hours  Sarah PETERSEN     Qty per Day:  2  Entered: Nov 16 2023 12:47PM  
This patient has been assessed with a concern for Malnutrition and has been determined to have a diagnosis/diagnoses of Severe protein-calorie malnutrition.    This patient is being managed with:   Diet NPO-  Entered: Nov 4 2023 11:10PM  
This patient has been assessed with a concern for Malnutrition and has been determined to have a diagnosis/diagnoses of Severe protein-calorie malnutrition.    This patient is being managed with:   Diet NPO-  Entered: Nov 4 2023 11:10PM

## 2023-11-22 NOTE — H&P ADULT - NSHPLABSRESULTS_GEN_ALL_CORE
8.8    6.38  )-----------( 143      ( 22 Nov 2023 05:30 )             28.9       11-22    140  |  101  |  14  ----------------------------<  96  4.3   |  33<H>  |  1.10    Ca    8.2<L>      22 Nov 2023 05:30  Phos  2.3     11-22  Mg     1.4     11-22    TPro  5.3<L>  /  Alb  2.5<L>  /  TBili  0.5  /  DBili  x   /  AST  25  /  ALT  8<L>  /  AlkPhos  70  11-22              Urinalysis Basic - ( 22 Nov 2023 05:30 )    Color: x / Appearance: x / SG: x / pH: x  Gluc: 96 mg/dL / Ketone: x  / Bili: x / Urobili: x   Blood: x / Protein: x / Nitrite: x   Leuk Esterase: x / RBC: x / WBC x   Sq Epi: x / Non Sq Epi: x / Bacteria: x            Lactate Trend            CAPILLARY BLOOD GLUCOSE      POCT Blood Glucose.: 95 mg/dL (22 Nov 2023 08:35)          Culture Results:   Moderate Staphylococcus aureus  Accompanied by normal respiratory tonio (11-06 @ 12:20)  Culture Results:   No growth at 5 days. (11-05 @ 05:00)  Culture Results:   No growth at 5 days. (11-01 @ 15:41)  Culture Results:   No growth at 5 days. (10-30 @ 01:39)  Culture Results:   No growth at 5 days. (10-30 @ 01:39)

## 2023-11-22 NOTE — PROGRESS NOTE ADULT - ASSESSMENT
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 Marr/Dr. Gar), admitted [id number] for odynophagia in setting of laryngeal SCC, now s/p PEG 11/14 & stepdowned to [facility] for transition of care. Patient is medically ready for discharge w/family not amenable for SOFI, now pending social work clearance for home care services d/t continuous O2 & suctioning requirements.

## 2023-11-22 NOTE — H&P ADULT - PROBLEM SELECTOR PLAN 2
Patient with b/l neck dermatitis due to radiation with associated pain and skin sloughing. Seen by wound care and rad onc during prior admission, well controlled with silvadene cream.  - c/w silver sulfadiazine 1% cream and non-stick silicone bandages  - rinse skin with saline soaks

## 2023-11-22 NOTE — PROGRESS NOTE ADULT - PROBLEM SELECTOR PLAN 8
TTE 07/2023: mild LVH, mild reduced LVH hypertrophy, normal RV function, LV systolic function mild decr EF 50-55%. home meds: imdur 30 once a day, lisinopril 20mg once a day, toprol 12.5mg once a day   - Lopressor 12.5mg BID via PEG  - hold imdur given soft BPs, resume when appropriate   - hold lisinopril given prior ELENITA and unable to tolerate PO

## 2023-11-22 NOTE — PROGRESS NOTE ADULT - PROVIDER SPECIALTY LIST ADULT
Internal Medicine
Palliative Care
Rad Onc
Rehab Medicine
Rehab Medicine
Hospitalist
Internal Medicine
Palliative Care
Palliative Care
Rad Onc
Rehab Medicine
Rehab Medicine
Internal Medicine
Palliative Care
Rad Onc
Rad Onc
Rehab Medicine
Rehab Medicine
Internal Medicine
Palliative Care
Internal Medicine
Palliative Care
Internal Medicine
Palliative Care
Intervent Cardiology
Palliative Care
Internal Medicine
Internal Medicine
Palliative Care
Internal Medicine

## 2023-11-22 NOTE — PROGRESS NOTE ADULT - NS ATTEST RISK PROBLEM GEN_ALL_CORE FT
#Odynophagia 2/2 laryngeal SCC  #Type II Diabetes
Chronic Illness With Severe Exacerbation/Progression  Prescription Of Parenteral Controlled Substances
Prescription Of Parenteral Controlled Substances
Chronic Illness With Severe Exacerbation/Progression  Prescription Of Parenteral Controlled Substances
Prescription Of Parenteral Controlled Substances
Chronic Illness With Severe Exacerbation/Progression  Prescription Of Parenteral Controlled Substances
Patient seen and examined, discussed with housestaff and consulting services.   Blood work results and radiographic data is reviewed and discussed with the team.   Medical Decision Making is  high complexity due to: laryngeal cancer sequela,

## 2023-11-22 NOTE — H&P ADULT - PROBLEM SELECTOR PLAN 8
F: none  E: replete prn  N: Glucerna 1.2 @ 65/h  DVT ppx: SCDs  Dispo: [facility] --> home once home needs set up v. SOFI if patient amenable F: none  E: replete prn  N: Glucerna 1.2 @ 65/h  DVT ppx: lovenox 40mg qd  Dispo: [facility] --> home once home needs set up v. SOFI if patient amenable

## 2023-11-23 NOTE — DISCHARGE NOTE PROVIDER - NSDCFUADDAPPT_GEN_ALL_CORE_FT
Please schedule for Modified Barium Swallow exam in next 4-6 weeks at [facility]. Phone number and contact information is listed here.  Please schedule for Modified Barium Swallow exam in next 4-6 weeks at [facility]. Phone number and contact information is listed here.  Please schedule for Modified Barium Swallow exam in next 4-6 weeks at [facility]. Phone number and contact information is listed here.

## 2023-11-23 NOTE — PROGRESS NOTE ADULT - PROBLEM SELECTOR PLAN 11
Diet: PEG, with titration target rate to 63  F: 1/4 NS  E: replete PRN  DVT: Resume after PEG   Code: Full  GI PPx: Pantoprazole 40 mg qd via PEG    Dispo: pt is medically cleared for dc home care services & needs O2, suctioning, hospital bed, w/c, 3in 1 commode and PEG feeds for dc Diet: PEG, with titration target rate to 63  F: 1/4 NS  E: replete PRN  DVT: Resume after PEG   Code: Full  GI PPx: Pantoprazole 40 mg qd via PEG    Dispo: pt is medically cleared for dc home care services & needs O2, suctioning, hospital bed, w/c, 3in 1 commode and PEG feeds for dc    Needs proper med rec at this admission and on this discharge, as was discharged recently with incorrect regimen.

## 2023-11-23 NOTE — PROGRESS NOTE ADULT - PROBLEM SELECTOR PLAN 7
Pt was agitated o/n and received 2x2.5mg Zyprexa for agitation. AM pt was responding and was AOx2, but after RT tx pt was AOx0. Unclear etiology at this point. Pt is AOx1-2. Infectious workup continues to be unremarkable w/ no NGTD and negative UA and CXR.     Likely 2/2 to hospital delirium, no FND    Plan:  - Re-orient as possible  - Seroquel 25mg qhs standing started 11/21 Pt was agitated o/n and received 2x2.5mg Zyprexa for agitation. AM pt was responding and was AOx2, but after RT tx pt was AOx0. Unclear etiology at this point. Pt is AOx1-2. Infectious workup continues to be unremarkable w/ no NGTD and negative UA and CXR.  Likely 2/2 to hospital delirium, no FND  - Re-orient as possible  - Seroquel 25mg qhs standing started 11/21

## 2023-11-23 NOTE — PROGRESS NOTE ADULT - ASSESSMENT
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 Marr/Dr. Gar), admitted [id number] for odynophagia in setting of laryngeal SCC, now s/p PEG 11/14 & stepdowned to [facility] for transition of care. Patient is medically ready for discharge w/family not amenable for SOFI, now pending social work clearance for home care services d/t continuous O2 & suctioning requirements.

## 2023-11-23 NOTE — DISCHARGE NOTE PROVIDER - NSDCMRMEDTOKEN_GEN_ALL_CORE_FT
aspirin 81 mg oral delayed release tablet: 1 tab(s) orally once a day  atorvastatin 80 mg oral tablet: 1 tab(s) orally once a day (at bedtime)  atropine 1% ophthalmic solution: to each affected eye once a day  Commode: ICD10 R26  fentaNYL 25 mcg/hr transdermal film, extended release: 25 mcg/hr transdermally every 72 hours MDD: 1 patch  fluconazole 10 mg/mL oral liquid: 400 milliliter(s) orally once a day  HumaLOG 100 units/mL injectable solution: 7 unit(s) injectable 3 times a day with meals  ipratropium-albuterol 0.5 mg-2.5 mg/3 mL inhalation solution: 3 milliliter(s) inhaled every 6 hours  Lantus 100 units/mL subcutaneous solution: 45 unit(s) subcutaneous once a day (in the morning)  metoprolol succinate 100 mg oral capsule, extended release: 1 cap(s) orally once a day  morphine 10 mg/0.5 mL oral solution: 0.25 milliliter(s) orally every 4 hours as needed for Pain MDD: 1.5mL  morphine 10 mg/5 mL oral solution: 2.5 milliliter(s) orally every 4 hours as needed for For pain MDD: 15mL  naloxone 0.4 mg/mL injectable solution: 0.4 milligram(s) intravenously  polyethylene glycol 3350 oral powder for reconstitution: 17 gram(s) orally every 12 hours As needed Constipation  Protonix 40 mg oral delayed release tablet: 1 tab(s) orally once a day  Rolling Walker: ICD10 R26  senna leaf extract oral tablet: 2 tab(s) orally once a day (at bedtime)  Seroquel 50 mg oral tablet: 1 tab(s) orally once a day (at bedtime)  silver sulfADIAZINE 1% topical cream: 1 Apply topically to affected area 2 times a day   aspirin 81 mg oral delayed release tablet: 1 tab(s) orally once a day  atorvastatin 80 mg oral tablet: 1 tab(s) orally once a day (at bedtime)  atropine 1% ophthalmic solution: to each affected eye once a day  Commode: ICD10 R26  fentaNYL 25 mcg/hr transdermal film, extended release: 25 mcg/hr transdermally every 72 hours MDD: 1 patch  fluconazole 10 mg/mL oral liquid: 400 milliliter(s) orally once a day  HumaLOG 100 units/mL injectable solution: 7 unit(s) injectable 3 times a day with meals  ipratropium-albuterol 0.5 mg-2.5 mg/3 mL inhalation solution: 3 milliliter(s) inhaled every 6 hours  Lantus 100 units/mL subcutaneous solution: 45 unit(s) subcutaneous once a day (in the morning)  metoprolol succinate 100 mg oral capsule, extended release: 1 cap(s) orally once a day  morphine 10 mg/0.5 mL oral solution: 0.25 milliliter(s) orally every 4 hours as needed for Pain MDD: 1.5mL  morphine 10 mg/5 mL oral solution: 2.5 milliliter(s) orally every 4 hours as needed for For pain MDD: 15mL  naloxone 0.4 mg/mL injectable solution: 0.4 milligram(s) intravenously  polyethylene glycol 3350 oral powder for reconstitution: 17 gram(s) orally every 12 hours As needed Constipation  Protonix 40 mg oral delayed release tablet: 1 tab(s) orally once a day  Rolling Walker: ICD10 R26  Semi-Electric Bed: Semi-electric bed for functional quadriplegia (ICD: R53.2)  senna leaf extract oral tablet: 2 tab(s) orally once a day (at bedtime)  Seroquel 50 mg oral tablet: 1 tab(s) orally once a day (at bedtime)  silver sulfADIAZINE 1% topical cream: 1 Apply topically to affected area 2 times a day   aspirin 81 mg oral delayed release tablet: 1 tab(s) orally once a day  atorvastatin 80 mg oral tablet: 1 tab(s) orally once a day (at bedtime)  atropine 1% ophthalmic solution: to each affected eye once a day  Commode: ICD10 R26  diphenhydramine/lidocaine/aluminum hydroxide/magnesium hydroxide/simethicone mucous membrane suspension: 1 application mucous membrane once a day  fentaNYL 25 mcg/hr transdermal film, extended release: 25 mcg/hr transdermally every 72 hours MDD: 1 patch  HumaLOG 100 units/mL injectable solution: 7 unit(s) injectable 3 times a day with meals  ipratropium-albuterol 0.5 mg-2.5 mg/3 mL inhalation solution: 3 milliliter(s) inhaled every 6 hours  Lantus 100 units/mL subcutaneous solution: 45 unit(s) subcutaneous once a day (in the morning)  metoprolol succinate 100 mg oral capsule, extended release: 1 cap(s) orally once a day  Rolling Walker: ICD10 R26  Semi-Electric Bed: Semi-electric bed for functional quadriplegia (ICD: R53.2)  silver sulfADIAZINE 1% topical cream: 1 Apply topically to affected area 2 times a day

## 2023-11-23 NOTE — DISCHARGE NOTE PROVIDER - ATTENDING DISCHARGE PHYSICAL EXAMINATION:
-Gen: NAD, resting in bed  -HEENT: skin changes consistent with radiation dermatitis   -CV: normal S1 and S2  -Lungs: CTABL, normal respiratory effort on RA  -AB: soft, NT, ND, normal BS, PEG site looks c/d/i   -Ext: no LE edema   -Neuro: A&O x 2, no focal deficits

## 2023-11-23 NOTE — DISCHARGE NOTE PROVIDER - NSDCFUSCHEDAPPT_GEN_ALL_CORE_FT
Chasity Linn Physician [facility]  ENDOCRIN [address]  Scheduled Appointment: 12/18/2023     Chasity Linn Physician [facility]  ENDOCRIN [address]  Scheduled Appointment: 12/18/2023     Chasity Linn Physician [facility]  ENDOCRIN [address]  Scheduled Appointment: 12/18/2023

## 2023-11-23 NOTE — PROGRESS NOTE ADULT - PROBLEM SELECTOR PLAN 6
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 Chemo/RT initiated 08/2023. Follows with Dr. Marr (rad onc) and Dr. Derek Harvey (onc). Last chemo (taxol/carboplatin) on 10/23, last RT 11/3, tolerated well.     - palliative consulted on board Dx 06/2023 with SCC larynx. Chemo/RT initiated 08/2023. Follows with Dr. Marr (rad onc) and Dr. Derek Harvey (onc). Last chemo (taxol/carboplatin) on 10/23, last RT 11/3, tolerated well.   - palliative consulted

## 2023-11-23 NOTE — DISCHARGE NOTE PROVIDER - NSDCCPCAREPLAN_GEN_ALL_CORE_FT
PRINCIPAL DISCHARGE DIAGNOSIS  Diagnosis: Debility  Assessment and Plan of Treatment:       SECONDARY DISCHARGE DIAGNOSES  Diagnosis: Need for physical therapy assessment  Assessment and Plan of Treatment:      PRINCIPAL DISCHARGE DIAGNOSIS  Diagnosis: Debility  Assessment and Plan of Treatment: You were noted to have difficulty taking care of your health status. For this we did the following:   Since you had been now and previously diagnosed with nutritional deficiency in setting of oral cancer. This necessitated placement of PEG tube for feeds, which was done on previous admission to [facility], and discharged with it. PEG tube interruptions and inability to care for led your family to return to the hospital for support in care. PEG tube was dislodged 11/24 overnight. Previously it was placed by IR on 11/14. GI/ IR was consulted and both agreed for replacement. CTAP post dislodgement shows intact stomach & anchor; no air/ fluid/ fluid collections. IR replaced PEG 11/30, with no complications and ensuing no signs of local infection. Please continue to care for the tube and increase the rate of the feeds will be done at the [facility] by the staff there.   The instructions of the feeds are listed in the dietary management section. Please note it and contact a provider if any serious change occurs or if you require additional support.   For your followups, they have been listed for your Oncology and Endocrinology appointment in next section. Please follow that schedule, and obtain a swallow exam as instructed in appointments region.      SECONDARY DISCHARGE DIAGNOSES  Diagnosis: Need for physical therapy assessment  Assessment and Plan of Treatment: To help your mobility improve, we have assigned you to a rehab facility. Please follow their instructions of health and mobility management to allow you to recover as soon as possible.     PRINCIPAL DISCHARGE DIAGNOSIS  Diagnosis: Debility  Assessment and Plan of Treatment: You were noted to have difficulty taking care of your health status. For this we did the following:   Since you had been now and previously diagnosed with nutritional deficiency in setting of oral cancer. This necessitated placement of PEG tube for feeds, which was done on previous admission to [facility], and discharged with it. PEG tube interruptions and inability to care for led your family to return to the hospital for support in care. PEG tube was dislodged 11/24 overnight. Previously it was placed by IR on 11/14. GI/ IR was consulted and both agreed for replacement. CTAP post dislodgement shows intact stomach & anchor; no air/ fluid/ fluid collections. IR replaced PEG 11/30, with no complications and ensuing no signs of local infection. Please continue to care for the tube and increase the rate of the feeds will be done at the [facility] by the staff there.   The instructions of the feeds are listed in the dietary management section. Please note it and contact a provider if any serious change occurs or if you require additional support.   For your followups, they have been listed for your Oncology and Endocrinology appointment in next section. Please follow that schedule, and obtain a swallow exam as instructed in appointments region.      SECONDARY DISCHARGE DIAGNOSES  Diagnosis: Need for physical therapy assessment  Assessment and Plan of Treatment: To help your mobility improve, we have assigned you to a rehab facility. Please follow their instructions of health and mobility management to allow you to recover as soon as possible.     PRINCIPAL DISCHARGE DIAGNOSIS  Diagnosis: Debility  Assessment and Plan of Treatment: You were noted to have difficulty taking care of your health status. For this we did the following:   Since you had been now and previously diagnosed with nutritional deficiency in setting of oral cancer. This necessitated placement of PEG tube for feeds, which was done on previous admission to [facility], and discharged with it. PEG tube interruptions and inability to care for led your family to return to the hospital for support in care. PEG tube was dislodged 11/24 overnight. Previously it was placed by IR on 11/14. GI/ IR was consulted and both agreed for replacement. CTAP post dislodgement shows intact stomach & anchor; no air/ fluid/ fluid collections. IR replaced PEG 11/30, with no complications and ensuing no signs of local infection. Please continue to care for the tube and increase the rate of the feeds will be done at the [facility] by the staff there.   The instructions of the feeds are listed in the dietary management section. Please note it and contact a provider if any serious change occurs or if you require additional support.   For your followups, they have been listed for your Oncology and Endocrinology appointment in next section. Please follow that schedule, and obtain a swallow exam as instructed in appointments region.      SECONDARY DISCHARGE DIAGNOSES  Diagnosis: Need for physical therapy assessment  Assessment and Plan of Treatment: To help your mobility improve, we have assigned you to a rehab facility. Please follow their instructions of health and mobility management to allow you to recover as soon as possible.

## 2023-11-23 NOTE — DISCHARGE NOTE PROVIDER - INSTRUCTIONS
These are the strict Tube Feed instructions to be followed by the Sub-Acute Rehab facility please:   Formula: Glucerna 1.5, with starting rate of 20ml/hr with increase of 10ml every 4 hours, to a goal of @50mL/hr x24hrs. The final goal is to provide 1200mL total volume, 1800kcal, 99g protein, 911mL free water.   Will require monitored labs: electrolytes, CMP, fingersticks for refeeding. Not currently as concerned given he was receiving fluids with dextrose in it till 11/29 ~15h00 and had insulin levels stable just prior to PEG tube placement due to this.     Goal of feeds once stable:   Total Volume for 24 Hours (mL): 1560  Total Number of Cans: 7  Continuous  Starting Tube Feed Rate {mL per Hour}: 20  Increase Tube Feed Rate by (mL): 10     Every 4 hours  Until Goal Tube Feed Rate (mL per Hour): 65  Tube Feed Duration (in Hours): 24  Tube Feed Start Time: 21:00

## 2023-11-23 NOTE — PROGRESS NOTE ADULT - PROBLEM SELECTOR PLAN 8
TTE 07/2023: mild LVH, mild reduced LVH hypertrophy, normal RV function, LV systolic function mild decr EF 50-55%. home meds: imdur 30 once a day, lisinopril 20mg once a day, toprol 12.5mg once a day   - Lopressor 12.5mg BID via PEG  - hold imdur given soft BPs, resume when appropriate   - hold lisinopril given prior ELENITA and unable to tolerate PO TTE 07/2023: mild LVH, mild reduced LVH hypertrophy, normal RV function, LV systolic function mild decr EF 50-55%. home meds: imdur 30 once a day, lisinopril 20mg once a day, toprol 12.5mg once a day   - Lopressor 12.5mg BID via PEG (Toprol cannot be crushed via PEG)  - hold imdur given soft BPs, resume when appropriate   - hold lisinopril given prior ELENITA and unable to tolerate PO

## 2023-11-23 NOTE — DISCHARGE NOTE PROVIDER - CARE PROVIDER_API CALL
Derek Harvey.  Hematology/Oncology  [address]  [place] [address]  Phone: [phone]  Fax: [phone]  Established Patient  Follow Up Time: 2 weeks   Derek Harvey.  Hematology/Oncology  [address]  [place] [address]  Phone: [phone]  Fax: [phone]  Established Patient  Follow Up Time: 2 weeks   Derek Harvey.  Hematology/Oncology  [address]  [place] [address]  Phone: [phone]  Fax: [phone]  Established Patient  Follow Up Time: 2 weeks

## 2023-11-23 NOTE — PROGRESS NOTE ADULT - ATTENDING COMMENTS
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     #Diarrhea  - documented as having fecal incontinence while on [facility], will clarify with that team.  tube feeds commonly cause diarrhea.  If diarrhea is persistent, can consider checking C diff and GI PCR    #Odynophagia  --outpatient follow up Dr. Derek Harvey  - continue chest PT  - aspiration precautions  - wean oxygen as able    50 minutes spent on this encounter, including face to face with patient, care coordination and documentation.

## 2023-11-23 NOTE — CHART NOTE - NSCHARTNOTEFT_GEN_A_CORE
The patient requires a semi-electric bed. Patient has a medical condition, functional quadriplegia, which requires positioning of the body in a way not feasible in an ordinary bed. Patient requires the head of the bed to be elevated more than 30 degrees most of the time due to aspiration. The patient requires a semi-electric bed. Patient has a medical condition, functional quadriplegia, which requires positioning of the body in a way not feasible in an ordinary bed. Patient requires the head of the bed to be elevated more than 30 degrees most of the time due to aspiration. He is currently receiving artifical feeds through a PEG tube, and is at high risk for aspiration.

## 2023-11-23 NOTE — PROGRESS NOTE ADULT - PROBLEM SELECTOR PLAN 1
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 C/o odynophagia causing poor PO intake. Per chart review appears poor PO intake has been ongoing issue while undergoing chemo/radiation. 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 Marr. Last chemo 10/23. 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 PEG placed 11/14  - Onc follow up in 1 week Dr. Derek Harvey (onc)  - Chest PT, hypertonic nebulizer q4hr   - On 3lpm O2 via NC, continue to wean  - Aspiration precautions    -Per rad onc, odynophagia and mucositis should likely resolve 2-3 weeks after completion of radiation. no radiation on 10/31. Last treatment 10/30  - magic mouthwash  - viscous lidocaine   -scopolamine patch  -BMP Mg/Phos @ 6pm to eval for Refeeding syndrome  -Palliative:   PAIN REGIMEN  -> Fentanyl 25mcg/hr Patch q72hr as long-acting agent  -> HYDROmorphone  1mg IV Push every 4 hours PRN Breakthrough pain  -> morphine 5mg Oral via PEG every 4 hours PRN Moderate Pain (4 - 6)  -> morphine 10mg Oral every 4 hours PRN Severe Pain (7 - 10)  -> atropine 1% sublingual TID Larynx SCC currently getting radiation and chemotherapy. C/o odynophagia causing poor PO intake. Per chart review appears poor PO intake has been ongoing issue while undergoing chemo/radiation. Pt started on fluconazole for ppx candidiasis esophagitis- given no improvement, ok to discontinue by Dr. Marr. Last chemo 10/23. 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 PEG placed 11/14. Per rad onc, odynophagia and mucositis should likely resolve 2-3 weeks after completion of radiation. no radiation on 10/31. Last treatment 10/30  - Onc follow up in 1 week Dr. Derek Harvey  - Chest PT, hypertonic nebulizer q4hr   - On 3lpm O2 via NC, continue to wean  - Aspiration precautions    - magic mouthwash  - viscous lidocaine   -scopolamine patch  -BMP Mg/Phos to eval for Refeeding syndrome    #Palliative:   PAIN REGIMEN  -> Fentanyl 25mcg/hr Patch q72hr as long-acting agent  -> HYDROmorphone  1mg IV Push every 4 hours PRN Breakthrough pain  -> morphine 5mg Oral via PEG every 4 hours PRN Moderate Pain (4 - 6)  -> morphine 10mg Oral every 4 hours PRN Severe Pain (7 - 10)  -> atropine 1% sublingual TID Pt had large foul-smelling diarrheal output this AM. Nonverbal to answer whether ongoing diarrhea prior to arrival. PO intake via PEG but given recent hospitalization, cannot rule out infectious vs. noninfectious etiologies of diarrhea.  -Obtain collateral from facility about recent I&O, diarrhea  -GI PCR, consider Cdiff add on if 3 episodes

## 2023-11-23 NOTE — DISCHARGE NOTE PROVIDER - NSDCQMCOGNITION_NEU_ALL_CORE
No overnight events.  Patient feels fatigued. Slept better last night.   Reports no pain at this time. Including in the right wrist.     REVIEW OF SYSTEMS  Constitutional - No fever,  +fatigue  HEENT - No vertigo, No neck pain  Neurological - No headaches, +loss of strength  Musculoskeletal - No joint pain, No joint swelling, No muscle pain            VITALS  T(C): 36.7 (06-23-19 @ 04:43), Max: 37 (06-22-19 @ 19:49)  HR: 71 (06-23-19 @ 04:43) (71 - 77)  BP: 145/77 (06-23-19 @ 04:43) (145/77 - 155/77)  RR: 14 (06-23-19 @ 04:43) (14 - 14)  SpO2: 95% (06-23-19 @ 04:43) (95% - 95%)  Wt(kg): --        MEDICATIONS   acetaminophen   Tablet .. 650 milliGRAM(s) every 6 hours PRN  buPROPion XL . 150 milliGRAM(s) daily  carbidopa 25 milliGRAM(s) <User Schedule>  carbidopa/levodopa  25/100 2 Tablet(s) <User Schedule>  carbidopa/levodopa CR 50/200 1 Tablet(s) <User Schedule>  docusate sodium 100 milliGRAM(s) two times a day  donepezil 10 milliGRAM(s) at bedtime  enoxaparin Injectable 40 milliGRAM(s) at bedtime  folic acid 1 milliGRAM(s) daily  LORazepam     Tablet 0.5 milliGRAM(s) two times a day  LORazepam     Tablet 1 milliGRAM(s) at bedtime  memantine 5 milliGRAM(s) two times a day  midodrine 10 milliGRAM(s) <User Schedule>  midodrine 5 milliGRAM(s) <User Schedule>  ondansetron    Tablet 4 milliGRAM(s) every 6 hours PRN  pantoprazole    Tablet 40 milliGRAM(s) before breakfast  polyethylene glycol 3350 17 Gram(s) daily PRN  QUEtiapine 12.5 milliGRAM(s) at bedtime  senna 2 Tablet(s) at bedtime  sertraline 200 milliGRAM(s) daily      RECENT LABS/IMAGING                    ---------  PHYSICAL EXAM  Constitutional - NAD, Comfortable	  Pulm - Breathing comfortably, No wheezing  Abd - Soft, NTND  Extremities - No edema, No calf tenderness, Right wrist splint  Neurologic Exam -                    Cognitive - Awake, Alert  Psychiatric - Fatigued    ASSESSMENT/PLAN  72y Female with functional deficits after a fall with PD  PD - Carbidopa, Sinemet  Orthostasis - Midodrine  Mood - Wellbutrin, Ativan, Seroquel  Memory - Aricept  Constipation - Colace, Senna, Miralax  Pain - Tylenol PRN  DVT PPX - Lovenox    Continue 3hrs a day of comprehensive rehab program. Difficulty making decisions/Difficulty remembering

## 2023-11-23 NOTE — DISCHARGE NOTE PROVIDER - DETAILS OF MALNUTRITION DIAGNOSIS/DIAGNOSES
This patient has been assessed with a concern for Malnutrition and was treated during this hospitalization for the following Nutrition diagnosis/diagnoses:     -  11/24/2023: Severe protein-calorie malnutrition   This patient has been assessed with a concern for Malnutrition and was treated during this hospitalization for the following Nutrition diagnosis/diagnoses:     -  11/24/2023: Severe protein-calorie malnutrition      These are the strict Tube Feed instructions to be followed by the Sub-Acute Rehab facility please:   Formula: Glucerna 1.5, with starting rate of 20ml/hr with increase of 10ml every 4 hours, to a goal of @50mL/hr x24hrs. The final goal is to provide 1200mL total volume, 1800kcal, 99g protein, 911mL free water.   Will require monitored labs: electrolytes, CMP, fingersticks for refeeding. Not currently as concerned given he was receiving fluids with dextrose in it till 11/29 ~15h00 and had insulin levels stable just prior to PEG tube placement due to this.     Goal of feeds once stable:   Total Volume for 24 Hours (mL): 1560  Total Number of Cans: 7  Continuous  Starting Tube Feed Rate {mL per Hour}: 20  Increase Tube Feed Rate by (mL): 10     Every 4 hours  Until Goal Tube Feed Rate (mL per Hour): 65  Tube Feed Duration (in Hours): 24  Tube Feed Start Time: 21:00

## 2023-11-23 NOTE — DISCHARGE NOTE PROVIDER - HOSPITAL COURSE
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 Marr/Dr. Gar), recently admitted for odynophagia 2/2 radiation-induced mucositis iso laryngeal SCC, now s/p PEG 11/14 discharged on 11/22 who presents due to inability for safe care at home.     Patient would benefit from increased home health care hours given the complexity of his numerous medical comorbidities, specifically for the care and maintenance of his PEG tube, and to manage his medications.     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 Marr/Dr. Gar), recently admitted for odynophagia 2/2 radiation-induced mucositis iso laryngeal SCC, now s/p PEG 11/14 discharged on 11/22 who presents due to inability for safe care at home.     Patient would benefit from increased home health care hours given the complexity of his numerous medical comorbidities, specifically for the care and maintenance of his PEG tube, and to manage his medications.     Problem List/Main Diagnoses:     New medications/therapies:   New lines/hardware:  Labs to be followed outpatient:   Exam to be followed outpatient:     Discharge plan: discharge to home           These are the strict Tube Feed instructions to be followed by the Sub-Acute Rehab facility please:   Formula: Glucerna 1.5, with starting rate of 20ml/hr with increase of 10ml every 4 hours, to a goal of @50mL/hr x24hrs. The final goal is to provide 1200mL total volume, 1800kcal, 99g protein, 911mL free water.   Will require monitored labs: electrolytes, CMP, fingersticks for refeeding. Not currently as concerned given he was receiving fluids with dextrose in it till 11/29 ~15h00 and had insulin levels stable just prior to PEG tube placement due to this.     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 Marr/Dr. Gar), recently admitted for odynophagia 2/2 radiation-induced mucositis in setting of laryngeal SCC, now s/p PEG 11/14 discharged on 11/22 who presented due to inability for safe care at home, found to have no significant improvement on debility, with admission to [facility] requiring PEG tube replacement 11/30, and medically cleared for disposition to [facility] for continued care management and family education for patient.      Patient would benefit from increased home health care hours given the complexity of his numerous medical comorbidities, specifically for the care and maintenance of his PEG tube, and to manage his medications.         Problem List/Main Diagnoses:   #Debility    #PEG Tube Malfunction  PEG tube seen to be dislodged, requring replacement, done on 11    New medications/therapies:   New lines/hardware:  Labs to be followed outpatient:   Exam to be followed outpatient:     Discharge plan: discharge to home           These are the strict Tube Feed instructions to be followed by the Sub-Acute Rehab facility please:   Formula: Glucerna 1.5, with starting rate of 20ml/hr with increase of 10ml every 4 hours, to a goal of @50mL/hr x24hrs. The final goal is to provide 1200mL total volume, 1800kcal, 99g protein, 911mL free water.   Will require monitored labs: electrolytes, CMP, fingersticks for refeeding. Not currently as concerned given he was receiving fluids with dextrose in it till 11/29 ~15h00 and had insulin levels stable just prior to PEG tube placement due to this.     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 Marr/Dr. Gar), recently admitted for odynophagia 2/2 radiation-induced mucositis in setting of laryngeal SCC, now s/p PEG 11/14 discharged on 11/22 who presented due to inability for safe care at home, found to have no significant improvement on debility, with admission to [facility] requiring PEG tube replacement 11/30, and medically cleared for disposition to [facility] for continued care management and family education for patient.      Patient would benefit from increased home health care hours given the complexity of his numerous medical comorbidities, specifically for the care and maintenance of his PEG tube, and to manage his medications.         Problem List/Main Diagnoses:   #Debility    #PEG Tube Malfunction  PEG tube seen to be dislodged, requring replacement, done on 11    New medications/therapies:   New lines/hardware:  Labs to be followed outpatient:   Exam to be followed outpatient:     Discharge plan: discharge to home           These are the strict Tube Feed instructions to be followed by the Sub-Acute Rehab facility please:   Formula: Glucerna 1.5, with starting rate of 20ml/hr with increase of 10ml every 4 hours, to a goal of @50mL/hr x24hrs. The final goal is to provide 1200mL total volume, 1800kcal, 99g protein, 911mL free water.   Will require monitored labs: electrolytes, CMP, fingersticks for refeeding. Not currently as concerned given he was receiving fluids with dextrose in it till 11/29 ~15h00 and had insulin levels stable just prior to PEG tube placement due to this.     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 Marr/Dr. Gar), recently admitted for odynophagia 2/2 radiation-induced mucositis in setting of laryngeal SCC, now s/p PEG 11/14 discharged on 11/22 who presented due to inability for safe care at home, found to have no significant improvement on debility, with admission to [facility] requiring PEG tube replacement 11/30, and medically cleared for disposition to [facility] for continued care management and family education for patient.      Patient would benefit from increased home health care hours given the complexity of his numerous medical comorbidities, specifically for the care and maintenance of his PEG tube, and to manage his medications.         Problem List/Main Diagnoses:   #Debility    #PEG Tube Malfunction  PEG tube seen to be dislodged, requring replacement, done on 11    New medications/therapies:   New lines/hardware:  Labs to be followed outpatient:   Exam to be followed outpatient:     Discharge plan: discharge to home           These are the strict Tube Feed instructions to be followed by the Sub-Acute Rehab facility please:   Formula: Glucerna 1.5, with starting rate of 20ml/hr with increase of 10ml every 4 hours, to a goal of @50mL/hr x24hrs. The final goal is to provide 1200mL total volume, 1800kcal, 99g protein, 911mL free water.   Will require monitored labs: electrolytes, CMP, fingersticks for refeeding. Not currently as concerned given he was receiving fluids with dextrose in it till 11/29 ~15h00 and had insulin levels stable just prior to PEG tube placement due to this.     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 Marr/Dr. Gar), recently admitted for odynophagia 2/2 radiation-induced mucositis in setting of laryngeal SCC, now s/p PEG 11/14 discharged on 11/22 who presented due to inability for safe care at home, found to have no significant improvement on debility, with admission to [facility] requiring PEG tube replacement 11/30, and medically cleared for disposition to [facility] for continued care management and family education for patient.      Patient would benefit from increased home health care hours given the complexity of his numerous medical comorbidities, specifically for the care and maintenance of his PEG tube, and to manage his medications.         Problem List/Main Diagnoses:   #Debility  Patient had been previously diagnosed with nutritional deficiency in setting of oral cancer. This necessitated placement of PEG tube for feeds, which was done on previous admission to [facility], and discharged with it. PEG tube interruptions and inability to care for patient led the family to return to the hospital for support in care. PEG tube dislodged 11/24 overnight. Patient was administered fluid and IV nutritional support and was noted to return to baseline status of AAOx2, with no acute distress prior to discahrge. Tube feeds were resumed and noted improvement in patient status. Physical therapy recommended [facility], with continued management and teaching there.   -Plan: SOFI training     #PEG Tube Malfunction  Patient had been previously diagnosed with nutritional deficiency in setting of oral cancer. This necessitated placement of PEG tube for feeds, which was done on previous admission to [facility], and discharged with it. PEG tube interruptions and inability to care for patient led the family to return to the hospital for support in care. PEG tube dislodged 11/24 overnight. Previously it was placed by IR on 11/14. GI/ IR was consulted and both agreed for replacement. CTAP post dislodgement shows intact stomach & anchor; no air/ fluid/ fluid collections. IR replaced PEG 11/30, with no complications and ensuing no signs of local infection. No concern of refeeding syndrome this time around because patient recieved dextrose containing fluids till most recently 11/29, thus insulin levels are expected to be stable after resuming feeds on 12/01, thus risk of longer stay at hospital leading to exposure to infectious or comorbid exposures outweighs benefit of staying multiple extended days for further evaluation. Patient has strict instructions to return if status changes, including worsening debility or feeding concerns, with outpatient followups scheduled.   PLAN: per nutrition recs  to continue with tube feeds of: Glucerna 1.5, 50ml/hr for 24hrs target.   These are the strict Tube Feed instructions to be followed by the Sub-Acute Rehab facility please:   Formula: Glucerna 1.5, with starting rate of 20ml/hr with increase of 10ml every 4 hours, to a goal of @50mL/hr x24hrs. The final goal is to provide 1200mL total volume, 1800kcal, 99g protein, 911mL free water.   Will require monitored labs: electrolytes, CMP, fingersticks for refeeding. Not currently as concerned given he was receiving fluids with dextrose in it till 11/29 ~15h00 and had insulin levels stable just prior to PEG tube placement due to this.     Goal of feeds once stable:   Total Volume for 24 Hours (mL): 1560  Total Number of Cans: 7  Continuous  Starting Tube Feed Rate {mL per Hour}: 20  Increase Tube Feed Rate by (mL): 10     Every 4 hours  Until Goal Tube Feed Rate (mL per Hour): 65  Tube Feed Duration (in Hours): 24  Tube Feed Start Time: 21:00    New medications/therapies: PEG replaced  Exam to be followed outpatient: MBSS 4-6 weeks on discharge. Endocrinology followup, Hematology Oncology Followup Dr. 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 Marr/Dr. Gar), recently admitted for odynophagia 2/2 radiation-induced mucositis in setting of laryngeal SCC, now s/p PEG 11/14 discharged on 11/22 who presented due to inability for safe care at home, found to have no significant improvement on debility, with admission to [facility] requiring PEG tube replacement 11/30, and medically cleared for disposition to [facility] for continued care management and family education for patient.      Patient would benefit from increased home health care hours given the complexity of his numerous medical comorbidities, specifically for the care and maintenance of his PEG tube, and to manage his medications.         Problem List/Main Diagnoses:   #Debility  Patient had been previously diagnosed with nutritional deficiency in setting of oral cancer. This necessitated placement of PEG tube for feeds, which was done on previous admission to [facility], and discharged with it. PEG tube interruptions and inability to care for patient led the family to return to the hospital for support in care. PEG tube dislodged 11/24 overnight. Patient was administered fluid and IV nutritional support and was noted to return to baseline status of AAOx2, with no acute distress prior to discahrge. Tube feeds were resumed and noted improvement in patient status. Physical therapy recommended [facility], with continued management and teaching there.   -Plan: SOFI training     #PEG Tube Malfunction  Patient had been previously diagnosed with nutritional deficiency in setting of oral cancer. This necessitated placement of PEG tube for feeds, which was done on previous admission to [facility], and discharged with it. PEG tube interruptions and inability to care for patient led the family to return to the hospital for support in care. PEG tube dislodged 11/24 overnight. Previously it was placed by IR on 11/14. GI/ IR was consulted and both agreed for replacement. CTAP post dislodgement shows intact stomach & anchor; no air/ fluid/ fluid collections. IR replaced PEG 11/30, with no complications and ensuing no signs of local infection. No concern of refeeding syndrome this time around because patient recieved dextrose containing fluids till most recently 11/29, thus insulin levels are expected to be stable after resuming feeds on 12/01, thus risk of longer stay at hospital leading to exposure to infectious or comorbid exposures outweighs benefit of staying multiple extended days for further evaluation. Patient has strict instructions to return if status changes, including worsening debility or feeding concerns, with outpatient followups scheduled.   PLAN: per nutrition recs  to continue with tube feeds of: Glucerna 1.5, 50ml/hr for 24hrs target.   These are the strict Tube Feed instructions to be followed by the Sub-Acute Rehab facility please:   Formula: Glucerna 1.5, with starting rate of 20ml/hr with increase of 10ml every 4 hours, to a goal of @50mL/hr x24hrs. The final goal is to provide 1200mL total volume, 1800kcal, 99g protein, 911mL free water.   Will require monitored labs: electrolytes, CMP, fingersticks for refeeding. Not currently as concerned given he was receiving fluids with dextrose in it till 11/29 ~15h00 and had insulin levels stable just prior to PEG tube placement due to this.     Goal of feeds once stable:   Total Volume for 24 Hours (mL): 1560  Total Number of Cans: 7  Continuous  Starting Tube Feed Rate {mL per Hour}: 20  Increase Tube Feed Rate by (mL): 10     Every 4 hours  Until Goal Tube Feed Rate (mL per Hour): 65  Tube Feed Duration (in Hours): 24  Tube Feed Start Time: 21:00    New medications/therapies: PEG replaced  Exam to be followed outpatient: MBSS 4-6 weeks on discharge. Endocrinology followup, Hematology Oncology Followup Dr. 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 Marr/Dr. Gar), recently admitted for odynophagia 2/2 radiation-induced mucositis in setting of laryngeal SCC, now s/p PEG 11/14 discharged on 11/22 who presented due to inability for safe care at home, found to have no significant improvement on debility, with admission to [facility] requiring PEG tube replacement 11/30, and medically cleared for disposition to [facility] for continued care management and family education for patient.      Patient would benefit from increased home health care hours given the complexity of his numerous medical comorbidities, specifically for the care and maintenance of his PEG tube, and to manage his medications.         Problem List/Main Diagnoses:   #Debility  Patient had been previously diagnosed with nutritional deficiency in setting of oral cancer. This necessitated placement of PEG tube for feeds, which was done on previous admission to [facility], and discharged with it. PEG tube interruptions and inability to care for patient led the family to return to the hospital for support in care. PEG tube dislodged 11/24 overnight. Patient was administered fluid and IV nutritional support and was noted to return to baseline status of AAOx2, with no acute distress prior to discahrge. Tube feeds were resumed and noted improvement in patient status. Physical therapy recommended [facility], with continued management and teaching there.   -Plan: SOFI training     #PEG Tube Malfunction  Patient had been previously diagnosed with nutritional deficiency in setting of oral cancer. This necessitated placement of PEG tube for feeds, which was done on previous admission to [facility], and discharged with it. PEG tube interruptions and inability to care for patient led the family to return to the hospital for support in care. PEG tube dislodged 11/24 overnight. Previously it was placed by IR on 11/14. GI/ IR was consulted and both agreed for replacement. CTAP post dislodgement shows intact stomach & anchor; no air/ fluid/ fluid collections. IR replaced PEG 11/30, with no complications and ensuing no signs of local infection. No concern of refeeding syndrome this time around because patient recieved dextrose containing fluids till most recently 11/29, thus insulin levels are expected to be stable after resuming feeds on 12/01, thus risk of longer stay at hospital leading to exposure to infectious or comorbid exposures outweighs benefit of staying multiple extended days for further evaluation. Patient has strict instructions to return if status changes, including worsening debility or feeding concerns, with outpatient followups scheduled.   PLAN: per nutrition recs  to continue with tube feeds of: Glucerna 1.5, 50ml/hr for 24hrs target.   These are the strict Tube Feed instructions to be followed by the Sub-Acute Rehab facility please:   Formula: Glucerna 1.5, with starting rate of 20ml/hr with increase of 10ml every 4 hours, to a goal of @50mL/hr x24hrs. The final goal is to provide 1200mL total volume, 1800kcal, 99g protein, 911mL free water.   Will require monitored labs: electrolytes, CMP, fingersticks for refeeding. Not currently as concerned given he was receiving fluids with dextrose in it till 11/29 ~15h00 and had insulin levels stable just prior to PEG tube placement due to this.     Goal of feeds once stable:   Total Volume for 24 Hours (mL): 1560  Total Number of Cans: 7  Continuous  Starting Tube Feed Rate {mL per Hour}: 20  Increase Tube Feed Rate by (mL): 10     Every 4 hours  Until Goal Tube Feed Rate (mL per Hour): 65  Tube Feed Duration (in Hours): 24  Tube Feed Start Time: 21:00    New medications/therapies: PEG replaced  Exam to be followed outpatient: MBSS 4-6 weeks on discharge. Endocrinology followup, Hematology Oncology Followup Dr. Gar     Discharge plan: discharge to [facility]

## 2023-11-23 NOTE — PROGRESS NOTE ADULT - PROBLEM SELECTOR PLAN 3
Reports progressive weakness over the last 2 weeks assc w/ fatigue and poor PO intake. Reports this typically happens after chemo. Typically ambulates with cane.  -Per PT, SOFI placement  -Out of bed to chair CAD s/p MIDCAB and PCI with multiple WINTER (most recent to LCx in 8/2022), on ASA 81 at home. Currently asymptomatic. EKG on admission w/o ischemic changes  - holding Aspirin   - Restarted Lipitor 80mg qd through PEG

## 2023-11-23 NOTE — DISCHARGE NOTE PROVIDER - NSFOLLOWUPCLINICS_GEN_ALL_ED_FT
[facility]  Family Medicine  [address]  [place], [place] [address]  Phone: [phone]  Fax:   Follow Up Time: 1 month     [facility]  Family Medicine  [address]  [place], [place] [address]  Phone: [phone]  Fax:   Follow Up Time: 1 month     [facility]  Family Medicine  [address]  [place], [place] [address]  Phone: [phone]  Fax:   Follow Up Time: 1 month

## 2023-11-23 NOTE — PROGRESS NOTE ADULT - PROBLEM SELECTOR PLAN 5
RESOLVED    #Hypernatremia    Pt w/ uptrending Creatinine since admission, May be pre renal due to volume loss 2/2 secretions.    - continue to trend BUN/Cr  - D5+1/2 NS 75ml/hr  - restart home flomax when pt is able to take PO  - q12 bladder scans RESOLVED  #Hypernatremia  Pt w/ uptrending Creatinine since admission, May be pre renal due to volume loss 2/2 secretions.  - continue to trend BUN/Cr  - D5+1/2 NS 75ml/hr  - restart home flomax when pt is able to take PO  - q12 bladder scans

## 2023-11-23 NOTE — DISCHARGE NOTE PROVIDER - NSDCCPTREATMENT_GEN_ALL_CORE_FT
PRINCIPAL PROCEDURE  Procedure: Replacement, PEG tube  Findings and Treatment:       SECONDARY PROCEDURE  Procedure: Cineradiogram  Findings and Treatment:     Procedure: CT abdomen & pelvis  Findings and Treatment:      PRINCIPAL PROCEDURE  Procedure: Replacement, PEG tube  Findings and Treatment: The left lateral margin of the liver was identified with ultrasound.  The stomach was insufflated via the nasogastric catheter. A safe location   for access was identified.  After 1 % lidocaine local anesthesia was administered, a dermatotomy was   made. Under direct fluoroscopic guidance, 3 percutaneous anchoring tacks   were advancedinto the stomach bracketing the dermatotomy.  The access   needle of a SASHA gastrostomy set was then advanced through the abdominal   wall into the stomach toward the gastric outlet. After serial dilation, a   peel-away sheath was advanced. The 18  [place] SASHA gastrostomy tube was   placed through the peel-away sheath in standard fashion. Contrast   injection confirmed intraluminal positioning. Sterile water were used to   insufflate the anchoring balloon.  A sterile dressing was placed around   the gastrostomy catheter. The nasogastric tube was removed. The patient   tolerated procedure without immediate complication.        SECONDARY PROCEDURE  Procedure: CT abdomen & pelvis  Findings and Treatment: FINDINGS:  LOWER CHEST: Small bilateral pleural effusions with adjacent atelectasis.   Coronary stents. Catheter tip in right atrium.  LIVER: Within normal limits.  BILE DUCTS: Normal caliber.  GALLBLADDER: Within normal limits.  SPLEEN: Within normal limits.  PANCREAS: Within normal limits.  ADRENALS: Within normal limits.  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 Right kidneys within normal limits.  BLADDER: Small posterior inferior wall diverticulum.  REPRODUCTIVE ORGANS: Within normal limits  BOWEL/PERITONEUM: No bowel obstruction. Nondistended stomach abutting   ventral abdominal wall at the site of prior percutaneous gastrostomy   tube. No ascites or pneumoperitoneum. Rectal mild mural thickening and   mucosal hyperemia.  VESSELS: Atherosclerotic changes.  RETROPERITONEUM/LYMPH NODES: No adenopathy  ABDOMINAL WALL: Postsurgical and probably post injection changes.  BONES: No suspicious lesion.  IMPRESSION:  1. No free air, fluid or fluid collection.The stomach is intact.  2. Probable proctitis. One may consider endoscopy after resolution of   acute issues if not recently performed.      Procedure: Cineradiogram  Findings and Treatment: FINDINGS:  Thin Liquid:  Penetration: Positive.  Aspiration: Positive.  Eliminated with posture/technique: Not applicable.  Nectar Thick Liquid:  Penetration: Positive.  Aspiration: Positive.  Eliminated with posture/technique: Reduced with a left head turn/tuck.  Honey Thick Liquid:  Penetration: Positive.  Aspiration: Positive.  Eliminated with posture/technique: Reduced with a left head turn/tuck.  Puree:  Penetration: Positive.  Aspiration: Positive.  Eliminated with posture/technique: Not applicable.  Anatomical findings: Degenerative changes of the cervical spine.       PRINCIPAL PROCEDURE  Procedure: Replacement, PEG tube  Findings and Treatment: The left lateral margin of the liver was identified with ultrasound.  The stomach was insufflated via the nasogastric catheter. A safe location   for access was identified.  After 1 % lidocaine local anesthesia was administered, a dermatotomy was   made. Under direct fluoroscopic guidance, 3 percutaneous anchoring tacks   were advancedinto the stomach bracketing the dermatotomy.  The access   needle of a SASHA gastrostomy set was then advanced through the abdominal   wall into the stomach toward the gastric outlet. After serial dilation, a   peel-away sheath was advanced. The 18  [place] SASHA gastrostomy tube was   placed through the peel-away sheath in standard fashion. Contrast   injection confirmed intraluminal positioning. Sterile water were used to   insufflate the anchoring balloon.  A sterile dressing was placed around   the gastrostomy catheter. The nasogastric tube was removed. The patient   tolerated procedure without immediate complication.        SECONDARY PROCEDURE  Procedure: CT abdomen & pelvis  Findings and Treatment: FINDINGS:  LOWER CHEST: Small bilateral pleural effusions with adjacent atelectasis.   Coronary stents. Catheter tip in right atrium.  LIVER: Within normal limits.  BILE DUCTS: Normal caliber.  GALLBLADDER: Within normal limits.  SPLEEN: Within normal limits.  PANCREAS: Within normal limits.  ADRENALS: Within normal limits.  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 Right kidneys within normal limits.  BLADDER: Small posterior inferior wall diverticulum.  REPRODUCTIVE ORGANS: Within normal limits  BOWEL/PERITONEUM: No bowel obstruction. Nondistended stomach abutting   ventral abdominal wall at the site of prior percutaneous gastrostomy   tube. No ascites or pneumoperitoneum. Rectal mild mural thickening and   mucosal hyperemia.  VESSELS: Atherosclerotic changes.  RETROPERITONEUM/LYMPH NODES: No adenopathy  ABDOMINAL WALL: Postsurgical and probably post injection changes.  BONES: No suspicious lesion.  IMPRESSION:  1. No free air, fluid or fluid collection.The stomach is intact.  2. Probable proctitis. One may consider endoscopy after resolution of   acute issues if not recently performed.      Procedure: Cineradiogram  Findings and Treatment: FINDINGS:  Thin Liquid:  Penetration: Positive.  Aspiration: Positive.  Eliminated with posture/technique: Not applicable.  Nectar Thick Liquid:  Penetration: Positive.  Aspiration: Positive.  Eliminated with posture/technique: Reduced with a left head turn/tuck.  Honey Thick Liquid:  Penetration: Positive.  Aspiration: Positive.  Eliminated with posture/technique: Reduced with a left head turn/tuck.  Puree:  Penetration: Positive.  Aspiration: Positive.  Eliminated with posture/technique: Not applicable.  Anatomical findings: Degenerative changes of the cervical spine.       PRINCIPAL PROCEDURE  Procedure: Replacement, PEG tube  Findings and Treatment: The left lateral margin of the liver was identified with ultrasound.  The stomach was insufflated via the nasogastric catheter. A safe location   for access was identified.  After 1 % lidocaine local anesthesia was administered, a dermatotomy was   made. Under direct fluoroscopic guidance, 3 percutaneous anchoring tacks   were advancedinto the stomach bracketing the dermatotomy.  The access   needle of a SASHA gastrostomy set was then advanced through the abdominal   wall into the stomach toward the gastric outlet. After serial dilation, a   peel-away sheath was advanced. The 18  Citizen of [place] SASHA gastrostomy tube was   placed through the peel-away sheath in standard fashion. Contrast   injection confirmed intraluminal positioning. Sterile water were used to   insufflate the anchoring balloon.  A sterile dressing was placed around   the gastrostomy catheter. The nasogastric tube was removed. The patient   tolerated procedure without immediate complication.        SECONDARY PROCEDURE  Procedure: CT abdomen & pelvis  Findings and Treatment: FINDINGS:  LOWER CHEST: Small bilateral pleural effusions with adjacent atelectasis.   Coronary stents. Catheter tip in right atrium.  LIVER: Within normal limits.  BILE DUCTS: Normal caliber.  GALLBLADDER: Within normal limits.  SPLEEN: Within normal limits.  PANCREAS: Within normal limits.  ADRENALS: Within normal limits.  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 Right kidneys within normal limits.  BLADDER: Small posterior inferior wall diverticulum.  REPRODUCTIVE ORGANS: Within normal limits  BOWEL/PERITONEUM: No bowel obstruction. Nondistended stomach abutting   ventral abdominal wall at the site of prior percutaneous gastrostomy   tube. No ascites or pneumoperitoneum. Rectal mild mural thickening and   mucosal hyperemia.  VESSELS: Atherosclerotic changes.  RETROPERITONEUM/LYMPH NODES: No adenopathy  ABDOMINAL WALL: Postsurgical and probably post injection changes.  BONES: No suspicious lesion.  IMPRESSION:  1. No free air, fluid or fluid collection.The stomach is intact.  2. Probable proctitis. One may consider endoscopy after resolution of   acute issues if not recently performed.      Procedure: Cineradiogram  Findings and Treatment: FINDINGS:  Thin Liquid:  Penetration: Positive.  Aspiration: Positive.  Eliminated with posture/technique: Not applicable.  Nectar Thick Liquid:  Penetration: Positive.  Aspiration: Positive.  Eliminated with posture/technique: Reduced with a left head turn/tuck.  Honey Thick Liquid:  Penetration: Positive.  Aspiration: Positive.  Eliminated with posture/technique: Reduced with a left head turn/tuck.  Puree:  Penetration: Positive.  Aspiration: Positive.  Eliminated with posture/technique: Not applicable.  Anatomical findings: Degenerative changes of the cervical spine.

## 2023-11-23 NOTE — PROGRESS NOTE ADULT - SUBJECTIVE AND OBJECTIVE BOX
***Note in progress***    OVERNIGHT EVENTS: NAEO    SUBJECTIVE / INTERVAL HPI: Patient seen and examined at bedside. Patient denying chest pain, SOB, palpitations, cough. Patient denies fever, chills, HA, Dizziness, N/V, abdominal pain, diarrhea, constipation, hematochezia/melena, dysuria, hematuria, new onset weakness/numbness, LE pain and/or swelling.    Remaining ROS negative       PHYSICAL EXAM:    General:NAD.   HEENT: NC/AT; PERRL, anicteric sclera; MMM  Neck: supple  Cardiovascular: +S1/S2, RRR  Respiratory: CTA B/L; no W/R/R  Gastrointestinal: soft, NT/ND; +BSx4  Extremities: WWP; no edema, clubbing or cyanosis  Vascular: 2+ radial, DP/PT pulses B/L  Neurological: AAOx3; no focal deficits  Psychiatric: pleasant mood and affect  Dermatologic: no appreciable wounds or damage to the skin    VITAL SIGNS:  Vital Signs Last 24 Hrs  T(C): 36.7 (23 Nov 2023 05:54), Max: 37.6 (22 Nov 2023 08:24)  T(F): 98 (23 Nov 2023 05:54), Max: 99.6 (22 Nov 2023 08:24)  HR: 86 (23 Nov 2023 05:54) (86 - 98)  BP: 124/68 (23 Nov 2023 05:54) (110/66 - 131/77)  BP(mean): 76 (22 Nov 2023 21:15) (76 - 76)  RR: 18 (23 Nov 2023 05:54) (18 - 19)  SpO2: 96% (23 Nov 2023 05:54) (92% - 98%)    Parameters below as of 22 Nov 2023 21:15  Patient On (Oxygen Delivery Method): nasal cannula  O2 Flow (L/min): 2        MEDICATIONS:  MEDICATIONS  (STANDING):  albuterol/ipratropium for Nebulization 3 milliLiter(s) Nebulizer every 6 hours  aspirin  chewable 81 milliGRAM(s) Oral every 24 hours  atorvastatin 80 milliGRAM(s) Oral at bedtime  atropine 1% Ophthalmic Solution for SubLingual Use 1 Drop(s) SubLingual every 8 hours  dextrose 5%. 1000 milliLiter(s) (100 mL/Hr) IV Continuous <Continuous>  dextrose 5%. 1000 milliLiter(s) (50 mL/Hr) IV Continuous <Continuous>  dextrose 50% Injectable 25 Gram(s) IV Push once  dextrose 50% Injectable 25 Gram(s) IV Push once  dextrose 50% Injectable 12.5 Gram(s) IV Push once  enoxaparin Injectable 40 milliGRAM(s) SubCutaneous every 24 hours  fentaNYL   Patch  25 MICROgram(s)/Hr 1 Patch Transdermal every 72 hours  FIRST- Mouthwash  BLM 4 milliLiter(s) Swish and Spit every 6 hours  glucagon  Injectable 1 milliGRAM(s) IntraMuscular once  influenza  Vaccine (HIGH DOSE) 0.7 milliLiter(s) IntraMuscular once  insulin glargine Injectable (LANTUS) 10 Unit(s) SubCutaneous at bedtime  insulin lispro (ADMELOG) corrective regimen sliding scale   SubCutaneous three times a day before meals  insulin lispro (ADMELOG) corrective regimen sliding scale   SubCutaneous at bedtime  lidocaine 2% Viscous 5 milliLiter(s) Swish and Spit every 12 hours  metoprolol tartrate 12.5 milliGRAM(s) Oral every 12 hours  pantoprazole    Tablet 40 milliGRAM(s) Oral before breakfast  QUEtiapine 50 milliGRAM(s) Oral at bedtime  senna 2 Tablet(s) Oral at bedtime  silver sulfADIAZINE 1% Cream 1 Application(s) Topical two times a day    MEDICATIONS  (PRN):  dextrose Oral Gel 15 Gram(s) Oral once PRN Blood Glucose LESS THAN 70 milliGRAM(s)/deciliter  morphine   Solution 5 milliGRAM(s) Oral every 4 hours PRN Moderate Pain (4 - 6)  morphine   Solution 10 milliGRAM(s) Oral every 4 hours PRN Severe Pain (7 - 10)  polyethylene glycol 3350 17 Gram(s) Oral every 12 hours PRN Constipation      ALLERGIES:  Allergies    No Known Allergies    Intolerances        LABS:                        8.6    6.58  )-----------( 153      ( 23 Nov 2023 06:54 )             28.2     11-23    143  |  104  |  12  ----------------------------<  68<L>  4.4   |  31  |  1.12    Ca    8.1<L>      23 Nov 2023 06:54  Phos  3.3     11-23  Mg     1.7     11-23    TPro  5.3<L>  /  Alb  2.5<L>  /  TBili  0.5  /  DBili  x   /  AST  25  /  ALT  8<L>  /  AlkPhos  70  11-22      Urinalysis Basic - ( 23 Nov 2023 06:54 )    Color: x / Appearance: x / SG: x / pH: x  Gluc: 68 mg/dL / Ketone: x  / Bili: x / Urobili: x   Blood: x / Protein: x / Nitrite: x   Leuk Esterase: x / RBC: x / WBC x   Sq Epi: x / Non Sq Epi: x / Bacteria: x      CAPILLARY BLOOD GLUCOSE      POCT Blood Glucose.: 101 mg/dL (23 Nov 2023 00:03)      RADIOLOGY & ADDITIONAL TESTS: Reviewed. 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 Marr/Dr. Gar), recently admitted for odynophagia 2/2 radiation-induced mucositis iso laryngeal SCC, now s/p PEG 11/14 discharged on 11/22 who presents due to inability for safe care at home. Pt brought in with wife, per chart review the visiting nurse felt the patient needed more assistance and was unable to be at home. Pt discharged from the hospital earlier today, currently being treated for throat cancer, states he does not have a hospital bed and cannot walk on his own. Was recommended for SOFI but declined. Patient seen and examined at bedside. Denies any new symptoms at this time. Continues to have mild pain of the neck at site of dermatitis and some discomfort at PEG tube site, otherwise feels well. States he wants to return home once services are set up, does not want to pursue rehab as an option.     In the ED: Initial vital signs: T: 97.9 F, HR: 95, BP: 131/77, R: 18, SpO2: 98% on 3L NC  Labs: no new labs, however 10/22 AM labs significant for Hgb 8.8, Plt 143, Mg 1.4, Phos 2.3  CXR: none EKG: none Medications: none Consults: none     OVERNIGHT EVENTS: NAEO    SUBJECTIVE / INTERVAL HPI: Patient seen and examined at bedside. He is somnolent but arousable to sternal rub but nonverbal. He had a large foul smelling diarrheal output this AM. Remaining ROS negative     PHYSICAL EXAM:  General: somnolent but arousable to sternal rubs  HEENT: NC/AT; PERRL, anicteric sclera; legally blind, dry mucus membrances  Neck: supple  Cardiovascular: +S1/S2, RRR  Respiratory: CTA B/L; no W/R/R; on 3 L NC  Gastrointestinal: soft, NT/ND; +BSx4; PEG tube w/o leakage  Extremities: WWP; no edema  Vascular: 2+ radial, DP/PT pulses B/L  Neurological: AAOx1  Dermatologic: no appreciable wounds or damage to the skin    VITAL SIGNS:  Vital Signs Last 24 Hrs  T(C): 36.7 (23 Nov 2023 05:54), Max: 37.6 (22 Nov 2023 08:24)  T(F): 98 (23 Nov 2023 05:54), Max: 99.6 (22 Nov 2023 08:24)  HR: 86 (23 Nov 2023 05:54) (86 - 98)  BP: 124/68 (23 Nov 2023 05:54) (110/66 - 131/77)  BP(mean): 76 (22 Nov 2023 21:15) (76 - 76)  RR: 18 (23 Nov 2023 05:54) (18 - 19)  SpO2: 96% (23 Nov 2023 05:54) (92% - 98%)    Parameters below as of 22 Nov 2023 21:15  Patient On (Oxygen Delivery Method): nasal cannula  O2 Flow (L/min): 2    MEDICATIONS:  MEDICATIONS  (STANDING):  albuterol/ipratropium for Nebulization 3 milliLiter(s) Nebulizer every 6 hours  aspirin  chewable 81 milliGRAM(s) Oral every 24 hours  atorvastatin 80 milliGRAM(s) Oral at bedtime  atropine 1% Ophthalmic Solution for SubLingual Use 1 Drop(s) SubLingual every 8 hours  dextrose 5%. 1000 milliLiter(s) (100 mL/Hr) IV Continuous <Continuous>  dextrose 5%. 1000 milliLiter(s) (50 mL/Hr) IV Continuous <Continuous>  dextrose 50% Injectable 25 Gram(s) IV Push once  dextrose 50% Injectable 25 Gram(s) IV Push once  dextrose 50% Injectable 12.5 Gram(s) IV Push once  enoxaparin Injectable 40 milliGRAM(s) SubCutaneous every 24 hours  fentaNYL   Patch  25 MICROgram(s)/Hr 1 Patch Transdermal every 72 hours  FIRST- Mouthwash  BLM 4 milliLiter(s) Swish and Spit every 6 hours  glucagon  Injectable 1 milliGRAM(s) IntraMuscular once  influenza  Vaccine (HIGH DOSE) 0.7 milliLiter(s) IntraMuscular once  insulin glargine Injectable (LANTUS) 10 Unit(s) SubCutaneous at bedtime  insulin lispro (ADMELOG) corrective regimen sliding scale   SubCutaneous three times a day before meals  insulin lispro (ADMELOG) corrective regimen sliding scale   SubCutaneous at bedtime  lidocaine 2% Viscous 5 milliLiter(s) Swish and Spit every 12 hours  metoprolol tartrate 12.5 milliGRAM(s) Oral every 12 hours  pantoprazole    Tablet 40 milliGRAM(s) Oral before breakfast  QUEtiapine 50 milliGRAM(s) Oral at bedtime  senna 2 Tablet(s) Oral at bedtime  silver sulfADIAZINE 1% Cream 1 Application(s) Topical two times a day    MEDICATIONS  (PRN):  dextrose Oral Gel 15 Gram(s) Oral once PRN Blood Glucose LESS THAN 70 milliGRAM(s)/deciliter  morphine   Solution 5 milliGRAM(s) Oral every 4 hours PRN Moderate Pain (4 - 6)  morphine   Solution 10 milliGRAM(s) Oral every 4 hours PRN Severe Pain (7 - 10)  polyethylene glycol 3350 17 Gram(s) Oral every 12 hours PRN Constipation    ALLERGIES:  Allergies  No Known Allergies  Intolerances    LABS:                        8.6    6.58  )-----------( 153      ( 23 Nov 2023 06:54 )             28.2     11-23    143  |  104  |  12  ----------------------------<  68<L>  4.4   |  31  |  1.12    Ca    8.1<L>      23 Nov 2023 06:54  Phos  3.3     11-23  Mg     1.7     11-23    TPro  5.3<L>  /  Alb  2.5<L>  /  TBili  0.5  /  DBili  x   /  AST  25  /  ALT  8<L>  /  AlkPhos  70  11-22  Urinalysis Basic - ( 23 Nov 2023 06:54 )  Color: x / Appearance: x / SG: x / pH: x  Gluc: 68 mg/dL / Ketone: x  / Bili: x / Urobili: x   Blood: x / Protein: x / Nitrite: x   Leuk Esterase: x / RBC: x / WBC x   Sq Epi: x / Non Sq Epi: x / Bacteria: x  CAPILLARY BLOOD GLUCOSE  POCT Blood Glucose.: 101 mg/dL (23 Nov 2023 00:03)  RADIOLOGY & ADDITIONAL TESTS: Reviewed.

## 2023-11-23 NOTE — DISCHARGE NOTE PROVIDER - PROVIDER TOKENS
PROVIDER:[TOKEN:[[id number]],FOLLOWUP:[2 weeks],ESTABLISHEDPATIENT:[T]] PROVIDER:[TOKEN:[[id number]],FOLLOWUP:[2 weeks],ESTABLISHEDPATIENT:[T]] PROVIDER:[TOKEN:[[id number]],FOLLOWUP:[2 weeks],ESTABLISHEDPATIENT:[T]]

## 2023-11-23 NOTE — PROGRESS NOTE ADULT - PROBLEM SELECTOR PLAN 2
Inability to tolerate PO due to Laryngeal SCC    -failed S&S on 11/7 --> re-evaluate with MBS 11/15. Concern for future esophageal atrophy w/ no PO intake  -PEG placed 11/14, plan Start feeds 24h post placement.   -Glucerna 1.2@ (Goal: 65cc/hr) Increase today @ 40cc/hr (11/17) and increase incremently tomorrow  -Banitrol w/ Glucerna feeds for diarrhea  -Loperamide 2mg BID for diarrhea via PEG  -switch pantoprazole 40 mg qd via PEG Inability to tolerate PO due to Laryngeal SCC  -failed S&S on 11/7 --> re-evaluate with MBS 11/15. Concern for future esophageal atrophy w/ no PO intake  -PEG placed 11/14, plan Start feeds 24h post placement.   -Glucerna 1.2@ (Goal: 65cc/hr) Increase today @ 40cc/hr (11/17) and increase incremently tomorrow  -Banitrol w/ Glucerna feeds for diarrhea  -Loperamide 2mg BID for diarrhea via PEG  -switch pantoprazole 40 mg qd via PEG Larynx SCC currently getting radiation and chemotherapy. C/o odynophagia causing poor PO intake. Per chart review appears poor PO intake has been ongoing issue while undergoing chemo/radiation. Pt started on fluconazole for ppx candidiasis esophagitis- given no improvement, ok to discontinue by Dr. Marr. Last chemo 10/23. 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 PEG placed 11/14. Per rad onc, odynophagia and mucositis should likely resolve 2-3 weeks after completion of radiation. no radiation on 10/31. Last treatment 10/30  - Onc follow up in 1 week Dr. Derek Harvey  - Chest PT, hypertonic nebulizer q4hr   - On 3lpm O2 via NC, continue to wean  - Aspiration precautions    - magic mouthwash  - viscous lidocaine   -scopolamine patch  -BMP Mg/Phos to eval for Refeeding syndrome    #Palliative:   PAIN REGIMEN  -> Fentanyl 25mcg/hr Patch q72hr as long-acting agent  -> HYDROmorphone  1mg IV Push every 4 hours PRN Breakthrough pain  -> morphine 5mg Oral via PEG every 4 hours PRN Moderate Pain (4 - 6)  -> morphine 10mg Oral every 4 hours PRN Severe Pain (7 - 10)  -> atropine 1% sublingual TID

## 2023-11-23 NOTE — DISCHARGE NOTE PROVIDER - NSDCQMERRANDS_GEN_ALL_CORE
From: Vadim Estevez  To: Ventura Lechuga MD  Sent: 4/8/2023 9:35 AM CDT  Subject: Medications    Hi, I got a call last Thursday wanting me to schedule an appointment which I did. I was also asked to confirm the medications I am taking and I think I mistakenly said Metoprolol. Just for the record these are the meds i have been taking since my last visit in October. Lisinopril Hctz 20-12.5 2x a day, Amlodipine 5mg 1x a day, Pravaststin 10 mg 1x a day, and aspirin 81 mg 1x a day.   Thank You
Yes

## 2023-11-23 NOTE — PROGRESS NOTE ADULT - PROBLEM SELECTOR PLAN 4
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 C/o pain, dysphagia and odynophagia for weeks which was tx with magic mouthwash, silver silvadene cream, fluconazole 400mg once a day (started 10/23). Radiation paused due to pain, last RT 10/28.  On exam -  Bilateral neck wounds - erythematous with skin breakdown, crusted dead skin peripherally, no drainage, no skin sloughing, at baseline per patient. Reports improvement with cream. No concern for superimposed infection at this time. Per Oncology tx w/ Chemo and Radiation is complete.  Plan:  -Per Rad onc, received RT 10/30, 10/31, and 11/1, 11/3. Plan to f/u w/ Onc in 1 week  -Check with RT to see when next dosing is scheduled vs complete?  -c/w silvadene cream and non-stick silicon bandage  -For skin care,  rinse with saline soaks liberally with dabbing of light soap and water Diet: PEG, with titration target rate to 63  F: 1/4 NS  E: replete PRN  DVT: Resume after PEG   Code: Full  GI PPx: Pantoprazole 40 mg qd via PEG    Dispo: pt is medically cleared for dc home care services & needs O2, suctioning, hospital bed, w/c, 3in 1 commode and PEG feeds for dc    Needs proper med rec at this admission and on this discharge, as was discharged recently with incorrect regimen.

## 2023-11-24 NOTE — DIETITIAN INITIAL EVALUATION ADULT - ADD RECOMMEND
1. Continue enteral nutrition support via PEG: Glucerna 1.2 with goal rate of 65 ml/hr continuously over 24hrs to provide 1560 ml tube feed, 1872 calories, 94 gProt., and 1256 ml free water. This is 23.2 nonprotein calories and 1.46 gProt. per kg ideal body weight 64.5 kg.   2. Monitor for electrolyte abnormalities; replete as necessary   3. Continue banatrol BID and imodium for diarrhea management   4. Maintain aspiration precautions at all times  5. Monitor BG control and tube feed tolerance and adjust nutrition plan as appropriate   6. Monitor chemistry, GI function, and skin integrity  1. Continue enteral nutrition support via PEG: Glucerna 1.2 with goal rate of 65 ml/hr continuously over 24hrs to provide 1560 ml tube feed, 1872 calories, 94 gProt., and 1256 ml free water. This is 23.2 nonprotein calories and 1.46 gProt. per kg actual body weight 64.4 kg.   2. Monitor for electrolyte abnormalities; replete as necessary   3. Continue banatrol BID and imodium for diarrhea management   4. Maintain aspiration precautions at all times  5. Monitor BG control and tube feed tolerance and adjust nutrition plan as appropriate   6. Monitor chemistry, GI function, and skin integrity

## 2023-11-24 NOTE — PROGRESS NOTE ADULT - PROBLEM SELECTOR PLAN 4
Diet: PEG, with titration target rate to 63  F: 1/4 NS  E: replete PRN  DVT: Resume after PEG   Code: Full  GI PPx: Pantoprazole 40 mg qd via PEG    Dispo: pt is medically cleared for dc home care services & needs O2, suctioning, hospital bed, w/c, 3in 1 commode and PEG feeds for dc    Needs proper med rec at this admission and on this discharge, as was discharged recently with incorrect regimen.

## 2023-11-24 NOTE — PROGRESS NOTE ADULT - CONVERSATION DETAILS
Spoke with daughter and wife bedside. Wife and daughter expressed that they would assign daughter Robert Robertson as health care proxy. Discussed extensively with family about the medical history, current hospital stay and prognosis. Expressed to family that CPR and intubation would cause more harm than benefit in patient's case and physician would recommend a more natural transition towards end of life care. Family agreed as they stated "(pt) has gone through a lot" and they did not " want him to suffer".     Decision of DNR/DNI was made bedside and family current is declining SOFI and want to take pt home. MOLST signed by family and is in physical file.

## 2023-11-24 NOTE — PROGRESS NOTE ADULT - ATTENDING COMMENTS
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     #Diarrhea  - diarrhea not new, confirmed with previous team  - likely due to tube feeds    #Odynophagia  #Laryngeal cancer  --outpatient follow   -- follow up Dr. Derek Harvey  - continue chest PT  - aspiration precautions  - wean oxygen as able    35 minutes spent on this encounter, including face to face with patient, care coordination and documentation.

## 2023-11-24 NOTE — DIETITIAN NUTRITION RISK NOTIFICATION - ADDITIONAL COMMENTS/DIETITIAN RECOMMENDATIONS
1. Continue enteral nutrition support via PEG: Glucerna 1.2 with goal rate of 65 ml/hr continuously over 24hrs to provide 1560 ml tube feed, 1872 calories, 94 gProt., and 1256 ml free water. This is 23.2 nonprotein calories and 1.46 gProt. per kg ideal body weight 64.5 kg.   2. Monitor for electrolyte abnormalities; replete as necessary   3. Continue banatrol BID and imodium for diarrhea management   4. Maintain aspiration precautions at all times  5. Monitor BG control and tube feed tolerance and adjust nutrition plan as appropriate   6. Monitor chemistry, GI function, and skin integrity 1. Continue enteral nutrition support via PEG: Glucerna 1.2 with goal rate of 65 ml/hr continuously over 24hrs to provide 1560 ml tube feed, 1872 calories, 94 gProt., and 1256 ml free water. This is 23.2 nonprotein calories and 1.46 gProt. per kg actual body weight 64.5 kg.   2. Monitor for electrolyte abnormalities; replete as necessary   3. Continue banatrol BID and imodium for diarrhea management   4. Maintain aspiration precautions at all times  5. Monitor BG control and tube feed tolerance and adjust nutrition plan as appropriate   6. Monitor chemistry, GI function, and skin integrity

## 2023-11-24 NOTE — PROGRESS NOTE ADULT - SUBJECTIVE AND OBJECTIVE BOX
OVERNIGHT EVENTS: pt was agitated, gave zyprexa 2.5 IM    SUBJECTIVE  Pt was seen and examined at bedside. Appears comfortable.       Vital Signs Last 24 Hrs  T(C): 36.6 (24 Nov 2023 05:35), Max: 36.9 (23 Nov 2023 17:12)  T(F): 97.9 (24 Nov 2023 05:35), Max: 98.4 (23 Nov 2023 17:12)  HR: 83 (24 Nov 2023 05:35) (82 - 90)  BP: 140/65 (24 Nov 2023 05:35) (126/63 - 140/65)  BP(mean): --  RR: 18 (24 Nov 2023 05:35) (18 - 18)  SpO2: 96% (24 Nov 2023 05:35) (95% - 100%)    Parameters below as of 24 Nov 2023 05:35  Patient On (Oxygen Delivery Method): nasal cannula          PHYSICAL EXAM     General: NAD, appears somnolent but arousable to voice  HEENT: NC/AT; PERRL; Neck Supple; legally blind  Respiratory: CTAB; no wheezes/rales/rhonchi, normal WOB  Cardiovascular: Regular rhythm/rate, + S1/S2; no murmurs, rubs gallops   Gastrointestinal: Soft; NTND; + PEG tube with no surrounding leakage or signs of inflammation   : no suprapubic/ CVA tenderness  Vascular: extremities WWP; no edema/cyanosis, 2+ distal pulses b/l   Neurological: A&Ox1  Integument: No gross skin abnormalities or rashes       .  LABS:                         8.7    7.37  )-----------( 160      ( 24 Nov 2023 07:34 )             28.3     11-24    144  |  106  |  14  ----------------------------<  83  4.8   |  31  |  1.06    Ca    8.2<L>      24 Nov 2023 07:34  Phos  3.4     11-24  Mg     1.6     11-24    TPro  5.1<L>  /  Alb  2.2<L>  /  TBili  0.4  /  DBili  x   /  AST  28  /  ALT  10  /  AlkPhos  84  11-24      Urinalysis Basic - ( 24 Nov 2023 07:34 )    Color: x / Appearance: x / SG: x / pH: x  Gluc: 83 mg/dL / Ketone: x  / Bili: x / Urobili: x   Blood: x / Protein: x / Nitrite: x   Leuk Esterase: x / RBC: x / WBC x   Sq Epi: x / Non Sq Epi: x / Bacteria: x                RADIOLOGY, EKG & ADDITIONAL TESTS: Reviewed.  OVERNIGHT EVENTS: pt was agitated, gave zyprexa 2.5 IM    SUBJECTIVE  Pt was seen and examined at bedside. Appears comfortable.       Vital Signs Last 24 Hrs  T(C): 36.6 (24 Nov 2023 05:35), Max: 36.9 (23 Nov 2023 17:12)  T(F): 97.9 (24 Nov 2023 05:35), Max: 98.4 (23 Nov 2023 17:12)  HR: 83 (24 Nov 2023 05:35) (82 - 90)  BP: 140/65 (24 Nov 2023 05:35) (126/63 - 140/65)  BP(mean): --  RR: 18 (24 Nov 2023 05:35) (18 - 18)  SpO2: 96% (24 Nov 2023 05:35) (95% - 100%)    Parameters below as of 24 Nov 2023 05:35  Patient On (Oxygen Delivery Method): nasal cannula          PHYSICAL EXAM     General: NAD, appears somnolent but responsive to command  HEENT: NC/AT; PERRL; Neck Supple; legally blind  Respiratory: CTAB; no wheezes/rales/rhonchi, normal WOB  Cardiovascular: Regular rhythm/rate, + S1/S2; no murmurs, rubs gallops   Gastrointestinal: Soft; NTND; + PEG tube with no surrounding leakage or signs of inflammation   : no suprapubic/ CVA tenderness  Vascular: extremities WWP; no edema/cyanosis, 2+ distal pulses b/l   Neurological: A&Ox1  Integument: No gross skin abnormalities or rashes       .  LABS:                         8.7    7.37  )-----------( 160      ( 24 Nov 2023 07:34 )             28.3     11-24    144  |  106  |  14  ----------------------------<  83  4.8   |  31  |  1.06    Ca    8.2<L>      24 Nov 2023 07:34  Phos  3.4     11-24  Mg     1.6     11-24    TPro  5.1<L>  /  Alb  2.2<L>  /  TBili  0.4  /  DBili  x   /  AST  28  /  ALT  10  /  AlkPhos  84  11-24      Urinalysis Basic - ( 24 Nov 2023 07:34 )    Color: x / Appearance: x / SG: x / pH: x  Gluc: 83 mg/dL / Ketone: x  / Bili: x / Urobili: x   Blood: x / Protein: x / Nitrite: x   Leuk Esterase: x / RBC: x / WBC x   Sq Epi: x / Non Sq Epi: x / Bacteria: x                RADIOLOGY, EKG & ADDITIONAL TESTS: Reviewed.

## 2023-11-24 NOTE — DIETITIAN NUTRITION RISK NOTIFICATION - TREATMENT: THE FOLLOWING DIET HAS BEEN RECOMMENDED
Diet, NPO with Tube Feed:   Tube Feeding Modality: Gastrostomy  Glucerna 1.2 [place] (GLUCERNARTH)  Total Volume for 24 Hours (mL): 1560  Total Number of Cans: 7  Continuous  Starting Tube Feed Rate {mL per Hour}: 20  Increase Tube Feed Rate by (mL): 10     Every 4 hours  Until Goal Tube Feed Rate (mL per Hour): 65  Tube Feed Duration (in Hours): 24  Tube Feed Start Time: 21:00  Sarah TF     Qty per Day:  2 (11-22-23 @ 21:45) [Active]

## 2023-11-24 NOTE — DIETITIAN INITIAL EVALUATION ADULT - NSFNSGIIOFT_GEN_A_CORE
Patient provided with ice chips   11-24-23 @ 07:01  -  11-24-23 @ 13:19  --------------------------------------------------------  OUT:  Total OUT: 0 mL    Total NET: 195 mL

## 2023-11-24 NOTE — PROGRESS NOTE ADULT - PROBLEM SELECTOR PLAN 3
CAD s/p MIDCAB and PCI with multiple WINTER (most recent to LCx in 8/2022), on ASA 81 at home. Currently asymptomatic. EKG on admission w/o ischemic changes  - holding Aspirin   - Restarted Lipitor 80mg qd through PEG CAD s/p MIDCAB and PCI with multiple WINTER (most recent to LCx in 8/2022), Currently asymptomatic. EKG on admission w/o ischemic changes.    - c/w home med [id number] and Lipitor 80qhs

## 2023-11-24 NOTE — PROGRESS NOTE ADULT - PROBLEM SELECTOR PLAN 1
Pt had large foul-smelling diarrheal output this AM. Nonverbal to answer whether ongoing diarrhea prior to arrival. PO intake via PEG but given recent hospitalization, cannot rule out infectious vs. noninfectious etiologies of diarrhea.  -Obtain collateral from facility about recent I&O, diarrhea  -GI PCR, consider Cdiff add on if 3 episodes IMPROVING. Pt had large foul-smelling diarrheal output 11/23. No further episode as of 11/24. per family, no hx of diarrhea prior to PEG placement but was started on stool softener. Low suspicion of infectious causes at this time.    -GI PCR, consider C diff add on if 3 episodes

## 2023-11-24 NOTE — PROGRESS NOTE ADULT - MOLST COMPLETED
Called patient to inform her that [facility] RT will be getting her setup with NIV this afternoon after lunch time. Patient expressed understanding.  
24-Nov-2023

## 2023-11-24 NOTE — DIETITIAN INITIAL EVALUATION ADULT - NSFNSPHYEXAMSKINFT_GEN_A_CORE
Pressure Injury 1: Bilateral:, heel, Suspected deep tissue injury  Pressure Injury 2: Right:, buttock, Stage I  Pressure Injury 3: none, none  Pressure Injury 4: none, none  Pressure Injury 5: none, none  Pressure Injury 6: none, none  Pressure Injury 7: none, none  Pressure Injury 8: none, none  Pressure Injury 9: none, none  Pressure Injury 10: none, none  Pressure Injury 11: none, none, Pressure Injury 1: Bilateral:, heel, Suspected deep tissue injury  Pressure Injury 2: none, none  Pressure Injury 3: none, none  Pressure Injury 4: none, none  Pressure Injury 5: none, none  Pressure Injury 6: none, none  Pressure Injury 7: none, none  Pressure Injury 8: none, none  Pressure Injury 9: none, none  Pressure Injury 10: none, none  Pressure Injury 11: none, none

## 2023-11-24 NOTE — DIETITIAN INITIAL EVALUATION ADULT - OTHER INFO
78-year-old M with PMH of CAD, PVD, DM, 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 Marr/Dr. Gar), recently admitted for odynophagia 2/2 radiation-induced mucositis iso laryngeal SCC, now s/p PEG 11/14 discharged on 11/22 who presents due to inability for safe care at home. Pt brought in with wife, per chart review the visiting nurse felt the patient needed more assistance and was unable to be at home. Pt discharged from the hospital earlier today, currently being treated for throat cancer, states he does not have a hospital bed and cannot walk on his own. Was recommended for SOFI but declined. Patient seen and examined at bedside. Denies any new symptoms at this time. Continues to have mild pain of the neck at site of dermatitis and some discomfort at PEG tube site, otherwise feels well. States he wants to return home once services are set up, does not want to pursue rehab as an option.     Patient seen at bedside for nutrition assessment, patient known to this RD from prior admission. Patient sleeping at time of assessment, unrousable x 2 attempts thus subjective information obtained from RN and EMR. Patient with PEG, observed tube feed running at goall, RN endorses good tolerance with no report of n/v/d/c/abd pain. Dosing weight: 142#, BMI 22.9. On previous admit, patient 150#. -8#/5% x 1 month, clinically significant. NFPE notable for moderate muscle wasting to temples. Based on ASPEN guidelines, patient meets criteria for severe malnutrition. Noted with bilateral heel DTI, stage 1 pressure injury to R buttock, and +1 bilateral ankle edema. Labs: glucose trending  x 24 hours, most recent HgbA1c 10/30 9.0%. Meds: insulin, glucagon, bowel regimen. See nutrition recommendations below.  78-year-old M with PMH of CAD, PVD, DM, 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 Marr/Dr. Gar), recently admitted for odynophagia 2/2 radiation-induced mucositis iso laryngeal SCC, now s/p PEG 11/14 discharged on 11/22 who presents due to inability for safe care at home. Pt brought in with wife, per chart review the visiting nurse felt the patient needed more assistance and was unable to be at home. Pt discharged from the hospital earlier today, currently being treated for throat cancer, states he does not have a hospital bed and cannot walk on his own. Was recommended for SOFI but declined. Patient seen and examined at bedside. Denies any new symptoms at this time. Continues to have mild pain of the neck at site of dermatitis and some discomfort at PEG tube site, otherwise feels well. States he wants to return home once services are set up, does not want to pursue rehab as an option.     Patient seen at bedside for nutrition assessment, patient known to this RD from prior admission. Patient sleeping at time of assessment, unrousable x 2 attempts thus subjective information obtained from RN and EMR. Patient with PEG, observed tube feed running at goal, RN endorses good tolerance with no report of n/v/d/c/abd pain. Dosing weight: 142#, BMI 22.9. On previous admit, patient 150#. -8#/5% x 1 month, clinically significant. NFPE notable for moderate muscle wasting to temples. Based on ASPEN guidelines, patient meets criteria for severe malnutrition. Noted with bilateral heel DTI, stage 1 pressure injury to R buttock, and +1 bilateral ankle edema. Labs: glucose trending  x 24 hours, most recent HgbA1c 10/30 9.0%. Meds: insulin, glucagon, bowel regimen. See nutrition recommendations below.

## 2023-11-24 NOTE — DIETITIAN INITIAL EVALUATION ADULT - PROBLEM SELECTOR PLAN 1
Per chart review from prior admission, patient with significant odynophagia likely 2/2 radiation therapy iso laryngeal squamous cell carcinoma. Follows outpatient with Dr. Marr, last received treatment 11/3. Was seen by speech & swallow multiple times during prior admission and failed PO trials, PEG tube placed on 11/14. Per rad-onc, mucositis would resolve 2-3 weeks after completion of radiation. Previously seen by palliative and placed on a pain regimen.  - c/w fentanyl patch 25mcg q72h  - c/w morphine 5mg via PEG q4h for moderate pain  - c/w morphine 10mg via PEG q4h for severe pain  - c/w atropine 1% sublingual TID to reduce oral secretions  - c/w magic mouthwash q6h and lidocaine 2% viscous sol. q12h  - monitor suctioning requirements  - c/w PEG feeds  - bowel regimen while on opiates

## 2023-11-24 NOTE — DIETITIAN INITIAL EVALUATION ADULT - PERTINENT LABORATORY DATA
11-24    144  |  106  |  14  ----------------------------<  83  4.8   |  31  |  1.06    Ca    8.2<L>      24 Nov 2023 07:34  Phos  3.4     11-24  Mg     1.6     11-24    TPro  5.1<L>  /  Alb  2.2<L>  /  TBili  0.4  /  DBili  x   /  AST  28  /  ALT  10  /  AlkPhos  84  11-24  POCT Blood Glucose.: 163 mg/dL (11-24-23 @ 12:01)  A1C with Estimated Average Glucose Result: 9.0 % (10-30-23 @ 05:30)  A1C with Estimated Average Glucose Result: 7.9 % (07-15-23 @ 06:32)

## 2023-11-24 NOTE — DIETITIAN INITIAL EVALUATION ADULT - OTHER CALCULATIONS
Based on Standards of Care patient within % IBW (100%) thus actual body weight used for all calculations (142#). Needs adjusted for elderly repletion, ca dx. Fluid recs per team.

## 2023-11-24 NOTE — PROGRESS NOTE ADULT - PROBLEM SELECTOR PLAN 2
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 C/o odynophagia causing poor PO intake. Per chart review appears poor PO intake has been ongoing issue while undergoing chemo/radiation. Pt started on fluconazole for ppx candidiasis esophagitis- given no improvement, ok to discontinue by Dr. Marr. Last chemo 10/23. 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 PEG placed 11/14. Per rad onc, odynophagia and mucositis should likely resolve 2-3 weeks after completion of radiation. no radiation on 10/31. Last treatment 10/30  - Onc follow up in 1 week Dr. Derek Harvey  - Chest PT, hypertonic nebulizer q4hr   - On 3lpm O2 via NC, continue to wean  - Aspiration precautions    - magic mouthwash  - viscous lidocaine   -scopolamine patch  -BMP Mg/Phos to eval for Refeeding syndrome    #Palliative:   PAIN REGIMEN  -> Fentanyl 25mcg/hr Patch q72hr as long-acting agent  -> HYDROmorphone  1mg IV Push every 4 hours PRN Breakthrough pain  -> morphine 5mg Oral via PEG every 4 hours PRN Moderate Pain (4 - 6)  -> morphine 10mg Oral every 4 hours PRN Severe Pain (7 - 10)  -> atropine 1% sublingual TID Larynx SCC currently getting radiation and chemotherapy, last chemo 10/30. C/o odynophagia causing poor PO intake. PEG placed 11/14 i/s/o ongoing PO intake 2/2 odynophagia, likely i/s/o cancer tx. Pt previously on fluconazole for candidiasis esophagitis ppx but d/c by Dr. Marr for lack of improvement.     - Per rad onc, odynophagia and mucositis should likely resolve 2-3 weeks after completion of radiation. (last 10/30)  - Onc follow up in 1 week Dr. Derek Harvey  - Chest PT, hypertonic nebulizer q4hr   - On 3lpm O2 via NC, continue to wean  - Aspiration precautions    - magic mouthwash  - viscous lidocaine   - scopolamine patch  - labs for increased risk of refeeding syndrome     #PAIN REGIMEN  follows with Dr. Anthony. Not in acute pain    - Fentanyl 25mcg/hr Patch q72hr as long-acting agent  - HYDROmorphone  1mg IV Push every 4 hours PRN Breakthrough pain  - morphine 5mg Oral via PEG every 4 hours PRN Moderate Pain (4 - 6)  - morphine 10mg Oral every 4 hours PRN Severe Pain (7 - 10)  - atropine 1% sublingual TID

## 2023-11-24 NOTE — DIETITIAN INITIAL EVALUATION ADULT - PROBLEM SELECTOR PLAN 7
A1c 9.0 on 10/30. Discharged on lantus 45qd in the AM and lispro 7u TID, however patient discontinued on standing insulin while previously hospitalized due to hypoglycemia episodes.   - c/w tube feeds  - POC glucose checks q6h  - start lantus 10u (previously on lantus 15u, but hypoglycemic to 60s this AM)  - moderate ISS

## 2023-11-24 NOTE — DIETITIAN INITIAL EVALUATION ADULT - PERTINENT MEDS FT
MEDICATIONS  (STANDING):  albuterol/ipratropium for Nebulization 3 milliLiter(s) Nebulizer every 6 hours  aspirin  chewable 81 milliGRAM(s) Oral every 24 hours  atorvastatin 80 milliGRAM(s) Oral at bedtime  atropine 1% Ophthalmic Solution for SubLingual Use 1 Drop(s) SubLingual every 8 hours  dextrose 5%. 1000 milliLiter(s) (100 mL/Hr) IV Continuous <Continuous>  dextrose 5%. 1000 milliLiter(s) (50 mL/Hr) IV Continuous <Continuous>  dextrose 50% Injectable 25 Gram(s) IV Push once  dextrose 50% Injectable 12.5 Gram(s) IV Push once  dextrose 50% Injectable 25 Gram(s) IV Push once  enoxaparin Injectable 40 milliGRAM(s) SubCutaneous every 24 hours  fentaNYL   Patch  25 MICROgram(s)/Hr 1 Patch Transdermal every 72 hours  FIRST- Mouthwash  BLM 4 milliLiter(s) Swish and Spit every 6 hours  glucagon  Injectable 1 milliGRAM(s) IntraMuscular once  influenza  Vaccine (HIGH DOSE) 0.7 milliLiter(s) IntraMuscular once  insulin glargine Injectable (LANTUS) 10 Unit(s) SubCutaneous at bedtime  insulin lispro (ADMELOG) corrective regimen sliding scale   SubCutaneous three times a day before meals  insulin lispro (ADMELOG) corrective regimen sliding scale   SubCutaneous at bedtime  lidocaine 2% Viscous 5 milliLiter(s) Swish and Spit every 12 hours  metoprolol tartrate 12.5 milliGRAM(s) Oral every 12 hours  pantoprazole    Tablet 40 milliGRAM(s) Oral before breakfast  QUEtiapine 50 milliGRAM(s) Oral at bedtime  senna 2 Tablet(s) Oral at bedtime  silver sulfADIAZINE 1% Cream 1 Application(s) Topical two times a day    MEDICATIONS  (PRN):  dextrose Oral Gel 15 Gram(s) Oral once PRN Blood Glucose LESS THAN 70 milliGRAM(s)/deciliter  morphine   Solution 10 milliGRAM(s) Oral every 4 hours PRN Severe Pain (7 - 10)  morphine   Solution 5 milliGRAM(s) Oral every 4 hours PRN Moderate Pain (4 - 6)  polyethylene glycol 3350 17 Gram(s) Oral every 12 hours PRN Constipation

## 2023-11-24 NOTE — DIETITIAN INITIAL EVALUATION ADULT - PROBLEM SELECTOR PLAN 8
F: none  E: replete prn  N: Glucerna 1.2 @ 65/h  DVT ppx: lovenox 40mg qd  Dispo: [facility] --> home once home needs set up v. SOFI if patient amenable

## 2023-11-24 NOTE — PROGRESS NOTE ADULT - ASSESSMENT
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 Marr/Dr. Gar), admitted [id number] for odynophagia in setting of laryngeal SCC, now s/p PEG 11/14 & stepdowned to [facility] for transition of care. Patient is medically ready for discharge w/family not amenable for SOFI, now pending social work clearance for home care services d/t continuous O2 & suctioning requirements.  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 Marr/Dr. Gar), admitted [id number] for odynophagia in setting of laryngeal SCC, now s/p PEG 11/14 & stepdowned to [facility] for transition of care. Patient is medically ready for discharge w/family not amenable for SOFI, now pending social work clearance for home care services d/t continuous O2 & suctioning requirements.

## 2023-11-24 NOTE — DIETITIAN INITIAL EVALUATION ADULT - PROBLEM SELECTOR PLAN 5
TTE 07/2023: mild LVH, mild reduced LVH hypertrophy, normal RV function, LV systolic function mild decr EF 50-55%. home meds: discharged on toprol 100mg once a day  per chart review.   - start lopressor 12.5 mg bid via PEG with hold parameters

## 2023-11-25 NOTE — PROGRESS NOTE ADULT - ASSESSMENT
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 Marr/Dr. Gar), admitted [id number] for odynophagia in setting of laryngeal SCC, now s/p PEG 11/14 & stepdowned to [facility] for transition of care. Patient is medically ready for discharge w/family not amenable for SOFI, now pending social work clearance for home care services d/t continuous O2 & suctioning requirements.  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 Marr/Dr. Gar),  s/p PEG 11/14 was admitted to [facility] for increase need of care at home after recent discharge.  family now amenable for SOFI. Patient is medically ready for discharge w/family not amenable for SOFI, now pending social work clearance for home care services d/t continuous O2 & suctioning requirements.

## 2023-11-25 NOTE — PROGRESS NOTE ADULT - SUBJECTIVE AND OBJECTIVE BOX
OVERNIGHT EVENTS: pt continues to exhibits agitation that requires zyprexa IM. am Qtc 489. PEG tube dislodged during agitation. GI/ IR consulted. Surg recommend CTAP w/ IV cont and reevaluate.       SUBJECTIVE  Pt was seen and examined at bedside. Appears calm s/p zyprexa but remain confused. Redirect with family on phone x1 but remain confused        Vital Signs Last 24 Hrs  T(C): 36.7 (25 Nov 2023 21:10), Max: 37.2 (25 Nov 2023 05:29)  T(F): 98 (25 Nov 2023 21:10), Max: 99 (25 Nov 2023 05:29)  HR: 94 (25 Nov 2023 21:10) (87 - 94)  BP: 122/61 (25 Nov 2023 21:10) (122/61 - 138/73)  BP(mean): --  RR: 18 (25 Nov 2023 21:10) (18 - 18)  SpO2: 98% (25 Nov 2023 21:10) (94% - 98%)    Parameters below as of 25 Nov 2023 21:10  Patient On (Oxygen Delivery Method): nasal cannula  O2 Flow (L/min): 2        PHYSICAL EXAM     General: NAD  HEENT: Neck Supple; legally blind, dry MM  Respiratory: CTAB; no wheezes/rales/rhonchi, normal WOB  Cardiovascular: Regular rhythm/rate, + S1/S2; no murmurs, rubs gallops   Gastrointestinal: Soft; NTND; PEG tube dislodged. Areas appears erythremic but w/ no active drainage.   : no suprapubic tenderness  Vascular: extremities WWP; no edema/cyanosis, 2+ distal pulses b/l   Neurological: A&Ox1  Skin: skin changes at neck likely 2/2 agitation, pressure ulcers @ b/l heels      .      < from: CT Abdomen and Pelvis w/ IV Cont (11.25.23 @ 15:46) >  IMPRESSION:    1. No free air, fluid or fluid collection.The stomach is intact.  2. Probable proctitis. One may consider endoscopy after resolution of   acute issues if not recently performed.        --- End of Report ---    < end of copied text >         OVERNIGHT EVENTS: pt continues to exhibits agitation that requires zyprexa IM. am Qtc 489. PEG tube dislodged during agitation. GI/ IR consulted. Surg recommend CTAP w/ IV cont and reevaluate.       SUBJECTIVE  Pt was seen and examined at bedside. Appears calm s/p zyprexa but remain confused. Redirect with family on phone x1 but remain confused        Vital Signs Last 24 Hrs  T(C): 36.7 (25 Nov 2023 21:10), Max: 37.2 (25 Nov 2023 05:29)  T(F): 98 (25 Nov 2023 21:10), Max: 99 (25 Nov 2023 05:29)  HR: 94 (25 Nov 2023 21:10) (87 - 94)  BP: 122/61 (25 Nov 2023 21:10) (122/61 - 138/73)  BP(mean): --  RR: 18 (25 Nov 2023 21:10) (18 - 18)  SpO2: 98% (25 Nov 2023 21:10) (94% - 98%)    Parameters below as of 25 Nov 2023 21:10  Patient On (Oxygen Delivery Method): nasal cannula  O2 Flow (L/min): 2        PHYSICAL EXAM     General: NAD  HEENT: Neck Supple; legally blind, dry MM  Respiratory: CTAB; no wheezes/rales/rhonchi, normal WOB  Cardiovascular: Regular rhythm/rate, + S1/S2; no murmurs, rubs gallops   Gastrointestinal: Soft; NTND; PEG tube dislodged. Areas appears erythremic but w/ no active drainage.   : no suprapubic tenderness  Vascular: extremities WWP; no edema/cyanosis, 2+ distal pulses b/l   Neurological: A&Ox1  Skin: skin changes at neck likely 2/2 radiaion, pressure ulcers @ b/l heels      .      < from: CT Abdomen and Pelvis w/ IV Cont (11.25.23 @ 15:46) >  IMPRESSION:    1. No free air, fluid or fluid collection.The stomach is intact.  2. Probable proctitis. One may consider endoscopy after resolution of   acute issues if not recently performed.        --- End of Report ---    < end of copied text >

## 2023-11-25 NOTE — PROGRESS NOTE ADULT - PROBLEM SELECTOR PLAN 2
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 C/o odynophagia causing poor PO intake. PEG placed 11/14 i/s/o ongoing PO intake 2/2 odynophagia, likely i/s/o cancer tx. Pt previously on fluconazole for candidiasis esophagitis ppx but d/c by Dr. Marr for lack of improvement.     - Per rad onc, odynophagia and mucositis should likely resolve 2-3 weeks after completion of radiation. (last 10/30)  - Onc follow up in 1 week Dr. Derek Harvey  - Chest PT, hypertonic nebulizer q4hr   - On 3lpm O2 via NC, continue to wean  - Aspiration precautions    - magic mouthwash  - viscous lidocaine   - scopolamine patch  - labs for increased risk of refeeding syndrome     #PAIN REGIMEN  follows with Dr. Anthony. Not in acute pain    - Fentanyl 25mcg/hr Patch q72hr as long-acting agent  - HYDROmorphone  1mg IV Push every 4 hours PRN Breakthrough pain  - morphine 5mg Oral via PEG every 4 hours PRN Moderate Pain (4 - 6)  - morphine 10mg Oral every 4 hours PRN Severe Pain (7 - 10)  - atropine 1% sublingual TID Pt continues to exhibit agitation episodes requiring zyprexa IM. pm > am. barely directable with family on phone but mostly calm with family bedside. On seroquel 50 qhs (now held 2/2 odynophagia). Qtc 489 11/25. mental status unchanged.   Likely 2/2 delirium and/or dementia. Infectious causes unlikely i/s/o normal WBC, afebrile and VSS.     - will 1st attempt to redirect with family on phone  - zyprexa IM for breakthrough

## 2023-11-25 NOTE — PROGRESS NOTE ADULT - PROBLEM SELECTOR PLAN 6
Diet: NPO w/ ice chips as tolerated (previously w/ PEG, with titration target rate to 63)  F:   E: replete PRN  DVT: Resume after PEG   Code: Full  GI PPx: Pantoprazole 40 mg qd via PEG    Dispo: pt is medically cleared for dc home care services & needs O2, suctioning, hospital bed, w/c, 3in 1 commode and PEG feeds for dc    Needs proper med rec at this admission and on this discharge, as was discharged recently with incorrect regimen.

## 2023-11-25 NOTE — PROGRESS NOTE ADULT - PROBLEM SELECTOR PLAN 4
Diet: PEG, with titration target rate to 63  F: 1/4 NS  E: replete PRN  DVT: Resume after PEG   Code: Full  GI PPx: Pantoprazole 40 mg qd via PEG    Dispo: pt is medically cleared for dc home care services & needs O2, suctioning, hospital bed, w/c, 3in 1 commode and PEG feeds for dc    Needs proper med rec at this admission and on this discharge, as was discharged recently with incorrect regimen. CAD s/p MIDCAB and PCI with multiple WINTER (most recent to LCx in 8/2022), Currently asymptomatic. EKG on admission w/o ischemic changes.    - c/w home med ASA81 and Lipitor 80qhs

## 2023-11-25 NOTE — PROGRESS NOTE ADULT - ATTENDING COMMENTS
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     #Diarrhea  - diarrhea not new, confirmed with previous team  - likely due to tube feeds  - despite abdominal binder, patient had been calm yesterday, but pulled out PEG tube overnight.  Surgery consulted and appreciate input.      #Odynophagia  #Laryngeal cancer  --outpatient follow   -- follow up Dr. Derek Harvey  - continue chest PT  - aspiration precautions  - wean oxygen as able    35 minutes spent on this encounter, including face to face with patient, care coordination and documentation.

## 2023-11-25 NOTE — PROGRESS NOTE ADULT - PROBLEM SELECTOR PLAN 3
CAD s/p MIDCAB and PCI with multiple WINTER (most recent to LCx in 8/2022), Currently asymptomatic. EKG on admission w/o ischemic changes.    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 C/o odynophagia causing poor PO intake. PEG placed 11/14 i/s/o ongoing PO intake 2/2 odynophagia, likely i/s/o cancer tx. Pt previously on fluconazole for candidiasis esophagitis ppx but d/c by Dr. Marr for lack of improvement.     - Per rad onc, odynophagia and mucositis should likely resolve 2-3 weeks after completion of radiation. (last 10/30)  - Onc follow up in 1 week Dr. Derek Harvey  - Chest PT, hypertonic nebulizer q4hr     - Aspiration precautions    - magic mouthwash  - viscous lidocaine   - scopolamine patch  - labs for increased risk of refeeding syndrome     #PAIN REGIMEN  follows with Dr. Anthony. Not in acute pain    - Fentanyl 25mcg/hr Patch q72hr as long-acting agent  - HYDROmorphone  1mg IV Push every 4 hours PRN Breakthrough pain  - morphine 5mg Oral via PEG every 4 hours PRN Moderate Pain (4 - 6)  - morphine 10mg Oral every 4 hours PRN Severe Pain (7 - 10)  - atropine 1% sublingual TID

## 2023-11-25 NOTE — PROGRESS NOTE ADULT - PROBLEM SELECTOR PLAN 1
IMPROVING. Pt had large foul-smelling diarrheal output 11/23. No further episode as of 11/24. per family, no hx of diarrhea prior to PEG placement but was started on stool softener. Low suspicion of infectious causes at this time.    -GI PCR, consider C diff add on if 3 episodes PEG tube dislodged 11/24 o/n. Previously placed by IR 11/14. GI/ IR consulted. Surg consulted and recommended CTAP, showing intact stomach with no free air, fluid/ fluid collections. IR reconsulted 11/25.     - f/u IR  - NPO with nice chips as tolerated.   - gentle fluid given hx of CAD s/p stents w/ EF 45-50%.   - FSG

## 2023-11-25 NOTE — PROGRESS NOTE ADULT - PROBLEM SELECTOR PLAN 5
RESOLVED. Pt had large foul-smelling diarrheal output 11/23. No further episode as of 11/24. per family, no hx of diarrhea prior to PEG placement but was started on stool softener. Low suspicion of infectious causes at this time.

## 2023-11-26 NOTE — PROGRESS NOTE ADULT - PROBLEM SELECTOR PLAN 2
Pt continues to exhibit agitation episodes requiring zyprexa IM. pm > am. barely directable with family on phone but mostly calm with family bedside. On seroquel 50 qhs (now held 2/2 odynophagia). Qtc 489 11/25. mental status unchanged.   Likely 2/2 delirium and/or dementia. Infectious causes unlikely i/s/o normal WBC, afebrile and VSS.     - IM zyprexa  - patient denies discomfort today, and appears comfortable and not agitated.    - monitor QTC while on zyprexa

## 2023-11-26 NOTE — PROGRESS NOTE ADULT - PROBLEM SELECTOR PLAN 3
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 C/o odynophagia causing poor PO intake. PEG placed 11/14 i/s/o ongoing PO intake 2/2 odynophagia, likely i/s/o cancer tx. Pt previously on fluconazole for candidiasis esophagitis ppx but d/c by Dr. Marr for lack of improvement.     - Per rad onc, odynophagia and mucositis should likely resolve 2-3 weeks after completion of radiation. (last 10/30)  - Onc follow up in 1 week Dr. Derek Harvey  - Chest PT, hypertonic nebulizer q4hr     - Aspiration precautions    - magic mouthwash  - viscous lidocaine   - scopolamine patch  - labs for increased risk of refeeding syndrome     #PAIN REGIMEN  follows with Dr. Anthony. Not in acute pain    - Fentanyl 25mcg/hr Patch q72hr as long-acting agent  - HYDROmorphone  1mg IV Push every 4 hours PRN Breakthrough pain  - morphine 5mg Oral via PEG every 4 hours PRN Moderate Pain (4 - 6)  - morphine 10mg Oral every 4 hours PRN Severe Pain (7 - 10)  - atropine 1% sublingual TID

## 2023-11-26 NOTE — SWALLOW BEDSIDE ASSESSMENT ADULT - SWALLOW EVAL: DIAGNOSIS
Pt with known severe pharyngeal dysphagia per MBSS completed 11/16/2023. Trials limited due to severity of dysphagia and need for repeat MBSS eval, if inline with GOC. This svc will f/u to determine POC. At this time, recommend continue NPO with alternative means of nutrition. Maintain strict aspiration precautions.

## 2023-11-26 NOTE — SWALLOW BEDSIDE ASSESSMENT ADULT - SLP PERTINENT HISTORY OF CURRENT PROBLEM
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 Marr/Dr. Gar), recently admitted for odynophagia 2/2 radiation-induced mucositis iso laryngeal SCC, now s/p PEG 11/14 discharged on 11/22 who presents due to inability for safe care at home. Pt brought in with wife, per chart review the visiting nurse felt the patient needed more assistance and was unable to be at home. Pt discharged from the hospital earlier today, currently being treated for throat cancer, states he does not have a hospital bed and cannot walk on his own. Was recommended for SOFI but declined. Patient seen and examined at bedside. Denies any new symptoms at this time.

## 2023-11-26 NOTE — PROGRESS NOTE ADULT - PROBLEM SELECTOR PLAN 6
Diet: NPO w/ ice chips as tolerated (previously w/ PEG, with titration target rate to 63)  F:   E: replete PRN  DVT: Resume after PEG   Code: Full  GI PPx: Pantoprazole 40 mg qd via PEG      Needs proper med rec at this admission and on this discharge, as was discharged recently with incorrect regimen.

## 2023-11-26 NOTE — SWALLOW BEDSIDE ASSESSMENT ADULT - COMMENTS
Per MD, pt self removed PEG tube. Replacement planned for 11/27.  Pt known to this c from previous admission. MBSS completed 11/16 which revealed "Severe pharyngeal dysphagia with impact to safety and efficiency of the swallow." and recommendations for NPO with alternative means of nutrition.

## 2023-11-26 NOTE — PROGRESS NOTE ADULT - ASSESSMENT
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 Marr/Dr. Gar), admitted [id number] for odynophagia in setting of laryngeal SCC, now s/p PEG 11/14 & stepdowned to [facility] for transition of care. Patient is medically ready for discharge w/family not amenable for SOFI, now pending social work clearance for home care services d/t continuous O2 & suctioning requirements.

## 2023-11-26 NOTE — SWALLOW BEDSIDE ASSESSMENT ADULT - PHARYNGEAL PHASE
Hyolaryngeal movement appeared weak per palpation. Increasing wet vocal quality post swallow. Cued throat clear and deep oral suctioning completed.

## 2023-11-26 NOTE — PROGRESS NOTE ADULT - SUBJECTIVE AND OBJECTIVE BOX
More awake and alert today.  Denies any shortness of breath or any pain.     Remaining ROS negative     PHYSICAL EXAM:    General: more awake, alert and conversant  HEENT: dry mucous membranes, frail, atraumatic  Cardiovascular: +S1/S2, RRR  Respiratory: CTA B/L; no W/R/R  Gastrointestinal: soft, NT/ND; +BSx4  Extremities: WWP; no edema  Neurological: no acute deficits noted,  Psychiatric: pleasant mood and affect  Derm: healing dermatitis of the neck bilaterally    VITAL SIGNS:  Vital Signs Last 24 Hrs  T(C): 36.7 (25 Nov 2023 21:10), Max: 36.7 (25 Nov 2023 21:10)  T(F): 98 (25 Nov 2023 21:10), Max: 98 (25 Nov 2023 21:10)  HR: 97 (26 Nov 2023 06:48) (94 - 97)  BP: 122/65 (26 Nov 2023 06:48) (122/61 - 122/65)  BP(mean): --  RR: 18 (26 Nov 2023 06:48) (18 - 18)  SpO2: 97% (26 Nov 2023 06:48) (97% - 98%)    Parameters below as of 25 Nov 2023 21:10  Patient On (Oxygen Delivery Method): nasal cannula  O2 Flow (L/min): 2        MEDICATIONS:  MEDICATIONS  (STANDING):  albuterol/ipratropium for Nebulization 3 milliLiter(s) Nebulizer every 6 hours  aspirin  chewable 81 milliGRAM(s) Oral every 24 hours  atorvastatin 80 milliGRAM(s) Oral at bedtime  atropine 1% Ophthalmic Solution for SubLingual Use 1 Drop(s) SubLingual every 8 hours  dextrose 5%. 1000 milliLiter(s) (100 mL/Hr) IV Continuous <Continuous>  dextrose 5%. 1000 milliLiter(s) (50 mL/Hr) IV Continuous <Continuous>  dextrose 50% Injectable 25 Gram(s) IV Push once  dextrose 50% Injectable 12.5 Gram(s) IV Push once  dextrose 50% Injectable 25 Gram(s) IV Push once  enoxaparin Injectable 40 milliGRAM(s) SubCutaneous every 24 hours  fentaNYL   Patch  25 MICROgram(s)/Hr 1 Patch Transdermal every 72 hours  FIRST- Mouthwash  BLM 4 milliLiter(s) Swish and Spit every 6 hours  glucagon  Injectable 1 milliGRAM(s) IntraMuscular once  influenza  Vaccine (HIGH DOSE) 0.7 milliLiter(s) IntraMuscular once  insulin glargine Injectable (LANTUS) 10 Unit(s) SubCutaneous at bedtime  insulin lispro (ADMELOG) corrective regimen sliding scale   SubCutaneous three times a day before meals  insulin lispro (ADMELOG) corrective regimen sliding scale   SubCutaneous at bedtime  lactated ringers. 500 milliLiter(s) (60 mL/Hr) IV Continuous <Continuous>  lidocaine 2% Viscous 5 milliLiter(s) Swish and Spit every 12 hours  metoprolol tartrate 12.5 milliGRAM(s) Oral every 12 hours  pantoprazole    Tablet 40 milliGRAM(s) Oral before breakfast  QUEtiapine 50 milliGRAM(s) Oral at bedtime  senna 2 Tablet(s) Oral at bedtime  silver sulfADIAZINE 1% Cream 1 Application(s) Topical two times a day    MEDICATIONS  (PRN):  dextrose Oral Gel 15 Gram(s) Oral once PRN Blood Glucose LESS THAN 70 milliGRAM(s)/deciliter  morphine   Solution 10 milliGRAM(s) Oral every 4 hours PRN Severe Pain (7 - 10)  morphine   Solution 5 milliGRAM(s) Oral every 4 hours PRN Moderate Pain (4 - 6)  OLANZapine Injectable 2.5 milliGRAM(s) IntraMuscular every 6 hours PRN for agitation  polyethylene glycol 3350 17 Gram(s) Oral every 12 hours PRN Constipation      ALLERGIES:  Allergies    No Known Allergies    Intolerances        LABS:                        9.2    6.51  )-----------( 199      ( 26 Nov 2023 07:19 )             29.9     11-26    142  |  103  |  14  ----------------------------<  179<H>  4.8   |  31  |  1.04    Ca    8.5      26 Nov 2023 07:19  Phos  3.2     11-26  Mg     1.5     11-26    TPro  5.4<L>  /  Alb  2.3<L>  /  TBili  0.4  /  DBili  x   /  AST  22  /  ALT  8<L>  /  AlkPhos  70  11-26    PT/INR - ( 26 Nov 2023 07:19 )   PT: 13.2 sec;   INR: 1.16          PTT - ( 26 Nov 2023 07:19 )  PTT:29.4 sec  Urinalysis Basic - ( 26 Nov 2023 07:19 )    Color: x / Appearance: x / SG: x / pH: x  Gluc: 179 mg/dL / Ketone: x  / Bili: x / Urobili: x   Blood: x / Protein: x / Nitrite: x   Leuk Esterase: x / RBC: x / WBC x   Sq Epi: x / Non Sq Epi: x / Bacteria: x      CAPILLARY BLOOD GLUCOSE      POCT Blood Glucose.: 149 mg/dL (26 Nov 2023 12:06)      RADIOLOGY & ADDITIONAL TESTS: Reviewed.

## 2023-11-26 NOTE — SWALLOW BEDSIDE ASSESSMENT ADULT - SLP GENERAL OBSERVATIONS
Pt appreciated on RA in bed. Hypo/dysphonia appreciated, impeding intelligibility. Pt followed simple commands and responded to simple open ended questions in English and Hebrew.

## 2023-11-26 NOTE — PROGRESS NOTE ADULT - PROBLEM SELECTOR PLAN 1
PEG tube dislodged 11/24 o/n. Previously placed by IR 11/14. GI/ IR consulted. Surg consulted and recommended CTAP, showing intact stomach with no free air, fluid/ fluid collections. IR reconsulted 11/25.     - f/u IR - will reevaluate tomorrow.   - NPO  - gentle fluid given hx of CAD s/p stents w/ EF 45-50%.   - FSG  - SLP note from last week reviewed - PEG tube placed for nutrition, and recommendation for repeat MBSS in 4-6 weeks.  Would also recommend re-involving SLP in light of the dislodged tube.

## 2023-11-26 NOTE — PROGRESS NOTE ADULT - PROBLEM SELECTOR PLAN 4
CAD s/p MIDCAB and PCI with multiple WINTER (most recent to LCx in 8/2022), Currently asymptomatic. EKG on admission w/o ischemic changes.    - c/w home med ASA81 and Lipitor 80qhs

## 2023-11-27 NOTE — CONSULT NOTE ADULT - PROBLEM SELECTOR RECOMMENDATION 5
.  Patient is DNR/DNI as per discussion with primary team  -patient appointed granddaughter (Yvrose Smith, [phone]) as alternate decision maker  -see prior C note    Patient would benefit from outpatient Palliative/Supportive Oncology follow-up on discharge with Dr. Emma Anthony at [facility]. Please call [phone] to make an appointment when discharge planning

## 2023-11-27 NOTE — ADVANCED PRACTICE NURSE CONSULT - REASON FOR CONSULT
bilateral heel wounds, present on admission    Per chart review, 88M, history of CAD, PVD, DM, 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 Marr/Dr. Gar), admitted [id number] for odynophagia in setting of laryngeal SCC, now s/p PEG 11/14 & stepdowned to [facility] for transition of care. Patient is medically ready for discharge w/family not amenable for SOFI, now pending social work clearance for home care services d/t continuous O2 & suctioning requirements.

## 2023-11-27 NOTE — PROGRESS NOTE ADULT - ATTENDING COMMENTS
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      #PEG Displacement    -IR consulted, follow up recommendations      #Odynophagia    -SLP recommends NPO with alternative means of nutrition for now, pending repeat evaluation today   -will need repeat MBSS in 4-6 weeks    -continue with symptomatic management       #Diarrhea    -chronic issue 2/2 tube feeds     #Laryngeal SCC   #Radiation-Induced Mucositis    -continue with pain regimen as per previous palliative care recommendations      #Goals of Care  -palliative care consulted, follow up recommendations     DVT PPx: 88-year-old male with a PMHx of laryngeal SCC (on chemotherapy and radiation, on tube feeds), PVD, DMII and recent admission for radiation-induced mucositis (s/p PEG) who presented with inability to care for self at home after recent discharge.      #PEG Displacement    -IR consulted, follow up recommendations      #Odynophagia    -SLP recommends NPO with alternative means of nutrition for now, pending repeat evaluation today   -will need repeat MBSS in 4-6 weeks    -continue with symptomatic management       #Diarrhea    -chronic issue 2/2 tube feeds     #Laryngeal SCC   #Radiation-Induced Mucositis    -continue with pain regimen as per previous palliative care recommendations      #Goals of Care  -palliative care consulted, follow up recommendations     DVT PPx: Lovenox     Dispo: SOFI

## 2023-11-27 NOTE — CONSULT NOTE ADULT - PROBLEM SELECTOR RECOMMENDATION 3
.  s/p Chemo/RT with curative intent  -no evidence of metastatic disease at this time, not a hospice qualifying disease  -completed treatment during past admission

## 2023-11-27 NOTE — CONSULT NOTE ADULT - PROBLEM SELECTOR RECOMMENDATION 2
.  -Atropine Sublingual Drops TID for local secretion management  -trial of Scopolamine Patch to reduce secretion burden .  -Atropine Sublingual Drops TID for local secretion management  -avoid systemic anticholinergics to minimize delirium

## 2023-11-27 NOTE — PROGRESS NOTE ADULT - SUBJECTIVE AND OBJECTIVE BOX
OVERNIGHT EVENTS: NAEO. received zyprexa im 2.5 qd    SUBJECTIVE  Pt was seen and examined at bedside. Appears comfortable. Responsive to commands and voice.       Vital Signs Last 24 Hrs  T(C): 36.9 (27 Nov 2023 09:28), Max: 36.9 (26 Nov 2023 20:49)  T(F): 98.5 (27 Nov 2023 09:28), Max: 98.5 (26 Nov 2023 20:49)  HR: 86 (27 Nov 2023 09:28) (86 - 92)  BP: 127/67 (27 Nov 2023 09:28) (127/67 - 128/75)  BP(mean): --  RR: 18 (27 Nov 2023 09:28) (18 - 18)  SpO2: 99% (27 Nov 2023 09:28) (98% - 99%)    Parameters below as of 27 Nov 2023 09:28  Patient On (Oxygen Delivery Method): nasal cannula  O2 Flow (L/min): 2        PHYSICAL EXAM     General: NAD  HEENT: Neck Supple; legally blind, MMM, hoarse voice   Respiratory: CTAB; no wheezes/rales/rhonchi, normal WOB  Cardiovascular: Regular rhythm/rate, + S1/S2; no murmurs, rubs gallops   Gastrointestinal: Soft; NTND; PEG tube dislodged. Areas appears erythremic but w/ no active drainage.   : no suprapubic tenderness  Vascular: extremities WWP; no edema/cyanosis, 2+ distal pulses b/l   Neurological: A&Ox1  Skin: skin changes at neck likely 2/2 radiation, pressure ulcers @ b/l heels (stable per wound care)       .  LABS:                         9.5    6.18  )-----------( 211      ( 27 Nov 2023 05:30 )             31.0     11-27    144  |  104  |  12  ----------------------------<  143<H>  4.7   |  28  |  0.97    Ca    8.7      27 Nov 2023 05:30  Phos  3.5     11-27  Mg     2.1     11-27    TPro  5.4<L>  /  Alb  2.3<L>  /  TBili  0.4  /  DBili  x   /  AST  22  /  ALT  8<L>  /  AlkPhos  70  11-26    PT/INR - ( 26 Nov 2023 07:19 )   PT: 13.2 sec;   INR: 1.16          PTT - ( 26 Nov 2023 07:19 )  PTT:29.4 sec  Urinalysis Basic - ( 27 Nov 2023 05:30 )    Color: x / Appearance: x / SG: x / pH: x  Gluc: 143 mg/dL / Ketone: x  / Bili: x / Urobili: x   Blood: x / Protein: x / Nitrite: x   Leuk Esterase: x / RBC: x / WBC x   Sq Epi: x / Non Sq Epi: x / Bacteria: x                RADIOLOGY, EKG & ADDITIONAL TESTS: Reviewed.

## 2023-11-27 NOTE — PROGRESS NOTE ADULT - PROBLEM SELECTOR PLAN 2
Pt continues to exhibit agitation episodes requiring zyprexa IM. pm > am. barely directable with family on phone but mostly calm with family bedside. On seroquel 50 qhs (now held 2/2 odynophagia). Qtc 489 11/25. mental status unchanged.   Likely 2/2 delirium and/or dementia. Infectious causes unlikely i/s/o normal WBC, afebrile and VSS.     - will 1st attempt to redirect with family on phone  - zyprexa IM for breakthrough Pt continues to exhibit agitation episodes requiring zyprexa IM. pm > am. barely directable with family on phone but mostly calm with family bedside. On seroquel 50 qhs (now held 2/2 odynophagia). Qtc 489 11/25. mental status unchanged. Requiring IM zyprexa 2.5 x1 qhs.   Likely 2/2 delirium and/or dementia. Infectious causes unlikely i/s/o normal WBC, afebrile and VSS.     - will 1st attempt to redirect with family on phone  - will attempt to withhold zyprexa for upcoming procedure

## 2023-11-27 NOTE — CONSULT NOTE ADULT - SUBJECTIVE AND OBJECTIVE BOX
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 Marr/Dr. Gar), recently admitted for odynophagia 2/2 radiation-induced mucositis iso laryngeal SCC, now s/p PEG 11/14 discharged on 11/22 who presents due to inability for safe care at home. Pt brought in with wife, per chart review the visiting nurse felt the patient needed more assistance and was unable to be at home. Pt discharged from the hospital earlier today, currently being treated for throat cancer, states he does not have a hospital bed and cannot walk on his own. Was recommended for SOFI but declined. Patient seen and examined at bedside. Denies any new symptoms at this time. Continues to have mild pain of the neck at site of dermatitis and some discomfort at PEG tube site, otherwise feels well. States he wants to return home once services are set up, does not want to pursue rehab as an option.     In the ED:  Initial vital signs: T: 97.9 F, HR: 95, BP: 131/77, R: 18, SpO2: 98% on 3L NC  Labs: no new labs, however 10/22 AM labs significant for Hgb 8.8, Plt 143, Mg 1.4, Phos 2.3  CXR: none  EKG: none  Medications: none  Consults: none  (22 Nov 2023 20:44)    Patient seen and examined at bedside. Appears overtly comfortable. Denies any significant complaint. Comprehensive symptom assessment and GOC exploration as noted below. Further collateral obtained from primary team, chart, and family.    PERTINENT PM/SXH:   HTN (hypertension)    HLD (hyperlipidemia)    DM (diabetes mellitus)    CAD (coronary artery disease)    Glaucoma    PVD (peripheral vascular disease)      No significant past surgical history      FAMILY HISTORY:    No history of  in first degree relatives  Unable to obtain due to encephalopathy    ITEMS NOT CHECKED ARE NOT PRESENT  SOCIAL HISTORY:   Significant other/partner:  []  Children:  []  Substance hx:  []   Tobacco hx:  []   Alcohol hx: []   Home Opioid hx:  [] I-Stop Reference No:  - no active Rx's / see chart note  Living Situation: []Home  []Long term care  []Rehab []Other  Latter-day/Spiritual practice: ; Role of organized Latter day [] important [] some [] unable to assess  Coping: [] well [] with difficulty [] poor coping [] unable to assess  Support system: [] strong [] adequate [] inadequate    ADVANCE DIRECTIVES:    []MOLST  []Living Will  DECISION MAKER(s):  [] Health Care Proxy(s)  [] Surrogate(s)  [] Guardian           Name(s)/Phone Number(s):     BASELINE (I)ADLs (prior to admission):  [place]: []Total  [] Moderate []Dependent    ALLERGIES:  No Known Allergies    MEDICATIONS  (STANDING):  albuterol/ipratropium for Nebulization 3 milliLiter(s) Nebulizer every 6 hours  aspirin  chewable 81 milliGRAM(s) Oral every 24 hours  atorvastatin 80 milliGRAM(s) Oral at bedtime  atropine 1% Ophthalmic Solution for SubLingual Use 1 Drop(s) SubLingual every 8 hours  dextrose 5%. 1000 milliLiter(s) (100 mL/Hr) IV Continuous <Continuous>  dextrose 5%. 1000 milliLiter(s) (50 mL/Hr) IV Continuous <Continuous>  dextrose 50% Injectable 25 Gram(s) IV Push once  dextrose 50% Injectable 12.5 Gram(s) IV Push once  dextrose 50% Injectable 25 Gram(s) IV Push once  enoxaparin Injectable 40 milliGRAM(s) SubCutaneous every 24 hours  fentaNYL   Patch  25 MICROgram(s)/Hr 1 Patch Transdermal every 72 hours  FIRST- Mouthwash  BLM 4 milliLiter(s) Swish and Spit every 6 hours  glucagon  Injectable 1 milliGRAM(s) IntraMuscular once  influenza  Vaccine (HIGH DOSE) 0.7 milliLiter(s) IntraMuscular once  insulin glargine Injectable (LANTUS) 10 Unit(s) SubCutaneous at bedtime  insulin lispro (ADMELOG) corrective regimen sliding scale   SubCutaneous three times a day before meals  insulin lispro (ADMELOG) corrective regimen sliding scale   SubCutaneous at bedtime  lactated ringers. 500 milliLiter(s) (60 mL/Hr) IV Continuous <Continuous>  lactated ringers. 1000 milliLiter(s) (75 mL/Hr) IV Continuous <Continuous>  lidocaine 2% Viscous 5 milliLiter(s) Swish and Spit every 12 hours  metoprolol tartrate 12.5 milliGRAM(s) Oral every 12 hours  pantoprazole    Tablet 40 milliGRAM(s) Oral before breakfast  QUEtiapine 50 milliGRAM(s) Oral at bedtime  senna 2 Tablet(s) Oral at bedtime  silver sulfADIAZINE 1% Cream 1 Application(s) Topical two times a day    MEDICATIONS  (PRN):  dextrose Oral Gel 15 Gram(s) Oral once PRN Blood Glucose LESS THAN 70 milliGRAM(s)/deciliter  morphine   Solution 5 milliGRAM(s) Oral every 4 hours PRN Moderate Pain (4 - 6)  morphine   Solution 10 milliGRAM(s) Oral every 4 hours PRN Severe Pain (7 - 10)  OLANZapine Injectable 2.5 milliGRAM(s) IntraMuscular every 6 hours PRN for agitation  polyethylene glycol 3350 17 Gram(s) Oral every 12 hours PRN Constipation    Analgesic Use (Scheduled and PRNs) for past 24 hours:    OLANZapine Injectable   2.5 milliGRAM(s) IntraMuscular (11-27-23 @ 00:51)      PRESENT SYMPTOMS: []Unable to obtain due to poor mentation/encephalopathy  Source if other than patient:  []Family   []Team     Pain: [] yes [] no  QOL impact -   Location -                    Aggravating Factors -  Quality -  Radiation -  Timing -  Severity (0-10 scale) -   Minimal Acceptable Level (0-10 scale) -    CPOT Score:  (Critical Care Pain Assessment)    PAIN AD Score:  (Nonverbal Pain Assessment)    Dyspnea:                           []Mild  []Moderate []Severe  Anxiety:                             []Mild []Moderate []Severe  Fatigue:                             []Mild []Moderate []Severe  Nausea:                             []Mild []Moderate []Severe  Loss of Appetite:              []Mild []Moderate []Severe  Constipation:                    []Mild []Moderate []Severe    Other Symptoms:  [x]All other review of systems negative     Palliative Performance Status Version 2:  %  (Functional Assessment Tool)    GENERAL:  [] NAD []Alert []Lethargic  []Cachexia  []Unarousable  []Verbal  []Non-Verbal  BEHAVIORAL:   []Anxiety  []Delirium []Agitation []Cooperative []Oriented x  HEENT:  []Normal  [] Moist Mucous Membranes []Dry mouth   []ET Tube/Trach  []Oral lesions  PULMONARY:   []Clear []Tachypnea  []Audible excessive secretions  []Normal Work of Breathing []Labored Breathing  []Rhonchi []Crackles []Wheezing  CARDIOVASCULAR:    []Regular Rate []Regular Rhythm []Irregular []Tachy  []Yaya  GASTROINTESTINAL:  []Soft  []Distended   []+BS  []Non tender []Tender  []PEG []OGT/ NGT  Last BM:  GENITOURINARY:  []Normal [] Incontinent   []Oliguria/Anuria   []Paige  MUSCULOSKELETAL:   []Normal Extremities  []Weakness  []Bed/Wheelchair bound []Edema  NEUROLOGIC:   []No focal deficits  []Cognitive impairment  []Dysphagia []Dysarthria []Paresis []Encephalopathic  SKIN:   []Normal   []Pressure ulcer(s)  []Rash    CRITICAL CARE:  [ ]Shock Present  [ ]Septic [ ]Cardiogenic [ ]Neurologic [ ]Hypovolemic  [ ] Vasopressors [ ]Inotropes   [ ]Respiratory failure present [ ]Mechanical Ventilation [ ]Non-invasive ventilatory support [ ]High-Flow  [ ]Acute  [ ]Chronic [ ]Hypoxic  [ ]Hypercarbic   [ ]Other organ failure    Vital Signs Last 24 Hrs  T(C): 36.9 (27 Nov 2023 09:28), Max: 36.9 (26 Nov 2023 20:49)  T(F): 98.5 (27 Nov 2023 09:28), Max: 98.5 (26 Nov 2023 20:49)  HR: 86 (27 Nov 2023 09:28) (86 - 92)  BP: 127/67 (27 Nov 2023 09:28) (127/67 - 128/75)  BP(mean): --  RR: 18 (27 Nov 2023 09:28) (18 - 18)  SpO2: 99% (27 Nov 2023 09:28) (98% - 99%)    Parameters below as of 27 Nov 2023 09:28  Patient On (Oxygen Delivery Method): nasal cannula  O2 Flow (L/min): 2       LABS: Personally reviewed and interpreted                        9.5    6.18  )-----------( 211      ( 27 Nov 2023 05:30 )             31.0   11-27    144  |  104  |  12  ----------------------------<  143<H>  4.7   |  28  |  0.97    Ca    8.7      27 Nov 2023 05:30  Phos  3.5     11-27  Mg     2.1     11-27    TPro  5.4<L>  /  Alb  2.3<L>  /  TBili  0.4  /  DBili  x   /  AST  22  /  ALT  8<L>  /  AlkPhos  70  11-26    RADIOLOGY & ADDITIONAL STUDIES: Personally reviewed and interpreted    REFERRALS:  [x]Social Work/Case management []PT/OT []Chaplaincy  []Hospice  []Patient/Family Support []Massage Therapy []Music Therapy []Holistic RN []Ethics    DISCUSSION OF CASE: Family - to provide updates and emotional support; Primary Team/RN - to discuss plan of care [facility]  Dickson Roberson, Palliative Care Attending  Contact Info: Call [phone] (HEAL Line) or message on [employer] Teams (Dickson Roberson)    HPI:  HPI: Patient is a 78-year-old M with PMH of CAD, PVD, DM, 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 Marr/Dr. Gar), recently admitted for odynophagia 2/2 radiation-induced mucositis iso laryngeal SCC, now s/p PEG 11/14 discharged on 11/22 who presents due to inability for safe care at home. Pt brought in with wife, per chart review the visiting nurse felt the patient needed more assistance and was unable to be at home. Pt discharged from the hospital earlier today, currently being treated for throat cancer, states he does not have a hospital bed and cannot walk on his own. Was recommended for SOFI but declined. Patient seen and examined at bedside. Denies any new symptoms at this time. Continues to have mild pain of the neck at site of dermatitis and some discomfort at PEG tube site, otherwise feels well. States he wants to return home once services are set up, does not want to pursue rehab as an option.     Patient seen and examined at bedside. Appears overtly comfortable. Hospital course complicated by delirium, similar to previous admission. Comprehensive symptom assessment and GOC exploration as noted below. Further collateral obtained from primary team, chart, and family.    PERTINENT PM/SXH:   HTN (hypertension)  HLD (hyperlipidema)  DM (diabetes mellitus)  CAD (coronary artery disease)  [place]  PVD (peripheral vascular disease  No significant past surgical history    FAMILY HISTORY:  No history of malignancy in first degree relatives  Unable to obtain due to encephalopathy    ITEMS NOT CHECKED ARE NOT PRESENT  SOCIAL HISTORY:   Significant other/partner:  [x]  Children:  [x]  Substance hx:  []   Tobacco hx:  []   Alcohol hx: []   Home Opioid hx:  [] I-Stop Reference No:  - no active Rx's   Living Situation: [x]Home  []Long term care  []Rehab []Other  Buddhism/Spiritual practice: ; Role of organized Islam [x] important [] some [] unable to assess  Coping: [] well [x] with difficulty [] poor coping [] unable to assess  Support system: [] strong [x] adequate [] inadequate    ADVANCE DIRECTIVES:    []MOLST  []Living Will  DECISION MAKER(s):  [x] Health Care Proxy(s)  [] Surrogate(s)  [] Guardian           Name(s)/Phone Number(s): daYvrose crouch    BASELINE (I)ADLs (prior to admission):  [place]: []Total  [x] Moderate []Dependent    ALLERGIES:  No Known Allergies    MEDICATIONS  (STANDING):  albuterol/ipratropium for Nebulization 3 milliLiter(s) Nebulizer every 6 hours  aspirin  chewable 81 milliGRAM(s) Oral every 24 hours  atorvastatin 80 milliGRAM(s) Oral at bedtime  atropine 1% Ophthalmic Solution for SubLingual Use 1 Drop(s) SubLingual every 8 hours  dextrose 5%. 1000 milliLiter(s) (100 mL/Hr) IV Continuous <Continuous>  dextrose 5%. 1000 milliLiter(s) (50 mL/Hr) IV Continuous <Continuous>  dextrose 50% Injectable 25 Gram(s) IV Push once  dextrose 50% Injectable 12.5 Gram(s) IV Push once  dextrose 50% Injectable 25 Gram(s) IV Push once  enoxaparin Injectable 40 milliGRAM(s) SubCutaneous every 24 hours  fentaNYL   Patch  25 MICROgram(s)/Hr 1 Patch Transdermal every 72 hours  FIRST- Mouthwash  BLM 4 milliLiter(s) Swish and Spit every 6 hours  glucagon  Injectable 1 milliGRAM(s) IntraMuscular once  influenza  Vaccine (HIGH DOSE) 0.7 milliLiter(s) IntraMuscular once  insulin glargine Injectable (LANTUS) 10 Unit(s) SubCutaneous at bedtime  insulin lispro (ADMELOG) corrective regimen sliding scale   SubCutaneous three times a day before meals  insulin lispro (ADMELOG) corrective regimen sliding scale   SubCutaneous at bedtime  lactated ringers. 500 milliLiter(s) (60 mL/Hr) IV Continuous <Continuous>  lactated ringers. 1000 milliLiter(s) (75 mL/Hr) IV Continuous <Continuous>  lidocaine 2% Viscous 5 milliLiter(s) Swish and Spit every 12 hours  metoprolol tartrate 12.5 milliGRAM(s) Oral every 12 hours  pantoprazole    Tablet 40 milliGRAM(s) Oral before breakfast  QUEtiapine 50 milliGRAM(s) Oral at bedtime  senna 2 Tablet(s) Oral at bedtime  silver sulfADIAZINE 1% Cream 1 Application(s) Topical two times a day    MEDICATIONS  (PRN):  dextrose Oral Gel 15 Gram(s) Oral once PRN Blood Glucose LESS THAN 70 milliGRAM(s)/deciliter  morphine   Solution 5 milliGRAM(s) Oral every 4 hours PRN Moderate Pain (4 - 6)  morphine   Solution 10 milliGRAM(s) Oral every 4 hours PRN Severe Pain (7 - 10)  OLANZapine Injectable 2.5 milliGRAM(s) IntraMuscular every 6 hours PRN for agitation  polyethylene glycol 3350 17 Gram(s) Oral every 12 hours PRN Constipation    Analgesic Use (Scheduled and PRNs) for past 24 hours:    OLANZapine Injectable   2.5 milliGRAM(s) IntraMuscular (11-27-23 @ 00:51)      PRESENT SYMPTOMS: [x]Unable to obtain due to poor mentation/encephalopathy  Source if other than patient:  []Family   []Team     Pain: [] yes [x] no  QOL impact -   Location -                    Aggravating Factors -  Quality -  Radiation -  Timing -  Severity (0-10 scale) -   Minimal Acceptable Level (0-10 scale) -    CPOT Score:  (Critical Care Pain Assessment)    PAIN AD Score: 0  (Nonverbal Pain Assessment)    Dyspnea:                           []Mild  []Moderate []Severe  Anxiety:                             []Mild []Moderate []Severe  Fatigue:                             []Mild []Moderate []Severe  Nausea:                             []Mild []Moderate []Severe  Loss of Appetite:              []Mild []Moderate []Severe  Constipation:                    []Mild []Moderate []Severe    Other Symptoms:  [x]All other review of systems negative     Palliative Performance Status Version 2:  50%  (Functional Assessment Tool)    GENERAL:  [x] NAD []Alert [x]Lethargic  []Cachexia  []Unarousable  [x]Verbal  []Non-Verbal  BEHAVIORAL:   []Anxiety  [x]Delirium []Agitation []Cooperative []Oriented x  HEENT:  [x]Normal  [x] Moist Mucous Membranes []Dry mouth   []ET Tube/Trach  []Oral lesions  PULMONARY:   []Clear []Tachypnea  []Audible excessive secretions  [x]Normal Work of Breathing []Labored Breathing  []Rhonchi []Crackles []Wheezing  CARDIOVASCULAR:    [x]Regular Rate [x]Regular Rhythm []Irregular []Tachy  []Yaya  GASTROINTESTINAL:  [x]Soft  []Distended   [x]+BS  []Non tender []Tender  []PEG []OGT/ NGT  Last BM:  GENITOURINARY:  [x]Normal [] Incontinent   []Oliguria/Anuria   []Paige  MUSCULOSKELETAL:   [x]Normal Extremities  [x]Weakness  []Bed/Wheelchair bound []Edema  NEUROLOGIC:   []No focal deficits  []Cognitive impairment  [x]Dysphagia []Dysarthria []Paresis [x]Encephalopathic  SKIN:   [x]Normal   []Pressure ulcer(s)  []Rash    Vital Signs Last 24 Hrs  T(C): 36.9 (27 Nov 2023 09:28), Max: 36.9 (26 Nov 2023 20:49)  T(F): 98.5 (27 Nov 2023 09:28), Max: 98.5 (26 Nov 2023 20:49)  HR: 86 (27 Nov 2023 09:28) (86 - 92)  BP: 127/67 (27 Nov 2023 09:28) (127/67 - 128/75)  BP(mean): --  RR: 18 (27 Nov 2023 09:28) (18 - 18)  SpO2: 99% (27 Nov 2023 09:28) (98% - 99%)    Parameters below as of 27 Nov 2023 09:28  Patient On (Oxygen Delivery Method): nasal cannula  O2 Flow (L/min): 2    LABS: Personally reviewed and interpreted                        9.5    6.18  )-----------( 211      ( 27 Nov 2023 05:30 )             31.0   11-27    144  |  104  |  12  ----------------------------<  143<H>  4.7   |  28  |  0.97    Ca    8.7      27 Nov 2023 05:30  Phos  3.5     11-27  Mg     2.1     11-27    TPro  5.4<L>  /  Alb  2.3<L>  /  TBili  0.4  /  DBili  x   /  AST  22  /  ALT  8<L>  /  AlkPhos  70  11-26    RADIOLOGY & ADDITIONAL STUDIES: Personally reviewed and interpreted  < from: CT Abdomen and Pelvis w/ IV Cont (11.25.23 @ 15:46) >  LOWER CHEST: Small bilateral pleural effusions with adjacent atelectasis.   Coronary stents. Catheter tip in right atrium.    LIVER: Within normal limits.  BILE DUCTS: Normal caliber.  GALLBLADDER: Within normal limits.  SPLEEN: Within normal limits.  PANCREAS: Within normal limits.  ADRENALS: Within normal limits.  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 Right kidneys within normal limits.  BLADDER: Small posterior inferior wall diverticulum.  REPRODUCTIVE ORGANS: Within normal limits    BOWEL/PERITONEUM: No bowel obstruction. Nondistended stomach abutting   ventral abdominal wall at the site of prior percutaneous gastrostomy   tube. No ascites or pneumoperitoneum. Rectal mild mural thickening and   mucosal hyperemia.  VESSELS: Atherosclerotic changes.  RETROPERITONEUM/LYMPH NODES: No adenopathy  ABDOMINAL WALL: Postsurgical and probably post injection changes.  BONES: No suspicious lesion.    IMPRESSION:  1. No free air, fluid or fluid collection.The stomach is intact.  2. Probable proctitis. One may consider endoscopy after resolution ofacute issues if not recently performed.    REFERRALS:  [x]Social Work/Case management [x]PT/OT []Chaplaincy  []Hospice  []Patient/Family Support []Massage Therapy []Music Therapy []Holistic RN []Ethics    DISCUSSION OF CASE: Family - to provide updates and emotional support; Primary Team/RN - to discuss plan of care

## 2023-11-27 NOTE — PROGRESS NOTE ADULT - PROBLEM SELECTOR PLAN 1
PEG tube dislodged 11/24 o/n. Previously placed by IR 11/14. GI/ IR consulted. Surg consulted and recommended CTAP, showing intact stomach with no free air, fluid/ fluid collections. IR reconsulted 11/25.     - f/u IR  - NPO with nice chips as tolerated.   - gentle fluid given hx of CAD s/p stents w/ EF 45-50%.   - FSG PEG tube dislodged 11/24 o/n. Previously placed by IR 11/14. GI/ IR consulted. Surg consulted and recommended CTAP, showing intact stomach with no free air, fluid/ fluid collections. IR reconsulted 11/25, now pending replacement 11/28    - f/u IR  - NPO with nice chips as tolerated.   - gentle IVF given hx of CAD s/p stents w/ EF 45-50%.   - FSG

## 2023-11-27 NOTE — ADVANCED PRACTICE NURSE CONSULT - RECOMMEDATIONS
Bilateral heels: cleanse with normal saline. Apply Cavilon No Sting barrier film daily.   Keep heels elevated off of bed at all times.     Continue pressure injury prevention/treatment including frequent repositioning, moisture management, optimizing nutrition.    Please reconsult if wounds deteriorate or new concerns develop.    Bilateral heels: cleanse with normal saline. Apply Cavilon No Sting barrier film daily.   Keep heels elevated off of bed at all times.     Continue pressure injury prevention/treatment including frequent repositioning, moisture management, optimizing nutrition.    Medical team updated.    Please reconsult if wounds deteriorate or new concerns develop.

## 2023-11-27 NOTE — CONSULT NOTE ADULT - PROBLEM SELECTOR RECOMMENDATION 9
.  -Fentanyl Patch 25mcg q72hr  -if unable to tolerate PO or made NPO, then recommend Morphine 2mg IV q4h PRN for Moderate Pain and Morphine 4mg IV q4h PRN for Severe Pain .  -Fentanyl Patch 25mcg q72hr  -if unable to tolerate PO or made NPO, then recommend Morphine 2mg IV q4h PRN for Moderate Pain and Morphine 4mg IV q4h PRN for Severe Pain    Regimen with PEG: Morphine Solution 5mg via PEG q4h PRN Moderate Pain and Morphine Solution 10mg via PEG q4h PRN Severe Pain

## 2023-11-27 NOTE — PROGRESS NOTE ADULT - ASSESSMENT
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 Marr/Dr. Gar), admitted [id number] for odynophagia in setting of laryngeal SCC, now s/p PEG 11/14 & stepdowned to [facility] for transition of care. Patient is medically ready for discharge w/family not amenable for SOFI, now pending social work clearance for home care services d/t continuous O2 & suctioning requirements.  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 Marr/Dr. Gar), admitted [id number] for odynophagia in setting of laryngeal SCC, now s/p PEG 11/14 & stepdowned to [facility] for transition of care, hospital course c/b disloged PEG, pending replacement. family now amenable for SOFI.  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 Marr/Dr. Gar),  s/p PEG 11/14 was admitted to [facility] for increase need of care at home after recent discharge.  hospital course c/b disloged PEG, pending replacement 11/29. family now amenable for SOFI.

## 2023-11-27 NOTE — CONSULT NOTE ADULT - PROBLEM SELECTOR RECOMMENDATION 6
.  Complex medical decision making / symptom management in the setting of malignancy.    Will continue to follow for ongoing GOC discussion / titration of palliative regimen. Emotional support provided, questions answered.  Active Psychosocial Referrals:  [x]Social Work/Case management [x]PT/OT [x]Chaplaincy []Hospice  []Patient/Family Support []Holistic RN [x]Massage Therapy []Music Therapy []Ethics  Coping: [] well [x] with difficulty [] poor coping [] unable to assess  Support system: [] strong [x] adequate [] inadequate    For new or uncontrolled symptoms, please call Palliative Care at [phone]). The service is available 24/7 (including nights & weekends) to provide symptom management recommendations over the phone as appropriate

## 2023-11-27 NOTE — PROGRESS NOTE ADULT - PROBLEM SELECTOR PLAN 3
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 C/o odynophagia causing poor PO intake. PEG placed 11/14 i/s/o ongoing PO intake 2/2 odynophagia, likely i/s/o cancer tx. Pt previously on fluconazole for candidiasis esophagitis ppx but d/c by Dr. Marr for lack of improvement.     - Per rad onc, odynophagia and mucositis should likely resolve 2-3 weeks after completion of radiation. (last 10/30)  - Onc follow up in 1 week Dr. Derek Harvey  - Chest PT, hypertonic nebulizer q4hr     - Aspiration precautions    - magic mouthwash  - viscous lidocaine   - scopolamine patch  - labs for increased risk of refeeding syndrome     #PAIN REGIMEN  follows with Dr. Anthony. 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 C/o odynophagia causing poor PO intake. PEG placed 11/14 i/s/o ongoing PO intake 2/2 odynophagia, likely i/s/o cancer tx. Pt previously on fluconazole for candidiasis esophagitis ppx but d/c by Dr. Marr for lack of improvement.     - Per rad onc, odynophagia and mucositis should likely resolve 2-3 weeks after completion of radiation. (last 10/30)  - SS reconsulted. recs NPO. pending PEG   - Onc follow up 1 wk w/ Dr. Derek Harvey upon d/c   - Chest PT, hypertonic nebulizer q4hr     - Aspiration precautions    - magic mouthwash  - viscous lidocaine   - scopolamine patch  - labs for increased risk of refeeding syndrome     #PAIN REGIMEN  follows with Dr. Anthony. Not in acute pain    - Fentanyl 25mcg/hr Patch q72hr as long-acting agent  - HYDROmorphone  1mg IV Push every 4 hours PRN Breakthrough pain  - morphine 5mg Oral via PEG every 4 hours PRN Moderate Pain (4 - 6)  - morphine 10mg Oral every 4 hours PRN Severe Pain (7 - 10)  - atropine 1% sublingual TID

## 2023-11-27 NOTE — ADVANCED PRACTICE NURSE CONSULT - ASSESSMENT
Pt awake, alert. Denies pain. 1 person assist to turn. Smion 13.    Bilateral heel: flattened blood filled blisters; right 2x6cm, left 3x4cm. No drainage, no erythema. Feet warm, dry, palpable DPs, no edema.   Sacrococcygeal, back intact.  Pt awake, alert. Denies pain. 1 person assist to turn. Simon 13.  Pt NPO, TF via PEG. Bedbound.    Bilateral heel: flattened blood filled blisters; right 2x6cm, left 3x4cm. No drainage, no erythema. Feet warm, dry, palpable DPs, no edema.   Sacrococcygeal, back intact.

## 2023-11-28 NOTE — PROGRESS NOTE ADULT - ATTENDING COMMENTS
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     #PEG Displacement     -IR consulted, plan for PEG placement on 11/29    #Odynophagia     -SLP recommends NPO with alternative means of nutrition for now, will need repeat MBSS in 4-6 weeks     -continue with symptomatic management      #Laryngeal SCC    #Radiation-Induced Mucositis     -continue with pain regimen as per previous palliative care recommendations       #Goals of Care   -palliative care consulted, appreciate recommendations    DVT PPx: Lovenox (hold tonight for PEG)    Dispo: [facility]

## 2023-11-28 NOTE — PROGRESS NOTE ADULT - PROBLEM SELECTOR PLAN 3
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 C/o odynophagia causing poor PO intake. PEG placed 11/14 i/s/o ongoing PO intake 2/2 odynophagia, likely i/s/o cancer tx. Pt previously on fluconazole for candidiasis esophagitis ppx but d/c by Dr. Marr for lack of improvement.     - Per rad onc, odynophagia and mucositis should likely resolve 2-3 weeks after completion of radiation. (last 10/30)  - SS reconsulted. recs NPO. pending PEG   - Onc follow up 1 wk w/ Dr. Derek Harvey upon d/c   - Chest PT, hypertonic nebulizer q4hr     - Aspiration precautions    - magic mouthwash  - viscous lidocaine   - scopolamine patch  - labs for increased risk of refeeding syndrome     #PAIN REGIMEN  follows with Dr. Anthony. 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 C/o odynophagia causing poor PO intake. PEG placed 11/14 i/s/o ongoing PO intake 2/2 odynophagia, likely i/s/o cancer tx. Pt previously on fluconazole for candidiasis esophagitis ppx but d/c by Dr. Marr for lack of improvement.     - Per rad onc, odynophagia and mucositis should likely resolve 2-3 weeks after completion of radiation. (last 10/30)  - SS reconsulted. recs NPO. pending PEG   - Onc follow up 1 wk w/ Dr. Derek Harvey upon d/c   - Chest PT, hypertonic nebulizer q4hr   - Aspiration precautions    - magic mouthwash  - viscous lidocaine   - scopolamine patch  - labs for increased risk of refeeding syndrome     #PAIN REGIMEN  follows with Dr. Anthony. Not in acute pain    - Fentanyl 25mcg/hr Patch q72hr as long-acting agent  - HYDROmorphone  1mg IV Push every 4 hours PRN Breakthrough pain  - morphine 5mg Oral via PEG every 4 hours PRN Moderate Pain (4 - 6)  - morphine 10mg Oral every 4 hours PRN Severe Pain (7 - 10)  - atropine 1% sublingual TID

## 2023-11-28 NOTE — PROGRESS NOTE ADULT - SUBJECTIVE AND OBJECTIVE BOX
OVERNIGHT EVENTS: NAEO. Did not require zyprexa o/n.     SUBJECTIVE  Pt was seen and examined at bedside. Calm and pleasant. No acute concern.     ROS limited but pt denies any fevers/ chills, n/v, headache, acute SOB, chest pain, abdominal pain, genitourinary sx.       Vital Signs Last 24 Hrs  T(C): 36.7 (28 Nov 2023 05:35), Max: 36.9 (27 Nov 2023 09:28)  T(F): 98.1 (28 Nov 2023 05:35), Max: 98.5 (27 Nov 2023 09:28)  HR: 84 (28 Nov 2023 05:35) (84 - 90)  BP: 130/60 (28 Nov 2023 05:35) (108/61 - 130/60)  BP(mean): --  RR: 18 (28 Nov 2023 05:35) (18 - 18)  SpO2: 95% (28 Nov 2023 05:35) (95% - 100%)    Parameters below as of 28 Nov 2023 05:35  Patient On (Oxygen Delivery Method): nasal cannula  O2 Flow (L/min): 2        PHYSICAL EXAM   General: NAD  HEENT: legally blind, MMM, hoarse voice   Respiratory: CTAB; no wheezes/rales/rhonchi, normal WOB  Cardiovascular: Regular rhythm/rate, + S1/S2; no murmurs, rubs gallops   Gastrointestinal: Soft; NTND; PEG tube dislodged. Improving erythema w/ no active signs of inflammation around site.  : no suprapubic tenderness  Vascular: extremities WWP; no edema/cyanosis, 2+ distal pulses b/l   Neurological: A&Ox1  Skin: skin changes at neck likely 2/2 radiation, pressure ulcers @ b/l heels (stable per wound care)     .  LABS:                         9.3    5.51  )-----------( 231      ( 28 Nov 2023 06:59 )             30.6     11-28    142  |  103  |  12  ----------------------------<  191<H>  4.3   |  30  |  0.91    Ca    8.6      28 Nov 2023 06:59  Phos  3.4     11-28  Mg     1.6     11-28    TPro  5.5<L>  /  Alb  2.6<L>  /  TBili  0.3  /  DBili  x   /  AST  19  /  ALT  8<L>  /  AlkPhos  66  11-28    PT/INR - ( 28 Nov 2023 06:59 )   PT: 13.4 sec;   INR: 1.18          PTT - ( 28 Nov 2023 06:59 )  PTT:31.4 sec  Urinalysis Basic - ( 28 Nov 2023 06:59 )    Color: x / Appearance: x / SG: x / pH: x  Gluc: 191 mg/dL / Ketone: x  / Bili: x / Urobili: x   Blood: x / Protein: x / Nitrite: x   Leuk Esterase: x / RBC: x / WBC x   Sq Epi: x / Non Sq Epi: x / Bacteria: x                RADIOLOGY, EKG & ADDITIONAL TESTS: Reviewed.  OVERNIGHT EVENTS: NAEO. Did not require zyprexa o/n.     SUBJECTIVE  Pt was seen and examined at bedside. Calm and pleasant. No acute concern.     ROS limited but pt denies any fevers/ chills, n/v, headache, acute SOB, chest pain, abdominal pain, genitourinary sx.       Vital Signs Last 24 Hrs  T(C): 36.7 (28 Nov 2023 05:35), Max: 36.9 (27 Nov 2023 09:28)  T(F): 98.1 (28 Nov 2023 05:35), Max: 98.5 (27 Nov 2023 09:28)  HR: 84 (28 Nov 2023 05:35) (84 - 90)  BP: 130/60 (28 Nov 2023 05:35) (108/61 - 130/60)  BP(mean): --  RR: 18 (28 Nov 2023 05:35) (18 - 18)  SpO2: 95% (28 Nov 2023 05:35) (95% - 100%)    Parameters below as of 28 Nov 2023 05:35  Patient On (Oxygen Delivery Method): nasal cannula  O2 Flow (L/min): 2        PHYSICAL EXAM   General: NAD, calm   HEENT: legally blind, MMM, hoarse voice   Respiratory: CTAB; no wheezes/rales/rhonchi, normal WOB  Cardiovascular: Regular rhythm/rate, + S1/S2; no murmurs, rubs gallops   Gastrointestinal: Soft; NTND; PEG tube dislodged. Improving erythema w/ no active signs of inflammation around site.  : no suprapubic tenderness  Vascular: extremities WWP; no edema/cyanosis, 2+ distal pulses b/l   Neurological: A&Ox2  Skin: skin changes at neck likely 2/2 radiation, pressure ulcers @ b/l heels (stable per wound care)     .  LABS:                         9.3    5.51  )-----------( 231      ( 28 Nov 2023 06:59 )             30.6     11-28    142  |  103  |  12  ----------------------------<  191<H>  4.3   |  30  |  0.91    Ca    8.6      28 Nov 2023 06:59  Phos  3.4     11-28  Mg     1.6     11-28    TPro  5.5<L>  /  Alb  2.6<L>  /  TBili  0.3  /  DBili  x   /  AST  19  /  ALT  8<L>  /  AlkPhos  66  11-28    PT/INR - ( 28 Nov 2023 06:59 )   PT: 13.4 sec;   INR: 1.18          PTT - ( 28 Nov 2023 06:59 )  PTT:31.4 sec  Urinalysis Basic - ( 28 Nov 2023 06:59 )    Color: x / Appearance: x / SG: x / pH: x  Gluc: 191 mg/dL / Ketone: x  / Bili: x / Urobili: x   Blood: x / Protein: x / Nitrite: x   Leuk Esterase: x / RBC: x / WBC x   Sq Epi: x / Non Sq Epi: x / Bacteria: x                RADIOLOGY, EKG & ADDITIONAL TESTS: Reviewed.

## 2023-11-28 NOTE — CHART NOTE - NSCHARTNOTEFT_GEN_A_CORE
ONCOLOGY FOLLOW UP NOTE     Mr. Robertson follows with me in the outpatient oncology clinic.  I visited him today and spoke with his granddaughter Yvrose to assist with interpretation of recent medical events and care coordination.    Briefly, the patient is a 78-year-old man with locally advanced laryngeal squamous cell carcinoma who has recently completed definitive concurrent chemo RT.  His treatment course was complicated by radiation dermatitis and by mucositis which has limited his ability to maintain adequate nutritional status.  He is now s/p PEG tube placement but has been suffering from severe hospital acquired delirium resulting in self removal of the tube.  He has returned to the hospital because his family was not able to care for him at home.    The patient's family has previously been hesitant to except dispo to subacute rehab, however they seem more amenable now after having been reassured that it is the safest option for him as he recovers from treatment related side effects in the days and weeks to come.  His medical team is in the process of having PEG tube replaced and organizing safe discharge.    Assessment/Recommendations:  – Patient's cancer is s/p definitive chemo RT.  He will have repeat imaging 12 weeks after the completion of therapy to evaluate response.  In the meantime, would not consider him as having "active cancer" for the purposes of GoC planning   –Appreciate excellent supportive care per primary team.  Agree with medical management for delirium and agitation as needed, replacement of enteral feeding access, and aggressive SLP involvement to resume oral intake as soon as possible  – Please reach out to me anytime for assistance in family communication or goals of care discussions.  I can be reached at [email or web address]      I spent 38 minutes reviewing the patient's recent data, in consultation with his primary team, and discussing his care with his family

## 2023-11-28 NOTE — PROGRESS NOTE ADULT - ASSESSMENT
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 Marr/Dr. Gar), admitted [id number] for odynophagia in setting of laryngeal SCC, now s/p PEG 11/14 & stepdowned to [facility] for transition of care, hospital course c/b disloged PEG, pending replacement. family now amenable for SOFI.  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 Marr/Dr. Gar), admitted [id number] for odynophagia in setting of laryngeal SCC, now s/p PEG 11/14 & stepdowned to [facility] for transition of care, hospital course c/b disloged PEG, pending replacement 11/29. family now amenable for SOFI.  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 Marr/Dr. Gar),  s/p PEG 11/14 was admitted to [facility] for increase need of care at home after recent discharge.  hospital course c/b disloged PEG, pending replacement 11/29. family now amenable for SOFI.

## 2023-11-28 NOTE — PROGRESS NOTE ADULT - PROBLEM SELECTOR PLAN 6
Diet: NPO w/ ice chips as tolerated (previously w/ PEG, with titration target rate to 63)  F:   E: replete PRN  DVT: Resume after PEG   Code: Full  GI PPx: Pantoprazole 40 mg qd via PEG    Dispo: pt is medically cleared for dc home care services & needs O2, suctioning, hospital bed, w/c, 3in 1 commode and PEG feeds for dc    Needs proper med rec at this admission and on this discharge, as was discharged recently with incorrect regimen. Diet: NPO w/ ice chips as tolerated (previously w/ PEG, with titration target rate to 63)  F: D5LR   E: replete PRN  DVT: Resume after PEG   Code: Full  GI PPx: Pantoprazole 40 mg qd via PEG    Dispo: pt is medically cleared for dc home care services & needs O2, suctioning, hospital bed, w/c, 3in 1 commode and PEG feeds for dc    Needs proper med rec at this admission and on this discharge, as was discharged recently with incorrect regimen.

## 2023-11-28 NOTE — CONSULT NOTE ADULT - SUBJECTIVE AND OBJECTIVE BOX
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 Marr/Dr. Gar), admitted [id number] for odynophagia in setting of laryngeal SCC, now s/p G-tube placement 11/14 & stepdowned to [facility] for transition of care, hospital course c/b dislodge G-tube (pt pulled out d/t agitation 11/24), pending replacement. IR consulted for G tube placement.     Clinical History: MED EVAL    DNI    No pertinent family history in first degree relatives    Handoff    MEWS Score    HTN (hypertension)    HLD (hyperlipidemia)    DM (diabetes mellitus)    CAD (coronary artery disease)    Glaucoma    PVD (peripheral vascular disease)    Hospital admission due to social situation    Odynophagia    Radiation-induced esophagitis    Laryngeal squamous cell carcinoma    CAD (coronary artery disease)    Diabetes mellitus    Prophylactic measure    HTN (hypertension)    HFrEF (heart failure with reduced ejection fraction)    Agitation    Radiation-induced dermatosis    Radiation-induced dermatitis    Acute diarrhea    PEG tube malfunction    No significant past surgical history    MED EVAL    0    [email or web address]        Meds:albuterol/ipratropium for Nebulization 3 milliLiter(s) Nebulizer every 6 hours  atorvastatin 80 milliGRAM(s) Oral at bedtime  atropine 1% Ophthalmic Solution for SubLingual Use 1 Drop(s) SubLingual every 8 hours  dextrose 5% + lactated ringers. 1000 milliLiter(s) IV Continuous <Continuous>  dextrose 5%. 1000 milliLiter(s) IV Continuous <Continuous>  dextrose 5%. 1000 milliLiter(s) IV Continuous <Continuous>  dextrose 50% Injectable 25 Gram(s) IV Push once  dextrose 50% Injectable 12.5 Gram(s) IV Push once  dextrose 50% Injectable 25 Gram(s) IV Push once  dextrose Oral Gel 15 Gram(s) Oral once PRN  fentaNYL   Patch  25 MICROgram(s)/Hr 1 Patch Transdermal every 72 hours  FIRST- Mouthwash  BLM 4 milliLiter(s) Swish and Spit every 6 hours  glucagon  Injectable 1 milliGRAM(s) IntraMuscular once  influenza  Vaccine (HIGH DOSE) 0.7 milliLiter(s) IntraMuscular once  insulin glargine Injectable (LANTUS) 10 Unit(s) SubCutaneous at bedtime  insulin lispro (ADMELOG) corrective regimen sliding scale   SubCutaneous three times a day before meals  insulin lispro (ADMELOG) corrective regimen sliding scale   SubCutaneous at bedtime  lidocaine 2% Viscous 5 milliLiter(s) Swish and Spit every 12 hours  metoprolol tartrate 12.5 milliGRAM(s) Oral every 12 hours  morphine   Solution 10 milliGRAM(s) Oral every 4 hours PRN  morphine   Solution 5 milliGRAM(s) Oral every 4 hours PRN  pantoprazole    Tablet 40 milliGRAM(s) Oral before breakfast  polyethylene glycol 3350 17 Gram(s) Oral every 12 hours PRN  QUEtiapine 50 milliGRAM(s) Oral at bedtime  senna 2 Tablet(s) Oral at bedtime  silver sulfADIAZINE 1% Cream 1 Application(s) Topical two times a day      Allergies:No Known Allergies        Labs:                           9.3    5.51  )-----------( 231      ( 28 Nov 2023 06:59 )             30.6     PT/INR - ( 28 Nov 2023 06:59 )   PT: 13.4 sec;   INR: 1.18          PTT - ( 28 Nov 2023 06:59 )  PTT:31.4 sec  11-28    142  |  103  |  12  ----------------------------<  191<H>  4.3   |  30  |  0.91    Ca    8.6      28 Nov 2023 06:59  Phos  3.4     11-28  Mg     1.6     11-28    TPro  5.5<L>  /  Alb  2.6<L>  /  TBili  0.3  /  DBili  x   /  AST  19  /  ALT  8<L>  /  AlkPhos  66  11-28          Imaging Findings: reviewed

## 2023-11-28 NOTE — PROGRESS NOTE ADULT - PROBLEM SELECTOR PLAN 1
PEG tube dislodged 11/24 o/n. Previously placed by IR 11/14. GI/ IR consulted. Surg consulted and recommended CTAP, showing intact stomach with no free air, fluid/ fluid collections. IR reconsulted 11/25, now pending replacement 11/28    - f/u IR  - NPO with nice chips as tolerated.   - gentle IVF given hx of CAD s/p stents w/ EF 45-50%.   - FSG PEG tube dislodged 11/24 o/n. Previously placed by IR 11/14. GI/ IR consulted. Surg consulted and recommended CTAP, showing intact stomach with no free air, fluid/ fluid collections. IR reconsulted 11/25, now pending replacement ?????. no signs of local infection.     - f/u IR  - NPO with nice chips as tolerated.   - gentle IVF given hx of CAD s/p stents w/ EF 45-50%.   - FSG PEG tube dislodged 11/24 o/n. Previously placed by IR 11/14. GI/ IR consulted. CTAP post dislodgement shows intact stomach & anchor; no air/ fluid/ fluid collections. IR reconsulted 11/25, now pending replacement 11/29. no signs of local infection.     - f/u IR  - NPO with nice chips as tolerated.   - gentle IVF given hx of CAD s/p stents w/ EF 45-50%.   - FSG

## 2023-11-28 NOTE — PROGRESS NOTE ADULT - PROBLEM SELECTOR PLAN 4
CAD s/p MIDCAB and PCI with multiple WINTER (most recent to LCx in 8/2022), Currently asymptomatic. EKG on admission w/o ischemic changes.    - c/w home med ASA81 and Lipitor 80qhs STABLE. CAD s/p MIDCAB and PCI with multiple WINTER (most recent to LCx in 8/2022), Currently asymptomatic. EKGs w/o ischemic changes.    - c/w home med ASA81 and Lipitor 80qhs STABLE. CAD s/p MIDCAB and PCI with multiple WINTER (most recent to [facility] in 8/2022), Currently asymptomatic. EKGs w/o ischemic changes.    - home med ASA81 and Lipitor 80qhs held 2/2 NPO / dislodged PEG  - restart after PEG replacement per IR recs

## 2023-11-28 NOTE — PROGRESS NOTE ADULT - PROBLEM SELECTOR PLAN 2
Pt continues to exhibit agitation episodes requiring zyprexa IM. pm > am. barely directable with family on phone but mostly calm with family bedside. On seroquel 50 qhs (now held 2/2 odynophagia). Qtc 489 11/25. mental status unchanged. Requiring IM zyprexa 2.5 x1 qhs.   Likely 2/2 delirium and/or dementia. Infectious causes unlikely i/s/o normal WBC, afebrile and VSS.     - will 1st attempt to redirect with family on phone  - will attempt to withhold zyprexa for upcoming procedure IMPROVING. Pt exhibited agitation episodes required zyprexa IM. On Seroquel 50 qhs (now held 2/2 odynophagia). Qtc 489 11/25. mental status now AO2 (baseline). Now in supervised room, calm with no PRN agitation med required. 11/28 Qtc 503.   Likely 2/2 delirium and/or dementia. Infectious causes unlikely i/s/o normal WBC, afebrile and VSS.     - will 1st attempt to redirect with family on phone  - avoid QTc prolongation medication.   - c/w supervision

## 2023-11-29 NOTE — PROGRESS NOTE ADULT - PROBLEM SELECTOR PLAN 3
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 C/o odynophagia causing poor PO intake. PEG placed 11/14 i/s/o ongoing PO intake 2/2 odynophagia, likely i/s/o cancer tx. Pt previously on fluconazole for candidiasis esophagitis ppx but d/c by Dr. Marr for lack of improvement.     - Per rad onc, odynophagia and mucositis should likely resolve 2-3 weeks after completion of radiation. (last 10/30)  - SS reconsulted. recs NPO. pending PEG   - Onc follow up 1 wk w/ Dr. Derek Harvey upon d/c   - Chest PT, hypertonic nebulizer q4hr   - Aspiration precautions    - magic mouthwash  - viscous lidocaine   - scopolamine patch  - labs for increased risk of refeeding syndrome     #PAIN REGIMEN  follows with Dr. Anthony. Not in acute pain    - Fentanyl 25mcg/hr Patch q72hr as long-acting agent  - HYDROmorphone  1mg IV Push every 4 hours PRN Breakthrough pain  - morphine 5mg Oral via PEG every 4 hours PRN Moderate Pain (4 - 6)  - morphine 10mg Oral every 4 hours PRN Severe Pain (7 - 10)  - atropine 1% sublingual TID

## 2023-11-29 NOTE — PROGRESS NOTE ADULT - SUBJECTIVE AND OBJECTIVE BOX
CC: Patient is a 78y old  Male who presents with a chief complaint of inadequate support at home (29 Nov 2023 07:50)      INTERVAL EVENTS: INNA    SUBJECTIVE / INTERVAL HPI: Patient seen and examined at bedside.     ROS: negative unless otherwise stated above.    VITAL SIGNS:  Vitals Interpretation review:  Vital Signs Last 24 Hrs  T(C): 36.3 (29 Nov 2023 09:00), Max: 36.9 (28 Nov 2023 20:51)  T(F): 97.4 (29 Nov 2023 09:00), Max: 98.5 (28 Nov 2023 20:51)  HR: 87 (29 Nov 2023 09:00) (83 - 89)  BP: 133/69 (29 Nov 2023 09:00) (123/70 - 136/72)  BP(mean): 93 (28 Nov 2023 20:51) (93 - 93)  RR: 18 (29 Nov 2023 09:00) (17 - 18)  SpO2: 100% (29 Nov 2023 09:00) (97% - 100%)    Parameters below as of 29 Nov 2023 09:00  Patient On (Oxygen Delivery Method): nasal cannula        11-29-23 @ 07:01  -  11-29-23 @ 16:06  --------------------------------------------------------  IN: 170 mL / OUT: 0 mL / NET: 170 mL      PHYSICAL EXAM:  General: NAD, calm   HEENT: legally blind, MMM, hoarse voice   Respiratory: CTAB; no wheezes/rales/rhonchi, normal WOB  Cardiovascular: Regular rhythm/rate, + S1/S2; no murmurs, rubs gallops   Gastrointestinal: Soft; NTND; PEG tube pending. Improving erythema w/ no active signs of inflammation around site.  : no suprapubic tenderness  Vascular: extremities WWP; no edema/cyanosis, 2+ distal pulses b/l   Neurological: A&Ox2  Skin: skin changes at neck likely 2/2 radiation, pressure ulcers @ b/l heels (stable per wound care)     MEDICATIONS:  MEDICATIONS  (STANDING):  albuterol/ipratropium for Nebulization 3 milliLiter(s) Nebulizer every 6 hours  atorvastatin 80 milliGRAM(s) Oral at bedtime  atropine 1% Ophthalmic Solution for SubLingual Use 1 Drop(s) SubLingual every 8 hours  dextrose 5% + lactated ringers. 1000 milliLiter(s) (60 mL/Hr) IV Continuous <Continuous>  dextrose 5%. 1000 milliLiter(s) (100 mL/Hr) IV Continuous <Continuous>  dextrose 5%. 1000 milliLiter(s) (50 mL/Hr) IV Continuous <Continuous>  dextrose 50% Injectable 25 Gram(s) IV Push once  dextrose 50% Injectable 12.5 Gram(s) IV Push once  dextrose 50% Injectable 25 Gram(s) IV Push once  FIRST- Mouthwash  BLM 4 milliLiter(s) Swish and Spit every 6 hours  glucagon  Injectable 1 milliGRAM(s) IntraMuscular once  influenza  Vaccine (HIGH DOSE) 0.7 milliLiter(s) IntraMuscular once  insulin glargine Injectable (LANTUS) 10 Unit(s) SubCutaneous at bedtime  insulin lispro (ADMELOG) corrective regimen sliding scale   SubCutaneous three times a day before meals  insulin lispro (ADMELOG) corrective regimen sliding scale   SubCutaneous at bedtime  lidocaine 2% Viscous 5 milliLiter(s) Swish and Spit every 12 hours  metoprolol tartrate 12.5 milliGRAM(s) Oral every 12 hours  pantoprazole    Tablet 40 milliGRAM(s) Oral before breakfast  QUEtiapine 50 milliGRAM(s) Oral at bedtime  senna 2 Tablet(s) Oral at bedtime  silver sulfADIAZINE 1% Cream 1 Application(s) Topical two times a day    MEDICATIONS  (PRN):  dextrose Oral Gel 15 Gram(s) Oral once PRN Blood Glucose LESS THAN 70 milliGRAM(s)/deciliter  morphine   Solution 5 milliGRAM(s) Oral every 4 hours PRN Moderate Pain (4 - 6)  morphine   Solution 10 milliGRAM(s) Oral every 4 hours PRN Severe Pain (7 - 10)  polyethylene glycol 3350 17 Gram(s) Oral every 12 hours PRN Constipation      ALLERGIES:  Allergies    No Known Allergies    Intolerances        LABS:  Labs Interpretation:                         9.4    7.22  )-----------( 249      ( 29 Nov 2023 05:30 )             30.9     11-29    143  |  104  |  12  ----------------------------<  186<H>  3.6   |  30  |  0.86    Ca    8.7      29 Nov 2023 05:30  Phos  3.3     11-29  Mg     1.8     11-29    TPro  5.4<L>  /  Alb  2.6<L>  /  TBili  0.3  /  DBili  x   /  AST  16  /  ALT  8<L>  /  AlkPhos  69  11-29    PT/INR - ( 29 Nov 2023 05:30 )   PT: 13.3 sec;   INR: 1.17          PTT - ( 29 Nov 2023 05:30 )  PTT:31.2 sec  Urinalysis Basic - ( 29 Nov 2023 05:30 )    Color: x / Appearance: x / SG: x / pH: x  Gluc: 186 mg/dL / Ketone: x  / Bili: x / Urobili: x   Blood: x / Protein: x / Nitrite: x   Leuk Esterase: x / RBC: x / WBC x   Sq Epi: x / Non Sq Epi: x / Bacteria: x        POCT Blood Glucose.: 154 mg/dL (29 Nov 2023 11:58)        RADIOLOGY & ADDITIONAL TESTS: Reviewed.

## 2023-11-29 NOTE — PROGRESS NOTE ADULT - ATTENDING COMMENTS
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      #PEG Displacement      -IR consulted, plan for PEG placement on 11/29, will resume TFs when cleared post-procedure     #Odynophagia      -SLP recommends NPO with alternative means of nutrition for now, will need repeat MBSS in 4-6 weeks      -continue with symptomatic management       #Laryngeal SCC     #Radiation-Induced Mucositis      -continue with pain regimen as per previous palliative care recommendations        #Goals of Care    -palliative care consulted, appreciate recommendations     DVT PPx: held for PEG    Dispo: SOFI

## 2023-11-29 NOTE — CHART NOTE - NSCHARTNOTEFT_GEN_A_CORE
Admitting Diagnosis:   Patient is a 78y old  Male who presents with a chief complaint of inadequate support at home (29 Nov 2023 07:50)      PAST MEDICAL & SURGICAL HISTORY:  HTN (hypertension)  HLD (hyperlipidemia)  DM (diabetes mellitus)  CAD (coronary artery disease)  Glaucoma  PVD (peripheral vascular disease)  No significant past surgical history      Current Nutrition Order: NPO       PO Intake: Good (%) [   ]  Fair (50-75%) [   ] Poor (<25%) [   ] - NPO    GI Issues: fecal incontinence, last BM 11/28 per EMR    Skin Integrity: +1 edema to BL feet, DTI to BL heels    Labs:   11-29    143  |  104  |  12  ----------------------------<  186<H>  3.6   |  30  |  0.86    Ca    8.7      29 Nov 2023 05:30  Phos  3.3     11-29  Mg     1.8     11-29    TPro  5.4<L>  /  Alb  2.6<L>  /  TBili  0.3  /  DBili  x   /  AST  16  /  ALT  8<L>  /  AlkPhos  69  11-29    CAPILLARY BLOOD GLUCOSE      POCT Blood Glucose.: 154 mg/dL (29 Nov 2023 11:58)  POCT Blood Glucose.: 180 mg/dL (29 Nov 2023 06:40)  POCT Blood Glucose.: 174 mg/dL (28 Nov 2023 22:58)  POCT Blood Glucose.: 177 mg/dL (28 Nov 2023 18:40)      Medications:  MEDICATIONS  (STANDING):  albuterol/ipratropium for Nebulization 3 milliLiter(s) Nebulizer every 6 hours  atorvastatin 80 milliGRAM(s) Oral at bedtime  atropine 1% Ophthalmic Solution for SubLingual Use 1 Drop(s) SubLingual every 8 hours  dextrose 5% + lactated ringers. 1000 milliLiter(s) (60 mL/Hr) IV Continuous <Continuous>  dextrose 5%. 1000 milliLiter(s) (100 mL/Hr) IV Continuous <Continuous>  dextrose 5%. 1000 milliLiter(s) (50 mL/Hr) IV Continuous <Continuous>  dextrose 50% Injectable 25 Gram(s) IV Push once  dextrose 50% Injectable 12.5 Gram(s) IV Push once  dextrose 50% Injectable 25 Gram(s) IV Push once  FIRST- Mouthwash  BLM 4 milliLiter(s) Swish and Spit every 6 hours  glucagon  Injectable 1 milliGRAM(s) IntraMuscular once  influenza  Vaccine (HIGH DOSE) 0.7 milliLiter(s) IntraMuscular once  insulin glargine Injectable (LANTUS) 10 Unit(s) SubCutaneous at bedtime  insulin lispro (ADMELOG) corrective regimen sliding scale   SubCutaneous three times a day before meals  insulin lispro (ADMELOG) corrective regimen sliding scale   SubCutaneous at bedtime  lidocaine 2% Viscous 5 milliLiter(s) Swish and Spit every 12 hours  metoprolol tartrate 12.5 milliGRAM(s) Oral every 12 hours  pantoprazole    Tablet 40 milliGRAM(s) Oral before breakfast  QUEtiapine 50 milliGRAM(s) Oral at bedtime  senna 2 Tablet(s) Oral at bedtime  silver sulfADIAZINE 1% Cream 1 Application(s) Topical two times a day    MEDICATIONS  (PRN):  dextrose Oral Gel 15 Gram(s) Oral once PRN Blood Glucose LESS THAN 70 milliGRAM(s)/deciliter  morphine   Solution 10 milliGRAM(s) Oral every 4 hours PRN Severe Pain (7 - 10)  morphine   Solution 5 milliGRAM(s) Oral every 4 hours PRN Moderate Pain (4 - 6)  polyethylene glycol 3350 17 Gram(s) Oral every 12 hours PRN Constipation      Weight:  Height for BMI (FEET)	5 Feet  Height for BMI (INCHES)	6 Inch(s)  Height for BMI (CENTIMETERS)	167.64 Centimeter(s)  Weight for BMI (lbs)	142 lb  Weight for BMI (kg)	64.4 kg  Body Mass Index	22.9    Weight Change: no new weights to assess    Nutrition Focused Physical Exam: see nutrition risk note from 11/24    Estimated energy needs:   Estimated Energy Needs Weight (lbs)	142 lb  Estimated Energy Needs Weight (kg)	64.4 kg  Estimated Energy Needs From (sandy/kg)	27  Estimated Energy Needs To (sandy/kg)	33  Estimated Energy Needs Calculated From (sandy/kg)	1738  Estimated Energy Needs Calculated To (sandy/kg)	2125    Estimated Protein Needs Weight (lbs)	142 lb  Estimated Protein Needs Weight (kg)	64.4 kg  Estimated Protein Needs From (g/kg)	1.2  Estimated Protein Needs To (g/kg)	1.5  Estimated Protein Needs Calculated From (g/kg)	77.28  Estimated Protein Needs Calculated To (g/kg)	96.6  Other Calculations	Based on Standards of Care patient within % IBW (100%) thus actual body weight used for all calculations (142#). Needs adjusted for elderly repletion, ca dx. Fluid recs per team.    Subjective:     Previous Nutrition Diagnosis:    Active [   ]  Resolved [   ]    If resolved, new PES:     Goal:    Recommendations:    Education:     Risk Level: High [   ] Moderate [   ] Low [   ] Admitting Diagnosis:   Patient is a 78y old  Male who presents with a chief complaint of inadequate support at home (29 Nov 2023 07:50)      PAST MEDICAL & SURGICAL HISTORY:  HTN (hypertension)  HLD (hyperlipidemia)  DM (diabetes mellitus)  CAD (coronary artery disease)  Glaucoma  PVD (peripheral vascular disease)  No significant past surgical history      Current Nutrition Order: NPO       PO Intake: Good (%) [   ]  Fair (50-75%) [   ] Poor (<25%) [   ] - NPO    GI Issues: fecal incontinence, last BM 11/28 per EMR    Skin Integrity: +1 edema to BL feet, DTI to BL heels    Labs:   11-29    143  |  104  |  12  ----------------------------<  186<H>  3.6   |  30  |  0.86    Ca    8.7      29 Nov 2023 05:30  Phos  3.3     11-29  Mg     1.8     11-29    TPro  5.4<L>  /  Alb  2.6<L>  /  TBili  0.3  /  DBili  x   /  AST  16  /  ALT  8<L>  /  AlkPhos  69  11-29    CAPILLARY BLOOD GLUCOSE      POCT Blood Glucose.: 154 mg/dL (29 Nov 2023 11:58)  POCT Blood Glucose.: 180 mg/dL (29 Nov 2023 06:40)  POCT Blood Glucose.: 174 mg/dL (28 Nov 2023 22:58)  POCT Blood Glucose.: 177 mg/dL (28 Nov 2023 18:40)      Medications:  MEDICATIONS  (STANDING):  albuterol/ipratropium for Nebulization 3 milliLiter(s) Nebulizer every 6 hours  atorvastatin 80 milliGRAM(s) Oral at bedtime  atropine 1% Ophthalmic Solution for SubLingual Use 1 Drop(s) SubLingual every 8 hours  dextrose 5% + lactated ringers. 1000 milliLiter(s) (60 mL/Hr) IV Continuous <Continuous>  dextrose 5%. 1000 milliLiter(s) (100 mL/Hr) IV Continuous <Continuous>  dextrose 5%. 1000 milliLiter(s) (50 mL/Hr) IV Continuous <Continuous>  dextrose 50% Injectable 25 Gram(s) IV Push once  dextrose 50% Injectable 12.5 Gram(s) IV Push once  dextrose 50% Injectable 25 Gram(s) IV Push once  FIRST- Mouthwash  BLM 4 milliLiter(s) Swish and Spit every 6 hours  glucagon  Injectable 1 milliGRAM(s) IntraMuscular once  influenza  Vaccine (HIGH DOSE) 0.7 milliLiter(s) IntraMuscular once  insulin glargine Injectable (LANTUS) 10 Unit(s) SubCutaneous at bedtime  insulin lispro (ADMELOG) corrective regimen sliding scale   SubCutaneous three times a day before meals  insulin lispro (ADMELOG) corrective regimen sliding scale   SubCutaneous at bedtime  lidocaine 2% Viscous 5 milliLiter(s) Swish and Spit every 12 hours  metoprolol tartrate 12.5 milliGRAM(s) Oral every 12 hours  pantoprazole    Tablet 40 milliGRAM(s) Oral before breakfast  QUEtiapine 50 milliGRAM(s) Oral at bedtime  senna 2 Tablet(s) Oral at bedtime  silver sulfADIAZINE 1% Cream 1 Application(s) Topical two times a day    MEDICATIONS  (PRN):  dextrose Oral Gel 15 Gram(s) Oral once PRN Blood Glucose LESS THAN 70 milliGRAM(s)/deciliter  morphine   Solution 10 milliGRAM(s) Oral every 4 hours PRN Severe Pain (7 - 10)  morphine   Solution 5 milliGRAM(s) Oral every 4 hours PRN Moderate Pain (4 - 6)  polyethylene glycol 3350 17 Gram(s) Oral every 12 hours PRN Constipation      Weight:  Height for BMI (FEET)	5 Feet  Height for BMI (INCHES)	6 Inch(s)  Height for BMI (CENTIMETERS)	167.64 Centimeter(s)  Weight for BMI (lbs)	142 lb  Weight for BMI (kg)	64.4 kg  Body Mass Index	22.9    Weight Change: no new weights to assess    Nutrition Focused Physical Exam: see nutrition risk note from 11/24    Estimated energy needs:   Estimated Energy Needs Weight (lbs)	142 lb  Estimated Energy Needs Weight (kg)	64.4 kg  Estimated Energy Needs From (sandy/kg)	27  Estimated Energy Needs To (sandy/kg)	33  Estimated Energy Needs Calculated From (sandy/kg)	1738  Estimated Energy Needs Calculated To (sandy/kg)	2125    Estimated Protein Needs Weight (lbs)	142 lb  Estimated Protein Needs Weight (kg)	64.4 kg  Estimated Protein Needs From (g/kg)	1.2  Estimated Protein Needs To (g/kg)	1.5  Estimated Protein Needs Calculated From (g/kg)	77.28  Estimated Protein Needs Calculated To (g/kg)	96.6  Other Calculations	Based on Standards of Care patient within % IBW (100%) thus actual body weight used for all calculations (142#). Needs adjusted for elderly repletion, ca dx. Fluid recs per team.    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 Marr/Dr. Gar), recently admitted for odynophagia 2/2 radiation-induced mucositis iso laryngeal SCC, now s/p PEG 11/14 discharged on 11/22 who presents due to inability for safe care at home. Pt brought in with wife, per chart review the visiting nurse felt the patient needed more assistance and was unable to be at home. Pt discharged from the hospital earlier today, currently being treated for throat cancer, states he does not have a hospital bed and cannot walk on his own. Was recommended for SOFI but declined. Patient seen and examined at bedside. Denies any new symptoms at this time. Continues to have mild pain of the neck at site of dermatitis and some discomfort at PEG tube site, otherwise feels well. States he wants to return home once services are set up, does not want to pursue rehab as an option.     Patient seen for follow up assessment. Current diet order: NPO. Patient awaiting PEG replacement today then will restart feeds. Patient previously ordered for Glucerna 1.2, however now unavailable in house. Recommend to switch to Glucerna 1.5 @50mL/hr x24hrs to provide 1200mL total volume, 1800kcal, 99g protein, 911mL free water. Order pended to MD, available for sign off when ready to restart feeds. Fecal incontinence, last BM 11/28 per EMR. +1 edema to BL feet, DTI to BL heels. Meds: insulin, Magic mouthwash, bowel regimen. RD to f/u prn.    Previous Nutrition Diagnosis: Severe protein calorie malnutrition in context of chronic illness related to larynx cancer as evidenced by 5% weight loss x1 month, moderate muscle wasting.    Active [ x  ]  Resolved [   ]    Goal: Patient to meet at least 75% of nutritional needs consistently via most feasible route    Recommendations:  1. When patient cleared to use PEG site, recommend changing tube feed formula due to nationwide shortages to Glucerna 1.5 @50mL/hr x24hrs to provide 1200mL total volume, 1800kcal, 99g protein, 911mL free water.   2. Monitor labs: electrolytes, cmp, fingesticks  3. Monitor weights   4. Pain and bowel regimen per team   *discussed with team, order pended to MD    Risk Level: High [ x  ] Moderate [   ] Low [   ]

## 2023-11-29 NOTE — PROGRESS NOTE ADULT - PROBLEM SELECTOR PLAN 1
PEG tube dislodged 11/24 o/n. Previously placed by IR 11/14. GI/ IR consulted. CTAP post dislodgement shows intact stomach & anchor; no air/ fluid/ fluid collections. IR reconsulted 11/25, now pending replacement 11/30. no signs of local infection.     - f/u IR  - NPO with nice chips as tolerated.   - gentle IVF given hx of CAD s/p stents w/ EF 45-50%.   - FSG  - Consented

## 2023-11-29 NOTE — PHYSICAL THERAPY INITIAL EVALUATION ADULT - ADDITIONAL COMMENTS
PLOF and social history obtained from chart, pt known to Rehab due to long hospital stay over the past few months. Pt. lives with his wife in an apt with 1 FOS to enter. At baseline (from about Sept), pt. ambulated independently with a SC, and wife assists him with ADLs.

## 2023-11-29 NOTE — PROGRESS NOTE ADULT - ASSESSMENT
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 Marr/Dr. Gar),  s/p PEG 11/14 was admitted to [facility] for increase need of care at home after recent discharge.  hospital course c/b disloged PEG, pending replacement 11/30. family now amenable for SOFI.

## 2023-11-29 NOTE — PHYSICAL THERAPY INITIAL EVALUATION ADULT - PERTINENT HX OF CURRENT PROBLEM, REHAB EVAL
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 Marr/Dr. Gar),  s/p PEG 11/14 was admitted to [facility] for increase need of care at home after recent discharge.  hospital course c/b disloged PEG, pending replacement 11/29. family now amenable for SOFI.

## 2023-11-29 NOTE — PROGRESS NOTE ADULT - PROBLEM SELECTOR PLAN 4
STABLE. CAD s/p MIDCAB and PCI with multiple WINTER (most recent to LCx in 8/2022), Currently asymptomatic. EKGs w/o ischemic changes.    - home med [id number] and Lipitor 80qhs held 2/2 NPO / dislodged PEG  - restart after PEG replacement per IR recs

## 2023-11-29 NOTE — PROGRESS NOTE ADULT - PROBLEM SELECTOR PLAN 2
IMPROVING. Pt exhibited agitation episodes required zyprexa IM. On Seroquel 50 qhs (now held 2/2 odynophagia). Qtc 489 11/25. mental status now AO2 (baseline). Now in supervised room, calm with no PRN agitation med required. 11/28 Qtc 503.   Likely 2/2 delirium and/or dementia. Infectious causes unlikely i/s/o normal WBC, afebrile and VSS.     - will 1st attempt to redirect with family on phone  - avoid QTc prolongation medication.   - c/w supervision

## 2023-11-29 NOTE — PROGRESS NOTE ADULT - PROBLEM SELECTOR PLAN 6
Diet: NPO w/ ice chips as tolerated (previously w/ PEG, with titration target rate to 63)  F: D5LR   E: replete PRN  DVT: Resume after PEG   Code: Full  GI PPx: Pantoprazole 40 mg qd via PEG once placed again     Dispo: Once PEG placed, pt is medically cleared for dc home care services & needs O2, suctioning, hospital bed, w/c, 3in 1 commode and PEG feeds for dc    Needs proper med rec at this admission and on this discharge, as was discharged recently with incorrect regimen.

## 2023-11-30 NOTE — PRE-ANESTHESIA EVALUATION ADULT - NSANTHADDINFOFT_GEN_ALL_CORE
Patient with diminished mental status but gave verbal assent for anesthesia. Daughter, Bisi Robertson, gave witnessed telephone consent for anesthesia, MAC with GA backup, suspension of DNR/DNI reconfirmed.

## 2023-11-30 NOTE — PROGRESS NOTE ADULT - PROBLEM SELECTOR PLAN 2
IMPROVING. Pt exhibited agitation episodes required zyprexa IM. On Seroquel 50 qhs (now held 2/2 odynophagia). Qtc 489 11/25. mental status now AO2 (baseline). Now in supervised room, calm with no PRN agitation med required. 11/28 Qtc 503.   Likely 2/2 delirium and/or dementia. Infectious causes unlikely i/s/o normal WBC, afebrile and VSS.     - will 1st attempt to redirect with family on phone  - avoid QTc prolongation medication.   - c/w supervision IMPROVING. Pt exhibited agitation episodes required zyprexa IM. On Seroquel 50 qhs (now held 2/2 odynophagia). Qtc 489 11/25. mental status now AO2 (baseline). Now in supervised room, calm with no PRN agitation med required. 11/28 Qtc 503.   Likely 2/2 delirium and/or dementia. Infectious causes unlikely i/s/o normal WBC, afebrile and VSS.     - will 1st attempt to redirect with family on phone, to refrain from Zyprexa or sedative use   - avoid QTc prolongation medication.   - c/w supervision

## 2023-11-30 NOTE — PRE-ANESTHESIA EVALUATION ADULT - NSDENTALSD_ENT_ALL_CORE
Missing rear molars, poor dentition with several chipped teeth, ground/worn teeth, cavities./missing teeth

## 2023-11-30 NOTE — PROGRESS NOTE ADULT - PROBLEM SELECTOR PLAN 1
PEG tube dislodged 11/24 o/n. Previously placed by IR 11/14. GI/ IR consulted. CTAP post dislodgement shows intact stomach & anchor; no air/ fluid/ fluid collections. IR reconsulted 11/25, now pending replacement 11/30. no signs of local infection.     - f/u IR  - NPO with nice chips as tolerated.   - gentle IVF given hx of CAD s/p stents w/ EF 45-50%.   - FSG  - Consented PEG tube dislodged 11/24 o/n. Previously placed by IR 11/14. GI/ IR consulted. CTAP post dislodgement shows intact stomach & anchor; no air/ fluid/ fluid collections. IR replaced PEG 11/30. no signs of local infection.     - Will reinitiate feeds upon IR clearence to reinitiate, at doses per nutrition recs noted 11/29   - gentle IVF given hx of CAD s/p stents w/ EF 45-50%. -> stopped 11/29   - FSG INNA  - Trend refeeding syndrome labs   - Thiamine consideration

## 2023-11-30 NOTE — PROGRESS NOTE ADULT - ASSESSMENT
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 Marr/Dr. Gar),  s/p PEG 11/14 was admitted to [facility] for increase need of care at home after recent discharge.  hospital course c/b disloged PEG, pending replacement 11/30. family now amenable for SOFI.  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 Marr/Dr. Gar),  s/p PEG 11/14 was admitted to [facility] for increase need of care at home after recent discharge.  hospital course c/b disloged PEG, uncomplicated IR replacement 11/30, remains on [facility] for reinitiation of feeds to r/o refeeding & family now amenable for SOFI on dispo.

## 2023-11-30 NOTE — PROGRESS NOTE ADULT - PROBLEM SELECTOR PLAN 6
Diet: NPO w/ ice chips as tolerated (previously w/ PEG, with titration target rate to 63)  F: D5LR   E: replete PRN  DVT: Resume after PEG   Code: Full  GI PPx: Pantoprazole 40 mg qd via PEG once placed again     Dispo: Once PEG placed, pt is medically cleared for dc home care services & needs O2, suctioning, hospital bed, w/c, 3in 1 commode and PEG feeds for dc    Needs proper med rec at this admission and on this discharge, as was discharged recently with incorrect regimen. Diet: NPO w/ ice chips as tolerated (previously w/ PEG, with titration target rate to 63)  F: D5LR @60cc/hr, stopped 11/30   E: replete PRN  DVT: Resume after PEG   Code: Full  GI PPx: Pantoprazole 40 mg qd via PEG     Dispo: Once PEG placed, pt is medically cleared for dc home care services & needs O2, suctioning, hospital bed, w/c, 3in 1 commode and PEG feeds for dc    Needs proper med rec at this admission and on this discharge, as was discharged recently with incorrect regimen.

## 2023-11-30 NOTE — PRE-ANESTHESIA EVALUATION ADULT - NSRADCARDRESULTSFT_GEN_ALL_CORE
TTE 11/7/2023  "CONCLUSIONS:     1. Basal and mid inferolateral wall and mid anterolateral segment are akinetic.   2. Regional wall motion abnormalities consistent with ischemic heart disease.   3. Mildly reduced left ventricular systolic function.   4. Normal right ventricular size and systolic function.   5. No hemodynamically significant valvular disease.   6. Moderate pulmonary hypertension.   7. No pericardial effusion."

## 2023-11-30 NOTE — PRE-ANESTHESIA EVALUATION ADULT - NSANTHPMHFT_GEN_ALL_CORE
Cardiac: Positive for HTN, HLD, CAD, HFrEF. Denies MI/Angina, Murmur/Valvular Disorder. 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   Renal: Denies kidney dysfunction  Hepatic: Denies liver dysfunction  Gastrointestinal: Positive for gastrostomy tube placement. Denies GERD/IBS  Endocrine: Positive for DM type II  Neurologic: Denies stroke/seizure disorder  Hematologic: Denies anemia, blood clotting disorder, blood thinning medication.    PSH: PEG tube placement 11/14/2023.

## 2023-11-30 NOTE — PROGRESS NOTE ADULT - PROBLEM SELECTOR PLAN 4
STABLE. CAD s/p MIDCAB and PCI with multiple WINTER (most recent to LCx in 8/2022), Currently asymptomatic. EKGs w/o ischemic changes.    - home med ASA81 and Lipitor 80qhs held 2/2 NPO / dislodged PEG  - restart after PEG replacement per IR recs STABLE. CAD s/p MIDCAB and PCI with multiple WINTER (most recent to LCx in 8/2022), Currently asymptomatic. EKGs w/o ischemic changes.    - home med ASA81 and Lipitor 80qhs held 2/2 NPO / dislodged PEG -> replaced 11/30   - to restart after PEG replacement per IR recs

## 2023-11-30 NOTE — PROGRESS NOTE ADULT - PROBLEM SELECTOR PLAN 3
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 C/o odynophagia causing poor PO intake. PEG placed 11/14 i/s/o ongoing PO intake 2/2 odynophagia, likely i/s/o cancer tx. Pt previously on fluconazole for candidiasis esophagitis ppx but d/c by Dr. Marr for lack of improvement.     - Per rad onc, odynophagia and mucositis should likely resolve 2-3 weeks after completion of radiation. (last 10/30)  - SS reconsulted. recs NPO. pending PEG   - Onc follow up 1 wk w/ Dr. Derek Harvey upon d/c   - Chest PT, hypertonic nebulizer q4hr   - Aspiration precautions    - magic mouthwash  - viscous lidocaine   - scopolamine patch  - labs for increased risk of refeeding syndrome     #PAIN REGIMEN  follows with Dr. Anthony. 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 C/o odynophagia causing poor PO intake. PEG placed 11/14 i/s/o ongoing PO intake 2/2 odynophagia, likely i/s/o cancer tx. Pt previously on fluconazole for candidiasis esophagitis ppx but d/c by Dr. Marr for lack of improvement.     - Per rad onc, odynophagia and mucositis should likely resolve 2-3 weeks after completion of radiation. (last 10/30)  - SS reconsulted. PEG replaced 11/30    - Onc follow up 1 wk w/ Dr. Derek Harvey upon d/c   - Chest PT, hypertonic nebulizer q4hr   - Aspiration precautions    - Magic mouthwash   - magic mouthwash  - viscous lidocaine   - scopolamine patch  - labs for increased risk of refeeding syndrome     #PAIN REGIMEN  follows with Dr. Anthony. Not in acute pain    - Fentanyl 25mcg/hr Patch q72hr as long-acting agent  - HYDROmorphone  1mg IV Push every 4 hours PRN Breakthrough pain  - morphine 5mg Oral via PEG every 4 hours PRN Moderate Pain (4 - 6)  - morphine 10mg Oral every 4 hours PRN Severe Pain (7 - 10)  - atropine 1% sublingual TID

## 2023-11-30 NOTE — PROGRESS NOTE ADULT - ATTENDING COMMENTS
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       #PEG Displacement       -IR consulted, s/p PEG placement on 11/30, will resume TFs when cleared post-procedure      #Odynophagia       -SLP recommends NPO with alternative means of nutrition for now, will need repeat MBSS in 4-6 weeks       -continue with symptomatic management        #Laryngeal SCC      #Radiation-Induced Mucositis       -continue with pain regimen as per previous palliative care recommendations        #Goals of Care     -palliative care consulted, appreciate recommendations     DVT PPx: Lovenox     Dispo: SOFI

## 2023-11-30 NOTE — PROGRESS NOTE ADULT - SUBJECTIVE AND OBJECTIVE BOX
CC: Patient is a 78y old  Male who presents with a chief complaint of inadequate support at home (29 Nov 2023 07:50)      INTERVAL EVENTS: INNA    SUBJECTIVE / INTERVAL HPI: Patient seen and examined at bedside.     ROS: negative unless otherwise stated above.    VITAL SIGNS:  Vitals Interpretation review:  Vital Signs Last 24 Hrs  T(C): 36.3 (30 Nov 2023 06:05), Max: 36.4 (29 Nov 2023 15:15)  T(F): 97.3 (30 Nov 2023 06:05), Max: 97.6 (29 Nov 2023 15:15)  HR: 82 (30 Nov 2023 06:05) (82 - 87)  BP: 126/62 (30 Nov 2023 06:05) (105/60 - 133/69)  BP(mean): --  RR: 18 (30 Nov 2023 06:05) (17 - 18)  SpO2: 96% (30 Nov 2023 06:05) (96% - 100%)    Parameters below as of 30 Nov 2023 06:05  Patient On (Oxygen Delivery Method): room air          11-29-23 @ 07:01  -  11-30-23 @ 07:00  --------------------------------------------------------  IN: 170 mL / OUT: 0 mL / NET: 170 mL        PHYSICAL EXAM:    General: NAD  HEENT: MMM  Neck: supple  Cardiovascular: +S1/S2; RRR  Respiratory: CTA B/L; no W/R/R  Gastrointestinal: soft, NT/ND  Extremities: WWP; no edema, clubbing or cyanosis  Vascular: 2+ radial, DP/PT pulses B/L  Neurological: AAOx3; no focal deficits    MEDICATIONS:  MEDICATIONS  (STANDING):  albuterol/ipratropium for Nebulization 3 milliLiter(s) Nebulizer every 6 hours  atorvastatin 80 milliGRAM(s) Oral at bedtime  atropine 1% Ophthalmic Solution for SubLingual Use 1 Drop(s) SubLingual every 8 hours  dextrose 5% + lactated ringers. 1000 milliLiter(s) (60 mL/Hr) IV Continuous <Continuous>  dextrose 5%. 1000 milliLiter(s) (50 mL/Hr) IV Continuous <Continuous>  dextrose 5%. 1000 milliLiter(s) (100 mL/Hr) IV Continuous <Continuous>  dextrose 50% Injectable 25 Gram(s) IV Push once  dextrose 50% Injectable 25 Gram(s) IV Push once  dextrose 50% Injectable 12.5 Gram(s) IV Push once  FIRST- Mouthwash  BLM 4 milliLiter(s) Swish and Spit every 6 hours  glucagon  Injectable 1 milliGRAM(s) IntraMuscular once  influenza  Vaccine (HIGH DOSE) 0.7 milliLiter(s) IntraMuscular once  insulin glargine Injectable (LANTUS) 10 Unit(s) SubCutaneous at bedtime  insulin lispro (ADMELOG) corrective regimen sliding scale   SubCutaneous three times a day before meals  insulin lispro (ADMELOG) corrective regimen sliding scale   SubCutaneous at bedtime  lidocaine 2% Viscous 5 milliLiter(s) Swish and Spit every 12 hours  magnesium sulfate  IVPB 2 Gram(s) IV Intermittent once  metoprolol tartrate 12.5 milliGRAM(s) Oral every 12 hours  pantoprazole    Tablet 40 milliGRAM(s) Oral before breakfast  QUEtiapine 50 milliGRAM(s) Oral at bedtime  senna 2 Tablet(s) Oral at bedtime  silver sulfADIAZINE 1% Cream 1 Application(s) Topical two times a day    MEDICATIONS  (PRN):  dextrose Oral Gel 15 Gram(s) Oral once PRN Blood Glucose LESS THAN 70 milliGRAM(s)/deciliter  polyethylene glycol 3350 17 Gram(s) Oral every 12 hours PRN Constipation      ALLERGIES:  Allergies    No Known Allergies    Intolerances        LABS:  Labs Interpretation:                         9.2    5.44  )-----------( 242      ( 30 Nov 2023 05:30 )             30.0     11-30    142  |  105  |  11  ----------------------------<  169<H>  4.1   |  32<H>  |  0.86    Ca    8.4      30 Nov 2023 05:30  Phos  3.6     11-30  Mg     1.7     11-30    TPro  5.2<L>  /  Alb  2.1<L>  /  TBili  0.3  /  DBili  x   /  AST  15  /  ALT  6<L>  /  AlkPhos  61  11-30    PT/INR - ( 30 Nov 2023 05:30 )   PT: 13.8 sec;   INR: 1.22          PTT - ( 30 Nov 2023 05:30 )  PTT:30.8 sec  Urinalysis Basic - ( 30 Nov 2023 05:30 )    Color: x / Appearance: x / SG: x / pH: x  Gluc: 169 mg/dL / Ketone: x  / Bili: x / Urobili: x   Blood: x / Protein: x / Nitrite: x   Leuk Esterase: x / RBC: x / WBC x   Sq Epi: x / Non Sq Epi: x / Bacteria: x      CAPILLARY BLOOD GLUCOSE      POCT Blood Glucose.: 151 mg/dL (30 Nov 2023 06:27)            RADIOLOGY & ADDITIONAL TESTS: Reviewed.  CXR  EKG    Imaging Interpretation Review:    FMHx  Surgical Hx   CC: Patient is a 78y old  Male who presents with a chief complaint of inadequate support at home (29 Nov 2023 07:50)      INTERVAL EVENTS: Successful, uncomplicated, PEG placement. No signs of infection around placement site on evaluation once back on floor. Will monitor for refeeding once feeds restarted.     SUBJECTIVE / INTERVAL HPI: Patient seen and examined at bedside.     ROS: negative unless otherwise stated above.    VITAL SIGNS:  Vitals Interpretation review:  Vital Signs Last 24 Hrs  T(C): 36.3 (30 Nov 2023 06:05), Max: 36.4 (29 Nov 2023 15:15)  T(F): 97.3 (30 Nov 2023 06:05), Max: 97.6 (29 Nov 2023 15:15)  HR: 82 (30 Nov 2023 06:05) (82 - 87)  BP: 126/62 (30 Nov 2023 06:05) (105/60 - 133/69)  BP(mean): --  RR: 18 (30 Nov 2023 06:05) (17 - 18)  SpO2: 96% (30 Nov 2023 06:05) (96% - 100%)    Parameters below as of 30 Nov 2023 06:05  Patient On (Oxygen Delivery Method): room air          11-29-23 @ 07:01  -  11-30-23 @ 07:00  --------------------------------------------------------  IN: 170 mL / OUT: 0 mL / NET: 170 mL        PHYSICAL EXAM:  General: NAD, calm   HEENT: legally blind, MMM, hoarse voice   Respiratory: CTAB; no wheezes/rales/rhonchi, normal WOB  Cardiovascular: Regular rhythm/rate, + S1/S2; no murmurs, rubs gallops   Gastrointestinal: Soft; NTND; PEG tube placed 11/30. No new erythema w/ no active signs of inflammation around site.  : no suprapubic tenderness  Vascular: extremities WWP; no edema/cyanosis, 2+ distal pulses b/l   Neurological: A&Ox2  Skin: skin changes at neck likely 2/2 radiation, pressure ulcers @ b/l heels (stable per wound care)   MEDICATIONS:  MEDICATIONS  (STANDING):  albuterol/ipratropium for Nebulization 3 milliLiter(s) Nebulizer every 6 hours  atorvastatin 80 milliGRAM(s) Oral at bedtime  atropine 1% Ophthalmic Solution for SubLingual Use 1 Drop(s) SubLingual every 8 hours  dextrose 5% + lactated ringers. 1000 milliLiter(s) (60 mL/Hr) IV Continuous <Continuous>  dextrose 5%. 1000 milliLiter(s) (50 mL/Hr) IV Continuous <Continuous>  dextrose 5%. 1000 milliLiter(s) (100 mL/Hr) IV Continuous <Continuous>  dextrose 50% Injectable 25 Gram(s) IV Push once  dextrose 50% Injectable 25 Gram(s) IV Push once  dextrose 50% Injectable 12.5 Gram(s) IV Push once  FIRST- Mouthwash  BLM 4 milliLiter(s) Swish and Spit every 6 hours  glucagon  Injectable 1 milliGRAM(s) IntraMuscular once  influenza  Vaccine (HIGH DOSE) 0.7 milliLiter(s) IntraMuscular once  insulin glargine Injectable (LANTUS) 10 Unit(s) SubCutaneous at bedtime  insulin lispro (ADMELOG) corrective regimen sliding scale   SubCutaneous three times a day before meals  insulin lispro (ADMELOG) corrective regimen sliding scale   SubCutaneous at bedtime  lidocaine 2% Viscous 5 milliLiter(s) Swish and Spit every 12 hours  magnesium sulfate  IVPB 2 Gram(s) IV Intermittent once  metoprolol tartrate 12.5 milliGRAM(s) Oral every 12 hours  pantoprazole    Tablet 40 milliGRAM(s) Oral before breakfast  QUEtiapine 50 milliGRAM(s) Oral at bedtime  senna 2 Tablet(s) Oral at bedtime  silver sulfADIAZINE 1% Cream 1 Application(s) Topical two times a day    MEDICATIONS  (PRN):  dextrose Oral Gel 15 Gram(s) Oral once PRN Blood Glucose LESS THAN 70 milliGRAM(s)/deciliter  polyethylene glycol 3350 17 Gram(s) Oral every 12 hours PRN Constipation      ALLERGIES:  Allergies    No Known Allergies    Intolerances        LABS:  Labs Interpretation:                         9.2    5.44  )-----------( 242      ( 30 Nov 2023 05:30 )             30.0     11-30    142  |  105  |  11  ----------------------------<  169<H>  4.1   |  32<H>  |  0.86    Ca    8.4      30 Nov 2023 05:30  Phos  3.6     11-30  Mg     1.7     11-30    TPro  5.2<L>  /  Alb  2.1<L>  /  TBili  0.3  /  DBili  x   /  AST  15  /  ALT  6<L>  /  AlkPhos  61  11-30    PT/INR - ( 30 Nov 2023 05:30 )   PT: 13.8 sec;   INR: 1.22          PTT - ( 30 Nov 2023 05:30 )  PTT:30.8 sec  Urinalysis Basic - ( 30 Nov 2023 05:30 )    Color: x / Appearance: x / SG: x / pH: x  Gluc: 169 mg/dL / Ketone: x  / Bili: x / Urobili: x   Blood: x / Protein: x / Nitrite: x   Leuk Esterase: x / RBC: x / WBC x   Sq Epi: x / Non Sq Epi: x / Bacteria: x        POCT Blood Glucose.: 151 mg/dL (30 Nov 2023 06:27)    RADIOLOGY & ADDITIONAL TESTS: Reviewed.

## 2023-12-01 NOTE — PROGRESS NOTE ADULT - ASSESSMENT
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 Marr/Dr. Gar),  s/p PEG 11/14 was admitted to [facility] for increase need of care at home after recent discharge.  hospital course c/b disloged PEG, uncomplicated IR replacement 11/30, remains on RMF with feeds restarted 12/01 and medically cleared for SOFI on dispo.  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 Marr/Dr. Gar),  s/p PEG 11/14 was admitted to [facility] for increase need of care at home after recent discharge.  hospital course c/b disloged PEG, uncomplicated IR replacement 11/30, remains on RMF with feeds restarted 12/01 and medically cleared for SOFI on dispo.  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 Marr/Dr. Gar),  s/p PEG 11/14 was admitted to [facility] for increase need of care at home after recent discharge.  hospital course c/b disloged PEG, uncomplicated IR replacement 11/30, remains on RMF with feeds restarted 12/01 and medically cleared for SOFI on dispo.

## 2023-12-01 NOTE — PROGRESS NOTE ADULT - PROBLEM SELECTOR PLAN 2
IMPROVING. Pt exhibited agitation episodes required zyprexa IM. On Seroquel 50 qhs (now held 2/2 odynophagia). Qtc 489 11/25. mental status now AO2 (baseline). Now in supervised room, calm with no PRN agitation med required. 11/28 Qtc 503.   Likely 2/2 delirium and/or dementia. Infectious causes unlikely i/s/o normal WBC, afebrile and VSS.     - will 1st attempt to redirect with family on phone, to refrain from Zyprexa or sedative use   - avoid QTc prolongation medication.   - c/w supervision

## 2023-12-01 NOTE — PROGRESS NOTE ADULT - PROBLEM SELECTOR PROBLEM 6
Prophylactic measure
Squamous cell carcinoma of larynx
Prophylactic measure
Prophylactic measure

## 2023-12-01 NOTE — DISCHARGE NOTE NURSING/CASE MANAGEMENT/SOCIAL WORK - NSDCPEFALRISK_GEN_ALL_CORE
For information on Fall & Injury Prevention, visit: [email or web address] OR  [email or web address] OR  https://www.cdc.gov/steadi/patient.html

## 2023-12-01 NOTE — PROGRESS NOTE ADULT - PROBLEM SELECTOR PLAN 6
Diet: NPO w/ PEG Glucerna 1.5 feeds  F: D5LR @60cc/hr, stopped 11/30   E: replete PRN  DVT: Resumed after PEG   Code: Full  GI PPx: Pantoprazole 40 mg qd via PEG     Dispo: Once PEG placed, pt is medically cleared for dc home care services & needs O2, suctioning, hospital bed, w/c, 3in 1 commode and PEG feeds for dc    Needs proper med rec at this admission and on this discharge, as was discharged recently with incorrect regimen.

## 2023-12-01 NOTE — PROGRESS NOTE ADULT - PROVIDER SPECIALTY LIST ADULT
Internal Medicine
Hospitalist
Internal Medicine

## 2023-12-01 NOTE — PROGRESS NOTE ADULT - SUBJECTIVE AND OBJECTIVE BOX
CC: Patient is a 78y old  Male who presents with a chief complaint of inadequate support at home (01 Dec 2023 07:20)      INTERVAL EVENTS: INNA    SUBJECTIVE / INTERVAL HPI: Patient seen and examined at bedside.     ROS: negative unless otherwise stated above.    VITAL SIGNS:  Vitals Interpretation review:  Vital Signs Last 24 Hrs  T(C): 36.7 (01 Dec 2023 14:00), Max: 36.7 (01 Dec 2023 14:00)  T(F): 98 (01 Dec 2023 14:00), Max: 98 (01 Dec 2023 14:00)  HR: 82 (01 Dec 2023 14:00) (82 - 92)  BP: 116/66 (01 Dec 2023 14:00) (113/63 - 129/65)  BP(mean): --  RR: 18 (01 Dec 2023 14:00) (16 - 18)  SpO2: 96% (01 Dec 2023 14:00) (96% - 100%)    Parameters below as of 01 Dec 2023 14:00  Patient On (Oxygen Delivery Method): nasal cannula  O2 Flow (L/min): 2        12-01-23 @ 07:01  -  12-01-23 @ 16:28  --------------------------------------------------------  IN: 90 mL / OUT: 0 mL / NET: 90 mL        PHYSICAL EXAM:  General: NAD, calm  HEENT: legally blind, MMM, hoarse voice   Respiratory: CTAB; no wheezes/rales/rhonchi, normal WOB  Cardiovascular: Regular rhythm/rate, + S1/S2; no murmurs, rubs gallops   Gastrointestinal: Soft; NTND; PEG tube placed 11/30. No new erythema w/ no active signs of inflammation around site.  : no suprapubic tenderness  Vascular: extremities WWP; no edema/cyanosis, 2+ distal pulses b/l   Neurological: A&Ox2  Skin: skin changes at neck likely 2/2 radiation, pressure ulcers @ b/l heels (stable per wound care)       MEDICATIONS:  MEDICATIONS  (STANDING):  albuterol/ipratropium for Nebulization 3 milliLiter(s) Nebulizer every 6 hours  atorvastatin 80 milliGRAM(s) Oral at bedtime  atropine 1% Ophthalmic Solution for SubLingual Use 1 Drop(s) SubLingual every 8 hours  dextrose 5%. 1000 milliLiter(s) (100 mL/Hr) IV Continuous <Continuous>  dextrose 5%. 1000 milliLiter(s) (50 mL/Hr) IV Continuous <Continuous>  dextrose 50% Injectable 25 Gram(s) IV Push once  dextrose 50% Injectable 12.5 Gram(s) IV Push once  dextrose 50% Injectable 25 Gram(s) IV Push once  fentaNYL   Patch  25 MICROgram(s)/Hr 1 Patch Transdermal every 72 hours  FIRST- Mouthwash  BLM 4 milliLiter(s) Swish and Spit every 6 hours  glucagon  Injectable 1 milliGRAM(s) IntraMuscular once  influenza  Vaccine (HIGH DOSE) 0.7 milliLiter(s) IntraMuscular once  insulin glargine Injectable (LANTUS) 10 Unit(s) SubCutaneous at bedtime  insulin lispro (ADMELOG) corrective regimen sliding scale   SubCutaneous three times a day before meals  insulin lispro (ADMELOG) corrective regimen sliding scale   SubCutaneous at bedtime  lidocaine 2% Viscous 5 milliLiter(s) Swish and Spit every 12 hours  metoprolol tartrate 12.5 milliGRAM(s) Oral every 12 hours  pantoprazole    Tablet 40 milliGRAM(s) Oral before breakfast  QUEtiapine 50 milliGRAM(s) Oral at bedtime  senna 2 Tablet(s) Oral at bedtime  silver sulfADIAZINE 1% Cream 1 Application(s) Topical two times a day    MEDICATIONS  (PRN):  dextrose Oral Gel 15 Gram(s) Oral once PRN Blood Glucose LESS THAN 70 milliGRAM(s)/deciliter  polyethylene glycol 3350 17 Gram(s) Oral every 12 hours PRN Constipation      ALLERGIES:  Allergies    No Known Allergies    Intolerances        LABS:  Labs Interpretation:                         9.4    5.81  )-----------( 254      ( 01 Dec 2023 05:30 )             30.9     12-01    145  |  105  |  13  ----------------------------<  70  3.9   |  32<H>  |  0.91    Ca    8.7      01 Dec 2023 05:30  Phos  3.8     12-01  Mg     1.8     12-01    TPro  5.6<L>  /  Alb  2.4<L>  /  TBili  0.3  /  DBili  x   /  AST  16  /  ALT  <5<L>  /  AlkPhos  70  12-01    PT/INR - ( 30 Nov 2023 05:30 )   PT: 13.8 sec;   INR: 1.22          PTT - ( 30 Nov 2023 05:30 )  PTT:30.8 sec  Urinalysis Basic - ( 01 Dec 2023 05:30 )    Color: x / Appearance: x / SG: x / pH: x  Gluc: 70 mg/dL / Ketone: x  / Bili: x / Urobili: x   Blood: x / Protein: x / Nitrite: x   Leuk Esterase: x / RBC: x / WBC x   Sq Epi: x / Non Sq Epi: x / Bacteria: x      CAPILLARY BLOOD GLUCOSE      POCT Blood Glucose.: 107 mg/dL (01 Dec 2023 12:07)      RADIOLOGY & ADDITIONAL TESTS: Reviewed.

## 2023-12-01 NOTE — PROGRESS NOTE ADULT - PROBLEM SELECTOR PLAN 3
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 C/o odynophagia causing poor PO intake. PEG placed 11/14 i/s/o ongoing PO intake 2/2 odynophagia, likely i/s/o cancer tx. Pt previously on fluconazole for candidiasis esophagitis ppx but d/c by Dr. Marr for lack of improvement.     - Per rad onc, odynophagia and mucositis should likely resolve 2-3 weeks after completion of radiation. (last 10/30)  - SS reconsulted. PEG replaced 11/30    - Onc follow up 1 wk w/ Dr. Derek Harvey upon d/c   - Chest PT, hypertonic nebulizer q4hr   - Aspiration precautions    - Magic mouthwash   - magic mouthwash  - viscous lidocaine   - scopolamine patch    #PAIN REGIMEN  follows with Dr. Anthony. Not in acute pain    - Fentanyl 25mcg/hr Patch q72hr as long-acting agent  - HYDROmorphone  1mg IV Push every 4 hours PRN Breakthrough pain  - morphine 5mg Oral via PEG every 4 hours PRN Moderate Pain (4 - 6)  - morphine 10mg Oral every 4 hours PRN Severe Pain (7 - 10)  - atropine 1% sublingual TID

## 2023-12-01 NOTE — DISCHARGE NOTE NURSING/CASE MANAGEMENT/SOCIAL WORK - NSDCFUADDAPPT_GEN_ALL_CORE_FT
Please schedule for Modified Barium Swallow exam in next 4-6 weeks at [facility]. Phone number and contact information is listed here.

## 2023-12-01 NOTE — PROGRESS NOTE ADULT - PROBLEM SELECTOR PLAN 1
PEG tube dislodged 11/24 o/n. Previously placed by IR 11/14. GI/ IR consulted. CTAP post dislodgement shows intact stomach & anchor; no air/ fluid/ fluid collections. IR replaced PEG 11/30. no signs of local infection.     - Reinitiated feeds, at doses per nutrition recs noted 11/29 (Glucerna 1.5, 50ml/hr for 24hrs target)   - gentle IVF given hx of CAD s/p stents w/ EF 45-50%. -> stopped 11/29   - FSG INNA  - Thiamine consideration

## 2023-12-01 NOTE — PROGRESS NOTE ADULT - NUTRITIONAL ASSESSMENT
This patient has been assessed with a concern for Malnutrition and has been determined to have a diagnosis/diagnoses of Severe protein-calorie malnutrition.    This patient is being managed with:   Diet NPO after Midnight-     NPO Start Date: 26-Nov-2023   NPO Start Time: 23:59  Entered: Nov 26 2023 10:05PM    Diet NPO with Tube Feed-  Tube Feeding Modality: Gastrostomy  Glucerna 1.2 Seamus (GLUCERNARTH)  Total Volume for 24 Hours (mL): 1560  Total Number of Cans: 7  Continuous  Starting Tube Feed Rate {mL per Hour}: 20  Increase Tube Feed Rate by (mL): 10     Every 4 hours  Until Goal Tube Feed Rate (mL per Hour): 65  Tube Feed Duration (in Hours): 24  Tube Feed Start Time: 21:00  Sarah TF     Qty per Day:  2  Entered: Nov 22 2023  9:46PM  
This patient has been assessed with a concern for Malnutrition and has been determined to have a diagnosis/diagnoses of Severe protein-calorie malnutrition.    This patient is being managed with:   Diet NPO after Midnight-     NPO Start Date: 26-Nov-2023   NPO Start Time: 23:59  Entered: Nov 26 2023 10:05PM    Diet NPO with Tube Feed-  Tube Feeding Modality: Gastrostomy  Glucerna 1.2 Seamus (GLUCERNARTH)  Total Volume for 24 Hours (mL): 1560  Total Number of Cans: 7  Continuous  Starting Tube Feed Rate {mL per Hour}: 20  Increase Tube Feed Rate by (mL): 10     Every 4 hours  Until Goal Tube Feed Rate (mL per Hour): 65  Tube Feed Duration (in Hours): 24  Tube Feed Start Time: 21:00  Sarah TF     Qty per Day:  2  Entered: Nov 22 2023  9:46PM  
This patient has been assessed with a concern for Malnutrition and has been determined to have a diagnosis/diagnoses of Severe protein-calorie malnutrition.    This patient is being managed with:   Diet NPO with Tube Feed-  Tube Feeding Modality: Gastrostomy  Glucerna 1.2 Seamus (GLUCERNARTH)  Continuous  Starting Tube Feed Rate {mL per Hour}: 20     Every 24 hours  Until Goal Tube Feed Rate (mL per Hour): 65  Tube Feed Duration (in Hours): 24  Tube Feed Start Time: 13:00   Frequency: Every 6 Hours  Sarah PETERSEN     Qty per Day:  2  Entered: Nov 21 2023  7:58PM  
This patient has been assessed with a concern for Malnutrition and has been determined to have a diagnosis/diagnoses of Severe protein-calorie malnutrition.    This patient is being managed with:   Diet NPO after Midnight-     NPO Start Date: 26-Nov-2023   NPO Start Time: 23:59  Entered: Nov 26 2023 10:05PM    Diet NPO with Tube Feed-  Tube Feeding Modality: Gastrostomy  Glucerna 1.2 Seamus (GLUCERNARTH)  Total Volume for 24 Hours (mL): 1560  Total Number of Cans: 7  Continuous  Starting Tube Feed Rate {mL per Hour}: 20  Increase Tube Feed Rate by (mL): 10     Every 4 hours  Until Goal Tube Feed Rate (mL per Hour): 65  Tube Feed Duration (in Hours): 24  Tube Feed Start Time: 21:00  Sarah TF     Qty per Day:  2  Entered: Nov 22 2023  9:46PM  
This patient has been assessed with a concern for Malnutrition and has been determined to have a diagnosis/diagnoses of Severe protein-calorie malnutrition.  
This patient has been assessed with a concern for Malnutrition and has been determined to have a diagnosis/diagnoses of Severe protein-calorie malnutrition.    This patient is being managed with:   Diet NPO with Tube Feed-  Tube Feeding Modality: Gastrostomy  Glucerna 1.2 Seamus (GLUCERNARTH)  Total Volume for 24 Hours (mL): 1560  Total Number of Cans: 7  Continuous  Starting Tube Feed Rate {mL per Hour}: 20  Increase Tube Feed Rate by (mL): 10     Every 4 hours  Until Goal Tube Feed Rate (mL per Hour): 65  Tube Feed Duration (in Hours): 24  Tube Feed Start Time: 21:00  Sarah TF     Qty per Day:  2  Entered: Nov 22 2023  9:46PM  
This patient has been assessed with a concern for Malnutrition and has been determined to have a diagnosis/diagnoses of Severe protein-calorie malnutrition.    This patient is being managed with:   Diet NPO after Midnight-     NPO Start Date: 29-Nov-2023   NPO Start Time: 23:59  Entered: Nov 29 2023  4:48PM    The following pending diet order is being considered for treatment of Severe protein-calorie malnutrition:  Diet NPO with Tube Feed-  Tube Feeding Modality: Gastrostomy  Glucerna 1.5 Seamus (GLUCERNA1.5)  Total Volume for 24 Hours (mL): 1200  Total Number of Cans: 5  Continuous  Starting Tube Feed Rate {mL per Hour}: 10  Increase Tube Feed Rate by (mL): 10     Every 4 hours  Until Goal Tube Feed Rate (mL per Hour): 50  Tube Feed Duration (in Hours): 24  Tube Feed Start Time: 00:00  Entered: Nov 29 2023  2:38PM  
This patient has been assessed with a concern for Malnutrition and has been determined to have a diagnosis/diagnoses of Severe protein-calorie malnutrition.    This patient is being managed with:   Diet NPO with Tube Feed-  Tube Feeding Modality: Gastrostomy  Glucerna 1.2 Seamus (GLUCERNARTH)  Total Volume for 24 Hours (mL): 1560  Total Number of Cans: 7  Continuous  Starting Tube Feed Rate {mL per Hour}: 20  Increase Tube Feed Rate by (mL): 10     Every 4 hours  Until Goal Tube Feed Rate (mL per Hour): 65  Tube Feed Duration (in Hours): 24  Tube Feed Start Time: 21:00  Sarah TF     Qty per Day:  2  Entered: Nov 22 2023  9:46PM  
This patient has been assessed with a concern for Malnutrition and has been determined to have a diagnosis/diagnoses of Severe protein-calorie malnutrition.    This patient is being managed with:   Diet NPO with Tube Feed-  Tube Feeding Modality: Gastrostomy  Glucerna 1.2 Seamus (GLUCERNARTH)  Continuous  Starting Tube Feed Rate {mL per Hour}: 20     Every 24 hours  Until Goal Tube Feed Rate (mL per Hour): 65  Tube Feed Duration (in Hours): 24  Tube Feed Start Time: 13:00   Frequency: Every 6 Hours  Sarah PETERSEN     Qty per Day:  2  Entered: Nov 21 2023  7:58PM

## 2023-12-01 NOTE — PROGRESS NOTE ADULT - PROBLEM SELECTOR PLAN 4
STABLE. CAD s/p MIDCAB and PCI with multiple WINTER (most recent to LCx in 8/2022), Currently asymptomatic. EKGs w/o ischemic changes.    - home med ASA81 and Lipitor 80qhs held 2/2 NPO / dislodged PEG -> replaced 11/30   - 12/01 restarted 24hrs after PEG replacement per IR recs

## 2023-12-01 NOTE — PROGRESS NOTE ADULT - PROBLEM SELECTOR PROBLEM 5
Acute diarrhea
ELENITA (acute kidney injury)
Acute diarrhea

## 2023-12-01 NOTE — DISCHARGE NOTE NURSING/CASE MANAGEMENT/SOCIAL WORK - PATIENT PORTAL LINK FT
You can access the FollowMyHealth Patient Portal offered by [facility] by registering at the following website: [email or web address]. By joining [facility]’s FollowMyHealth portal, you will also be able to view your health information using other applications (apps) compatible with our system.

## 2023-12-01 NOTE — PROGRESS NOTE ADULT - REASON FOR ADMISSION
inadequate support at home

## 2023-12-02 NOTE — PATIENT PROFILE ADULT - FUNCTIONAL ASSESSMENT - BASIC MOBILITY 3.
Confirmed with pt all upcoming appt times including 10am [facility] for methotrexate.     1 = Total assistance

## 2023-12-02 NOTE — ED PROVIDER NOTE - INTERPRETATION
Nichole GODDARD - FYI for appointment today    Action Requested: Summary for Provider     []  Critical Lab, Recommendations Needed  [] Need Additional Advice  [x]   FYI    []   Need Orders  [] Need Medications Sent to Pharmacy  []  Other     SUMMARY: Cass
normal sinus rhythm

## 2023-12-02 NOTE — ED ADULT NURSE REASSESSMENT NOTE - NS ED NURSE REASSESS COMMENT FT1
Order is set up. Pending MD approval.  
Pt resting in bed, awake, awaiting rehab placement.
Bisi Davis: Daughter  #[phone]

## 2023-12-02 NOTE — ED PROVIDER NOTE - OBJECTIVE STATEMENT
78M hx cad, dm, laryngeal ca (chemo/radiation), pvd, s/p recent peg placement, BIBEMS from [facility]. pt was recently admitted to the hospital and was discharged to rehab facility yesterday. family states they were not satisfied with the care at the facility and want him placed elsewhere. [facility] facility was too hot and didn't have any air circulation. pt without complaints. 78M hx cad, dm, laryngeal ca (chemo/radiation), pvd, s/p recent peg placement, BIBEMS from [facility]. pt was recently admitted to the hospital and was discharged to rehab facility yesterday. family states they were not satisfied with the care at the facility and want him placed elsewhere. [facility] facility was too hot and didn't have any air circulation. pt without complaints. 78M hx cad, dm, laryngeal ca (chemo/radiation), pvd, s/p recent peg placement, BIBEMS from [facility]. pt was recently admitted to the hospital and was discharged to rehab facility yesterday. family states they were not satisfied with the care at the facility and want him placed elsewhere. [facility] facility was too hot and didn't have any air circulation. pt without complaints.

## 2023-12-02 NOTE — ED PROVIDER NOTE - CARE PLAN
Principal Discharge DX:	PEG (percutaneous endoscopic gastrostomy) status  Secondary Diagnosis:	Laryngeal cancer   1

## 2023-12-02 NOTE — H&P ADULT - PROBLEM SELECTOR PLAN 4
Pt exhibited agitation episodes required zyprexa IM on last admission. Is currently calm however was previously likely to delirium and/or dementia.   - attempt to redirect if patient becomes agitated  - can c/w zyprexa as per last admission on 12/1

## 2023-12-02 NOTE — H&P ADULT - HISTORY OF PRESENT ILLNESS
HPI: 78M hx cad, dm, laryngeal ca (chemo/radiation), pvd, s/p recent peg placement, BIBEMS from [facility]. pt was recently admitted to the hospital and was discharged to rehab facility yesterday. family states they were not satisfied with the care at the facility and want him placed elsewhere. [facility] facility was too hot and didn't have any air circulation. pt without complaints.    ED course   Vitals Temp 98  HR 88  Bp 107/64  Spo2 100% 2 L nNC   LAbs: Hgb 9.9, albumin 2.5, UA flordily positive   EKG NSR      General: NAD, calm  HEENT: legally blind, MMM, hoarse voice   Respiratory: CTAB; no wheezes/rales/rhonchi, normal WOB  Cardiovascular: Regular rhythm/rate, + S1/S2; no murmurs, rubs gallops   Gastrointestinal: Soft; NTND; PEG tube placed 11/30. No new erythema w/ no active signs of inflammation around site.  : no suprapubic tenderness  Vascular: extremities WWP; no edema/cyanosis, 2+ distal pulses b/l   Neurological: A&Ox2  Skin: skin changes at neck likely 2/2 radiation, pressure ulcers @ b/l heels (stable per wound care)    HPI: 78M hx cad, dm, laryngeal ca (chemo/radiation), pvd, s/p recent peg placement, BIBEMS from [facility]. pt was recently admitted to the hospital and was discharged to rehab facility yesterday. family states they were not satisfied with the care at the facility and want him placed elsewhere. [facility] facility was too hot and didn't have any air circulation. pt without complaints.    ED course   Vitals Temp 98  HR 88  Bp 107/64  Spo2 100% 2 L nNC   LAbs: Hgb 9.9, albumin 2.5, UA flordily positive   EKG NSR      General: NAD, calm  HEENT: legally blind, MMM, hoarse voice   Respiratory: CTAB; no wheezes/rales/rhonchi, normal WOB  Cardiovascular: Regular rhythm/rate, + S1/S2; no murmurs, rubs gallops   Gastrointestinal: Soft; NTND; PEG tube placed 11/30. No new erythema w/ no active signs of inflammation around site.  : no suprapubic tenderness  Vascular: extremities WWP; no edema/cyanosis, 2+ distal pulses b/l   Neurological: A&Ox2  Skin: skin changes at neck likely 2/2 radiation, pressure ulcers @ b/l heels (stable per wound care)    HPI: 78M hx cad, dm, laryngeal ca (chemo/radiation), pvd, s/p recent peg placement, BIBEMS from [facility]. pt was recently admitted to the hospital and was discharged to rehab facility yesterday. family states they were not satisfied with the care at the facility and want him placed elsewhere. \[facility]\"" facility was too hot and didn't have any air circulation. pt without complaints.    ED course   Vitals Temp 98  HR 88  Bp 107/64  Spo2 100% 2 L nNC   LAbs: Hgb 9.9, albumin 2.5, UA flordily positive   EKG NSR      General: NAD, calm  HEENT: legally blind, MMM, hoarse voice   Respiratory: CTAB; no wheezes/rales/rhonchi, normal WOB  Cardiovascular: Regular rhythm/rate, + S1/S2; no murmurs, rubs gallops   Gastrointestinal: Soft; NTND; PEG tube placed 11/30. No new erythema w/ no active signs of inflammation around site.  : no suprapubic tenderness  Vascular: extremities WWP; no edema/cyanosis, 2+ distal pulses b/l   Neurological: A&Ox2  Skin: skin changes at neck likely 2/2 radiation, pressure ulcers @ b/l heels (stable per wound care)

## 2023-12-02 NOTE — ED PROVIDER NOTE - NSICDXPASTMEDICALHX_GEN_ALL_CORE_FT
PAST MEDICAL HISTORY:  CAD (coronary artery disease)     DM (diabetes mellitus)     Glaucoma     HLD (hyperlipidemia)     HTN (hypertension)     Laryngeal cancer squamous cell carcinoma    PVD (peripheral vascular disease)

## 2023-12-02 NOTE — PATIENT PROFILE ADULT - FALL HARM RISK - HARM RISK INTERVENTIONS

## 2023-12-02 NOTE — H&P ADULT - PROBLEM SELECTOR PLAN 3
STABLE. CAD s/p MIDCAB and PCI with multiple WINTER (most recent to LCx in 8/2022), Currently asymptomatic. EKGs w/o ischemic changes    c/w home med ASA81 and Lipitor 80qhs

## 2023-12-02 NOTE — H&P ADULT - PROBLEM SELECTOR PLAN 5
A1c 9.0 on 10/30. Discharged on lantus 45qd in the AM and lispro 7u TID, however patient discontinued on standing insulin while previously hospitalized due to hypoglycemia episodes.   - c/w tube feeds  - finger sticks q6 houirs   - mISS

## 2023-12-02 NOTE — H&P ADULT - NSHPLABSRESULTS_GEN_ALL_CORE
LABS:  cret                        9.9    6.37  )-----------( 268      ( 02 Dec 2023 01:37 )             31.7     12-02    145  |  106  |  17  ----------------------------<  159<H>  4.8   |  27  |  0.90    Ca    8.8      02 Dec 2023 01:37  Phos  3.5     12-01  Mg     2.2     12-01    TPro  6.0  /  Alb  2.5<L>  /  TBili  0.3  /  DBili  x   /  AST  18  /  ALT  <5<L>  /  AlkPhos  74  12-02

## 2023-12-02 NOTE — ED PROVIDER NOTE - PROGRESS NOTE DETAILS
will readmit to medicine for  intervention and new SOFI placement will hold pt for  in the morning to see if pt can get alternative SOFI placement

## 2023-12-02 NOTE — ED ADULT NURSE NOTE - NSFALLRISKINTERV_ED_ALL_ED

## 2023-12-02 NOTE — ED ADULT TRIAGE NOTE - CHIEF COMPLAINT QUOTE
Pt, with hx of CAD, PVD, DM and squamous cell carcinoma of larynx, d/c'd today from [facility] (admitted for inadequate support at home), presents to ER for social admission d/t familys concern that pt was receiving inadequate care at [facility]. Pt voices no complaints at this time.

## 2023-12-02 NOTE — ED ADULT NURSE NOTE - CHIEF COMPLAINT QUOTE
Pt, with hx of CAD, PVD, DM and squamous cell carcinoma of larynx, d/c'd today from [facility] (admitted for inadequate support at home), presents to ER for social admission d/t family's concern that pt was receiving inadequate care at [facility]. Pt voices no complaints at this time.

## 2023-12-02 NOTE — ED PROVIDER NOTE - CLINICAL SUMMARY MEDICAL DECISION MAKING FREE TEXT BOX
discharged from hospital yesterday to [facility] as needs greater level of care of peg tube and laryngeal cancer than can be provided at home. family dissatisfied with rehab facility and does not want him to go back there. pt without complaints. will require  intervention  -check labs  -ekg discharged from hospital yesterday to [facility] as needs greater level of care of peg tube and laryngeal cancer than can be provided at home. family dissatisfied with rehab facility and does not want him to go back there. pt without complaints. will require  intervention  -check labs  -ekg discharged from hospital yesterday to [facility] as needs greater level of care of peg tube and laryngeal cancer than can be provided at home. family dissatisfied with rehab facility and does not want him to go back there. pt without complaints. will require  intervention  -check labs  -ekg

## 2023-12-02 NOTE — ED PROVIDER NOTE - NS ED MD DISPO DIVISION
[facility]

## 2023-12-02 NOTE — H&P ADULT - ASSESSMENT
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 Marr/Dr. Gar),  s/p PEG 11/14 was recently admitted to [facility] for increase need of care at home in which course c/b disloged PEG, uncomplicated IR replacement 11/30. 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 Marr/Dr. Gar),  s/p PEG 11/14 was recently admitted to [facility] for increase need of care at home in which course c/b disloged PEG, uncomplicated IR replacement 11/30. 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 Marr/Dr. Gar),  s/p PEG 11/14 was recently admitted to [facility] for increase need of care at home in which course c/b disloged PEG, uncomplicated IR replacement 11/30. TF were restarted on 12/01 and and patient was medically cleared for SOFI on dispo however now returned for re-placement

## 2023-12-02 NOTE — H&P ADULT - NSHPREVIEWOFSYSTEMS_GEN_ALL_CORE
General: NAD, calm  HEENT: legally blind, MMM, hoarse voice   Respiratory: CTAB; no wheezes/rales/rhonchi, normal WOB  Cardiovascular: Regular rhythm/rate, + S1/S2; no murmurs, rubs gallops   Gastrointestinal: Soft; NTND; PEG tube placed 11/30. No new erythema w/ no active signs of inflammation around site.  : no suprapubic tenderness  Vascular: extremities WWP; no edema/cyanosis, 2+ distal pulses b/l   Neurological: A&Ox2  Skin: skin changes at neck likely 2/2 radiation, pressure ulcers @ b/l heels (stable per wound care)

## 2023-12-02 NOTE — ED ADULT NURSE NOTE - OBJECTIVE STATEMENT
78 y.o. male BIBEMS from [facility]. Per patient's family he was discharged from [facility] yesterday and sent to the rehab center, but family reports the care was inadequate. Patient is AAOx3, blind, but able to follow commands; patient denies any c/o chest pain, fever, chills, N/V/D or abdominal pain. On assessment patient has been coughing up thick white sputum and has a LUQ peg tube. Patient is breathing spontaneously, chest rise is visible, symmetrical, even and unlabored.

## 2023-12-02 NOTE — H&P ADULT - ATTENDING COMMENTS
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 Marr/Dr. Gar),  s/p PEG 11/14 was recently admitted to [facility] for increase need of care at home in which course c/b disloged PEG, uncomplicated IR replacement 11/30. TF were restarted on 12/01 and and patient was medically cleared for SOFI on dispo however now returned for re-placement     Plan  -appreciate SW assistance re: placement  -c/w ASA,lipitor  -c/w FSGs  -c/w magic mouthwash  -lovenox for DVT ppx 88M, history of CAD, PVD, DM, 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 Marr/Dr. Gar),  s/p PEG 11/14 was recently admitted to [facility] for increase need of care at home in which course c/b disloged PEG, uncomplicated IR replacement 11/30. TF were restarted on 12/01 and and patient was medically cleared for SOFI on dispo however now returned for re-placement     Plan  -appreciate SW assistance re: placement  -c/w ASA,lipitor  -c/w FSGs  -c/w magic mouthwash  -lovenox for DVT ppx 88M, history of CAD, PVD, DM, 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 Marr/Dr. Gar),  s/p PEG 11/14 was recently admitted to [facility] for increase need of care at home in which course c/b disloged PEG, uncomplicated IR replacement 11/30. TF were restarted on 12/01 and and patient was medically cleared for SOFI on dispo however now returned for re-placement     Plan  -appreciate SW assistance re: placement  -c/w ASA,lipitor  -c/w FSGs  -c/w magic mouthwash  -lovenox for DVT ppx

## 2023-12-02 NOTE — H&P ADULT - PROBLEM SELECTOR PLAN 2
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 C/o odynophagia causing poor PO intake. PEG placed 11/14 i/s/o ongoing PO intake 2/2 odynophagia, likely i/s/o cancer tx. Pt previously on fluconazole for candidiasis esophagitis ppx but d/c by Dr. Marr for lack of improvement.       - Onc follow up 1 wk w/ Dr. Derek Harvey upon d/c   - Chest PT, hypertonic nebulizer q4hr   - Aspiration precautions    - Magic mouthwash   - magic mouthwash  - viscous lidocaine   - scopolamine patch    follows with Dr. Anthony. Not in acute pain    - Fentanyl 25mcg/hr Patch q72hr as long-acting agent  - HYDROmorphone  1mg IV Push every 4 hours PRN Breakthrough pain  - morphine 5mg Oral via PEG every 4 hours PRN Moderate Pain (4 - 6)  - morphine 10mg Oral every 4 hours PRN Severe Pain (7 - 10)  - atropine 1% sublingual TID. 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 C/o odynophagia causing poor PO intake. PEG placed 11/14 i/s/o ongoing PO intake 2/2 odynophagia, likely i/s/o cancer tx. Pt previously on fluconazole for candidiasis esophagitis ppx but d/c by Dr. Marr for lack of improvement.       - Onc follow up 1 wk w/ Dr. Derek Harvey upon d/c   - Chest PT, hypertonic nebulizer q4hr   - Aspiration precautions    - Magic mouthwash     follows with Dr. Anthony. Not in acute pain    - c.w Fentanyl 25mcg/hr Patch q72hr as long-acting agent

## 2023-12-03 NOTE — PROGRESS NOTE ADULT - ASSESSMENT
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 Marr/Dr. Gar),  s/p PEG 11/14 was recently admitted to [facility] for increase need of care at home in which course c/b disloged PEG, uncomplicated IR replacement 11/30. 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 Marr/Dr. Gar),  s/p PEG 11/14 was recently admitted to [facility] for increase need of care at home in which course c/b disloged PEG, uncomplicated IR replacement 11/30. 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 Marr/Dr. Gar),  s/p PEG 11/14 was recently admitted to [facility] for increase need of care at home in which course c/b disloged PEG, uncomplicated IR replacement 11/30. TF were restarted on 12/01 and and patient was medically cleared for SOFI on dispo however now returned for re-placement

## 2023-12-03 NOTE — DISCHARGE NOTE PROVIDER - NSDCCPCAREPLAN_GEN_ALL_CORE_FT
PRINCIPAL DISCHARGE DIAGNOSIS  Diagnosis: Laryngeal cancer  Assessment and Plan of Treatment: You were admitted for SOFI placement. You have aspirated on tube feeds goals of care conversation was held with family who's priority is to ensure that you are not in pain and would like to continue with minimally invasive medical interventions (IVF, Abx) to see if you can stabilize without escalation of care to ICU for pressors, etc. You have pulled PEG tube twice, so no PEG placement will be attempted again. Now family planning to take move towards hospice services.

## 2023-12-03 NOTE — PROGRESS NOTE ADULT - SUBJECTIVE AND OBJECTIVE BOX
Patient is a 78y old  Male who presents with a chief complaint of Rehab placement (02 Dec 2023 14:21)      SUBJECTIVE / OVERNIGHT EVENTS:    MEDICATIONS  (STANDING):  albuterol    90 MICROgram(s) HFA Inhaler 2 Puff(s) Inhalation every 6 hours  aspirin enteric coated 81 milliGRAM(s) Oral daily  atorvastatin 80 milliGRAM(s) Oral at bedtime  atropine 1% Solution 1 Drop(s) Both EYES daily  dextrose 5%. 1000 milliLiter(s) (100 mL/Hr) IV Continuous <Continuous>  dextrose 5%. 1000 milliLiter(s) (50 mL/Hr) IV Continuous <Continuous>  dextrose 50% Injectable 25 Gram(s) IV Push once  dextrose 50% Injectable 25 Gram(s) IV Push once  dextrose 50% Injectable 12.5 Gram(s) IV Push once  enoxaparin Injectable 40 milliGRAM(s) SubCutaneous every 24 hours  fentaNYL   Patch  25 MICROgram(s)/Hr 1 Patch Transdermal every 72 hours  FIRST- Mouthwash  BLM 10 milliLiter(s) Swish and Spit daily  glucagon  Injectable 1 milliGRAM(s) IntraMuscular once  influenza  Vaccine (HIGH DOSE) 0.7 milliLiter(s) IntraMuscular once  insulin lispro (ADMELOG) corrective regimen sliding scale   SubCutaneous three times a day before meals  magnesium sulfate  IVPB 2 Gram(s) IV Intermittent once  metoprolol succinate ER 25 milliGRAM(s) Oral daily    MEDICATIONS  (PRN):  acetaminophen     Tablet .. 650 milliGRAM(s) Oral every 6 hours PRN Temp greater or equal to 38C (100.4F), Mild Pain (1 - 3)  aluminum hydroxide/magnesium hydroxide/simethicone Suspension 30 milliLiter(s) Oral every 4 hours PRN Dyspepsia  dextrose Oral Gel 15 Gram(s) Oral once PRN Blood Glucose LESS THAN 70 milliGRAM(s)/deciliter  melatonin 3 milliGRAM(s) Oral at bedtime PRN Insomnia  ondansetron Injectable 4 milliGRAM(s) IV Push every 8 hours PRN Nausea and/or Vomiting      CAPILLARY BLOOD GLUCOSE      POCT Blood Glucose.: 245 mg/dL (03 Dec 2023 07:17)  POCT Blood Glucose.: 158 mg/dL (02 Dec 2023 22:51)  POCT Blood Glucose.: 170 mg/dL (02 Dec 2023 17:52)    I&O's Summary      PHYSICAL EXAM:  Vital Signs Last 24 Hrs  T(C): 36.3 (03 Dec 2023 06:41), Max: 36.9 (02 Dec 2023 13:25)  T(F): 97.3 (03 Dec 2023 06:41), Max: 98.5 (02 Dec 2023 13:25)  HR: 85 (03 Dec 2023 06:41) (83 - 85)  BP: 106/64 (03 Dec 2023 06:41) (106/64 - 125/69)  BP(mean): --  RR: 18 (03 Dec 2023 06:41) (16 - 18)  SpO2: 96% (03 Dec 2023 06:41) (96% - 100%)    Parameters below as of 03 Dec 2023 06:41  Patient On (Oxygen Delivery Method): nasal cannula  O2 Flow (L/min): 2    General: NAD, calm  HEENT: legally blind, MMM, hoarse voice   Respiratory: CTAB; no wheezes/rales/rhonchi, normal WOB  Cardiovascular: Regular rhythm/rate, + S1/S2; no murmurs, rubs gallops   Gastrointestinal: Soft; NTND; PEG tube placed 11/30. No new erythema w/ no active signs of inflammation around site.  : no suprapubic tenderness  Vascular: extremities WWP; no edema/cyanosis, 2+ distal pulses b/l   Neurological: A&Ox2  Skin: skin changes at neck likely 2/2 radiation, pressure ulcers @ b/l heels (stable per wound care)     LABS:                        9.5    6.35  )-----------( 237      ( 03 Dec 2023 07:31 )             31.5     12-03    141  |  103  |  24<H>  ----------------------------<  250<H>  4.4   |  27  |  0.82    Ca    8.5      03 Dec 2023 07:31  Phos  2.9     12-03  Mg     1.6     12-03    TPro  5.8<L>  /  Alb  2.4<L>  /  TBili  0.2  /  DBili  x   /  AST  13  /  ALT  <5<L>  /  AlkPhos  80  12-03          Urinalysis Basic - ( 03 Dec 2023 07:31 )    Color: x / Appearance: x / SG: x / pH: x  Gluc: 250 mg/dL / Ketone: x  / Bili: x / Urobili: x   Blood: x / Protein: x / Nitrite: x   Leuk Esterase: x / RBC: x / WBC x   Sq Epi: x / Non Sq Epi: x / Bacteria: x        RADIOLOGY & ADDITIONAL TESTS:    Imaging Personally Reviewed:    Consultant(s) Notes Reviewed:      Care Discussed with Consultants/Other Providers:

## 2023-12-03 NOTE — PROGRESS NOTE ADULT - PROBLEM SELECTOR PLAN 1
Patient recently discharged on 12/1 for SOFI, now requesting to be placed at another facility.    - f/u SW

## 2023-12-03 NOTE — DISCHARGE NOTE PROVIDER - DETAILS OF MALNUTRITION DIAGNOSIS/DIAGNOSES
This patient has been assessed with a concern for Malnutrition and was treated during this hospitalization for the following Nutrition diagnosis/diagnoses:     -  12/04/2023: Severe protein-calorie malnutrition

## 2023-12-03 NOTE — DISCHARGE NOTE PROVIDER - NSFOLLOWUPCLINICS_GEN_ALL_ED_FT
[facility]  Family Medicine  [address]  [place], [place] [address]  Phone: [phone]  Fax:      [facility]  Family Medicine  [address]  [place], [place] [address]  Phone: [phone]  Fax:      [facility]  Family Medicine  [address]  [place], [place] [address]  Phone: [phone]  Fax:

## 2023-12-03 NOTE — PROGRESS NOTE ADULT - PROBLEM SELECTOR PLAN 2
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 C/o odynophagia causing poor PO intake. PEG placed 11/14 i/s/o ongoing PO intake 2/2 odynophagia, likely i/s/o cancer tx. Pt previously on fluconazole for candidiasis esophagitis ppx but d/c by Dr. Marr for lack of improvement.       - Onc follow up 1 wk w/ Dr. Derek Harvey upon d/c   - Chest PT, hypertonic nebulizer q4hr   - Aspiration precautions    - Magic mouthwash     follows with Dr. Anthony. Not in acute pain    - c.w Fentanyl 25mcg/hr Patch q72hr as long-acting agent

## 2023-12-03 NOTE — DISCHARGE NOTE PROVIDER - NSDCMRMEDTOKEN_GEN_ALL_CORE_FT
aspirin 81 mg oral delayed release tablet: 1 tab(s) orally once a day  atorvastatin 80 mg oral tablet: 1 tab(s) orally once a day (at bedtime)  atropine 1% ophthalmic solution: to each affected eye once a day  Commode: ICD10 R26  diphenhydramine/lidocaine/aluminum hydroxide/magnesium hydroxide/simethicone mucous membrane suspension: 1 application mucous membrane once a day  fentaNYL 25 mcg/hr transdermal film, extended release: 25 mcg/hr transdermally every 72 hours MDD: 1 patch  ipratropium-albuterol 0.5 mg-2.5 mg/3 mL inhalation solution: 3 milliliter(s) inhaled every 6 hours  Lantus 100 units/mL subcutaneous solution: 45 unit(s) subcutaneous once a day (in the morning)  metoprolol succinate 25 mg oral capsule, extended release: 1 cap(s) orally once a day Please administer with PEG  Rolling Walker: ICD10 R26  Semi-Electric Bed: Semi-electric bed for functional quadriplegia (ICD: R53.2)  silver sulfADIAZINE 1% topical cream: 1 Apply topically to affected area 2 times a day   acetaminophen 325 mg oral tablet: 2 tab(s) orally every 6 hours As needed Moderate Pain (4 - 6)  fentaNYL 25 mcg/hr transdermal film, extended release: 25 mcg/hr transdermally every 72 hours MDD: 1 patch  LORazepam 1 mg/mL-NaCl 0.9% intravenous solution: 0.5 milliliter(s) intravenous once a day As needed Anxiety

## 2023-12-03 NOTE — DISCHARGE NOTE PROVIDER - HOSPITAL COURSE
#Discharge: do not delete    LEONARDO HAY is a 78y Male with a past medical history of _____    Presented with _____, found to have _____    Problem List/Main Diagnoses (system-based):   #     #     #    Patient was discharged to: (home/SOFI/acute rehab/hospice, etc. 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 Marr/Dr. Gar),  s/p PEG 11/14 was recently admitted to [facility] for increase need of care at home in which course c/b disloged PEG, uncomplicated IR replacement 11/30. TF were restarted on 12/01 and and patient was medically cleared for SOFI on dispo however now returned for re-placement. Course c/b septic shock in which patient was markedly hypotensive and tachycardic requiring fluid resuscitation following aspiration event.     Problem List/Main Diagnoses (system-based):   #     #     #    Patient was discharged to: (home/SOFI/acute rehab/hospice, etc. 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 Marr/Dr. Gar),  s/p PEG 11/14 was recently admitted to [facility] for increase need of care at home in which course c/b disloged PEG, uncomplicated IR replacement 11/30. TF were restarted on 12/01 and and patient was medically cleared for SOFI on dispo however now returned for re-placement. Course c/b septic shock in which patient was markedly hypotensive and tachycardic requiring fluid resuscitation following aspiration event.     Problem List/Main Diagnoses (system-based):   #     #     #    Patient was discharged to: (home/SOFI/acute rehab/hospice, etc. 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 Marr/Dr. Gar),  s/p PEG 11/14 was recently admitted to [facility] for increase need of care at home in which course c/b disloged PEG, uncomplicated IR replacement 11/30. TF were restarted on 12/01 and and patient was medically cleared for SOFI on dispo however now returned for re-placement. Course c/b septic shock in which patient was markedly hypotensive and tachycardic requiring fluid resuscitation following aspiration event.     Problem List/Main Diagnoses (system-based):   #     #     #    Patient was discharged to: (home/SOFI/acute rehab/hospice, etc. and w/ home health/home PT/RN/home O2)    New medications:   Changes to old medications:  Medications that were stopped:    Items to follow up as outpatient:    Physical exam at the time of discharge:       LABS & STUDIES:   LEONARDO HAY 78M hx cad, dm, laryngeal ca (chemo/radiation), pvd, s/p recent peg placement, BIBEMS from SOFI. pt was recently admitted to the hospital and was discharged to rehab facility yesterday. family states they were not satisfied with the care at the facility and want him placed elsewhere. [facility] was too hot and didn't have any air circulation so readmitted to [facility] 12/2 where home meds and tube feeds restarted. On 12/5 patient aspirated on tube feeds and rapid response called. GOC conversation held with family who's priority is to ensure patient is not in pain and would like to continue with minimally invasive medical interventions (IVF, Abx) to see if patient can stabilize without escalation of care to ICU for pressors, etc. C/b PEG tube pullingx2. Now family planning to move towards hospice services.     LEONARDO HAY 78M hx cad, dm, laryngeal ca (chemo/radiation), pvd, s/p recent peg placement, BIBEMS from SOFI. pt was recently admitted to the hospital and was discharged to rehab facility yesterday. family states they were not satisfied with the care at the facility and want him placed elsewhere. [facility] facility was too hot and didn't have any air circulation so readmitted to [facility] 12/2 where home meds and tube feeds restarted. On 12/5 patient aspirated on tube feeds and rapid response called. GOC conversation held with family who's priority is to ensure patient is not in pain and would like to continue with minimally invasive medical interventions (IVF, Abx) to see if patient can stabilize without escalation of care to ICU for pressors, etc. C/b PEG tube pullingx2. Now family planning to move towards hospice services.     LEONARDO HAY 78M hx cad, dm, laryngeal ca (chemo/radiation), pvd, s/p recent peg placement, BIBEMS from SOFI. pt was recently admitted to the hospital and was discharged to rehab facility yesterday. family states they were not satisfied with the care at the facility and want him placed elsewhere. [facility] facility was too hot and didn't have any air circulation so readmitted to [facility] 12/2 where home meds and tube feeds restarted. On 12/5 patient aspirated on tube feeds and rapid response called. GOC conversation held with family who's priority is to ensure patient is not in pain and would like to continue with minimally invasive medical interventions (IVF, Abx) to see if patient can stabilize without escalation of care to ICU for pressors, etc. C/b PEG tube pullingx2. Now family planning to move towards hospice services.     LEONARDO HAY 78M hx cad, dm, laryngeal ca (chemo/radiation), pvd, s/p recent peg placement, BIBEMS from SOFI. pt was recently admitted to the hospital and was discharged to rehab facility yesterday. family states they were not satisfied with the care at the facility and want him placed elsewhere. [facility] facility was too hot and didn't have any air circulation so readmitted to [facility] 12/2 where home meds and tube feeds restarted. On 12/5 patient aspirated on tube feeds and rapid response called. GOC conversation held with family who's priority is to ensure patient is not in pain and would like to continue with minimally invasive medical interventions (IVF, Abx) to see if patient can stabilize without escalation of care to ICU for pressors, etc. C/b PEG tube pullingx2. Now family planning to move towards inpatient hospice.     LEONARDO HAY 78M hx cad, dm, laryngeal ca (chemo/radiation), pvd, s/p recent peg placement, BIBEMS from SOFI. pt was recently admitted to the hospital and was discharged to rehab facility yesterday. family states they were not satisfied with the care at the facility and want him placed elsewhere. \[facility]\"" facility was too hot and didn't have any air circulation so readmitted to [facility] 12/2 where home meds and tube feeds restarted. On 12/5 patient aspirated on tube feeds and rapid response called. GOC conversation held with family who's priority is to ensure patient is not in pain and would like to continue with minimally invasive medical interventions (IVF, Abx) to see if patient can stabilize without escalation of care to ICU for pressors, etc. C/b PEG tube pullingx2. Now family planning to move towards inpatient hospice.     LEONARDO HAY 78M hx cad, dm, laryngeal ca (chemo/radiation), pvd, s/p recent peg placement, BIBEMS from SOFI. pt was recently admitted to the hospital and was discharged to rehab facility yesterday. family states they were not satisfied with the care at the facility and want him placed elsewhere. hospitals facility was too hot and didn't have any air circulation so readmitted to [facility] 12/2 where home meds and tube feeds restarted. On 12/5 patient aspirated on tube feeds and rapid response called. GOC conversation held with family who's priority is to ensure patient is not in pain and would like to continue with minimally invasive medical interventions (IVF, Abx) to see if patient can stabilize without escalation of care to ICU for pressors, etc. C/b PEG tube pullingx2. Now family planning to move towards inpatient hospice.     LEONARDO HAY 78M hx cad, dm, laryngeal ca (chemo/radiation), pvd, s/p recent peg placement, BIBEMS from [facility]. pt was recently admitted to the hospital and was discharged to rehab facility yesterday. family states they were not satisfied with the care at the facility and want him placed elsewhere. [facility] facility was too hot and didn't have any air circulation so readmitted to [facility] 12/2 where home meds and tube feeds restarted. On 12/5 patient aspirated on tube feeds and rapid response called. GOC conversation held with family who's priority is to ensure patient is not in pain and would like to continue with minimally invasive medical interventions (IVF, Abx) to see if patient can stabilize without escalation of care to ICU for pressors, etc. C/b PEG tube pullingx2. Now family planning to move towards inpatient hospice.      Attending Attestation     Discharge Diagnosis     # Metastatic Laryngeal Cancer  # Acute Metabolic Encephalopathy   # Severe protein Calorie Malnutrition   # Oropharyngeal Dysphagia  # Chemotherapy Induced Mucositis   # Peripheral Vascular Disease     Patient has removed peg tube multiple times and family and patients goals of care are comfort, pain control and pleasure feeding and not more interventions they do not align with their goals of care , given that the patient is receiving large quantities  antianxiety , analgesic medication and as patients family unable to provide adequate level of care for the patient he is being discharged to inpatient hospice.       Neck: Dressing present on L side of neck , Tracheostomy   Respiratory: coarse breath sounds , transmitted upper air way sounds   Cardiovascular: RRR, normal S1 and S2, no m/r/g.   Gastrointestinal: +BS, soft NTND, PEG removed, dress accordingly   Extremities: wwp; no cyanosis, clubbing or edema.   Vascular: Pulses equal and strong throughout.   Neurological: AAOx0, withdrawal to noxious stimuli in all extremities.  Skin: No gross skin abnormalities or rashes LEONARDO HAY 78M hx cad, dm, laryngeal ca (chemo/radiation), pvd, s/p recent peg placement, BIBEMS from [facility]. pt was recently admitted to the hospital and was discharged to rehab facility yesterday. family states they were not satisfied with the care at the facility and want him placed elsewhere. [facility] facility was too hot and didn't have any air circulation so readmitted to [facility] 12/2 where home meds and tube feeds restarted. On 12/5 patient aspirated on tube feeds and rapid response called. GOC conversation held with family who's priority is to ensure patient is not in pain and would like to continue with minimally invasive medical interventions (IVF, Abx) to see if patient can stabilize without escalation of care to ICU for pressors, etc. C/b PEG tube pullingx2. Now family planning to move towards inpatient hospice.      Attending Attestation     Discharge Diagnosis     # Metastatic Laryngeal Cancer  # Acute Metabolic Encephalopathy   # Severe protein Calorie Malnutrition   # Oropharyngeal Dysphagia  # Chemotherapy Induced Mucositis   # Peripheral Vascular Disease     Patient has removed peg tube multiple times and family and patients goals of care are comfort, pain control and pleasure feeding and not more interventions they do not align with their goals of care , given that the patient is receiving large quantities  antianxiety , analgesic medication and as patients family unable to provide adequate level of care for the patient he is being discharged to inpatient hospice.       Neck: Dressing present on L side of neck , Tracheostomy   Respiratory: coarse breath sounds , transmitted upper air way sounds   Cardiovascular: RRR, normal S1 and S2, no m/r/g.   Gastrointestinal: +BS, soft NTND, PEG removed, dress accordingly   Extremities: wwp; no cyanosis, clubbing or edema.   Vascular: Pulses equal and strong throughout.   Neurological: AAOx0, withdrawal to noxious stimuli in all extremities.  Skin: No gross skin abnormalities or rashes LEONARDO HAY 78M hx cad, dm, laryngeal ca (chemo/radiation), pvd, s/p recent peg placement, BIBEMS from [facility]. pt was recently admitted to the hospital and was discharged to rehab facility yesterday. family states they were not satisfied with the care at the facility and want him placed elsewhere. [facility] facility was too hot and didn't have any air circulation so readmitted to [facility] 12/2 where home meds and tube feeds restarted. On 12/5 patient aspirated on tube feeds and rapid response called. GOC conversation held with family who's priority is to ensure patient is not in pain and would like to continue with minimally invasive medical interventions (IVF, Abx) to see if patient can stabilize without escalation of care to ICU for pressors, etc. C/b PEG tube pullingx2. Now family planning to move towards inpatient hospice.      Attending Attestation     Discharge Diagnosis     # Metastatic Laryngeal Cancer  # Acute Metabolic Encephalopathy   # Severe protein Calorie Malnutrition   # Oropharyngeal Dysphagia  # Chemotherapy Induced Mucositis   # Peripheral Vascular Disease     Patient has removed peg tube multiple times and family and patients goals of care are comfort, pain control and pleasure feeding and not more interventions they do not align with their goals of care , given that the patient is receiving large quantities  antianxiety , analgesic medication and as patients family unable to provide adequate level of care for the patient he is being discharged to inpatient hospice.       Neck: Dressing present on L side of neck , Tracheostomy   Respiratory: coarse breath sounds , transmitted upper air way sounds   Cardiovascular: RRR, normal S1 and S2, no m/r/g.   Gastrointestinal: +BS, soft NTND, PEG removed, dress accordingly   Extremities: wwp; no cyanosis, clubbing or edema.   Vascular: Pulses equal and strong throughout.   Neurological: AAOx0, withdrawal to noxious stimuli in all extremities.  Skin: No gross skin abnormalities or rashes

## 2023-12-03 NOTE — DISCHARGE NOTE PROVIDER - NSDCFUADDAPPT_GEN_ALL_CORE_FT
PCP @ [address] [place] [address], Dec 22, 2023 1:45pm PCP @ [address] [place] [address], Dec 22, 2023 1:45pm PCP @ [address] [place] [address], Dec 22, 2023 1:45pm

## 2023-12-03 NOTE — DISCHARGE NOTE PROVIDER - NSDCFUSCHEDAPPT_GEN_ALL_CORE_FT
Chasity Linn Physician [facility]  ENDOCRIN [address]  Scheduled Appointment: 12/18/2023     Chasity Linn Physician [facility]  ENDOCRIN [address]  Scheduled Appointment: 12/18/2023     Chasity Linn Physician [facility]  ENDOCRIN [address]  Scheduled Appointment: 12/18/2023     Chasity Linn  [facility] Physician [facility]  ENDOCRIN [address]  Scheduled Appointment: 12/18/2023    [facility]  INTMED [address]  Scheduled Appointment: 12/22/2023     Chasity Linn  [facility] Physician [facility]  ENDOCRIN [address]  Scheduled Appointment: 12/18/2023    [facility]  INTMED [address]  Scheduled Appointment: 12/22/2023     Chasity Linn  [facility] Physician [facility]  ENDOCRIN [address]  Scheduled Appointment: 12/18/2023    [facility]  INTMED [address]  Scheduled Appointment: 12/22/2023

## 2023-12-04 NOTE — ADVANCED PRACTICE NURSE CONSULT - RECOMMEDATIONS
Bilateral heels: cleanse with normal saline. Apply Cavilon No Sting barrier film daily.   Keep heels elevated off of bed at all times.   Left neck: cleanse with normal saline, apply Silvadene cream and cover with Allevyn foam. Change daily.     Continue pressure injury prevention/treatment including frequent repositioning, moisture management, optimizing nutrition.    Primary RN and medical team updated.    Please reconsult if wounds deteriorate or new concerns develop.

## 2023-12-04 NOTE — PROGRESS NOTE ADULT - ATTENDING COMMENTS
#SCC CA of the larynx  #Dysphagia/odynophagia  #Chronic wounds  #Severe protein calorie malnutrition        -Can continue to follow up outpatient for possible XRT/onc as well as GI for options. PEG in place looks c/d/i. Dicsussions with patient/wife, plan is for home as they did not want rehab. Working with SW/CYNTHIA to have everything in place for dc to home.   -Wound care consult. Wounds POA.   -See nutritionist note for full plan. Encouraging tube feeds. Mostly tolerating at this time.         MDM high  Reviewed labs, imaigng, records, hospital course to date. Reviewed outpatient records. Discussed with patients wife for collaterals/further information. Case discussed with house staff, wound care.  Management of dysphagia requiring tube feeds.   Management of severe protein calorie malnutrition. #SCC CA of the larynx  #Dysphagia/odynophagia  #Chronic wounds  #Severe protein calorie malnutrition  #Patient with functional quadriplegia, POA        -Can continue to follow up outpatient for possible XRT/onc as well as GI for options. PEG in place looks c/d/i. Dicsussions with patient/wife, plan is for home as they did not want rehab. Working with SW/CM to have everything in place for dc to home.   -Wound care consult. Wounds POA.   -See nutritionist note for full plan. Encouraging tube feeds. Mostly tolerating at this time.   -PT consult. Will likely need rehab.         MDM high  Reviewed labs, imaigng, records, hospital course to date. Reviewed outpatient records. Discussed with patients wife for collaterals/further information. Case discussed with house staff, wound care.  Management of dysphagia requiring tube feeds.   Management of severe protein calorie malnutrition.

## 2023-12-04 NOTE — DIETITIAN INITIAL EVALUATION ADULT - NSFNSPHYEXAMSKINFT_GEN_A_CORE
Pressure Injury 1: Right:, heel, Suspected deep tissue injury  Pressure Injury 2: Left:, heel, Suspected deep tissue injury

## 2023-12-04 NOTE — DIETITIAN INITIAL EVALUATION ADULT - PERTINENT LABORATORY DATA
12-03    141  |  103  |  24<H>  ----------------------------<  250<H>  4.4   |  27  |  0.82    Ca    8.5      03 Dec 2023 07:31  Phos  2.9     12-03  Mg     1.6     12-03    TPro  5.8<L>  /  Alb  2.4<L>  /  TBili  0.2  /  DBili  x   /  AST  13  /  ALT  <5<L>  /  AlkPhos  80  12-03  POCT Blood Glucose.: 376 mg/dL (12-04-23 @ 12:01)  A1C with Estimated Average Glucose Result: 9.0 % (10-30-23 @ 05:30)  A1C with Estimated Average Glucose Result: 7.9 % (07-15-23 @ 06:32)

## 2023-12-04 NOTE — ADVANCED PRACTICE NURSE CONSULT - REASON FOR CONSULT
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 Marr/Dr. Gar),  s/p PEG 11/14 was recently admitted to [facility] for increase need of care at home in which course c/b disloged PEG, uncomplicated IR replacement 11/30. 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 Marr/Dr. Gar),  s/p PEG 11/14 was recently admitted to [facility] for increase need of care at home in which course c/b disloged PEG, uncomplicated IR replacement 11/30. TF were restarted on 12/01 and and patient was medically cleared for SOFI on dispo however now returned for re-placement

## 2023-12-04 NOTE — DIETITIAN INITIAL EVALUATION ADULT - OTHER INFO
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 Marr/Dr. Gar),  s/p PEG 11/14 was recently admitted to [facility] for increase need of care at home in which course c/b disloged PEG, uncomplicated IR replacement 11/30. TF were restarted on 12/01 and and patient was medically cleared for SOFI on dispo however now returned for re-placement     Pt seen on 7WO for assessment. Labs and medication orders reviewed. Ordered for IV magnesium sulfate, magic mouthwash. No updated labs 12/4, POC blood glucose (12/3-12/4) [phone]. NPO with tube feeds via PEG status post SLP eval on prior admission 11/26, RD observed tube feed running at goal 50mL/hr this AM 12/4. Pt reports good tolerance of enteral regimen. Note stated admission wt 132lb, RD obtained bed scale wt 12/4 136lb. Wt history per [facility] HIE: (9/25) 165lb, (10/3) 162lb, (10/29) 150lb, (11/24) 142lb. 9/25 wt vs. 12/4 wt indicates 29lb/18% wt loss in <3 months - clinically significant. Nutrition-focused physical examination significant for mild to moderate wasting. Per ASPEN guidelines, pt meets criteria for severe chronic malnutrition - see nutrition risk notification. No nausea/vomiting/diarrhea/constipation noted, no recorded BMs since admission 12/2. No Roman Catholic/ethnic/cultural food preferences noted. Bilateral heels DTIs and left neck wound noted, Simon score 13. See nutrition recommendations. RD to remain available.  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 Marr/Dr. Gar),  s/p PEG 11/14 was recently admitted to [facility] for increase need of care at home in which course c/b disloged PEG, uncomplicated IR replacement 11/30. TF were restarted on 12/01 and and patient was medically cleared for SOFI on dispo however now returned for re-placement     Pt seen on 7WO for assessment. Labs and medication orders reviewed. Ordered for IV magnesium sulfate, magic mouthwash. No updated labs 12/4, POC blood glucose (12/3-12/4) [phone]. NPO with tube feeds via PEG status post SLP eval on prior admission 11/26, RD observed tube feed running at goal 50mL/hr this AM 12/4. Pt reports good tolerance of enteral regimen. Note stated admission wt 132lb, RD obtained bed scale wt 12/4 136lb. Wt history per [facility] HIE: (9/25) 165lb, (10/3) 162lb, (10/29) 150lb, (11/24) 142lb. 9/25 wt vs. 12/4 wt indicates 29lb/18% wt loss in <3 months - clinically significant. Nutrition-focused physical examination significant for mild to moderate wasting. Per ASPEN guidelines, pt meets criteria for severe chronic malnutrition - see nutrition risk notification. No nausea/vomiting/diarrhea/constipation noted, no recorded BMs since admission 12/2. No Shinto/ethnic/cultural food preferences noted. Bilateral heels DTIs and left neck wound noted, Simon score 13. See nutrition recommendations. RD to remain available.

## 2023-12-04 NOTE — ADVANCED PRACTICE NURSE CONSULT - ASSESSMENT
Pt known to me from previous admission (last seen 11/27). Sleeps through assessment today.   Bedbound, Simon 13, BMI 21, 1 person assist to turn. NPO, on TF via PEG  Seen by rad/onc on last admission and rec silvadene for neck.     Bilateral heel: flattened blood filled blisters; right 2x6cm, left 3x4cm. No drainage, no erythema. Feet warm, dry, palpable DPs, no edema. Stable and unchanged from previous assessment  Left neck, radiation burn: 6x3cm with mix of 50% pink nongranular tissue and 50% thin yellow slough. Small serous drainage. Periwound intact. No signs of infection.

## 2023-12-04 NOTE — DIETITIAN INITIAL EVALUATION ADULT - PERTINENT MEDS FT
MEDICATIONS  (STANDING):  albuterol    90 MICROgram(s) HFA Inhaler 2 Puff(s) Inhalation every 6 hours  aspirin enteric coated 81 milliGRAM(s) Oral daily  atorvastatin 80 milliGRAM(s) Oral at bedtime  atropine 1% Solution 1 Drop(s) Both EYES daily  dextrose 5%. 1000 milliLiter(s) (50 mL/Hr) IV Continuous <Continuous>  dextrose 5%. 1000 milliLiter(s) (100 mL/Hr) IV Continuous <Continuous>  dextrose 50% Injectable 25 Gram(s) IV Push once  dextrose 50% Injectable 25 Gram(s) IV Push once  dextrose 50% Injectable 12.5 Gram(s) IV Push once  enoxaparin Injectable 40 milliGRAM(s) SubCutaneous every 24 hours  fentaNYL   Patch  25 MICROgram(s)/Hr 1 Patch Transdermal every 72 hours  FIRST- Mouthwash  BLM 10 milliLiter(s) Swish and Spit daily  glucagon  Injectable 1 milliGRAM(s) IntraMuscular once  influenza  Vaccine (HIGH DOSE) 0.7 milliLiter(s) IntraMuscular once  insulin lispro (ADMELOG) corrective regimen sliding scale   SubCutaneous every 6 hours  magnesium sulfate  IVPB 2 Gram(s) IV Intermittent once    MEDICATIONS  (PRN):  acetaminophen     Tablet .. 650 milliGRAM(s) Oral every 6 hours PRN Moderate Pain (4 - 6)  aluminum hydroxide/magnesium hydroxide/simethicone Suspension 30 milliLiter(s) Oral every 4 hours PRN Dyspepsia  dextrose Oral Gel 15 Gram(s) Oral once PRN Blood Glucose LESS THAN 70 milliGRAM(s)/deciliter  melatonin 3 milliGRAM(s) Oral at bedtime PRN Insomnia  ondansetron Injectable 4 milliGRAM(s) IV Push every 8 hours PRN Nausea and/or Vomiting

## 2023-12-04 NOTE — PROGRESS NOTE ADULT - SUBJECTIVE AND OBJECTIVE BOX
**INCOMPLETE NOTE    OVERNIGHT EVENTS:    SUBJECTIVE:  Patient seen and examined at bedside.  ROS: Patient denies h/n/v/d, fever, chills, cp, palpitations, sob, abd pain, leg swelling, rashes, dysuria, and changes in BM.     Vital Signs Last 12 Hrs  T(F): 97.7 (12-04-23 @ 05:16), Max: 97.7 (12-04-23 @ 05:16)  HR: 84 (12-04-23 @ 05:16) (84 - 84)  BP: 110/64 (12-04-23 @ 05:16) (110/64 - 110/64)  BP(mean): --  RR: 16 (12-04-23 @ 05:16) (16 - 16)  SpO2: 99% (12-04-23 @ 05:16) (99% - 99%)  I&O's Summary      PHYSICAL EXAM:  Constitutional: NAD, comfortable in bed.  HEENT: NC/AT, PERRLA, EOMI, no conjunctival pallor or scleral icterus, MMM  Neck: Supple, no JVD  Respiratory: CTA B/L. No w/r/r.   Cardiovascular: RRR, normal S1 and S2, no m/r/g.   Gastrointestinal: +BS, soft NTND, no guarding or rebound tenderness, no palpable masses   Extremities: wwp; no cyanosis, clubbing or edema.   Vascular: Pulses equal and strong throughout.   Neurological: AAOx3, no CN deficits, strength and sensation intact throughout.   Skin: No gross skin abnormalities or rashes        LABS:                        9.5    6.35  )-----------( 237      ( 03 Dec 2023 07:31 )             31.5     12-03    141  |  103  |  24<H>  ----------------------------<  250<H>  4.4   |  27  |  0.82    Ca    8.5      03 Dec 2023 07:31  Phos  2.9     12-03  Mg     1.6     12-03    TPro  5.8<L>  /  Alb  2.4<L>  /  TBili  0.2  /  DBili  x   /  AST  13  /  ALT  <5<L>  /  AlkPhos  80  12-03      Urinalysis Basic - ( 03 Dec 2023 07:31 )    Color: x / Appearance: x / SG: x / pH: x  Gluc: 250 mg/dL / Ketone: x  / Bili: x / Urobili: x   Blood: x / Protein: x / Nitrite: x   Leuk Esterase: x / RBC: x / WBC x   Sq Epi: x / Non Sq Epi: x / Bacteria: x          RADIOLOGY & ADDITIONAL TESTS:    MEDICATIONS  (STANDING):  albuterol    90 MICROgram(s) HFA Inhaler 2 Puff(s) Inhalation every 6 hours  aspirin enteric coated 81 milliGRAM(s) Oral daily  atorvastatin 80 milliGRAM(s) Oral at bedtime  atropine 1% Solution 1 Drop(s) Both EYES daily  dextrose 5%. 1000 milliLiter(s) (50 mL/Hr) IV Continuous <Continuous>  dextrose 5%. 1000 milliLiter(s) (100 mL/Hr) IV Continuous <Continuous>  dextrose 50% Injectable 25 Gram(s) IV Push once  dextrose 50% Injectable 25 Gram(s) IV Push once  dextrose 50% Injectable 12.5 Gram(s) IV Push once  enoxaparin Injectable 40 milliGRAM(s) SubCutaneous every 24 hours  fentaNYL   Patch  25 MICROgram(s)/Hr 1 Patch Transdermal every 72 hours  FIRST- Mouthwash  BLM 10 milliLiter(s) Swish and Spit daily  glucagon  Injectable 1 milliGRAM(s) IntraMuscular once  influenza  Vaccine (HIGH DOSE) 0.7 milliLiter(s) IntraMuscular once  insulin lispro (ADMELOG) corrective regimen sliding scale   SubCutaneous every 6 hours  magnesium sulfate  IVPB 2 Gram(s) IV Intermittent once    MEDICATIONS  (PRN):  acetaminophen     Tablet .. 650 milliGRAM(s) Oral every 6 hours PRN Temp greater or equal to 38C (100.4F), Mild Pain (1 - 3)  aluminum hydroxide/magnesium hydroxide/simethicone Suspension 30 milliLiter(s) Oral every 4 hours PRN Dyspepsia  dextrose Oral Gel 15 Gram(s) Oral once PRN Blood Glucose LESS THAN 70 milliGRAM(s)/deciliter  melatonin 3 milliGRAM(s) Oral at bedtime PRN Insomnia  ondansetron Injectable 4 milliGRAM(s) IV Push every 8 hours PRN Nausea and/or Vomiting   OVERNIGHT EVENTS:    SUBJECTIVE:  Patient seen and examined at bedside. NAD. Asking for his family   ROS: Patient denies h/n/v/d, fever, chills, cp, palpitations, sob, abd pain, leg swelling, rashes, dysuria, and changes in BM.     Vital Signs Last 12 Hrs  T(F): 97.7 (12-04-23 @ 05:16), Max: 97.7 (12-04-23 @ 05:16)  HR: 84 (12-04-23 @ 05:16) (84 - 84)  BP: 110/64 (12-04-23 @ 05:16) (110/64 - 110/64)  BP(mean): --  RR: 16 (12-04-23 @ 05:16) (16 - 16)  SpO2: 99% (12-04-23 @ 05:16) (99% - 99%)  I&O's Summary      PHYSICAL EXAM:  General: NAD, calm  HEENT: legally blind, MMM, hoarse voice, wound on L side of neck open, non purulent   Respiratory: CTAB; no wheezes/rales/rhonchi, normal WOB  Cardiovascular: Regular rhythm/rate, + S1/S2; no murmurs, rubs gallops   Gastrointestinal: Soft; NTND; PEG tube placed 11/30. No new erythema w/ no active signs of inflammation around site.  : no suprapubic tenderness  Vascular: extremities WWP; no edema/cyanosis, 2+ distal pulses b/l   Neurological: A&Ox2  Skin: skin changes at neck likely 2/2 radiation, pressure ulcers @ b/l heels         LABS:                        9.5    6.35  )-----------( 237      ( 03 Dec 2023 07:31 )             31.5     12-03    141  |  103  |  24<H>  ----------------------------<  250<H>  4.4   |  27  |  0.82    Ca    8.5      03 Dec 2023 07:31  Phos  2.9     12-03  Mg     1.6     12-03    TPro  5.8<L>  /  Alb  2.4<L>  /  TBili  0.2  /  DBili  x   /  AST  13  /  ALT  <5<L>  /  AlkPhos  80  12-03      Urinalysis Basic - ( 03 Dec 2023 07:31 )    Color: x / Appearance: x / SG: x / pH: x  Gluc: 250 mg/dL / Ketone: x  / Bili: x / Urobili: x   Blood: x / Protein: x / Nitrite: x   Leuk Esterase: x / RBC: x / WBC x   Sq Epi: x / Non Sq Epi: x / Bacteria: x          RADIOLOGY & ADDITIONAL TESTS:    MEDICATIONS  (STANDING):  albuterol    90 MICROgram(s) HFA Inhaler 2 Puff(s) Inhalation every 6 hours  aspirin enteric coated 81 milliGRAM(s) Oral daily  atorvastatin 80 milliGRAM(s) Oral at bedtime  atropine 1% Solution 1 Drop(s) Both EYES daily  dextrose 5%. 1000 milliLiter(s) (50 mL/Hr) IV Continuous <Continuous>  dextrose 5%. 1000 milliLiter(s) (100 mL/Hr) IV Continuous <Continuous>  dextrose 50% Injectable 25 Gram(s) IV Push once  dextrose 50% Injectable 25 Gram(s) IV Push once  dextrose 50% Injectable 12.5 Gram(s) IV Push once  enoxaparin Injectable 40 milliGRAM(s) SubCutaneous every 24 hours  fentaNYL   Patch  25 MICROgram(s)/Hr 1 Patch Transdermal every 72 hours  FIRST- Mouthwash  BLM 10 milliLiter(s) Swish and Spit daily  glucagon  Injectable 1 milliGRAM(s) IntraMuscular once  influenza  Vaccine (HIGH DOSE) 0.7 milliLiter(s) IntraMuscular once  insulin lispro (ADMELOG) corrective regimen sliding scale   SubCutaneous every 6 hours  magnesium sulfate  IVPB 2 Gram(s) IV Intermittent once    MEDICATIONS  (PRN):  acetaminophen     Tablet .. 650 milliGRAM(s) Oral every 6 hours PRN Temp greater or equal to 38C (100.4F), Mild Pain (1 - 3)  aluminum hydroxide/magnesium hydroxide/simethicone Suspension 30 milliLiter(s) Oral every 4 hours PRN Dyspepsia  dextrose Oral Gel 15 Gram(s) Oral once PRN Blood Glucose LESS THAN 70 milliGRAM(s)/deciliter  melatonin 3 milliGRAM(s) Oral at bedtime PRN Insomnia  ondansetron Injectable 4 milliGRAM(s) IV Push every 8 hours PRN Nausea and/or Vomiting   OVERNIGHT EVENTS: INNA     SUBJECTIVE:  Patient seen and examined at bedside. NAD. Asking for his family   ROS: Patient denies h/n/v/d, fever, chills, cp, palpitations, sob, abd pain, leg swelling, rashes, dysuria, and changes in BM.     Vital Signs Last 12 Hrs  T(F): 97.7 (12-04-23 @ 05:16), Max: 97.7 (12-04-23 @ 05:16)  HR: 84 (12-04-23 @ 05:16) (84 - 84)  BP: 110/64 (12-04-23 @ 05:16) (110/64 - 110/64)  BP(mean): --  RR: 16 (12-04-23 @ 05:16) (16 - 16)  SpO2: 99% (12-04-23 @ 05:16) (99% - 99%)  I&O's Summary      PHYSICAL EXAM:  General: NAD, calm  HEENT: legally blind, MMM, hoarse voice, wound on L side of neck open, non purulent   Respiratory: CTAB; no wheezes/rales/rhonchi, normal WOB  Cardiovascular: Regular rhythm/rate, + S1/S2; no murmurs, rubs gallops   Gastrointestinal: Soft; NTND; PEG tube placed 11/30. No new erythema w/ no active signs of inflammation around site.  : no suprapubic tenderness  Vascular: extremities WWP; no edema/cyanosis, 2+ distal pulses b/l   Neurological: A&Ox2  Skin: skin changes at neck likely 2/2 radiation, pressure ulcers @ b/l heels         LABS:                        9.5    6.35  )-----------( 237      ( 03 Dec 2023 07:31 )             31.5     12-03    141  |  103  |  24<H>  ----------------------------<  250<H>  4.4   |  27  |  0.82    Ca    8.5      03 Dec 2023 07:31  Phos  2.9     12-03  Mg     1.6     12-03    TPro  5.8<L>  /  Alb  2.4<L>  /  TBili  0.2  /  DBili  x   /  AST  13  /  ALT  <5<L>  /  AlkPhos  80  12-03      Urinalysis Basic - ( 03 Dec 2023 07:31 )    Color: x / Appearance: x / SG: x / pH: x  Gluc: 250 mg/dL / Ketone: x  / Bili: x / Urobili: x   Blood: x / Protein: x / Nitrite: x   Leuk Esterase: x / RBC: x / WBC x   Sq Epi: x / Non Sq Epi: x / Bacteria: x          RADIOLOGY & ADDITIONAL TESTS:    MEDICATIONS  (STANDING):  albuterol    90 MICROgram(s) HFA Inhaler 2 Puff(s) Inhalation every 6 hours  aspirin enteric coated 81 milliGRAM(s) Oral daily  atorvastatin 80 milliGRAM(s) Oral at bedtime  atropine 1% Solution 1 Drop(s) Both EYES daily  dextrose 5%. 1000 milliLiter(s) (50 mL/Hr) IV Continuous <Continuous>  dextrose 5%. 1000 milliLiter(s) (100 mL/Hr) IV Continuous <Continuous>  dextrose 50% Injectable 25 Gram(s) IV Push once  dextrose 50% Injectable 25 Gram(s) IV Push once  dextrose 50% Injectable 12.5 Gram(s) IV Push once  enoxaparin Injectable 40 milliGRAM(s) SubCutaneous every 24 hours  fentaNYL   Patch  25 MICROgram(s)/Hr 1 Patch Transdermal every 72 hours  FIRST- Mouthwash  BLM 10 milliLiter(s) Swish and Spit daily  glucagon  Injectable 1 milliGRAM(s) IntraMuscular once  influenza  Vaccine (HIGH DOSE) 0.7 milliLiter(s) IntraMuscular once  insulin lispro (ADMELOG) corrective regimen sliding scale   SubCutaneous every 6 hours  magnesium sulfate  IVPB 2 Gram(s) IV Intermittent once    MEDICATIONS  (PRN):  acetaminophen     Tablet .. 650 milliGRAM(s) Oral every 6 hours PRN Temp greater or equal to 38C (100.4F), Mild Pain (1 - 3)  aluminum hydroxide/magnesium hydroxide/simethicone Suspension 30 milliLiter(s) Oral every 4 hours PRN Dyspepsia  dextrose Oral Gel 15 Gram(s) Oral once PRN Blood Glucose LESS THAN 70 milliGRAM(s)/deciliter  melatonin 3 milliGRAM(s) Oral at bedtime PRN Insomnia  ondansetron Injectable 4 milliGRAM(s) IV Push every 8 hours PRN Nausea and/or Vomiting

## 2023-12-04 NOTE — DIETITIAN INITIAL EVALUATION ADULT - OTHER CALCULATIONS
Estimated needs based on RD obtained wt 12/4 as within % IBW 142lb/64.5kg (94%). Needs adjusted for age, cancer on treatment, wound healing, and malnutrition.

## 2023-12-04 NOTE — DIETITIAN INITIAL EVALUATION ADULT - ADD RECOMMEND
1. INPATIENT REGIMEN: Recommend continue Glucerna 1.5 @50mL/hr x24hr to provide 1200mL total volume, 1800kcal (29kcal/kg CBW 61.8kg), 99g protein (1.6g/kg CBW 61.8kg), and 911mL free water.   >>Defer free water flushes to team.  >>Monitor GI tolerance & maintain aspiration precautions. RD to remain available to adjust EN regimen prn.   2. DISCHARGE REGIMEN: In setting of of Glucerna formula shortage, recommend Diabetisource AC @65mL/hr x24hr to provide 1560mL total volume, 1872kcal (30kcal/kg CBW 61.8kg), 94g protein (1.5g/kg CBW 61.8kg), and 1273mL free water.   >>Defer free water flushes to team. Consider additional 600mL water to meet fluid needs.   >>Monitor GI tolerance & maintain aspiration precautions. RD to remain available to adjust EN regimen prn.   3. Monitor weight trends, labs, skin integrity, & hydration status.  4. Pain and bowel regimens per team.  5. RD remains available for dietary education/intervention prn.

## 2023-12-04 NOTE — DIETITIAN INITIAL EVALUATION ADULT - NUTRITIONGOAL OUTCOME1
Consistently meet >75% nutrient needs via most appropriate route for nutrition. Reduce signs and symptoms of malnutrition.

## 2023-12-04 NOTE — DIETITIAN INITIAL EVALUATION ADULT - EDUCATION DIETARY MODIFICATIONS
Educated on signs and symptoms of tube feed tolerance to monitor. Discussed ongoing use of PEG feeds as sole source nutrition. Pt aware RD remains available for additional questions/concerns./(2) meets goals/outcomes/verbalization

## 2023-12-04 NOTE — PROGRESS NOTE ADULT - ASSESSMENT
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 Marr/Dr. Gar),  s/p PEG 11/14 was recently admitted to [facility] for increase need of care at home in which course c/b disloged PEG, uncomplicated IR replacement 11/30. 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 Marr/Dr. Gar),  s/p PEG 11/14 was recently admitted to [facility] for increase need of care at home in which course c/b disloged PEG, uncomplicated IR replacement 11/30. 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 Marr/Dr. Gar),  s/p PEG 11/14 was recently admitted to [facility] for increase need of care at home in which course c/b disloged PEG, uncomplicated IR replacement 11/30. 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 Marr/Dr. Gar),  s/p PEG 11/14 was recently admitted to [facility] for increase need of care at home in which course c/b disloged PEG, uncomplicated IR replacement 11/30. TF were restarted on 12/01 and and patient was medically cleared for SOFI on dispo however now returned for re-placement

## 2023-12-04 NOTE — DIETITIAN INITIAL EVALUATION ADULT - NS FNS DIET ORDER
Diet, NPO with Tube Feed:   Tube Feeding Modality: Gastrostomy  Glucerna 1.5 Seamus (GLUCERNA1.5)  Continuous  Starting Tube Feed Rate {mL per Hour}: 20  Increase Tube Feed Rate by (mL): 10     Every 4 hours  Until Goal Tube Feed Rate (mL per Hour): 50  Tube Feed Duration (in Hours): 24  Tube Feed Start Time: 17:30 (12-02-23 @ 17:08) [Active]

## 2023-12-04 NOTE — CHART NOTE - NSCHARTNOTEFT_GEN_A_CORE
Patient will need a hospital bed and low air loss mattress for home use. Patients head needs to be elevated 30 degrees to prevent aspirations and wedges and pillows have been tried and failed. Patient is unable to make frequent changes to body position on their own. A gel overlay has been tried and failed.     Patient will need low air loss mattress to help with healing of ankle DTI

## 2023-12-04 NOTE — PROGRESS NOTE ADULT - TIME BILLING
Chart review this/past hospitalizations. Discussed with house staff, SW, CM, patient, patient's family. Coordination of home services and home support. Thorough H/P.

## 2023-12-04 NOTE — DIETITIAN NUTRITION RISK NOTIFICATION - ADDITIONAL COMMENTS/DIETITIAN RECOMMENDATIONS
1. INPATIENT REGIMEN: Recommend continue Glucerna 1.5 @50mL/hr x24hr to provide 1200mL total volume, 1800kcal (29kcal/kg CBW 61.8kg), 99g protein (1.6g/kg CBW 61.8kg), and 911mL free water.   >>Defer free water flushes to team.  >>Monitor GI tolerance & maintain aspiration precautions. RD to remain available to adjust EN regimen prn.   2. DISCHARGE REGIMEN: In setting of of Glucerna formula shortage, recommend Diabetisource AC @65mL/hr x24hr to provide 1560mL total volume, 1872kcal (30kcal/kg CBW 61.8kg), 94g protein (1.5g/kg CBW 61.8kg), and 1273mL free water.   >>Defer free water flushes to team. Consider additional 600mL water to meet fluid needs.   >>Monitor GI tolerance & maintain aspiration precautions. RD to remain available to adjust EN regimen prn.

## 2023-12-05 NOTE — CONSULT NOTE ADULT - SUBJECTIVE AND OBJECTIVE BOX
[facility] Geriatrics and Palliative Care  Dickson Roberson, Palliative Care Attending  Contact Info: Call [phone] (HEAL Line) or message on Microsoft Teams (Dickson Roberson)    HPI:  HPI: [id number] hx cad, dm, laryngeal ca (chemo/radiation), pvd, s/p recent peg placement, BIBEMS from [facility]. pt was recently admitted to the hospital and was discharged to rehab facility yesterday. family states they were not satisfied with the care at the facility and want him placed elsewhere. states facility was too hot and didn't have any air circulation. pt without complaints.    Patient seen and examined at bedside. Encephalopathic and unable to participate in interview. s/p Rapid Response due to large emesis and aspiration event. Denies any significant complaint. Comprehensive symptom assessment and GOC exploration as noted below. Further collateral obtained from primary team, chart, and family.    PERTINENT PM/SXH:   HTN (hypertension)  HLD (hyperlipidema)  DM (diabetes mellitus)  CAD (coronary artery disease)  Glaucom  PVD (peripheral vascular disease  No significant past surgical history    FAMILY HISTORY:  No history of malignancy in first degree relatives  Unable to obtain due to encephalopathy    ITEMS NOT CHECKED ARE NOT PRESENT  SOCIAL HISTORY:   Significant other/partner:  [x]  Children:  [x]  Substance hx:  []   Tobacco hx:  []   Alcohol hx: []   Home Opioid hx:  [x] I-Stop Reference No: [id number]  A	N	Y	O	11/21/2023	11/22/2023	fentanyl 25 mcg/hr patch	5	15	Pampanini Emma	[id number]	Insurance	[facility]	N	N	O	11/22/2023	11/22/2023	morphine sulf 10 mg/5 ml soln	75ml	5	Pampanini, Emma	[id number]	Insurance	[facility]  B	N	N	O	10/06/2023	10/11/2023	oxycodone hcl (ir) 5 mg tablet	28	7	Kranthi Marr	[id number]	Insurance	[facility]  Living Situation: [x]Home  []Long term care  []Rehab []Other  Latter-day/Spiritual practice: ; Role of organized Pentecostalism [x] important [] some [] unable to assess  Coping: [] well [] with difficulty [] poor coping [x] unable to assess  Support system: [] strong [x] adequate [] inadequate    ADVANCE DIRECTIVES:    []MOLST  []Living Will  DECISION MAKER(s):  [x] Health Care Proxy(s)  [] Surrogate(s)  [] Guardian           Name(s)/Phone Number(s): Yvrose Germain    BASELINE (I)ADLs (prior to admission):  [place]: []Total  [] Moderate [x]Dependent    ALLERGIES:  No Known Allergies    MEDICATIONS  (STANDING):  albuterol    90 MICROgram(s) HFA Inhaler 2 Puff(s) Inhalation every 6 hours  aspirin  chewable 81 milliGRAM(s) Oral every 24 hours  fentaNYL   Patch  25 MICROgram(s)/Hr 1 Patch Transdermal every 72 hours  FIRST- Mouthwash  BLM 10 milliLiter(s) Swish and Spit daily  influenza  Vaccine (HIGH DOSE) 0.7 milliLiter(s) IntraMuscular once  piperacillin/tazobactam IVPB.. 3.375 Gram(s) IV Intermittent every 8 hours  silver sulfADIAZINE 1% Cream 1 Application(s) Topical daily  sodium chloride 0.9%. 1000 milliLiter(s) (150 mL/Hr) IV Continuous <Continuous>  vancomycin  IVPB 1000 milliGRAM(s) IV Intermittent every 24 hours    MEDICATIONS  (PRN):  acetaminophen     Tablet .. 650 milliGRAM(s) Oral every 6 hours PRN Moderate Pain (4 - 6)  glycopyrrolate Injectable 0.4 milliGRAM(s) IV Push four times a day PRN Secretions  HYDROmorphone  Injectable 0.5 milliGRAM(s) IV Push every 2 hours PRN Moderate pain (4-6), Severe pain (7-10), Respiratory rate greater than 22  LORazepam   Injectable 0.5 milliGRAM(s) IV Push every 4 hours PRN Anxiety  ondansetron Injectable 4 milliGRAM(s) IV Push every 8 hours PRN Nausea and/or Vomiting    Analgesic Use (Scheduled and PRNs) for past 24 hours:  acetaminophen     Tablet ..   650 milliGRAM(s) Oral (12-05-23 @ 09:15)  acetaminophen   IVPB ..   400 mL/Hr IV Intermittent (12-05-23 @ 11:47)  fentaNYL   Patch  25 MICROgram(s)/Hr   1 Patch Transdermal (12-05-23 @ 16:26)    PRESENT SYMPTOMS: [x]Unable to obtain due to poor mentation/encephalopathy  Source if other than patient:  []Family   []Team     Pain: [] yes [x] no - see PAINAD  QOL impact -   Location -                    Aggravating Factors -  Quality -  Radiation -  Timing -  Severity (0-10 scale) -   Minimal Acceptable Level (0-10 scale) -    PAIN AD Score: 0  (Nonverbal Pain Assessment)    Dyspnea:                           []Mild  []Moderate []Severe  Anxiety:                             []Mild []Moderate []Severe  Fatigue:                             []Mild []Moderate []Severe  Nausea:                             []Mild []Moderate []Severe  Loss of Appetite:              []Mild []Moderate []Severe  Constipation:                    []Mild []Moderate []Severe    Other Symptoms:  [x]All other review of systems negative     Palliative Performance Status Version 2:  20%  (Functional Assessment Tool)    GENERAL:  [] NAD []Alert [x]Lethargic  []Cachexia  []Unarousable  [x]Minimally Verbal  []Non-Verbal  BEHAVIORAL:   []Anxiety  [x]Delirium []Agitation []Cooperative []Oriented x  HEENT:  []Normal  [] Moist Mucous Membranes [x]Dry mouth   []ET Tube/Trach  []Oral lesions  PULMONARY:   []Clear []Tachypnea  []Audible excessive secretions  [x]Normal Work of Breathing []Labored Breathing  [x]Rhonchi []Crackles []Wheezing  CARDIOVASCULAR:    [x]Regular Rate [x]Regular Rhythm []Irregular []Tachy  []Yaya  GASTROINTESTINAL:  [x]Soft  [x]Distended   []+BS  [x]Non tender []Tender  [x]PEG []OGT/ NGT  Last BM:  GENITOURINARY:  []Normal [x] Incontinent   []Oliguria/Anuria   []Paige  MUSCULOSKELETAL:   []Normal Extremities  [x]Weakness  [x]Bed/Wheelchair bound []Edema  NEUROLOGIC:   []No focal deficits  [x]Cognitive impairment  [x]Dysphagia []Dysarthria []Paresis [x]Encephalopathic  SKIN:   []Normal   []Pressure ulcer(s)  [x]Rash    CRITICAL CARE:  [ ]Shock Present  [ ]Septic [ ]Cardiogenic [ ]Neurologic [ ]Hypovolemic  [ ] Vasopressors [ ]Inotropes   [x]Respiratory failure present [ ]Mechanical Ventilation [ ]Non-invasive ventilatory support [ ]High-Flow  [x]Acute  [ ]Chronic [x]Hypoxic  [ ]Hypercarbic   [ ]Other organ failure    Vital Signs Last 24 Hrs  T(C): 37.6 (05 Dec 2023 14:08), Max: 39.4 (05 Dec 2023 11:00)  T(F): 99.7 (05 Dec 2023 14:08), Max: 103 (05 Dec 2023 11:00)  HR: 97 (05 Dec 2023 14:08) (80 - 134)  BP: 78/41 (05 Dec 2023 14:08) (68/40 - 122/68)  BP(mean): 82 (04 Dec 2023 21:05) (82 - 82)  RR: 20 (05 Dec 2023 14:08) (18 - 22)  SpO2: 99% (05 Dec 2023 14:08) (89% - 99%)    Parameters below as of 05 Dec 2023 14:08  Patient On (Oxygen Delivery Method): nasal cannula  O2 Flow (L/min): 6    LABS: Personally reviewed and interpreted                      9.5    2.70  )-----------( 206      ( 05 Dec 2023 10:59 )             30.4   12-05    140  |  109<H>  |  28<H>  ----------------------------<  199<H>  4.0   |  23  |  0.85    Ca    7.6<L>      05 Dec 2023 10:59  Phos  1.9     12-05  Mg     1.1     12-05  TPro  4.6<L>  /  Alb  1.8<L>  /  TBili  0.5  /  DBili  x   /  AST  77<H>  /  ALT  16  /  AlkPhos  141<H>  12-05    RADIOLOGY & ADDITIONAL STUDIES: Personally reviewed and interpreted  < from: Xray Chest 1 View AP/PA (12.05.23 @ 12:18) >  Clear lungs. Small left pleural effusion. Nopneumothorax. Mildly enlarged cardiac silhouette and coronary stents. Right chest port catheter tip overlies right atrium.    REFERRALS:  [x]Social Work/Case management [x]PT/OT [x]Chaplaincy  []Hospice  []Patient/Family Support []Massage Therapy []Music Therapy []Holistic RN []Ethics    DISCUSSION OF CASE: Wife, Daughter, Granddaughter - to provide updates and emotional support; Primary Team/RN - to discuss plan of care; Outpatient Onc - to provide updates [facility] Geriatrics and Palliative Care  Dickson Roberson, Palliative Care Attending  Contact Info: Call [phone] (HEAL Line) or message on [employer] Teams (Dickson Roberson)    HPI:  HPI: [id number] hx cad, dm, laryngeal ca (chemo/radiation), pvd, s/p recent peg placement, BIBEMS from [facility]. pt was recently admitted to the hospital and was discharged to rehab facility yesterday. family states they were not satisfied with the care at the facility and want him placed elsewhere. states facility was too hot and didn't have any air circulation. pt without complaints.    Patient seen and examined at bedside. Encephalopathic and unable to participate in interview. s/p Rapid Response due to large emesis and aspiration event. Denies any significant complaint. Comprehensive symptom assessment and GOC exploration as noted below. Further collateral obtained from primary team, chart, and family.    PERTINENT PM/SXH:   HTN (hypertension)  HLD (hyperlipidema)  DM (diabetes mellitus)  CAD (coronary artery disease)  Glaucom  PVD (peripheral vascular disease  No significant past surgical history    FAMILY HISTORY:  No history of malignancy in first degree relatives  Unable to obtain due to encephalopathy    ITEMS NOT CHECKED ARE NOT PRESENT  SOCIAL HISTORY:   Significant other/partner:  [x]  Children:  [x]  Substance hx:  []   Tobacco hx:  []   Alcohol hx: []   Home Opioid hx:  [x] I-Stop Reference No: [id number]  A	N	Y	O	11/21/2023	11/22/2023	fentanyl 25 mcg/hr patch	5	15	Pampanini Emma	[id number]	Insurance	[facility]	N	N	O	11/22/2023	11/22/2023	morphine sulf 10 mg/5 ml soln	75ml	5	Pampanini, Emma	[id number]	Insurance	[facility] [place]  B	N	N	O	10/06/2023	10/11/2023	oxycodone hcl (ir) 5 mg tablet	28	7	Kranthi Marr	[id number]	Insurance	[facility]  Living Situation: [x]Home  []Long term care  []Rehab []Other  Amish/Spiritual practice: ; Role of organized Mu-ism [x] important [] some [] unable to assess  Coping: [] well [] with difficulty [] poor coping [x] unable to assess  Support system: [] strong [x] adequate [] inadequate    ADVANCE DIRECTIVES:    []MOLST  []Living Will  DECISION MAKER(s):  [x] Health Care Proxy(s)  [] Surrogate(s)  [] Guardian           Name(s)/Phone Number(s): Yvrose Germain    BASELINE (I)ADLs (prior to admission):  [place]: []Total  [] Moderate [x]Dependent    ALLERGIES:  No Known Allergies    MEDICATIONS  (STANDING):  albuterol    90 MICROgram(s) HFA Inhaler 2 Puff(s) Inhalation every 6 hours  aspirin  chewable 81 milliGRAM(s) Oral every 24 hours  fentaNYL   Patch  25 MICROgram(s)/Hr 1 Patch Transdermal every 72 hours  FIRST- Mouthwash  BLM 10 milliLiter(s) Swish and Spit daily  influenza  Vaccine (HIGH DOSE) 0.7 milliLiter(s) IntraMuscular once  piperacillin/tazobactam IVPB.. 3.375 Gram(s) IV Intermittent every 8 hours  silver sulfADIAZINE 1% Cream 1 Application(s) Topical daily  sodium chloride 0.9%. 1000 milliLiter(s) (150 mL/Hr) IV Continuous <Continuous>  vancomycin  IVPB 1000 milliGRAM(s) IV Intermittent every 24 hours    MEDICATIONS  (PRN):  acetaminophen     Tablet .. 650 milliGRAM(s) Oral every 6 hours PRN Moderate Pain (4 - 6)  glycopyrrolate Injectable 0.4 milliGRAM(s) IV Push four times a day PRN Secretions  HYDROmorphone  Injectable 0.5 milliGRAM(s) IV Push every 2 hours PRN Moderate pain (4-6), Severe pain (7-10), Respiratory rate greater than 22  LORazepam   Injectable 0.5 milliGRAM(s) IV Push every 4 hours PRN Anxiety  ondansetron Injectable 4 milliGRAM(s) IV Push every 8 hours PRN Nausea and/or Vomiting    Analgesic Use (Scheduled and PRNs) for past 24 hours:  acetaminophen     Tablet ..   650 milliGRAM(s) Oral (12-05-23 @ 09:15)  acetaminophen   IVPB ..   400 mL/Hr IV Intermittent (12-05-23 @ 11:47)  fentaNYL   Patch  25 MICROgram(s)/Hr   1 Patch Transdermal (12-05-23 @ 16:26)    PRESENT SYMPTOMS: [x]Unable to obtain due to poor mentation/encephalopathy  Source if other than patient:  []Family   []Team     Pain: [] yes [x] no - see PAINAD  QOL impact -   Location -                    Aggravating Factors -  Quality -  Radiation -  Timing -  Severity (0-10 scale) -   Minimal Acceptable Level (0-10 scale) -    PAIN AD Score: 0  (Nonverbal Pain Assessment)    Dyspnea:                           []Mild  []Moderate []Severe  Anxiety:                             []Mild []Moderate []Severe  Fatigue:                             []Mild []Moderate []Severe  Nausea:                             []Mild []Moderate []Severe  Loss of Appetite:              []Mild []Moderate []Severe  Constipation:                    []Mild []Moderate []Severe    Other Symptoms:  [x]All other review of systems negative     Palliative Performance Status Version 2:  20%  (Functional Assessment Tool)    GENERAL:  [] NAD []Alert [x]Lethargic  []Cachexia  []Unarousable  [x]Minimally Verbal  []Non-Verbal  BEHAVIORAL:   []Anxiety  [x]Delirium []Agitation []Cooperative []Oriented x  HEENT:  []Normal  [] Moist Mucous Membranes [x]Dry mouth   []ET Tube/Trach  []Oral lesions  PULMONARY:   []Clear []Tachypnea  []Audible excessive secretions  [x]Normal Work of Breathing []Labored Breathing  [x]Rhonchi []Crackles []Wheezing  CARDIOVASCULAR:    [x]Regular Rate [x]Regular Rhythm []Irregular []Tachy  []Yaya  GASTROINTESTINAL:  [x]Soft  [x]Distended   []+BS  [x]Non tender []Tender  [x]PEG []OGT/ NGT  Last BM:  GENITOURINARY:  []Normal [x] Incontinent   []Oliguria/Anuria   []Paige  MUSCULOSKELETAL:   []Normal Extremities  [x]Weakness  [x]Bed/Wheelchair bound []Edema  NEUROLOGIC:   []No focal deficits  [x]Cognitive impairment  [x]Dysphagia []Dysarthria []Paresis [x]Encephalopathic  SKIN:   []Normal   []Pressure ulcer(s)  [x]Rash    CRITICAL CARE:  [ ]Shock Present  [ ]Septic [ ]Cardiogenic [ ]Neurologic [ ]Hypovolemic  [ ] Vasopressors [ ]Inotropes   [x]Respiratory failure present [ ]Mechanical Ventilation [ ]Non-invasive ventilatory support [ ]High-Flow  [x]Acute  [ ]Chronic [x]Hypoxic  [ ]Hypercarbic   [ ]Other organ failure    Vital Signs Last 24 Hrs  T(C): 37.6 (05 Dec 2023 14:08), Max: 39.4 (05 Dec 2023 11:00)  T(F): 99.7 (05 Dec 2023 14:08), Max: 103 (05 Dec 2023 11:00)  HR: 97 (05 Dec 2023 14:08) (80 - 134)  BP: 78/41 (05 Dec 2023 14:08) (68/40 - 122/68)  BP(mean): 82 (04 Dec 2023 21:05) (82 - 82)  RR: 20 (05 Dec 2023 14:08) (18 - 22)  SpO2: 99% (05 Dec 2023 14:08) (89% - 99%)    Parameters below as of 05 Dec 2023 14:08  Patient On (Oxygen Delivery Method): nasal cannula  O2 Flow (L/min): 6    LABS: Personally reviewed and interpreted                      9.5    2.70  )-----------( 206      ( 05 Dec 2023 10:59 )             30.4   12-05    140  |  109<H>  |  28<H>  ----------------------------<  199<H>  4.0   |  23  |  0.85    Ca    7.6<L>      05 Dec 2023 10:59  Phos  1.9     12-05  Mg     1.1     12-05  TPro  4.6<L>  /  Alb  1.8<L>  /  TBili  0.5  /  DBili  x   /  AST  77<H>  /  ALT  16  /  AlkPhos  141<H>  12-05    RADIOLOGY & ADDITIONAL STUDIES: Personally reviewed and interpreted  < from: Xray Chest 1 View AP/PA (12.05.23 @ 12:18) >  Clear lungs. Small left pleural effusion. Nopneumothorax. Mildly enlarged cardiac silhouette and coronary stents. Right chest port catheter tip overlies right atrium.    REFERRALS:  [x]Social Work/Case management [x]PT/OT [x]Chaplaincy  []Hospice  []Patient/Family Support []Massage Therapy []Music Therapy []Holistic RN []Ethics    DISCUSSION OF CASE: Wife, Daughter, Granddaughter - to provide updates and emotional support; Primary Team/RN - to discuss plan of care; Outpatient Onc - to provide updates

## 2023-12-05 NOTE — CONSULT NOTE ADULT - CONVERSATION DETAILS
Reviewed patient's hospital course and interval developments. Discussed advanced directives including code status, HCP completion, and hospice eligibility.    DNR/DNI, MEWS Exempt, Symptom-Directed Care but continue with IVF/Abx for medical management of infection. Agreeable to symptom medications as need to ensure patient is comfortable.    Family's priority is to ensure patient is not in pain and would like to continue with minimally invasive medical interventions (IVF, Abx) to see if patient can stabilize without escalation of care to ICU for pressors, etc. Complicated context as patient completed curative-intent chemo/RT for his malignancy but family is concerned that patient has been suffering through his lengthy hospitalizations (and readmissions) and they agree that escalation to ICU care (i.e. pressors) would cause undue burden.    Given patient's propensity for delirium, ICU setting is likely to cause added suffering. Placement of central line near patient's neck given radiation changes is likely to cause additional pain.

## 2023-12-05 NOTE — CHART NOTE - NSCHARTNOTEFT_GEN_A_CORE
Around 11:02 AM, informed by RN that patient did not appear well. Prior to evaluation, patient had 2 large voluminous emesis of undigested food. PEG feeds was discontinued without any residual feeds. Upon bedside evaluation, vitals T 103F (rectal), , /40 and RR 22 (81%) on home 2LNC, Finger . Routine labs, ABG, and BCx were obtained. Patient was given 2L normal saline bolus, Vanc 1g, Zosyn 3.375mg x1, IV Tylenol 1g. EKG consistent with sinus tachy. Patient mentating at baseline. GOC with granddaughter (phone) - patient is still DNR/DNI, however would prefer to discuss need for pressors if needed.  - Lactate 3.6  - ABG on NRB 7.47/290/30/99%   - Repeat vitals after 2L NS is /66, , RR 16, SpO2 100% on 6LNC    #Sepsis   #Aspiration PNA    Plan:  - Obtain CXR  - C/w Vanc & Zosyn, would de-escalate Abx as appropriate   - C/w wean O2 as tolerated  - Repeat lactate   - Ongoing GOC with family  - Hold PEG feeds for now - may require Abd XR and pro-motility agent Around 11:02 AM, informed by RN that patient did not appear well. Prior to evaluation, patient had 2 large voluminous emesis of undigested food. PEG feeds was discontinued without any residual feeds. Upon bedside evaluation, vitals T 103F (rectal), , /40 and RR 22 (81%) on home 2LNC, Finger . Routine labs, ABG, and BCx were obtained. Patient was given 2L normal saline bolus, Vanc 1g, Zosyn 3.375mg x1, IV Tylenol 1g. EKG consistent with sinus tachy. Patient mentating at baseline. GOC with granddaughter (phone) - patient is still DNR/DNI, however would prefer to discuss need for pressors if needed.  - Lactate 3.6  - ABG on NRB 7.47/290/30/99%   - Repeat vitals after 2L NS is /66, , RR 16, SpO2 100% on 6LNC    #Sepsis   #Aspiration PNA    Plan:  - Obtain CXR and Abd XR  - C/w Vanc & Zosyn, would de-escalate Abx as appropriate   - C/w wean O2 as tolerated  - Repeat lactate   - Ongoing GOC with family  - Hold PEG feeds for now - may require PEG study and pro-motility agent Around 11:02 AM, informed by RN that patient did not appear well. Prior to evaluation, patient had 2 large voluminous emesis of undigested food. PEG feeds was discontinued without any residual feeds. Upon bedside evaluation, vitals T 103F (rectal), , /40 and RR 22 (81%) on home 2LNC, Finger . Routine labs, ABG, and BCx were obtained. Patient was given 2L normal saline bolus, Vanc 1g, Zosyn 3.375mg x1, IV Tylenol 1g. EKG consistent with sinus tachy. Patient mentating at baseline. GOC with granddaughter (phone) - patient is still DNR/DNI, however would prefer to discuss need for pressors if needed.  - Lactate 3.6  - ABG on NRB 7.47/290/30/99%   - Repeat vitals after 2L NS is /66, , RR 16, SpO2 100% on 6LNC    #Sepsis   #Aspiration PNA    Plan:  - Obtain CXR and Abd XR  - C/w Vanc & Zosyn, would de-escalate Abx as appropriate   - C/w wean O2 as tolerated  - Repeat lactate   - Ongoing GOC with family  - Hold PEG feeds for now - may require PEG study and pro-motility agent  - ICU consulted

## 2023-12-05 NOTE — PROGRESS NOTE ADULT - ATTENDING COMMENTS
#SCC CA of the larynx  #Severe sepsis/shock, (HR, temp, lactate, resp failure) minimally responsive to IVF but no pressors a/p GOC), not POA  #Aspiration/gram negative PNA  #Dysphagia/odynophagia  #Chronic wounds  #Severe protein calorie malnutrition  #Patient with functional quadriplegia, POA      -This AM prior to seeing patient nurse informed team that patient had episode of emesis, feeds were held. Shortly after patient had another episode and this time became tachy, hypotensive, hypoxic, febrile. Went to see patient, was still mentating however was more confused, in mod distress. RR was called. Broad abx were initiated as well as cultures and IVF. Was briefly on nonrebreather as was hypoxic on 6L. Eventually was stabilized. Multidisciplinary discussion with ICU consult team, palliative care, medical team and family; goals were to avoid more aggressive measures; patient still DNR/DNI, no pressors, but c/w IVF and abx to see if he will have resolutions. Checked on patient repeatedly and several hours later he was looking more comfortable, no further distress. Again discussed goals of family, specifically granddaughter at that time and again felt content with decision.   -Will see GOC depending on prognosis  -Wound care consult. Wounds POA.   -See nutritionist note for full plan. At this time holding feeds in setting of aspiration event. Depending on trajectory will need to reevaluate goals regarding further nutrition. No IV nutrition given sepsis.   -PT consult. Will likely need rehab.         MDM high  Reviewed labs, imaigng, records, hospital course to date. Reviewed outpatient records. Discussed with patients wife for collaterals/further information. Case discussed with palliative care, ICU consult team.  Management of severe sepsis/shock  Goals of care discussion with patients family  Management of severe protein calorie malnutrition.

## 2023-12-05 NOTE — CONSULT NOTE ADULT - ASSESSMENT
79yo M with PMH of CAD, PVD, T2DM, and SCC of Larynx p/w weakness with course complicated by aspiration event and shock. Palliative consulted for complex symptom management in the setting of malignancy.    ·	see GOC note: DNR/DNI, MEWS Exempt, c/w IVF/Abx for medical management of infection, Symptom-Directed Care  ·	PRN symptom regimen ordered if patient develops symptoms  ·	family's priority is to ensure patient is not in pain, continue with minimally invasive medical interventions (IVF, Abx)  ·	complicated context as patient completed curative-intent chemo/RT for his malignancy but family is concerned that patient has been suffering through his lengthy hospitalizations (and readmissions) and they agree that escalation to ICU care (i.e. pressors) would cause undue burden 77yo M with PMH of CAD, PVD, T2DM, and SCC of Larynx p/w weakness with course complicated by aspiration event and shock. Palliative consulted for complex symptom management in the setting of malignancy.    ·	see GOC note: DNR/DNI, MEWS Exempt, c/w IVF/Abx for medical management of infection, Symptom-Directed Care  ·	PRN symptom regimen ordered if patient develops symptoms  ·	family's priority is to ensure patient is not in pain, continue with minimally invasive medical interventions (IVF, Abx)  ·	complicated context as patient completed curative-intent chemo/RT for his malignancy but family is concerned that patient has been suffering through his lengthy hospitalizations (and readmissions) and they agree that escalation to ICU care (i.e. pressors) would cause undue burden

## 2023-12-05 NOTE — PROGRESS NOTE ADULT - ASSESSMENT
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 Marr/Dr. Gar),  s/p PEG 11/14 was recently admitted to [facility] for increase need of care at home in which course c/b disloged PEG, uncomplicated IR replacement 11/30. 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 Marr/Dr. Gar),  s/p PEG 11/14 was recently admitted to [facility] for increase need of care at home in which course c/b disloged PEG, uncomplicated IR replacement 11/30. TF were restarted on 12/01 and and patient was medically cleared for SOFI on dispo however now returned for re-placement

## 2023-12-05 NOTE — CONSULT NOTE ADULT - PROBLEM SELECTOR RECOMMENDATION 2
.  In the setting of treated laryngeal cancer and radiation changes  -hold tube feeds at this time, can re-evaluate restarting depending on clinical course  -allow pleasure feeds (i.e. water with swabs) as tolerated

## 2023-12-05 NOTE — CONSULT NOTE ADULT - PROBLEM SELECTOR RECOMMENDATION 9
.  -Dilaudid 0.5mg IV q2h PRN for Moderate/Severe Pain or RR>22  -Ativan 0.5mg IV q4h PRN for Anxiety/Agitation  -Robinul 0.4mg IV q6h PRN for Excessive Secretions  -Fentanyl Patch 225mcg/hr q72hr

## 2023-12-05 NOTE — PROVIDER CONTACT NOTE (CHANGE IN STATUS NOTIFICATION) - RECOMMENDATIONS
RR respond called, sepsis protocol preformed, 2 L NS given, 1g vancomycin IV, 3.375 mg Zosyn IV, EKG, 1g Tylenol IV given, chest x-ray and labs preformed as well, possible transfer to higher level of care

## 2023-12-05 NOTE — CONSULT NOTE ADULT - PROBLEM SELECTOR RECOMMENDATION 6
.  Complex medical decision making / symptom management in the setting of advanced illness.    Will continue to follow for ongoing GOC discussion / titration of palliative regimen. Emotional support provided, questions answered.  Active Psychosocial Referrals:  [x]Social Work/Case management [x]PT/OT [x]Chaplaincy []Hospice  []Patient/Family Support []Holistic RN []Massage Therapy []Music Therapy []Ethics  Coping: [] well [] with difficulty [] poor coping [x] unable to assess  Support system: [] strong [x] adequate [] inadequate    For new or uncontrolled symptoms, please call Palliative Care at [phone]). The service is available 24/7 (including nights & weekends) to provide symptom management recommendations over the phone as appropriate .  Complex medical decision making / symptom management in the setting of advanced illness.    Will continue to follow for ongoing GOC discussion / titration of palliative regimen. Emotional support provided, questions answered.  Active Psychosocial Referrals:  [x]Social Work/Case management [x]PT/OT [x]Chaplaincy []Hospice  []Patient/Family Support []Holistic RN []Massage Therapy []Music Therapy []Ethics  Coping: [] well [] with difficulty [] poor coping [x] unable to assess  Support system: [] strong [x] adequate [] inadequate    For new or uncontrolled symptoms, please call Palliative Care at [phone]). The service is available 24/7 (including nights & weekends) to provide symptom management recommendations over the phone as appropriate

## 2023-12-05 NOTE — CONSULT NOTE ADULT - NS ATTEST RISK PROBLEM GEN_ALL_CORE FT
Acute Illness That Poses A Threat To Life  Abrupt Change In Neurological Status  Decision Made To De-escalate Care Due To Poor Prognosis  Prescription Of Parenteral Controlled Substances

## 2023-12-05 NOTE — CONSULT NOTE ADULT - PROBLEM SELECTOR RECOMMENDATION 4
.  s/p curative-intent chemo/RT but suffering from significant side effects  -while patient's malignancy is not a hospice qualifying diagnosis, the complications of sepsis and respiratory failure may lead to the end of his life  -updated outpatient Onc on today's events

## 2023-12-05 NOTE — PROGRESS NOTE ADULT - SUBJECTIVE AND OBJECTIVE BOX
OVERNIGHT EVENTS: : pt with dysuria. Repeat UA positive. Started Ceftriaxone 1g qd.    SUBJECTIVE:  Patient seen and examined at bedside this morning, resting comfortably with NAD or complaints. Says he feels "ok" but was concerned over elevated glucose readings   ROS: Patient denies h/n/v/d, fever, chills, cp, palpitations, sob, abd pain, leg swelling, rashes, dysuria, and changes in BM.     Vital Signs Last 12 Hrs  T(F): 103 (12-05-23 @ 11:00), Max: 103 (12-05-23 @ 11:00)  HR: 134 (12-05-23 @ 11:00) (80 - 134)  BP: 68/40 (12-05-23 @ 11:00) (68/40 - 122/68)  BP(mean): --  RR: 22 (12-05-23 @ 11:00) (18 - 22)  SpO2: 89% (12-05-23 @ 11:00) (89% - 99%)  I&O's Summary    04 Dec 2023 07:01  -  05 Dec 2023 07:00  --------------------------------------------------------  IN: 0 mL / OUT: 35 mL / NET: -35 mL        PHYSICAL EXAM:  Constitutional: NAD, comfortable in bed.  HEENT: Blind   Neck: Radiation wound on Left side of neck dressed   Respiratory: CTA B/L  Cardiovascular: RRR, normal S1 and S2, no m/r/g.   Gastrointestinal: PEG tube in place  Extremities: wwp;  Neurological: AAOx2: aware of person and location but limited orientation given blindness and difficulty hearing          LABS:                        9.5    2.70  )-----------( 206      ( 05 Dec 2023 10:59 )             30.4     12-05    140  |  109<H>  |  28<H>  ----------------------------<  199<H>  4.0   |  23  |  0.85    Ca    7.6<L>      05 Dec 2023 10:59  Phos  1.9     12-05  Mg     1.1     12-05    TPro  4.6<L>  /  Alb  1.8<L>  /  TBili  0.5  /  DBili  x   /  AST  77<H>  /  ALT  16  /  AlkPhos  141<H>  12-05    PTT - ( 05 Dec 2023 10:59 )  PTT:30.0 sec  Urinalysis Basic - ( 05 Dec 2023 10:59 )    Color: x / Appearance: x / SG: x / pH: x  Gluc: 199 mg/dL / Ketone: x  / Bili: x / Urobili: x   Blood: x / Protein: x / Nitrite: x   Leuk Esterase: x / RBC: x / WBC x   Sq Epi: x / Non Sq Epi: x / Bacteria: x          RADIOLOGY & ADDITIONAL TESTS:    MEDICATIONS  (STANDING):  albuterol    90 MICROgram(s) HFA Inhaler 2 Puff(s) Inhalation every 6 hours  aspirin  chewable 81 milliGRAM(s) Oral every 24 hours  atorvastatin 80 milliGRAM(s) Oral at bedtime  atropine 1% Solution 1 Drop(s) Both EYES daily  dextrose 5%. 1000 milliLiter(s) (50 mL/Hr) IV Continuous <Continuous>  dextrose 5%. 1000 milliLiter(s) (100 mL/Hr) IV Continuous <Continuous>  dextrose 50% Injectable 25 Gram(s) IV Push once  dextrose 50% Injectable 25 Gram(s) IV Push once  dextrose 50% Injectable 12.5 Gram(s) IV Push once  enoxaparin Injectable 40 milliGRAM(s) SubCutaneous every 24 hours  fentaNYL   Patch  25 MICROgram(s)/Hr 1 Patch Transdermal every 72 hours  FIRST- Mouthwash  BLM 10 milliLiter(s) Swish and Spit daily  glucagon  Injectable 1 milliGRAM(s) IntraMuscular once  influenza  Vaccine (HIGH DOSE) 0.7 milliLiter(s) IntraMuscular once  insulin lispro (ADMELOG) corrective regimen sliding scale   SubCutaneous every 6 hours  insulin regular  human recombinant 3 Unit(s) SubCutaneous every 6 hours  magnesium sulfate  IVPB 2 Gram(s) IV Intermittent once  metoprolol tartrate 12.5 milliGRAM(s) Oral every 12 hours  piperacillin/tazobactam IVPB.- 3.375 Gram(s) IV Intermittent once  piperacillin/tazobactam IVPB.- 3.375 Gram(s) IV Intermittent once  piperacillin/tazobactam IVPB.. 3.375 Gram(s) IV Intermittent every 8 hours  piperacillin/tazobactam IVPB.. 3.375 Gram(s) IV Intermittent every 8 hours  silver sulfADIAZINE 1% Cream 1 Application(s) Topical daily  sodium chloride 0.9%. 1000 milliLiter(s) (150 mL/Hr) IV Continuous <Continuous>  vancomycin  IVPB      vancomycin  IVPB 1000 milliGRAM(s) IV Intermittent every 12 hours    MEDICATIONS  (PRN):  acetaminophen     Tablet .. 650 milliGRAM(s) Oral every 6 hours PRN Moderate Pain (4 - 6)  aluminum hydroxide/magnesium hydroxide/simethicone Suspension 30 milliLiter(s) Oral every 4 hours PRN Dyspepsia  dextrose Oral Gel 15 Gram(s) Oral once PRN Blood Glucose LESS THAN 70 milliGRAM(s)/deciliter  melatonin 3 milliGRAM(s) Oral at bedtime PRN Insomnia  ondansetron Injectable 4 milliGRAM(s) IV Push every 8 hours PRN Nausea and/or Vomiting

## 2023-12-05 NOTE — PROGRESS NOTE ADULT - PROBLEM SELECTOR PLAN 6
A1c 9.0 on 10/30. Discharged on lantus 45qd in the AM and lispro 7u TID, however patient discontinued on standing insulin while previously hospitalized due to hypoglycemia episodes.   - c/w tube feeds  - finger sticks q6 houirs  - c/w regular insulin 3U   - mISS

## 2023-12-05 NOTE — CONSULT NOTE ADULT - PROBLEM SELECTOR RECOMMENDATION 5
.  Patient is DNR/DNI, AMAURY in chart  -family is acting collectively to make decisions but patient appointed granddaughter (Yvrose Smith, [phone]) as alternate decision maker during previous hospitalization    In addition to the E/M visit, an advance care planning meeting was performed. Start time: 1:30PM; End time: 2:16PM; Total time: 46min. A face to face meeting to discuss advance care planning was held today regarding: LEONARDO HAY. Primary decision maker: Patient is unable to participate in decision making; Alternate/surrogate: Family. Discussed advance directives including, but not limited to, healthcare proxy and code status. Decision regarding code status: DNR/DNI; Documentation completed today: [facility] note .  Patient is DNR/DNI, AMAURY in chart  -family is acting collectively to make decisions but patient appointed granddaughter (Yvrose Smith, [phone]) as alternate decision maker during previous hospitalization    In addition to the E/M visit, an advance care planning meeting was performed. Start time: 1:30PM; End time: 2:16PM; Total time: 46min. A face to face meeting to discuss advance care planning was held today regarding: LEONARDO HAY. Primary decision maker: Patient is unable to participate in decision making; Alternate/surrogate: Family. Discussed advance directives including, but not limited to, healthcare proxy and code status. Decision regarding code status: DNR/DNI; Documentation completed today: [facility] note

## 2023-12-06 NOTE — PROGRESS NOTE ADULT - PROBLEM SELECTOR PLAN 4
STABLE. CAD s/p MIDCAB and PCI with multiple WINTER (most recent to LCx in 8/2022), Currently asymptomatic. EKGs w/o ischemic changes    c/w home med ASA81 and Lipitor 80qhs Larynx SCC currently getting radiation and chemotherapy, last chemo 10/30. C/o odynophagia causing poor PO intake. PEG placed 11/14 i/s/o ongoing PO intake 2/2 odynophagia, likely i/s/o cancer tx. Pt previously on fluconazole for candidiasis esophagitis ppx but d/c by Dr. Marr for lack of improvement.   -hold tube feeds at this time, can re-evaluate restarting depending on clinical course  -allow pleasure feeds (i.e. water with swabs) as tolerated. A1c 9.0 on 10/30. Discharged on lantus 45qd in the AM and lispro 7u TID, however patient discontinued on standing insulin while previously hospitalized due to hypoglycemia episodes.   - c/w tube feeds  - finger sticks q6 houirs   - mISS

## 2023-12-06 NOTE — PROGRESS NOTE ADULT - PROBLEM SELECTOR PLAN 1
Patient recently discharged on 12/1 for SOFI, now requesting to be placed at another facility.    - f/u SW Rapid response called yesterday for septic shock in which patient was markedly hypotensive and tachycardic requiring fluid resuscitation following aspiration event.  GOC conversation held with family and palliative team.   - patient continues to be DNR/DNI, now MEWS exempt   - family wishes to pursue symptom directed care but continue with IVF/Abx for management of infection   - no escalation of care is to be had to ICU for pressors etc but will continue with Zosyn and Vancomycin empric dosing

## 2023-12-06 NOTE — PROGRESS NOTE ADULT - PROBLEM SELECTOR PLAN 6
A1c 9.0 on 10/30. Discharged on lantus 45qd in the AM and lispro 7u TID, however patient discontinued on standing insulin while previously hospitalized due to hypoglycemia episodes.   - c/w tube feeds  - finger sticks q6 houirs  - c/w regular insulin 3U   - mISS F: none  E: replete prn  N: Glucerna 1.2 @ 65/h  DVT ppx: lovenox 40mg qd  Dispo: SOFI

## 2023-12-06 NOTE — PROGRESS NOTE ADULT - PROBLEM SELECTOR PLAN 2
Positive UA on admission, with patient now reporting dysuria -  - c/w Ceftriaxone 1g qd, first dose given 12/5AM Aspiration and GOC as above   Pain regimen:   -Dilaudid 0.5mg IV q2h PRN for Moderate/Severe Pain or RR>22  -Ativan 0.5mg IV q4h PRN for Anxiety/Agitation  -Robinul 0.4mg IV q6h PRN for Excessive Secretions  -Fentanyl Patch 225mcg/hr q72hr.

## 2023-12-06 NOTE — PROGRESS NOTE ADULT - SUBJECTIVE AND OBJECTIVE BOX
**INCOMPLETE NOTE    OVERNIGHT EVENTS:    SUBJECTIVE:  Patient seen and examined at bedside.  ROS: Patient denies h/n/v/d, fever, chills, cp, palpitations, sob, abd pain, leg swelling, rashes, dysuria, and changes in BM.     Vital Signs Last 12 Hrs  T(F): 98.3 (12-06-23 @ 05:52), Max: 99.4 (12-05-23 @ 20:44)  HR: 87 (12-06-23 @ 05:52) (87 - 96)  BP: 80/41 (12-06-23 @ 05:52) (80/41 - 88/40)  BP(mean): 54 (12-06-23 @ 05:52) (54 - 54)  RR: 20 (12-06-23 @ 05:52) (20 - 20)  SpO2: 95% (12-06-23 @ 05:52) (95% - 95%)  I&O's Summary      PHYSICAL EXAM:  Constitutional: NAD, comfortable in bed.  HEENT: NC/AT, PERRLA, EOMI, no conjunctival pallor or scleral icterus, MMM  Neck: Supple, no JVD  Respiratory: CTA B/L. No w/r/r.   Cardiovascular: RRR, normal S1 and S2, no m/r/g.   Gastrointestinal: +BS, soft NTND, no guarding or rebound tenderness, no palpable masses   Extremities: wwp; no cyanosis, clubbing or edema.   Vascular: Pulses equal and strong throughout.   Neurological: AAOx3, no CN deficits, strength and sensation intact throughout.   Skin: No gross skin abnormalities or rashes        LABS:                        7.8    23.07 )-----------( 168      ( 06 Dec 2023 05:30 )             24.6     12-06    141  |  108  |  34<H>  ----------------------------<  234<H>  5.0   |  26  |  1.23    Ca    7.6<L>      06 Dec 2023 05:30  Phos  2.2     12-06  Mg     1.7     12-06    TPro  4.8<L>  /  Alb  2.1<L>  /  TBili  0.5  /  DBili  x   /  AST  92<H>  /  ALT  38  /  AlkPhos  180<H>  12-06    PTT - ( 05 Dec 2023 10:59 )  PTT:30.0 sec  Urinalysis Basic - ( 06 Dec 2023 05:30 )    Color: x / Appearance: x / SG: x / pH: x  Gluc: 234 mg/dL / Ketone: x  / Bili: x / Urobili: x   Blood: x / Protein: x / Nitrite: x   Leuk Esterase: x / RBC: x / WBC x   Sq Epi: x / Non Sq Epi: x / Bacteria: x          RADIOLOGY & ADDITIONAL TESTS:    MEDICATIONS  (STANDING):  albuterol    90 MICROgram(s) HFA Inhaler 2 Puff(s) Inhalation every 6 hours  aspirin  chewable 81 milliGRAM(s) Oral every 24 hours  dextrose 5%. 1000 milliLiter(s) (50 mL/Hr) IV Continuous <Continuous>  dextrose 5%. 1000 milliLiter(s) (100 mL/Hr) IV Continuous <Continuous>  dextrose 50% Injectable 25 Gram(s) IV Push once  dextrose 50% Injectable 12.5 Gram(s) IV Push once  dextrose 50% Injectable 25 Gram(s) IV Push once  fentaNYL   Patch  25 MICROgram(s)/Hr 1 Patch Transdermal every 72 hours  FIRST- Mouthwash  BLM 10 milliLiter(s) Swish and Spit daily  glucagon  Injectable 1 milliGRAM(s) IntraMuscular once  influenza  Vaccine (HIGH DOSE) 0.7 milliLiter(s) IntraMuscular once  insulin lispro (ADMELOG) corrective regimen sliding scale   SubCutaneous at bedtime  insulin lispro (ADMELOG) corrective regimen sliding scale   SubCutaneous three times a day before meals  piperacillin/tazobactam IVPB.. 3.375 Gram(s) IV Intermittent every 8 hours  silver sulfADIAZINE 1% Cream 1 Application(s) Topical daily  sodium chloride 0.9%. 1000 milliLiter(s) (150 mL/Hr) IV Continuous <Continuous>  vancomycin  IVPB 1000 milliGRAM(s) IV Intermittent every 24 hours    MEDICATIONS  (PRN):  acetaminophen     Tablet .. 650 milliGRAM(s) Oral every 6 hours PRN Moderate Pain (4 - 6)  acetaminophen   Oral Liquid .. 600 milliGRAM(s) Oral every 6 hours PRN Temp greater or equal to 38C (100.4F), Mild Pain (1 - 3)  dextrose Oral Gel 15 Gram(s) Oral once PRN Blood Glucose LESS THAN 70 milliGRAM(s)/deciliter  glycopyrrolate Injectable 0.4 milliGRAM(s) IV Push four times a day PRN Secretions  HYDROmorphone  Injectable 0.5 milliGRAM(s) IV Push every 2 hours PRN Moderate pain (4-6), Severe pain (7-10), Respiratory rate greater than 22  LORazepam   Injectable 0.5 milliGRAM(s) IV Push every 4 hours PRN Anxiety  ondansetron Injectable 4 milliGRAM(s) IV Push every 8 hours PRN Nausea and/or Vomiting   OVERNIGHT EVENTS: INNA     SUBJECTIVE:  Patient seen and examined at bedside. Resting in bed, NAD able to converse with writer, says he feels "Ok"   ROS: Patient denies h/n/v/d, fever, chills, cp, palpitations, sob, abd pain,     Vital Signs Last 12 Hrs  T(F): 98.3 (12-06-23 @ 05:52), Max: 99.4 (12-05-23 @ 20:44)  HR: 87 (12-06-23 @ 05:52) (87 - 96)  BP: 80/41 (12-06-23 @ 05:52) (80/41 - 88/40)  BP(mean): 54 (12-06-23 @ 05:52) (54 - 54)  RR: 20 (12-06-23 @ 05:52) (20 - 20)  SpO2: 95% (12-06-23 @ 05:52) (95% - 95%)  I&O's Summary      PHYSICAL EXAM:  Constitutional: NAD,   HEENT: Blind   Neck: Radiation wound on Left side of neck dressed   Respiratory: CTA B/L  Cardiovascular: RRR, normal S1 and S2, no m/r/g.   Gastrointestinal: PEG tube in place  Extremities: wwp;  Neurological: AAOx2: aware of person and location but limited orientation given blindness and difficulty hearing            LABS:                        7.8    23.07 )-----------( 168      ( 06 Dec 2023 05:30 )             24.6     12-06    141  |  108  |  34<H>  ----------------------------<  234<H>  5.0   |  26  |  1.23    Ca    7.6<L>      06 Dec 2023 05:30  Phos  2.2     12-06  Mg     1.7     12-06    TPro  4.8<L>  /  Alb  2.1<L>  /  TBili  0.5  /  DBili  x   /  AST  92<H>  /  ALT  38  /  AlkPhos  180<H>  12-06    PTT - ( 05 Dec 2023 10:59 )  PTT:30.0 sec  Urinalysis Basic - ( 06 Dec 2023 05:30 )    Color: x / Appearance: x / SG: x / pH: x  Gluc: 234 mg/dL / Ketone: x  / Bili: x / Urobili: x   Blood: x / Protein: x / Nitrite: x   Leuk Esterase: x / RBC: x / WBC x   Sq Epi: x / Non Sq Epi: x / Bacteria: x          RADIOLOGY & ADDITIONAL TESTS:    MEDICATIONS  (STANDING):  albuterol    90 MICROgram(s) HFA Inhaler 2 Puff(s) Inhalation every 6 hours  aspirin  chewable 81 milliGRAM(s) Oral every 24 hours  dextrose 5%. 1000 milliLiter(s) (50 mL/Hr) IV Continuous <Continuous>  dextrose 5%. 1000 milliLiter(s) (100 mL/Hr) IV Continuous <Continuous>  dextrose 50% Injectable 25 Gram(s) IV Push once  dextrose 50% Injectable 12.5 Gram(s) IV Push once  dextrose 50% Injectable 25 Gram(s) IV Push once  fentaNYL   Patch  25 MICROgram(s)/Hr 1 Patch Transdermal every 72 hours  FIRST- Mouthwash  BLM 10 milliLiter(s) Swish and Spit daily  glucagon  Injectable 1 milliGRAM(s) IntraMuscular once  influenza  Vaccine (HIGH DOSE) 0.7 milliLiter(s) IntraMuscular once  insulin lispro (ADMELOG) corrective regimen sliding scale   SubCutaneous at bedtime  insulin lispro (ADMELOG) corrective regimen sliding scale   SubCutaneous three times a day before meals  piperacillin/tazobactam IVPB.. 3.375 Gram(s) IV Intermittent every 8 hours  silver sulfADIAZINE 1% Cream 1 Application(s) Topical daily  sodium chloride 0.9%. 1000 milliLiter(s) (150 mL/Hr) IV Continuous <Continuous>  vancomycin  IVPB 1000 milliGRAM(s) IV Intermittent every 24 hours    MEDICATIONS  (PRN):  acetaminophen     Tablet .. 650 milliGRAM(s) Oral every 6 hours PRN Moderate Pain (4 - 6)  acetaminophen   Oral Liquid .. 600 milliGRAM(s) Oral every 6 hours PRN Temp greater or equal to 38C (100.4F), Mild Pain (1 - 3)  dextrose Oral Gel 15 Gram(s) Oral once PRN Blood Glucose LESS THAN 70 milliGRAM(s)/deciliter  glycopyrrolate Injectable 0.4 milliGRAM(s) IV Push four times a day PRN Secretions  HYDROmorphone  Injectable 0.5 milliGRAM(s) IV Push every 2 hours PRN Moderate pain (4-6), Severe pain (7-10), Respiratory rate greater than 22  LORazepam   Injectable 0.5 milliGRAM(s) IV Push every 4 hours PRN Anxiety  ondansetron Injectable 4 milliGRAM(s) IV Push every 8 hours PRN Nausea and/or Vomiting

## 2023-12-06 NOTE — PROGRESS NOTE ADULT - PROBLEM SELECTOR PLAN 3
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 C/o odynophagia causing poor PO intake. PEG placed 11/14 i/s/o ongoing PO intake 2/2 odynophagia, likely i/s/o cancer tx. Pt previously on fluconazole for candidiasis esophagitis ppx but d/c by Dr. Marr for lack of improvement.       - Onc follow up 1 wk w/ Dr. Derek Harvey upon d/c   - Chest PT, hypertonic nebulizer q4hr   - Aspiration precautions    - Magic mouthwash     follows with Dr. Anthony. Not in acute pain    - c.w Fentanyl 25mcg/hr Patch q72hr as long-acting agent Patient recently discharged on 12/1 for SOFI, now requesting to be placed at another facility.    - f/u SW Larynx SCC currently getting radiation and chemotherapy, last chemo 10/30. C/o odynophagia causing poor PO intake. PEG placed 11/14 i/s/o ongoing PO intake 2/2 odynophagia, likely i/s/o cancer tx. Pt previously on fluconazole for candidiasis esophagitis ppx but d/c by Dr. Marr for lack of improvement.   -hold tube feeds at this time, can re-evaluate restarting depending on clinical course  -allow pleasure feeds (i.e. water with swabs) as tolerated.

## 2023-12-06 NOTE — PROGRESS NOTE ADULT - ASSESSMENT
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 Marr/Dr. Gar),  s/p PEG 11/14 was recently admitted to [facility] for increase need of care at home in which course c/b disloged PEG, uncomplicated IR replacement 11/30. 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 Marr/Dr. Gar),  s/p PEG 11/14 was recently admitted to [facility] for increase need of care at home in which course c/b disloged PEG, uncomplicated IR replacement 11/30. TF were restarted on 12/01 and and patient was medically cleared for SOFI on dispo however now returned for re-placement

## 2023-12-06 NOTE — PROGRESS NOTE ADULT - ATTENDING COMMENTS
Pt is 77 yo man with laryngeal cancer, dementia, transitioned to [facility] team for management of end of life care. Pt sustained septic shock and was partially resuscitated with fluids and antibiotics. Pt is DNR DNI MEWS exampt, but with cont medical care  ----for likely aspiration PNA will cont with Zosyn and Vanc  ----pt oxygen supplementation requirements are increasing to 4L  ----for hypotension pt is on 150cc of NS, care not to be escalated to pressors  ----likely ileus, pt is not on feeds Pt is 77 yo man with laryngeal cancer, dementia, transitioned to [facility] team for management of end of life care. Pt sustained septic shock and was partially resuscitated with fluids and antibiotics. Pt is DNR DNI MEWS exampt, but with cont medical care  ----for likely aspiration PNA will cont with Zosyn and Vanc  ----pt oxygen supplementation requirements are increasing to 4L  ----for hypotension pt is on 150cc of NS, care not to be escalated to pressors  ----likely ileus, pt is not on feeds

## 2023-12-06 NOTE — PROGRESS NOTE ADULT - PROBLEM SELECTOR PLAN 5
Pt exhibited agitation episodes required zyprexa IM on last admission. Is currently calm however was previously likely to delirium and/or dementia.   - attempt to redirect if patient becomes agitated  - can c/w zyprexa as per last admission on 12/1 A1c 9.0 on 10/30. Discharged on lantus 45qd in the AM and lispro 7u TID, however patient discontinued on standing insulin while previously hospitalized due to hypoglycemia episodes.   - c/w tube feeds  - finger sticks q6 houirs  - c/w regular insulin 3U   - mISS F: none  E: replete prn  N: Glucerna 1.2 @ 65/h  DVT ppx: lovenox 40mg qd  Dispo: SOFI

## 2023-12-06 NOTE — PROGRESS NOTE ADULT - PROBLEM SELECTOR PLAN 7
F: none  E: replete prn  N: Glucerna 1.2 @ 65/h  DVT ppx: lovenox 40mg qd  Dispo: SOFI
F: none  E: replete prn  N: Glucerna 1.2 @ 65/h  DVT ppx: lovenox 40mg qd  Dispo: SOFI

## 2023-12-07 NOTE — PROGRESS NOTE ADULT - PROBLEM SELECTOR PLAN 1
Rapid response called yesterday for septic shock in which patient was markedly hypotensive and tachycardic requiring fluid resuscitation following aspiration event.  GOC conversation held with family and palliative team.   - patient continues to be DNR/DNI, now MEWS exempt   - family wishes to pursue symptom directed care but continue with IVF/Abx for management of infection   - no escalation of care is to be had to ICU for pressors etc but will continue with Zosyn and Vancomycin empric dosing

## 2023-12-07 NOTE — PROGRESS NOTE ADULT - ATTENDING COMMENTS
Pt is 77 yo man with laryngeal cancer, dementia, transitioned to [facility] team for management of end of life care. Pt sustained septic shock and was partially resuscitated with fluids and antibiotics. Pt is DNR DNI MEWS exampt, but with cont medical care  ----for aspiration PNA will cont with Zosyn and Vanc for now, follow on MRSA swab results and de-escalate Vanc if possible  ----pt oxygen supplementation requirements are increasing to 4L  ----for hypotension pt is on 150cc of NS, care not to be escalated to pressors. Pt will be restarted on PEG feeds if abdominal XR does not show major pathology  ----dispo home as per family wishes Pt is 79 yo man with laryngeal cancer, dementia, transitioned to [facility] team for management of end of life care. Pt sustained septic shock and was partially resuscitated with fluids and antibiotics. Pt is DNR DNI MEWS exampt, but with cont medical care  ----for aspiration PNA will cont with Zosyn and Vanc for now, follow on MRSA swab results and de-escalate Vanc if possible  ----pt oxygen supplementation requirements are increasing to 4L  ----for hypotension pt is on 150cc of NS, care not to be escalated to pressors. Pt will be restarted on PEG feeds if abdominal XR does not show major pathology  ----dispo home as per family wishes

## 2023-12-07 NOTE — PROGRESS NOTE ADULT - PROBLEM SELECTOR PLAN 2
Aspiration and GOC as above   Pain regimen:   -Dilaudid 0.5mg IV q2h PRN for Moderate/Severe Pain or RR>22  -Ativan 0.5mg IV q4h PRN for Anxiety/Agitation  -Robinul 0.4mg IV q6h PRN for Excessive Secretions  -Fentanyl Patch 225mcg/hr q72hr. Aspiration and GOC as above   Pain regimen:   -Dilaudid 0.5mg IV q2h PRN for Moderate/Severe Pain or RR>22  -Ativan 0.5mg IV q4h PRN for Anxiety/Agitation  -Robinul 0.4mg IV q6h PRN for Excessive Secretions  -Fentanyl Patch 225mcg/hr q72hr.  Infectious workup   - f/u MRSA swab   - f/u Vanc trough 12pm

## 2023-12-07 NOTE — PROGRESS NOTE ADULT - PROBLEM SELECTOR PLAN 3
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 C/o odynophagia causing poor PO intake. PEG placed 11/14 i/s/o ongoing PO intake 2/2 odynophagia, likely i/s/o cancer tx. Pt previously on fluconazole for candidiasis esophagitis ppx but d/c by Dr. Marr for lack of improvement.   -hold tube feeds at this time, can re-evaluate restarting depending on clinical course  -allow pleasure feeds (i.e. water with swabs) as tolerated.

## 2023-12-07 NOTE — PROGRESS NOTE ADULT - ASSESSMENT
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 Marr/Dr. Gar),  s/p PEG 11/14 was recently admitted to [facility] for increase need of care at home in which course c/b disloged PEG, uncomplicated IR replacement 11/30. 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 Marr/Dr. Gar),  s/p PEG 11/14 was recently admitted to [facility] for increase need of care at home in which course c/b disloged PEG, uncomplicated IR replacement 11/30. TF were restarted on 12/01 and and patient was medically cleared for SOFI on dispo however now returned for re-placement

## 2023-12-07 NOTE — CHART NOTE - NSCHARTNOTEFT_GEN_A_CORE
ONCOLOGY CONTINUITY NOTE      I saw Mr. Robertson today and spoke with his team about the current state of his condition.  The patient recently suffered significant aspiration event with resultant hypotension.  He was partially resuscitated with fluids and goals of care discussion was initiated.    The outcome of that discussion was to continue supportive measures such as IVF and antibiotics, but to avoid escalation of care including central line and ICU placement.  On this conservative management plan the patient actually seems to be improving somewhat and when I saw him his mental status seemed improved compared to the last time I examined him approximately 1 week ago.    At this time, the plan is to continue the supportive measures and, when safe to do so, resume his enteral feeds.  I agree with the plan and appreciate the skilled and compassionate care of the primary team.  I spoke with the patient's significant other, daughter, and granddaughter and answered questions they had about his condition and treatment plan.    Please reach out anytime.       Total time spent in patient care: 30 minutes

## 2023-12-07 NOTE — PROGRESS NOTE ADULT - SUBJECTIVE AND OBJECTIVE BOX
**INCOMPLETE NOTE    OVERNIGHT EVENTS:    SUBJECTIVE:  Patient seen and examined at bedside.  ROS: Patient denies h/n/v/d, fever, chills, cp, palpitations, sob, abd pain, leg swelling, rashes, dysuria, and changes in BM.     Vital Signs Last 12 Hrs  T(F): 97.4 (12-07-23 @ 05:38), Max: 97.6 (12-06-23 @ 20:36)  HR: 76 (12-07-23 @ 05:38) (76 - 77)  BP: 92/49 (12-07-23 @ 05:38) (89/42 - 92/49)  BP(mean): --  RR: 18 (12-07-23 @ 05:38) (18 - 18)  SpO2: 95% (12-07-23 @ 05:38) (95% - 96%)  I&O's Summary    06 Dec 2023 07:01  -  07 Dec 2023 07:00  --------------------------------------------------------  IN: 3300 mL / OUT: 925 mL / NET: 2375 mL        PHYSICAL EXAM:  Constitutional: NAD, comfortable in bed.  HEENT: NC/AT, PERRLA, EOMI, no conjunctival pallor or scleral icterus, MMM  Neck: Supple, no JVD  Respiratory: CTA B/L. No w/r/r.   Cardiovascular: RRR, normal S1 and S2, no m/r/g.   Gastrointestinal: +BS, soft NTND, no guarding or rebound tenderness, no palpable masses   Extremities: wwp; no cyanosis, clubbing or edema.   Vascular: Pulses equal and strong throughout.   Neurological: AAOx3, no CN deficits, strength and sensation intact throughout.   Skin: No gross skin abnormalities or rashes        LABS:                        7.1    20.87 )-----------( 151      ( 07 Dec 2023 05:30 )             23.6     12-06    141  |  108  |  34<H>  ----------------------------<  234<H>  5.0   |  26  |  1.23    Ca    7.6<L>      06 Dec 2023 05:30  Phos  2.2     12-06  Mg     1.7     12-06    TPro  4.8<L>  /  Alb  2.1<L>  /  TBili  0.5  /  DBili  x   /  AST  92<H>  /  ALT  38  /  AlkPhos  180<H>  12-06    PTT - ( 05 Dec 2023 10:59 )  PTT:30.0 sec  Urinalysis Basic - ( 06 Dec 2023 05:30 )    Color: x / Appearance: x / SG: x / pH: x  Gluc: 234 mg/dL / Ketone: x  / Bili: x / Urobili: x   Blood: x / Protein: x / Nitrite: x   Leuk Esterase: x / RBC: x / WBC x   Sq Epi: x / Non Sq Epi: x / Bacteria: x          RADIOLOGY & ADDITIONAL TESTS:    MEDICATIONS  (STANDING):  albuterol    90 MICROgram(s) HFA Inhaler 2 Puff(s) Inhalation every 6 hours  aspirin  chewable 81 milliGRAM(s) Oral every 24 hours  dextrose 5%. 1000 milliLiter(s) (50 mL/Hr) IV Continuous <Continuous>  dextrose 5%. 1000 milliLiter(s) (100 mL/Hr) IV Continuous <Continuous>  dextrose 50% Injectable 25 Gram(s) IV Push once  dextrose 50% Injectable 12.5 Gram(s) IV Push once  dextrose 50% Injectable 25 Gram(s) IV Push once  fentaNYL   Patch  25 MICROgram(s)/Hr 1 Patch Transdermal every 72 hours  FIRST- Mouthwash  BLM 10 milliLiter(s) Swish and Spit daily  glucagon  Injectable 1 milliGRAM(s) IntraMuscular once  influenza  Vaccine (HIGH DOSE) 0.7 milliLiter(s) IntraMuscular once  insulin lispro (ADMELOG) corrective regimen sliding scale   SubCutaneous at bedtime  insulin lispro (ADMELOG) corrective regimen sliding scale   SubCutaneous three times a day before meals  piperacillin/tazobactam IVPB.. 3.375 Gram(s) IV Intermittent every 8 hours  silver sulfADIAZINE 1% Cream 1 Application(s) Topical daily  sodium chloride 0.9%. 1000 milliLiter(s) (150 mL/Hr) IV Continuous <Continuous>    MEDICATIONS  (PRN):  acetaminophen     Tablet .. 650 milliGRAM(s) Oral every 6 hours PRN Moderate Pain (4 - 6)  dextrose Oral Gel 15 Gram(s) Oral once PRN Blood Glucose LESS THAN 70 milliGRAM(s)/deciliter  glycopyrrolate Injectable 0.4 milliGRAM(s) IV Push four times a day PRN Secretions  HYDROmorphone  Injectable 0.5 milliGRAM(s) IV Push every 2 hours PRN Moderate pain (4-6), Severe pain (7-10), Respiratory rate greater than 22  LORazepam   Injectable 0.5 milliGRAM(s) IV Push every 4 hours PRN Anxiety  ondansetron Injectable 4 milliGRAM(s) IV Push every 8 hours PRN Nausea and/or Vomiting   OVERNIGHT EVENTS: INNA     SUBJECTIVE:  Patient seen and examined at bedside. More energetic, NAD   ROS: Patient denies h/n/v/d, fever, chills, cp, palpitations, sob, abd pain, leg swelling, rashes, dysuria, and changes in BM.     Vital Signs Last 12 Hrs  T(F): 97.4 (12-07-23 @ 05:38), Max: 97.6 (12-06-23 @ 20:36)  HR: 76 (12-07-23 @ 05:38) (76 - 77)  BP: 92/49 (12-07-23 @ 05:38) (89/42 - 92/49)  BP(mean): --  RR: 18 (12-07-23 @ 05:38) (18 - 18)  SpO2: 95% (12-07-23 @ 05:38) (95% - 96%)  I&O's Summary    06 Dec 2023 07:01  -  07 Dec 2023 07:00  --------------------------------------------------------  IN: 3300 mL / OUT: 925 mL / NET: 2375 mL        PHYSICAL EXAM:  Constitutional: NAD,   HEENT: Blind   Neck: Radiation wound on Left side of neck dressed   Respiratory: CTA B/L  Cardiovascular: RRR, normal S1 and S2, no m/r/g.   Gastrointestinal: PEG tube in place  Extremities: wwp; non edematous   Neurological: AAOx2:          LABS:                        7.1    20.87 )-----------( 151      ( 07 Dec 2023 05:30 )             23.6     12-06    141  |  108  |  34<H>  ----------------------------<  234<H>  5.0   |  26  |  1.23    Ca    7.6<L>      06 Dec 2023 05:30  Phos  2.2     12-06  Mg     1.7     12-06    TPro  4.8<L>  /  Alb  2.1<L>  /  TBili  0.5  /  DBili  x   /  AST  92<H>  /  ALT  38  /  AlkPhos  180<H>  12-06    PTT - ( 05 Dec 2023 10:59 )  PTT:30.0 sec  Urinalysis Basic - ( 06 Dec 2023 05:30 )    Color: x / Appearance: x / SG: x / pH: x  Gluc: 234 mg/dL / Ketone: x  / Bili: x / Urobili: x   Blood: x / Protein: x / Nitrite: x   Leuk Esterase: x / RBC: x / WBC x   Sq Epi: x / Non Sq Epi: x / Bacteria: x          RADIOLOGY & ADDITIONAL TESTS:    MEDICATIONS  (STANDING):  albuterol    90 MICROgram(s) HFA Inhaler 2 Puff(s) Inhalation every 6 hours  aspirin  chewable 81 milliGRAM(s) Oral every 24 hours  dextrose 5%. 1000 milliLiter(s) (50 mL/Hr) IV Continuous <Continuous>  dextrose 5%. 1000 milliLiter(s) (100 mL/Hr) IV Continuous <Continuous>  dextrose 50% Injectable 25 Gram(s) IV Push once  dextrose 50% Injectable 12.5 Gram(s) IV Push once  dextrose 50% Injectable 25 Gram(s) IV Push once  fentaNYL   Patch  25 MICROgram(s)/Hr 1 Patch Transdermal every 72 hours  FIRST- Mouthwash  BLM 10 milliLiter(s) Swish and Spit daily  glucagon  Injectable 1 milliGRAM(s) IntraMuscular once  influenza  Vaccine (HIGH DOSE) 0.7 milliLiter(s) IntraMuscular once  insulin lispro (ADMELOG) corrective regimen sliding scale   SubCutaneous at bedtime  insulin lispro (ADMELOG) corrective regimen sliding scale   SubCutaneous three times a day before meals  piperacillin/tazobactam IVPB.. 3.375 Gram(s) IV Intermittent every 8 hours  silver sulfADIAZINE 1% Cream 1 Application(s) Topical daily  sodium chloride 0.9%. 1000 milliLiter(s) (150 mL/Hr) IV Continuous <Continuous>    MEDICATIONS  (PRN):  acetaminophen     Tablet .. 650 milliGRAM(s) Oral every 6 hours PRN Moderate Pain (4 - 6)  dextrose Oral Gel 15 Gram(s) Oral once PRN Blood Glucose LESS THAN 70 milliGRAM(s)/deciliter  glycopyrrolate Injectable 0.4 milliGRAM(s) IV Push four times a day PRN Secretions  HYDROmorphone  Injectable 0.5 milliGRAM(s) IV Push every 2 hours PRN Moderate pain (4-6), Severe pain (7-10), Respiratory rate greater than 22  LORazepam   Injectable 0.5 milliGRAM(s) IV Push every 4 hours PRN Anxiety  ondansetron Injectable 4 milliGRAM(s) IV Push every 8 hours PRN Nausea and/or Vomiting

## 2023-12-08 PROBLEM — C32.9 MALIGNANT NEOPLASM OF LARYNX, UNSPECIFIED: Chronic | Status: ACTIVE | Noted: 2023-01-01

## 2023-12-08 NOTE — PROGRESS NOTE ADULT - PROBLEM SELECTOR PLAN 3
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 C/o odynophagia causing poor PO intake. PEG placed 11/14 i/s/o ongoing PO intake 2/2 odynophagia, likely i/s/o cancer tx. Pt previously on fluconazole for candidiasis esophagitis ppx but d/c by Dr. Marr for lack of improvement.   -Given patients clinical imrpovement will restart - trickle feeds today with goal rate of 50ccs  -allow pleasure feeds (i.e. water with swabs) as tolerated.

## 2023-12-08 NOTE — PROGRESS NOTE ADULT - PROBLEM SELECTOR PLAN 4
A1c 9.0 on 10/30. Discharged on lantus 45qd in the AM and lispro 7u TID, however patient discontinued on standing insulin while previously hospitalized due to hypoglycemia episodes.   - finger sticks q6 houirs   - mISS

## 2023-12-08 NOTE — PROGRESS NOTE ADULT - PROBLEM SELECTOR PLAN 2
Aspiration and GOC as above   Pain regimen:   -Dilaudid 0.5mg IV q2h PRN for Moderate/Severe Pain or RR>22  -Ativan 0.5mg IV q4h PRN for Anxiety/Agitation  -Robinul 0.4mg IV q6h PRN for Excessive Secretions  -Fentanyl Patch 225mcg/hr q72hr.  Infectious workup   - f/u MRSA swab   - f/u Vanc trough 12pm

## 2023-12-08 NOTE — CHART NOTE - NSCHARTNOTEFT_GEN_A_CORE
Admitting Diagnosis:   Patient is a 78y old  Male who presents with a chief complaint of Rehab placement (08 Dec 2023 07:21)    PAST MEDICAL & SURGICAL HISTORY:  HTN (hypertension)  HLD (hyperlipidemia)  DM (diabetes mellitus)  CAD (coronary artery disease)  Glaucoma  PVD (peripheral vascular disease)  Laryngeal cancer  squamous cell carcinoma  S/P percutaneous endoscopic gastrostomy (PEG) tube placement    Current Nutrition Order:  NPO with Tube Feed via PEG: Glucerna 1.5 @50mL/hr x24hr to provide 1200mL total volume, 1800kcal (29kcal/kg CBW 61.8kg), 99g protein (1.6g/kg CBW 61.8kg), and 911mL free water.    PO Intake: Good (%) [   ]  Fair (50-75%) [   ] Poor (<25%) [   ] - NPO with EN, see below     GI Issues:   No nausea/vomiting/diarrhea/constipation documented  No abdominal distension/discomfort noted     Pain:  No pain reported     Skin Integrity:  Left neck radiation burn, bilateral heel DTIs documented  No edema noted  Simon score 12    Labs:       140  |  111<H>  |  16  ----------------------------<  167<H>  4.0   |  21<L>  |  0.77    Ca    7.0<L>      08 Dec 2023 08:49  Phos  2.0       Mg     1.6         TPro  4.4<L>  /  Alb  1.7<L>  /  TBili  0.3  /  DBili  x   /  AST  23  /  ALT  14  /  AlkPhos  132<H>      CAPILLARY BLOOD GLUCOSE  POCT Blood Glucose.: 159 mg/dL (08 Dec 2023 12:27)  POCT Blood Glucose.: 187 mg/dL (08 Dec 2023 08:18)  POCT Blood Glucose.: 163 mg/dL (07 Dec 2023 22:03)  POCT Blood Glucose.: 188 mg/dL (07 Dec 2023 17:20)    Medications:  MEDICATIONS  (STANDING):  albuterol    90 MICROgram(s) HFA Inhaler 2 Puff(s) Inhalation every 6 hours  aspirin  chewable 81 milliGRAM(s) Oral every 24 hours  dextrose 5%. 1000 milliLiter(s) (100 mL/Hr) IV Continuous <Continuous>  dextrose 5%. 1000 milliLiter(s) (50 mL/Hr) IV Continuous <Continuous>  dextrose 50% Injectable 12.5 Gram(s) IV Push once  dextrose 50% Injectable 25 Gram(s) IV Push once  dextrose 50% Injectable 25 Gram(s) IV Push once  fentaNYL   Patch  25 MICROgram(s)/Hr 1 Patch Transdermal every 72 hours  FIRST- Mouthwash  BLM 10 milliLiter(s) Swish and Spit daily  glucagon  Injectable 1 milliGRAM(s) IntraMuscular once  influenza  Vaccine (HIGH DOSE) 0.7 milliLiter(s) IntraMuscular once  insulin lispro (ADMELOG) corrective regimen sliding scale   SubCutaneous at bedtime  insulin lispro (ADMELOG) corrective regimen sliding scale   SubCutaneous three times a day before meals  piperacillin/tazobactam IVPB.. 3.375 Gram(s) IV Intermittent every 8 hours  silver sulfADIAZINE 1% Cream 1 Application(s) Topical daily  sodium chloride 0.9%. 1000 milliLiter(s) (50 mL/Hr) IV Continuous <Continuous>  sodium phosphate 30 milliMole(s)/500 mL IVPB 30 milliMole(s) IV Intermittent once    MEDICATIONS  (PRN):  acetaminophen     Tablet .. 650 milliGRAM(s) Oral every 6 hours PRN Moderate Pain (4 - 6)  dextrose Oral Gel 15 Gram(s) Oral once PRN Blood Glucose LESS THAN 70 milliGRAM(s)/deciliter  glycopyrrolate Injectable 0.4 milliGRAM(s) IV Push four times a day PRN Secretions  HYDROmorphone  Injectable 0.5 milliGRAM(s) IV Push every 2 hours PRN Moderate pain (4-6), Severe pain (7-10), Respiratory rate greater than 22  LORazepam   Injectable 0.5 milliGRAM(s) IV Push every 4 hours PRN Anxiety  ondansetron Injectable 4 milliGRAM(s) IV Push every 8 hours PRN Nausea and/or Vomiting    Anthropometrics:  Height: 5'6"  Weight: 132lb/60kg  IBW: 142lb/64.5kg    93% IBW    Weight Change:   No new weights obtained. Recommend nursing to trend weights weekly. RD to continue monitoring weights as able.     Nutrition Focused Physical Exam:   Completed on , see nutrition risk notification    Estimated energy needs:   Calories: 30-35kcal/k-2156kcal/d  Protein: 1.4-1.6g/k-99g/d  Fluid: 30-35mL/k-2156mL/d  Estimated needs based on RD obtained wt  as within % IBW 142lb/64.5kg (94%). Needs adjusted for age, cancer on treatment, wound healing, and malnutrition.    Subjective:   88M, history of CAD, PVD, DM, squamous cell carcinoma of larynx (2023) getting radiation and chemo (follows Dr. Marr/Dr. Gar),  s/p PEG  was recently admitted to [facility] for increase need of care at home in which course c/b disloged PEG, uncomplicated IR replacement . TF were restarted on  and and patient was medically cleared for SOFI on dispo however now returned for re-placement. Rapid response called  for septic shock requiring fluid resuscitation following aspiration event.  GOC conversation held with family and palliative team - MEWS exempt, symptom directed care but continue with IVF/Abx for management of infection, pleasure feeds (i.e. water with swabs) as tolerated, enteral feeds dependent on clinical course.     Pt seen on 7WO for follow-up. Labs and medication orders reviewed. Ordered for magic mouthwash. Na/K/Mg WNL, phosphorus 2 <L>, POC blood glucose (-) 139-188. NPO with no enteral feeds at time of assessment, pt resting comfortably. No GI concerns at this time. Pt now advanced to trickle feeds  status post clinical improvement per team. See nutrition recommendations. RD to remain available.      Previous Nutrition Diagnosis:  Severe chronic malnutrition related to inadequate intake in setting of increased metabolic demand as evidenced by 18% wt loss in <3 months and mild-moderate wasting.    Active [ x ]  Resolved [   ]    Goal:  Optimize nutrition and hydration status within the goals of care.     Recommendations:  1. As medically feasible, recommend advance cautiously and as tolerated to goal Glucerna 1.5 @50mL/hr x24hr to provide 1200mL total volume, 1800kcal (29kcal/kg CBW 61.8kg), 99g protein (1.6g/kg CBW 61.8kg), and 911mL free water.   >>Defer free water flushes to team.  >>Monitor GI tolerance & maintain aspiration precautions, maintain HOB >/=30 degrees unless contraindicated. RD to remain available to adjust EN regimen prn.   2. If pt to discharge with enteral nutrition, recommend Diabetisource AC @65mL/hr x24hr to provide 1560mL total volume, 1872kcal (30kcal/kg CBW 61.8kg), 94g protein (1.5g/kg CBW 61.8kg), and 1273mL free water.   >>Defer free water flushes to team. Consider additional 600mL water to meet fluid needs.   >>Monitor GI tolerance & maintain aspiration precautions. RD to remain available to adjust EN regimen prn.   3. Monitor weight trends, labs, skin integrity, & hydration status.  4. Pain and bowel regimens per team.  5. RD remains available for dietary education/intervention prn.    Education:   Deferred.     Risk Level: High [   ] Moderate [ x ] Low [   ] Admitting Diagnosis:   Patient is a 78y old  Male who presents with a chief complaint of Rehab placement (08 Dec 2023 07:21)    PAST MEDICAL & SURGICAL HISTORY:  HTN (hypertension)  HLD (hyperlipidemia)  DM (diabetes mellitus)  CAD (coronary artery disease)  Glaucoma  PVD (peripheral vascular disease)  Laryngeal cancer  squamous cell carcinoma  S/P percutaneous endoscopic gastrostomy (PEG) tube placement    Current Nutrition Order:  NPO with Tube Feed via PEG: Glucerna 1.5 @50mL/hr x24hr to provide 1200mL total volume, 1800kcal (29kcal/kg CBW 61.8kg), 99g protein (1.6g/kg CBW 61.8kg), and 911mL free water.    PO Intake: Good (%) [   ]  Fair (50-75%) [   ] Poor (<25%) [   ] - NPO with EN, see below     GI Issues:   No nausea/vomiting/diarrhea/constipation documented  No abdominal distension/discomfort noted     Pain:  No pain reported     Skin Integrity:  Left neck radiation burn, bilateral heel DTIs documented  No edema noted  Simon score 12    Labs:       140  |  111<H>  |  16  ----------------------------<  167<H>  4.0   |  21<L>  |  0.77    Ca    7.0<L>      08 Dec 2023 08:49  Phos  2.0       Mg     1.6         TPro  4.4<L>  /  Alb  1.7<L>  /  TBili  0.3  /  DBili  x   /  AST  23  /  ALT  14  /  AlkPhos  132<H>      CAPILLARY BLOOD GLUCOSE  POCT Blood Glucose.: 159 mg/dL (08 Dec 2023 12:27)  POCT Blood Glucose.: 187 mg/dL (08 Dec 2023 08:18)  POCT Blood Glucose.: 163 mg/dL (07 Dec 2023 22:03)  POCT Blood Glucose.: 188 mg/dL (07 Dec 2023 17:20)    Medications:  MEDICATIONS  (STANDING):  albuterol    90 MICROgram(s) HFA Inhaler 2 Puff(s) Inhalation every 6 hours  aspirin  chewable 81 milliGRAM(s) Oral every 24 hours  dextrose 5%. 1000 milliLiter(s) (100 mL/Hr) IV Continuous <Continuous>  dextrose 5%. 1000 milliLiter(s) (50 mL/Hr) IV Continuous <Continuous>  dextrose 50% Injectable 12.5 Gram(s) IV Push once  dextrose 50% Injectable 25 Gram(s) IV Push once  dextrose 50% Injectable 25 Gram(s) IV Push once  fentaNYL   Patch  25 MICROgram(s)/Hr 1 Patch Transdermal every 72 hours  FIRST- Mouthwash  BLM 10 milliLiter(s) Swish and Spit daily  glucagon  Injectable 1 milliGRAM(s) IntraMuscular once  influenza  Vaccine (HIGH DOSE) 0.7 milliLiter(s) IntraMuscular once  insulin lispro (ADMELOG) corrective regimen sliding scale   SubCutaneous at bedtime  insulin lispro (ADMELOG) corrective regimen sliding scale   SubCutaneous three times a day before meals  piperacillin/tazobactam IVPB.. 3.375 Gram(s) IV Intermittent every 8 hours  silver sulfADIAZINE 1% Cream 1 Application(s) Topical daily  sodium chloride 0.9%. 1000 milliLiter(s) (50 mL/Hr) IV Continuous <Continuous>  sodium phosphate 30 milliMole(s)/500 mL IVPB 30 milliMole(s) IV Intermittent once    MEDICATIONS  (PRN):  acetaminophen     Tablet .. 650 milliGRAM(s) Oral every 6 hours PRN Moderate Pain (4 - 6)  dextrose Oral Gel 15 Gram(s) Oral once PRN Blood Glucose LESS THAN 70 milliGRAM(s)/deciliter  glycopyrrolate Injectable 0.4 milliGRAM(s) IV Push four times a day PRN Secretions  HYDROmorphone  Injectable 0.5 milliGRAM(s) IV Push every 2 hours PRN Moderate pain (4-6), Severe pain (7-10), Respiratory rate greater than 22  LORazepam   Injectable 0.5 milliGRAM(s) IV Push every 4 hours PRN Anxiety  ondansetron Injectable 4 milliGRAM(s) IV Push every 8 hours PRN Nausea and/or Vomiting    Anthropometrics:  Height: 5'6"  Weight: 132lb/60kg  IBW: 142lb/64.5kg    93% IBW    Weight Change:   No new weights obtained. Recommend nursing to trend weights weekly. RD to continue monitoring weights as able.     Nutrition Focused Physical Exam:   Completed on , see nutrition risk notification    Estimated energy needs:   Calories: 30-35kcal/k-2156kcal/d  Protein: 1.4-1.6g/k-99g/d  Fluid: 30-35mL/k-2156mL/d  Estimated needs based on RD obtained wt  as within % IBW 142lb/64.5kg (94%). Needs adjusted for age, cancer on treatment, wound healing, and malnutrition.    Subjective:   88M, history of CAD, PVD, DM, squamous cell carcinoma of larynx (2023) getting radiation and chemo (follows Dr. Marr/Dr. Gar),  s/p PEG  was recently admitted to [facility] for increase need of care at home in which course c/b disloged PEG, uncomplicated IR replacement . TF were restarted on  and and patient was medically cleared for SOFI on dispo however now returned for re-placement. Rapid response called  for septic shock requiring fluid resuscitation following aspiration event.  GOC conversation held with family and palliative team - MEWS exempt, symptom directed care but continue with IVF/Abx for management of infection, pleasure feeds (i.e. water with swabs) as tolerated, enteral feeds dependent on clinical course.     Pt seen on 7WO for follow-up. Labs and medication orders reviewed. Ordered for magic mouthwash. Na/K/Mg WNL, phosphorus 2 <L>, POC blood glucose (-) 139-188. NPO with no enteral feeds at time of assessment, pt resting comfortably. No GI concerns at this time. Pt now advanced to trickle feeds  status post clinical improvement per team. See nutrition recommendations. RD to remain available.      Previous Nutrition Diagnosis:  Severe chronic malnutrition related to inadequate intake in setting of increased metabolic demand as evidenced by 18% wt loss in <3 months and mild-moderate wasting.    Active [ x ]  Resolved [   ]    Goal:  Optimize nutrition and hydration status within the goals of care.     Recommendations:  1. As medically feasible, recommend advance cautiously and as tolerated to goal Glucerna 1.5 @50mL/hr x24hr to provide 1200mL total volume, 1800kcal (29kcal/kg CBW 61.8kg), 99g protein (1.6g/kg CBW 61.8kg), and 911mL free water.   >>Defer free water flushes to team.  >>Monitor GI tolerance & maintain aspiration precautions, maintain HOB >/=30 degrees unless contraindicated. RD to remain available to adjust EN regimen prn.   2. If pt to discharge with enteral nutrition, recommend Diabetisource AC @65mL/hr x24hr to provide 1560mL total volume, 1872kcal (30kcal/kg CBW 61.8kg), 94g protein (1.5g/kg CBW 61.8kg), and 1273mL free water.   >>Defer free water flushes to team. Consider additional 600mL water to meet fluid needs.   >>Monitor GI tolerance & maintain aspiration precautions. RD to remain available to adjust EN regimen prn.   3. Monitor weight trends, labs, skin integrity, & hydration status.  4. Pain and bowel regimens per team.  5. RD remains available for dietary education/intervention prn.    Education:   Deferred.     Risk Level: High [   ] Moderate [ x ] Low [   ] Admitting Diagnosis:   Patient is a 78y old  Male who presents with a chief complaint of Rehab placement (08 Dec 2023 07:21)    PAST MEDICAL & SURGICAL HISTORY:  HTN (hypertension)  HLD (hyperlipidemia)  DM (diabetes mellitus)  CAD (coronary artery disease)  Glaucoma  PVD (peripheral vascular disease)  Laryngeal cancer  squamous cell carcinoma  S/P percutaneous endoscopic gastrostomy (PEG) tube placement    Current Nutrition Order:  NPO with Tube Feed via PEG: Glucerna 1.5 @50mL/hr x24hr to provide 1200mL total volume, 1800kcal (29kcal/kg CBW 61.8kg), 99g protein (1.6g/kg CBW 61.8kg), and 911mL free water.    PO Intake: Good (%) [   ]  Fair (50-75%) [   ] Poor (<25%) [   ] - NPO with EN, see below     GI Issues:   No nausea/vomiting/diarrhea/constipation documented  No abdominal distension/discomfort noted     Pain:  No pain reported     Skin Integrity:  Left neck radiation burn, bilateral heel DTIs documented  No edema noted  Simon score 12    Labs:       140  |  111<H>  |  16  ----------------------------<  167<H>  4.0   |  21<L>  |  0.77    Ca    7.0<L>      08 Dec 2023 08:49  Phos  2.0       Mg     1.6         TPro  4.4<L>  /  Alb  1.7<L>  /  TBili  0.3  /  DBili  x   /  AST  23  /  ALT  14  /  AlkPhos  132<H>      CAPILLARY BLOOD GLUCOSE  POCT Blood Glucose.: 159 mg/dL (08 Dec 2023 12:27)  POCT Blood Glucose.: 187 mg/dL (08 Dec 2023 08:18)  POCT Blood Glucose.: 163 mg/dL (07 Dec 2023 22:03)  POCT Blood Glucose.: 188 mg/dL (07 Dec 2023 17:20)    Medications:  MEDICATIONS  (STANDING):  albuterol    90 MICROgram(s) HFA Inhaler 2 Puff(s) Inhalation every 6 hours  aspirin  chewable 81 milliGRAM(s) Oral every 24 hours  dextrose 5%. 1000 milliLiter(s) (100 mL/Hr) IV Continuous <Continuous>  dextrose 5%. 1000 milliLiter(s) (50 mL/Hr) IV Continuous <Continuous>  dextrose 50% Injectable 12.5 Gram(s) IV Push once  dextrose 50% Injectable 25 Gram(s) IV Push once  dextrose 50% Injectable 25 Gram(s) IV Push once  fentaNYL   Patch  25 MICROgram(s)/Hr 1 Patch Transdermal every 72 hours  FIRST- Mouthwash  BLM 10 milliLiter(s) Swish and Spit daily  glucagon  Injectable 1 milliGRAM(s) IntraMuscular once  influenza  Vaccine (HIGH DOSE) 0.7 milliLiter(s) IntraMuscular once  insulin lispro (ADMELOG) corrective regimen sliding scale   SubCutaneous at bedtime  insulin lispro (ADMELOG) corrective regimen sliding scale   SubCutaneous three times a day before meals  piperacillin/tazobactam IVPB.. 3.375 Gram(s) IV Intermittent every 8 hours  silver sulfADIAZINE 1% Cream 1 Application(s) Topical daily  sodium chloride 0.9%. 1000 milliLiter(s) (50 mL/Hr) IV Continuous <Continuous>  sodium phosphate 30 milliMole(s)/500 mL IVPB 30 milliMole(s) IV Intermittent once    MEDICATIONS  (PRN):  acetaminophen     Tablet .. 650 milliGRAM(s) Oral every 6 hours PRN Moderate Pain (4 - 6)  dextrose Oral Gel 15 Gram(s) Oral once PRN Blood Glucose LESS THAN 70 milliGRAM(s)/deciliter  glycopyrrolate Injectable 0.4 milliGRAM(s) IV Push four times a day PRN Secretions  HYDROmorphone  Injectable 0.5 milliGRAM(s) IV Push every 2 hours PRN Moderate pain (4-6), Severe pain (7-10), Respiratory rate greater than 22  LORazepam   Injectable 0.5 milliGRAM(s) IV Push every 4 hours PRN Anxiety  ondansetron Injectable 4 milliGRAM(s) IV Push every 8 hours PRN Nausea and/or Vomiting    Anthropometrics:  Height: 5'6"  Weight: 132lb/60kg  IBW: 142lb/64.5kg    93% IBW    Weight Change:   No new weights obtained. Recommend nursing to trend weights weekly. RD to continue monitoring weights as able.     Nutrition Focused Physical Exam:   Completed on , see nutrition risk notification    Estimated energy needs:   Calories: 30-35kcal/k-2156kcal/d  Protein: 1.4-1.6g/k-99g/d  Fluid: 30-35mL/k-2156mL/d  Estimated needs based on RD obtained wt  as within % IBW 142lb/64.5kg (94%). Needs adjusted for age, cancer on treatment, wound healing, and malnutrition.    Subjective:   88M, history of CAD, PVD, DM, squamous cell carcinoma of larynx (2023) getting radiation and chemo (follows Dr. Marr/Dr. Gar),  s/p PEG  was recently admitted to [facility] for increase need of care at home in which course c/b disloged PEG, uncomplicated IR replacement . TF were restarted on  and and patient was medically cleared for SOFI on dispo however now returned for re-placement. Rapid response called  for septic shock requiring fluid resuscitation following aspiration event.  GOC conversation held with family and palliative team - MEWS exempt, symptom directed care but continue with IVF/Abx for management of infection, pleasure feeds (i.e. water with swabs) as tolerated, enteral feeds dependent on clinical course.     Pt seen on 7WO for follow-up. Labs and medication orders reviewed. Ordered for magic mouthwash. Na/K/Mg WNL, phosphorus 2 <L>, POC blood glucose (-) 139-188. NPO with no enteral feeds at time of assessment, pt resting comfortably. No GI concerns at this time. Pt now advanced to trickle feeds  status post clinical improvement per team. Pt GOC now aligned with comfort measures only - order in chart. With consideration for ongoing use of enteral nutrition, pt determined to be moderate complexity. See nutrition recommendations. RD to remain available.      Previous Nutrition Diagnosis:  Severe chronic malnutrition related to inadequate intake in setting of increased metabolic demand as evidenced by 18% wt loss in <3 months and mild-moderate wasting.    Active [ x ]  Resolved [   ]    Goal:  Optimize nutrition and hydration status within the goals of care.     Recommendations:  1. As medically feasible, recommend advance cautiously and as tolerated to goal Glucerna 1.5 @50mL/hr x24hr to provide 1200mL total volume, 1800kcal (29kcal/kg CBW 61.8kg), 99g protein (1.6g/kg CBW 61.8kg), and 911mL free water.   >>Defer free water flushes to team.  >>Monitor GI tolerance & maintain aspiration precautions, maintain HOB >/=30 degrees unless contraindicated. RD to remain available to adjust EN regimen prn.   >>Maintain nutrition within GOC. Consider comfort feeds if pt alert and expressing desire to eat, defer consistency to team/SLP.   2. If pt to discharge with enteral nutrition, recommend Diabetisource AC @65mL/hr x24hr to provide 1560mL total volume, 1872kcal (30kcal/kg CBW 61.8kg), 94g protein (1.5g/kg CBW 61.8kg), and 1273mL free water.   >>Defer free water flushes to team. Consider additional 600mL water to meet fluid needs.   >>Monitor GI tolerance & maintain aspiration precautions, maintain HOB >/=30 degrees unless contraindicated. RD to remain available to adjust EN regimen prn.   3. Monitor weight trends, labs, skin integrity, & hydration status.  4. Pain and bowel regimens per team.  5. RD remains available for dietary education/intervention prn.    Education:   Deferred.     Risk Level: High [   ] Moderate [ x ] Low [   ] Admitting Diagnosis:   Patient is a 78y old  Male who presents with a chief complaint of Rehab placement (08 Dec 2023 07:21)    PAST MEDICAL & SURGICAL HISTORY:  HTN (hypertension)  HLD (hyperlipidemia)  DM (diabetes mellitus)  CAD (coronary artery disease)  Glaucoma  PVD (peripheral vascular disease)  Laryngeal cancer  squamous cell carcinoma  S/P percutaneous endoscopic gastrostomy (PEG) tube placement    Current Nutrition Order:  NPO with Tube Feed via PEG: Glucerna 1.5 @50mL/hr x24hr to provide 1200mL total volume, 1800kcal (29kcal/kg CBW 61.8kg), 99g protein (1.6g/kg CBW 61.8kg), and 911mL free water.    PO Intake: Good (%) [   ]  Fair (50-75%) [   ] Poor (<25%) [   ] - NPO with EN, see below     GI Issues:   No nausea/vomiting/diarrhea/constipation documented  No abdominal distension/discomfort noted     Pain:  No pain reported     Skin Integrity:  Left neck radiation burn, bilateral heel DTIs documented  No edema noted  Simon score 12    Labs:       140  |  111<H>  |  16  ----------------------------<  167<H>  4.0   |  21<L>  |  0.77    Ca    7.0<L>      08 Dec 2023 08:49  Phos  2.0       Mg     1.6         TPro  4.4<L>  /  Alb  1.7<L>  /  TBili  0.3  /  DBili  x   /  AST  23  /  ALT  14  /  AlkPhos  132<H>      CAPILLARY BLOOD GLUCOSE  POCT Blood Glucose.: 159 mg/dL (08 Dec 2023 12:27)  POCT Blood Glucose.: 187 mg/dL (08 Dec 2023 08:18)  POCT Blood Glucose.: 163 mg/dL (07 Dec 2023 22:03)  POCT Blood Glucose.: 188 mg/dL (07 Dec 2023 17:20)    Medications:  MEDICATIONS  (STANDING):  albuterol    90 MICROgram(s) HFA Inhaler 2 Puff(s) Inhalation every 6 hours  aspirin  chewable 81 milliGRAM(s) Oral every 24 hours  dextrose 5%. 1000 milliLiter(s) (100 mL/Hr) IV Continuous <Continuous>  dextrose 5%. 1000 milliLiter(s) (50 mL/Hr) IV Continuous <Continuous>  dextrose 50% Injectable 12.5 Gram(s) IV Push once  dextrose 50% Injectable 25 Gram(s) IV Push once  dextrose 50% Injectable 25 Gram(s) IV Push once  fentaNYL   Patch  25 MICROgram(s)/Hr 1 Patch Transdermal every 72 hours  FIRST- Mouthwash  BLM 10 milliLiter(s) Swish and Spit daily  glucagon  Injectable 1 milliGRAM(s) IntraMuscular once  influenza  Vaccine (HIGH DOSE) 0.7 milliLiter(s) IntraMuscular once  insulin lispro (ADMELOG) corrective regimen sliding scale   SubCutaneous at bedtime  insulin lispro (ADMELOG) corrective regimen sliding scale   SubCutaneous three times a day before meals  piperacillin/tazobactam IVPB.. 3.375 Gram(s) IV Intermittent every 8 hours  silver sulfADIAZINE 1% Cream 1 Application(s) Topical daily  sodium chloride 0.9%. 1000 milliLiter(s) (50 mL/Hr) IV Continuous <Continuous>  sodium phosphate 30 milliMole(s)/500 mL IVPB 30 milliMole(s) IV Intermittent once    MEDICATIONS  (PRN):  acetaminophen     Tablet .. 650 milliGRAM(s) Oral every 6 hours PRN Moderate Pain (4 - 6)  dextrose Oral Gel 15 Gram(s) Oral once PRN Blood Glucose LESS THAN 70 milliGRAM(s)/deciliter  glycopyrrolate Injectable 0.4 milliGRAM(s) IV Push four times a day PRN Secretions  HYDROmorphone  Injectable 0.5 milliGRAM(s) IV Push every 2 hours PRN Moderate pain (4-6), Severe pain (7-10), Respiratory rate greater than 22  LORazepam   Injectable 0.5 milliGRAM(s) IV Push every 4 hours PRN Anxiety  ondansetron Injectable 4 milliGRAM(s) IV Push every 8 hours PRN Nausea and/or Vomiting    Anthropometrics:  Height: 5'6"  Weight: 132lb/60kg  IBW: 142lb/64.5kg    93% IBW    Weight Change:   No new weights obtained. Recommend nursing to trend weights weekly. RD to continue monitoring weights as able.     Nutrition Focused Physical Exam:   Completed on , see nutrition risk notification    Estimated energy needs:   Calories: 30-35kcal/k-2156kcal/d  Protein: 1.4-1.6g/k-99g/d  Fluid: 30-35mL/k-2156mL/d  Estimated needs based on RD obtained wt  as within % IBW 142lb/64.5kg (94%). Needs adjusted for age, cancer on treatment, wound healing, and malnutrition.    Subjective:   88M, history of CAD, PVD, DM, squamous cell carcinoma of larynx (2023) getting radiation and chemo (follows Dr. Marr/Dr. Gar),  s/p PEG  was recently admitted to [facility] for increase need of care at home in which course c/b disloged PEG, uncomplicated IR replacement . TF were restarted on  and and patient was medically cleared for SOFI on dispo however now returned for re-placement. Rapid response called  for septic shock requiring fluid resuscitation following aspiration event.  GOC conversation held with family and palliative team - MEWS exempt, symptom directed care but continue with IVF/Abx for management of infection, pleasure feeds (i.e. water with swabs) as tolerated, enteral feeds dependent on clinical course.     Pt seen on 7WO for follow-up. Labs and medication orders reviewed. Ordered for magic mouthwash. Na/K/Mg WNL, phosphorus 2 <L>, POC blood glucose (-) 139-188. NPO with no enteral feeds at time of assessment, pt resting comfortably. No GI concerns at this time. Pt now advanced to trickle feeds  status post clinical improvement per team. Pt GOC now aligned with comfort measures only - order in chart. With consideration for ongoing use of enteral nutrition, pt determined to be moderate complexity. See nutrition recommendations. RD to remain available.      Previous Nutrition Diagnosis:  Severe chronic malnutrition related to inadequate intake in setting of increased metabolic demand as evidenced by 18% wt loss in <3 months and mild-moderate wasting.    Active [ x ]  Resolved [   ]    Goal:  Optimize nutrition and hydration status within the goals of care.     Recommendations:  1. As medically feasible, recommend advance cautiously and as tolerated to goal Glucerna 1.5 @50mL/hr x24hr to provide 1200mL total volume, 1800kcal (29kcal/kg CBW 61.8kg), 99g protein (1.6g/kg CBW 61.8kg), and 911mL free water.   >>Defer free water flushes to team.  >>Monitor GI tolerance & maintain aspiration precautions, maintain HOB >/=30 degrees unless contraindicated. RD to remain available to adjust EN regimen prn.   >>Maintain nutrition within GOC. Consider comfort feeds if pt alert and expressing desire to eat, defer consistency to team/SLP.   2. If pt to discharge with enteral nutrition, recommend Diabetisource AC @65mL/hr x24hr to provide 1560mL total volume, 1872kcal (30kcal/kg CBW 61.8kg), 94g protein (1.5g/kg CBW 61.8kg), and 1273mL free water.   >>Defer free water flushes to team. Consider additional 600mL water to meet fluid needs.   >>Monitor GI tolerance & maintain aspiration precautions, maintain HOB >/=30 degrees unless contraindicated. RD to remain available to adjust EN regimen prn.   3. Monitor weight trends, labs, skin integrity, & hydration status.  4. Pain and bowel regimens per team.  5. RD remains available for dietary education/intervention prn.    Education:   Deferred.     Risk Level: High [   ] Moderate [ x ] Low [   ]

## 2023-12-08 NOTE — PROGRESS NOTE ADULT - ASSESSMENT
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 Marr/Dr. Gar),  s/p PEG 11/14 was recently admitted to [facility] for increase need of care at home in which course c/b disloged PEG, uncomplicated IR replacement 11/30. 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 Marr/Dr. Gar),  s/p PEG 11/14 was recently admitted to [facility] for increase need of care at home in which course c/b disloged PEG, uncomplicated IR replacement 11/30. TF were restarted on 12/01 and and patient was medically cleared for SOFI on dispo however now returned for re-placement

## 2023-12-08 NOTE — PROGRESS NOTE ADULT - ATTENDING COMMENTS
Pt is 77 yo man with laryngeal cancer, dementia, transitioned to [facility] team for management of end of life care. Pt sustained septic shock and was partially resuscitated with fluids and antibiotics. Pt is DNR DNI MEWS exampt, but with cont medical care  ----for aspiration PNA will cont with Zosyn till Monday  ----pt oxygen supplementation requirements are increasing to 4L, will attempt to de-escalate  ----PEG feedings are being restarted and IV hydration is being slowly tapered off.   ----dispo home as per family wishes. Has 24/7 home care Pt is 77 yo man with laryngeal cancer, dementia, transitioned to [facility] team for management of end of life care. Pt sustained septic shock and was partially resuscitated with fluids and antibiotics. Pt is DNR DNI MEWS exampt, but with cont medical care  ----for aspiration PNA will cont with Zosyn till Monday  ----pt oxygen supplementation requirements are increasing to 4L, will attempt to de-escalate  ----PEG feedings are being restarted and IV hydration is being slowly tapered off.   ----dispo home as per family wishes. Has 24/7 home care

## 2023-12-08 NOTE — PROGRESS NOTE ADULT - SUBJECTIVE AND OBJECTIVE BOX
OVERNIGHT EVENTS: INNA     SUBJECTIVE:  Patient seen and examined at bedside. NAD, mentation improving   ROS: Patient denies h/n/v/d, fever, chills, cp, palpitations, sob, abd pain, leg swelling, rashes, dysuria, and changes in BM.     Vital Signs Last 12 Hrs  T(F): 98.1 (12-08-23 @ 05:24), Max: 98.1 (12-08-23 @ 05:24)  HR: 74 (12-08-23 @ 05:24) (74 - 74)  BP: 121/66 (12-08-23 @ 05:24) (121/66 - 121/66)  BP(mean): 84 (12-08-23 @ 05:24) (84 - 84)  RR: 18 (12-08-23 @ 05:24) (18 - 18)  SpO2: 96% (12-08-23 @ 05:24) (96% - 96%)  I&O's Summary    07 Dec 2023 07:01  -  08 Dec 2023 07:00  --------------------------------------------------------  IN: 1100 mL / OUT: 1475 mL / NET: -375 mL        PHYSICAL EXAM:  Constitutional: NAD,   HEENT: Blind   Neck: Radiation wound on Left side of neck dressed   Respiratory: CTA B/L  Cardiovascular: RRR, normal S1 and S2, no m/r/g.   Gastrointestinal: PEG tube in place  Extremities: wwp; non edematous   Neurological: AAOx2:            LABS:                        7.4    13.49 )-----------( 155      ( 08 Dec 2023 08:49 )             24.2     12-08    140  |  111<H>  |  16  ----------------------------<  167<H>  4.0   |  21<L>  |  0.77    Ca    7.0<L>      08 Dec 2023 08:49  Phos  2.0     12-08  Mg     1.6     12-08    TPro  4.4<L>  /  Alb  1.7<L>  /  TBili  0.3  /  DBili  x   /  AST  23  /  ALT  14  /  AlkPhos  132<H>  12-08      Urinalysis Basic - ( 08 Dec 2023 08:49 )    Color: x / Appearance: x / SG: x / pH: x  Gluc: 167 mg/dL / Ketone: x  / Bili: x / Urobili: x   Blood: x / Protein: x / Nitrite: x   Leuk Esterase: x / RBC: x / WBC x   Sq Epi: x / Non Sq Epi: x / Bacteria: x          RADIOLOGY & ADDITIONAL TESTS:    MEDICATIONS  (STANDING):  albuterol    90 MICROgram(s) HFA Inhaler 2 Puff(s) Inhalation every 6 hours  aspirin  chewable 81 milliGRAM(s) Oral every 24 hours  dextrose 5%. 1000 milliLiter(s) (100 mL/Hr) IV Continuous <Continuous>  dextrose 5%. 1000 milliLiter(s) (50 mL/Hr) IV Continuous <Continuous>  dextrose 50% Injectable 25 Gram(s) IV Push once  dextrose 50% Injectable 12.5 Gram(s) IV Push once  dextrose 50% Injectable 25 Gram(s) IV Push once  fentaNYL   Patch  25 MICROgram(s)/Hr 1 Patch Transdermal every 72 hours  FIRST- Mouthwash  BLM 10 milliLiter(s) Swish and Spit daily  glucagon  Injectable 1 milliGRAM(s) IntraMuscular once  influenza  Vaccine (HIGH DOSE) 0.7 milliLiter(s) IntraMuscular once  insulin lispro (ADMELOG) corrective regimen sliding scale   SubCutaneous at bedtime  insulin lispro (ADMELOG) corrective regimen sliding scale   SubCutaneous three times a day before meals  piperacillin/tazobactam IVPB.. 3.375 Gram(s) IV Intermittent every 8 hours  silver sulfADIAZINE 1% Cream 1 Application(s) Topical daily  sodium chloride 0.9%. 1000 milliLiter(s) (50 mL/Hr) IV Continuous <Continuous>  sodium phosphate 30 milliMole(s)/500 mL IVPB 30 milliMole(s) IV Intermittent once    MEDICATIONS  (PRN):  acetaminophen     Tablet .. 650 milliGRAM(s) Oral every 6 hours PRN Moderate Pain (4 - 6)  dextrose Oral Gel 15 Gram(s) Oral once PRN Blood Glucose LESS THAN 70 milliGRAM(s)/deciliter  glycopyrrolate Injectable 0.4 milliGRAM(s) IV Push four times a day PRN Secretions  HYDROmorphone  Injectable 0.5 milliGRAM(s) IV Push every 2 hours PRN Moderate pain (4-6), Severe pain (7-10), Respiratory rate greater than 22  LORazepam   Injectable 0.5 milliGRAM(s) IV Push every 4 hours PRN Anxiety  ondansetron Injectable 4 milliGRAM(s) IV Push every 8 hours PRN Nausea and/or Vomiting

## 2023-12-09 NOTE — PROGRESS NOTE ADULT - PROBLEM SELECTOR PLAN 3
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 C/o odynophagia causing poor PO intake. PEG placed 11/14 i/s/o ongoing PO intake 2/2 odynophagia, likely i/s/o cancer tx. Pt previously on fluconazole for candidiasis esophagitis ppx but d/c by Dr. Marr for lack of improvement.   -held feeds given worsening distension  -allow pleasure feeds (i.e. water with swabs) as tolerated.

## 2023-12-09 NOTE — PROGRESS NOTE ADULT - PROBLEM SELECTOR PLAN 2
Aspiration and GOC as above   Pain regimen:   -Dilaudid 0.5mg IV q2h PRN for Moderate/Severe Pain or RR>22  -Ativan 0.5mg IV q4h PRN for Anxiety/Agitation  -Robinul 0.4mg IV q6h PRN for Excessive Secretions  -Fentanyl Patch 225mcg/hr q72hr.  - continue with Zosyn

## 2023-12-09 NOTE — PROGRESS NOTE ADULT - SUBJECTIVE AND OBJECTIVE BOX
Subjective:  No acute events overnight.  Patient is doing well today without new complaints.  Denies HA, CP, SOB, abdominal pain, nausea, vomiting, fever, chills or diarrhea.     Objective:   Vital Signs Last 24 Hrs  T(C): 36.6 (09 Dec 2023 12:00), Max: 36.6 (08 Dec 2023 20:27)  T(F): 97.8 (09 Dec 2023 12:00), Max: 97.9 (09 Dec 2023 05:31)  HR: 77 (09 Dec 2023 12:00) (77 - 86)  BP: 103/59 (09 Dec 2023 12:00) (103/59 - 129/72)  BP(mean): --  RR: 16 (09 Dec 2023 12:00) (16 - 19)  SpO2: 95% (09 Dec 2023 12:00) (94% - 95%)    Parameters below as of 09 Dec 2023 12:00  Patient On (Oxygen Delivery Method): room air      Physical Exam:   Constitutional: NAD,   HEENT: Blind   Neck: Radiation wound on Left side of neck dressed   Respiratory: CTA B/L  Cardiovascular: RRR, normal S1 and S2, no m/r/g.   Gastrointestinal: PEG tube in place, abdominal slightly distended  Extremities: wwp; non edematous   Neurological: AAOx2:        Labs:                        8.6    8.00  )-----------( 170      ( 09 Dec 2023 05:30 )             27.7       12-09    139  |  107  |  10  ----------------------------<  209<H>  3.6   |  25  |  0.72    Ca    8.0<L>      09 Dec 2023 05:30  Phos  2.6     12-09  Mg     1.4     12-09    TPro  4.9<L>  /  Alb  2.1<L>  /  TBili  0.4  /  DBili  x   /  AST  19  /  ALT  13  /  AlkPhos  114  12-09      Microbiology:     Culture - Blood (collected 12-05-23 @ 12:32)  Source: .Blood Blood-Peripheral  Preliminary Report (12-09-23 @ 13:00):    No growth at 4 days.    Culture - Blood (collected 12-05-23 @ 12:32)  Source: .Blood Blood-Peripheral  Preliminary Report (12-09-23 @ 13:00):    No growth at 4 days.         Medications:  MEDICATIONS  (STANDING):  albuterol    90 MICROgram(s) HFA Inhaler 2 Puff(s) Inhalation every 6 hours  aspirin  chewable 81 milliGRAM(s) Oral every 24 hours  dextrose 5%. 1000 milliLiter(s) (100 mL/Hr) IV Continuous <Continuous>  dextrose 5%. 1000 milliLiter(s) (50 mL/Hr) IV Continuous <Continuous>  dextrose 50% Injectable 12.5 Gram(s) IV Push once  dextrose 50% Injectable 25 Gram(s) IV Push once  dextrose 50% Injectable 25 Gram(s) IV Push once  fentaNYL   Patch  25 MICROgram(s)/Hr 1 Patch Transdermal every 72 hours  FIRST- Mouthwash  BLM 10 milliLiter(s) Swish and Spit daily  glucagon  Injectable 1 milliGRAM(s) IntraMuscular once  influenza  Vaccine (HIGH DOSE) 0.7 milliLiter(s) IntraMuscular once  insulin lispro (ADMELOG) corrective regimen sliding scale   SubCutaneous three times a day before meals  insulin lispro (ADMELOG) corrective regimen sliding scale   SubCutaneous at bedtime  piperacillin/tazobactam IVPB.. 3.375 Gram(s) IV Intermittent every 8 hours  QUEtiapine 25 milliGRAM(s) Oral every 24 hours  silver sulfADIAZINE 1% Cream 1 Application(s) Topical daily    MEDICATIONS  (PRN):  acetaminophen     Tablet .. 650 milliGRAM(s) Oral every 6 hours PRN Moderate Pain (4 - 6)  dextrose Oral Gel 15 Gram(s) Oral once PRN Blood Glucose LESS THAN 70 milliGRAM(s)/deciliter  glycopyrrolate Injectable 0.4 milliGRAM(s) IV Push four times a day PRN Secretions  HYDROmorphone  Injectable 0.5 milliGRAM(s) IV Push every 2 hours PRN Moderate pain (4-6), Severe pain (7-10), Respiratory rate greater than 22  LORazepam   Injectable 0.5 milliGRAM(s) IV Push daily PRN Anxiety  ondansetron Injectable 4 milliGRAM(s) IV Push every 8 hours PRN Nausea and/or Vomiting

## 2023-12-09 NOTE — PROGRESS NOTE ADULT - ASSESSMENT
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 Marr/Dr. Gar),  s/p PEG 11/14 was recently admitted to [facility] for increase need of care at home in which course c/b disloged PEG, uncomplicated IR replacement 11/30. 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 Marr/Dr. Gar),  s/p PEG 11/14 was recently admitted to [facility] for increase need of care at home in which course c/b disloged PEG, uncomplicated IR replacement 11/30. TF were restarted on 12/01 and patient was medically cleared for SOFI on dispo however now returned for re-placement

## 2023-12-10 NOTE — CHART NOTE - NSCHARTNOTEFT_GEN_A_CORE
PALLIATIVE MEDICINE COORDINATION OF CARE NOTE FOR LEONARDO HAY  [  ] ED Trigger   [  ] MICU Trigger     [ x ] Consult    Patient last assessed: __12/5/23___  to manage: GOC/AD, Symptoms, and Support was recommended:_12/5/23_____    _40_____ Minutes; Start: __0900___  End: _0940___, of non-face-to-face prolonged service provided that relates to (face-to-face) care that has or will occur and ongoing patient management, including one or more of the following:   - Reviewed records from other physicians or other health care professional services, including one or more of the following: other medical records and diagnostic / radiology study results     HPI:  HPI: 78M hx cad, dm, laryngeal ca (chemo/radiation), pvd, s/p recent peg placement, BIBEMS from [facility]. pt was recently admitted to the hospital and was discharged to rehab facility yesterday. family states they were not satisfied with the care at the facility and want him placed elsewhere. states facility was too hot and didn't have any air circulation. pt without complaints.    ED course   Vitals Temp 98  HR 88  Bp 107/64  Spo2 100% 2 L nNC   LAbs: Hgb 9.9, albumin 2.5, UA flordily positive   EKG NSR      General: NAD, calm  HEENT: legally blind, MMM, hoarse voice   Respiratory: CTAB; no wheezes/rales/rhonchi, normal WOB  Cardiovascular: Regular rhythm/rate, + S1/S2; no murmurs, rubs gallops   Gastrointestinal: Soft; NTND; PEG tube placed 11/30. No new erythema w/ no active signs of inflammation around site.  : no suprapubic tenderness  Vascular: extremities WWP; no edema/cyanosis, 2+ distal pulses b/l   Neurological: A&Ox2  Skin: skin changes at neck likely 2/2 radiation, pressure ulcers @ b/l heels (stable per wound care)    (02 Dec 2023 14:21)      - Other: iStop reviewed.    - Other: Medication reviewed.    The patient HAS NOT used PRN's in the last 24h.    MEDICATIONS  (STANDING):    MEDICATIONS  (PRN):      - Other: Advanced directives     DNR DNI     No documented MOLST form found on [facility]     No documented HCP form found on [facility]     No Living will / POA / Advance directives found on [place] / [facility].     No documented GOC notes on [facility]    - Other: Coordination/Plan of care     _3___ admissions in 1 year     Current admission LOS: _8__ days     LACE score: __16__ ADVANCE ILLNESS PATIENT.     Patient NOT previously seen by palliative medicine consult service.      Discussed with  Consult request for: " Advanced Head and neck cancer, significant symptom burden   "    Patient is an Advanced Illness patient hence the primary team will need to complete GOC document of any prior GOC discussions that were done during this admission so far as well. PALLIATIVE MEDICINE COORDINATION OF CARE NOTE FOR LEONARDO HAY  [  ] ED Trigger   [  ] MICU Trigger     [ x ] Consult    Patient last assessed: __12/5/23___  to manage: GOC/AD, Symptoms, and Support was recommended:_12/5/23_____    _40_____ Minutes; Start: __0900___  End: _0940___, of non-face-to-face prolonged service provided that relates to (face-to-face) care that has or will occur and ongoing patient management, including one or more of the following:   - Reviewed records from other physicians or other health care professional services, including one or more of the following: other medical records and diagnostic / radiology study results     HPI:  HPI: 78M hx cad, dm, laryngeal ca (chemo/radiation), pvd, s/p recent peg placement, BIBEMS from [facility]. pt was recently admitted to the hospital and was discharged to rehab facility yesterday. family states they were not satisfied with the care at the facility and want him placed elsewhere. states facility was too hot and didn't have any air circulation. pt without complaints.    ED course   Vitals Temp 98  HR 88  Bp 107/64  Spo2 100% 2 L nNC   LAbs: Hgb 9.9, albumin 2.5, UA flordily positive   EKG NSR      General: NAD, calm  HEENT: legally blind, MMM, hoarse voice   Respiratory: CTAB; no wheezes/rales/rhonchi, normal WOB  Cardiovascular: Regular rhythm/rate, + S1/S2; no murmurs, rubs gallops   Gastrointestinal: Soft; NTND; PEG tube placed 11/30. No new erythema w/ no active signs of inflammation around site.  : no suprapubic tenderness  Vascular: extremities WWP; no edema/cyanosis, 2+ distal pulses b/l   Neurological: A&Ox2  Skin: skin changes at neck likely 2/2 radiation, pressure ulcers @ b/l heels (stable per wound care)    (02 Dec 2023 14:21)      - Other: iStop reviewed.    - Other: Medication reviewed.    The patient HAS NOT used PRN's in the last 24h.    MEDICATIONS  (STANDING):    MEDICATIONS  (PRN):      - Other: Advanced directives     DNR DNI     No documented MOLST form found on [facility]     No documented HCP form found on [facility]     No Living will / POA / Advance directives found on [place] / [facility].     No documented GOC notes on [facility]    - Other: Coordination/Plan of care     _3___ admissions in 1 year     Current admission LOS: _8__ days     LACE score: __16__ ADVANCE ILLNESS PATIENT.     Patient NOT previously seen by palliative medicine consult service.      Discussed with  Consult request for: " Advanced Head and neck cancer, significant symptom burden   "    Patient is an Advanced Illness patient hence the primary team will need to complete GOC document of any prior GOC discussions that were done during this admission so far as well.

## 2023-12-10 NOTE — PROGRESS NOTE ADULT - ATTENDING COMMENTS
-patient pulled out PEG overnight, abdominal xray without acute pathology and physical exam is benign   -will need to touch base with IR and palliative care about next steps given patient has pulled out his PEG tube multiple times and family has been inconsistent with disposition plan over past 3 recent admissions   -maintain NPO status for now, may be worth having SLP re-evaluation given more time has passed since chemo/radiation   -can give mIVFs as needed -patient pulled out PEG overnight, abdominal xray without acute pathology and physical exam is benign   -will need to touch base with IR and palliative care about next steps given patient has pulled out his PEG tube multiple times and family has been inconsistent with disposition plan over past 3 recent admissions   -maintain NPO status for now, may be worth having SLP re-evaluation given more time has passed since chemo/radiation   -can give mIVFs as needed  -continue with Zosyn

## 2023-12-10 NOTE — PROGRESS NOTE ADULT - PROBLEM SELECTOR PLAN 1
Rapid response called yesterday for septic shock in which patient was markedly hypotensive and tachycardic requiring fluid resuscitation following aspiration event.  GOC conversation held with family and palliative team.   - patient continues to be DNR/DNI, now MEWS exempt   - family wishes to pursue symptom directed care but continue with Abx for management of infection   - no escalation of care is to be had to ICU for pressors etc but will continue with Zosyn and Vancomycin empric dosing Rapid response called on 12/5 for hypotension and tachycardia likely 2/2 aspiration event, requiring fluid resuscitation. BP responded to fluids.  GOC conversation held with family and palliative team.   - patient continues to be DNR/DNI, now MEWS exempt   - family wishes to pursue symptom directed care but continue with Abx for management of infection   - no escalation of care is to be had to ICU for pressors etc but will continue with Zosyn and Vancomycin empric dosing

## 2023-12-10 NOTE — PROGRESS NOTE ADULT - PROBLEM SELECTOR PLAN 3
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 C/o odynophagia causing poor PO intake. PEG placed 11/14 i/s/o ongoing PO intake 2/2 odynophagia, likely i/s/o cancer tx. Pt previously on fluconazole for candidiasis esophagitis ppx but d/c by Dr. Marr for lack of improvement.   -held feeds given worsening distension  -allow pleasure feeds (i.e. water with swabs) as tolerated. 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 C/o odynophagia causing poor PO intake. PEG placed 11/14 i/s/o ongoing PO intake 2/2 odynophagia, likely i/s/o cancer tx. Pt previously on fluconazole for candidiasis esophagitis ppx but d/c by Dr. Marr for lack of improvement.   12/9- patient pulled out PEG tube overnight     -held feeds given worsening distension  -allow pleasure feeds (i.e. water with swabs) as tolerated.  - IR consulted for placement of PEG tube, if patient appears dry will start on fluids in the meantime

## 2023-12-10 NOTE — PROGRESS NOTE ADULT - SUBJECTIVE AND OBJECTIVE BOX
**INCOMPLETE NOTE    OVERNIGHT EVENTS:    SUBJECTIVE:  Patient seen and examined at bedside.    Vital Signs Last 12 Hrs  T(F): 99.2 (12-10-23 @ 05:53), Max: 99.2 (12-10-23 @ 05:53)  HR: 97 (12-10-23 @ 05:53) (77 - 97)  BP: 139/62 (12-10-23 @ 05:53) (113/59 - 139/62)  BP(mean): --  RR: 16 (12-10-23 @ 05:53) (16 - 16)  SpO2: 95% (12-10-23 @ 05:53) (95% - 100%)  I&O's Summary    08 Dec 2023 07:01  -  09 Dec 2023 07:00  --------------------------------------------------------  IN: 450 mL / OUT: 350 mL / NET: 100 mL    09 Dec 2023 07:01  -  10 Dec 2023 06:55  --------------------------------------------------------  IN: 0 mL / OUT: 550 mL / NET: -550 mL        PHYSICAL EXAM:  Constitutional: NAD, comfortable in bed.  HEENT: NC/AT, PERRLA, EOMI, no conjunctival pallor or scleral icterus, MMM  Neck: Supple, no JVD  Respiratory: CTA B/L. No w/r/r.   Cardiovascular: RRR, normal S1 and S2, no m/r/g.   Gastrointestinal: +BS, soft NTND, no guarding or rebound tenderness, no palpable masses   Extremities: wwp; no cyanosis, clubbing or edema.   Vascular: Pulses equal and strong throughout.   Neurological: AAOx3, no CN deficits, strength and sensation intact throughout.   Skin: No gross skin abnormalities or rashes        LABS:                        8.6    8.00  )-----------( 170      ( 09 Dec 2023 05:30 )             27.7     12-09    139  |  107  |  10  ----------------------------<  209<H>  3.6   |  25  |  0.72    Ca    8.0<L>      09 Dec 2023 05:30  Phos  2.6     12-09  Mg     1.4     12-09    TPro  4.9<L>  /  Alb  2.1<L>  /  TBili  0.4  /  DBili  x   /  AST  19  /  ALT  13  /  AlkPhos  114  12-09      Urinalysis Basic - ( 09 Dec 2023 05:30 )    Color: x / Appearance: x / SG: x / pH: x  Gluc: 209 mg/dL / Ketone: x  / Bili: x / Urobili: x   Blood: x / Protein: x / Nitrite: x   Leuk Esterase: x / RBC: x / WBC x   Sq Epi: x / Non Sq Epi: x / Bacteria: x          RADIOLOGY & ADDITIONAL TESTS:    MEDICATIONS  (STANDING):  albuterol    90 MICROgram(s) HFA Inhaler 2 Puff(s) Inhalation every 6 hours  aspirin  chewable 81 milliGRAM(s) Oral every 24 hours  dextrose 5%. 1000 milliLiter(s) (100 mL/Hr) IV Continuous <Continuous>  dextrose 5%. 1000 milliLiter(s) (50 mL/Hr) IV Continuous <Continuous>  dextrose 50% Injectable 12.5 Gram(s) IV Push once  dextrose 50% Injectable 25 Gram(s) IV Push once  dextrose 50% Injectable 25 Gram(s) IV Push once  fentaNYL   Patch  25 MICROgram(s)/Hr 1 Patch Transdermal every 72 hours  FIRST- Mouthwash  BLM 10 milliLiter(s) Swish and Spit daily  glucagon  Injectable 1 milliGRAM(s) IntraMuscular once  influenza  Vaccine (HIGH DOSE) 0.7 milliLiter(s) IntraMuscular once  insulin lispro (ADMELOG) corrective regimen sliding scale   SubCutaneous at bedtime  insulin lispro (ADMELOG) corrective regimen sliding scale   SubCutaneous three times a day before meals  piperacillin/tazobactam IVPB.. 3.375 Gram(s) IV Intermittent every 8 hours  QUEtiapine 25 milliGRAM(s) Oral every 24 hours  silver sulfADIAZINE 1% Cream 1 Application(s) Topical daily    MEDICATIONS  (PRN):  acetaminophen     Tablet .. 650 milliGRAM(s) Oral every 6 hours PRN Moderate Pain (4 - 6)  dextrose Oral Gel 15 Gram(s) Oral once PRN Blood Glucose LESS THAN 70 milliGRAM(s)/deciliter  glycopyrrolate Injectable 0.4 milliGRAM(s) IV Push four times a day PRN Secretions  HYDROmorphone  Injectable 0.5 milliGRAM(s) IV Push every 2 hours PRN Moderate pain (4-6), Severe pain (7-10), Respiratory rate greater than 22  LORazepam   Injectable 0.5 milliGRAM(s) IV Push daily PRN Anxiety  ondansetron Injectable 4 milliGRAM(s) IV Push every 8 hours PRN Nausea and/or Vomiting   OVERNIGHT EVENTS: patient pulled out peg tube       SUBJECTIVE:  Patient seen and examined at bedside. Did not engage in much conversation but denies chest pain, SOB, or any other acute complaints     Vital Signs Last 12 Hrs  T(F): 99.2 (12-10-23 @ 05:53), Max: 99.2 (12-10-23 @ 05:53)  HR: 97 (12-10-23 @ 05:53) (77 - 97)  BP: 139/62 (12-10-23 @ 05:53) (113/59 - 139/62)  BP(mean): --  RR: 16 (12-10-23 @ 05:53) (16 - 16)  SpO2: 95% (12-10-23 @ 05:53) (95% - 100%)  I&O's Summary    08 Dec 2023 07:01  -  09 Dec 2023 07:00  --------------------------------------------------------  IN: 450 mL / OUT: 350 mL / NET: 100 mL    09 Dec 2023 07:01  -  10 Dec 2023 06:55  --------------------------------------------------------  IN: 0 mL / OUT: 550 mL / NET: -550 mL        PHYSICAL EXAM:  Constitutional: NAD,   HEENT: Blind   Neck: Radiation wound on Left side of neck dressed   Respiratory: CTA B/L  Cardiovascular: RRR, normal S1 and S2, no m/r/g.   Gastrointestinal: PEG tube pulled out o/n, dressing in place, no erythema or discharge noted   Extremities: wwp; non edematous       LABS:                        8.6    8.00  )-----------( 170      ( 09 Dec 2023 05:30 )             27.7     12-09    139  |  107  |  10  ----------------------------<  209<H>  3.6   |  25  |  0.72    Ca    8.0<L>      09 Dec 2023 05:30  Phos  2.6     12-09  Mg     1.4     12-09    TPro  4.9<L>  /  Alb  2.1<L>  /  TBili  0.4  /  DBili  x   /  AST  19  /  ALT  13  /  AlkPhos  114  12-09      Urinalysis Basic - ( 09 Dec 2023 05:30 )    Color: x / Appearance: x / SG: x / pH: x  Gluc: 209 mg/dL / Ketone: x  / Bili: x / Urobili: x   Blood: x / Protein: x / Nitrite: x   Leuk Esterase: x / RBC: x / WBC x   Sq Epi: x / Non Sq Epi: x / Bacteria: x          RADIOLOGY & ADDITIONAL TESTS:    MEDICATIONS  (STANDING):  albuterol    90 MICROgram(s) HFA Inhaler 2 Puff(s) Inhalation every 6 hours  aspirin  chewable 81 milliGRAM(s) Oral every 24 hours  dextrose 5%. 1000 milliLiter(s) (100 mL/Hr) IV Continuous <Continuous>  dextrose 5%. 1000 milliLiter(s) (50 mL/Hr) IV Continuous <Continuous>  dextrose 50% Injectable 12.5 Gram(s) IV Push once  dextrose 50% Injectable 25 Gram(s) IV Push once  dextrose 50% Injectable 25 Gram(s) IV Push once  fentaNYL   Patch  25 MICROgram(s)/Hr 1 Patch Transdermal every 72 hours  FIRST- Mouthwash  BLM 10 milliLiter(s) Swish and Spit daily  glucagon  Injectable 1 milliGRAM(s) IntraMuscular once  influenza  Vaccine (HIGH DOSE) 0.7 milliLiter(s) IntraMuscular once  insulin lispro (ADMELOG) corrective regimen sliding scale   SubCutaneous at bedtime  insulin lispro (ADMELOG) corrective regimen sliding scale   SubCutaneous three times a day before meals  piperacillin/tazobactam IVPB.. 3.375 Gram(s) IV Intermittent every 8 hours  QUEtiapine 25 milliGRAM(s) Oral every 24 hours  silver sulfADIAZINE 1% Cream 1 Application(s) Topical daily    MEDICATIONS  (PRN):  acetaminophen     Tablet .. 650 milliGRAM(s) Oral every 6 hours PRN Moderate Pain (4 - 6)  dextrose Oral Gel 15 Gram(s) Oral once PRN Blood Glucose LESS THAN 70 milliGRAM(s)/deciliter  glycopyrrolate Injectable 0.4 milliGRAM(s) IV Push four times a day PRN Secretions  HYDROmorphone  Injectable 0.5 milliGRAM(s) IV Push every 2 hours PRN Moderate pain (4-6), Severe pain (7-10), Respiratory rate greater than 22  LORazepam   Injectable 0.5 milliGRAM(s) IV Push daily PRN Anxiety  ondansetron Injectable 4 milliGRAM(s) IV Push every 8 hours PRN Nausea and/or Vomiting

## 2023-12-10 NOTE — PROGRESS NOTE ADULT - ASSESSMENT
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 Marr/Dr. Gar),  s/p PEG 11/14 was recently admitted to [facility] for increase need of care at home in which course c/b disloged PEG, uncomplicated IR replacement 11/30. 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 Marr/Dr. Gar),  s/p PEG 11/14 was recently admitted to [facility] for increase need of care at home in which course c/b disloged PEG, uncomplicated IR replacement 11/30. TF were restarted on 12/01 and patient was medically cleared for SOFI on dispo however now returned for re-placement

## 2023-12-10 NOTE — PROGRESS NOTE ADULT - PROBLEM SELECTOR PLAN 4
A1c 9.0 on 10/30. Discharged on lantus 45qd in the AM and lispro 7u TID, however patient discontinued on standing insulin while previously hospitalized due to hypoglycemia episodes.   - finger sticks q6 houirs   - mISS A1c 9.0 on 10/30. Discharged on lantus 45qd in the AM and lispro 7u TID, however patient discontinued on standing insulin while previously hospitalized due to hypoglycemia episodes.   - finger sticks q6 hours   - mISS

## 2023-12-10 NOTE — PROVIDER CONTACT NOTE (OTHER) - SITUATION
Pt found on rounds with abdominal dressing removed and gastric tube pulled out and thrown onto the floor

## 2023-12-11 NOTE — PROGRESS NOTE ADULT - ASSESSMENT
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 Marr/Dr. Gar),  s/p PEG 11/14 was recently admitted to [facility] for increase need of care at home in which course c/b disloged PEG, uncomplicated IR replacement 11/30. 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 Marr/Dr. Gar),  s/p PEG 11/14 was recently admitted to [facility] for increase need of care at home in which course c/b disloged PEG, uncomplicated IR replacement 11/30. TF were restarted on 12/01 and patient was medically cleared for SOFI on dispo however now returned for re-placement

## 2023-12-11 NOTE — PROGRESS NOTE ADULT - PROBLEM SELECTOR PLAN 4
A1c 9.0 on 10/30. Discharged on lantus 45qd in the AM and lispro 7u TID, however patient discontinued on standing insulin while previously hospitalized due to hypoglycemia episodes.   - finger sticks q6 hours   - mISS

## 2023-12-11 NOTE — PROGRESS NOTE ADULT - SUBJECTIVE AND OBJECTIVE BOX
OVERNIGHT EVENTS: INNA     SUBJECTIVE:  Patient seen and examined at bedside. NAD, comfortable in bed denies any complaints   ROS: Patient denies h/n/v/d, fever, chills, cp, palpitations, sob, abd pain, leg swelling, rashes, dysuria, and changes in BM.     Vital Signs Last 12 Hrs  T(F): 98.2 (12-11-23 @ 06:06), Max: 98.2 (12-11-23 @ 06:06)  HR: 95 (12-11-23 @ 06:06) (95 - 95)  BP: 129/57 (12-11-23 @ 06:06) (129/57 - 129/57)  BP(mean): --  RR: 16 (12-11-23 @ 06:06) (16 - 16)  SpO2: 92% (12-11-23 @ 06:06) (92% - 92%)  I&O's Summary      PHYSICAL EXAM:  Constitutional: NAD,  HEENT: blind, eyes closed   Neck: Supple, no JVD  Respiratory: CTA B/L. No w/r/r.   Cardiovascular: RRR, normal S1 and S2, no m/r/g.   Gastrointestinal: PEG removed, site dressed with clean bandages   Extremities: wwp; no cyanosis, clubbing or edema.   Vascular: Pulses equal and strong throughout.   Neurological: AAOx2  Skin: No gross skin abnormalities or rashes        LABS:                        9.1    5.88  )-----------( 225      ( 11 Dec 2023 05:30 )             30.1     12-11    140  |  106  |  6<L>  ----------------------------<  185<H>  4.1   |  24  |  0.78    Ca    8.5      11 Dec 2023 05:30  Phos  2.4     12-11  Mg     1.3     12-11      PT/INR - ( 11 Dec 2023 05:30 )   PT: 14.0 sec;   INR: 1.24          PTT - ( 11 Dec 2023 05:30 )  PTT:30.9 sec  Urinalysis Basic - ( 11 Dec 2023 05:30 )    Color: x / Appearance: x / SG: x / pH: x  Gluc: 185 mg/dL / Ketone: x  / Bili: x / Urobili: x   Blood: x / Protein: x / Nitrite: x   Leuk Esterase: x / RBC: x / WBC x   Sq Epi: x / Non Sq Epi: x / Bacteria: x          RADIOLOGY & ADDITIONAL TESTS:    MEDICATIONS  (STANDING):  albuterol    90 MICROgram(s) HFA Inhaler 2 Puff(s) Inhalation every 6 hours  aspirin  chewable 81 milliGRAM(s) Oral every 24 hours  dextrose 5%. 1000 milliLiter(s) (50 mL/Hr) IV Continuous <Continuous>  dextrose 5%. 1000 milliLiter(s) (100 mL/Hr) IV Continuous <Continuous>  dextrose 50% Injectable 25 Gram(s) IV Push once  dextrose 50% Injectable 12.5 Gram(s) IV Push once  dextrose 50% Injectable 25 Gram(s) IV Push once  fentaNYL   Patch  25 MICROgram(s)/Hr 1 Patch Transdermal every 72 hours  FIRST- Mouthwash  BLM 10 milliLiter(s) Swish and Spit daily  glucagon  Injectable 1 milliGRAM(s) IntraMuscular once  influenza  Vaccine (HIGH DOSE) 0.7 milliLiter(s) IntraMuscular once  insulin lispro (ADMELOG) corrective regimen sliding scale   SubCutaneous at bedtime  insulin lispro (ADMELOG) corrective regimen sliding scale   SubCutaneous three times a day before meals  OLANZapine Injectable 5 milliGRAM(s) IntraMuscular at bedtime  piperacillin/tazobactam IVPB.. 3.375 Gram(s) IV Intermittent every 8 hours  silver sulfADIAZINE 1% Cream 1 Application(s) Topical daily    MEDICATIONS  (PRN):  acetaminophen     Tablet .. 650 milliGRAM(s) Oral every 6 hours PRN Moderate Pain (4 - 6)  dextrose Oral Gel 15 Gram(s) Oral once PRN Blood Glucose LESS THAN 70 milliGRAM(s)/deciliter  glycopyrrolate Injectable 0.4 milliGRAM(s) IV Push four times a day PRN Secretions  HYDROmorphone  Injectable 0.5 milliGRAM(s) IV Push every 2 hours PRN Moderate pain (4-6), Severe pain (7-10), Respiratory rate greater than 22  LORazepam   Injectable 0.5 milliGRAM(s) IV Push daily PRN Anxiety  ondansetron Injectable 4 milliGRAM(s) IV Push every 8 hours PRN Nausea and/or Vomiting

## 2023-12-11 NOTE — PROGRESS NOTE ADULT - PROBLEM SELECTOR PLAN 3
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 C/o odynophagia causing poor PO intake. PEG placed 11/14 i/s/o ongoing PO intake 2/2 odynophagia, likely i/s/o cancer tx. Pt previously on fluconazole for candidiasis esophagitis ppx but d/c by Dr. Marr for lack of improvement.   12/9- patient pulled out PEG tube overnight     -held feeds given worsening distension  - Patient removed PEG tube, will follow up with IR regarding feasibility of further placement

## 2023-12-11 NOTE — PROGRESS NOTE ADULT - PROBLEM SELECTOR PLAN 2
Aspiration and GOC as above   Pain regimen:   -Dilaudid 0.5mg IV q2h PRN for Moderate/Severe Pain or RR>22  -Ativan 0.5mg IV q4h PRN for Anxiety/Agitation  -Robinul 0.4mg IV q6h PRN for Excessive Secretions  -Fentanyl Patch 225mcg/hr q72hr.

## 2023-12-11 NOTE — PROGRESS NOTE ADULT - PROBLEM SELECTOR PLAN 1
Ongoing GOC conversations held with family after rapid response was called during admission in which patient had aspirated prompting fluid resucitation and abx treatment   - patient continues to be DNR/DNI, now MEWS exempt   - family wishes to pursue symptom directed care but continue with Abx for management of infection   - no escalation of care is to be had to ICU for pressors   - sp Vancomycin, Zosyn to be DC'd on 12/12

## 2023-12-11 NOTE — PROGRESS NOTE ADULT - ATTENDING COMMENTS
-patient pulled out PEG overnight, abdominal xray without acute pathology and physical exam is benign   -will need to touch base with IR and palliative care about next steps given patient has pulled out his PEG tube multiple times and family has been inconsistent with disposition plan over past 3 recent admissions   -maintain NPO status for now, may be worth having SLP re-evaluation given more time has passed since chemo/radiation   -can give mIVFs as needed  -continue with Zosyn.

## 2023-12-12 NOTE — PROGRESS NOTE ADULT - PROBLEM SELECTOR PLAN 1
.  -Dilaudid 0.5mg IV q2h PRN for Moderate/Severe Pain or RR>22  -Ativan 0.5mg IV q4h PRN for Anxiety/Agitation  -Robinul 0.4mg IV q6h PRN for Excessive Secretions  -Fentanyl Patch 25mcg/hr q72hr

## 2023-12-12 NOTE — PROGRESS NOTE ADULT - PROBLEM SELECTOR PLAN 5
F: none  E: replete prn  N: Glucerna 1.2 @ 65/h  DVT ppx: lovenox 40mg qd  Dispo: SOFI F: none  E: replete prn  N: NPO  DVT ppx: lovenox 40mg qd  Dispo: SOFI

## 2023-12-12 NOTE — PROGRESS NOTE ADULT - PROBLEM SELECTOR PLAN 3
.  PPSV = 20%, requires total assistance for all ADLs  -c/w supportive care, turning and positioning

## 2023-12-12 NOTE — CHART NOTE - NSCHARTNOTEFT_GEN_A_CORE
Spoke with patients granddaughter (HCP)  Yvrose today on phone at 2:45pm regarding family GOC. AT this point family is interested in pursuing hospice for patient. She was also counseled once again about the potential risks of pleasure feeds/hydration and how it may cause aspiration event in patient. Family would like to speak to palliative/hospice team and social work for planning of next steps. Understand that patient is likely to pass from medical conditions

## 2023-12-12 NOTE — PROGRESS NOTE ADULT - PROBLEM SELECTOR PLAN 4
.  s/p curative-intent chemo/RT but suffering from significant side effects  -while patient's malignancy is not definitively a hospice qualifying diagnosis, the complications of sepsis and respiratory failure are life-limiting

## 2023-12-12 NOTE — PROGRESS NOTE ADULT - ATTENDING COMMENTS
-patient pulled out PEG overnight, abdominal xray without acute pathology and physical exam is benign   -will need to touch base with IR and palliative care about next steps given patient has pulled out his PEG tube multiple times

## 2023-12-12 NOTE — PROGRESS NOTE ADULT - PROBLEM SELECTOR PLAN 2
.  In the setting of treated laryngeal cancer and radiation changes  -PEG dislogded again, no plans to replace  -allow pleasure feeds as tolerated

## 2023-12-12 NOTE — PROGRESS NOTE ADULT - ASSESSMENT
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 Mrar/Dr. Gar),  s/p PEG 11/14 was recently admitted to [facility] for increase need of care at home in which course c/b disloged PEG, uncomplicated IR replacement 11/30. 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 Marr/Dr. Gar),  s/p PEG 11/14 was recently admitted to [facility] for increase need of care at home in which course c/b disloged PEG, uncomplicated IR replacement 11/30. TF were restarted on 12/01 and patient was medically cleared for SOFI on dispo however now returned for re-placement

## 2023-12-12 NOTE — PROGRESS NOTE ADULT - ASSESSMENT
77yo M with PMH of CAD, PVD, T2DM, and SCC of Larynx p/w weakness with course complicated by aspiration event and shock. Palliative consulted for complex symptom management in the setting of malignancy.    ·	requiring frequent PRNs for adequate symptom management  ·	ongoing discussion regarding hospice transition

## 2023-12-12 NOTE — PROGRESS NOTE ADULT - PROBLEM SELECTOR PLAN 5
.  Patient is DNR/DNI, MOLST in chart  -family is acting collectively to make decisions but patient appointed granddaughter (Yvrose Luis, [phone]) as alternate decision maker during previous hospitalizatio .  Patient is DNR/DNI, MOLST in chart  -family is acting collectively to make decisions but patient appointed granddaughter (Yvrose Luis, [phone]) as alternate decision maker during previous hospitalizatio

## 2023-12-12 NOTE — PROGRESS NOTE ADULT - PROBLEM SELECTOR PLAN 1
Ongoing GOC conversations held with family after rapid response was called during admission in which patient had aspirated prompting fluid resucitation and abx treatment   - patient continues to be DNR/DNI, now MEWS exempt   - family wishes to pursue symptom directed care but continue with Abx for management of infection   - no escalation of care is to be had to ICU for pressors   - sp Vancomycin, Zosyn to be DC'd on 12/12 Ongoing GOC conversations held with family after rapid response was called during admission in which patient had aspirated prompting fluid resucitation and abx treatment   - patient continues to be DNR/DNI, now MEWS exempt   - family wishes to pursue symptom directed care but continue with Abx for management of infection   - no escalation of care is to be had to ICU for pressors   - Leonel Bowman DC  12/12 Ongoing GOC conversations held with family after rapid response was called during admission in which patient had aspirated prompting fluid resuscitation and abx treatment   - patient continues to be DNR/DNI, now MEWS exempt   - family wishes to pursue symptom directed care but continue with Abx for management of infection   - no escalation of care is to be had to ICU for pressors   - Family attempting pleasure feeds on 12/11, educated on risk of aspiration if pursuing this   - Leonel Bowman DC  12/12

## 2023-12-12 NOTE — PROGRESS NOTE ADULT - PROBLEM SELECTOR PLAN 6
.  Complex medical decision making / symptom management in the setting of advanced illness.    Will continue to follow for ongoing GOC discussion / titration of palliative regimen. Emotional support provided, questions answered.  Active Psychosocial Referrals:  [x]Social Work/Case management [x]PT/OT [x]Chaplaincy [x]Hospice  []Patient/Family Support []Holistic RN []Massage Therapy []Music Therapy []Ethics  Coping: [] well [] with difficulty [] poor coping [x] unable to assess  Support system: [] strong [x] adequate [] inadequate    For new or uncontrolled symptoms, please call Palliative Care at [phone]). The service is available 24/7 (including nights & weekends) to provide symptom management recommendations over the phone as appropriate .  Complex medical decision making / symptom management in the setting of advanced illness.    Will continue to follow for ongoing GOC discussion / titration of palliative regimen. Emotional support provided, questions answered.  Active Psychosocial Referrals:  [x]Social Work/Case management [x]PT/OT [x]Chaplaincy [x]Hospice  []Patient/Family Support []Holistic RN []Massage Therapy []Music Therapy []Ethics  Coping: [] well [] with difficulty [] poor coping [x] unable to assess  Support system: [] strong [x] adequate [] inadequate    For new or uncontrolled symptoms, please call Palliative Care at [phone]). The service is available 24/7 (including nights & weekends) to provide symptom management recommendations over the phone as appropriate

## 2023-12-12 NOTE — PROGRESS NOTE ADULT - SUBJECTIVE AND OBJECTIVE BOX
[facility] Geriatrics and Palliative Care  Dickson Roberson, Palliative Care Attending  Contact Info: Call [phone] (HEAL Line) or message on Microsoft Teams (Dickson Roberson)    SUBJECTIVE AND OBJECTIVE:  INTERVAL HPI/OVERNIGHT EVENTS: Interval events noted. Unable to participate in interview. See patient's PRN use for the past 24hrs noted below. Comprehensive symptom assessment and GOC exploration as noted below. Extensive time spent discussing plan of care with family. No unexpected adverse effects of opiates noted: no sedation/dizziness/nausea.    ALLERGIES:  No Known Allergies    MEDICATIONS  (STANDING):  albuterol    90 MICROgram(s) HFA Inhaler 2 Puff(s) Inhalation every 6 hours  aspirin  chewable 81 milliGRAM(s) Oral every 24 hours  dextrose 5%. 1000 milliLiter(s) (50 mL/Hr) IV Continuous <Continuous>  dextrose 5%. 1000 milliLiter(s) (100 mL/Hr) IV Continuous <Continuous>  dextrose 50% Injectable 25 Gram(s) IV Push once  dextrose 50% Injectable 12.5 Gram(s) IV Push once  dextrose 50% Injectable 25 Gram(s) IV Push once  fentaNYL   Patch  25 MICROgram(s)/Hr 1 Patch Transdermal every 72 hours  FIRST- Mouthwash  BLM 10 milliLiter(s) Swish and Spit daily  glucagon  Injectable 1 milliGRAM(s) IntraMuscular once  influenza  Vaccine (HIGH DOSE) 0.7 milliLiter(s) IntraMuscular once  insulin lispro (ADMELOG) corrective regimen sliding scale   SubCutaneous three times a day before meals  insulin lispro (ADMELOG) corrective regimen sliding scale   SubCutaneous at bedtime  OLANZapine Injectable 5 milliGRAM(s) IntraMuscular at bedtime  silver sulfADIAZINE 1% Cream 1 Application(s) Topical daily  sodium phosphate 15 milliMole(s)/250 mL IVPB 15 milliMole(s) IV Intermittent once    MEDICATIONS  (PRN):  acetaminophen     Tablet .. 650 milliGRAM(s) Oral every 6 hours PRN Moderate Pain (4 - 6)  dextrose Oral Gel 15 Gram(s) Oral once PRN Blood Glucose LESS THAN 70 milliGRAM(s)/deciliter  glycopyrrolate Injectable 0.4 milliGRAM(s) IV Push four times a day PRN Secretions  HYDROmorphone  Injectable 0.5 milliGRAM(s) IV Push every 2 hours PRN Moderate pain (4-6), Severe pain (7-10), Respiratory rate greater than 22  LORazepam   Injectable 0.5 milliGRAM(s) IV Push daily PRN Anxiety  ondansetron Injectable 4 milliGRAM(s) IV Push every 8 hours PRN Nausea and/or Vomiting      Analgesic Use (Scheduled and PRNs) for past 24 hours:    HYDROmorphone  Injectable   0.5 milliGRAM(s) IV Push (12-12-23 @ 13:13)   0.5 milliGRAM(s) IV Push (12-12-23 @ 09:30)   0.5 milliGRAM(s) IV Push (12-12-23 @ 04:44)   0.5 milliGRAM(s) IV Push (12-12-23 @ 00:24)   0.5 milliGRAM(s) IV Push (12-11-23 @ 21:33)    LORazepam   Injectable   0.5 milliGRAM(s) IV Push (12-12-23 @ 00:03)    LORazepam   Injectable   0.5 milliGRAM(s) IV Push (12-12-23 @ 15:51)    OLANZapine Injectable   5 milliGRAM(s) IntraMuscular (12-11-23 @ 21:23)      ITEMS UNCHECKED ARE NOT PRESENT  PRESENT SYMPTOMS/REVIEW OF SYSTEMS: []Unable to obtain due to poor mentation   Source if other than patient:  []Family   []Team         Vital Signs Last 24 Hrs  T(C): 36.9 (12 Dec 2023 13:49), Max: 37.2 (11 Dec 2023 20:46)  T(F): 98.5 (12 Dec 2023 13:49), Max: 99 (11 Dec 2023 20:46)  HR: 95 (12 Dec 2023 13:49) (80 - 95)  BP: 128/61 (12 Dec 2023 13:49) (105/60 - 128/61)  BP(mean): --  RR: 18 (12 Dec 2023 13:49) (16 - 18)  SpO2: 95% (12 Dec 2023 13:49) (95% - 95%)    Parameters below as of 12 Dec 2023 13:49  Patient On (Oxygen Delivery Method): room air        LABS: Personally reviewed and interpreted                        8.7    4.41  )-----------( 231      ( 12 Dec 2023 05:30 )             28.7   12-12    139  |  105  |  5<L>  ----------------------------<  162<H>  3.8   |  26  |  0.77    Ca    8.2<L>      12 Dec 2023 05:30  Phos  2.6     12-12  Mg     1.9     12-12    TPro  5.0<L>  /  Alb  2.1<L>  /  TBili  0.4  /  DBili  x   /  AST  14  /  ALT  6<L>  /  AlkPhos  83  12-12      RADIOLOGY & ADDITIONAL STUDIES: Personally reviewed and interpreted  None new    DISCUSSION OF CASE: Family - to provide updates and emotional support; Primary Team/RN - to discuss plan of care [facility] Geriatrics and Palliative Care  Dickson Roberson, Palliative Care Attending  Contact Info: Call [phone] (HEAL Line) or message on [employer] Teams (Dickson Roberson)    SUBJECTIVE AND OBJECTIVE:  INTERVAL HPI/OVERNIGHT EVENTS: Interval events noted. Unable to participate in interview. See patient's PRN use for the past 24hrs noted below. Comprehensive symptom assessment and GOC exploration as noted below. Extensive time spent discussing plan of care with family. No unexpected adverse effects of opiates noted: no sedation/dizziness/nausea.    ALLERGIES:  No Known Allergies    MEDICATIONS  (STANDING):  albuterol    90 MICROgram(s) HFA Inhaler 2 Puff(s) Inhalation every 6 hours  aspirin  chewable 81 milliGRAM(s) Oral every 24 hours  dextrose 5%. 1000 milliLiter(s) (50 mL/Hr) IV Continuous <Continuous>  dextrose 5%. 1000 milliLiter(s) (100 mL/Hr) IV Continuous <Continuous>  dextrose 50% Injectable 25 Gram(s) IV Push once  dextrose 50% Injectable 12.5 Gram(s) IV Push once  dextrose 50% Injectable 25 Gram(s) IV Push once  fentaNYL   Patch  25 MICROgram(s)/Hr 1 Patch Transdermal every 72 hours  FIRST- Mouthwash  BLM 10 milliLiter(s) Swish and Spit daily  glucagon  Injectable 1 milliGRAM(s) IntraMuscular once  influenza  Vaccine (HIGH DOSE) 0.7 milliLiter(s) IntraMuscular once  insulin lispro (ADMELOG) corrective regimen sliding scale   SubCutaneous three times a day before meals  insulin lispro (ADMELOG) corrective regimen sliding scale   SubCutaneous at bedtime  OLANZapine Injectable 5 milliGRAM(s) IntraMuscular at bedtime  silver sulfADIAZINE 1% Cream 1 Application(s) Topical daily  sodium phosphate 15 milliMole(s)/250 mL IVPB 15 milliMole(s) IV Intermittent once    MEDICATIONS  (PRN):  acetaminophen     Tablet .. 650 milliGRAM(s) Oral every 6 hours PRN Moderate Pain (4 - 6)  dextrose Oral Gel 15 Gram(s) Oral once PRN Blood Glucose LESS THAN 70 milliGRAM(s)/deciliter  glycopyrrolate Injectable 0.4 milliGRAM(s) IV Push four times a day PRN Secretions  HYDROmorphone  Injectable 0.5 milliGRAM(s) IV Push every 2 hours PRN Moderate pain (4-6), Severe pain (7-10), Respiratory rate greater than 22  LORazepam   Injectable 0.5 milliGRAM(s) IV Push daily PRN Anxiety  ondansetron Injectable 4 milliGRAM(s) IV Push every 8 hours PRN Nausea and/or Vomiting      Analgesic Use (Scheduled and PRNs) for past 24 hours:    HYDROmorphone  Injectable   0.5 milliGRAM(s) IV Push (12-12-23 @ 13:13)   0.5 milliGRAM(s) IV Push (12-12-23 @ 09:30)   0.5 milliGRAM(s) IV Push (12-12-23 @ 04:44)   0.5 milliGRAM(s) IV Push (12-12-23 @ 00:24)   0.5 milliGRAM(s) IV Push (12-11-23 @ 21:33)    LORazepam   Injectable   0.5 milliGRAM(s) IV Push (12-12-23 @ 00:03)    LORazepam   Injectable   0.5 milliGRAM(s) IV Push (12-12-23 @ 15:51)    OLANZapine Injectable   5 milliGRAM(s) IntraMuscular (12-11-23 @ 21:23)      ITEMS UNCHECKED ARE NOT PRESENT  PRESENT SYMPTOMS/REVIEW OF SYSTEMS: []Unable to obtain due to poor mentation   Source if other than patient:  []Family   []Team         Vital Signs Last 24 Hrs  T(C): 36.9 (12 Dec 2023 13:49), Max: 37.2 (11 Dec 2023 20:46)  T(F): 98.5 (12 Dec 2023 13:49), Max: 99 (11 Dec 2023 20:46)  HR: 95 (12 Dec 2023 13:49) (80 - 95)  BP: 128/61 (12 Dec 2023 13:49) (105/60 - 128/61)  BP(mean): --  RR: 18 (12 Dec 2023 13:49) (16 - 18)  SpO2: 95% (12 Dec 2023 13:49) (95% - 95%)    Parameters below as of 12 Dec 2023 13:49  Patient On (Oxygen Delivery Method): room air        LABS: Personally reviewed and interpreted                        8.7    4.41  )-----------( 231      ( 12 Dec 2023 05:30 )             28.7   12-12    139  |  105  |  5<L>  ----------------------------<  162<H>  3.8   |  26  |  0.77    Ca    8.2<L>      12 Dec 2023 05:30  Phos  2.6     12-12  Mg     1.9     12-12    TPro  5.0<L>  /  Alb  2.1<L>  /  TBili  0.4  /  DBili  x   /  AST  14  /  ALT  6<L>  /  AlkPhos  83  12-12      RADIOLOGY & ADDITIONAL STUDIES: Personally reviewed and interpreted  None new    DISCUSSION OF CASE: Family - to provide updates and emotional support; Primary Team/RN - to discuss plan of care [facility] Geriatrics and Palliative Care  Dickson Roberson, Palliative Care Attending  Contact Info: Call [phone] (HEAL Line) or message on [employer] Teams (Dickson Roberson)    SUBJECTIVE AND OBJECTIVE:  INTERVAL HPI/OVERNIGHT EVENTS: Interval events noted. Unable to participate in interview. See patient's PRN use for the past 24hrs noted below. Comprehensive symptom assessment and GOC exploration as noted below. Extensive time spent discussing plan of care with family. No unexpected adverse effects of opiates noted: no sedation/dizziness/nausea.    ALLERGIES:  No Known Allergies    MEDICATIONS  (STANDING):  albuterol    90 MICROgram(s) HFA Inhaler 2 Puff(s) Inhalation every 6 hours  aspirin  chewable 81 milliGRAM(s) Oral every 24 hours  dextrose 5%. 1000 milliLiter(s) (50 mL/Hr) IV Continuous <Continuous>  dextrose 5%. 1000 milliLiter(s) (100 mL/Hr) IV Continuous <Continuous>  dextrose 50% Injectable 25 Gram(s) IV Push once  dextrose 50% Injectable 12.5 Gram(s) IV Push once  dextrose 50% Injectable 25 Gram(s) IV Push once  fentaNYL   Patch  25 MICROgram(s)/Hr 1 Patch Transdermal every 72 hours  FIRST- Mouthwash  BLM 10 milliLiter(s) Swish and Spit daily  glucagon  Injectable 1 milliGRAM(s) IntraMuscular once  influenza  Vaccine (HIGH DOSE) 0.7 milliLiter(s) IntraMuscular once  insulin lispro (ADMELOG) corrective regimen sliding scale   SubCutaneous three times a day before meals  insulin lispro (ADMELOG) corrective regimen sliding scale   SubCutaneous at bedtime  OLANZapine Injectable 5 milliGRAM(s) IntraMuscular at bedtime  silver sulfADIAZINE 1% Cream 1 Application(s) Topical daily  sodium phosphate 15 milliMole(s)/250 mL IVPB 15 milliMole(s) IV Intermittent once    MEDICATIONS  (PRN):  acetaminophen     Tablet .. 650 milliGRAM(s) Oral every 6 hours PRN Moderate Pain (4 - 6)  dextrose Oral Gel 15 Gram(s) Oral once PRN Blood Glucose LESS THAN 70 milliGRAM(s)/deciliter  glycopyrrolate Injectable 0.4 milliGRAM(s) IV Push four times a day PRN Secretions  HYDROmorphone  Injectable 0.5 milliGRAM(s) IV Push every 2 hours PRN Moderate pain (4-6), Severe pain (7-10), Respiratory rate greater than 22  LORazepam   Injectable 0.5 milliGRAM(s) IV Push daily PRN Anxiety  ondansetron Injectable 4 milliGRAM(s) IV Push every 8 hours PRN Nausea and/or Vomiting      Analgesic Use (Scheduled and PRNs) for past 24 hours:    HYDROmorphone  Injectable   0.5 milliGRAM(s) IV Push (12-12-23 @ 13:13)   0.5 milliGRAM(s) IV Push (12-12-23 @ 09:30)   0.5 milliGRAM(s) IV Push (12-12-23 @ 04:44)   0.5 milliGRAM(s) IV Push (12-12-23 @ 00:24)   0.5 milliGRAM(s) IV Push (12-11-23 @ 21:33)    LORazepam   Injectable   0.5 milliGRAM(s) IV Push (12-12-23 @ 00:03)    LORazepam   Injectable   0.5 milliGRAM(s) IV Push (12-12-23 @ 15:51)    OLANZapine Injectable   5 milliGRAM(s) IntraMuscular (12-11-23 @ 21:23)      ITEMS UNCHECKED ARE NOT PRESENT  PRESENT SYMPTOMS: [x]Unable to obtain due to poor mentation/encephalopathy  Source if other than patient:  []Family   []Team     Pain: [x] yes [] no - see PAINAD  QOL impact -   Location -                    Aggravating Factors -  Quality -  Radiation -  Timing -  Severity (0-10 scale) -   Minimal Acceptable Level (0-10 scale) -    PAIN AD Score: 4  (Nonverbal Pain Assessment)    Dyspnea:                           []Mild  []Moderate []Severe  Anxiety:                             []Mild []Moderate []Severe  Fatigue:                             []Mild []Moderate []Severe  Nausea:                             []Mild []Moderate []Severe  Loss of Appetite:              []Mild []Moderate []Severe  Constipation:                    []Mild []Moderate []Severe    Other Symptoms:  [x]All other review of systems negative     Palliative Performance Status Version 2:  20%  (Functional Assessment Tool)    GENERAL:  [] NAD []Alert [x]Lethargic  []Cachexia  []Unarousable  [x]Minimally Verbal  []Non-Verbal  BEHAVIORAL:   []Anxiety  [x]Delirium []Agitation []Cooperative []Oriented x  HEENT:  []Normal  [] Moist Mucous Membranes [x]Dry mouth   []ET Tube/Trach  []Oral lesions  PULMONARY:   []Clear []Tachypnea  []Audible excessive secretions  [x]Normal Work of Breathing []Labored Breathing  [x]Rhonchi []Crackles []Wheezing  CARDIOVASCULAR:    [x]Regular Rate [x]Regular Rhythm []Irregular []Tachy  []Yaya  GASTROINTESTINAL:  [x]Soft  [x]Distended   []+BS  [x]Non tender []Tender  [x]PEG []OGT/ NGT  Last BM:  GENITOURINARY:  []Normal [x] Incontinent   []Oliguria/Anuria   []Paige  MUSCULOSKELETAL:   []Normal Extremities  [x]Weakness  [x]Bed/Wheelchair bound []Edema  NEUROLOGIC:   []No focal deficits  [x]Cognitive impairment  [x]Dysphagia []Dysarthria []Paresis [x]Encephalopathic  SKIN:   []Normal   []Pressure ulcer(s)  [x]Rash    Vital Signs Last 24 Hrs  T(C): 36.9 (12 Dec 2023 13:49), Max: 37.2 (11 Dec 2023 20:46)  T(F): 98.5 (12 Dec 2023 13:49), Max: 99 (11 Dec 2023 20:46)  HR: 95 (12 Dec 2023 13:49) (80 - 95)  BP: 128/61 (12 Dec 2023 13:49) (105/60 - 128/61)  BP(mean): --  RR: 18 (12 Dec 2023 13:49) (16 - 18)  SpO2: 95% (12 Dec 2023 13:49) (95% - 95%)    Parameters below as of 12 Dec 2023 13:49  Patient On (Oxygen Delivery Method): room air    LABS: Personally reviewed and interpreted                      8.7    4.41  )-----------( 231      ( 12 Dec 2023 05:30 )             28.7   12-12    139  |  105  |  5<L>  ----------------------------<  162<H>  3.8   |  26  |  0.77    Ca    8.2<L>      12 Dec 2023 05:30  Phos  2.6     12-12  Mg     1.9     12-12  TPro  5.0<L>  /  Alb  2.1<L>  /  TBili  0.4  /  DBili  x   /  AST  14  /  ALT  6<L>  /  AlkPhos  83  12-12    RADIOLOGY & ADDITIONAL STUDIES: Personally reviewed and interpreted  None new    DISCUSSION OF CASE: Family - to provide updates and emotional support; Primary Team/RN - to discuss plan of care [facility] Geriatrics and Palliative Care  Dickson Roberson, Palliative Care Attending  Contact Info: Call [phone] (HEAL Line) or message on [employer] Teams (Dickson Roberson)    SUBJECTIVE AND OBJECTIVE:  INTERVAL HPI/OVERNIGHT EVENTS: Interval events noted. Unable to participate in interview. See patient's PRN use for the past 24hrs noted below. Comprehensive symptom assessment and GOC exploration as noted below. Extensive time spent discussing plan of care with family. No unexpected adverse effects of opiates noted: no sedation/dizziness/nausea.    ALLERGIES:  No Known Allergies    MEDICATIONS  (STANDING):  albuterol    90 MICROgram(s) HFA Inhaler 2 Puff(s) Inhalation every 6 hours  aspirin  chewable 81 milliGRAM(s) Oral every 24 hours  dextrose 5%. 1000 milliLiter(s) (50 mL/Hr) IV Continuous <Continuous>  dextrose 5%. 1000 milliLiter(s) (100 mL/Hr) IV Continuous <Continuous>  dextrose 50% Injectable 25 Gram(s) IV Push once  dextrose 50% Injectable 12.5 Gram(s) IV Push once  dextrose 50% Injectable 25 Gram(s) IV Push once  fentaNYL   Patch  25 MICROgram(s)/Hr 1 Patch Transdermal every 72 hours  FIRST- Mouthwash  BLM 10 milliLiter(s) Swish and Spit daily  glucagon  Injectable 1 milliGRAM(s) IntraMuscular once  influenza  Vaccine (HIGH DOSE) 0.7 milliLiter(s) IntraMuscular once  insulin lispro (ADMELOG) corrective regimen sliding scale   SubCutaneous three times a day before meals  insulin lispro (ADMELOG) corrective regimen sliding scale   SubCutaneous at bedtime  OLANZapine Injectable 5 milliGRAM(s) IntraMuscular at bedtime  silver sulfADIAZINE 1% Cream 1 Application(s) Topical daily  sodium phosphate 15 milliMole(s)/250 mL IVPB 15 milliMole(s) IV Intermittent once    MEDICATIONS  (PRN):  acetaminophen     Tablet .. 650 milliGRAM(s) Oral every 6 hours PRN Moderate Pain (4 - 6)  dextrose Oral Gel 15 Gram(s) Oral once PRN Blood Glucose LESS THAN 70 milliGRAM(s)/deciliter  glycopyrrolate Injectable 0.4 milliGRAM(s) IV Push four times a day PRN Secretions  HYDROmorphone  Injectable 0.5 milliGRAM(s) IV Push every 2 hours PRN Moderate pain (4-6), Severe pain (7-10), Respiratory rate greater than 22  LORazepam   Injectable 0.5 milliGRAM(s) IV Push daily PRN Anxiety  ondansetron Injectable 4 milliGRAM(s) IV Push every 8 hours PRN Nausea and/or Vomiting      Analgesic Use (Scheduled and PRNs) for past 24 hours:    HYDROmorphone  Injectable   0.5 milliGRAM(s) IV Push (12-12-23 @ 13:13)   0.5 milliGRAM(s) IV Push (12-12-23 @ 09:30)   0.5 milliGRAM(s) IV Push (12-12-23 @ 04:44)   0.5 milliGRAM(s) IV Push (12-12-23 @ 00:24)   0.5 milliGRAM(s) IV Push (12-11-23 @ 21:33)    LORazepam   Injectable   0.5 milliGRAM(s) IV Push (12-12-23 @ 00:03)    LORazepam   Injectable   0.5 milliGRAM(s) IV Push (12-12-23 @ 15:51)    OLANZapine Injectable   5 milliGRAM(s) IntraMuscular (12-11-23 @ 21:23)      ITEMS UNCHECKED ARE NOT PRESENT  PRESENT SYMPTOMS: [x]Unable to obtain due to poor mentation/encephalopathy  Source if other than patient:  []Family   []Team     Pain: [x] yes [] no - see PAINAD  QOL impact -   Location -                    Aggravating Factors -  Quality -  Radiation -  Timing -  Severity (0-10 scale) -   Minimal Acceptable Level (0-10 scale) -    PAIN AD Score: 4  (Nonverbal Pain Assessment)    Dyspnea:                           []Mild  []Moderate []Severe  Anxiety:                             []Mild []Moderate []Severe  Fatigue:                             []Mild []Moderate []Severe  Nausea:                             []Mild []Moderate []Severe  Loss of Appetite:              []Mild []Moderate []Severe  Constipation:                    []Mild []Moderate []Severe    Other Symptoms:  [x]All other review of systems negative     Palliative Performance Status Version 2:  20%  (Functional Assessment Tool)    GENERAL:  [] NAD []Alert [x]Lethargic  []Cachexia  []Unarousable  [x]Minimally Verbal  []Non-Verbal  BEHAVIORAL:   []Anxiety  [x]Delirium []Agitation []Cooperative []Oriented x  HEENT:  []Normal  [] Moist Mucous Membranes [x]Dry mouth   []ET Tube/Trach  []Oral lesions  PULMONARY:   []Clear []Tachypnea  []Audible excessive secretions  [x]Normal Work of Breathing []Labored Breathing  [x]Rhonchi []Crackles []Wheezing  CARDIOVASCULAR:    [x]Regular Rate [x]Regular Rhythm []Irregular []Tachy  []Yaya  GASTROINTESTINAL:  [x]Soft  [x]Distended   []+BS  [x]Non tender []Tender  [x]PEG []OGT/ NGT  Last BM:  GENITOURINARY:  []Normal [x] Incontinent   []Oliguria/Anuria   []Paige  MUSCULOSKELETAL:   []Normal Extremities  [x]Weakness  [x]Bed/Wheelchair bound []Edema  NEUROLOGIC:   []No focal deficits  [x]Cognitive impairment  [x]Dysphagia []Dysarthria []Paresis [x]Encephalopathic  SKIN:   []Normal   []Pressure ulcer(s)  [x]Rash    Vital Signs Last 24 Hrs  T(C): 36.9 (12 Dec 2023 13:49), Max: 37.2 (11 Dec 2023 20:46)  T(F): 98.5 (12 Dec 2023 13:49), Max: 99 (11 Dec 2023 20:46)  HR: 95 (12 Dec 2023 13:49) (80 - 95)  BP: 128/61 (12 Dec 2023 13:49) (105/60 - 128/61)  BP(mean): --  RR: 18 (12 Dec 2023 13:49) (16 - 18)  SpO2: 95% (12 Dec 2023 13:49) (95% - 95%)    Parameters below as of 12 Dec 2023 13:49  Patient On (Oxygen Delivery Method): room air    LABS: Personally reviewed and interpreted                      8.7    4.41  )-----------( 231      ( 12 Dec 2023 05:30 )             28.7   12-12    139  |  105  |  5<L>  ----------------------------<  162<H>  3.8   |  26  |  0.77    Ca    8.2<L>      12 Dec 2023 05:30  Phos  2.6     12-12  Mg     1.9     12-12  TPro  5.0<L>  /  Alb  2.1<L>  /  TBili  0.4  /  DBili  x   /  AST  14  /  ALT  6<L>  /  AlkPhos  83  12-12    RADIOLOGY & ADDITIONAL STUDIES: Personally reviewed and interpreted  None new    DISCUSSION OF CASE: Family - to provide updates and emotional support; Primary Team/RN - to discuss plan of care

## 2023-12-12 NOTE — PROGRESS NOTE ADULT - PROBLEM SELECTOR PLAN 3
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 C/o odynophagia causing poor PO intake. PEG placed 11/14 i/s/o ongoing PO intake 2/2 odynophagia, likely i/s/o cancer tx. Pt previously on fluconazole for candidiasis esophagitis ppx but d/c by Dr. Marr for lack of improvement.   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 C/o odynophagia causing poor PO intake. PEG placed 11/14 i/s/o ongoing PO intake 2/2 odynophagia, likely i/s/o cancer tx. Pt previously on fluconazole for candidiasis esophagitis ppx but d/c by Dr. Marr for lack of improvement.   12/9- patient pulled out PEG tube overnight   -held feeds given worsening distension, IR will not place PEG again given repetaed removal by patient

## 2023-12-12 NOTE — PROGRESS NOTE ADULT - PROBLEM SELECTOR PLAN 4
A1c 9.0 on 10/30. Discharged on lantus 45qd in the AM and lispro 7u TID, however patient discontinued on standing insulin while previously hospitalized due to hypoglycemia episodes.   - finger sticks q6 hours   - mISS A1c 9.0 on 10/30. Discharged on lantus 45qd in the AM and lispro 7u TID, however patient discontinued on standing insulin while previously hospitalized due to hypoglycemia episodes.    - mISS

## 2023-12-12 NOTE — PROGRESS NOTE ADULT - SUBJECTIVE AND OBJECTIVE BOX
OVERNIGHT EVENTS: BEN    SUBJECTIVE:  Patient seen and examined at bedside.  ROS: Patient denies h/n/v/d, fever, chills, cp, palpitations, sob, abd pain, leg swelling, rashes, dysuria, and changes in BM.     Vital Signs Last 12 Hrs  T(F): 99 (12-11-23 @ 20:46), Max: 99 (12-11-23 @ 20:46)  HR: 80 (12-11-23 @ 20:46) (80 - 80)  BP: 105/60 (12-11-23 @ 20:46) (105/60 - 105/60)  BP(mean): --  RR: 16 (12-11-23 @ 20:46) (16 - 16)  SpO2: 95% (12-11-23 @ 20:46) (95% - 95%)  I&O's Summary      PHYSICAL EXAM:  Constitutional: NAD, comfortable in bed.  HEENT: NC/AT, PERRLA, EOMI, no conjunctival pallor or scleral icterus, MMM  Neck: Supple, no JVD  Respiratory: CTA B/L. No w/r/r.   Cardiovascular: RRR, normal S1 and S2, no m/r/g.   Gastrointestinal: +BS, soft NTND, no guarding or rebound tenderness, no palpable masses   Extremities: wwp; no cyanosis, clubbing or edema.   Vascular: Pulses equal and strong throughout.   Neurological: AAOx3, no CN deficits, strength and sensation intact throughout.   Skin: No gross skin abnormalities or rashes        LABS:                        8.7    4.41  )-----------( 231      ( 12 Dec 2023 05:30 )             28.7     12-11    140  |  106  |  6<L>  ----------------------------<  185<H>  4.1   |  24  |  0.78    Ca    8.5      11 Dec 2023 05:30  Phos  2.4     12-11  Mg     1.3     12-11      PT/INR - ( 11 Dec 2023 05:30 )   PT: 14.0 sec;   INR: 1.24          PTT - ( 11 Dec 2023 05:30 )  PTT:30.9 sec  Urinalysis Basic - ( 11 Dec 2023 05:30 )    Color: x / Appearance: x / SG: x / pH: x  Gluc: 185 mg/dL / Ketone: x  / Bili: x / Urobili: x   Blood: x / Protein: x / Nitrite: x   Leuk Esterase: x / RBC: x / WBC x   Sq Epi: x / Non Sq Epi: x / Bacteria: x          RADIOLOGY & ADDITIONAL TESTS:    MEDICATIONS  (STANDING):  albuterol    90 MICROgram(s) HFA Inhaler 2 Puff(s) Inhalation every 6 hours  aspirin  chewable 81 milliGRAM(s) Oral every 24 hours  dextrose 5%. 1000 milliLiter(s) (100 mL/Hr) IV Continuous <Continuous>  dextrose 5%. 1000 milliLiter(s) (50 mL/Hr) IV Continuous <Continuous>  dextrose 50% Injectable 12.5 Gram(s) IV Push once  dextrose 50% Injectable 25 Gram(s) IV Push once  dextrose 50% Injectable 25 Gram(s) IV Push once  fentaNYL   Patch  25 MICROgram(s)/Hr 1 Patch Transdermal every 72 hours  FIRST- Mouthwash  BLM 10 milliLiter(s) Swish and Spit daily  glucagon  Injectable 1 milliGRAM(s) IntraMuscular once  influenza  Vaccine (HIGH DOSE) 0.7 milliLiter(s) IntraMuscular once  insulin lispro (ADMELOG) corrective regimen sliding scale   SubCutaneous three times a day before meals  insulin lispro (ADMELOG) corrective regimen sliding scale   SubCutaneous at bedtime  OLANZapine Injectable 5 milliGRAM(s) IntraMuscular at bedtime  piperacillin/tazobactam IVPB.. 3.375 Gram(s) IV Intermittent every 8 hours  silver sulfADIAZINE 1% Cream 1 Application(s) Topical daily  sodium phosphate 15 milliMole(s)/250 mL IVPB 15 milliMole(s) IV Intermittent once    MEDICATIONS  (PRN):  acetaminophen     Tablet .. 650 milliGRAM(s) Oral every 6 hours PRN Moderate Pain (4 - 6)  dextrose Oral Gel 15 Gram(s) Oral once PRN Blood Glucose LESS THAN 70 milliGRAM(s)/deciliter  glycopyrrolate Injectable 0.4 milliGRAM(s) IV Push four times a day PRN Secretions  HYDROmorphone  Injectable 0.5 milliGRAM(s) IV Push every 2 hours PRN Moderate pain (4-6), Severe pain (7-10), Respiratory rate greater than 22  LORazepam   Injectable 0.5 milliGRAM(s) IV Push daily PRN Anxiety  ondansetron Injectable 4 milliGRAM(s) IV Push every 8 hours PRN Nausea and/or Vomiting   OVERNIGHT EVENTS: BEN    SUBJECTIVE:  Patient seen and examined at bedside. NAD, comfortable in bed   ROS: Patient denies h/n/v/d, fever, chills, cp, palpitations, sob, abd pain, leg swelling, rashes, dysuria, and changes in BM.     Vital Signs Last 12 Hrs  T(F): 99 (12-11-23 @ 20:46), Max: 99 (12-11-23 @ 20:46)  HR: 80 (12-11-23 @ 20:46) (80 - 80)  BP: 105/60 (12-11-23 @ 20:46) (105/60 - 105/60)  BP(mean): --  RR: 16 (12-11-23 @ 20:46) (16 - 16)  SpO2: 95% (12-11-23 @ 20:46) (95% - 95%)  I&O's Summary      PHYSICAL EXAM:  Constitutional: NAD  HEENT: NC/AT, PERRLA, EOMI, no conjunctival pallor or scleral icterus, MMM  Neck: Dressing present on L side of neck   Respiratory: CTA B/L. No w/r/r.   Cardiovascular: RRR, normal S1 and S2, no m/r/g.   Gastrointestinal: +BS, soft NTND, PEG removed, dress accordingly   Extremities: wwp; no cyanosis, clubbing or edema.   Vascular: Pulses equal and strong throughout.   Neurological: AAOx1-2  Skin: No gross skin abnormalities or rashes        LABS:                        8.7    4.41  )-----------( 231      ( 12 Dec 2023 05:30 )             28.7     12-11    140  |  106  |  6<L>  ----------------------------<  185<H>  4.1   |  24  |  0.78    Ca    8.5      11 Dec 2023 05:30  Phos  2.4     12-11  Mg     1.3     12-11      PT/INR - ( 11 Dec 2023 05:30 )   PT: 14.0 sec;   INR: 1.24          PTT - ( 11 Dec 2023 05:30 )  PTT:30.9 sec  Urinalysis Basic - ( 11 Dec 2023 05:30 )    Color: x / Appearance: x / SG: x / pH: x  Gluc: 185 mg/dL / Ketone: x  / Bili: x / Urobili: x   Blood: x / Protein: x / Nitrite: x   Leuk Esterase: x / RBC: x / WBC x   Sq Epi: x / Non Sq Epi: x / Bacteria: x          RADIOLOGY & ADDITIONAL TESTS:    MEDICATIONS  (STANDING):  albuterol    90 MICROgram(s) HFA Inhaler 2 Puff(s) Inhalation every 6 hours  aspirin  chewable 81 milliGRAM(s) Oral every 24 hours  dextrose 5%. 1000 milliLiter(s) (100 mL/Hr) IV Continuous <Continuous>  dextrose 5%. 1000 milliLiter(s) (50 mL/Hr) IV Continuous <Continuous>  dextrose 50% Injectable 12.5 Gram(s) IV Push once  dextrose 50% Injectable 25 Gram(s) IV Push once  dextrose 50% Injectable 25 Gram(s) IV Push once  fentaNYL   Patch  25 MICROgram(s)/Hr 1 Patch Transdermal every 72 hours  FIRST- Mouthwash  BLM 10 milliLiter(s) Swish and Spit daily  glucagon  Injectable 1 milliGRAM(s) IntraMuscular once  influenza  Vaccine (HIGH DOSE) 0.7 milliLiter(s) IntraMuscular once  insulin lispro (ADMELOG) corrective regimen sliding scale   SubCutaneous three times a day before meals  insulin lispro (ADMELOG) corrective regimen sliding scale   SubCutaneous at bedtime  OLANZapine Injectable 5 milliGRAM(s) IntraMuscular at bedtime  piperacillin/tazobactam IVPB.. 3.375 Gram(s) IV Intermittent every 8 hours  silver sulfADIAZINE 1% Cream 1 Application(s) Topical daily  sodium phosphate 15 milliMole(s)/250 mL IVPB 15 milliMole(s) IV Intermittent once    MEDICATIONS  (PRN):  acetaminophen     Tablet .. 650 milliGRAM(s) Oral every 6 hours PRN Moderate Pain (4 - 6)  dextrose Oral Gel 15 Gram(s) Oral once PRN Blood Glucose LESS THAN 70 milliGRAM(s)/deciliter  glycopyrrolate Injectable 0.4 milliGRAM(s) IV Push four times a day PRN Secretions  HYDROmorphone  Injectable 0.5 milliGRAM(s) IV Push every 2 hours PRN Moderate pain (4-6), Severe pain (7-10), Respiratory rate greater than 22  LORazepam   Injectable 0.5 milliGRAM(s) IV Push daily PRN Anxiety  ondansetron Injectable 4 milliGRAM(s) IV Push every 8 hours PRN Nausea and/or Vomiting

## 2023-12-13 NOTE — PROGRESS NOTE ADULT - SUBJECTIVE AND OBJECTIVE BOX
**INCOMPLETE NOTE    OVERNIGHT EVENTS:    SUBJECTIVE:  Patient seen and examined at bedside.  No fever, chills, dizziness, headache, changes in vision, chest pain, dyspnea, nausea or vomiting, dysuria, changes in bowel movements, LE edema. Rest of 12 point Review of systems negative unless otherwise documented elsewhere in note.     Vital Signs Last 12 Hrs  T(F): 97.4 (12-13-23 @ 06:22), Max: 98.1 (12-12-23 @ 21:36)  HR: 87 (12-13-23 @ 06:22) (87 - 96)  BP: 119/60 (12-13-23 @ 06:22) (119/60 - 128/68)  BP(mean): --  RR: 14 (12-13-23 @ 06:22) (14 - 20)  SpO2: 95% (12-13-23 @ 06:22) (95% - 95%)  I&O's Summary      PHYSICAL EXAM:  Constitutional: NAD, comfortable in bed.  HEENT: NC/AT, PERRLA, EOMI, no conjunctival pallor or scleral icterus, MMM  Neck: Supple, no JVD  Respiratory: CTA B/L. No w/r/r.   Cardiovascular: RRR, normal S1 and S2, no m/r/g.   Gastrointestinal: +BS, soft NTND, no guarding or rebound tenderness, no palpable masses   Extremities: wwp; no cyanosis, clubbing or edema.   Vascular: Pulses equal and strong throughout.   Neurological: AAOx3, no CN deficits, strength and sensation intact throughout.   Skin: No gross skin abnormalities or rashes        LABS:                        8.7    4.41  )-----------( 231      ( 12 Dec 2023 05:30 )             28.7     12-12    139  |  105  |  5<L>  ----------------------------<  162<H>  3.8   |  26  |  0.77    Ca    8.2<L>      12 Dec 2023 05:30  Phos  2.6     12-12  Mg     1.9     12-12    TPro  5.0<L>  /  Alb  2.1<L>  /  TBili  0.4  /  DBili  x   /  AST  14  /  ALT  6<L>  /  AlkPhos  83  12-12      Urinalysis Basic - ( 12 Dec 2023 05:30 )    Color: x / Appearance: x / SG: x / pH: x  Gluc: 162 mg/dL / Ketone: x  / Bili: x / Urobili: x   Blood: x / Protein: x / Nitrite: x   Leuk Esterase: x / RBC: x / WBC x   Sq Epi: x / Non Sq Epi: x / Bacteria: x          RADIOLOGY & ADDITIONAL TESTS:    MEDICATIONS  (STANDING):  albuterol    90 MICROgram(s) HFA Inhaler 2 Puff(s) Inhalation every 6 hours  aspirin  chewable 81 milliGRAM(s) Oral every 24 hours  dextrose 5%. 1000 milliLiter(s) (100 mL/Hr) IV Continuous <Continuous>  dextrose 5%. 1000 milliLiter(s) (50 mL/Hr) IV Continuous <Continuous>  dextrose 50% Injectable 12.5 Gram(s) IV Push once  dextrose 50% Injectable 25 Gram(s) IV Push once  dextrose 50% Injectable 25 Gram(s) IV Push once  fentaNYL   Patch  25 MICROgram(s)/Hr 1 Patch Transdermal every 72 hours  FIRST- Mouthwash  BLM 10 milliLiter(s) Swish and Spit daily  glucagon  Injectable 1 milliGRAM(s) IntraMuscular once  influenza  Vaccine (HIGH DOSE) 0.7 milliLiter(s) IntraMuscular once  insulin lispro (ADMELOG) corrective regimen sliding scale   SubCutaneous at bedtime  insulin lispro (ADMELOG) corrective regimen sliding scale   SubCutaneous three times a day before meals  OLANZapine Injectable 5 milliGRAM(s) IntraMuscular at bedtime  silver sulfADIAZINE 1% Cream 1 Application(s) Topical daily  sodium phosphate 15 milliMole(s)/250 mL IVPB 15 milliMole(s) IV Intermittent once    MEDICATIONS  (PRN):  acetaminophen     Tablet .. 650 milliGRAM(s) Oral every 6 hours PRN Moderate Pain (4 - 6)  dextrose Oral Gel 15 Gram(s) Oral once PRN Blood Glucose LESS THAN 70 milliGRAM(s)/deciliter  glycopyrrolate Injectable 0.4 milliGRAM(s) IV Push four times a day PRN Secretions  HYDROmorphone  Injectable 0.5 milliGRAM(s) IV Push every 2 hours PRN Moderate pain (4-6), Severe pain (7-10), Respiratory rate greater than 22  LORazepam   Injectable 0.5 milliGRAM(s) IV Push daily PRN Anxiety  ondansetron Injectable 4 milliGRAM(s) IV Push every 8 hours PRN Nausea and/or Vomiting   OVERNIGHT EVENTS: No acute event    SUBJECTIVE:  Patient seen and examined at bedside. Non-verbal, non responsive, lethargic, withdraws to noxious stimuli.    Vital Signs Last 12 Hrs  T(F): 97.4 (12-13-23 @ 06:22), Max: 98.1 (12-12-23 @ 21:36)  HR: 87 (12-13-23 @ 06:22) (87 - 96)  BP: 119/60 (12-13-23 @ 06:22) (119/60 - 128/68)  BP(mean): --  RR: 14 (12-13-23 @ 06:22) (14 - 20)  SpO2: 95% (12-13-23 @ 06:22) (95% - 95%)  I&O's Summary      PHYSICAL EXAM:  Constitutional: NAD  HEENT: NC/AT, PERRLA, EOMI, no conjunctival pallor or scleral icterus, MMM  Neck: Dressing present on L side of neck   Respiratory: CTA B/L. No w/r/r.   Cardiovascular: RRR, normal S1 and S2, no m/r/g.   Gastrointestinal: +BS, soft NTND, PEG removed, dress accordingly   Extremities: wwp; no cyanosis, clubbing or edema.   Vascular: Pulses equal and strong throughout.   Neurological: AAOx0, withdrawal to noxious stimuli in all extremities.  Skin: No gross skin abnormalities or rashes        LABS:                        8.7    4.41  )-----------( 231      ( 12 Dec 2023 05:30 )             28.7     12-12    139  |  105  |  5<L>  ----------------------------<  162<H>  3.8   |  26  |  0.77    Ca    8.2<L>      12 Dec 2023 05:30  Phos  2.6     12-12  Mg     1.9     12-12    TPro  5.0<L>  /  Alb  2.1<L>  /  TBili  0.4  /  DBili  x   /  AST  14  /  ALT  6<L>  /  AlkPhos  83  12-12      Urinalysis Basic - ( 12 Dec 2023 05:30 )    Color: x / Appearance: x / SG: x / pH: x  Gluc: 162 mg/dL / Ketone: x  / Bili: x / Urobili: x   Blood: x / Protein: x / Nitrite: x   Leuk Esterase: x / RBC: x / WBC x   Sq Epi: x / Non Sq Epi: x / Bacteria: x          RADIOLOGY & ADDITIONAL TESTS:    MEDICATIONS  (STANDING):  albuterol    90 MICROgram(s) HFA Inhaler 2 Puff(s) Inhalation every 6 hours  aspirin  chewable 81 milliGRAM(s) Oral every 24 hours  dextrose 5%. 1000 milliLiter(s) (100 mL/Hr) IV Continuous <Continuous>  dextrose 5%. 1000 milliLiter(s) (50 mL/Hr) IV Continuous <Continuous>  dextrose 50% Injectable 12.5 Gram(s) IV Push once  dextrose 50% Injectable 25 Gram(s) IV Push once  dextrose 50% Injectable 25 Gram(s) IV Push once  fentaNYL   Patch  25 MICROgram(s)/Hr 1 Patch Transdermal every 72 hours  FIRST- Mouthwash  BLM 10 milliLiter(s) Swish and Spit daily  glucagon  Injectable 1 milliGRAM(s) IntraMuscular once  influenza  Vaccine (HIGH DOSE) 0.7 milliLiter(s) IntraMuscular once  insulin lispro (ADMELOG) corrective regimen sliding scale   SubCutaneous at bedtime  insulin lispro (ADMELOG) corrective regimen sliding scale   SubCutaneous three times a day before meals  OLANZapine Injectable 5 milliGRAM(s) IntraMuscular at bedtime  silver sulfADIAZINE 1% Cream 1 Application(s) Topical daily  sodium phosphate 15 milliMole(s)/250 mL IVPB 15 milliMole(s) IV Intermittent once    MEDICATIONS  (PRN):  acetaminophen     Tablet .. 650 milliGRAM(s) Oral every 6 hours PRN Moderate Pain (4 - 6)  dextrose Oral Gel 15 Gram(s) Oral once PRN Blood Glucose LESS THAN 70 milliGRAM(s)/deciliter  glycopyrrolate Injectable 0.4 milliGRAM(s) IV Push four times a day PRN Secretions  HYDROmorphone  Injectable 0.5 milliGRAM(s) IV Push every 2 hours PRN Moderate pain (4-6), Severe pain (7-10), Respiratory rate greater than 22  LORazepam   Injectable 0.5 milliGRAM(s) IV Push daily PRN Anxiety  ondansetron Injectable 4 milliGRAM(s) IV Push every 8 hours PRN Nausea and/or Vomiting

## 2023-12-13 NOTE — PROGRESS NOTE ADULT - PROBLEM SELECTOR PLAN 4
A1c 9.0 on 10/30. Discharged on lantus 45qd in the AM and lispro 7u TID, however patient discontinued on standing insulin while previously hospitalized due to hypoglycemia episodes.    - mISS

## 2023-12-13 NOTE — PROGRESS NOTE ADULT - ASSESSMENT
·	plan to transition to Inpatient Hospice tomorrow 79yo M with PMH of CAD, PVD, T2DM, and SCC of Larynx p/w weakness with course complicated by aspiration event and shock. Palliative consulted for complex symptom management in the setting of malignancy.    ·	plan to transition to Inpatient Hospice tomorrow

## 2023-12-13 NOTE — PROGRESS NOTE ADULT - REASON FOR ADMISSION
Rehab placement

## 2023-12-13 NOTE — PROGRESS NOTE ADULT - NUTRITIONAL ASSESSMENT
This patient has been assessed with a concern for Malnutrition and has been determined to have a diagnosis/diagnoses of Severe protein-calorie malnutrition.    This patient is being managed with:   Diet NPO after Midnight-     NPO Start Date: 10-Dec-2023   NPO Start Time: 23:59  Entered: Dec 10 2023 11:29AM    Diet NPO with Tube Feed-  Tube Feeding Modality: Gastrostomy  Glucerna 1.5 Se[place] (GLUCERNA1.5)  Total Volume for 24 Hours (mL): 1200  Total Number of Cans: 5  Continuous  Starting Tube Feed Rate {mL per Hour}: 10  Increase Tube Feed Rate by (mL): 10     Every 4 hours  Until Goal Tube Feed Rate (mL per Hour): 50  Tube Feed Duration (in Hours): 24  Tube Feed Start Time: 11:00  Entered: Dec  8 2023 10:06AM  
This patient has been assessed with a concern for Malnutrition and has been determined to have a diagnosis/diagnoses of Severe protein-calorie malnutrition.  
This patient has been assessed with a concern for Malnutrition and has been determined to have a diagnosis/diagnoses of Severe protein-calorie malnutrition.    This patient is being managed with:   Diet NPO with Tube Feed-  Tube Feeding Modality: Gastrostomy  Glucerna 1.5 [place] (GLUCERNA1.5)  Continuous  Starting Tube Feed Rate {mL per Hour}: 20  Increase Tube Feed Rate by (mL): 10     Every 4 hours  Until Goal Tube Feed Rate (mL per Hour): 50  Tube Feed Duration (in Hours): 24  Tube Feed Start Time: 17:30  Entered: Dec  2 2023  5:08PM  
This patient has been assessed with a concern for Malnutrition and has been determined to have a diagnosis/diagnoses of Severe protein-calorie malnutrition.    This patient is being managed with:   Diet NPO after Midnight-     NPO Start Date: 10-Dec-2023   NPO Start Time: 23:59  Entered: Dec 10 2023 11:29AM    Diet NPO with Tube Feed-  Tube Feeding Modality: Gastrostomy  Glucerna 1.5 [place] (GLUCERNA1.5)  Total Volume for 24 Hours (mL): 1200  Total Number of Cans: 5  Continuous  Starting Tube Feed Rate {mL per Hour}: 10  Increase Tube Feed Rate by (mL): 10     Every 4 hours  Until Goal Tube Feed Rate (mL per Hour): 50  Tube Feed Duration (in Hours): 24  Tube Feed Start Time: 11:00  Entered: Dec  8 2023 10:06AM  
This patient has been assessed with a concern for Malnutrition and has been determined to have a diagnosis/diagnoses of Severe protein-calorie malnutrition.    This patient is being managed with:   Diet NPO with Tube Feed-  Tube Feeding Modality: Gastrostomy  Glucerna 1.5 Se[place] (GLUCERNA1.5)  Total Volume for 24 Hours (mL): 1200  Total Number of Cans: 5  Continuous  Starting Tube Feed Rate {mL per Hour}: 10  Increase Tube Feed Rate by (mL): 10     Every 4 hours  Until Goal Tube Feed Rate (mL per Hour): 50  Tube Feed Duration (in Hours): 24  Tube Feed Start Time: 11:00  Entered: Dec  8 2023 10:06AM  
This patient has been assessed with a concern for Malnutrition and has been determined to have a diagnosis/diagnoses of Severe protein-calorie malnutrition.    This patient is being managed with:   Diet NPO with Tube Feed-  Tube Feeding Modality: Gastrostomy  Glucerna 1.5 Se[place] (GLUCERNA1.5)  Continuous  Starting Tube Feed Rate {mL per Hour}: 20  Increase Tube Feed Rate by (mL): 10     Every 4 hours  Until Goal Tube Feed Rate (mL per Hour): 50  Tube Feed Duration (in Hours): 24  Tube Feed Start Time: 17:30  Entered: Dec  2 2023  5:08PM  
This patient has been assessed with a concern for Malnutrition and has been determined to have a diagnosis/diagnoses of Severe protein-calorie malnutrition.    This patient is being managed with:   Diet NPO after Midnight-     NPO Start Date: 10-Dec-2023   NPO Start Time: 23:59  Entered: Dec 10 2023 11:29AM    Diet NPO with Tube Feed-  Tube Feeding Modality: Gastrostomy  Glucerna 1.5 [place] (GLUCERNA1.5)  Total Volume for 24 Hours (mL): 1200  Total Number of Cans: 5  Continuous  Starting Tube Feed Rate {mL per Hour}: 10  Increase Tube Feed Rate by (mL): 10     Every 4 hours  Until Goal Tube Feed Rate (mL per Hour): 50  Tube Feed Duration (in Hours): 24  Tube Feed Start Time: 11:00  Entered: Dec  8 2023 10:06AM  
This patient has been assessed with a concern for Malnutrition and has been determined to have a diagnosis/diagnoses of Severe protein-calorie malnutrition.    This patient is being managed with:   Diet NPO with Tube Feed-  Tube Feeding Modality: Gastrostomy  Glucerna 1.5 Seamus (GLUCERNA1.5)  Total Volume for 24 Hours (mL): 1200  Total Number of Cans: 5  Continuous  Starting Tube Feed Rate {mL per Hour}: 10  Increase Tube Feed Rate by (mL): 10     Every 4 hours  Until Goal Tube Feed Rate (mL per Hour): 50  Tube Feed Duration (in Hours): 24  Tube Feed Start Time: 11:00  Entered: Dec  8 2023 10:06AM  
This patient has been assessed with a concern for Malnutrition and has been determined to have a diagnosis/diagnoses of Severe protein-calorie malnutrition.    This patient is being managed with:   Diet NPO with Tube Feed-  Tube Feeding Modality: Gastrostomy  Glucerna 1.5 [place]amus (GLUCERNA1.5)  Continuous  Starting Tube Feed Rate {mL per Hour}: 20  Increase Tube Feed Rate by (mL): 10     Every 4 hours  Until Goal Tube Feed Rate (mL per Hour): 50  Tube Feed Duration (in Hours): 24  Tube Feed Start Time: 17:30  Entered: Dec  2 2023  5:08PM  
Severe protein calorie malnutrition:      1. INPATIENT REGIMEN: Recommend continue Glucerna 1.5 @50mL/hr x24hr to provide 1200mL total volume, 1800kcal (29kcal/kg CBW 61.8kg), 99g protein (1.6g/kg CBW 61.8kg), and 911mL free water.   >>Defer free water flushes to team.  >>Monitor GI tolerance & maintain aspiration precautions. RD to remain available to adjust EN regimen prn.   2. DISCHARGE REGIMEN: In setting of of Glucerna formula shortage, recommend Diabetisource AC @65mL/hr x24hr to provide 1560mL total volume, 1872kcal (30kcal/kg CBW 61.8kg), 94g protein (1.5g/kg CBW 61.8kg), and 1273mL free water.   >>Defer free water flushes to team. Consider additional 600mL water to meet fluid needs.   >>Monitor GI tolerance & maintain aspiration precautions. RD to remain available to adjust EN regimen prn.   3. Monitor weight trends, labs, skin integrity, & hydration status.  4. Pain and bowel regimens per team.  5. RD remains available for dietary education/intervention prn

## 2023-12-13 NOTE — PROGRESS NOTE ADULT - PROBLEM SELECTOR PLAN 6
.  Complex medical decision making / symptom management in the setting of advanced illness.    Will continue to follow for ongoing GOC discussion / titration of palliative regimen. Emotional support provided, questions answered.  Active Psychosocial Referrals:  [x]Social Work/Case management [x]PT/OT [x]Chaplaincy [x]Hospice  []Patient/Family Support []Holistic RN []Massage Therapy []Music Therapy []Ethics  Coping: [] well [] with difficulty [] poor coping [x] unable to assess  Support system: [] strong [x] adequate [] inadequate    For new or uncontrolled symptoms, please call Palliative Care at [phone]). The service is available 24/7 (including nights & weekends) to provide symptom management recommendations over the phone as appropriate .  Complex medical decision making / symptom management in the setting of advanced illness.    Will continue to follow for ongoing GOC discussion / titration of palliative regimen. Emotional support provided, questions answered.  Active Psychosocial Referrals:  [x]Social Work/Case management [x]PT/OT [x]Chaplaincy [x]Hospice  []Patient/Family Support []Holistic RN []Massage Therapy []Music Therapy []Ethics  Coping: [] well [] with difficulty [] poor coping [x] unable to assess  Support system: [] strong [x] adequate [] inadequate    For new or uncontrolled symptoms, please call Palliative Care at [phone]). The service is available 24/7 (including nights & weekends) to provide symptom management recommendations over the phone as appropriate

## 2023-12-13 NOTE — PROGRESS NOTE ADULT - SUBJECTIVE AND OBJECTIVE BOX
[facility] [place] [facility] Geriatrics and Palliative Care  Dickson Roberson, Palliative Care Attending  Contact Info: Call [phone] (HEAL Line) or message on Microsoft Teams (Dickson Roberson)    SUBJECTIVE AND OBJECTIVE:  INTERVAL HPI/OVERNIGHT EVENTS: Interval events noted. See patient's PRN use for the past 24hrs noted below. Comprehensive symptom assessment and GOC exploration as noted below. Extensive time spent discussing plan of care with family. No unexpected adverse effects of opiates noted: no sedation/dizziness/nausea.    ALLERGIES:  No Known Allergies    MEDICATIONS  (STANDING):  albuterol    90 MICROgram(s) HFA Inhaler 2 Puff(s) Inhalation every 6 hours  aspirin  chewable 81 milliGRAM(s) Oral every 24 hours  dextrose 5%. 1000 milliLiter(s) (100 mL/Hr) IV Continuous <Continuous>  dextrose 5%. 1000 milliLiter(s) (50 mL/Hr) IV Continuous <Continuous>  dextrose 50% Injectable 12.5 Gram(s) IV Push once  dextrose 50% Injectable 25 Gram(s) IV Push once  dextrose 50% Injectable 25 Gram(s) IV Push once  fentaNYL   Patch  25 MICROgram(s)/Hr 1 Patch Transdermal every 72 hours  FIRST- Mouthwash  BLM 10 milliLiter(s) Swish and Spit daily  glucagon  Injectable 1 milliGRAM(s) IntraMuscular once  influenza  Vaccine (HIGH DOSE) 0.7 milliLiter(s) IntraMuscular once  OLANZapine Injectable 5 milliGRAM(s) IntraMuscular at bedtime  silver sulfADIAZINE 1% Cream 1 Application(s) Topical daily  sodium phosphate 15 milliMole(s)/250 mL IVPB 15 milliMole(s) IV Intermittent once    MEDICATIONS  (PRN):  acetaminophen     Tablet .. 650 milliGRAM(s) Oral every 6 hours PRN Moderate Pain (4 - 6)  dextrose Oral Gel 15 Gram(s) Oral once PRN Blood Glucose LESS THAN 70 milliGRAM(s)/deciliter  glycopyrrolate Injectable 0.4 milliGRAM(s) IV Push four times a day PRN Secretions  HYDROmorphone  Injectable 0.5 milliGRAM(s) IV Push every 2 hours PRN Moderate pain (4-6), Severe pain (7-10), Respiratory rate greater than 22  LORazepam   Injectable 0.5 milliGRAM(s) IV Push daily PRN Anxiety  ondansetron Injectable 4 milliGRAM(s) IV Push every 8 hours PRN Nausea and/or Vomiting      Analgesic Use (Scheduled and PRNs) for past 24 hours:    HYDROmorphone  Injectable   0.5 milliGRAM(s) IV Push (12-13-23 @ 11:49)   0.5 milliGRAM(s) IV Push (12-13-23 @ 04:47)   0.5 milliGRAM(s) IV Push (12-13-23 @ 00:55)   0.5 milliGRAM(s) IV Push (12-12-23 @ 21:29)    OLANZapine Injectable   5 milliGRAM(s) IntraMuscular (12-12-23 @ 21:29)      ITEMS UNCHECKED ARE NOT PRESENT  PRESENT SYMPTOMS/REVIEW OF SYSTEMS: []Unable to obtain due to poor mentation   Source if other than patient:  []Family   []Team         Vital Signs Last 24 Hrs  T(C): 36.6 (13 Dec 2023 14:14), Max: 36.7 (12 Dec 2023 21:36)  T(F): 97.9 (13 Dec 2023 14:14), Max: 98.1 (12 Dec 2023 21:36)  HR: 84 (13 Dec 2023 14:14) (84 - 96)  BP: 135/55 (13 Dec 2023 14:14) (119/60 - 135/55)  BP(mean): --  RR: 16 (13 Dec 2023 14:14) (14 - 20)  SpO2: 95% (13 Dec 2023 14:14) (95% - 95%)    Parameters below as of 13 Dec 2023 14:14  Patient On (Oxygen Delivery Method): room air    LABS: Personally reviewed and interpreted                      9.0    5.44  )-----------( 272      ( 13 Dec 2023 07:25 )             29.3   12-13    142  |  105  |  4<L>  ----------------------------<  140<H>  3.8   |  25  |  0.68    Ca    8.8      13 Dec 2023 07:25  Phos  2.6     12-13  Mg     1.5     12-13  TPro  5.1<L>  /  Alb  2.0<L>  /  TBili  0.3  /  DBili  x   /  AST  14  /  ALT  6<L>  /  AlkPhos  81  12-13    RADIOLOGY & ADDITIONAL STUDIES: Personally reviewed and interpreted  None new    DISCUSSION OF CASE: Family - to provide updates and emotional support; Primary Team/RN - to discuss plan of care [facility] Geriatrics and Palliative Care  Dickson Roberson, Palliative Care Attending  Contact Info: Call [phone] (HEAL Line) or message on Microsoft Teams (Dickson Roberson)    SUBJECTIVE AND OBJECTIVE:  INTERVAL HPI/OVERNIGHT EVENTS: Interval events noted. See patient's PRN use for the past 24hrs noted below. Comprehensive symptom assessment and GOC exploration as noted below. Extensive time spent discussing plan of care with family. No unexpected adverse effects of opiates noted: no sedation/dizziness/nausea.    ALLERGIES:  No Known Allergies    MEDICATIONS  (STANDING):  albuterol    90 MICROgram(s) HFA Inhaler 2 Puff(s) Inhalation every 6 hours  aspirin  chewable 81 milliGRAM(s) Oral every 24 hours  dextrose 5%. 1000 milliLiter(s) (100 mL/Hr) IV Continuous <Continuous>  dextrose 5%. 1000 milliLiter(s) (50 mL/Hr) IV Continuous <Continuous>  dextrose 50% Injectable 12.5 Gram(s) IV Push once  dextrose 50% Injectable 25 Gram(s) IV Push once  dextrose 50% Injectable 25 Gram(s) IV Push once  fentaNYL   Patch  25 MICROgram(s)/Hr 1 Patch Transdermal every 72 hours  FIRST- Mouthwash  BLM 10 milliLiter(s) Swish and Spit daily  glucagon  Injectable 1 milliGRAM(s) IntraMuscular once  influenza  Vaccine (HIGH DOSE) 0.7 milliLiter(s) IntraMuscular once  OLANZapine Injectable 5 milliGRAM(s) IntraMuscular at bedtime  silver sulfADIAZINE 1% Cream 1 Application(s) Topical daily  sodium phosphate 15 milliMole(s)/250 mL IVPB 15 milliMole(s) IV Intermittent once    MEDICATIONS  (PRN):  acetaminophen     Tablet .. 650 milliGRAM(s) Oral every 6 hours PRN Moderate Pain (4 - 6)  dextrose Oral Gel 15 Gram(s) Oral once PRN Blood Glucose LESS THAN 70 milliGRAM(s)/deciliter  glycopyrrolate Injectable 0.4 milliGRAM(s) IV Push four times a day PRN Secretions  HYDROmorphone  Injectable 0.5 milliGRAM(s) IV Push every 2 hours PRN Moderate pain (4-6), Severe pain (7-10), Respiratory rate greater than 22  LORazepam   Injectable 0.5 milliGRAM(s) IV Push daily PRN Anxiety  ondansetron Injectable 4 milliGRAM(s) IV Push every 8 hours PRN Nausea and/or Vomiting      Analgesic Use (Scheduled and PRNs) for past 24 hours:    HYDROmorphone  Injectable   0.5 milliGRAM(s) IV Push (12-13-23 @ 11:49)   0.5 milliGRAM(s) IV Push (12-13-23 @ 04:47)   0.5 milliGRAM(s) IV Push (12-13-23 @ 00:55)   0.5 milliGRAM(s) IV Push (12-12-23 @ 21:29)    OLANZapine Injectable   5 milliGRAM(s) IntraMuscular (12-12-23 @ 21:29)      ITEMS UNCHECKED ARE NOT PRESENT  PRESENT SYMPTOMS/REVIEW OF SYSTEMS: []Unable to obtain due to poor mentation   Source if other than patient:  []Family   []Team         Vital Signs Last 24 Hrs  T(C): 36.6 (13 Dec 2023 14:14), Max: 36.7 (12 Dec 2023 21:36)  T(F): 97.9 (13 Dec 2023 14:14), Max: 98.1 (12 Dec 2023 21:36)  HR: 84 (13 Dec 2023 14:14) (84 - 96)  BP: 135/55 (13 Dec 2023 14:14) (119/60 - 135/55)  BP(mean): --  RR: 16 (13 Dec 2023 14:14) (14 - 20)  SpO2: 95% (13 Dec 2023 14:14) (95% - 95%)    Parameters below as of 13 Dec 2023 14:14  Patient On (Oxygen Delivery Method): room air    LABS: Personally reviewed and interpreted                      9.0    5.44  )-----------( 272      ( 13 Dec 2023 07:25 )             29.3   12-13    142  |  105  |  4<L>  ----------------------------<  140<H>  3.8   |  25  |  0.68    Ca    8.8      13 Dec 2023 07:25  Phos  2.6     12-13  Mg     1.5     12-13  TPro  5.1<L>  /  Alb  2.0<L>  /  TBili  0.3  /  DBili  x   /  AST  14  /  ALT  6<L>  /  AlkPhos  81  12-13    RADIOLOGY & ADDITIONAL STUDIES: Personally reviewed and interpreted  None new    DISCUSSION OF CASE: Family - to provide updates and emotional support; Primary Team/RN - to discuss plan of care [facility] Geriatrics and Palliative Care  Dickson Roberson, Palliative Care Attending  Contact Info: Call [phone] (HEAL Line) or message on Microsoft Teams (Dickson Roberson)    SUBJECTIVE AND OBJECTIVE:  INTERVAL HPI/OVERNIGHT EVENTS: Interval events noted. Family in agreement with inpatient hospice. Patient unable to participate in interview and frequent medications for symptom control. See patient's PRN use for the past 24hrs noted below. Comprehensive symptom assessment and GOC exploration as noted below. Extensive time spent discussing plan of care with family. No unexpected adverse effects of opiates noted: no sedation/dizziness/nausea.    ALLERGIES:  No Known Allergies    MEDICATIONS  (STANDING):  albuterol    90 MICROgram(s) HFA Inhaler 2 Puff(s) Inhalation every 6 hours  aspirin  chewable 81 milliGRAM(s) Oral every 24 hours  dextrose 5%. 1000 milliLiter(s) (100 mL/Hr) IV Continuous <Continuous>  dextrose 5%. 1000 milliLiter(s) (50 mL/Hr) IV Continuous <Continuous>  dextrose 50% Injectable 12.5 Gram(s) IV Push once  dextrose 50% Injectable 25 Gram(s) IV Push once  dextrose 50% Injectable 25 Gram(s) IV Push once  fentaNYL   Patch  25 MICROgram(s)/Hr 1 Patch Transdermal every 72 hours  FIRST- Mouthwash  BLM 10 milliLiter(s) Swish and Spit daily  glucagon  Injectable 1 milliGRAM(s) IntraMuscular once  influenza  Vaccine (HIGH DOSE) 0.7 milliLiter(s) IntraMuscular once  OLANZapine Injectable 5 milliGRAM(s) IntraMuscular at bedtime  silver sulfADIAZINE 1% Cream 1 Application(s) Topical daily  sodium phosphate 15 milliMole(s)/250 mL IVPB 15 milliMole(s) IV Intermittent once    MEDICATIONS  (PRN):  acetaminophen     Tablet .. 650 milliGRAM(s) Oral every 6 hours PRN Moderate Pain (4 - 6)  dextrose Oral Gel 15 Gram(s) Oral once PRN Blood Glucose LESS THAN 70 milliGRAM(s)/deciliter  glycopyrrolate Injectable 0.4 milliGRAM(s) IV Push four times a day PRN Secretions  HYDROmorphone  Injectable 0.5 milliGRAM(s) IV Push every 2 hours PRN Moderate pain (4-6), Severe pain (7-10), Respiratory rate greater than 22  LORazepam   Injectable 0.5 milliGRAM(s) IV Push daily PRN Anxiety  ondansetron Injectable 4 milliGRAM(s) IV Push every 8 hours PRN Nausea and/or Vomiting      Analgesic Use (Scheduled and PRNs) for past 24 hours:    HYDROmorphone  Injectable   0.5 milliGRAM(s) IV Push (12-13-23 @ 11:49)   0.5 milliGRAM(s) IV Push (12-13-23 @ 04:47)   0.5 milliGRAM(s) IV Push (12-13-23 @ 00:55)   0.5 milliGRAM(s) IV Push (12-12-23 @ 21:29)    OLANZapine Injectable   5 milliGRAM(s) IntraMuscular (12-12-23 @ 21:29)      ITEMS UNCHECKED ARE NOT PRESENT  PRESENT SYMPTOMS: [x]Unable to obtain due to poor mentation/encephalopathy  Source if other than patient:  []Family   []Team     Pain: [x] yes [] no - see PAINAD  QOL impact -   Location -                    Aggravating Factors -  Quality -  Radiation -  Timing -  Severity (0-10 scale) -   Minimal Acceptable Level (0-10 scale) -    PAIN AD Score: 4  (Nonverbal Pain Assessment)    Dyspnea:                           []Mild  []Moderate []Severe  Anxiety:                             []Mild []Moderate []Severe  Fatigue:                             []Mild []Moderate []Severe  Nausea:                             []Mild []Moderate []Severe  Loss of Appetite:              []Mild []Moderate []Severe  Constipation:                    []Mild []Moderate []Severe    Other Symptoms:  [x]All other review of systems negative     Palliative Performance Status Version 2:  20%  (Functional Assessment Tool)    GENERAL:  [] NAD []Alert [x]Lethargic  []Cachexia  []Unarousable  [x]Minimally Verbal  []Non-Verbal  BEHAVIORAL:   []Anxiety  [x]Delirium []Agitation []Cooperative []Oriented x  HEENT:  []Normal  [] Moist Mucous Membranes [x]Dry mouth   []ET Tube/Trach  []Oral lesions  PULMONARY:   []Clear []Tachypnea  []Audible excessive secretions  [x]Normal Work of Breathing []Labored Breathing  [x]Rhonchi []Crackles []Wheezing  CARDIOVASCULAR:    [x]Regular Rate [x]Regular Rhythm []Irregular []Tachy  []Yaya  GASTROINTESTINAL:  [x]Soft  [x]Distended   []+BS  [x]Non tender []Tender  [x]PEG []OGT/ NGT  Last BM:  GENITOURINARY:  []Normal [x] Incontinent   []Oliguria/Anuria   []Paige  MUSCULOSKELETAL:   []Normal Extremities  [x]Weakness  [x]Bed/Wheelchair bound []Edema  NEUROLOGIC:   []No focal deficits  [x]Cognitive impairment  [x]Dysphagia []Dysarthria []Paresis [x]Encephalopathic  SKIN:   []Normal   []Pressure ulcer(s)  [x]Rash    Vital Signs Last 24 Hrs  T(C): 36.6 (13 Dec 2023 14:14), Max: 36.7 (12 Dec 2023 21:36)  T(F): 97.9 (13 Dec 2023 14:14), Max: 98.1 (12 Dec 2023 21:36)  HR: 84 (13 Dec 2023 14:14) (84 - 96)  BP: 135/55 (13 Dec 2023 14:14) (119/60 - 135/55)  BP(mean): --  RR: 16 (13 Dec 2023 14:14) (14 - 20)  SpO2: 95% (13 Dec 2023 14:14) (95% - 95%)    Parameters below as of 13 Dec 2023 14:14  Patient On (Oxygen Delivery Method): room air    LABS: Personally reviewed and interpreted                      9.0    5.44  )-----------( 272      ( 13 Dec 2023 07:25 )             29.3   12-13    142  |  105  |  4<L>  ----------------------------<  140<H>  3.8   |  25  |  0.68    Ca    8.8      13 Dec 2023 07:25  Phos  2.6     12-13  Mg     1.5     12-13  TPro  5.1<L>  /  Alb  2.0<L>  /  TBili  0.3  /  DBili  x   /  AST  14  /  ALT  6<L>  /  AlkPhos  81  12-13    RADIOLOGY & ADDITIONAL STUDIES: Personally reviewed and interpreted  None new    DISCUSSION OF CASE: Family - to provide updates and emotional support; Primary Team/RN - to discuss plan of care [facility] Geriatrics and Palliative Care  Dickson Roberson, Palliative Care Attending  Contact Info: Call [phone] (HEAL Line) or message on Microsoft Teams (Dickson Roberson)    SUBJECTIVE AND OBJECTIVE:  INTERVAL HPI/OVERNIGHT EVENTS: Interval events noted. Family in agreement with inpatient hospice. Patient unable to participate in interview and frequent medications for symptom control. See patient's PRN use for the past 24hrs noted below. Comprehensive symptom assessment and GOC exploration as noted below. Extensive time spent discussing plan of care with family. No unexpected adverse effects of opiates noted: no sedation/dizziness/nausea.    ALLERGIES:  No Known Allergies    MEDICATIONS  (STANDING):  albuterol    90 MICROgram(s) HFA Inhaler 2 Puff(s) Inhalation every 6 hours  aspirin  chewable 81 milliGRAM(s) Oral every 24 hours  dextrose 5%. 1000 milliLiter(s) (100 mL/Hr) IV Continuous <Continuous>  dextrose 5%. 1000 milliLiter(s) (50 mL/Hr) IV Continuous <Continuous>  dextrose 50% Injectable 12.5 Gram(s) IV Push once  dextrose 50% Injectable 25 Gram(s) IV Push once  dextrose 50% Injectable 25 Gram(s) IV Push once  fentaNYL   Patch  25 MICROgram(s)/Hr 1 Patch Transdermal every 72 hours  FIRST- Mouthwash  BLM 10 milliLiter(s) Swish and Spit daily  glucagon  Injectable 1 milliGRAM(s) IntraMuscular once  influenza  Vaccine (HIGH DOSE) 0.7 milliLiter(s) IntraMuscular once  OLANZapine Injectable 5 milliGRAM(s) IntraMuscular at bedtime  silver sulfADIAZINE 1% Cream 1 Application(s) Topical daily  sodium phosphate 15 milliMole(s)/250 mL IVPB 15 milliMole(s) IV Intermittent once    MEDICATIONS  (PRN):  acetaminophen     Tablet .. 650 milliGRAM(s) Oral every 6 hours PRN Moderate Pain (4 - 6)  dextrose Oral Gel 15 Gram(s) Oral once PRN Blood Glucose LESS THAN 70 milliGRAM(s)/deciliter  glycopyrrolate Injectable 0.4 milliGRAM(s) IV Push four times a day PRN Secretions  HYDROmorphone  Injectable 0.5 milliGRAM(s) IV Push every 2 hours PRN Moderate pain (4-6), Severe pain (7-10), Respiratory rate greater than 22  LORazepam   Injectable 0.5 milliGRAM(s) IV Push daily PRN Anxiety  ondansetron Injectable 4 milliGRAM(s) IV Push every 8 hours PRN Nausea and/or Vomiting      Analgesic Use (Scheduled and PRNs) for past 24 hours:    HYDROmorphone  Injectable   0.5 milliGRAM(s) IV Push (12-13-23 @ 11:49)   0.5 milliGRAM(s) IV Push (12-13-23 @ 04:47)   0.5 milliGRAM(s) IV Push (12-13-23 @ 00:55)   0.5 milliGRAM(s) IV Push (12-12-23 @ 21:29)    OLANZapine Injectable   5 milliGRAM(s) IntraMuscular (12-12-23 @ 21:29)      ITEMS UNCHECKED ARE NOT PRESENT  PRESENT SYMPTOMS: [x]Unable to obtain due to poor mentation/encephalopathy  Source if other than patient:  []Family   []Team     Pain: [x] yes [] no - see PAINAD  QOL impact -   Location -                    Aggravating Factors -  Quality -  Radiation -  Timing -  Severity (0-10 scale) -   Minimal Acceptable Level (0-10 scale) -    PAIN AD Score: 4  (Nonverbal Pain Assessment)    Dyspnea:                           []Mild  []Moderate []Severe  Anxiety:                             []Mild []Moderate []Severe  Fatigue:                             []Mild []Moderate []Severe  Nausea:                             []Mild []Moderate []Severe  Loss of Appetite:              []Mild []Moderate []Severe  Constipation:                    []Mild []Moderate []Severe    Other Symptoms:  [x]All other review of systems negative     Palliative Performance Status Version 2:  20%  (Functional Assessment Tool)    GENERAL:  [] NAD []Alert [x]Lethargic  []Cachexia  []Unarousable  [x]Minimally Verbal  []Non-Verbal  BEHAVIORAL:   []Anxiety  [x]Delirium []Agitation []Cooperative []Oriented x  HEENT:  []Normal  [] Moist Mucous Membranes [x]Dry mouth   []ET Tube/Trach  []Oral lesions  PULMONARY:   []Clear []Tachypnea  []Audible excessive secretions  [x]Normal Work of Breathing []Labored Breathing  [x]Rhonchi []Crackles []Wheezing  CARDIOVASCULAR:    [x]Regular Rate [x]Regular Rhythm []Irregular []Tachy  []Yaya  GASTROINTESTINAL:  [x]Soft  [x]Distended   []+BS  [x]Non tender []Tender  [x]PEG []OGT/ NGT  Last BM:  GENITOURINARY:  []Normal [x] Incontinent   []Oliguria/Anuria   []Paige  MUSCULOSKELETAL:   []Normal Extremities  [x]Weakness  [x]Bed/Wheelchair bound []Edema  NEUROLOGIC:   []No focal deficits  [x]Cognitive impairment  [x]Dysphagia []Dysarthria []Paresis [x]Encephalopathic  SKIN:   []Normal   []Pressure ulcer(s)  [x]Rash    Vital Signs Last 24 Hrs  T(C): 36.6 (13 Dec 2023 14:14), Max: 36.7 (12 Dec 2023 21:36)  T(F): 97.9 (13 Dec 2023 14:14), Max: 98.1 (12 Dec 2023 21:36)  HR: 84 (13 Dec 2023 14:14) (84 - 96)  BP: 135/55 (13 Dec 2023 14:14) (119/60 - 135/55)  BP(mean): --  RR: 16 (13 Dec 2023 14:14) (14 - 20)  SpO2: 95% (13 Dec 2023 14:14) (95% - 95%)    Parameters below as of 13 Dec 2023 14:14  Patient On (Oxygen Delivery Method): room air    LABS: Personally reviewed and interpreted                      9.0    5.44  )-----------( 272      ( 13 Dec 2023 07:25 )             29.3   12-13    142  |  105  |  4<L>  ----------------------------<  140<H>  3.8   |  25  |  0.68    Ca    8.8      13 Dec 2023 07:25  Phos  2.6     12-13  Mg     1.5     12-13  TPro  5.1<L>  /  Alb  2.0<L>  /  TBili  0.3  /  DBili  x   /  AST  14  /  ALT  6<L>  /  AlkPhos  81  12-13    RADIOLOGY & ADDITIONAL STUDIES: Personally reviewed and interpreted  None new    DISCUSSION OF CASE: Family - to provide updates and emotional support; Primary Team/RN - to discuss plan of care

## 2023-12-13 NOTE — PROGRESS NOTE ADULT - ASSESSMENT
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 Marr/Dr. Gar),  s/p PEG 11/14 was recently admitted to [facility] for increase need of care at home in which course c/b disloged PEG, uncomplicated IR replacement 11/30. 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 Marr/Dr. Gar),  s/p PEG 11/14 was recently admitted to [facility] [place] for increase need of care at home in which course c/b disloged PEG, uncomplicated IR replacement 11/30. TF were restarted on 12/01 and patient was medically cleared for SOFI on dispo however now returned for re-placement

## 2023-12-13 NOTE — PROGRESS NOTE ADULT - PROVIDER SPECIALTY LIST ADULT
Internal Medicine
Palliative Care
Palliative Care
Hospitalist
Internal Medicine

## 2023-12-13 NOTE — PROGRESS NOTE ADULT - PROBLEM SELECTOR PLAN 2
Aspiration and GOC as above   Pain regimen:   -Dilaudid 0.5mg IV q2h PRN for Moderate/Severe Pain or RR>22  -Ativan 0.5mg IV q4h PRN for Anxiety/Agitation  -Robinul 0.4mg IV q6h PRN for Excessive Secretions  -Fentanyl Patch 225mcg/hr q72hr. Aspiration and GOC as above   Pain regimen:   - Dilaudid 0.5mg IV q2h PRN for Moderate/Severe Pain or RR>22  - Ativan 0.5mg IV q4h PRN for Anxiety/Agitation  - Robinul 0.4mg IV q6h PRN for Excessive Secretions  - Fentanyl Patch 225mcg/hr q72hr.

## 2023-12-13 NOTE — PROGRESS NOTE ADULT - PROBLEM SELECTOR PLAN 5
F: none  E: replete prn  N: NPO  DVT ppx: lovenox 40mg qd  Dispo: SOFI F: none  E: replete prn  N: NPO  DVT ppx: Lovenox 40mg qd  Dispo: SOFI

## 2023-12-13 NOTE — PROGRESS NOTE ADULT - PROBLEM SELECTOR PLAN 3
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 C/o odynophagia causing poor PO intake. PEG placed 11/14 i/s/o ongoing PO intake 2/2 odynophagia, likely i/s/o cancer tx. Pt previously on fluconazole for candidiasis esophagitis ppx but d/c by Dr. Marr for lack of improvement.   12/9- patient pulled out PEG tube overnight   -held feeds given worsening distension, IR will not place PEG again given repetaed removal by patient

## 2023-12-13 NOTE — PROGRESS NOTE ADULT - NS ATTEST RISK PROBLEM GEN_ALL_CORE FT
Acute Illness That Poses A Threat To Life  Abrupt Change In Neurological Status  Decision Made To De-escalate Care Due To Poor Prognosis  Prescription Of Parenteral Controlled Substances
Acute Illness That Poses A Threat To Life  Abrupt Change In Neurological Status  Decision Made To De-escalate Care Due To Poor Prognosis  Prescription Of Parenteral Controlled Substances
Patient seen and examined, discussed with housestaff and consulting services.   Blood work results and radiographic data is reviewed and discussed with the team.   Medical Decision Making is  high complexity due to: Severe sepsis due to aspiration PNA, Ileus

## 2023-12-13 NOTE — PROGRESS NOTE ADULT - PROBLEM SELECTOR PLAN 1
Ongoing GOC conversations held with family after rapid response was called during admission in which patient had aspirated prompting fluid resuscitation and abx treatment   - patient continues to be DNR/DNI, now MEWS exempt   - family wishes to pursue symptom directed care but continue with Abx for management of infection   - no escalation of care is to be had to ICU for pressors   - Family attempting pleasure feeds on 12/11, educated on risk of aspiration if pursuing this   - Leonel Bowman DC  12/12 Ongoing GOC conversations held with family after rapid response was called during admission in which patient had aspirated prompting fluid resuscitation and abx treatment   - patient continues to be DNR/DNI, now MEWS exempt   - family wishes to pursue symptom directed care but continue with Abx for management of infection   - no escalation of care is to be had to ICU for pressors   - Family attempting pleasure feeds on 12/11, educated on risk of aspiration if pursuing this   - s/p Vanc and Zosyn. DC on 12/12  - may continue with comfort measures pending palliative & family GOC conversation

## 2023-12-13 NOTE — PROGRESS NOTE ADULT - PROBLEM SELECTOR PLAN 5
.  Patient is DNR/DNI, MOLST in chart  -family is acting collectively to make decisions but patient appointed granddaughter (Yvrose Luis, [phone]) as alternate decision maker during previous hospitalizatio .  Patient is DNR/DNI, MOLST in chart  -family is acting collectively to make decisions but patient appointed granddaughter (Yvrose Luis, [phone]) as alternate decision maker during previous hospitalizatio

## 2023-12-13 NOTE — PROGRESS NOTE ADULT - ATTENDING COMMENTS
removed PEG Tube on 12/10   awaiting GOC discussion with Family   Allow comfort feeds as family want patient to eat despite the risk of aspiration   discussed with Palliative care team

## 2023-12-14 NOTE — H&P ADULT - ASSESSMENT
78 M with advanced head and neck cancer, increased oropharyngeal secretions, respiratory failure, functional quadriplegia, encounter for palliative care.

## 2023-12-14 NOTE — DISCHARGE NOTE NURSING/CASE MANAGEMENT/SOCIAL WORK - NSDCPEFALRISK_GEN_ALL_CORE
For information on Fall & Injury Prevention, visit: [email or web address] OR  [email or web address] OR  https://www.cdc.gov/steadi/patient.html For information on Fall & Injury Prevention, visit: [email or web address] OR  [email or web address] OR  https://www.cdc.gov/steadi/patient.html

## 2023-12-14 NOTE — H&P ADULT - HISTORY OF PRESENT ILLNESS
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 Marr/Dr. Gar),  s/p PEG 11/14 was recently admitted to [facility] for increase need of care at home in which course c/b disloged PEG. Patient self removed his PEG. Decision made to not replace and focus on comfort care. Increasing end of life symptoms, secretions, respiratory distress and pain.    []Pain/Dyspnea managed by hourly monitoring and titration of (medication)  [x]Pain/Dyspnea improved as a result of aggressive titration of (medication)  []GIP to manage uncontrolled pain/dyspnea and continuous titrations of (medication)  []Requires frequent skilled nursing assessment for non-verbal signs of pain/dyspnea/agitation through grimacing, groaning, tachynea, writhing, rigidity  []Effectiveness of pain/tachypnea management is continuously reevaluated hourly to acheive maximal comfort    Comprehensive symptom assessment and justification of GIP level of care as noted. Patient continues to require symptom monitoring through multiple daily bedside assessments and daily intervention through the admistration of PRN medications and adjustment of scheduled opiates/opiate infusion. See patient's PRN use for the past 24hrs noted below. Extensive time spent discussing care plan with family. No unexpected adverse effects of opiates noted. Plan of care discussed collaboratively with Hospice and Facility staff.    PERTINENT PM/SXH:   HTN (hypertension)    HLD (hyperlipidemia)    DM (diabetes mellitus)    CAD (coronary artery disease)    Glaucoma    PVD (peripheral vascular disease)    Laryngeal cancer      No significant past surgical history    S/P percutaneous endoscopic gastrostomy (PEG) tube placement      FAMILY HISTORY:  No history of  in first degree relatives according to chart  Unable to obtain due to patient's encephalopathy    ITEMS NOT CHECKED ARE NOT PRESENT  SOCIAL HISTORY:   Significant other/partner:  [x]  Children:  [x]   Substance hx:  []   Tobacco hx:  []   Alcohol hx: []  Living Situation: [x]Home  []Long term care  []Rehab []Other  Orthodoxy/Spiritual practice: ; Role of organized Sabianist [ ] important [ ] some [ x] unable to assess  Coping: [] well [] with difficulty [] poor coping [x] unable to assess  Support system: [] strong [] adequate [] inadequate    ADVANCE DIRECTIVES:    [x]MOLST: DNR/DNI  DECISION MAKER(s):  [] Health Care Proxy(s)  [] Surrogate(s)  [] Guardian           Name(s)/Phone Number(s): Yvrose Smith (child)    ALLERGIES:  No Known Allergies    MEDICATIONS  (STANDING):  HYDROmorphone  Injectable 0.5 milliGRAM(s) IV Push every 4 hours  scopolamine 1 mG/72 Hr(s) Patch 1 Patch Transdermal every 72 hours    MEDICATIONS  (PRN):  bisacodyl Suppository 10 milliGRAM(s) Rectal daily PRN Constipation  glycopyrrolate Injectable 0.4 milliGRAM(s) IV Push four times a day PRN Secretions  HYDROmorphone  Injectable 0.5 milliGRAM(s) IV Push every 2 hours PRN Moderate pain (4-6), Severe pain (7-10), Respiratory rate greater than 22  LORazepam   Injectable 0.5 milliGRAM(s) IV Push every 4 hours PRN Anxiety  ondansetron Injectable 4 milliGRAM(s) IV Push every 6 hours PRN Nausea and/or Vomiting    Analgesic Use (Scheduled and PRNs) for past 24 hours:      PRESENT SYMPTOMS/REVIEW OF SYSTEMS: []Unable to obtain due to poor mentation/encephalopathy  Source if other than patient:  [x]Family   []Team     Pain: [] yes [x] no - see PAINAD  QOL Impact -   Location -                    Aggravating Factors -  Quality -  Radiation -  Timing -  Severity (0-10 scale) -   Minimal Acceptable Level (0-10 scale) -    PAIN AD Score:  (Nonverbal Pain Assessment Scale)    Dyspnea:                           []Mild  []Moderate []Severe  Anxiety:                             []Mild []Moderate []Severe  Fatigue:                             []Mild []Moderate []Severe  Nausea:                             []Mild []Moderate []Severe  Loss of Appetite:              []Mild []Moderate []Severe  Constipation:                    []Mild []Moderate []Severe    Other Symptoms:  [x]All Other Review Of Systems Negative     Palliative Performance Status Version 2:  20%    PHYSICAL EXAM:  GENERAL:  [] NAD []Alert [x]Lethargic  []Cachexia  [x]Unarousable  []Verbal  []Non-Verbal  Behavioral:   []Anxiety  [x]Delirium []Agitation []Cooperative []Oriented x  HEENT:  []Normal  [] Moist Mucous Membranes [x]Dry mouth   []ET Tube/Trach  []Oral lesions  PULMONARY:   []Clear []Tachypnea  [x]Audible excessive secretions  []Normal Work of Breathing [x]Labored Breathing  []Rhonchi []Crackles []Wheezing  CARDIOVASCULAR:    [x]Regular Rate []Regular Rhythm []Irregular []Tachy  []Yaya  GASTROINTESTINAL:  [x]Soft  []Distended   [x]+BS  [x]Non tender []Tender  []PEG []OGT/ NGT  Last BM:  GENITOURINARY:  []Normal [] Incontinent   []Oliguria/Anuria   [x]Paige  MUSCULOSKELETAL:   []Normal Extremities  []Weakness  [x]Bed/Wheelchair bound []Edema  NEUROLOGIC:   []No focal deficits  [x]Cognitive impairment  []Dysphagia []Dysarthria []Paresis []Encephalopathic  SKIN:   []Normal   []Pressure ulcer(s)  [x}Rash    Vital Signs Last 24 Hrs  T(C): 36.8 (14 Dec 2023 06:30), Max: 36.8 (13 Dec 2023 21:17)  T(F): 98.3 (14 Dec 2023 06:30), Max: 98.3 (14 Dec 2023 06:30)  HR: 85 (14 Dec 2023 06:30) (76 - 85)  BP: 156/69 (14 Dec 2023 06:30) (138/58 - 156/69)  BP(mean): 98 (14 Dec 2023 06:30) (98 - 98)  RR: 17 (14 Dec 2023 06:30) (16 - 17)  SpO2: 97% (14 Dec 2023 06:30) (96% - 97%)        LABS:                        9.0    5.44  )-----------( 272      ( 13 Dec 2023 07:25 )             29.3   12-13    142  |  105  |  4<L>  ----------------------------<  140<H>  3.8   |  25  |  0.68    Ca    8.8      13 Dec 2023 07:25  Phos  2.6     12-13  Mg     1.5     12-13    TPro  5.1<L>  /  Alb  2.0<L>  /  TBili  0.3  /  DBili  x   /  AST  14  /  ALT  6<L>  /  AlkPhos  81  12-13    RADIOLOGY & ADDITIONAL STUDIES:    REFERRALS: [x]Hospice [x]Social Work  []Chaplaincy  []Patient/Family Support []Massage Therapy []Music Therapy    DISCUSSION OF CASE: Family - to obtain additional history and to provide emotional support; Hospice Liasion - to discuss plan of care; RN - to discuss symptom burden and regimen adjustment Hospice GIP Admission  [facility] Geriatrics and Palliative Care / [facility]  Contact Info: Call [facility] at [phone] or [facility] at [phone] for symptom management or to request interdiscplinary team intervention (RN/CYNTHIA, BIMAL, ) for patient/family    SYMPTOMS REQUIRING GIP LEVEL OF CARE:  [x ]Pain  [ x]Dyspnea  [ ]Anxiety/Agitation  [ ]Nausea  [ ]Active Seizure    HPI:  88M, history of CAD, PVD, DM, 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 Marr/Dr. Gar),  s/p PEG 11/14 was recently admitted to [facility] for increase need of care at home in which course c/b disloged PEG. Patient self removed his PEG. Decision made to not replace and focus on comfort care. Increasing end of life symptoms, secretions, respiratory distress and pain.    []Pain/Dyspnea managed by hourly monitoring and titration of (medication)  [x]Pain/Dyspnea improved as a result of aggressive titration of (medication)  []GIP to manage uncontrolled pain/dyspnea and continuous titrations of (medication)  []Requires frequent skilled nursing assessment for non-verbal signs of pain/dyspnea/agitation through grimacing, groaning, tachynea, writhing, rigidity  []Effectiveness of pain/tachypnea management is continuously reevaluated hourly to acheive maximal comfort    Comprehensive symptom assessment and justification of GIP level of care as noted. Patient continues to require symptom monitoring through multiple daily bedside assessments and daily intervention through the admistration of PRN medications and adjustment of scheduled opiates/opiate infusion. See patient's PRN use for the past 24hrs noted below. Extensive time spent discussing care plan with family. No unexpected adverse effects of opiates noted. Plan of care discussed collaboratively with Hospice and Facility staff.    PERTINENT PM/SXH:   HTN (hypertension)    HLD (hyperlipidemia)    DM (diabetes mellitus)    CAD (coronary artery disease)    Glaucoma    PVD (peripheral vascular disease)    Laryngeal cancer      No significant past surgical history    S/P percutaneous endoscopic gastrostomy (PEG) tube placement      FAMILY HISTORY:  No history of  in first degree relatives according to chart  Unable to obtain due to patient's encephalopathy    ITEMS NOT CHECKED ARE NOT PRESENT  SOCIAL HISTORY:   Significant other/partner:  [x]  Children:  [x]   Substance hx:  []   Tobacco hx:  []   Alcohol hx: []  Living Situation: [x]Home  []Long term care  []Rehab []Other  Advent/Spiritual practice: ; Role of organized Bahai [ ] important [ ] some [ x] unable to assess  Coping: [] well [] with difficulty [] poor coping [x] unable to assess  Support system: [] strong [] adequate [] inadequate    ADVANCE DIRECTIVES:    [x]MOLST: DNR/DNI  DECISION MAKER(s):  [] Health Care Proxy(s)  [] Surrogate(s)  [] Guardian           Name(s)/Phone Number(s): Yvrose Smith (child)    ALLERGIES:  No Known Allergies    MEDICATIONS  (STANDING):  HYDROmorphone  Injectable 0.5 milliGRAM(s) IV Push every 4 hours  scopolamine 1 mG/72 Hr(s) Patch 1 Patch Transdermal every 72 hours    MEDICATIONS  (PRN):  bisacodyl Suppository 10 milliGRAM(s) Rectal daily PRN Constipation  glycopyrrolate Injectable 0.4 milliGRAM(s) IV Push four times a day PRN Secretions  HYDROmorphone  Injectable 0.5 milliGRAM(s) IV Push every 2 hours PRN Moderate pain (4-6), Severe pain (7-10), Respiratory rate greater than 22  LORazepam   Injectable 0.5 milliGRAM(s) IV Push every 4 hours PRN Anxiety  ondansetron Injectable 4 milliGRAM(s) IV Push every 6 hours PRN Nausea and/or Vomiting    Analgesic Use (Scheduled and PRNs) for past 24 hours:      PRESENT SYMPTOMS/REVIEW OF SYSTEMS: []Unable to obtain due to poor mentation/encephalopathy  Source if other than patient:  [x]Family   []Team     Pain: [] yes [x] no - see PAINAD  QOL Impact -   Location -                    Aggravating Factors -  Quality -  Radiation -  Timing -  Severity (0-10 scale) -   Minimal Acceptable Level (0-10 scale) -    PAIN AD Score:  (Nonverbal Pain Assessment Scale)    Dyspnea:                           []Mild  []Moderate []Severe  Anxiety:                             []Mild []Moderate []Severe  Fatigue:                             []Mild []Moderate []Severe  Nausea:                             []Mild []Moderate []Severe  Loss of Appetite:              []Mild []Moderate []Severe  Constipation:                    []Mild []Moderate []Severe    Other Symptoms:  [x]All Other Review Of Systems Negative     Palliative Performance Status Version 2:  20%    PHYSICAL EXAM:  GENERAL:  [] NAD []Alert [x]Lethargic  []Cachexia  [x]Unarousable  []Verbal  []Non-Verbal  Behavioral:   []Anxiety  [x]Delirium []Agitation []Cooperative []Oriented x  HEENT:  []Normal  [] Moist Mucous Membranes [x]Dry mouth   []ET Tube/Trach  []Oral lesions  PULMONARY:   []Clear []Tachypnea  [x]Audible excessive secretions  []Normal Work of Breathing [x]Labored Breathing  []Rhonchi []Crackles []Wheezing  CARDIOVASCULAR:    [x]Regular Rate []Regular Rhythm []Irregular []Tachy  []Yaya  GASTROINTESTINAL:  [x]Soft  []Distended   [x]+BS  [x]Non tender []Tender  []PEG []OGT/ NGT  Last BM:  GENITOURINARY:  []Normal [] Incontinent   []Oliguria/Anuria   [x]Paige  MUSCULOSKELETAL:   []Normal Extremities  []Weakness  [x]Bed/Wheelchair bound []Edema  NEUROLOGIC:   []No focal deficits  [x]Cognitive impairment  []Dysphagia []Dysarthria []Paresis []Encephalopathic  SKIN:   []Normal   []Pressure ulcer(s)  [x}Rash    Vital Signs Last 24 Hrs  T(C): 36.8 (14 Dec 2023 06:30), Max: 36.8 (13 Dec 2023 21:17)  T(F): 98.3 (14 Dec 2023 06:30), Max: 98.3 (14 Dec 2023 06:30)  HR: 85 (14 Dec 2023 06:30) (76 - 85)  BP: 156/69 (14 Dec 2023 06:30) (138/58 - 156/69)  BP(mean): 98 (14 Dec 2023 06:30) (98 - 98)  RR: 17 (14 Dec 2023 06:30) (16 - 17)  SpO2: 97% (14 Dec 2023 06:30) (96% - 97%)        LABS:                        9.0    5.44  )-----------( 272      ( 13 Dec 2023 07:25 )             29.3   12-13    142  |  105  |  4<L>  ----------------------------<  140<H>  3.8   |  25  |  0.68    Ca    8.8      13 Dec 2023 07:25  Phos  2.6     12-13  Mg     1.5     12-13    TPro  5.1<L>  /  Alb  2.0<L>  /  TBili  0.3  /  DBili  x   /  AST  14  /  ALT  6<L>  /  AlkPhos  81  12-13    RADIOLOGY & ADDITIONAL STUDIES:    REFERRALS: [x]Hospice [x]Social Work  []Chaplaincy  []Patient/Family Support []Massage Therapy []Music Therapy    DISCUSSION OF CASE: Family - to obtain additional history and to provide emotional support; Hospice Liasion - to discuss plan of care; RN - to discuss symptom burden and regimen adjustment

## 2023-12-14 NOTE — PATIENT PROFILE ADULT - FALL HARM RISK - HARM RISK INTERVENTIONS
Assistance with ambulation/Assistance OOB with selected safe patient handling equipment/Communicate Risk of Fall with Harm to all staff/Discuss with provider need for PT consult/Monitor gait and stability/Provide patient with walking aids - walker, cane, crutches/Reinforce activity limits and safety measures with patient and family/Tailored Fall Risk Interventions/Visual Cue: Yellow wristband and red socks/Bed in lowest position, wheels locked, appropriate side rails in place/Call bell, personal items and telephone in reach/Instruct patient to call for assistance before getting out of bed or chair/Non-slip footwear when patient is out of bed/[place] to call system/Physically safe environment - no spills, clutter or unnecessary equipment/Purposeful Proactive Rounding/Room/bathroom lighting operational, light cord in reach Assistance with ambulation/Assistance OOB with selected safe patient handling equipment/Communicate Risk of Fall with Harm to all staff/Discuss with provider need for PT consult/Monitor gait and stability/Provide patient with walking aids - walker, cane, crutches/Reinforce activity limits and safety measures with patient and family/Tailored Fall Risk Interventions/Visual Cue: Yellow wristband and red socks/Bed in lowest position, wheels locked, appropriate side rails in place/Call bell, personal items and telephone in reach/Instruct patient to call for assistance before getting out of bed or chair/Non-slip footwear when patient is out of bed/[place] to call system/Physically safe environment - no spills, clutter or unnecessary equipment/Purposeful Proactive Rounding/Room/bathroom lighting operational, light cord in reach

## 2023-12-14 NOTE — PATIENT PROFILE ADULT - FUNCTIONAL ASSESSMENT - BASIC MOBILITY 6.
1-calculated by average/Not able to assess (calculate score using [facility] averaging method) 1-calculated by average/Not able to assess (calculate score using [facility] averaging method)

## 2023-12-14 NOTE — H&P ADULT - PROBLEM SELECTOR PLAN 6
.  Complex medical decision making / symptom management in the setting of end stage head and neck cancer.  .    Will continue to follow for ongoing GOC discussion / titration of palliative regimen. Emotional support provided, questions answered.  Active Psychosocial Referrals:  [x]Social Work/Case management []PT/OT []Chaplaincy []Hospice  []Patient/Family Support []Holistic RN []Massage Therapy []Music Therapy []Ethics  Coping: [] well [] with difficulty [] poor coping [] unable to assess  Support system: [] strong [] adequate [] inadequate    For new or uncontrolled symptoms, please call Palliative Care at [phone]). The service is available 24/7 (including nights & weekends) to provide symptom management recommendations over the phone as appropriate .  Complex medical decision making / symptom management in the setting of end stage head and neck cancer.  .    Will continue to follow for ongoing GOC discussion / titration of palliative regimen. Emotional support provided, questions answered.  Active Psychosocial Referrals:  [x]Social Work/Case management []PT/OT []Chaplaincy []Hospice  []Patient/Family Support []Holistic RN []Massage Therapy []Music Therapy []Ethics  Coping: [] well [] with difficulty [] poor coping [] unable to assess  Support system: [] strong [] adequate [] inadequate    For new or uncontrolled symptoms, please call Palliative Care at [phone]). The service is available 24/7 (including nights & weekends) to provide symptom management recommendations over the phone as appropriate

## 2023-12-14 NOTE — DISCHARGE NOTE NURSING/CASE MANAGEMENT/SOCIAL WORK - NSDCFUADDAPPT_GEN_ALL_CORE_FT
PCP @ [address] [place] [address], Dec 22, 2023 1:45pm PCP @ [address] [place] [address], Dec 22, 2023 1:45pm

## 2023-12-14 NOTE — DISCHARGE NOTE NURSING/CASE MANAGEMENT/SOCIAL WORK - PATIENT PORTAL LINK FT
You can access the FollowMyHealth Patient Portal offered by [facility] by registering at the following website: [email or web address]. By joining [employer]’s FollowMyHealth portal, you will also be able to view your health information using other applications (apps) compatible with our system. You can access the FollowMyHealth Patient Portal offered by [facility] by registering at the following website: [email or web address]. By joining [employer]’s FollowMyHealth portal, you will also be able to view your health information using other applications (apps) compatible with our system.

## 2023-12-15 NOTE — DIETITIAN INITIAL EVALUATION ADULT - ADD RECOMMEND
1. Continue diet as ordered.   >>Maintain nutrition within GOC at all times.   >>Consider comfort feeds if pt alert and expressing desire to eat, defer consistency to team/SLP. Do not force feed. Honor all food preferences as able.  2. Monitor GI tolerance, weight trends, labs, & skin integrity as appropriate.  3. Defer bowel and pain regimens to team.   4. RD to remain available for dietary intervention prn.

## 2023-12-15 NOTE — DIETITIAN INITIAL EVALUATION ADULT - EDUCATION DIETARY MODIFICATIONS
Education not appropriate at this time, RD remains available for dietary education prn./not applicable

## 2023-12-15 NOTE — DIETITIAN INITIAL EVALUATION ADULT - OTHER INFO
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 Marr/Dr. Gar), status post PEG 11/14 was recently admitted to [facility] for increase need of care at home in which course complicated by dislodged/self removed PEG. Decision made to not replace and focus on comfort care.     Pt seen for nutrition assessment, now transferred to inpatient hospice. No nausea/vomiting/diarrhea/constipation noted, last recorded BM 12/14. Nonverbal indicators of pain absent - pain regimen ordered. No edema or pressure injuries documented, left neck wound noted, Simon score 6. NKFA. Wt history per [facility] HIE: (9/25) 165lb; current admission wt 132lb/60kg indicates 33lb/20% wt loss in <3 months - clinically significant. Per chart, pt with limited nutritional intake status post aspiration event during hospital admission 12/5. Pt meets criteria for severe protein-calorie malnutrition due to prolonged inadequate energy intake related to clinical status. Pt with GOC aligned with comfort measures - order in chart. Pt can be determined at low complexity and follow up by dietitian by consult only. See nutrition recommendations.  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 Marr/Dr. Gar), status post PEG 11/14 was recently admitted to [facility] for increase need of care at home in which course complicated by dislodged/self removed PEG. Decision made to not replace and focus on comfort care.     Pt seen for nutrition assessment, now transferred to inpatient hospice. No nausea/vomiting/diarrhea/constipation noted, last recorded BM 12/14. Nonverbal indicators of pain absent - pain regimen ordered. No edema or pressure injuries documented, left neck wound noted, Simon score 6. NKFA. Wt history per [facility] HIE: (9/25) 165lb; current admission wt 132lb/60kg indicates 33lb/20% wt loss in <3 months - clinically significant. Per chart, pt with limited nutritional intake status post aspiration event during hospital admission 12/5. Pt meets criteria for severe protein-calorie malnutrition due to prolonged inadequate energy intake related to clinical status. Pt with GOC aligned with comfort measures - order in chart. Pt can be determined at low complexity and follow up by dietitian by consult only. See nutrition recommendations.

## 2023-12-15 NOTE — DIETITIAN INITIAL EVALUATION ADULT - PERTINENT LABORATORY DATA
A1C with Estimated Average Glucose Result: 9.0 % (10-30-23 @ 05:30)  A1C with Estimated Average Glucose Result: 7.9 % (07-15-23 @ 06:32)

## 2023-12-15 NOTE — DIETITIAN INITIAL EVALUATION ADULT - PERTINENT MEDS FT
MEDICATIONS  (STANDING):  fentaNYL   Patch  25 MICROgram(s)/Hr 1 Patch Transdermal every 72 hours  HYDROmorphone  Injectable 0.5 milliGRAM(s) IV Push every 4 hours  scopolamine 1 mG/72 Hr(s) Patch 1 Patch Transdermal every 72 hours    MEDICATIONS  (PRN):  bisacodyl Suppository 10 milliGRAM(s) Rectal daily PRN Constipation  glycopyrrolate Injectable 0.4 milliGRAM(s) IV Push four times a day PRN Secretions  HYDROmorphone  Injectable 0.5 milliGRAM(s) IV Push every 2 hours PRN Moderate pain (4-6), Severe pain (7-10), Respiratory rate greater than 22  LORazepam   Injectable 0.5 milliGRAM(s) IV Push every 4 hours PRN Anxiety  ondansetron Injectable 4 milliGRAM(s) IV Push every 6 hours PRN Nausea and/or Vomiting

## 2023-12-15 NOTE — DIETITIAN INITIAL EVALUATION ADULT - PROBLEM SELECTOR PLAN 6
.  Complex medical decision making / symptom management in the setting of end stage head and neck cancer.  .    Will continue to follow for ongoing GOC discussion / titration of palliative regimen. Emotional support provided, questions answered.  Active Psychosocial Referrals:  [x]Social Work/Case management []PT/OT []Chaplaincy []Hospice  []Patient/Family Support []Holistic RN []Massage Therapy []Music Therapy []Ethics  Coping: [] well [] with difficulty [] poor coping [] unable to assess  Support system: [] strong [] adequate [] inadequate    For new or uncontrolled symptoms, please call Palliative Care at [phone] The service is available 24/7 (including nights & weekends) to provide symptom management recommendations over the phone as appropriate .  Complex medical decision making / symptom management in the setting of end stage head and neck cancer.  .    Will continue to follow for ongoing GOC discussion / titration of palliative regimen. Emotional support provided, questions answered.  Active Psychosocial Referrals:  [x]Social Work/Case management []PT/OT []Chaplaincy []Hospice  []Patient/Family Support []Holistic RN []Massage Therapy []Music Therapy []Ethics  Coping: [] well [] with difficulty [] poor coping [] unable to assess  Support system: [] strong [] adequate [] inadequate    For new or uncontrolled symptoms, please call Palliative Care at [phone] The service is available 24/7 (including nights & weekends) to provide symptom management recommendations over the phone as appropriate

## 2023-12-15 NOTE — PROGRESS NOTE ADULT - PROBLEM SELECTOR PLAN 6
.  Complex medical decision making / symptom management in the setting of end stage head and neck cancer.  .    Will continue to follow for ongoing GOC discussion / titration of palliative regimen. Emotional support provided, questions answered.  Active Psychosocial Referrals:  [x]Social Work/Case management []PT/OT []Chaplaincy []Hospice  []Patient/Family Support []Holistic RN []Massage Therapy []Music Therapy []Ethics  Coping: [] well [] with difficulty [] poor coping [] unable to assess  Support system: [] strong [] adequate [] inadequate    For new or uncontrolled symptoms, please call Palliative Care at [phone] The service is available 24/7 (including nights & weekends) to provide symptom management recommendations over the phone as appropriate .  Complex medical decision making / symptom management in the setting of end stage head and neck cancer.  .    Will continue to follow for ongoing GOC discussion / titration of palliative regimen. Emotional support provided, questions answered.  Active Psychosocial Referrals:  [x]Social Work/Case management []PT/OT []Chaplaincy []Hospice  []Patient/Family Support []Holistic RN []Massage Therapy []Music Therapy []Ethics  Coping: [] well [] with difficulty [] poor coping [] unable to assess  Support system: [] strong [] adequate [] inadequate    For new or uncontrolled symptoms, please call Palliative Care at [phone] The service is available 24/7 (including nights & weekends) to provide symptom management recommendations over the phone as appropriate

## 2023-12-15 NOTE — PROGRESS NOTE ADULT - SUBJECTIVE AND OBJECTIVE BOX
Hospice GIP Daily Documentation  [facility] / [facility]  Contact Info: Call [facility] at [phone] or [facility] at [phone] for symptom management or to request interdiscplinary team intervention (RN/CYNTHIA, BIMAL, ) for patient/family    SYMPTOMS REQUIRING GIP LEVEL OF CARE:  [x]Pain  [x]Dyspnea  []Anxiety/Agitation  []Nausea  []Seizure    Interval Events:  Remains with heavy secretions.  Agitated.    [x]Pain/Dyspnea managed by hourly monitoring and titration of (medication)  []Pain/Dyspnea improved as a result of aggressive titration of (medication)  []GIP to manage uncontrolled pain/dyspnea and continuous titrations of (medication)  []Requires frequent skilled nursing assessment for non-verbal signs of pain/dyspnea/agitation through grimacing, groaning, tachynea, writhing, rigidity  []Effectiveness of pain/tachypnea management is continuously reevaluated hourly to acheive maximal comfort    Overnight events discussed with nursing staff. Comprehensive symptom assessment and justification of GIP level of care as noted. Patient continues to require symptom monitoring through multiple daily bedside assessments and daily intervention through the admistration of PRN medications and adjustment of scheduled opiates/opiate infusion. See patient's PRN use for the past 24hrs noted below. Extensive time spent discussing care plan with family. No unexpected adverse effects of opiates noted. Plan of care discussed collaboratively with Hospice and Facility staff.    ADVANCE DIRECTIVES:    [x]MOLST: DNR/DNI    ALLERGIES:  No Known Allergies    MEDICATIONS  (STANDING):  glycopyrrolate Injectable 0.4 milliGRAM(s) IV Push every 6 hours  HYDROmorphone  Injectable 0.5 milliGRAM(s) IV Push every 4 hours  scopolamine 1 mG/72 Hr(s) Patch 1 Patch Transdermal every 72 hours    MEDICATIONS  (PRN):  bisacodyl Suppository 10 milliGRAM(s) Rectal daily PRN Constipation  HYDROmorphone  Injectable 0.5 milliGRAM(s) IV Push every 2 hours PRN Moderate pain (4-6), Severe pain (7-10), Respiratory rate greater than 22  LORazepam   Injectable 0.5 milliGRAM(s) IV Push every 4 hours PRN Anxiety  ondansetron Injectable 4 milliGRAM(s) IV Push every 6 hours PRN Nausea and/or Vomiting    Analgesic Use (Scheduled and PRNs) for past 24 hours:    HYDROmorphone  Injectable   0.5 milliGRAM(s) IV Push (12-16-23 @ 07:11)   0.5 milliGRAM(s) IV Push (12-16-23 @ 03:49)   0.5 milliGRAM(s) IV Push (12-15-23 @ 22:09)   0.5 milliGRAM(s) IV Push (12-15-23 @ 18:28)   0.5 milliGRAM(s) IV Push (12-15-23 @ 13:38)   0.5 milliGRAM(s) IV Push (12-15-23 @ 09:20)    LORazepam   Injectable   0.5 milliGRAM(s) IV Push (12-16-23 @ 03:46)   0.5 milliGRAM(s) IV Push (12-15-23 @ 13:38)      PRESENT SYMPTOMS/REVIEW OF SYSTEMS:  Source if other than patient:  []Family   [x]Team     Pain: [] yes [x] no - see PAINAD  QOL Impact -   Location -                    Aggravating Factors -  Quality -  Radiation -  Timing -  Severity (0-10 scale) -   Minimal Acceptable Level (0-10 scale) -    PAINAD Score:    Dyspnea:                           []Mild  [x]Moderate []Severe  Anxiety:                             []Mild [x]Moderate []Severe  Fatigue:                             []Mild []Moderate []Severe  Nausea:                             []Mild []Moderate []Severe  Loss of Appetite:              []Mild []Moderate []Severe  Constipation:                    []Mild []Moderate []Severe    Other Symptoms:  [x]All Other Review Of Systems Negative - [x]Unable to obtain due to poor mentation/encephalopathy    Palliative Performance Status Version 2:  10%    PHYSICAL EXAM:  GENERAL:  []Alert []Lethargic  [x]Cachexia  [x]Unarousable  []Minimally Verbal  []Non-Verbal  Behavioral:   []Anxiety  []Delirium [x]Agitation  HEENT:  []Normal  [] Moist Mucous Membranes [x]Dry mouth []Oral lesions  PULMONARY:   []Clear []Tachypnea  [x]Audible excessive secretions  []Normal Work of Breathing [x]Labored Breathing  []Rhonchi []Crackles []Wheezing  CARDIOVASCULAR:    []Regular Rate x[]Regular Rhythm []Irregular []Tachy  []Yaya  GASTROINTESTINAL:  [x]Soft  []Distended   [x]+BS  [x]Non tender []Tender  []PEG []OGT/ NGT  Last BM:  GENITOURINARY:  []Normal [] Incontinent   []Oliguria/Anuria   [x]Paige  MUSCULOSKELETAL:   []Normal Extremities  [x]Weakness  [x]Bed/Wheelchair bound []Edema  NEUROLOGIC:   []No focal deficits  [x]Cognitive impairment  [x]Dysphagia []Dysarthria []Paresis [x]Encephalopathic  SKIN:   [x]Normal   []Pressure ulcer(s)  []Rash    Vital Signs Last 24 Hrs  T(C): 37.1 (16 Dec 2023 08:35), Max: 37.1 (16 Dec 2023 08:35)  T(F): 98.8 (16 Dec 2023 08:35), Max: 98.8 (16 Dec 2023 08:35)  HR: 83 (16 Dec 2023 08:35) (83 - 87)  BP: 132/69 (16 Dec 2023 08:35) (128/70 - 132/69)  BP(mean): --  RR: 16 (16 Dec 2023 08:35) (16 - 19)  SpO2: 93% (16 Dec 2023 08:35) (92% - 93%)    Parameters below as of 16 Dec 2023 08:35  Patient On (Oxygen Delivery Method): room air        LABS/IMAGING: None new    REFERRALS: [x]Hospice [x]Social Work  []Chaplaincy  []Patient/Family Support []Massage Therapy []Music Therapy    DISCUSSION OF CASE: Family - to discuss medication changes, provide emotional support, and expeccted outcomes; Hospice Liasion - to discuss plan of care and symptom regimen; RN - to discuss symptom burden and regimen adjustment Hospice GIP Daily Documentation  [facility] [place] [facility] / [facility]  Contact Info: Call [facility] at [phone] or [facility] at [phone] for symptom management or to request interdiscplinary team intervention (RN/CYNTHIA, BIMAL, ) for patient/family    SYMPTOMS REQUIRING GIP LEVEL OF CARE:  [x]Pain  [x]Dyspnea  []Anxiety/Agitation  []Nausea  []Seizure    Interval Events:  Remains with heavy secretions.  Agitated.    [x]Pain/Dyspnea managed by hourly monitoring and titration of (medication)  []Pain/Dyspnea improved as a result of aggressive titration of (medication)  []GIP to manage uncontrolled pain/dyspnea and continuous titrations of (medication)  []Requires frequent skilled nursing assessment for non-verbal signs of pain/dyspnea/agitation through grimacing, groaning, tachynea, writhing, rigidity  []Effectiveness of pain/tachypnea management is continuously reevaluated hourly to acheive maximal comfort    Overnight events discussed with nursing staff. Comprehensive symptom assessment and justification of GIP level of care as noted. Patient continues to require symptom monitoring through multiple daily bedside assessments and daily intervention through the admistration of PRN medications and adjustment of scheduled opiates/opiate infusion. See patient's PRN use for the past 24hrs noted below. Extensive time spent discussing care plan with family. No unexpected adverse effects of opiates noted. Plan of care discussed collaboratively with Hospice and Facility staff.    ADVANCE DIRECTIVES:    [x]MOLST: DNR/DNI    ALLERGIES:  No Known Allergies    MEDICATIONS  (STANDING):  glycopyrrolate Injectable 0.4 milliGRAM(s) IV Push every 6 hours  HYDROmorphone  Injectable 0.5 milliGRAM(s) IV Push every 4 hours  scopolamine 1 mG/72 Hr(s) Patch 1 Patch Transdermal every 72 hours    MEDICATIONS  (PRN):  bisacodyl Suppository 10 milliGRAM(s) Rectal daily PRN Constipation  HYDROmorphone  Injectable 0.5 milliGRAM(s) IV Push every 2 hours PRN Moderate pain (4-6), Severe pain (7-10), Respiratory rate greater than 22  LORazepam   Injectable 0.5 milliGRAM(s) IV Push every 4 hours PRN Anxiety  ondansetron Injectable 4 milliGRAM(s) IV Push every 6 hours PRN Nausea and/or Vomiting    Analgesic Use (Scheduled and PRNs) for past 24 hours:    HYDROmorphone  Injectable   0.5 milliGRAM(s) IV Push (12-16-23 @ 07:11)   0.5 milliGRAM(s) IV Push (12-16-23 @ 03:49)   0.5 milliGRAM(s) IV Push (12-15-23 @ 22:09)   0.5 milliGRAM(s) IV Push (12-15-23 @ 18:28)   0.5 milliGRAM(s) IV Push (12-15-23 @ 13:38)   0.5 milliGRAM(s) IV Push (12-15-23 @ 09:20)    LORazepam   Injectable   0.5 milliGRAM(s) IV Push (12-16-23 @ 03:46)   0.5 milliGRAM(s) IV Push (12-15-23 @ 13:38)      PRESENT SYMPTOMS/REVIEW OF SYSTEMS:  Source if other than patient:  []Family   [x]Team     Pain: [] yes [x] no - see PAINAD  QOL Impact -   Location -                    Aggravating Factors -  Quality -  Radiation -  Timing -  Severity (0-10 scale) -   Minimal Acceptable Level (0-10 scale) -    PAINAD Score:    Dyspnea:                           []Mild  [x]Moderate []Severe  Anxiety:                             []Mild [x]Moderate []Severe  Fatigue:                             []Mild []Moderate []Severe  Nausea:                             []Mild []Moderate []Severe  Loss of Appetite:              []Mild []Moderate []Severe  Constipation:                    []Mild []Moderate []Severe    Other Symptoms:  [x]All Other Review Of Systems Negative - [x]Unable to obtain due to poor mentation/encephalopathy    Palliative Performance Status Version 2:  10%    PHYSICAL EXAM:  GENERAL:  []Alert []Lethargic  [x]Cachexia  [x]Unarousable  []Minimally Verbal  []Non-Verbal  Behavioral:   []Anxiety  []Delirium [x]Agitation  HEENT:  []Normal  [] Moist Mucous Membranes [x]Dry mouth []Oral lesions  PULMONARY:   []Clear []Tachypnea  [x]Audible excessive secretions  []Normal Work of Breathing [x]Labored Breathing  []Rhonchi []Crackles []Wheezing  CARDIOVASCULAR:    []Regular Rate x[]Regular Rhythm []Irregular []Tachy  []Yaya  GASTROINTESTINAL:  [x]Soft  []Distended   [x]+BS  [x]Non tender []Tender  []PEG []OGT/ NGT  Last BM:  GENITOURINARY:  []Normal [] Incontinent   []Oliguria/Anuria   [x]Paige  MUSCULOSKELETAL:   []Normal Extremities  [x]Weakness  [x]Bed/Wheelchair bound []Edema  NEUROLOGIC:   []No focal deficits  [x]Cognitive impairment  [x]Dysphagia []Dysarthria []Paresis [x]Encephalopathic  SKIN:   [x]Normal   []Pressure ulcer(s)  []Rash    Vital Signs Last 24 Hrs  T(C): 37.1 (16 Dec 2023 08:35), Max: 37.1 (16 Dec 2023 08:35)  T(F): 98.8 (16 Dec 2023 08:35), Max: 98.8 (16 Dec 2023 08:35)  HR: 83 (16 Dec 2023 08:35) (83 - 87)  BP: 132/69 (16 Dec 2023 08:35) (128/70 - 132/69)  BP(mean): --  RR: 16 (16 Dec 2023 08:35) (16 - 19)  SpO2: 93% (16 Dec 2023 08:35) (92% - 93%)    Parameters below as of 16 Dec 2023 08:35  Patient On (Oxygen Delivery Method): room air        LABS/IMAGING: None new    REFERRALS: [x]Hospice [x]Social Work  []Chaplaincy  []Patient/Family Support []Massage Therapy []Music Therapy    DISCUSSION OF CASE: Family - to discuss medication changes, provide emotional support, and expeccted outcomes; Hospice Liasion - to discuss plan of care and symptom regimen; RN - to discuss symptom burden and regimen adjustment

## 2023-12-16 NOTE — PROGRESS NOTE ADULT - PROBLEM SELECTOR PLAN 6
.  Complex medical decision making / symptom management in the setting of end stage head and neck cancer.  .    Will continue to follow for ongoing GOC discussion / titration of palliative regimen. Emotional support provided, questions answered.  Active Psychosocial Referrals:  [x]Social Work/Case management []PT/OT []Chaplaincy []Hospice  []Patient/Family Support []Holistic RN []Massage Therapy []Music Therapy []Ethics  Coping: [] well [] with difficulty [] poor coping [] unable to assess  Support system: [] strong [] adequate [] inadequate    For new or uncontrolled symptoms, please call Palliative Care at [phone]). The service is available 24/7 (including nights & weekends) to provide symptom management recommendations over the phone as appropriate .  Complex medical decision making / symptom management in the setting of end stage head and neck cancer.  .    Will continue to follow for ongoing GOC discussion / titration of palliative regimen. Emotional support provided, questions answered.  Active Psychosocial Referrals:  [x]Social Work/Case management []PT/OT []Chaplaincy []Hospice  []Patient/Family Support []Holistic RN []Massage Therapy []Music Therapy []Ethics  Coping: [] well [] with difficulty [] poor coping [] unable to assess  Support system: [] strong [] adequate [] inadequate    For new or uncontrolled symptoms, please call Palliative Care at [phone] The service is available 24/7 (including nights & weekends) to provide symptom management recommendations over the phone as appropriate

## 2023-12-16 NOTE — PROGRESS NOTE ADULT - SUBJECTIVE AND OBJECTIVE BOX
Hospice GIP Daily Documentation  [facility] Geriatrics and Palliative Care / [facility]  Contact Info: Call [facility] at [phone] or [facility] at [phone] for symptom management or to request interdiscplinary team intervention (RN/CYNTHIA, BIMAL, ) for patient/family    SYMPTOMS REQUIRING GIP LEVEL OF CARE:  [x]Pain  [x]Dyspnea  []Anxiety/Agitation  []Nausea  []Seizure    Interval Events:  Remains with heavy secretions.  Agitated.    [x]Pain/Dyspnea managed by hourly monitoring and titration of (medication)  []Pain/Dyspnea improved as a result of aggressive titration of (medication)  []GIP to manage uncontrolled pain/dyspnea and continuous titrations of (medication)  []Requires frequent skilled nursing assessment for non-verbal signs of pain/dyspnea/agitation through grimacing, groaning, tachynea, writhing, rigidity  []Effectiveness of pain/tachypnea management is continuously reevaluated hourly to acheive maximal comfort    Overnight events discussed with nursing staff. Comprehensive symptom assessment and justification of GIP level of care as noted. Patient continues to require symptom monitoring through multiple daily bedside assessments and daily intervention through the admistration of PRN medications and adjustment of scheduled opiates/opiate infusion. See patient's PRN use for the past 24hrs noted below. Extensive time spent discussing care plan with family. No unexpected adverse effects of opiates noted. Plan of care discussed collaboratively with Hospice and Facility staff.    ADVANCE DIRECTIVES:    [x]MOLST: DNR/DNI    ALLERGIES:  No Known Allergies    MEDICATIONS  (STANDING):  glycopyrrolate Injectable 0.4 milliGRAM(s) IV Push every 6 hours  HYDROmorphone  Injectable 0.5 milliGRAM(s) IV Push every 4 hours  scopolamine 1 mG/72 Hr(s) Patch 1 Patch Transdermal every 72 hours    MEDICATIONS  (PRN):  bisacodyl Suppository 10 milliGRAM(s) Rectal daily PRN Constipation  HYDROmorphone  Injectable 0.5 milliGRAM(s) IV Push every 2 hours PRN Moderate pain (4-6), Severe pain (7-10), Respiratory rate greater than 22  LORazepam   Injectable 0.5 milliGRAM(s) IV Push every 4 hours PRN Anxiety  ondansetron Injectable 4 milliGRAM(s) IV Push every 6 hours PRN Nausea and/or Vomiting    Analgesic Use (Scheduled and PRNs) for past 24 hours:    HYDROmorphone  Injectable   0.5 milliGRAM(s) IV Push (12-16-23 @ 07:11)   0.5 milliGRAM(s) IV Push (12-16-23 @ 03:49)   0.5 milliGRAM(s) IV Push (12-15-23 @ 22:09)   0.5 milliGRAM(s) IV Push (12-15-23 @ 18:28)   0.5 milliGRAM(s) IV Push (12-15-23 @ 13:38)   0.5 milliGRAM(s) IV Push (12-15-23 @ 09:20)    LORazepam   Injectable   0.5 milliGRAM(s) IV Push (12-16-23 @ 03:46)   0.5 milliGRAM(s) IV Push (12-15-23 @ 13:38)      PRESENT SYMPTOMS/REVIEW OF SYSTEMS:  Source if other than patient:  []Family   [x]Team     Pain: [] yes [x] no - see PAINAD  QOL Impact -   Location -                    Aggravating Factors -  Quality -  Radiation -  Timing -  Severity (0-10 scale) -   Minimal Acceptable Level (0-10 scale) -    PAINAD Score:    Dyspnea:                           []Mild  [x]Moderate []Severe  Anxiety:                             []Mild [x]Moderate []Severe  Fatigue:                             []Mild []Moderate []Severe  Nausea:                             []Mild []Moderate []Severe  Loss of Appetite:              []Mild []Moderate []Severe  Constipation:                    []Mild []Moderate []Severe    Other Symptoms:  [x]All Other Review Of Systems Negative - [x]Unable to obtain due to poor mentation/encephalopathy    Palliative Performance Status Version 2:  10%    PHYSICAL EXAM:  GENERAL:  []Alert []Lethargic  [x]Cachexia  [x]Unarousable  []Minimally Verbal  []Non-Verbal  Behavioral:   []Anxiety  []Delirium [x]Agitation  HEENT:  []Normal  [] Moist Mucous Membranes [x]Dry mouth []Oral lesions  PULMONARY:   []Clear []Tachypnea  [x]Audible excessive secretions  []Normal Work of Breathing [x]Labored Breathing  []Rhonchi []Crackles []Wheezing  CARDIOVASCULAR:    []Regular Rate x[]Regular Rhythm []Irregular []Tachy  []Yaya  GASTROINTESTINAL:  [x]Soft  []Distended   [x]+BS  [x]Non tender []Tender  []PEG []OGT/ NGT  Last BM:  GENITOURINARY:  []Normal [] Incontinent   []Oliguria/Anuria   [x]Paige  MUSCULOSKELETAL:   []Normal Extremities  [x]Weakness  [x]Bed/Wheelchair bound []Edema  NEUROLOGIC:   []No focal deficits  [x]Cognitive impairment  [x]Dysphagia []Dysarthria []Paresis [x]Encephalopathic  SKIN:   [x]Normal   []Pressure ulcer(s)  []Rash    Vital Signs Last 24 Hrs  T(C): 37.1 (16 Dec 2023 08:35), Max: 37.1 (16 Dec 2023 08:35)  T(F): 98.8 (16 Dec 2023 08:35), Max: 98.8 (16 Dec 2023 08:35)  HR: 83 (16 Dec 2023 08:35) (83 - 87)  BP: 132/69 (16 Dec 2023 08:35) (128/70 - 132/69)  BP(mean): --  RR: 16 (16 Dec 2023 08:35) (16 - 19)  SpO2: 93% (16 Dec 2023 08:35) (92% - 93%)    Parameters below as of 16 Dec 2023 08:35  Patient On (Oxygen Delivery Method): room air        LABS/IMAGING: None new    REFERRALS: [x]Hospice [x]Social Work  []Chaplaincy  []Patient/Family Support []Massage Therapy []Music Therapy    DISCUSSION OF CASE: Family - to discuss medication changes, provide emotional support, and expeccted outcomes; Hospice Liasion - to discuss plan of care and symptom regimen; RN - to discuss symptom burden and regimen adjustment Hospice GIP Daily Documentation  [facility] Geriatrics and Palliative Care / [facility]  Contact Info: Call [facility] at [phone] or [facility] at [phone] for symptom management or to request interdiscplinary team intervention (RN/CYNTHIA, BIMAL, ) for patient/family    SYMPTOMS REQUIRING GIP LEVEL OF CARE:  [x]Pain  [x]Dyspnea  []Anxiety/Agitation  []Nausea  []Seizure    Interval Events:  Remains with heavy secretions.  Agitated.    [x]Pain/Dyspnea managed by hourly monitoring and titration of (medication)  []Pain/Dyspnea improved as a result of aggressive titration of (medication)  []GIP to manage uncontrolled pain/dyspnea and continuous titrations of (medication)  []Requires frequent skilled nursing assessment for non-verbal signs of pain/dyspnea/agitation through grimacing, groaning, tachynea, writhing, rigidity  []Effectiveness of pain/tachypnea management is continuously reevaluated hourly to acheive maximal comfort    Overnight events discussed with nursing staff. Comprehensive symptom assessment and justification of GIP level of care as noted. Patient continues to require symptom monitoring through multiple daily bedside assessments and daily intervention through the admistration of PRN medications and adjustment of scheduled opiates/opiate infusion. See patient's PRN use for the past 24hrs noted below. Extensive time spent discussing care plan with family. No unexpected adverse effects of opiates noted. Plan of care discussed collaboratively with Hospice and Facility staff.    ADVANCE DIRECTIVES:    [x]MOLST: DNR/DNI    ALLERGIES:  No Known Allergies    MEDICATIONS  (STANDING):  glycopyrrolate Injectable 0.4 milliGRAM(s) IV Push every 6 hours  HYDROmorphone  Injectable 0.5 milliGRAM(s) IV Push every 4 hours  scopolamine 1 mG/72 Hr(s) Patch 1 Patch Transdermal every 72 hours    MEDICATIONS  (PRN):  bisacodyl Suppository 10 milliGRAM(s) Rectal daily PRN Constipation  HYDROmorphone  Injectable 0.5 milliGRAM(s) IV Push every 2 hours PRN Moderate pain (4-6), Severe pain (7-10), Respiratory rate greater than 22  LORazepam   Injectable 0.5 milliGRAM(s) IV Push every 4 hours PRN Anxiety  ondansetron Injectable 4 milliGRAM(s) IV Push every 6 hours PRN Nausea and/or Vomiting    Analgesic Use (Scheduled and PRNs) for past 24 hours:    HYDROmorphone  Injectable   0.5 milliGRAM(s) IV Push (12-16-23 @ 07:11)   0.5 milliGRAM(s) IV Push (12-16-23 @ 03:49)   0.5 milliGRAM(s) IV Push (12-15-23 @ 22:09)   0.5 milliGRAM(s) IV Push (12-15-23 @ 18:28)   0.5 milliGRAM(s) IV Push (12-15-23 @ 13:38)   0.5 milliGRAM(s) IV Push (12-15-23 @ 09:20)    LORazepam   Injectable   0.5 milliGRAM(s) IV Push (12-16-23 @ 03:46)   0.5 milliGRAM(s) IV Push (12-15-23 @ 13:38)      PRESENT SYMPTOMS/REVIEW OF SYSTEMS:  Source if other than patient:  []Family   [x]Team     Pain: [] yes [x] no - see PAINAD  QOL Impact -   Location -                    Aggravating Factors -  Quality -  Radiation -  Timing -  Severity (0-10 scale) -   Minimal Acceptable Level (0-10 scale) -    PAINAD Score:    Dyspnea:                           []Mild  [x]Moderate []Severe  Anxiety:                             []Mild [x]Moderate []Severe  Fatigue:                             []Mild []Moderate []Severe  Nausea:                             []Mild []Moderate []Severe  Loss of Appetite:              []Mild []Moderate []Severe  Constipation:                    []Mild []Moderate []Severe    Other Symptoms:  [x]All Other Review Of Systems Negative - [x]Unable to obtain due to poor mentation/encephalopathy    Palliative Performance Status Version 2:  10%    PHYSICAL EXAM:  GENERAL:  []Alert []Lethargic  [x]Cachexia  [x]Unarousable  []Minimally Verbal  []Non-Verbal  Behavioral:   []Anxiety  []Delirium [x]Agitation  HEENT:  []Normal  [] Moist Mucous Membranes [x]Dry mouth []Oral lesions  PULMONARY:   []Clear []Tachypnea  [x]Audible excessive secretions  []Normal Work of Breathing [x]Labored Breathing  []Rhonchi []Crackles []Wheezing  CARDIOVASCULAR:    []Regular Rate x[]Regular Rhythm []Irregular []Tachy  []Yaya  GASTROINTESTINAL:  [x]Soft  []Distended   [x]+BS  [x]Non tender []Tender  []PEG []OGT/ NGT  Last BM:  GENITOURINARY:  []Normal [] Incontinent   []Oliguria/Anuria   [x]Paige  MUSCULOSKELETAL:   []Normal Extremities  [x]Weakness  [x]Bed/Wheelchair bound []Edema  NEUROLOGIC:   []No focal deficits  [x]Cognitive impairment  [x]Dysphagia []Dysarthria []Paresis [x]Encephalopathic  SKIN:   [x]Normal   []Pressure ulcer(s)  []Rash    Vital Signs Last 24 Hrs  T(C): 37.1 (16 Dec 2023 08:35), Max: 37.1 (16 Dec 2023 08:35)  T(F): 98.8 (16 Dec 2023 08:35), Max: 98.8 (16 Dec 2023 08:35)  HR: 83 (16 Dec 2023 08:35) (83 - 87)  BP: 132/69 (16 Dec 2023 08:35) (128/70 - 132/69)  BP(mean): --  RR: 16 (16 Dec 2023 08:35) (16 - 19)  SpO2: 93% (16 Dec 2023 08:35) (92% - 93%)    Parameters below as of 16 Dec 2023 08:35  Patient On (Oxygen Delivery Method): room air        LABS/IMAGING: None new    REFERRALS: [x]Hospice [x]Social Work  []Chaplaincy  []Patient/Family Support []Massage Therapy []Music Therapy    DISCUSSION OF CASE: Family - to discuss medication changes, provide emotional support, and expeccted outcomes; Hospice Liasion - to discuss plan of care and symptom regimen; RN - to discuss symptom burden and regimen adjustment

## 2023-12-17 NOTE — PROGRESS NOTE ADULT - SUBJECTIVE AND OBJECTIVE BOX
**INCOMPLETE NOTE    OVERNIGHT EVENTS:    SUBJECTIVE:  Patient seen and examined at bedside.  No fever, chills, dizziness, headache, changes in vision, chest pain, dyspnea, nausea or vomiting, dysuria, changes in bowel movements, LE edema. Rest of 12 point Review of systems negative unless otherwise documented elsewhere in note.     Vital Signs Last 12 Hrs  T(F): 97.3 (12-16-23 @ 20:51), Max: 98.1 (12-16-23 @ 20:40)  HR: 85 (12-16-23 @ 20:51) (84 - 85)  BP: 113/65 (12-16-23 @ 20:51) (113/65 - 117/67)  BP(mean): 83 (12-16-23 @ 20:40) (83 - 83)  RR: 19 (12-16-23 @ 20:51) (17 - 19)  SpO2: 96% (12-16-23 @ 20:51) (93% - 96%)  I&O's Summary      PHYSICAL EXAM:  Constitutional: NAD, comfortable in bed.  HEENT: NC/AT, PERRLA, EOMI, no conjunctival pallor or scleral icterus, MMM  Neck: Supple, no JVD  Respiratory: CTA B/L. No w/r/r.   Cardiovascular: RRR, normal S1 and S2, no m/r/g.   Gastrointestinal: +BS, soft NTND, no guarding or rebound tenderness, no palpable masses   Extremities: wwp; no cyanosis, clubbing or edema.   Vascular: Pulses equal and strong throughout.   Neurological: AAOx3, no CN deficits, strength and sensation intact throughout.   Skin: No gross skin abnormalities or rashes        LABS:                  RADIOLOGY & ADDITIONAL TESTS:    MEDICATIONS  (STANDING):  glycopyrrolate Injectable 0.4 milliGRAM(s) IV Push every 6 hours  HYDROmorphone  Injectable 0.5 milliGRAM(s) IV Push every 4 hours  scopolamine 1 mG/72 Hr(s) Patch 1 Patch Transdermal every 72 hours    MEDICATIONS  (PRN):  bisacodyl Suppository 10 milliGRAM(s) Rectal daily PRN Constipation  HYDROmorphone  Injectable 0.5 milliGRAM(s) IV Push every 2 hours PRN Moderate pain (4-6), Severe pain (7-10), Respiratory rate greater than 22  LORazepam   Injectable 0.5 milliGRAM(s) IV Push every 4 hours PRN Anxiety  ondansetron Injectable 4 milliGRAM(s) IV Push every 6 hours PRN Nausea and/or Vomiting   OVERNIGHT EVENTS: patient was agitated and moved to the side of bed.    SUBJECTIVE: Patient seen and examined at bedside.     Vital Signs Last 12 Hrs  T(F): 97.3 (12-16-23 @ 20:51), Max: 98.1 (12-16-23 @ 20:40)  HR: 85 (12-16-23 @ 20:51) (84 - 85)  BP: 113/65 (12-16-23 @ 20:51) (113/65 - 117/67)  BP(mean): 83 (12-16-23 @ 20:40) (83 - 83)  RR: 19 (12-16-23 @ 20:51) (17 - 19)  SpO2: 96% (12-16-23 @ 20:51) (93% - 96%)  I&O's Summary      PHYSICAL EXAM:  Constitutional: NAD, cachexia, unarousible  HEENT: NC/AT, PERRLA, EOMI, no conjunctival pallor or scleral icterus, dry mucus membraine  Neck: Supple, no JVD  Respiratory: Audible excessive secretions; labored breathing  Cardiovascular: RRR, normal S1 and S2, no m/r/g.   Gastrointestinal: +BS, soft NTND, no guarding or rebound tenderness, no palpable masses   : Paige in place  Extremities: wwp; no cyanosis, clubbing or edema.   Vascular: Pulses equal and strong throughout.   Neurological: AAOx0; dysphagia  Skin: No gross skin abnormalities or rashes        LABS: None      RADIOLOGY & ADDITIONAL TESTS:    MEDICATIONS  (STANDING):  glycopyrrolate Injectable 0.4 milliGRAM(s) IV Push every 6 hours  HYDROmorphone  Injectable 0.5 milliGRAM(s) IV Push every 4 hours  scopolamine 1 mG/72 Hr(s) Patch 1 Patch Transdermal every 72 hours    MEDICATIONS  (PRN):  bisacodyl Suppository 10 milliGRAM(s) Rectal daily PRN Constipation  HYDROmorphone  Injectable 0.5 milliGRAM(s) IV Push every 2 hours PRN Moderate pain (4-6), Severe pain (7-10), Respiratory rate greater than 22  LORazepam   Injectable 0.5 milliGRAM(s) IV Push every 4 hours PRN Anxiety  ondansetron Injectable 4 milliGRAM(s) IV Push every 6 hours PRN Nausea and/or Vomiting

## 2023-12-17 NOTE — PROGRESS NOTE ADULT - PROBLEM SELECTOR PLAN 6
Complex medical decision making / symptom management in the setting of end stage head and neck cancer.  .  Will continue to follow for ongoing GOC discussion / titration of palliative regimen. Emotional support provided, questions answered.  Active Psychosocial Referrals:  [x]Social Work/Case management []PT/OT []Chaplaincy []Hospice  []Patient/Family Support []Holistic RN []Massage Therapy []Music Therapy []Ethics  Coping: [] well [] with difficulty [] poor coping [] unable to assess  Support system: [] strong [] adequate [] inadequate    For new or uncontrolled symptoms, please call Palliative Care at [phone]). The service is available 24/7 (including nights & weekends) to provide symptom management recommendations over the phone as appropriate Complex medical decision making / symptom management in the setting of end stage head and neck cancer.  .  Will continue to follow for ongoing GOC discussion / titration of palliative regimen. Emotional support provided, questions answered.  Active Psychosocial Referrals:  [x]Social Work/Case management []PT/OT []Chaplaincy []Hospice  []Patient/Family Support []Holistic RN []Massage Therapy []Music Therapy []Ethics  Coping: [] well [] with difficulty [] poor coping [] unable to assess  Support system: [] strong [] adequate [] inadequate    For new or uncontrolled symptoms, please call Palliative Care at [phone] The service is available 24/7 (including nights & weekends) to provide symptom management recommendations over the phone as appropriate

## 2023-12-17 NOTE — PROGRESS NOTE ADULT - ATTENDING COMMENTS
Patient seen and examined.  Agree with resident note as above.  Meds, labs and vitals all reviewed.  Patient with advanced head and neck cancer.   Currently on inpatient hospice.  Remains on ATC Dilaudid, ATC Robinul.  Adding ATC Ativan 0.5mg IV Q6 hours due to terminal agitation.

## 2023-12-18 NOTE — PROGRESS NOTE ADULT - PROBLEM SELECTOR PLAN 6
Complex medical decision making / symptom management in the setting of end stage head and neck cancer.  .  Will continue to follow for ongoing GOC discussion / titration of palliative regimen. Emotional support provided, questions answered.  Active Psychosocial Referrals:  [x]Social Work/Case management []PT/OT []Chaplaincy []Hospice  []Patient/Family Support []Holistic RN []Massage Therapy []Music Therapy []Ethics  Coping: [] well [] with difficulty [] poor coping [] unable to assess  Support system: [] strong [] adequate [] inadequate    For new or uncontrolled symptoms, please call Palliative Care at [phone]). The service is available 24/7 (including nights & weekends) to provide symptom management recommendations over the phone as appropriate Complex medical decision making / symptom management in the setting of end stage head and neck cancer.  .  Will continue to follow for ongoing GOC discussion / titration of palliative regimen. Emotional support provided, questions answered.  Active Psychosocial Referrals:  [x]Social Work/Case management []PT/OT []Chaplaincy []Hospice  []Patient/Family Support []Holistic RN []Massage Therapy []Music Therapy []Ethics  Coping: [] well [] with difficulty [] poor coping [] unable to assess  Support system: [] strong [] adequate [] inadequate    For new or uncontrolled symptoms, please call Palliative Care at [phone]). The service is available 24/7 (including nights & weekends) to provide symptom management recommendations over the phone as appropriate

## 2023-12-18 NOTE — PROGRESS NOTE ADULT - SUBJECTIVE AND OBJECTIVE BOX
OVERNIGHT EVENTS: patient was agitated and moved to the side of bed.    SUBJECTIVE: Patient seen and examined at bedside.     Vital Signs Last 12 Hrs  T(F): 97.3 (12-16-23 @ 20:51), Max: 98.1 (12-16-23 @ 20:40)  HR: 85 (12-16-23 @ 20:51) (84 - 85)  BP: 113/65 (12-16-23 @ 20:51) (113/65 - 117/67)  BP(mean): 83 (12-16-23 @ 20:40) (83 - 83)  RR: 19 (12-16-23 @ 20:51) (17 - 19)  SpO2: 96% (12-16-23 @ 20:51) (93% - 96%)  I&O's Summary      PHYSICAL EXAM:  Constitutional: NAD, cachexia, unarousible  HEENT: NC/AT, PERRLA, EOMI, no conjunctival pallor or scleral icterus, dry mucus membraine  Neck: Supple, no JVD  Respiratory: Audible excessive secretions; labored breathing  Cardiovascular: RRR, normal S1 and S2, no m/r/g.   Gastrointestinal: +BS, soft NTND, no guarding or rebound tenderness, no palpable masses   : Paige in place  Extremities: wwp; no cyanosis, clubbing or edema.   Vascular: Pulses equal and strong throughout.   Neurological: AAOx0; dysphagia  Skin: No gross skin abnormalities or rashes        LABS: None      RADIOLOGY & ADDITIONAL TESTS:    MEDICATIONS  (STANDING):  glycopyrrolate Injectable 0.4 milliGRAM(s) IV Push every 6 hours  HYDROmorphone  Injectable 0.5 milliGRAM(s) IV Push every 4 hours  scopolamine 1 mG/72 Hr(s) Patch 1 Patch Transdermal every 72 hours    MEDICATIONS  (PRN):  bisacodyl Suppository 10 milliGRAM(s) Rectal daily PRN Constipation  HYDROmorphone  Injectable 0.5 milliGRAM(s) IV Push every 2 hours PRN Moderate pain (4-6), Severe pain (7-10), Respiratory rate greater than 22  LORazepam   Injectable 0.5 milliGRAM(s) IV Push every 4 hours PRN Anxiety  ondansetron Injectable 4 milliGRAM(s) IV Push every 6 hours PRN Nausea and/or Vomiting

## 2023-12-19 NOTE — PROGRESS NOTE ADULT - SUBJECTIVE AND OBJECTIVE BOX
SUBJECTIVE AND OBJECTIVE:  Unarousable.  Febrile, hypoxic, tachypneic.  No oral route  INTERVAL HPI/OVERNIGHT EVENTS:  End of life, actively dying.  DNR on chart:   Allergies    No Known Allergies    Intolerances    MEDICATIONS  (STANDING):  glycopyrrolate Injectable 0.4 milliGRAM(s) IV Push every 6 hours  HYDROmorphone Infusion 0.2 mG/Hr (0.2 mL/Hr) IV Continuous <Continuous>  LORazepam   Injectable 1 milliGRAM(s) IV Push every 6 hours  scopolamine 1 mG/72 Hr(s) Patch 1 Patch Transdermal every 72 hours    MEDICATIONS  (PRN):  bisacodyl Suppository 10 milliGRAM(s) Rectal daily PRN Constipation  HYDROmorphone  Injectable 0.5 milliGRAM(s) IV Push every 2 hours PRN Moderate pain (4-6), Severe pain (7-10), Respiratory rate greater than 22  LORazepam   Injectable 0.5 milliGRAM(s) IV Push every 4 hours PRN Anxiety  ondansetron Injectable 4 milliGRAM(s) IV Push every 6 hours PRN Nausea and/or Vomiting      ITEMS UNCHECKED ARE NOT PRESENT    PRESENT SYMPTOMS: [x ]Unable to obtain due to poor mentation   Source if other than patient:  [ ]Family   [ ]Team     Pain (Impact on QOL):    Location:  Minimal acceptable level (0-10 scale):                   Aggravating factors:  Quality:  Radiation:  Severity (0-10 scale):    Timing:    Dyspnea:                           [ ]Mild [ ]Moderate [ ]Severe  Anxiety:                             [ ]Mild [ ]Moderate [ ]Severe  Fatigue:                             [ ]Mild [ ]Moderate [ ]Severe  Nausea:                             [ ]Mild [ ]Moderate [ ]Severe  Loss of appetite:              [ ]Mild [ ]Moderate [ ]Severe  Constipation:                    [ ]Mild [ ]Moderate [ ]Severe  Grief Present                    [ ] Yes  [ ] No   PAIN AD Score:	  http://geriatrictoolkit.[place].[facility]/cog/painad.pdf (Ctrl + left click to view)    Other Symptoms:  [x]All other review of systems negative     Karnofsky Performance Score/Palliative Performance Status Version 2:     10    %    http://palliative.info/resource_material/[email or web address].pdf  PHYSICAL EXAM:  Vital Signs Last 24 Hrs  T(C): 37.4 (19 Dec 2023 13:49), Max: 39.2 (19 Dec 2023 05:46)  T(F): 99.3 (19 Dec 2023 13:49), Max: 102.6 (19 Dec 2023 05:46)  HR: 92 (19 Dec 2023 13:49) (92 - 99)  BP: 111/61 (19 Dec 2023 13:49) (111/61 - 137/68)  BP(mean): --  RR: 16 (19 Dec 2023 13:49) (16 - 18)  SpO2: 93% (19 Dec 2023 13:49) (79% - 93%)    Parameters below as of 19 Dec 2023 13:49  Patient On (Oxygen Delivery Method): nasal cannula  O2 Flow (L/min): 3   I&O's Summary   GENERAL:  [ ]Alert  [ ]Oriented x   [ ]Lethargic  [ x]Cachexia  [x ]Unarousable  [ ]Verbal  [ ]Non-Verbal  Behavioral:   [ ] Anxiety  [ ] Delirium [ ] Agitation [ x] Other  HEENT:  [ ]Normal   [ x]Dry mouth   [ ]ET Tube/Trach  [ ]Oral lesions  PULMONARY:   [ ]Clear [x ]Tachypnea  [x ]Audible excessive secretions   [x ]Rhonchi        [ ]Right [ ]Left [x]Bilateral  [ ]Crackles        [ ]Right [ ]Left [ ]Bilateral  [ ]Wheezing     [ ]Right [ ]Left [ ]Bilateral  CARDIOVASCULAR:    [ x]Regular [ ]Irregular [ ]Tachy  [ ]Yaya [ ]Murmur [ ]Other  GASTROINTESTINAL:  [ x]Soft  [ ]Distended   [ x]+BS  [ x]Non tender [ ]Tender  [ ]PEG [ ]OGT/ NGT   Last BM:  GENITOURINARY:  [ ]Normal [ ] Incontinent   [ ]Oliguria/Anuria   [ x]Paige  MUSCULOSKELETAL:   [ ]Normal   [ ]Weakness  [x ]Bed/Wheelchair bound [ ]Edema  NEUROLOGIC:   [ ]No focal deficits  [ ] Cognitive impairment  [ ] Dysphagia [ ]Dysarthria [ ] Paresis [ x]Other   SKIN:   [ x]Normal   [ ]Pressure ulcer(s)  [ ]Rash    CRITICAL CARE:  [ ] Shock Present  [ ]Septic [ ]Cardiogenic [ ]Neurologic [ ]Hypovolemic  [ ]  Vasopressors [ ]  Inotropes   [ ] Respiratory failure present  [ ] Acute  [ ] Chronic [ ] Hypoxic  [ ] Hypercarbic [ ] Other  [ ] Other organ failure     LABS:    NONE        RADIOLOGY & ADDITIONAL STUDIES:    Protein Calorie Malnutrition Present: [ ] yes [ ] no  [ ] PPSV2 < or = 30%  [ ] significant weight loss [ ] poor nutritional intake [ ] anasarca [ ] catabolic state Artificial Nutrition [ ]     REFERRALS:   [ ]Chaplaincy  [ ] Hospice  [ ]Child Life  [ ]Social Work  [ ]Case management [ ]Holistic Therapy   Goals of Care Document:  SUBJECTIVE AND OBJECTIVE:  Unarousable.  Febrile, hypoxic, tachypneic.  No oral route  INTERVAL HPI/OVERNIGHT EVENTS:  End of life, actively dying.  DNR on chart:   Allergies    No Known Allergies    Intolerances    MEDICATIONS  (STANDING):  glycopyrrolate Injectable 0.4 milliGRAM(s) IV Push every 6 hours  HYDROmorphone Infusion 0.2 mG/Hr (0.2 mL/Hr) IV Continuous <Continuous>  LORazepam   Injectable 1 milliGRAM(s) IV Push every 6 hours  scopolamine 1 mG/72 Hr(s) Patch 1 Patch Transdermal every 72 hours    MEDICATIONS  (PRN):  bisacodyl Suppository 10 milliGRAM(s) Rectal daily PRN Constipation  HYDROmorphone  Injectable 0.5 milliGRAM(s) IV Push every 2 hours PRN Moderate pain (4-6), Severe pain (7-10), Respiratory rate greater than 22  LORazepam   Injectable 0.5 milliGRAM(s) IV Push every 4 hours PRN Anxiety  ondansetron Injectable 4 milliGRAM(s) IV Push every 6 hours PRN Nausea and/or Vomiting      ITEMS UNCHECKED ARE NOT PRESENT    PRESENT SYMPTOMS: [x ]Unable to obtain due to poor mentation   Source if other than patient:  [ ]Family   [ ]Team     Pain (Impact on QOL):    Location:  Minimal acceptable level (0-10 scale):                   Aggravating factors:  Quality:  Radiation:  Severity (0-10 scale):    Timing:    Dyspnea:                           [ ]Mild [ ]Moderate [ ]Severe  Anxiety:                             [ ]Mild [ ]Moderate [ ]Severe  Fatigue:                             [ ]Mild [ ]Moderate [ ]Severe  Nausea:                             [ ]Mild [ ]Moderate [ ]Severe  Loss of appetite:              [ ]Mild [ ]Moderate [ ]Severe  Constipation:                    [ ]Mild [ ]Moderate [ ]Severe  Grief Present                    [ ] Yes  [ ] No   PAIN AD Score:	  http://geriatrictoolkit.[place].[facility]/cog/painad.pdf (Ctrl + left click to view)    Other Symptoms:  [x]All other review of systems negative     Karnofsky Performance Score/Palliative Performance Status Version 2:     10    %    http://palliative.info/resource_material/[email or web address].pdf  PHYSICAL EXAM:  Vital Signs Last 24 Hrs  T(C): 37.4 (19 Dec 2023 13:49), Max: 39.2 (19 Dec 2023 05:46)  T(F): 99.3 (19 Dec 2023 13:49), Max: 102.6 (19 Dec 2023 05:46)  HR: 92 (19 Dec 2023 13:49) (92 - 99)  BP: 111/61 (19 Dec 2023 13:49) (111/61 - 137/68)  BP(mean): --  RR: 16 (19 Dec 2023 13:49) (16 - 18)  SpO2: 93% (19 Dec 2023 13:49) (79% - 93%)    Parameters below as of 19 Dec 2023 13:49  Patient On (Oxygen Delivery Method): nasal cannula  O2 Flow (L/min): 3   I&O's Summary   GENERAL:  [ ]Alert  [ ]Oriented x   [ ]Lethargic  [ x]Cachexia  [x ]Unarousable  [ ]Verbal  [ ]Non-Verbal  Behavioral:   [ ] Anxiety  [ ] Delirium [ ] Agitation [ x] Other  HEENT:  [ ]Normal   [ x]Dry mouth   [ ]ET Tube/Trach  [ ]Oral lesions  PULMONARY:   [ ]Clear [x ]Tachypnea  [x ]Audible excessive secretions   [x ]Rhonchi        [ ]Right [ ]Left [x]Bilateral  [ ]Crackles        [ ]Right [ ]Left [ ]Bilateral  [ ]Wheezing     [ ]Right [ ]Left [ ]Bilateral  CARDIOVASCULAR:    [ x]Regular [ ]Irregular [ ]Tachy  [ ]Yaya [ ]Murmur [ ]Other  GASTROINTESTINAL:  [ x]Soft  [ ]Distended   [ x]+BS  [ x]Non tender [ ]Tender  [ ]PEG [ ]OGT/ NGT   Last BM:  GENITOURINARY:  [ ]Normal [ ] Incontinent   [ ]Oliguria/Anuria   [ x]Paige  MUSCULOSKELETAL:   [ ]Normal   [ ]Weakness  [x ]Bed/Wheelchair bound [ ]Edema  NEUROLOGIC:   [ ]No focal deficits  [ ] Cognitive impairment  [ ] Dysphagia [ ]Dysarthria [ ] Paresis [ x]Other   SKIN:   [ x]Normal   [ ]Pressure ulcer(s)  [ ]Rash    CRITICAL CARE:  [ ] Shock Present  [ ]Septic [ ]Cardiogenic [ ]Neurologic [ ]Hypovolemic  [ ]  Vasopressors [ ]  Inotropes   [ ] Respiratory failure present  [ ] Acute  [ ] Chronic [ ] Hypoxic  [ ] Hypercarbic [ ] Other  [ ] Other organ failure     LABS:    NONE        RADIOLOGY & ADDITIONAL STUDIES:    Protein Calorie Malnutrition Present: [ ] yes [ ] no  [ ] PPSV2 < or = 30%  [ ] significant weight loss [ ] poor nutritional intake [ ] anasarca [ ] catabolic state Artificial Nutrition [ ]     REFERRALS:   [ ]Chaplaincy  [ ] Hospice  [ ]Child Life  [ ]Social Work  [ ]Case management [ ]Holistic Therapy   Goals of Care Document:

## 2023-12-19 NOTE — PROGRESS NOTE ADULT - PROBLEM SELECTOR PLAN 6
Complex medical decision making / symptom management in the setting of end stage head and neck cancer.  .  Will continue to follow for ongoing GOC discussion / titration of palliative regimen. Emotional support provided, questions answered.  Active Psychosocial Referrals:  [x]Social Work/Case management []PT/OT []Chaplaincy []Hospice  []Patient/Family Support []Holistic RN []Massage Therapy []Music Therapy []Ethics  Coping: [] well [] with difficulty [] poor coping [] unable to assess  Support system: [] strong [] adequate [] inadequate    For new or uncontrolled symptoms, please call Palliative Care at [phone]). The service is available 24/7 (including nights & weekends) to provide symptom management recommendations over the phone as appropriate Complex medical decision making / symptom management in the setting of end stage head and neck cancer.  .  Will continue to follow for ongoing GOC discussion / titration of palliative regimen. Emotional support provided, questions answered.  Active Psychosocial Referrals:  [x]Social Work/Case management []PT/OT []Chaplaincy []Hospice  []Patient/Family Support []Holistic RN []Massage Therapy []Music Therapy []Ethics  Coping: [] well [] with difficulty [] poor coping [] unable to assess  Support system: [] strong [] adequate [] inadequate    For new or uncontrolled symptoms, please call Palliative Care at [phone]). The service is available 24/7 (including nights & weekends) to provide symptom management recommendations over the phone as appropriate

## 2023-12-20 NOTE — PROGRESS NOTE ADULT - PROBLEM SELECTOR PLAN 6
Complex medical decision making / symptom management in the setting of end stage head and neck cancer.  .  Will continue to follow for ongoing GOC discussion / titration of palliative regimen. Emotional support provided, questions answered.  Active Psychosocial Referrals:  [x]Social Work/Case management []PT/OT []Chaplaincy []Hospice  []Patient/Family Support []Holistic RN []Massage Therapy []Music Therapy []Ethics  Coping: [] well [] with difficulty [] poor coping [] unable to assess  Support system: [] strong [] adequate [] inadequate    For new or uncontrolled symptoms, please call Palliative Care at [phone]). The service is available 24/7 (including nights & weekends) to provide symptom management recommendations over the phone as appropriate Complex medical decision making / symptom management in the setting of end stage head and neck cancer.  .  Will continue to follow for ongoing GOC discussion / titration of palliative regimen. Emotional support provided, questions answered.  Active Psychosocial Referrals:  [x]Social Work/Case management []PT/OT []Chaplaincy []Hospice  []Patient/Family Support []Holistic RN []Massage Therapy []Music Therapy []Ethics  Coping: [] well [] with difficulty [] poor coping [] unable to assess  Support system: [] strong [] adequate [] inadequate    For new or uncontrolled symptoms, please call Palliative Care at [phone]). The service is available 24/7 (including nights & weekends) to provide symptom management recommendations over the phone as appropriate

## 2023-12-20 NOTE — PROGRESS NOTE ADULT - SUBJECTIVE AND OBJECTIVE BOX
SUBJECTIVE AND OBJECTIVE: Unarousable. Wife at bedside reports patient otherwise has been comfortable while she has been at bedside.    INTERVAL HPI/OVERNIGHT EVENTS:  Patient was febrile overnight. He received two doses of IV Dilaudid 0.5mg. Continues to be on Dilaudid GTT.   End of life, actively dying.    DNR on chart:   Allergies    DNR on chart: Yes  Yes      Allergies    No Known Allergies    Intolerances    MEDICATIONS  (STANDING):  glycopyrrolate Injectable 0.4 milliGRAM(s) IV Push every 6 hours  HYDROmorphone Infusion 0.2 mG/Hr (0.2 mL/Hr) IV Continuous <Continuous>  LORazepam   Injectable 1 milliGRAM(s) IV Push every 6 hours  scopolamine 1 mG/72 Hr(s) Patch 1 Patch Transdermal every 72 hours    MEDICATIONS  (PRN):  bisacodyl Suppository 10 milliGRAM(s) Rectal daily PRN Constipation  HYDROmorphone  Injectable 0.5 milliGRAM(s) IV Push every 2 hours PRN Moderate pain (4-6), Severe pain (7-10), Respiratory rate greater than 22  LORazepam   Injectable 0.5 milliGRAM(s) IV Push every 4 hours PRN Anxiety  ondansetron Injectable 4 milliGRAM(s) IV Push every 6 hours PRN Nausea and/or Vomiting    ITEMS UNCHECKED ARE NOT PRESENT    PRESENT SYMPTOMS: [x ]Unable to self-report see PAINAD, RDOS below  Source if other than patient:  [ x]Family   [ x]Team     Pain: [ ] yes [ ] no  QOL impact -   Location -                    Aggravating factors -  Quality -  Radiation -  Timing-  Severity (0-10 scale):  Minimal acceptable level (0-10 scale):     Dyspnea:                           [ ]Mild [ ]Moderate [ ]Severe  Anxiety:                             [ ]Mild [ ]Moderate [ ]Severe  Fatigue:                             [ ]Mild [ ]Moderate [ ]Severe  Nausea:                             [ ]Mild [ ]Moderate [ ]Severe  Loss of appetite:              [ ]Mild [ ]Moderate [ ]Severe  Constipation:                    [ ]Mild [ ]Moderate [ ]Severe    PCSSQ [Palliative Care Spiritual Screening Question]   Severity (0-10):  Score of 4 or > indicate consideration of Chaplaincy referral.  Chaplaincy Referral: [ ] yes [ ] refused [ ] following [ ] deferred    Caregiver [place]? : [ ] yes [ ] no [ ] deferred:  Social work referral [ ] Patient & Family Centered Care Referral [ ]   Anticipatory Grief present?:  [ ] yes [ ] no [ ] deferred:  Social work referral [ ] Patient & Family Centered Care Referral [ ]  	  Other Symptoms:  [ ]All other review of systems negative     PHYSICAL EXAM:   Vital Signs Last 24 Hrs  T(C): 37.4 (20 Dec 2023 05:43), Max: 37.4 (19 Dec 2023 13:49)  T(F): 99.3 (20 Dec 2023 05:43), Max: 99.3 (19 Dec 2023 13:49)  HR: 96 (20 Dec 2023 05:43) (92 - 96)  BP: 126/70 (20 Dec 2023 05:43) (111/61 - 126/70)  BP(mean): --  RR: 16 (20 Dec 2023 05:43) (16 - 16)  SpO2: 97% (20 Dec 2023 05:43) (93% - 97%)    Parameters below as of 20 Dec 2023 05:43  Patient On (Oxygen Delivery Method): nasal cannula  O2 Flow (L/min): 3   I&O's Summary    GENERAL:  [ ]Alert  [ ]Oriented x   [ ]Lethargic  [ x]Cachexia  [x ]Unarousable  [ ]Verbal  [ ]Non-Verbal  Behavioral:   [ ] Anxiety  [ ] Delirium [ ] Agitation [ x] Other  HEENT:  [ ]Normal   [ x]Dry mouth   [ ]ET Tube/Trach  [ ]Oral lesions  PULMONARY:   [ ]Clear [ ]Tachypnea  [ ]Audible excessive secretions   [x ]Rhonchi        [ ]Right [ ]Left [x]Bilateral  [ ]Crackles        [ ]Right [ ]Left [ ]Bilateral  [ ]Wheezing     [ ]Right [ ]Left [ ]Bilateral  CARDIOVASCULAR:    [ x]Regular [ ]Irregular [ ]Tachy  [ ]Yaya [ ]Murmur [ ]Other  GASTROINTESTINAL:  [ x]Soft  [ ]Distended   [ x]+BS  [ x]Non tender [ ]Tender  [ ]PEG [ ]OGT/ NGT   Last BM:  GENITOURINARY:  [ ]Normal [ ] Incontinent   [ ]Oliguria/Anuria   [ x]Paige  MUSCULOSKELETAL:   [ ]Normal   [ ]Weakness  [x ]Bed/Wheelchair bound [ ]Edema  NEUROLOGIC:   [ ]No focal deficits  [ ] Cognitive impairment  [ ] Dysphagia [ ]Dysarthria [ ] Paresis [ x]Other   SKIN:   [ ]Normal   [ ]Pressure ulcer(s)  [ ]Rash    CRITICAL CARE:  [ ] Shock Present  [ ]Septic [ ]Cardiogenic [ ]Neurologic [ ]Hypovolemic  [ ]  Vasopressors [ ]  Inotropes   [ ] Respiratory failure present  [ ] Acute  [ ] Chronic [ ] Hypoxic  [ ] Hypercarbic [ ] Other  [ ] Other organ failure     LABS: none new      RADIOLOGY & ADDITIONAL STUDIES: none new    PROTEIN CALORIE MALNUTRITION: [ ] mild [ ] moderate [ ] severe  [ ] underweight [ ] morbid obesity    https://www.andeal.org/vault/2440/web/files/ONC/Table_Clinical%20Characteristics%20to%20Document%20Malnutrition-White%20JV%20et%20al%202012.pdf    Height (cm): 167.6 (12-14-23 @ 17:09), 167.6 (12-02-23 @ 01:10), 167.6 (11-30-23 @ 10:25)  Weight (kg): 60 (12-14-23 @ 17:09), 60 (12-02-23 @ 01:10), 64.4 (11-30-23 @ 10:25)  BMI (kg/m2): 21.4 (12-14-23 @ 17:09), 21.4 (12-02-23 @ 01:10), 22.9 (11-30-23 @ 10:25)    [x ] PPSV2 < or = 30% [ ] significant weight loss [ ] poor nutritional intake [ ] anasarca   Artificial Nutrition [ ]     Other REFERRALS:    [ ] Hospice  [ ]Child Life  [ ]Social Work  [ ]Case management [ ]Holistic Therapy [ ] Physical Therapy [ ] Dietary         Palliative Performance Scale:  http://npcrc.org/files/news/palliative_performance_scale_ppsv2.pdf  (Ctrl +  left click to view)  Respiratory Distress Observation Tool:  https://homecareinformation.net/handouts/hen/Respiratory_Distress_Observation_Scale.pdf (Ctrl +  left click to view)  PAINAD Score:  http://geriatrictoolkit.[facility]/cog/painad.pdf (Ctrl +  left click to view)   SUBJECTIVE AND OBJECTIVE: Unarousable. Wife at bedside reports patient otherwise has been comfortable while she has been at bedside.    INTERVAL HPI/OVERNIGHT EVENTS:  Patient was febrile overnight. He received two doses of IV Dilaudid 0.5mg. Continues to be on Dilaudid GTT.   End of life, actively dying.    DNR on chart:   Allergies    DNR on chart: Yes  Yes      Allergies    No Known Allergies    Intolerances    MEDICATIONS  (STANDING):  glycopyrrolate Injectable 0.4 milliGRAM(s) IV Push every 6 hours  HYDROmorphone Infusion 0.2 mG/Hr (0.2 mL/Hr) IV Continuous <Continuous>  LORazepam   Injectable 1 milliGRAM(s) IV Push every 6 hours  scopolamine 1 mG/72 Hr(s) Patch 1 Patch Transdermal every 72 hours    MEDICATIONS  (PRN):  bisacodyl Suppository 10 milliGRAM(s) Rectal daily PRN Constipation  HYDROmorphone  Injectable 0.5 milliGRAM(s) IV Push every 2 hours PRN Moderate pain (4-6), Severe pain (7-10), Respiratory rate greater than 22  LORazepam   Injectable 0.5 milliGRAM(s) IV Push every 4 hours PRN Anxiety  ondansetron Injectable 4 milliGRAM(s) IV Push every 6 hours PRN Nausea and/or Vomiting    ITEMS UNCHECKED ARE NOT PRESENT    PRESENT SYMPTOMS: [x ]Unable to self-report see PAINAD, RDOS below  Source if other than patient:  [ x]Family   [ x]Team     Pain: [ ] yes [ ] no  QOL impact -   Location -                    Aggravating factors -  Quality -  Radiation -  Timing-  Severity (0-10 scale):  Minimal acceptable level (0-10 scale):     Dyspnea:                           [ ]Mild [ ]Moderate [ ]Severe  Anxiety:                             [ ]Mild [ ]Moderate [ ]Severe  Fatigue:                             [ ]Mild [ ]Moderate [ ]Severe  Nausea:                             [ ]Mild [ ]Moderate [ ]Severe  Loss of appetite:              [ ]Mild [ ]Moderate [ ]Severe  Constipation:                    [ ]Mild [ ]Moderate [ ]Severe    PCSSQ [Palliative Care Spiritual Screening Question]   Severity (0-10):  Score of 4 or > indicate consideration of Chaplaincy referral.  Chaplaincy Referral: [ ] yes [ ] refused [ ] following [ ] deferred    Caregiver [place]? : [ ] yes [ ] no [ ] deferred:  Social work referral [ ] Patient & Family Centered Care Referral [ ]   Anticipatory Grief present?:  [ ] yes [ ] no [ ] deferred:  Social work referral [ ] Patient & Family Centered Care Referral [ ]  	  Other Symptoms:  [ ]All other review of systems negative     PHYSICAL EXAM:   Vital Signs Last 24 Hrs  T(C): 37.4 (20 Dec 2023 05:43), Max: 37.4 (19 Dec 2023 13:49)  T(F): 99.3 (20 Dec 2023 05:43), Max: 99.3 (19 Dec 2023 13:49)  HR: 96 (20 Dec 2023 05:43) (92 - 96)  BP: 126/70 (20 Dec 2023 05:43) (111/61 - 126/70)  BP(mean): --  RR: 16 (20 Dec 2023 05:43) (16 - 16)  SpO2: 97% (20 Dec 2023 05:43) (93% - 97%)    Parameters below as of 20 Dec 2023 05:43  Patient On (Oxygen Delivery Method): nasal cannula  O2 Flow (L/min): 3   I&O's Summary    GENERAL:  [ ]Alert  [ ]Oriented x   [ ]Lethargic  [ x]Cachexia  [x ]Unarousable  [ ]Verbal  [ ]Non-Verbal  Behavioral:   [ ] Anxiety  [ ] Delirium [ ] Agitation [ x] Other  HEENT:  [ ]Normal   [ x]Dry mouth   [ ]ET Tube/Trach  [ ]Oral lesions  PULMONARY:   [ ]Clear [ ]Tachypnea  [ ]Audible excessive secretions   [x ]Rhonchi        [ ]Right [ ]Left [x]Bilateral  [ ]Crackles        [ ]Right [ ]Left [ ]Bilateral  [ ]Wheezing     [ ]Right [ ]Left [ ]Bilateral  CARDIOVASCULAR:    [ x]Regular [ ]Irregular [ ]Tachy  [ ]Yaya [ ]Murmur [ ]Other  GASTROINTESTINAL:  [ x]Soft  [ ]Distended   [ x]+BS  [ x]Non tender [ ]Tender  [ ]PEG [ ]OGT/ NGT   Last BM:  GENITOURINARY:  [ ]Normal [ ] Incontinent   [ ]Oliguria/Anuria   [ x]Paige  MUSCULOSKELETAL:   [ ]Normal   [ ]Weakness  [x ]Bed/Wheelchair bound [ ]Edema  NEUROLOGIC:   [ ]No focal deficits  [ ] Cognitive impairment  [ ] Dysphagia [ ]Dysarthria [ ] Paresis [ x]Other   SKIN:   [ ]Normal   [ ]Pressure ulcer(s)  [ ]Rash    CRITICAL CARE:  [ ] Shock Present  [ ]Septic [ ]Cardiogenic [ ]Neurologic [ ]Hypovolemic  [ ]  Vasopressors [ ]  Inotropes   [ ] Respiratory failure present  [ ] Acute  [ ] Chronic [ ] Hypoxic  [ ] Hypercarbic [ ] Other  [ ] Other organ failure     LABS: none new      RADIOLOGY & ADDITIONAL STUDIES: none new    PROTEIN CALORIE MALNUTRITION: [ ] mild [ ] moderate [ ] severe  [ ] underweight [ ] morbid obesity    https://www.andeal.org/vault/2440/web/files/ONC/Table_Clinical%20Characteristics%20to%20Document%20Malnutrition-White%20JV%20et%20al%202012.pdf    Height (cm): 167.6 (12-14-23 @ 17:09), 167.6 (12-02-23 @ 01:10), 167.6 (11-30-23 @ 10:25)  Weight (kg): 60 (12-14-23 @ 17:09), 60 (12-02-23 @ 01:10), 64.4 (11-30-23 @ 10:25)  BMI (kg/m2): 21.4 (12-14-23 @ 17:09), 21.4 (12-02-23 @ 01:10), 22.9 (11-30-23 @ 10:25)    [x ] PPSV2 < or = 30% [ ] significant weight loss [ ] poor nutritional intake [ ] anasarca   Artificial Nutrition [ ]     Other REFERRALS:    [ ] Hospice  [ ]Child Life  [ ]Social Work  [ ]Case management [ ]Holistic Therapy [ ] Physical Therapy [ ] Dietary         Palliative Performance Scale:  http://npcrc.org/files/news/palliative_performance_scale_ppsv2.pdf  (Ctrl +  left click to view)  Respiratory Distress Observation Tool:  https://homecareinformation.net/handouts/hen/Respiratory_Distress_Observation_Scale.pdf (Ctrl +  left click to view)  PAINAD Score:  http://geriatrictoolkit.[facility]/cog/painad.pdf (Ctrl +  left click to view)

## 2023-12-21 NOTE — PROGRESS NOTE ADULT - PROBLEM SELECTOR PLAN 2
- Robinul 0.4mg IV Q6 hours.  - Scopolamine patch.
- Continue Robinul 0.4mg IV Q6 hours.  - Continue Scopolamine patch.
- Robinul 0.4mg IV Q6 hours.  - Scopolamine patch.
- Continue Robinul 0.4mg IV Q6 hours.  - Continue Scopolamine patch.

## 2023-12-21 NOTE — PROGRESS NOTE ADULT - PROBLEM/PLAN-5
DISPLAY PLAN FREE TEXT
Name band;
DISPLAY PLAN FREE TEXT

## 2023-12-21 NOTE — PROGRESS NOTE ADULT - PROBLEM SELECTOR PROBLEM 2
Increased oropharyngeal secretions

## 2023-12-21 NOTE — PROGRESS NOTE ADULT - PROBLEM SELECTOR PLAN 4
- Dilaudid increased to 0.5mg IV Q4 hours ATC.  - Fentanyl patch discontinued.
- Continue Dilaudid gtt. at 0.5mg/hr - increased due to increased frequency use of PRNs  - Continue Dilaudid 1mg IV Q2 hours PRN - Required five doses of 0.5mg IV Dilaudid in past 24h 7am-7am
- Dilaudid increased to 0.5mg IV Q4 hours ATC.  - Fentanyl patch discontinued.
- Continue Dilaudid gtt. at 0.2mg/hr started.  - Continue Dilaudid 0.5mg IV Q2 hours PRN - Required two doses in past 24h 7am-7am
- Dilaudid increased to 0.5mg IV Q4 hours ATC.  - Fentanyl patch discontinued.
- Dilaudid increased to 0.5mg IV Q4 hours ATC.  - Fentanyl patch discontinued.
- Dilaudid gtt. at 0.2mg/hr started.  - Dilaudid 0.5mg IV Q2 hours prn.

## 2023-12-21 NOTE — PROGRESS NOTE ADULT - PROBLEM SELECTOR PLAN 6
Complex medical decision making / symptom management in the setting of end stage head and neck cancer.  .  Will continue to follow for ongoing GOC discussion / titration of palliative regimen. Emotional support provided, questions answered.  Active Psychosocial Referrals:  [x]Social Work/Case management []PT/OT []Chaplaincy []Hospice  []Patient/Family Support []Holistic RN []Massage Therapy []Music Therapy []Ethics  Coping: [] well [] with difficulty [] poor coping [] unable to assess  Support system: [] strong [] adequate [] inadequate    For new or uncontrolled symptoms, please call Palliative Care at [phone]). The service is available 24/7 (including nights & weekends) to provide symptom management recommendations over the phone as appropriate Complex medical decision making / symptom management in the setting of end stage head and neck cancer.  .  Will continue to follow for ongoing GOC discussion / titration of palliative regimen. Emotional support provided, questions answered.  Active Psychosocial Referrals:  [x]Social Work/Case management []PT/OT []Chaplaincy []Hospice  []Patient/Family Support []Holistic RN []Massage Therapy []Music Therapy []Ethics  Coping: [] well [] with difficulty [] poor coping [] unable to assess  Support system: [] strong [] adequate [] inadequate    For new or uncontrolled symptoms, please call Palliative Care at [phone]). The service is available 24/7 (including nights & weekends) to provide symptom management recommendations over the phone as appropriate

## 2023-12-21 NOTE — PROGRESS NOTE ADULT - PROBLEM SELECTOR PROBLEM 1
Head and neck cancer

## 2023-12-21 NOTE — PROGRESS NOTE ADULT - PROBLEM SELECTOR PLAN 1
- End stage disease.  - No further disease targeted therapy.  - Inpatient hospice admission.

## 2023-12-21 NOTE — PROGRESS NOTE ADULT - SUBJECTIVE AND OBJECTIVE BOX
OVERNIGHT EVENTS: No acute overnight events. Unarousable. Patient appears more tachypneic today.      DNR on chart: Yes  Yes      Allergies    No Known Allergies    Intolerances    MEDICATIONS  (STANDING):  glycopyrrolate Injectable 0.4 milliGRAM(s) IV Push every 6 hours  HYDROmorphone Infusion 0.5 mG/Hr (0.5 mL/Hr) IV Continuous <Continuous>  LORazepam   Injectable 1 milliGRAM(s) IV Push every 6 hours  scopolamine 1 mG/72 Hr(s) Patch 1 Patch Transdermal every 72 hours    MEDICATIONS  (PRN):  bisacodyl Suppository 10 milliGRAM(s) Rectal daily PRN Constipation  HYDROmorphone  Injectable 1 milliGRAM(s) IV Push every 2 hours PRN Moderate pain (4-6), Severe pain (7-10), Respiratory rate greater than 22  LORazepam   Injectable 0.5 milliGRAM(s) IV Push every 4 hours PRN Anxiety  ondansetron Injectable 4 milliGRAM(s) IV Push every 6 hours PRN Nausea and/or Vomiting    ITEMS UNCHECKED ARE NOT PRESENT    PRESENT SYMPTOMS: [x ]Unable to self-report see PAINAD, RDOS below  Source if other than patient:  [ ]Family   [ x]Team     Pain: [ ] yes [ ] no  QOL impact -   Location -                    Aggravating factors -  Quality -  Radiation -  Timing-  Severity (0-10 scale):  Minimal acceptable level (0-10 scale):     Dyspnea:                           [ ]Mild [ ]Moderate [ ]Severe  Anxiety:                             [ ]Mild [ ]Moderate [ ]Severe  Fatigue:                             [ ]Mild [ ]Moderate [ ]Severe  Nausea:                             [ ]Mild [ ]Moderate [ ]Severe  Loss of appetite:              [ ]Mild [ ]Moderate [ ]Severe  Constipation:                    [ ]Mild [ ]Moderate [ ]Severe    PCSSQ [Palliative Care Spiritual Screening Question]   Severity (0-10):  Score of 4 or > indicate consideration of Chaplaincy referral.  Chaplaincy Referral: [ ] yes [ ] refused [ ] following [ ] deferred    Caregiver [place]? : [ ] yes [ ] no [ ] deferred:  Social work referral [ ] Patient & Family Centered Care Referral [ ]   Anticipatory Grief present?:  [ ] yes [ ] no [ ] deferred:  Social work referral [ ] Patient & Family Centered Care Referral [ ]  	  Other Symptoms:  [ ]All other review of systems negative   Patient unable to verbalize    PHYSICAL EXAM:   Vital Signs Last 24 Hrs  T(C): 36.8 (21 Dec 2023 06:02), Max: 39.4 (20 Dec 2023 13:05)  T(F): 98.3 (21 Dec 2023 06:02), Max: 103 (20 Dec 2023 13:05)  HR: 114 (21 Dec 2023 06:02) (114 - 114)  BP: 138/73 (21 Dec 2023 06:02) (138/73 - 138/73)  BP(mean): --  RR: 18 (21 Dec 2023 06:02) (18 - 18)  SpO2: 95% (21 Dec 2023 06:02) (95% - 95%)    Parameters below as of 21 Dec 2023 06:02  Patient On (Oxygen Delivery Method): nasal cannula     I&O's Summary    GENERAL:  [ ]Alert  [ ]Oriented x   [ ]Lethargic  [ x]Cachexia  [x ]Unarousable  [ ]Verbal  [ ]Non-Verbal  Behavioral:   [ ] Anxiety  [ ] Delirium [ ] Agitation [ x] Other  HEENT:  [ ]Normal   [ x]Dry mouth   [ ]ET Tube/Trach  [ ]Oral lesions  PULMONARY:   [ ]Clear [ ]Tachypnea  [ ]Audible excessive secretions   [x ]Rhonchi        [ ]Right [ ]Left [x]Bilateral  [ ]Crackles        [ ]Right [ ]Left [ ]Bilateral  [ ]Wheezing     [ ]Right [ ]Left [ ]Bilateral  CARDIOVASCULAR:    [ x]Regular [ ]Irregular [ ]Tachy  [ ]Yaya [ ]Murmur [ ]Other  GASTROINTESTINAL:  [ x]Soft  [ ]Distended   [ x]+BS  [ x]Non tender [ ]Tender  [ ]PEG [ ]OGT/ NGT   Last BM:  GENITOURINARY:  [ ]Normal [ ] Incontinent   [ ]Oliguria/Anuria   [ x]Paige  MUSCULOSKELETAL:   [ ]Normal   [ ]Weakness  [x ]Bed/Wheelchair bound [ ]Edema  NEUROLOGIC:   [ ]No focal deficits  [ ] Cognitive impairment  [ ] Dysphagia [ ]Dysarthria [ ] Paresis [ x]Other   SKIN:   [ ]Normal   [ ]Pressure ulcer(s)  [ ]Rash    CRITICAL CARE:  [ ] Shock Present  [ ]Septic [ ]Cardiogenic [ ]Neurologic [ ]Hypovolemic  [ ]  Vasopressors [ ]  Inotropes   [ ] Respiratory failure present  [ ] Acute  [ ] Chronic [ ] Hypoxic  [ ] Hypercarbic [ ] Other  [ ] Other organ failure     LABS: none new      RADIOLOGY & ADDITIONAL STUDIES: none new    PROTEIN CALORIE MALNUTRITION: [ ] mild [ ] moderate [ ] severe  [ ] underweight [ ] morbid obesity    https://www.andeal.org/genesis/2440/web/files/ONC/Table_Clinical%20Characteristics%20to%20Document%20Malnutrition-White%20JV%20et%20al%202012.pdf    Height (cm): 167.6 (12-14-23 @ 17:09), 167.6 (12-02-23 @ 01:10), 167.6 (11-30-23 @ 10:25)  Weight (kg): 60 (12-14-23 @ 17:09), 60 (12-02-23 @ 01:10), 64.4 (11-30-23 @ 10:25)  BMI (kg/m2): 21.4 (12-14-23 @ 17:09), 21.4 (12-02-23 @ 01:10), 22.9 (11-30-23 @ 10:25)    [x ] PPSV2 < or = 30% [ ] significant weight loss [ ] poor nutritional intake [ ] anasarca   Artificial Nutrition [ ]     Other REFERRALS:    [ ] Hospice  [ ]Child Life  [ ]Social Work  [ ]Case management [ ]Holistic Therapy [ ] Physical Therapy [ ] Dietary         Palliative Performance Scale:  http://npcrc.org/files/news/palliative_performance_scale_ppsv2.pdf  (Ctrl +  left click to view)  Respiratory Distress Observation Tool:  https://homecareinformation.net/handouts/hen/Respiratory_Distress_Observation_Scale.pdf (Ctrl +  left click to view)  PAINAD Score:  http://geriatrictoolkit.[place].[email or web address]/cog/painad.pdf (Ctrl +  left click to view)   OVERNIGHT EVENTS: No acute overnight events. Unarousable. Patient appears more tachypneic today.      DNR on chart: Yes  Yes      Allergies    No Known Allergies    Intolerances    MEDICATIONS  (STANDING):  glycopyrrolate Injectable 0.4 milliGRAM(s) IV Push every 6 hours  HYDROmorphone Infusion 0.5 mG/Hr (0.5 mL/Hr) IV Continuous <Continuous>  LORazepam   Injectable 1 milliGRAM(s) IV Push every 6 hours  scopolamine 1 mG/72 Hr(s) Patch 1 Patch Transdermal every 72 hours    MEDICATIONS  (PRN):  bisacodyl Suppository 10 milliGRAM(s) Rectal daily PRN Constipation  HYDROmorphone  Injectable 1 milliGRAM(s) IV Push every 2 hours PRN Moderate pain (4-6), Severe pain (7-10), Respiratory rate greater than 22  LORazepam   Injectable 0.5 milliGRAM(s) IV Push every 4 hours PRN Anxiety  ondansetron Injectable 4 milliGRAM(s) IV Push every 6 hours PRN Nausea and/or Vomiting    ITEMS UNCHECKED ARE NOT PRESENT    PRESENT SYMPTOMS: [x ]Unable to self-report see PAINAD, RDOS below  Source if other than patient:  [ ]Family   [ x]Team     Pain: [ ] yes [ ] no  QOL impact -   Location -                    Aggravating factors -  Quality -  Radiation -  Timing-  Severity (0-10 scale):  Minimal acceptable level (0-10 scale):     Dyspnea:                           [ ]Mild [ ]Moderate [ ]Severe  Anxiety:                             [ ]Mild [ ]Moderate [ ]Severe  Fatigue:                             [ ]Mild [ ]Moderate [ ]Severe  Nausea:                             [ ]Mild [ ]Moderate [ ]Severe  Loss of appetite:              [ ]Mild [ ]Moderate [ ]Severe  Constipation:                    [ ]Mild [ ]Moderate [ ]Severe    PCSSQ [Palliative Care Spiritual Screening Question]   Severity (0-10):  Score of 4 or > indicate consideration of Chaplaincy referral.  Chaplaincy Referral: [ ] yes [ ] refused [ ] following [ ] deferred    Caregiver [place]? : [ ] yes [ ] no [ ] deferred:  Social work referral [ ] Patient & Family Centered Care Referral [ ]   Anticipatory Grief present?:  [ ] yes [ ] no [ ] deferred:  Social work referral [ ] Patient & Family Centered Care Referral [ ]  	  Other Symptoms:  [ ]All other review of systems negative   Patient unable to verbalize    PHYSICAL EXAM:   Vital Signs Last 24 Hrs  T(C): 36.8 (21 Dec 2023 06:02), Max: 39.4 (20 Dec 2023 13:05)  T(F): 98.3 (21 Dec 2023 06:02), Max: 103 (20 Dec 2023 13:05)  HR: 114 (21 Dec 2023 06:02) (114 - 114)  BP: 138/73 (21 Dec 2023 06:02) (138/73 - 138/73)  BP(mean): --  RR: 18 (21 Dec 2023 06:02) (18 - 18)  SpO2: 95% (21 Dec 2023 06:02) (95% - 95%)    Parameters below as of 21 Dec 2023 06:02  Patient On (Oxygen Delivery Method): nasal cannula     I&O's Summary    GENERAL:  [ ]Alert  [ ]Oriented x   [ ]Lethargic  [ x]Cachexia  [x ]Unarousable  [ ]Verbal  [ ]Non-Verbal  Behavioral:   [ ] Anxiety  [ ] Delirium [ ] Agitation [ x] Other  HEENT:  [ ]Normal   [ x]Dry mouth   [ ]ET Tube/Trach  [ ]Oral lesions  PULMONARY:   [ ]Clear [ ]Tachypnea  [ ]Audible excessive secretions   [x ]Rhonchi        [ ]Right [ ]Left [x]Bilateral  [ ]Crackles        [ ]Right [ ]Left [ ]Bilateral  [ ]Wheezing     [ ]Right [ ]Left [ ]Bilateral  CARDIOVASCULAR:    [ x]Regular [ ]Irregular [ ]Tachy  [ ]Yaya [ ]Murmur [ ]Other  GASTROINTESTINAL:  [ x]Soft  [ ]Distended   [ x]+BS  [ x]Non tender [ ]Tender  [ ]PEG [ ]OGT/ NGT   Last BM:  GENITOURINARY:  [ ]Normal [ ] Incontinent   [ ]Oliguria/Anuria   [ x]Paige  MUSCULOSKELETAL:   [ ]Normal   [ ]Weakness  [x ]Bed/Wheelchair bound [ ]Edema  NEUROLOGIC:   [ ]No focal deficits  [ ] Cognitive impairment  [ ] Dysphagia [ ]Dysarthria [ ] Paresis [ x]Other   SKIN:   [ ]Normal   [ ]Pressure ulcer(s)  [ ]Rash    CRITICAL CARE:  [ ] Shock Present  [ ]Septic [ ]Cardiogenic [ ]Neurologic [ ]Hypovolemic  [ ]  Vasopressors [ ]  Inotropes   [ ] Respiratory failure present  [ ] Acute  [ ] Chronic [ ] Hypoxic  [ ] Hypercarbic [ ] Other  [ ] Other organ failure     LABS: none new      RADIOLOGY & ADDITIONAL STUDIES: none new    PROTEIN CALORIE MALNUTRITION: [ ] mild [ ] moderate [ ] severe  [ ] underweight [ ] morbid obesity    https://www.andeal.org/genesis/2440/web/files/ONC/Table_Clinical%20Characteristics%20to%20Document%20Malnutrition-White%20JV%20et%20al%202012.pdf    Height (cm): 167.6 (12-14-23 @ 17:09), 167.6 (12-02-23 @ 01:10), 167.6 (11-30-23 @ 10:25)  Weight (kg): 60 (12-14-23 @ 17:09), 60 (12-02-23 @ 01:10), 64.4 (11-30-23 @ 10:25)  BMI (kg/m2): 21.4 (12-14-23 @ 17:09), 21.4 (12-02-23 @ 01:10), 22.9 (11-30-23 @ 10:25)    [x ] PPSV2 < or = 30% [ ] significant weight loss [ ] poor nutritional intake [ ] anasarca   Artificial Nutrition [ ]     Other REFERRALS:    [ ] Hospice  [ ]Child Life  [ ]Social Work  [ ]Case management [ ]Holistic Therapy [ ] Physical Therapy [ ] Dietary         Palliative Performance Scale:  http://npcrc.org/files/news/palliative_performance_scale_ppsv2.pdf  (Ctrl +  left click to view)  Respiratory Distress Observation Tool:  https://homecareinformation.net/handouts/hen/Respiratory_Distress_Observation_Scale.pdf (Ctrl +  left click to view)  PAINAD Score:  http://geriatrictoolkit.[place].[facility]/cog/painad.pdf (Ctrl +  left click to view)

## 2023-12-21 NOTE — PROGRESS NOTE ADULT - REASON FOR ADMISSION
Head and neck cancer, increased oropharyngeal secretions

## 2023-12-22 NOTE — DISCHARGE NOTE FOR THE EXPIRED PATIENT - MD NAME THAT PRONOUNCED PATIENT DEAD
Gabe Phillips Dr. Phillips [facility] Dr. Phillips [facility], [place]

## 2023-12-22 NOTE — DISCHARGE NOTE FOR THE EXPIRED PATIENT - HOSPITAL COURSE
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 Marr/Dr. Gar),  s/p PEG placement 11/14, discharged to rehab, readmitted to [facility] for SOFI placement due to family dissatisfiction, course c/b disloged PEG. Patient self removed his PEG two times. Goals of care conversation was held with family. Decision was made to not replace PEG and focus on comfort care. Patient was then admitted to inpatient hospice for management of increasing end of life symptoms, secretions, respiratory distress, and pain.    On 12/22 at 10:30, author was called to bedside to evaluate the patient for no spontaneous respiration. On physical exam, patient did not respond to verbal or noxious stimuli. No spontaneous respirations. Absent heart and breath sounds. Absent radial and carotid pulses. Pupils are fixed and dilated, no corneal reflex. Patient pronounced dead at 10:35. Attending, Dr. Roberson was notified. Family denies need for autopsy. 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 Marr/Dr. Gar),  s/p PEG placement 11/14, discharged to rehab, readmitted to [facility] for SOFI placement due to family dissatisfiction, course c/b disloged PEG. Patient self removed his PEG two times. Goals of care conversation was held with family. Decision was made to not replace PEG and focus on comfort care. Patient was then admitted to inpatient hospice for management of increasing end of life symptoms, secretions, respiratory distress, and pain.    On 12/22 at 10:30, author was called to bedside to evaluate the patient for no spontaneous respiration. On physical exam, patient did not respond to verbal or noxious stimuli. No spontaneous respirations. Absent heart and breath sounds. Absent radial and carotid pulses. Pupils are fixed and dilated, no corneal reflex. Patient pronounced dead at 10:35. Attending, Dr. Roberson was notified. Family denies need for autopsy.

## 2023-12-26 DIAGNOSIS — Z51.5 ENCOUNTER FOR PALLIATIVE CARE: ICD-10-CM

## 2023-12-26 DIAGNOSIS — J96.01 ACUTE RESPIRATORY FAILURE WITH HYPOXIA: ICD-10-CM

## 2023-12-26 DIAGNOSIS — R62.7 ADULT FAILURE TO THRIVE: ICD-10-CM

## 2023-12-26 DIAGNOSIS — R53.2 FUNCTIONAL QUADRIPLEGIA: ICD-10-CM

## 2023-12-26 DIAGNOSIS — Z66 DO NOT RESUSCITATE: ICD-10-CM

## 2023-12-26 DIAGNOSIS — I25.10 ATHEROSCLEROTIC HEART DISEASE OF NATIVE CORONARY ARTERY WITHOUT ANGINA PECTORIS: ICD-10-CM

## 2023-12-26 DIAGNOSIS — C76.0 MALIGNANT NEOPLASM OF HEAD, FACE AND NECK: ICD-10-CM

## 2023-12-26 DIAGNOSIS — I10 ESSENTIAL (PRIMARY) HYPERTENSION: ICD-10-CM

## 2023-12-26 DIAGNOSIS — R64 CACHEXIA: ICD-10-CM

## 2023-12-26 DIAGNOSIS — R13.10 DYSPHAGIA, UNSPECIFIED: ICD-10-CM

## 2023-12-26 DIAGNOSIS — C32.9 MALIGNANT NEOPLASM OF LARYNX, UNSPECIFIED: ICD-10-CM

## 2023-12-26 DIAGNOSIS — R45.1 RESTLESSNESS AND AGITATION: ICD-10-CM

## 2023-12-26 DIAGNOSIS — E11.51 TYPE 2 DIABETES MELLITUS WITH DIABETIC PERIPHERAL ANGIOPATHY WITHOUT GANGRENE: ICD-10-CM

## 2023-12-26 DIAGNOSIS — Z93.1 GASTROSTOMY STATUS: ICD-10-CM

## 2023-12-27 NOTE — DIETITIAN INITIAL EVALUATION ADULT - ENTER FROM (CAL/KG)
Assessment: 70 year old man with HTN, HLD, old dvt not on AC for 1 year with rle hematoma after fall. Surgery reconsulted for worsening pain. Swelling not noticeably different from prior exam. Pulses and motor/sensory intact with slight limitation secondary to pain, no pain out of proportion to exam or pain on passive flexion. Would re-imaging to assess evolution of hematoma    Recs:  - Serial exams  - Repeat CBC and CK  - CTA RLE to assess for active extravasation  - RLE wrapped from foot to thigh with Ace   - Pain control    B Team Surgery   [id number] Assessment: 70 year old man with HTN, HLD, old dvt not on AC for 1 year with rle hematoma after fall. Surgery reconsulted for worsening pain. Swelling not noticeably different from prior exam. Pulses and motor/sensory intact with slight limitation secondary to pain, no pain out of proportion to exam or pain on passive flexion. Would re-imaging to assess evolution of hematoma    Recs:  - Serial exams  - Repeat CBC and CK  - CTA RLE to assess for active extravasation  - RLE wrapped from foot to thigh with Ace   - Pain control    B Team Surgery   [id number]

## 2024-02-28 NOTE — H&P ADULT - BIRTH SEX
[facility] Diagnostic Referral    Patient has a referral for an EGD and C-Scope with a diagnosis of Nausea and vomiting, unspecified vomiting type, Abdominal pain, generalized, Slow transit constipation.    Please advise.    Thank you,  Cathleen Ramirez on 2/28/2024 at 11:05 AM    
Male

## 2024-05-03 NOTE — PRE-ANESTHESIA EVALUATION ADULT - TEMPERATURE IN CELSIUS (DEGREES C)
bike at dusk or after dark  Brain trauma prevention:  Wear helmet for biking, skiing and other activities that can cause a high impact injury  Emergencies: Teach child what to do in the case of an emergency; how to dial 911.    Gun Safety:  teach child to never touch any guns.  All guns should be locked up and unloaded in a safe.  Fire safety:  ensure all homes have fire and carbon monoxide detectors.  Internet safety:  always supervise and consider parental controls.  LIMIT screen time  Child abuse prevention:  Teach your child the different between good touch and bad touch, and to report any bad touches.  Also teach it is NEVER ok for an adult to tell a child to keep secrets from their parents or to express interest in a child's private parts.  Effects of second hand smoke  Avoid direct sunlight, sun protective clothing, sunscreen  Importance of detecting school issues ASAP as school failure has significant neg effect on children's self esteem and confidence   Importance of caring/supportive relationships with family and friends  Importance of reporting bullying, stalking, abuse, and any threat to one's safety ASAP  Importance of appropriate sleep amount and sleep hygiene (this age group should get 10-11 hours a night)  Importance of responsibility with school work; impact on one's future  Importance of responsibility at home.  This helps build a sense of competence as well.  Reasonable consequences for not following family rules.  Conflict resolution should always be non-violent  Proper dental care.  If no fluoride in water, need for oral fluoride supplementation  Signs of depression and anxiety;  Importance of reaching out for help if one ever develops these signs  Age/experience appropriate counseling concerning puberty, peer pressure, drug/alcohol/tobacco use, prevention strategy: to prevent making decisions one will later regret  Normal development  When to call  Well child visit schedule          An  36.7

## 2024-05-16 NOTE — PRE-ANESTHESIA EVALUATION ADULT - NSANTHSUBSTSD_GEN_ALL_CORE
47y P4 year old presents to PST prior to scheduled Midurethral Sling, Cystoscopy with Dr. Grant on 6/6/24. PMhx Stress Incontinence. PShx colonoscopy. C/o leakage of urine with coughing, sneezing, and walking as well as with urgency. Reports these symptoms have been increasing over the past 3 years. Endorses occasional pelvic pressure. Denies constipation and endorses bowel movements 2x daily. Denies dysuria, hematuria. Denies any chest pain, palpitations, SOB, N/V, fever or chills.     
No

## 2024-07-15 NOTE — PROGRESS NOTE ADULT - PROBLEM/PLAN-1
Pt arrives to ED refusing to tell triage RN what brings pt to ED \"you should already know, you can look it up, I just need to go upstairs to see my doctor\". Pt screaming at RN, banging on triage door. Security present.   
DISPLAY PLAN FREE TEXT
DISPLAY PLAN FREE TEXT

## 2024-10-21 NOTE — PATIENT PROFILE ADULT - NSPRESCRALCFREQ_GEN_A_NUR
"Progress Note - Hospitalist   Name: Drew Santos 75 y.o. male I MRN: [id number]  Unit/Bed#: E2 -01 I Date of Admission: 10/18/2024   Date of Service: 10/21/2024 I Hospital Day: 3    Assessment & Plan  Sepsis  Patient recently discharged 10/4 with polymicrobial bacteremia secondary to sacral wound and completed course of IV cefepime, flagyl and vancomycin followed by ampicillin. Blood cultures are growing Enterococcus, Enterobacter and Proteus last month. Patient presented to [facility] ED 10/18 from SNF due to worsening sacral wounds and possible osteomyelitis. Patient received cefepime, Flagyl and vancomycin in the ER.   CXR \"Limited study. Some atelectasis is present at the right base. No convincing evidence of pneumonia.\"  CT abdomen pelvis showing \"Large sacral decubitus ulcer extending to the coccyx with soft tissue gas now seen extending into the right and left medial aspects of the gluteus jac muscles. No drainable fluid collection. Erosion of the coccyx suggestive of osteomyelitis.\"  Blood culture x2 obtained upon admission growing Proteus and E. coli  MRSA negative  Has been afebrile since admission.  Infectious disease recs appreciated  Continue IV antibiotics with IV Rocephin  Gram-negative bacteremia  Blood cultures growing gram-negative rods, E. coli and Proteus  Infectious disease following  De-escalating antibiotics, currently on IV Rocephin  Right upper quadrant ultrasound performed, no evidence of acute cholecystitis  Surgery planning debridement in the OR tomorrow  Elevated liver enzymes  POA mild elevation , ALT 99, without guarding    Resolved  Right upper quadrant ultrasound without evidence of acute cholecystitis  Sacral ulcer (HCC)  Patient has at least 2 sacral ulcers. At least one of them is stage IV with palpable bone. Possible osteomyelitis. Surgery has examined patient's sacral wound and lower concern for acute infection, though may be source of translocation.   Surgery consult " "appreciated; recommend initiation of 0.25% Dakin soaked guaze packing TID while inpatient   Q2hr turns as patient tolerates  OR debridement scheduled for tomorrow   Pyuria  Noted with innumerable WBC from chronic hanks catheter sample, which cannot alone determine UTI in the setting of chronic catheter. CT scan showing \"Tiny bilateral renal cysts. Nonobstructing left renal calculi. No hydronephrosis or hydroureter. Chronic bilateral perinephric stranding. And Relatively collapsed around a Hanks catheter limiting evaluation\"  Urine culture growing Proteus  Continue IV Rocephin  Hypernatremia  Sodium 154, likely hypovolemic hypernatremia related to mild dehydration secondary to poor water intake  Received IV fluids with D5  NG tube in place, continue free water flushes and tube feeds  Continue daily monitoring  Chronic indwelling Hanks catheter  Chronic urethral hanks catheter secondary to chronic urinary retention, BPH.  Hanks patent at this time   Hanks was exchanged 10/19  Continue tamsulosin and finasteride  Proctitis  Noted on CT scan with history of this as possibly chronic. No diarrhea or clinical evidence of colitis.   Type 2 diabetes mellitus without complication, without long-term current use of insulin (HCC)  Lab Results   Component Value Date    HGBA1C 6.2 (H) 09/17/2024     Recent Labs     10/20/24  1614 10/20/24  2131 10/21/24  0709 10/21/24  1133   POCGLU 165* 168* 187* 191*     Blood Sugar Average: Last 72 hrs:  (P) [id number]  Last HgbA1C 6.2%  Continue sliding scale insulin   Hypoglycemia protocol  Monitor while on tube feeds  Paroxysmal atrial fibrillation (HCC)  Eliquis held for surgery  Continue pta metoprolol with hold parameters  Anemia of chronic disease  Has chronic anemia likely secondary to poor nutritional status underlying chronic illness.   Hemoglobin stable at 8.3  Hemodynamically stable at this time  Trend with daily CBC  Severe protein-calorie malnutrition (HCC)  Malnutrition " Findings:   ChronicAdult Malnutrition type: Chronic illness  Adult Degree of Malnutrition: Other severe protein calorie malnutrition  Malnutrition Characteristics: Weight loss, Inadequate energy  360 Statement: Severe protein calorie malnutrition in context of chronic illness r/t poor appetite, inadequate PO intake as evidance by energy intake less than 75% compared to estimated needs>1 month, 14% wt loss in 1 month (9/17/24 114 kg, 10/19/24 97.7 kg) treated with PO diet. Once able to have po treat with Mechanical Soft diet, Magic Cup BID, Ensure Compact 1x daily.  Patient with significant weight loss and per wife with significantly decreased oral intake over the past few months and has not been eating for 1 week at the nursing home.  Weight noted in April to be 275 lb, and now weighing 215 lb  Speech therapy consult and recommend dysphagia level 1-puree with thin liquids  NG tube placed  Tube feed ordered with PO diet, Dietary to adjust based on patient nutritional needs.  Plan for PEG tube placement in the OR tomorrow  Continue high-dose thiamine and electrolyte monitoring for refeeding syndrome    BMI Findings:    Body mass index is 25.54 kg/m².   History of CVA (cerebrovascular accident)  History of stroke with L sided hemiparesis recently hospitalized at [facility] in July 2024  Baseline non-verbal, alert, at times selective in answers, can nod yes/no however unsure accuracy most information obtained per patients wife  Baseline Dysphagia 2 mechanical soft, thin liquids, patient well known to speech therapy  Advanced care planning/counseling discussion  Patient readmitted with worsening prognosis with large sacral wound and developing osteomyelitis secondary to bacteremia, severe malnutrition. Extensive discussion with wife regarding patients guarded prognosis and goals of care for her . Wife opting to proceed with full medical care at this time.   Started on tube feeds with NG tube, PEG tube to be  placed tomorrow  Seen in consult by palliative care, family remains goal oriented    VTE Pharmacologic Prophylaxis: VTE Score: 8 High Risk (Score >/= 5) - Pharmacological DVT Prophylaxis Ordered: heparin. Sequential Compression Devices Ordered.    Mobility:   Basic Mobility Inpatient Raw Score: 6  JH-HLM Goal: 2: Bed activities/Dependent transfer  JH-HLM Achieved: 1: Laying in bed  JH-HLM Goal NOT achieved. Continue with multidisciplinary rounding and encourage appropriate mobility to improve upon JH-HLM goals.    Patient Centered Rounds: I performed bedside rounds with nursing staff today.   Discussions with Specialists or Other Care Team Provider: palliative, general surgery, ID    Education and Discussions with Family / Patient: Updated  (wife) at bedside.    Current Length of Stay: 3 day(s)  Current Patient Status: Inpatient   Certification Statement: The patient will continue to require additional inpatient hospital stay due to bacteremia requiring IV abx, sacral wound pending debridement, malnutrition pending PEG placement   Discharge Plan: Anticipate discharge in >72 hrs to discharge location to be determined pending rehab evaluations.    Code Status: Level 1 - Full Code    Subjective   Patient seen and examined at bedside.  Patient does not respond to voice, does not open eyes    Objective :  Temp:  [97 °F (36.1 °C)-97.9 °F (36.6 °C)] 97.9 °F (36.6 °C)  HR:  [] 126  BP: (100-137)/(58-80) 101/70  Resp:  [16-18] 18  SpO2:  [94 %-99 %] 99 %  O2 Device: None (Room air)    Body mass index is 25.54 kg/m².     Input and Output Summary (last 24 hours):     Intake/Output Summary (Last 24 hours) at 10/21/2024 1423  Last data filed at 10/21/2024 1359  Gross per 24 hour   Intake 100 ml   Output 1100 ml   Net -1000 ml       Physical Exam  Vitals reviewed.   Constitutional:       Appearance: He is ill-appearing.      Comments: NG tube   HENT:      Head: Normocephalic and atraumatic.   Eyes:       General: No scleral icterus.     Conjunctiva/sclera: Conjunctivae normal.   Cardiovascular:      Rate and Rhythm: Regular rhythm. Tachycardia present.      Heart sounds: No murmur heard.  Pulmonary:      Effort: Pulmonary effort is normal. No respiratory distress.      Breath sounds: Normal breath sounds.   Abdominal:      General: Bowel sounds are normal. There is no distension.      Palpations: Abdomen is soft.      Tenderness: There is no abdominal tenderness.   Musculoskeletal:      Cervical back: Neck supple.      Right lower leg: No edema.      Left lower leg: No edema.   Skin:     General: Skin is warm and dry.   Neurological:      Motor: Weakness (Chronic) present.   Psychiatric:         Speech: He is noncommunicative.           Lines/Drains:  Lines/Drains/Airways       Active Status       Name Placement date Placement time Site Days    Urethral Catheter Latex 20 Fr. 10/19/24  1209  Latex  2    NG/OG Tube Nasogastric Left nare 10/20/24  1518  Left nare  less than 1                  Urinary Catheter:  Goal for removal: N/A - Chronic Parra                 Lab Results: I have reviewed the following results:   Results from last 7 days   Lab Units 10/21/24  0439 10/20/24  0429   WBC Thousand/uL 15.62* 15.98*   HEMOGLOBIN g/dL 8.3* 8.5*   HEMATOCRIT % 28.1* 29.4*   PLATELETS Thousands/uL 539* 480*   SEGS PCT %  --  73   LYMPHO PCT %  --  18   MONO PCT %  --  5   EOS PCT %  --  2     Results from last 7 days   Lab Units 10/21/24  0439   SODIUM mmol/L 150*   POTASSIUM mmol/L 3.4*   CHLORIDE mmol/L 118*   CO2 mmol/L 28   BUN mg/dL 26*   CREATININE mg/dL 0.74   ANION GAP mmol/L 4   CALCIUM mg/dL 8.2*   ALBUMIN g/dL 2.0*   TOTAL BILIRUBIN mg/dL 0.62   ALK PHOS U/L 66   ALT U/L 36   AST U/L 26   GLUCOSE RANDOM mg/dL 200*     Results from last 7 days   Lab Units 10/18/24  1445   INR  1.57*     Results from last 7 days   Lab Units 10/21/24  1133 10/21/24  0709 10/20/24  2131 10/20/24  1614 10/20/24  1125 10/20/24  0633  10/19/24  2024 10/19/24  1604 10/19/24  1104 10/19/24  0744 10/18/24  2131   POC GLUCOSE mg/dl 191* 187* 168* 165* 143* 117 118 139 155* 164* 192*         Results from last 7 days   Lab Units 10/18/24  2244 10/18/24  1445   LACTIC ACID mmol/L 1.6 1.8   PROCALCITONIN ng/ml  --  0.72*       Recent Cultures (last 7 days):   Results from last 7 days   Lab Units 10/18/24  1500 10/18/24  1445 10/18/24  1335   BLOOD CULTURE   --   --  Escherichia coli*  Proteus mirabilis*   GRAM STAIN RESULT   --  Gram negative rods* Gram negative rods*   URINE CULTURE  >100,000 cfu/ml Proteus mirabilis*  --   --              Last 24 Hours Medication List:     Current Facility-Administered Medications:     acetaminophen (TYLENOL) tablet 650 mg, Q4H PRN    aspirin chewable tablet 81 mg, Daily    atorvastatin (LIPITOR) tablet 80 mg, QPM    bisacodyl (DULCOLAX) rectal suppository 10 mg, Daily PRN    cefTRIAXone (ROCEPHIN) 2,000 mg in dextrose 5 % 50 mL IVPB, Q24H    escitalopram (LEXAPRO) tablet 10 mg, Daily    finasteride (PROSCAR) tablet 5 mg, Daily    heparin (porcine) subcutaneous injection 5,000 Units, Q8H ELISEO    insulin lispro (HumALOG/ADMELOG) 100 units/mL subcutaneous injection 1-5 Units, TID AC **AND** Fingerstick Glucose (POCT), TID AC    insulin lispro (HumALOG/ADMELOG) 100 units/mL subcutaneous injection 1-5 Units, HS    lisinopril (ZESTRIL) tablet 10 mg, Daily    metoprolol succinate (TOPROL-XL) 24 hr tablet 25 mg, Daily    multivitamin stress formula tablet 1 tablet, Daily    ondansetron (ZOFRAN) injection 4 mg, Q6H PRN    senna (SENOKOT) tablet 17.2 mg, Daily PRN    tamsulosin (FLOMAX) capsule 0.4 mg, Daily With Dinner    thiamine (VITAMIN B1) 100 mg in sodium chloride 0.9 % 50 mL IVPB, Daily, Last Rate: 100 mg (10/21/24 1203)    Administrative Statements   Today, Patient Was Seen By: Esperanza Arizmendi PA-C      **Please Note: This note may have been constructed using a voice recognition system.**   Never

## 2024-12-29 NOTE — PROGRESS NOTE ADULT - PROBLEM/PLAN-11
stable   Continue Metoprolol   monitor    
DISPLAY PLAN FREE TEXT

## 2025-07-15 NOTE — PHYSICAL THERAPY INITIAL EVALUATION ADULT - LEVEL OF INDEPENDENCE: STAND/SIT, REHAB EVAL
Pt verified information regarding surgery, name, birth date, surgeon, procedure and allergies: NKA. Patient transferred to [facility] for surgery. Appropriate antibiotics ordered: Ancef 2 gm . Lab work within normal limits, 4 units of RBC's on call to OR, vital signs stable, Mepilex sacral border applied and 2% chlorhexidine gluconate skin prep given. Patient and family friend educated about surgery and pain management.    minimum assist (75% patients effort)

## (undated) DEVICE — DRSG TELFA 3 X 8

## (undated) DEVICE — DRAPE LIGHT HANDLE COVER (GREEN)

## (undated) DEVICE — SOL ANTI FOG

## (undated) DEVICE — VENODYNE/SCD SLEEVE CALF MEDIUM

## (undated) DEVICE — WARMING BLANKET LOWER ADULT

## (undated) DEVICE — SET IV INFUSE NDL 23GAX.75 12" TUBING

## (undated) DEVICE — SUCTION CATH AIRLIFE CONTROL VALVE TRIFLO 14FR

## (undated) DEVICE — Device